# Patient Record
Sex: FEMALE | Race: WHITE | Employment: OTHER | URBAN - METROPOLITAN AREA
[De-identification: names, ages, dates, MRNs, and addresses within clinical notes are randomized per-mention and may not be internally consistent; named-entity substitution may affect disease eponyms.]

---

## 2018-12-10 ENCOUNTER — HOSPITAL ENCOUNTER (EMERGENCY)
Age: 78
Discharge: HOME | End: 2018-12-10
Payer: MEDICARE

## 2018-12-10 VITALS
RESPIRATION RATE: 18 BRPM | SYSTOLIC BLOOD PRESSURE: 140 MMHG | HEART RATE: 66 BPM | OXYGEN SATURATION: 98 % | TEMPERATURE: 98.6 F | HEIGHT: 62 IN | DIASTOLIC BLOOD PRESSURE: 58 MMHG | WEIGHT: 162 LBS | BODY MASS INDEX: 29.81 KG/M2

## 2018-12-10 DIAGNOSIS — Z76.0 ENCOUNTER FOR MEDICATION REFILL: Primary | ICD-10-CM

## 2018-12-10 DIAGNOSIS — G62.9 NEUROPATHY: ICD-10-CM

## 2018-12-10 PROCEDURE — 99281 EMR DPT VST MAYX REQ PHY/QHP: CPT

## 2018-12-10 RX ORDER — HYDROCODONE BITARTRATE AND ACETAMINOPHEN 5; 325 MG/1; MG/1
1 TABLET ORAL 2 TIMES DAILY PRN
Qty: 14 TABLET | Refills: 0 | Status: SHIPPED | OUTPATIENT
Start: 2018-12-10 | End: 2018-12-17

## 2018-12-10 NOTE — ED PROVIDER NOTES
found. Please discuss with provider. Electronically signed by CONNIE Fernandez CNP   DD: 12/10/18  **This report was transcribed using voice recognition software. Every effort was made to ensure accuracy; however, inadvertent computerized transcription errors may be present.   END OF ED PROVIDER NOTE       CONNIE Fernandez CNP  12/31/18 9974

## 2018-12-14 LAB
T4 FREE: 1.1
TSH SERPL DL<=0.05 MIU/L-ACNC: 2.7 UIU/ML

## 2019-01-16 ENCOUNTER — TELEPHONE (OUTPATIENT)
Dept: ONCOLOGY | Age: 79
End: 2019-01-16

## 2019-01-18 ENCOUNTER — OFFICE VISIT (OUTPATIENT)
Dept: ONCOLOGY | Age: 79
End: 2019-01-18
Payer: MEDICARE

## 2019-01-18 ENCOUNTER — HOSPITAL ENCOUNTER (OUTPATIENT)
Dept: INFUSION THERAPY | Age: 79
Discharge: HOME | End: 2019-01-18

## 2019-01-18 VITALS
SYSTOLIC BLOOD PRESSURE: 139 MMHG | DIASTOLIC BLOOD PRESSURE: 95 MMHG | WEIGHT: 148 LBS | HEART RATE: 84 BPM | RESPIRATION RATE: 20 BRPM | HEIGHT: 61 IN | TEMPERATURE: 98.7 F | BODY MASS INDEX: 27.94 KG/M2

## 2019-01-18 DIAGNOSIS — D46.9 MYELODYSPLASIA (MYELODYSPLASTIC SYNDROME) (HCC): ICD-10-CM

## 2019-01-18 DIAGNOSIS — Z12.39 BREAST SCREENING: ICD-10-CM

## 2019-01-18 DIAGNOSIS — D46.9 MYELODYSPLASIA (MYELODYSPLASTIC SYNDROME) (HCC): Primary | ICD-10-CM

## 2019-01-18 LAB
ANISOCYTOSIS: ABNORMAL
BASOPHILS ABSOLUTE: 0 E9/L (ref 0–0.2)
BASOPHILS RELATIVE PERCENT: 0.6 % (ref 0–2)
EOSINOPHILS ABSOLUTE: 0.12 E9/L (ref 0.05–0.5)
EOSINOPHILS RELATIVE PERCENT: 2.6 % (ref 0–6)
FERRITIN: 4651 NG/ML
FOLATE: 7.2 NG/ML (ref 4.8–24.2)
HCT VFR BLD CALC: 26.3 % (ref 34–48)
HEMOGLOBIN: 8.8 G/DL (ref 11.5–15.5)
HYPOCHROMIA: ABNORMAL
IMMATURE RETIC FRACT: 18.1 % (ref 3–15.9)
IRON SATURATION: 102 % (ref 15–50)
IRON: 231 MCG/DL (ref 37–145)
LYMPHOCYTES ABSOLUTE: 2.45 E9/L (ref 1.5–4)
LYMPHOCYTES RELATIVE PERCENT: 50.9 % (ref 20–42)
MCH RBC QN AUTO: 38.1 PG (ref 26–35)
MCHC RBC AUTO-ENTMCNC: 33.5 % (ref 32–34.5)
MCV RBC AUTO: 113.9 FL (ref 80–99.9)
MONOCYTES ABSOLUTE: 0.24 E9/L (ref 0.1–0.95)
MONOCYTES RELATIVE PERCENT: 5.3 % (ref 2–12)
NEUTROPHILS ABSOLUTE: 1.97 E9/L (ref 1.8–7.3)
NEUTROPHILS RELATIVE PERCENT: 41.2 % (ref 43–80)
NUCLEATED RED BLOOD CELLS: 1.8 /100 WBC
OVALOCYTES: ABNORMAL
PDW BLD-RTO: 23.4 FL (ref 11.5–15)
PLATELET # BLD: 210 E9/L (ref 130–450)
PMV BLD AUTO: 10.3 FL (ref 7–12)
POIKILOCYTES: ABNORMAL
POLYCHROMASIA: ABNORMAL
RBC # BLD: 2.31 E12/L (ref 3.5–5.5)
RETIC HGB EQUIVALENT: 35.2 PG (ref 28.2–36.6)
RETICULOCYTE ABSOLUTE COUNT: 0.05 E12/L
RETICULOCYTE COUNT PCT: 2.2 % (ref 0.4–1.9)
SCHISTOCYTES: ABNORMAL
TARGET CELLS: ABNORMAL
TOTAL IRON BINDING CAPACITY: 227 MCG/DL (ref 250–450)
VITAMIN B-12: 657 PG/ML (ref 211–946)
WBC # BLD: 4.8 E9/L (ref 4.5–11.5)

## 2019-01-18 PROCEDURE — G8484 FLU IMMUNIZE NO ADMIN: HCPCS | Performed by: INTERNAL MEDICINE

## 2019-01-18 PROCEDURE — 84165 PROTEIN E-PHORESIS SERUM: CPT

## 2019-01-18 PROCEDURE — 1090F PRES/ABSN URINE INCON ASSESS: CPT | Performed by: INTERNAL MEDICINE

## 2019-01-18 PROCEDURE — 99205 OFFICE O/P NEW HI 60 MIN: CPT | Performed by: INTERNAL MEDICINE

## 2019-01-18 PROCEDURE — 83540 ASSAY OF IRON: CPT

## 2019-01-18 PROCEDURE — 82746 ASSAY OF FOLIC ACID SERUM: CPT

## 2019-01-18 PROCEDURE — 1123F ACP DISCUSS/DSCN MKR DOCD: CPT | Performed by: INTERNAL MEDICINE

## 2019-01-18 PROCEDURE — 1036F TOBACCO NON-USER: CPT | Performed by: INTERNAL MEDICINE

## 2019-01-18 PROCEDURE — 4040F PNEUMOC VAC/ADMIN/RCVD: CPT | Performed by: INTERNAL MEDICINE

## 2019-01-18 PROCEDURE — 83550 IRON BINDING TEST: CPT

## 2019-01-18 PROCEDURE — 85045 AUTOMATED RETICULOCYTE COUNT: CPT

## 2019-01-18 PROCEDURE — G8419 CALC BMI OUT NRM PARAM NOF/U: HCPCS | Performed by: INTERNAL MEDICINE

## 2019-01-18 PROCEDURE — 36415 COLL VENOUS BLD VENIPUNCTURE: CPT

## 2019-01-18 PROCEDURE — 82728 ASSAY OF FERRITIN: CPT

## 2019-01-18 PROCEDURE — 82668 ASSAY OF ERYTHROPOIETIN: CPT

## 2019-01-18 PROCEDURE — G8400 PT W/DXA NO RESULTS DOC: HCPCS | Performed by: INTERNAL MEDICINE

## 2019-01-18 PROCEDURE — 1101F PT FALLS ASSESS-DOCD LE1/YR: CPT | Performed by: INTERNAL MEDICINE

## 2019-01-18 PROCEDURE — G8427 DOCREV CUR MEDS BY ELIG CLIN: HCPCS | Performed by: INTERNAL MEDICINE

## 2019-01-18 PROCEDURE — 85025 COMPLETE CBC W/AUTO DIFF WBC: CPT

## 2019-01-18 PROCEDURE — 82607 VITAMIN B-12: CPT

## 2019-01-18 PROCEDURE — 99214 OFFICE O/P EST MOD 30 MIN: CPT | Performed by: INTERNAL MEDICINE

## 2019-01-20 LAB — ERYTHROPOIETIN: 77 MU/ML (ref 4–27)

## 2019-01-21 LAB
ALBUMIN SERPL-MCNC: 4 G/DL (ref 3.5–4.7)
ALPHA-1-GLOBULIN: 0.2 G/DL (ref 0.2–0.4)
ALPHA-2-GLOBULIN: 0.7 G/FL (ref 0.5–1)
BETA GLOBULIN: 0.9 G/DL (ref 0.8–1.3)
ELECTROPHORESIS: NORMAL
GAMMA GLOBULIN: 1.5 G/DL (ref 0.7–1.6)
TOTAL PROTEIN: 7.3 G/DL (ref 6.4–8.3)

## 2019-01-29 ENCOUNTER — APPOINTMENT (OUTPATIENT)
Dept: CT IMAGING | Age: 79
End: 2019-01-29
Payer: MEDICARE

## 2019-01-29 ENCOUNTER — APPOINTMENT (OUTPATIENT)
Dept: GENERAL RADIOLOGY | Age: 79
End: 2019-01-29
Payer: MEDICARE

## 2019-01-29 ENCOUNTER — HOSPITAL ENCOUNTER (OUTPATIENT)
Age: 79
Setting detail: OBSERVATION
Discharge: HOME | End: 2019-01-30
Attending: EMERGENCY MEDICINE | Admitting: INTERNAL MEDICINE
Payer: MEDICARE

## 2019-01-29 DIAGNOSIS — R09.02 HYPOXIA: ICD-10-CM

## 2019-01-29 DIAGNOSIS — J40 BRONCHITIS: Primary | ICD-10-CM

## 2019-01-29 PROBLEM — J44.1 COPD EXACERBATION (HCC): Status: ACTIVE | Noted: 2019-01-29

## 2019-01-29 LAB
ALBUMIN SERPL-MCNC: 4.5 G/DL (ref 3.5–5.2)
ALP BLD-CCNC: 45 U/L (ref 35–104)
ALT SERPL-CCNC: 30 U/L (ref 0–32)
ANION GAP SERPL CALCULATED.3IONS-SCNC: 11 MMOL/L (ref 7–16)
ANISOCYTOSIS: ABNORMAL
AST SERPL-CCNC: 28 U/L (ref 0–31)
BACTERIA: ABNORMAL /HPF
BASOPHILS ABSOLUTE: 0.03 E9/L (ref 0–0.2)
BASOPHILS RELATIVE PERCENT: 0.5 % (ref 0–2)
BILIRUB SERPL-MCNC: 1.4 MG/DL (ref 0–1.2)
BILIRUBIN URINE: NEGATIVE
BLOOD, URINE: NEGATIVE
BUN BLDV-MCNC: 26 MG/DL (ref 8–23)
CALCIUM SERPL-MCNC: 9.6 MG/DL (ref 8.6–10.2)
CASTS UA: ABNORMAL /LPF
CHLORIDE BLD-SCNC: 101 MMOL/L (ref 98–107)
CLARITY: CLEAR
CO2: 25 MMOL/L (ref 22–29)
COLOR: ABNORMAL
CREAT SERPL-MCNC: 0.9 MG/DL (ref 0.5–1)
CRYSTALS, UA: ABNORMAL
EOSINOPHILS ABSOLUTE: 0.02 E9/L (ref 0.05–0.5)
EOSINOPHILS RELATIVE PERCENT: 0.3 % (ref 0–6)
GFR AFRICAN AMERICAN: >60
GFR NON-AFRICAN AMERICAN: >60 ML/MIN/1.73
GLUCOSE BLD-MCNC: 119 MG/DL (ref 74–99)
GLUCOSE URINE: NEGATIVE MG/DL
HCT VFR BLD CALC: 26.3 % (ref 34–48)
HEMOGLOBIN: 9.1 G/DL (ref 11.5–15.5)
HYPOCHROMIA: ABNORMAL
IMMATURE GRANULOCYTES #: 0.06 E9/L
IMMATURE GRANULOCYTES %: 1 % (ref 0–5)
KETONES, URINE: ABNORMAL MG/DL
LACTIC ACID: 3.2 MMOL/L (ref 0.5–2.2)
LEUKOCYTE ESTERASE, URINE: ABNORMAL
LYMPHOCYTES ABSOLUTE: 2.04 E9/L (ref 1.5–4)
LYMPHOCYTES RELATIVE PERCENT: 32.5 % (ref 20–42)
MCH RBC QN AUTO: 40.8 PG (ref 26–35)
MCHC RBC AUTO-ENTMCNC: 34.6 % (ref 32–34.5)
MCV RBC AUTO: 117.9 FL (ref 80–99.9)
MONOCYTES ABSOLUTE: 0.55 E9/L (ref 0.1–0.95)
MONOCYTES RELATIVE PERCENT: 8.8 % (ref 2–12)
NEUTROPHILS ABSOLUTE: 3.58 E9/L (ref 1.8–7.3)
NEUTROPHILS RELATIVE PERCENT: 56.9 % (ref 43–80)
NITRITE, URINE: NEGATIVE
PDW BLD-RTO: 25.2 FL (ref 11.5–15)
PH UA: 5.5 (ref 5–9)
PLATELET # BLD: 275 E9/L (ref 130–450)
PMV BLD AUTO: 11.1 FL (ref 7–12)
POIKILOCYTES: ABNORMAL
POTASSIUM SERPL-SCNC: 4.5 MMOL/L (ref 3.5–5)
PRO-BNP: 375 PG/ML (ref 0–450)
PROTEIN UA: NEGATIVE MG/DL
RBC # BLD: 2.23 E12/L (ref 3.5–5.5)
RBC UA: ABNORMAL /HPF (ref 0–2)
SEDIMENTATION RATE, ERYTHROCYTE: 1 MM/HR (ref 0–20)
SODIUM BLD-SCNC: 137 MMOL/L (ref 132–146)
SPECIFIC GRAVITY UA: 1.02 (ref 1–1.03)
TEAR DROP CELLS: ABNORMAL
TOTAL PROTEIN: 7.5 G/DL (ref 6.4–8.3)
TROPONIN: <0.01 NG/ML (ref 0–0.03)
UROBILINOGEN, URINE: 0.2 E.U./DL
WBC # BLD: 6.3 E9/L (ref 4.5–11.5)
WBC UA: ABNORMAL /HPF (ref 0–5)

## 2019-01-29 PROCEDURE — 85025 COMPLETE CBC W/AUTO DIFF WBC: CPT

## 2019-01-29 PROCEDURE — 6360000002 HC RX W HCPCS: Performed by: EMERGENCY MEDICINE

## 2019-01-29 PROCEDURE — 80053 COMPREHEN METABOLIC PANEL: CPT

## 2019-01-29 PROCEDURE — 83605 ASSAY OF LACTIC ACID: CPT

## 2019-01-29 PROCEDURE — 71045 X-RAY EXAM CHEST 1 VIEW: CPT

## 2019-01-29 PROCEDURE — 85651 RBC SED RATE NONAUTOMATED: CPT

## 2019-01-29 PROCEDURE — 87798 DETECT AGENT NOS DNA AMP: CPT

## 2019-01-29 PROCEDURE — 96365 THER/PROPH/DIAG IV INF INIT: CPT

## 2019-01-29 PROCEDURE — 86140 C-REACTIVE PROTEIN: CPT

## 2019-01-29 PROCEDURE — 87633 RESP VIRUS 12-25 TARGETS: CPT

## 2019-01-29 PROCEDURE — 84484 ASSAY OF TROPONIN QUANT: CPT

## 2019-01-29 PROCEDURE — 96366 THER/PROPH/DIAG IV INF ADDON: CPT

## 2019-01-29 PROCEDURE — 2580000003 HC RX 258: Performed by: EMERGENCY MEDICINE

## 2019-01-29 PROCEDURE — 84145 PROCALCITONIN (PCT): CPT

## 2019-01-29 PROCEDURE — 83880 ASSAY OF NATRIURETIC PEPTIDE: CPT

## 2019-01-29 PROCEDURE — 94664 DEMO&/EVAL PT USE INHALER: CPT

## 2019-01-29 PROCEDURE — 87486 CHLMYD PNEUM DNA AMP PROBE: CPT

## 2019-01-29 PROCEDURE — 94640 AIRWAY INHALATION TREATMENT: CPT

## 2019-01-29 PROCEDURE — G0378 HOSPITAL OBSERVATION PER HR: HCPCS

## 2019-01-29 PROCEDURE — 87040 BLOOD CULTURE FOR BACTERIA: CPT

## 2019-01-29 PROCEDURE — 99285 EMERGENCY DEPT VISIT HI MDM: CPT

## 2019-01-29 PROCEDURE — 81001 URINALYSIS AUTO W/SCOPE: CPT

## 2019-01-29 PROCEDURE — 87581 M.PNEUMON DNA AMP PROBE: CPT

## 2019-01-29 PROCEDURE — 99219 PR INITIAL OBSERVATION CARE/DAY 50 MINUTES: CPT | Performed by: INTERNAL MEDICINE

## 2019-01-29 PROCEDURE — 6370000000 HC RX 637 (ALT 250 FOR IP): Performed by: EMERGENCY MEDICINE

## 2019-01-29 PROCEDURE — 36415 COLL VENOUS BLD VENIPUNCTURE: CPT

## 2019-01-29 RX ORDER — SODIUM CHLORIDE 0.9 % (FLUSH) 0.9 %
10 SYRINGE (ML) INJECTION PRN
Status: DISCONTINUED | OUTPATIENT
Start: 2019-01-29 | End: 2019-01-30 | Stop reason: HOSPADM

## 2019-01-29 RX ORDER — LEVOTHYROXINE SODIUM 175 UG/1
175 TABLET ORAL EVERY MORNING
COMMUNITY
End: 2020-07-17 | Stop reason: SDUPTHER

## 2019-01-29 RX ORDER — 0.9 % SODIUM CHLORIDE 0.9 %
1000 INTRAVENOUS SOLUTION INTRAVENOUS ONCE
Status: COMPLETED | OUTPATIENT
Start: 2019-01-29 | End: 2019-01-30

## 2019-01-29 RX ORDER — LEVOFLOXACIN 5 MG/ML
750 INJECTION, SOLUTION INTRAVENOUS ONCE
Status: COMPLETED | OUTPATIENT
Start: 2019-01-29 | End: 2019-01-30

## 2019-01-29 RX ORDER — IPRATROPIUM BROMIDE AND ALBUTEROL SULFATE 2.5; .5 MG/3ML; MG/3ML
3 SOLUTION RESPIRATORY (INHALATION) ONCE
Status: COMPLETED | OUTPATIENT
Start: 2019-01-29 | End: 2019-01-29

## 2019-01-29 RX ORDER — DOXYCYCLINE HYCLATE 100 MG/1
1 CAPSULE ORAL 2 TIMES DAILY
Refills: 0 | Status: ON HOLD | COMMUNITY
Start: 2019-01-25 | End: 2019-01-30 | Stop reason: HOSPADM

## 2019-01-29 RX ORDER — PREDNISONE 10 MG/1
10 TABLET ORAL SEE ADMIN INSTRUCTIONS
Refills: 0 | COMMUNITY
Start: 2019-01-25 | End: 2019-01-31

## 2019-01-29 RX ORDER — GABAPENTIN 300 MG/1
900 CAPSULE ORAL NIGHTLY
COMMUNITY
End: 2019-06-11 | Stop reason: SDUPTHER

## 2019-01-29 RX ADMIN — IPRATROPIUM BROMIDE AND ALBUTEROL SULFATE 3 AMPULE: .5; 3 SOLUTION RESPIRATORY (INHALATION) at 19:05

## 2019-01-29 RX ADMIN — SODIUM CHLORIDE 1000 ML: 9 INJECTION, SOLUTION INTRAVENOUS at 20:21

## 2019-01-29 RX ADMIN — LEVOFLOXACIN 750 MG: 5 INJECTION, SOLUTION INTRAVENOUS at 21:17

## 2019-01-29 ASSESSMENT — ENCOUNTER SYMPTOMS
NAUSEA: 0
STRIDOR: 0
COUGH: 1
BLOOD IN STOOL: 0
ABDOMINAL PAIN: 0
SHORTNESS OF BREATH: 0
BACK PAIN: 0
VOMITING: 0
WHEEZING: 1
CHEST TIGHTNESS: 0

## 2019-01-29 ASSESSMENT — PAIN SCALES - GENERAL: PAINLEVEL_OUTOF10: 0

## 2019-01-30 VITALS
RESPIRATION RATE: 18 BRPM | BODY MASS INDEX: 27.73 KG/M2 | SYSTOLIC BLOOD PRESSURE: 130 MMHG | OXYGEN SATURATION: 94 % | WEIGHT: 146.9 LBS | DIASTOLIC BLOOD PRESSURE: 59 MMHG | HEIGHT: 61 IN | TEMPERATURE: 97.9 F | HEART RATE: 68 BPM

## 2019-01-30 PROBLEM — G60.8 PERIPHERAL SENSORY NEUROPATHY: Chronic | Status: ACTIVE | Noted: 2019-01-29

## 2019-01-30 PROBLEM — R79.89 ELEVATED LACTIC ACID LEVEL: Status: ACTIVE | Noted: 2019-01-29

## 2019-01-30 PROBLEM — R05.9 COUGH: Status: ACTIVE | Noted: 2019-01-29

## 2019-01-30 PROBLEM — F32.A DEPRESSION: Chronic | Status: ACTIVE | Noted: 2019-01-29

## 2019-01-30 PROBLEM — E03.9 HYPOTHYROIDISM: Chronic | Status: ACTIVE | Noted: 2019-01-29

## 2019-01-30 PROBLEM — J40 BRONCHITIS: Status: ACTIVE | Noted: 2019-01-29

## 2019-01-30 PROBLEM — D46.9 MYELODYSPLASIA (MYELODYSPLASTIC SYNDROME) (HCC): Chronic | Status: ACTIVE | Noted: 2019-01-18

## 2019-01-30 PROBLEM — D53.9 MACROCYTIC ANEMIA: Status: ACTIVE | Noted: 2019-01-29

## 2019-01-30 LAB
ANION GAP SERPL CALCULATED.3IONS-SCNC: 9 MMOL/L (ref 7–16)
BUN BLDV-MCNC: 20 MG/DL (ref 8–23)
C-REACTIVE PROTEIN: 0.3 MG/DL (ref 0–0.4)
CALCIUM SERPL-MCNC: 8.6 MG/DL (ref 8.6–10.2)
CHLORIDE BLD-SCNC: 106 MMOL/L (ref 98–107)
CO2: 24 MMOL/L (ref 22–29)
CREAT SERPL-MCNC: 0.7 MG/DL (ref 0.5–1)
FILM ARRAY ADENOVIRUS: NORMAL
FILM ARRAY BORDETELLA PERTUSSIS: NORMAL
FILM ARRAY CHLAMYDOPHILIA PNEUMONIAE: NORMAL
FILM ARRAY CORONAVIRUS 229E: NORMAL
FILM ARRAY CORONAVIRUS HKU1: NORMAL
FILM ARRAY CORONAVIRUS NL63: NORMAL
FILM ARRAY CORONAVIRUS OC43: NORMAL
FILM ARRAY INFLUENZA A VIRUS 09H1: NORMAL
FILM ARRAY INFLUENZA A VIRUS H1: NORMAL
FILM ARRAY INFLUENZA A VIRUS H3: NORMAL
FILM ARRAY INFLUENZA A VIRUS: NORMAL
FILM ARRAY INFLUENZA B: NORMAL
FILM ARRAY METAPNEUMOVIRUS: NORMAL
FILM ARRAY MYCOPLASMA PNEUMONIAE: NORMAL
FILM ARRAY PARAINFLUENZA VIRUS 1: NORMAL
FILM ARRAY PARAINFLUENZA VIRUS 2: NORMAL
FILM ARRAY PARAINFLUENZA VIRUS 3: NORMAL
FILM ARRAY PARAINFLUENZA VIRUS 4: NORMAL
FILM ARRAY RESPIRATORY SYNCITIAL VIRUS: NORMAL
FILM ARRAY RHINOVIRUS/ENTEROVIRUS: NORMAL
GFR AFRICAN AMERICAN: >60
GFR NON-AFRICAN AMERICAN: >60 ML/MIN/1.73
GLUCOSE BLD-MCNC: 87 MG/DL (ref 74–99)
LACTIC ACID: 1.9 MMOL/L (ref 0.5–2.2)
POTASSIUM REFLEX MAGNESIUM: 4.2 MMOL/L (ref 3.5–5)
PROCALCITONIN: 0.05 NG/ML (ref 0–0.08)
SODIUM BLD-SCNC: 139 MMOL/L (ref 132–146)

## 2019-01-30 PROCEDURE — 2580000003 HC RX 258: Performed by: INTERNAL MEDICINE

## 2019-01-30 PROCEDURE — 6360000002 HC RX W HCPCS: Performed by: INTERNAL MEDICINE

## 2019-01-30 PROCEDURE — 6370000000 HC RX 637 (ALT 250 FOR IP): Performed by: INTERNAL MEDICINE

## 2019-01-30 PROCEDURE — 36415 COLL VENOUS BLD VENIPUNCTURE: CPT

## 2019-01-30 PROCEDURE — 80048 BASIC METABOLIC PNL TOTAL CA: CPT

## 2019-01-30 PROCEDURE — G0378 HOSPITAL OBSERVATION PER HR: HCPCS

## 2019-01-30 PROCEDURE — 83605 ASSAY OF LACTIC ACID: CPT

## 2019-01-30 PROCEDURE — APPSS45 APP SPLIT SHARED TIME 31-45 MINUTES: Performed by: PHYSICIAN ASSISTANT

## 2019-01-30 PROCEDURE — 96372 THER/PROPH/DIAG INJ SC/IM: CPT

## 2019-01-30 PROCEDURE — 99217 PR OBSERVATION CARE DISCHARGE MANAGEMENT: CPT | Performed by: HOSPITALIST

## 2019-01-30 PROCEDURE — 6370000000 HC RX 637 (ALT 250 FOR IP): Performed by: HOSPITALIST

## 2019-01-30 PROCEDURE — 94640 AIRWAY INHALATION TREATMENT: CPT

## 2019-01-30 RX ORDER — ALBUTEROL SULFATE 90 UG/1
2 AEROSOL, METERED RESPIRATORY (INHALATION) EVERY 6 HOURS PRN
Qty: 1 INHALER | Refills: 3 | Status: SHIPPED | OUTPATIENT
Start: 2019-01-30 | End: 2019-05-28

## 2019-01-30 RX ORDER — BENZONATATE 100 MG/1
100 CAPSULE ORAL EVERY 8 HOURS
Qty: 21 CAPSULE | Refills: 0 | Status: SHIPPED | OUTPATIENT
Start: 2019-01-30 | End: 2019-02-06

## 2019-01-30 RX ORDER — BENZONATATE 100 MG/1
100 CAPSULE ORAL EVERY 8 HOURS
Status: DISCONTINUED | OUTPATIENT
Start: 2019-01-30 | End: 2019-01-30 | Stop reason: HOSPADM

## 2019-01-30 RX ORDER — SODIUM CHLORIDE 9 MG/ML
INJECTION, SOLUTION INTRAVENOUS CONTINUOUS
Status: ACTIVE | OUTPATIENT
Start: 2019-01-30 | End: 2019-01-30

## 2019-01-30 RX ORDER — GUAIFENESIN/DEXTROMETHORPHAN 100-10MG/5
5 SYRUP ORAL EVERY 4 HOURS PRN
Qty: 120 ML | Refills: 0 | Status: SHIPPED | OUTPATIENT
Start: 2019-01-30 | End: 2019-02-09

## 2019-01-30 RX ORDER — IPRATROPIUM BROMIDE AND ALBUTEROL SULFATE 2.5; .5 MG/3ML; MG/3ML
1 SOLUTION RESPIRATORY (INHALATION) EVERY 4 HOURS PRN
Status: DISCONTINUED | OUTPATIENT
Start: 2019-01-30 | End: 2019-01-30 | Stop reason: HOSPADM

## 2019-01-30 RX ORDER — SODIUM CHLORIDE 0.9 % (FLUSH) 0.9 %
10 SYRINGE (ML) INJECTION EVERY 12 HOURS SCHEDULED
Status: DISCONTINUED | OUTPATIENT
Start: 2019-01-30 | End: 2019-01-30 | Stop reason: HOSPADM

## 2019-01-30 RX ORDER — SODIUM CHLORIDE 0.9 % (FLUSH) 0.9 %
10 SYRINGE (ML) INJECTION PRN
Status: DISCONTINUED | OUTPATIENT
Start: 2019-01-30 | End: 2019-01-30 | Stop reason: HOSPADM

## 2019-01-30 RX ORDER — GUAIFENESIN/DEXTROMETHORPHAN 100-10MG/5
5 SYRUP ORAL EVERY 4 HOURS PRN
Status: DISCONTINUED | OUTPATIENT
Start: 2019-01-30 | End: 2019-01-30 | Stop reason: HOSPADM

## 2019-01-30 RX ORDER — ACETAMINOPHEN 325 MG/1
650 TABLET ORAL EVERY 4 HOURS PRN
Status: DISCONTINUED | OUTPATIENT
Start: 2019-01-30 | End: 2019-01-30 | Stop reason: HOSPADM

## 2019-01-30 RX ORDER — ONDANSETRON 2 MG/ML
4 INJECTION INTRAMUSCULAR; INTRAVENOUS EVERY 6 HOURS PRN
Status: DISCONTINUED | OUTPATIENT
Start: 2019-01-30 | End: 2019-01-30 | Stop reason: HOSPADM

## 2019-01-30 RX ORDER — HYDROCODONE BITARTRATE AND ACETAMINOPHEN 5; 325 MG/1; MG/1
1 TABLET ORAL EVERY 12 HOURS PRN
Status: DISCONTINUED | OUTPATIENT
Start: 2019-01-30 | End: 2019-01-30 | Stop reason: HOSPADM

## 2019-01-30 RX ORDER — FAMOTIDINE 20 MG/1
20 TABLET, FILM COATED ORAL 2 TIMES DAILY
Status: DISCONTINUED | OUTPATIENT
Start: 2019-01-30 | End: 2019-01-30 | Stop reason: HOSPADM

## 2019-01-30 RX ORDER — ALBUTEROL SULFATE 90 UG/1
2 AEROSOL, METERED RESPIRATORY (INHALATION) 3 TIMES DAILY
Status: DISCONTINUED | OUTPATIENT
Start: 2019-01-30 | End: 2019-01-30 | Stop reason: HOSPADM

## 2019-01-30 RX ORDER — GABAPENTIN 300 MG/1
900 CAPSULE ORAL NIGHTLY
Status: DISCONTINUED | OUTPATIENT
Start: 2019-01-30 | End: 2019-01-30 | Stop reason: HOSPADM

## 2019-01-30 RX ADMIN — Medication 10 ML: at 10:27

## 2019-01-30 RX ADMIN — BENZONATATE 100 MG: 100 CAPSULE ORAL at 03:33

## 2019-01-30 RX ADMIN — GUAIFENESIN AND DEXTROMETHORPHAN 5 ML: 100; 10 SYRUP ORAL at 10:35

## 2019-01-30 RX ADMIN — GUAIFENESIN AND DEXTROMETHORPHAN 5 ML: 100; 10 SYRUP ORAL at 03:33

## 2019-01-30 RX ADMIN — FAMOTIDINE 20 MG: 20 TABLET, FILM COATED ORAL at 10:28

## 2019-01-30 RX ADMIN — SODIUM CHLORIDE: 9 INJECTION, SOLUTION INTRAVENOUS at 03:34

## 2019-01-30 RX ADMIN — ALBUTEROL SULFATE 2 PUFF: 90 AEROSOL, METERED RESPIRATORY (INHALATION) at 16:20

## 2019-01-30 RX ADMIN — BENZONATATE 100 MG: 100 CAPSULE ORAL at 10:26

## 2019-01-30 RX ADMIN — ENOXAPARIN SODIUM 40 MG: 40 INJECTION SUBCUTANEOUS at 10:26

## 2019-01-30 ASSESSMENT — PAIN DESCRIPTION - ORIENTATION: ORIENTATION: RIGHT;LEFT

## 2019-01-30 ASSESSMENT — PAIN DESCRIPTION - ONSET: ONSET: ON-GOING

## 2019-01-30 ASSESSMENT — PAIN DESCRIPTION - DESCRIPTORS: DESCRIPTORS: SORE

## 2019-01-30 ASSESSMENT — PAIN - FUNCTIONAL ASSESSMENT: PAIN_FUNCTIONAL_ASSESSMENT: PREVENTS OR INTERFERES SOME ACTIVE ACTIVITIES AND ADLS

## 2019-01-30 ASSESSMENT — PAIN DESCRIPTION - FREQUENCY: FREQUENCY: INTERMITTENT

## 2019-01-30 ASSESSMENT — PAIN SCALES - GENERAL: PAINLEVEL_OUTOF10: 1

## 2019-01-30 ASSESSMENT — PAIN DESCRIPTION - PAIN TYPE: TYPE: ACUTE PAIN

## 2019-01-30 ASSESSMENT — PAIN DESCRIPTION - LOCATION: LOCATION: CHEST

## 2019-02-01 ENCOUNTER — HOSPITAL ENCOUNTER (OUTPATIENT)
Dept: INFUSION THERAPY | Age: 79
End: 2019-02-01

## 2019-02-04 LAB
BLOOD CULTURE, ROUTINE: NORMAL
CULTURE, BLOOD 2: NORMAL
EKG ATRIAL RATE: 76 BPM
EKG P AXIS: 79 DEGREES
EKG P-R INTERVAL: 158 MS
EKG Q-T INTERVAL: 378 MS
EKG QRS DURATION: 80 MS
EKG QTC CALCULATION (BAZETT): 425 MS
EKG R AXIS: -7 DEGREES
EKG T AXIS: 44 DEGREES
EKG VENTRICULAR RATE: 76 BPM

## 2019-03-01 PROBLEM — R05.9 COUGH: Status: RESOLVED | Noted: 2019-01-29 | Resolved: 2019-03-01

## 2019-03-13 ENCOUNTER — APPOINTMENT (OUTPATIENT)
Dept: CT IMAGING | Age: 79
DRG: 195 | End: 2019-03-13
Payer: MEDICARE

## 2019-03-13 ENCOUNTER — APPOINTMENT (OUTPATIENT)
Dept: GENERAL RADIOLOGY | Age: 79
DRG: 195 | End: 2019-03-13
Payer: MEDICARE

## 2019-03-13 ENCOUNTER — HOSPITAL ENCOUNTER (INPATIENT)
Age: 79
LOS: 1 days | Discharge: HOME | DRG: 195 | End: 2019-03-14
Attending: EMERGENCY MEDICINE | Admitting: INTERNAL MEDICINE
Payer: MEDICARE

## 2019-03-13 DIAGNOSIS — J18.9 PNEUMONIA DUE TO ORGANISM: Primary | ICD-10-CM

## 2019-03-13 LAB
ALBUMIN SERPL-MCNC: 4.2 G/DL (ref 3.5–5.2)
ALP BLD-CCNC: 44 U/L (ref 35–104)
ALT SERPL-CCNC: 42 U/L (ref 0–32)
ANION GAP SERPL CALCULATED.3IONS-SCNC: 12 MMOL/L (ref 7–16)
ANISOCYTOSIS: ABNORMAL
AST SERPL-CCNC: 33 U/L (ref 0–31)
ATYPICAL LYMPHOCYTE RELATIVE PERCENT: 5.2 % (ref 0–4)
BASOPHILS ABSOLUTE: 0.06 E9/L (ref 0–0.2)
BASOPHILS RELATIVE PERCENT: 0.9 % (ref 0–2)
BILIRUB SERPL-MCNC: 1.6 MG/DL (ref 0–1.2)
BILIRUBIN URINE: NEGATIVE
BLOOD, URINE: NEGATIVE
BUN BLDV-MCNC: 17 MG/DL (ref 8–23)
CALCIUM SERPL-MCNC: 8.8 MG/DL (ref 8.6–10.2)
CHLORIDE BLD-SCNC: 100 MMOL/L (ref 98–107)
CLARITY: CLEAR
CO2: 24 MMOL/L (ref 22–29)
COLOR: YELLOW
CREAT SERPL-MCNC: 0.8 MG/DL (ref 0.5–1)
EOSINOPHILS ABSOLUTE: 0 E9/L (ref 0.05–0.5)
EOSINOPHILS RELATIVE PERCENT: 0 % (ref 0–6)
GFR AFRICAN AMERICAN: >60
GFR NON-AFRICAN AMERICAN: >60 ML/MIN/1.73
GLUCOSE BLD-MCNC: 104 MG/DL (ref 74–99)
GLUCOSE URINE: NEGATIVE MG/DL
HCT VFR BLD CALC: 26 % (ref 34–48)
HEMOGLOBIN: 8.5 G/DL (ref 11.5–15.5)
HYPOCHROMIA: ABNORMAL
INFLUENZA A BY PCR: NOT DETECTED
INFLUENZA B BY PCR: NOT DETECTED
KETONES, URINE: NEGATIVE MG/DL
LACTIC ACID: 2.3 MMOL/L (ref 0.5–2.2)
LEUKOCYTE ESTERASE, URINE: NEGATIVE
LIPASE: 41 U/L (ref 13–60)
LYMPHOCYTES ABSOLUTE: 1.52 E9/L (ref 1.5–4)
LYMPHOCYTES RELATIVE PERCENT: 17.4 % (ref 20–42)
MCH RBC QN AUTO: 38.3 PG (ref 26–35)
MCHC RBC AUTO-ENTMCNC: 32.7 % (ref 32–34.5)
MCV RBC AUTO: 117.1 FL (ref 80–99.9)
METAMYELOCYTES RELATIVE PERCENT: 1.7 % (ref 0–1)
MONOCYTES ABSOLUTE: 0.4 E9/L (ref 0.1–0.95)
MONOCYTES RELATIVE PERCENT: 6.1 % (ref 2–12)
NEUTROPHILS ABSOLUTE: 4.62 E9/L (ref 1.8–7.3)
NEUTROPHILS RELATIVE PERCENT: 68.7 % (ref 43–80)
NITRITE, URINE: NEGATIVE
NUCLEATED RED BLOOD CELLS: 0 /100 WBC
OVALOCYTES: ABNORMAL
PDW BLD-RTO: 24.3 FL (ref 11.5–15)
PH UA: 7 (ref 5–9)
PLATELET # BLD: 174 E9/L (ref 130–450)
PMV BLD AUTO: 10.2 FL (ref 7–12)
POIKILOCYTES: ABNORMAL
POLYCHROMASIA: ABNORMAL
POTASSIUM SERPL-SCNC: 4.1 MMOL/L (ref 3.5–5)
PROTEIN UA: NEGATIVE MG/DL
RBC # BLD: 2.22 E12/L (ref 3.5–5.5)
SODIUM BLD-SCNC: 136 MMOL/L (ref 132–146)
SPECIFIC GRAVITY UA: 1.01 (ref 1–1.03)
TARGET CELLS: ABNORMAL
TEAR DROP CELLS: ABNORMAL
TOTAL PROTEIN: 7.1 G/DL (ref 6.4–8.3)
UROBILINOGEN, URINE: 0.2 E.U./DL
WBC # BLD: 6.6 E9/L (ref 4.5–11.5)

## 2019-03-13 PROCEDURE — 71046 X-RAY EXAM CHEST 2 VIEWS: CPT

## 2019-03-13 PROCEDURE — 6370000000 HC RX 637 (ALT 250 FOR IP): Performed by: INTERNAL MEDICINE

## 2019-03-13 PROCEDURE — 74176 CT ABD & PELVIS W/O CONTRAST: CPT

## 2019-03-13 PROCEDURE — 83605 ASSAY OF LACTIC ACID: CPT

## 2019-03-13 PROCEDURE — 99285 EMERGENCY DEPT VISIT HI MDM: CPT

## 2019-03-13 PROCEDURE — 2580000003 HC RX 258: Performed by: EMERGENCY MEDICINE

## 2019-03-13 PROCEDURE — 81003 URINALYSIS AUTO W/O SCOPE: CPT

## 2019-03-13 PROCEDURE — 87040 BLOOD CULTURE FOR BACTERIA: CPT

## 2019-03-13 PROCEDURE — 99223 1ST HOSP IP/OBS HIGH 75: CPT | Performed by: INTERNAL MEDICINE

## 2019-03-13 PROCEDURE — G0378 HOSPITAL OBSERVATION PER HR: HCPCS

## 2019-03-13 PROCEDURE — 87633 RESP VIRUS 12-25 TARGETS: CPT

## 2019-03-13 PROCEDURE — 6370000000 HC RX 637 (ALT 250 FOR IP): Performed by: PHYSICIAN ASSISTANT

## 2019-03-13 PROCEDURE — 96365 THER/PROPH/DIAG IV INF INIT: CPT

## 2019-03-13 PROCEDURE — 85025 COMPLETE CBC W/AUTO DIFF WBC: CPT

## 2019-03-13 PROCEDURE — 36415 COLL VENOUS BLD VENIPUNCTURE: CPT

## 2019-03-13 PROCEDURE — 6360000002 HC RX W HCPCS: Performed by: INTERNAL MEDICINE

## 2019-03-13 PROCEDURE — 87798 DETECT AGENT NOS DNA AMP: CPT

## 2019-03-13 PROCEDURE — 87088 URINE BACTERIA CULTURE: CPT

## 2019-03-13 PROCEDURE — 6360000002 HC RX W HCPCS: Performed by: EMERGENCY MEDICINE

## 2019-03-13 PROCEDURE — 80053 COMPREHEN METABOLIC PANEL: CPT

## 2019-03-13 PROCEDURE — 87486 CHLMYD PNEUM DNA AMP PROBE: CPT

## 2019-03-13 PROCEDURE — 2060000000 HC ICU INTERMEDIATE R&B

## 2019-03-13 PROCEDURE — 87581 M.PNEUMON DNA AMP PROBE: CPT

## 2019-03-13 PROCEDURE — 87502 INFLUENZA DNA AMP PROBE: CPT

## 2019-03-13 PROCEDURE — 83690 ASSAY OF LIPASE: CPT

## 2019-03-13 RX ORDER — ACETAMINOPHEN 325 MG/1
650 TABLET ORAL EVERY 6 HOURS PRN
Status: DISCONTINUED | OUTPATIENT
Start: 2019-03-13 | End: 2019-03-14 | Stop reason: HOSPADM

## 2019-03-13 RX ORDER — ALBUTEROL SULFATE 90 UG/1
2 AEROSOL, METERED RESPIRATORY (INHALATION) EVERY 6 HOURS PRN
Status: DISCONTINUED | OUTPATIENT
Start: 2019-03-13 | End: 2019-03-14 | Stop reason: HOSPADM

## 2019-03-13 RX ORDER — HYDROCODONE BITARTRATE AND ACETAMINOPHEN 5; 325 MG/1; MG/1
1 TABLET ORAL NIGHTLY
Status: DISCONTINUED | OUTPATIENT
Start: 2019-03-13 | End: 2019-03-14 | Stop reason: HOSPADM

## 2019-03-13 RX ORDER — 0.9 % SODIUM CHLORIDE 0.9 %
1000 INTRAVENOUS SOLUTION INTRAVENOUS ONCE
Status: COMPLETED | OUTPATIENT
Start: 2019-03-13 | End: 2019-03-13

## 2019-03-13 RX ORDER — DOXYCYCLINE HYCLATE 100 MG/1
100 CAPSULE ORAL EVERY 12 HOURS SCHEDULED
Status: DISCONTINUED | OUTPATIENT
Start: 2019-03-13 | End: 2019-03-14 | Stop reason: HOSPADM

## 2019-03-13 RX ORDER — GABAPENTIN 300 MG/1
900 CAPSULE ORAL NIGHTLY
Status: DISCONTINUED | OUTPATIENT
Start: 2019-03-13 | End: 2019-03-14 | Stop reason: HOSPADM

## 2019-03-13 RX ORDER — ONDANSETRON 2 MG/ML
4 INJECTION INTRAMUSCULAR; INTRAVENOUS EVERY 6 HOURS PRN
Status: DISCONTINUED | OUTPATIENT
Start: 2019-03-13 | End: 2019-03-14 | Stop reason: HOSPADM

## 2019-03-13 RX ADMIN — ACETAMINOPHEN 650 MG: 325 TABLET ORAL at 17:56

## 2019-03-13 RX ADMIN — DOXYCYCLINE HYCLATE 100 MG: 100 CAPSULE ORAL at 16:15

## 2019-03-13 RX ADMIN — ONDANSETRON 4 MG: 2 INJECTION INTRAMUSCULAR; INTRAVENOUS at 17:49

## 2019-03-13 RX ADMIN — SODIUM CHLORIDE 1000 ML: 9 INJECTION, SOLUTION INTRAVENOUS at 10:06

## 2019-03-13 RX ADMIN — HYDROCODONE BITARTRATE AND ACETAMINOPHEN 1 TABLET: 5; 325 TABLET ORAL at 20:53

## 2019-03-13 RX ADMIN — GABAPENTIN 900 MG: 300 CAPSULE ORAL at 20:53

## 2019-03-13 RX ADMIN — CEFTRIAXONE SODIUM 2 G: 2 INJECTION, POWDER, FOR SOLUTION INTRAMUSCULAR; INTRAVENOUS at 10:33

## 2019-03-13 RX ADMIN — SERTRALINE HYDROCHLORIDE 50 MG: 50 TABLET ORAL at 20:53

## 2019-03-13 ASSESSMENT — PAIN SCALES - GENERAL
PAINLEVEL_OUTOF10: 0

## 2019-03-14 VITALS
BODY MASS INDEX: 26.55 KG/M2 | TEMPERATURE: 99.2 F | RESPIRATION RATE: 18 BRPM | HEIGHT: 61 IN | WEIGHT: 140.6 LBS | SYSTOLIC BLOOD PRESSURE: 102 MMHG | OXYGEN SATURATION: 90 % | DIASTOLIC BLOOD PRESSURE: 50 MMHG | HEART RATE: 78 BPM

## 2019-03-14 LAB
FILM ARRAY ADENOVIRUS: ABNORMAL
FILM ARRAY BORDETELLA PERTUSSIS: ABNORMAL
FILM ARRAY CHLAMYDOPHILIA PNEUMONIAE: ABNORMAL
FILM ARRAY CORONAVIRUS 229E: ABNORMAL
FILM ARRAY CORONAVIRUS HKU1: ABNORMAL
FILM ARRAY CORONAVIRUS NL63: ABNORMAL
FILM ARRAY CORONAVIRUS OC43: ABNORMAL
FILM ARRAY INFLUENZA A VIRUS 09H1: ABNORMAL
FILM ARRAY INFLUENZA A VIRUS H1: ABNORMAL
FILM ARRAY INFLUENZA A VIRUS H3: ABNORMAL
FILM ARRAY INFLUENZA A VIRUS: ABNORMAL
FILM ARRAY INFLUENZA B: ABNORMAL
FILM ARRAY METAPNEUMOVIRUS: ABNORMAL
FILM ARRAY MYCOPLASMA PNEUMONIAE: ABNORMAL
FILM ARRAY PARAINFLUENZA VIRUS 1: ABNORMAL
FILM ARRAY PARAINFLUENZA VIRUS 2: ABNORMAL
FILM ARRAY PARAINFLUENZA VIRUS 3: ABNORMAL
FILM ARRAY PARAINFLUENZA VIRUS 4: ABNORMAL
FILM ARRAY RHINOVIRUS/ENTEROVIRUS: ABNORMAL
ORGANISM: ABNORMAL
PROCALCITONIN: 0.07 NG/ML (ref 0–0.08)

## 2019-03-14 PROCEDURE — APPSS45 APP SPLIT SHARED TIME 31-45 MINUTES: Performed by: PHYSICIAN ASSISTANT

## 2019-03-14 PROCEDURE — 36415 COLL VENOUS BLD VENIPUNCTURE: CPT

## 2019-03-14 PROCEDURE — 99239 HOSP IP/OBS DSCHRG MGMT >30: CPT | Performed by: INTERNAL MEDICINE

## 2019-03-14 PROCEDURE — 6360000002 HC RX W HCPCS: Performed by: PHYSICIAN ASSISTANT

## 2019-03-14 PROCEDURE — 2580000003 HC RX 258: Performed by: PHYSICIAN ASSISTANT

## 2019-03-14 PROCEDURE — 2580000003 HC RX 258: Performed by: INTERNAL MEDICINE

## 2019-03-14 PROCEDURE — 84145 PROCALCITONIN (PCT): CPT

## 2019-03-14 PROCEDURE — 6370000000 HC RX 637 (ALT 250 FOR IP): Performed by: PHYSICIAN ASSISTANT

## 2019-03-14 PROCEDURE — 6370000000 HC RX 637 (ALT 250 FOR IP): Performed by: INTERNAL MEDICINE

## 2019-03-14 RX ORDER — SODIUM CHLORIDE 0.9 % (FLUSH) 0.9 %
10 SYRINGE (ML) INJECTION 2 TIMES DAILY
Status: DISCONTINUED | OUTPATIENT
Start: 2019-03-14 | End: 2019-03-14 | Stop reason: HOSPADM

## 2019-03-14 RX ORDER — LEVOFLOXACIN 250 MG/1
250 TABLET ORAL DAILY
Qty: 3 TABLET | Refills: 0 | Status: SHIPPED | OUTPATIENT
Start: 2019-03-14 | End: 2019-03-17

## 2019-03-14 RX ORDER — SODIUM CHLORIDE 0.9 % (FLUSH) 0.9 %
10 SYRINGE (ML) INJECTION PRN
Status: DISCONTINUED | OUTPATIENT
Start: 2019-03-14 | End: 2019-03-14 | Stop reason: HOSPADM

## 2019-03-14 RX ADMIN — LEVOTHYROXINE SODIUM 175 MCG: 125 TABLET ORAL at 08:34

## 2019-03-14 RX ADMIN — Medication 10 ML: at 09:50

## 2019-03-14 RX ADMIN — Medication 10 ML: at 10:28

## 2019-03-14 RX ADMIN — CEFTRIAXONE SODIUM 2 G: 2 INJECTION, POWDER, FOR SOLUTION INTRAMUSCULAR; INTRAVENOUS at 09:47

## 2019-03-14 RX ADMIN — DOXYCYCLINE HYCLATE 100 MG: 100 CAPSULE ORAL at 08:34

## 2019-03-14 ASSESSMENT — PAIN SCALES - GENERAL
PAINLEVEL_OUTOF10: 0

## 2019-03-15 LAB — URINE CULTURE, ROUTINE: NORMAL

## 2019-03-18 LAB
BLOOD CULTURE, ROUTINE: NORMAL
CULTURE, BLOOD 2: NORMAL

## 2019-04-23 ENCOUNTER — TELEPHONE (OUTPATIENT)
Dept: ONCOLOGY | Age: 79
End: 2019-04-23

## 2019-04-23 ENCOUNTER — HOSPITAL ENCOUNTER (OUTPATIENT)
Dept: INFUSION THERAPY | Age: 79
Discharge: HOME | End: 2019-04-23
Payer: MEDICARE

## 2019-04-23 ENCOUNTER — OFFICE VISIT (OUTPATIENT)
Dept: ONCOLOGY | Age: 79
End: 2019-04-23
Payer: MEDICARE

## 2019-04-23 VITALS
HEIGHT: 61 IN | DIASTOLIC BLOOD PRESSURE: 61 MMHG | HEART RATE: 75 BPM | BODY MASS INDEX: 27.17 KG/M2 | RESPIRATION RATE: 20 BRPM | TEMPERATURE: 98.2 F | SYSTOLIC BLOOD PRESSURE: 134 MMHG | WEIGHT: 143.9 LBS

## 2019-04-23 DIAGNOSIS — D46.9 MYELODYSPLASIA (MYELODYSPLASTIC SYNDROME) (HCC): Primary | ICD-10-CM

## 2019-04-23 DIAGNOSIS — D46.9 MDS (MYELODYSPLASTIC SYNDROME) (HCC): ICD-10-CM

## 2019-04-23 LAB
ANISOCYTOSIS: ABNORMAL
BASOPHILIC STIPPLING: ABNORMAL
BASOPHILS ABSOLUTE: 0.1 E9/L (ref 0–0.2)
BASOPHILS RELATIVE PERCENT: 1.8 % (ref 0–2)
EOSINOPHILS ABSOLUTE: 0.25 E9/L (ref 0.05–0.5)
EOSINOPHILS RELATIVE PERCENT: 4.4 % (ref 0–6)
FERRITIN: 3647 NG/ML
HCT VFR BLD CALC: 25.6 % (ref 34–48)
HEMOGLOBIN: 8.5 G/DL (ref 11.5–15.5)
HYPOCHROMIA: ABNORMAL
IRON SATURATION: 104 % (ref 15–50)
IRON: 231 MCG/DL (ref 37–145)
LYMPHOCYTES ABSOLUTE: 3.47 E9/L (ref 1.5–4)
LYMPHOCYTES RELATIVE PERCENT: 62.3 % (ref 20–42)
MCH RBC QN AUTO: 38.8 PG (ref 26–35)
MCHC RBC AUTO-ENTMCNC: 33.2 % (ref 32–34.5)
MCV RBC AUTO: 116.9 FL (ref 80–99.9)
MONOCYTES ABSOLUTE: 0.11 E9/L (ref 0.1–0.95)
MONOCYTES RELATIVE PERCENT: 1.8 % (ref 2–12)
NEUTROPHILS ABSOLUTE: 1.68 E9/L (ref 1.8–7.3)
NEUTROPHILS RELATIVE PERCENT: 29.8 % (ref 43–80)
NUCLEATED RED BLOOD CELLS: 1.8 /100 WBC
OVALOCYTES: ABNORMAL
PDW BLD-RTO: 23.5 FL (ref 11.5–15)
PLATELET # BLD: 204 E9/L (ref 130–450)
PMV BLD AUTO: 10.5 FL (ref 7–12)
POIKILOCYTES: ABNORMAL
POLYCHROMASIA: ABNORMAL
RBC # BLD: 2.19 E12/L (ref 3.5–5.5)
SCHISTOCYTES: ABNORMAL
TOTAL IRON BINDING CAPACITY: 222 MCG/DL (ref 250–450)
WBC # BLD: 5.6 E9/L (ref 4.5–11.5)

## 2019-04-23 PROCEDURE — 4040F PNEUMOC VAC/ADMIN/RCVD: CPT | Performed by: INTERNAL MEDICINE

## 2019-04-23 PROCEDURE — 6360000002 HC RX W HCPCS: Performed by: INTERNAL MEDICINE

## 2019-04-23 PROCEDURE — 36415 COLL VENOUS BLD VENIPUNCTURE: CPT

## 2019-04-23 PROCEDURE — 82728 ASSAY OF FERRITIN: CPT

## 2019-04-23 PROCEDURE — 99212 OFFICE O/P EST SF 10 MIN: CPT | Performed by: INTERNAL MEDICINE

## 2019-04-23 PROCEDURE — 1090F PRES/ABSN URINE INCON ASSESS: CPT | Performed by: INTERNAL MEDICINE

## 2019-04-23 PROCEDURE — G8419 CALC BMI OUT NRM PARAM NOF/U: HCPCS | Performed by: INTERNAL MEDICINE

## 2019-04-23 PROCEDURE — G8427 DOCREV CUR MEDS BY ELIG CLIN: HCPCS | Performed by: INTERNAL MEDICINE

## 2019-04-23 PROCEDURE — 83550 IRON BINDING TEST: CPT

## 2019-04-23 PROCEDURE — 85025 COMPLETE CBC W/AUTO DIFF WBC: CPT

## 2019-04-23 PROCEDURE — 83540 ASSAY OF IRON: CPT

## 2019-04-23 PROCEDURE — 96372 THER/PROPH/DIAG INJ SC/IM: CPT

## 2019-04-23 PROCEDURE — 1123F ACP DISCUSS/DSCN MKR DOCD: CPT | Performed by: INTERNAL MEDICINE

## 2019-04-23 PROCEDURE — 1036F TOBACCO NON-USER: CPT | Performed by: INTERNAL MEDICINE

## 2019-04-23 PROCEDURE — 99214 OFFICE O/P EST MOD 30 MIN: CPT | Performed by: INTERNAL MEDICINE

## 2019-04-23 PROCEDURE — G8400 PT W/DXA NO RESULTS DOC: HCPCS | Performed by: INTERNAL MEDICINE

## 2019-04-23 RX ADMIN — DARBEPOETIN ALFA 500 MCG: 500 INJECTION, SOLUTION INTRAVENOUS; SUBCUTANEOUS at 15:12

## 2019-04-23 NOTE — PROGRESS NOTES
followed by radiation therapy and chemotherapy and was then on oral chemo pill for 5 yrs; no recurrence as of 2019    Hypothyroidism     Mild depression ([facility]     Myelodysplastic syndrome ([facility] ~    Pulmonary tuberculosis ~5114-9823    in her early 25s    Sensory neuropathy     beyond the ankle b/l     Patient Active Problem List   Diagnosis    MDS (myelodysplastic syndrome) (HCC)    Bronchitis    Macrocytic anemia with high RDW    Hypothyroidism    Elevated lactic acid level    Depression    Peripheral sensory neuropathy    Pneumonia    Pneumonia due to organism    RSV (acute bronchiolitis due to respiratory syncytial virus)        Past Surgical History:      Procedure Laterality Date    BREAST BIOPSY      CHOLECYSTECTOMY      MASTECTOMY      right breast    OTHER SURGICAL HISTORY      blood transfusions    TONSILLECTOMY      TUBAL LIGATION         Family History:  Family History   Problem Relation Age of Onset    Cancer Mother         lung    Cancer Father         lung    Cancer Sister         ovarian cancer and breast cancer    Cancer Brother         unknown    Cancer Maternal Aunt         unknown    Cancer Other         brain       Medications:  Reviewed and reconciled.     Social History:  Social History     Socioeconomic History    Marital status: Single     Spouse name: Not on file    Number of children: Not on file    Years of education: Not on file    Highest education level: Not on file   Occupational History    Not on file   Social Needs    Financial resource strain: Not on file    Food insecurity:     Worry: Not on file     Inability: Not on file    Transportation needs:     Medical: Not on file     Non-medical: Not on file   Tobacco Use    Smoking status: Former Smoker     Packs/day: 1.00     Years: 37.00     Pack years: 37.00     Types: Cigarettes     Start date:      Last attempt to quit: 2000     Years since quittin.3    Smokeless tobacco: Never with a PMH significant for Right Breast Cancer, stage III, HR pos, was diagnosed in 2009, she had a right mastectomy done, followed by adjuvant chemo, she received 8 cycles, probably AC followed by T, then PMRT, and 5 years of adjuvant ET with Arimidex, was completed in 2015. She was treated in [facility] under the care of Dr. Valarie Skiff. She is doing well clinically without any evidence of recurrence of her disease. Discussed with her the importance of monthly SBE, and routine screening mammograms, she is overdue for a left breast mammogram, will need to be done ASAP. 2. She has MDS, diagnosed in 2017, she received ESAs and PRBCs transfusion, has transfusion related iron overload, hgb is 8.5, ferritin 3647, proceed with Aranesp today 4/23/2019. RTC in 3 weeks. Thank you for allowing us to participate in the care of Ms. Ventura.     Michaelle Lemus MD   HEMATOLOGY/MEDICAL ONCOLOGY  [address] [place] [address] MED ONCOLOGY  [address] [place] [id number]  Dept: [phone]

## 2019-05-14 ENCOUNTER — OFFICE VISIT (OUTPATIENT)
Dept: ONCOLOGY | Age: 79
End: 2019-05-14
Payer: MEDICARE

## 2019-05-14 ENCOUNTER — HOSPITAL ENCOUNTER (OUTPATIENT)
Dept: INFUSION THERAPY | Age: 79
Discharge: HOME | End: 2019-05-14
Payer: MEDICARE

## 2019-05-14 ENCOUNTER — HOSPITAL ENCOUNTER (OUTPATIENT)
Dept: GENERAL RADIOLOGY | Age: 79
Discharge: HOME | End: 2019-05-16
Payer: MEDICARE

## 2019-05-14 VITALS
WEIGHT: 145.1 LBS | HEIGHT: 61 IN | DIASTOLIC BLOOD PRESSURE: 57 MMHG | HEART RATE: 69 BPM | BODY MASS INDEX: 27.4 KG/M2 | TEMPERATURE: 98.2 F | SYSTOLIC BLOOD PRESSURE: 118 MMHG

## 2019-05-14 DIAGNOSIS — Z17.0 MALIGNANT NEOPLASM OF RIGHT BREAST IN FEMALE, ESTROGEN RECEPTOR POSITIVE, UNSPECIFIED SITE OF BREAST (HCC): ICD-10-CM

## 2019-05-14 DIAGNOSIS — D46.9 MYELODYSPLASIA (MYELODYSPLASTIC SYNDROME) (HCC): Primary | ICD-10-CM

## 2019-05-14 DIAGNOSIS — Z12.39 BREAST SCREENING: ICD-10-CM

## 2019-05-14 DIAGNOSIS — C50.911 MALIGNANT NEOPLASM OF RIGHT BREAST IN FEMALE, ESTROGEN RECEPTOR POSITIVE, UNSPECIFIED SITE OF BREAST (HCC): ICD-10-CM

## 2019-05-14 DIAGNOSIS — D46.9 MDS (MYELODYSPLASTIC SYNDROME) (HCC): ICD-10-CM

## 2019-05-14 LAB
ANISOCYTOSIS: ABNORMAL
BASOPHILS ABSOLUTE: 0.04 E9/L (ref 0–0.2)
BASOPHILS RELATIVE PERCENT: 0.9 % (ref 0–2)
EOSINOPHILS ABSOLUTE: 0.04 E9/L (ref 0.05–0.5)
EOSINOPHILS RELATIVE PERCENT: 0.9 % (ref 0–6)
HCT VFR BLD CALC: 25.7 % (ref 34–48)
HEMOGLOBIN: 8.4 G/DL (ref 11.5–15.5)
HYPOCHROMIA: ABNORMAL
LYMPHOCYTES ABSOLUTE: 2.82 E9/L (ref 1.5–4)
LYMPHOCYTES RELATIVE PERCENT: 64.3 % (ref 20–42)
MCH RBC QN AUTO: 38.4 PG (ref 26–35)
MCHC RBC AUTO-ENTMCNC: 32.7 % (ref 32–34.5)
MCV RBC AUTO: 117.4 FL (ref 80–99.9)
MONOCYTES ABSOLUTE: 0.13 E9/L (ref 0.1–0.95)
MONOCYTES RELATIVE PERCENT: 2.6 % (ref 2–12)
NEUTROPHILS ABSOLUTE: 1.36 E9/L (ref 1.8–7.3)
NEUTROPHILS RELATIVE PERCENT: 31.3 % (ref 43–80)
OVALOCYTES: ABNORMAL
PDW BLD-RTO: 25.2 FL (ref 11.5–15)
PLATELET # BLD: 184 E9/L (ref 130–450)
PMV BLD AUTO: 9.8 FL (ref 7–12)
POIKILOCYTES: ABNORMAL
POLYCHROMASIA: ABNORMAL
RBC # BLD: 2.19 E12/L (ref 3.5–5.5)
SCHISTOCYTES: ABNORMAL
WBC # BLD: 4.4 E9/L (ref 4.5–11.5)

## 2019-05-14 PROCEDURE — 85025 COMPLETE CBC W/AUTO DIFF WBC: CPT

## 2019-05-14 PROCEDURE — 99214 OFFICE O/P EST MOD 30 MIN: CPT | Performed by: INTERNAL MEDICINE

## 2019-05-14 PROCEDURE — G8419 CALC BMI OUT NRM PARAM NOF/U: HCPCS | Performed by: INTERNAL MEDICINE

## 2019-05-14 PROCEDURE — 6360000002 HC RX W HCPCS: Performed by: INTERNAL MEDICINE

## 2019-05-14 PROCEDURE — 36415 COLL VENOUS BLD VENIPUNCTURE: CPT

## 2019-05-14 PROCEDURE — 1036F TOBACCO NON-USER: CPT | Performed by: INTERNAL MEDICINE

## 2019-05-14 PROCEDURE — 1090F PRES/ABSN URINE INCON ASSESS: CPT | Performed by: INTERNAL MEDICINE

## 2019-05-14 PROCEDURE — 96372 THER/PROPH/DIAG INJ SC/IM: CPT

## 2019-05-14 PROCEDURE — G8427 DOCREV CUR MEDS BY ELIG CLIN: HCPCS | Performed by: INTERNAL MEDICINE

## 2019-05-14 PROCEDURE — 4040F PNEUMOC VAC/ADMIN/RCVD: CPT | Performed by: INTERNAL MEDICINE

## 2019-05-14 PROCEDURE — 77063 BREAST TOMOSYNTHESIS BI: CPT

## 2019-05-14 PROCEDURE — G8400 PT W/DXA NO RESULTS DOC: HCPCS | Performed by: INTERNAL MEDICINE

## 2019-05-14 PROCEDURE — 99212 OFFICE O/P EST SF 10 MIN: CPT

## 2019-05-14 PROCEDURE — 1123F ACP DISCUSS/DSCN MKR DOCD: CPT | Performed by: INTERNAL MEDICINE

## 2019-05-14 RX ADMIN — DARBEPOETIN ALFA 500 MCG: 500 INJECTION, SOLUTION INTRAVENOUS; SUBCUTANEOUS at 16:21

## 2019-05-14 NOTE — PROGRESS NOTES
[address] MED ONCOLOGY  [facility] [id number]  Dept: [phone]  Attending Progress Note      Reason for Visit:   1. Right Breast Cancer. 2. MDS. Referring Physician:  CONNIE Burks CNP    PCP:  CONNIE Burks CNP    History of Present Illness: The patient is a very pleasant 78-year-old lady with a PMH significant for Right Breast Cancer, stage III, HR pos, was diagnosed in 2009, she had a right mastectomy done, followed by adjuvant chemo, she received 8 cycles, probably AC followed by T, then PMRT, and 5 years of adjuvant ET with Arimidex, was completed in 2015. She was treated in [facility] under the care of Dr. Arie Zuniga. She was diagnosed with MDS in 2017, she received ESAs and PRBCs transfusion. She has transfusion related iron overload. She is feeling well overall, has chronic joints pain, peripheral neuropathy. The patient had a left breast screening mammogram done today. She was started on Aranesp on 4/24/2020. She felt better, had more energy after the aranesp. Review of Systems;  CONSTITUTIONAL: No fever, chills. Fair appetite and energy level. ENMT: Eyes: No diplopia; Nose: No epistaxis. Mouth: No sore throat. RESPIRATORY: No hemoptysis, shortness of breath, cough. CARDIOVASCULAR: No chest pain, palpitations. GASTROINTESTINAL: No nausea/vomiting, abdominal pain, diarrhea/constipation. GENITOURINARY: No dysuria, urinary frequency, hematuria. NEURO: No syncope, presyncope, headache.   Remainder:  ROS NEGATIVE    Past Medical History:      Diagnosis Date    Anemia     Pt reports she was dx in her teens, w/o proper w/u, with iron deficiency anemia and was on oral iron replacement for many years, resulting in iron overload    Aplastic anemia ([address] ~2525-1108    Possibly d/t chemotherapy for breast cancer    Breast cancer ([facility] 2009    right side; pt underwent radical mastectomy followed by radiation therapy and chemotherapy and was then on oral chemo pill for 5 yrs; no recurrence as of 2019    Hypothyroidism     [facility] depression ([facility]     Myelodysplastic syndrome ([facility] ~2016    Pulmonary tuberculosis ~9047-1508    in her early 25s    Sensory neuropathy     beyond the ankle b/l     Patient Active Problem List   Diagnosis    MDS (myelodysplastic syndrome) (HCC)    Bronchitis    Macrocytic anemia with high RDW    Hypothyroidism    Elevated lactic acid level    Depression    Peripheral sensory neuropathy    Pneumonia    Pneumonia due to organism    RSV (acute bronchiolitis due to respiratory syncytial virus)        Past Surgical History:      Procedure Laterality Date    BREAST BIOPSY      CHOLECYSTECTOMY      MASTECTOMY      right breast    OTHER SURGICAL HISTORY      blood transfusions    TONSILLECTOMY      TUBAL LIGATION         Family History:  Family History   Problem Relation Age of Onset    Cancer Mother         lung    Cancer Father         lung    Cancer Sister         ovarian cancer and breast cancer    Cancer Brother         unknown    Cancer Maternal Aunt         unknown    Cancer Other         brain       Medications:  Reviewed and reconciled.     Social History:  Social History     Socioeconomic History    Marital status: Single     Spouse name: Not on file    Number of children: Not on file    Years of education: Not on file    Highest education level: Not on file   Occupational History    Not on file   Social Needs    Financial resource strain: Not on file    Food insecurity:     Worry: Not on file     Inability: Not on file    Transportation needs:     Medical: Not on file     Non-medical: Not on file   Tobacco Use    Smoking status: Former Smoker     Packs/day: 1.00     Years: 37.00     Pack years: 37.00     Types: Cigarettes     Start date:      Last attempt to quit: 2000     Years since quittin.3    Smokeless tobacco: Never Used   Substance and Sexual Activity    Alcohol use: No    Drug use: No    Sexual activity: Not Currently   Lifestyle    Physical activity:     Days per week: Not on file     Minutes per session: Not on file    Stress: Not on file   Relationships    Social connections:     Talks on phone: Not on file     Gets together: Not on file     Attends Zoroastrian service: Not on file     Active member of club or organization: Not on file     Attends meetings of clubs or organizations: Not on file     Relationship status: Not on file    Intimate partner violence:     Fear of current or ex partner: Not on file     Emotionally abused: Not on file     Physically abused: Not on file     Forced sexual activity: Not on file   Other Topics Concern    Not on file   Social History Narrative    Not on file       Allergies: Allergies   Allergen Reactions    Bee Venom Swelling and Dermatitis    Penicillins Hives, Swelling and Dermatitis       Physical Exam:  BP (!) 118/57 (Site: Right Upper Arm, Position: Sitting, Cuff Size: Medium Adult)   Pulse 69   Temp 98.2 °F (36.8 °C) (Temporal)   Ht 5' 1\" (1.549 m)   Wt 145 lb 1.6 oz (65.8 kg)   LMP 05/28/2016   BMI 27.42 kg/m²   GENERAL: Alert, oriented x 3, not in acute distress. HEENT: PERRLA; EOMI. Oropharynx clear. NECK: Supple. No palpable cervical or supraclavicular lymphadenopathy. LUNGS: Good air entry bilaterally. No wheezing, crackles or rhonchi. CARDIOVASCULAR: Regular rate. No murmurs, rubs or gallops. BREASTS: she is s/p right mastectomy, she has telangiectasias, no palpable right axillary LN, the left breast exam is negative for any skin changes, no nipple discharge, no palpable masses or palpable left axillary LN. ABDOMEN: Soft. Non-tender, non-distended. Positive bowel sounds. EXTREMITIES: Without clubbing, cyanosis, or edema. NEUROLOGIC: No focal deficits. ECOG PS 1      Impression/Plan:     1.  The patient is a very pleasant 75-year-old lady with a PMH significant for Right Breast Cancer, stage III, HR pos, was diagnosed in 2009, she had a right mastectomy done, followed by adjuvant chemo, she received 8 cycles, probably AC followed by T, then PMRT, and 5 years of adjuvant ET with Arimidex, was completed in 2015. She was treated in [facility] under the care of Dr. Sea Knox. She is doing well clinically without any evidence of recurrence of her disease. Discussed with her the importance of monthly SBE, and routine screening mammograms, she had a left breast screening mammogram done today 5/14/2019, revealing benign findings, was negative for malignancy, will be due for repeat mammogram in 1 year. 2. She has MDS, diagnosed in 2017, she received ESAs and PRBCs transfusion, has transfusion related iron overload, hgb is 8.5, ferritin 3647, was restarted on Aranesp on 4/23/2019. Labs reviewed, she did not have improvement of the hemoglobin with Aranesp, it is 8.4 today, hematocrit 25.7 .4, has mild leukopenia, ANC is 1360, she will receive Aranesp, 500 MCG today, with change the frequency to every 2 weeks and reassess her response. RTC in 2 weeks. Thank you for allowing us to participate in the care of Ms. Ventura.     Alex Graves MD   HEMATOLOGY/MEDICAL ONCOLOGY  [address] [place] [address] MED ONCOLOGY  [address] [address]  Dept: [phone]

## 2019-05-28 ENCOUNTER — HOSPITAL ENCOUNTER (OUTPATIENT)
Dept: INFUSION THERAPY | Age: 79
Discharge: HOME | End: 2019-05-28
Payer: MEDICARE

## 2019-05-28 ENCOUNTER — OFFICE VISIT (OUTPATIENT)
Dept: ONCOLOGY | Age: 79
End: 2019-05-28
Payer: MEDICARE

## 2019-05-28 VITALS
HEART RATE: 60 BPM | WEIGHT: 142.9 LBS | HEIGHT: 61 IN | DIASTOLIC BLOOD PRESSURE: 61 MMHG | SYSTOLIC BLOOD PRESSURE: 127 MMHG | TEMPERATURE: 98.5 F | BODY MASS INDEX: 26.98 KG/M2

## 2019-05-28 DIAGNOSIS — Z17.0 MALIGNANT NEOPLASM OF RIGHT BREAST IN FEMALE, ESTROGEN RECEPTOR POSITIVE, UNSPECIFIED SITE OF BREAST (HCC): ICD-10-CM

## 2019-05-28 DIAGNOSIS — C50.911 MALIGNANT NEOPLASM OF RIGHT BREAST IN FEMALE, ESTROGEN RECEPTOR POSITIVE, UNSPECIFIED SITE OF BREAST (HCC): ICD-10-CM

## 2019-05-28 DIAGNOSIS — D46.9 MYELODYSPLASIA (MYELODYSPLASTIC SYNDROME) (HCC): Primary | ICD-10-CM

## 2019-05-28 DIAGNOSIS — D46.9 MDS (MYELODYSPLASTIC SYNDROME) (HCC): Primary | ICD-10-CM

## 2019-05-28 LAB
ANISOCYTOSIS: ABNORMAL
BASOPHILIC STIPPLING: ABNORMAL
BASOPHILS ABSOLUTE: 0.05 E9/L (ref 0–0.2)
BASOPHILS RELATIVE PERCENT: 0.9 % (ref 0–2)
EOSINOPHILS ABSOLUTE: 0.05 E9/L (ref 0.05–0.5)
EOSINOPHILS RELATIVE PERCENT: 0.9 % (ref 0–6)
FERRITIN: 3641 NG/ML
HCT VFR BLD CALC: 29.5 % (ref 34–48)
HEMOGLOBIN: 9.6 G/DL (ref 11.5–15.5)
LYMPHOCYTES ABSOLUTE: 3.45 E9/L (ref 1.5–4)
LYMPHOCYTES RELATIVE PERCENT: 65.2 % (ref 20–42)
MCH RBC QN AUTO: 38.4 PG (ref 26–35)
MCHC RBC AUTO-ENTMCNC: 32.5 % (ref 32–34.5)
MCV RBC AUTO: 118 FL (ref 80–99.9)
MONOCYTES ABSOLUTE: 0.26 E9/L (ref 0.1–0.95)
MONOCYTES RELATIVE PERCENT: 5.2 % (ref 2–12)
NEUTROPHILS ABSOLUTE: 1.48 E9/L (ref 1.8–7.3)
NEUTROPHILS RELATIVE PERCENT: 27.8 % (ref 43–80)
NUCLEATED RED BLOOD CELLS: 0.9 /100 WBC
OVALOCYTES: ABNORMAL
PDW BLD-RTO: 25.5 FL (ref 11.5–15)
PLATELET # BLD: 159 E9/L (ref 130–450)
PMV BLD AUTO: 10.2 FL (ref 7–12)
POIKILOCYTES: ABNORMAL
POLYCHROMASIA: ABNORMAL
RBC # BLD: 2.5 E12/L (ref 3.5–5.5)
SCHISTOCYTES: ABNORMAL
TARGET CELLS: ABNORMAL
TEAR DROP CELLS: ABNORMAL
WBC # BLD: 5.3 E9/L (ref 4.5–11.5)

## 2019-05-28 PROCEDURE — 1036F TOBACCO NON-USER: CPT | Performed by: INTERNAL MEDICINE

## 2019-05-28 PROCEDURE — G8400 PT W/DXA NO RESULTS DOC: HCPCS | Performed by: INTERNAL MEDICINE

## 2019-05-28 PROCEDURE — 85025 COMPLETE CBC W/AUTO DIFF WBC: CPT

## 2019-05-28 PROCEDURE — 99214 OFFICE O/P EST MOD 30 MIN: CPT | Performed by: INTERNAL MEDICINE

## 2019-05-28 PROCEDURE — 36415 COLL VENOUS BLD VENIPUNCTURE: CPT

## 2019-05-28 PROCEDURE — 82728 ASSAY OF FERRITIN: CPT

## 2019-05-28 PROCEDURE — 1090F PRES/ABSN URINE INCON ASSESS: CPT | Performed by: INTERNAL MEDICINE

## 2019-05-28 PROCEDURE — 1123F ACP DISCUSS/DSCN MKR DOCD: CPT | Performed by: INTERNAL MEDICINE

## 2019-05-28 PROCEDURE — G8419 CALC BMI OUT NRM PARAM NOF/U: HCPCS | Performed by: INTERNAL MEDICINE

## 2019-05-28 PROCEDURE — 4040F PNEUMOC VAC/ADMIN/RCVD: CPT | Performed by: INTERNAL MEDICINE

## 2019-05-28 PROCEDURE — 96372 THER/PROPH/DIAG INJ SC/IM: CPT

## 2019-05-28 PROCEDURE — G8427 DOCREV CUR MEDS BY ELIG CLIN: HCPCS | Performed by: INTERNAL MEDICINE

## 2019-05-28 PROCEDURE — 6360000002 HC RX W HCPCS: Performed by: INTERNAL MEDICINE

## 2019-05-28 RX ADMIN — DARBEPOETIN ALFA 500 MCG: 500 INJECTION, SOLUTION INTRAVENOUS; SUBCUTANEOUS at 14:26

## 2019-05-28 NOTE — PROGRESS NOTES
[address] MED ONCOLOGY  [facility] [address]  Dept: [phone]  Attending Progress Note      Reason for Visit:   1. Right Breast Cancer. 2. MDS. Referring Physician:  CONNIE Villagomez CNP    PCP:  CONNIE Villagomez CNP    History of Present Illness: The patient is a very pleasant 25-year-old lady with a PMH significant for Right Breast Cancer, stage III, HR pos, was diagnosed in 2009, she had a right mastectomy done, followed by adjuvant chemo, she received 8 cycles, probably AC followed by T, then PMRT, and 5 years of adjuvant ET with Arimidex, was completed in 2015. She was treated in [place] under the care of Dr. Dennise Dennis. She was diagnosed with MDS in 2017, she received ESAs and PRBCs transfusion. She has transfusion related iron overload. She is feeling well overall, has chronic joints pain, peripheral neuropathy. The patient had a left breast screening mammogram done today. She was started on Aranesp on 4/24/2020. She felt better, had more energy after the aranesp. Review of Systems;  CONSTITUTIONAL: No fever, chills. Fair appetite and energy level. ENMT: Eyes: No diplopia; Nose: No epistaxis. Mouth: No sore throat. RESPIRATORY: No hemoptysis, shortness of breath, cough. CARDIOVASCULAR: No chest pain, palpitations. GASTROINTESTINAL: No nausea/vomiting, abdominal pain, diarrhea/constipation. GENITOURINARY: No dysuria, urinary frequency, hematuria. NEURO: No syncope, presyncope, headache.   Remainder:  ROS NEGATIVE    Past Medical History:      Diagnosis Date    Anemia     Pt reports she was dx in her teens, w/o proper w/u, with iron deficiency anemia and was on oral iron replacement for many years, resulting in iron overload    Aplastic anemia ([address] ~8470-9259    Possibly d/t chemotherapy for breast cancer    Breast cancer ([facility] [facility] 2009    right side; pt underwent radical mastectomy followed by radiation therapy and chemotherapy and was then on oral chemo pill for 5 yrs; no recurrence as of 2019    Hypothyroidism     Mild depression ([facility]     Myelodysplastic syndrome ([facility] ~2016    Pulmonary tuberculosis ~9736-9922    in her early 25s    Sensory neuropathy     beyond the ankle b/l     Patient Active Problem List   Diagnosis    MDS (myelodysplastic syndrome) (HCC)    Bronchitis    Macrocytic anemia with high RDW    Hypothyroidism    Elevated lactic acid level    Depression    Peripheral sensory neuropathy    Pneumonia    Pneumonia due to organism    RSV (acute bronchiolitis due to respiratory syncytial virus)        Past Surgical History:      Procedure Laterality Date    BREAST BIOPSY      CHOLECYSTECTOMY      MASTECTOMY      right breast    OTHER SURGICAL HISTORY      blood transfusions    TONSILLECTOMY      TUBAL LIGATION         Family History:  Family History   Problem Relation Age of Onset    Cancer Mother         lung    Cancer Father         lung    Cancer Sister         ovarian cancer and breast cancer    Cancer Brother         unknown    Cancer Maternal Aunt         unknown    Cancer Other         brain       Medications:  Reviewed and reconciled.     Social History:  Social History     Socioeconomic History    Marital status: Single     Spouse name: Not on file    Number of children: Not on file    Years of education: Not on file    Highest education level: Not on file   Occupational History    Not on file   Social Needs    Financial resource strain: Not on file    Food insecurity:     Worry: Not on file     Inability: Not on file    Transportation needs:     Medical: Not on file     Non-medical: Not on file   Tobacco Use    Smoking status: Former Smoker     Packs/day: 1.00     Years: 37.00     Pack years: 37.00     Types: Cigarettes     Start date:      Last attempt to quit: 2000     Years since quittin.4    Smokeless tobacco: Never Used   Substance and Sexual Activity    Alcohol use: No    Drug use: No    Sexual activity: Not Currently   Lifestyle    Physical activity:     Days per week: Not on file     Minutes per session: Not on file    Stress: Not on file   Relationships    Social connections:     Talks on phone: Not on file     Gets together: Not on file     Attends Alevism service: Not on file     Active member of club or organization: Not on file     Attends meetings of clubs or organizations: Not on file     Relationship status: Not on file    Intimate partner violence:     Fear of current or ex partner: Not on file     Emotionally abused: Not on file     Physically abused: Not on file     Forced sexual activity: Not on file   Other Topics Concern    Not on file   Social History Narrative    Not on file       Allergies: Allergies   Allergen Reactions    Bee Venom Swelling and Dermatitis    Penicillins Hives, Swelling and Dermatitis       Physical Exam:  /61 (Site: Left Upper Arm, Position: Sitting, Cuff Size: Medium Adult)   Pulse 60   Temp 98.5 °F (36.9 °C) (Temporal)   Ht 5' 1\" (1.549 m)   Wt 142 lb 14.4 oz (64.8 kg)   LMP 05/28/2016   BMI 27.00 kg/m²   GENERAL: Alert, oriented x 3, not in acute distress. HEENT: PERRLA; EOMI. Oropharynx clear. NECK: Supple. No palpable cervical or supraclavicular lymphadenopathy. LUNGS: Good air entry bilaterally. No wheezing, crackles or rhonchi. CARDIOVASCULAR: Regular rate. No murmurs, rubs or gallops. BREASTS: she is s/p right mastectomy, she has telangiectasias, no palpable right axillary LN, the left breast exam is negative for any skin changes, no nipple discharge, no palpable masses or palpable left axillary LN. ABDOMEN: Soft. Non-tender, non-distended. Positive bowel sounds. EXTREMITIES: Without clubbing, cyanosis, or edema. NEUROLOGIC: No focal deficits. ECOG PS 1      Impression/Plan:     1.  The patient is a very pleasant 59-year-old lady with a PMH significant for Right Breast Cancer, stage III, HR pos, was diagnosed in 2009, she had a right mastectomy done, followed by adjuvant chemo, she received 8 cycles, probably AC followed by T, then PMRT, and 5 years of adjuvant ET with Arimidex, was completed in 2015. She was treated in [facility] under the care of Dr. Shalini Collins. She is doing well clinically without any evidence of recurrence of her disease. Discussed with her the importance of monthly SBE, and routine screening mammograms, she had a left breast screening mammogram done today 5/14/2019, revealing benign findings, was negative for malignancy, will be due for repeat mammogram in 1 year. 2. She has MDS, diagnosed in 2017, she received ESAs and PRBCs transfusion, has transfusion related iron overload, hgb is 8.5, ferritin 3647, was restarted on Aranesp on 4/23/2019. Labs reviewed, she did have improvement of the hemoglobin with Aranesp, from 8.4 to 9.6 g/dl today, she has macrocytosis and mild leukopenia, she will receive Aranesp, 500 MCG today, will decrease the frequency every 3 weeks. RTC in 3 weeks with labs, aranesp. Thank you for allowing us to participate in the care of Ms. Ventura.     Rachell Fofana MD   HEMATOLOGY/MEDICAL ONCOLOGY  [address] [place] [address] MED ONCOLOGY  [address] [place] [id number]  Dept: [phone]

## 2019-06-11 ENCOUNTER — OFFICE VISIT (OUTPATIENT)
Dept: FAMILY MEDICINE CLINIC | Age: 79
End: 2019-06-11
Payer: MEDICARE

## 2019-06-11 VITALS
HEIGHT: 61 IN | WEIGHT: 144 LBS | TEMPERATURE: 97.7 F | OXYGEN SATURATION: 96 % | HEART RATE: 67 BPM | BODY MASS INDEX: 27.19 KG/M2 | DIASTOLIC BLOOD PRESSURE: 58 MMHG | SYSTOLIC BLOOD PRESSURE: 120 MMHG

## 2019-06-11 DIAGNOSIS — D46.9 MDS (MYELODYSPLASTIC SYNDROME) (HCC): Chronic | ICD-10-CM

## 2019-06-11 DIAGNOSIS — G89.28 CHRONIC PAIN AFTER SURGICAL PROCEDURE FOR MALIGNANT NEOPLASM (HCC): ICD-10-CM

## 2019-06-11 DIAGNOSIS — M79.604 LEG PAIN, ANTERIOR, RIGHT: Primary | ICD-10-CM

## 2019-06-11 DIAGNOSIS — R19.7 DIARRHEA, UNSPECIFIED TYPE: ICD-10-CM

## 2019-06-11 DIAGNOSIS — C80.1 CHRONIC PAIN AFTER SURGICAL PROCEDURE FOR MALIGNANT NEOPLASM (HCC): ICD-10-CM

## 2019-06-11 PROBLEM — J18.9 PNEUMONIA: Status: RESOLVED | Noted: 2019-03-13 | Resolved: 2019-06-11

## 2019-06-11 PROBLEM — J18.9 PNEUMONIA DUE TO ORGANISM: Status: RESOLVED | Noted: 2019-03-13 | Resolved: 2019-06-11

## 2019-06-11 PROBLEM — R79.89 ELEVATED LACTIC ACID LEVEL: Status: RESOLVED | Noted: 2019-01-29 | Resolved: 2019-06-11

## 2019-06-11 PROBLEM — J40 BRONCHITIS: Status: RESOLVED | Noted: 2019-01-29 | Resolved: 2019-06-11

## 2019-06-11 PROCEDURE — 99214 OFFICE O/P EST MOD 30 MIN: CPT | Performed by: NURSE PRACTITIONER

## 2019-06-11 RX ORDER — GABAPENTIN 300 MG/1
900 CAPSULE ORAL NIGHTLY
Qty: 90 CAPSULE | Refills: 2 | Status: SHIPPED | OUTPATIENT
Start: 2019-06-11 | End: 2019-09-20 | Stop reason: SDUPTHER

## 2019-06-11 RX ORDER — HYDROCODONE BITARTRATE AND ACETAMINOPHEN 5; 325 MG/1; MG/1
1 TABLET ORAL 2 TIMES DAILY PRN
Qty: 60 TABLET | Refills: 0 | Status: SHIPPED | OUTPATIENT
Start: 2019-06-11 | End: 2019-07-16 | Stop reason: SDUPTHER

## 2019-06-11 ASSESSMENT — ENCOUNTER SYMPTOMS
NAUSEA: 0
CONSTIPATION: 0
EYES NEGATIVE: 1
ABDOMINAL PAIN: 0
SHORTNESS OF BREATH: 0
COUGH: 0
ALLERGIC/IMMUNOLOGIC NEGATIVE: 1
CHEST TIGHTNESS: 0
DIARRHEA: 1

## 2019-06-11 NOTE — PROGRESS NOTES
Psychiatric/Behavioral: Positive for dysphoric mood. Vitals:    06/11/19 1449   BP: (!) 120/58   Pulse: 67   Temp: 97.7 °F (36.5 °C)   SpO2: 96%   Weight: 144 lb (65.3 kg)   Height: 5' 1\" (1.549 m)     Estimated body mass index is 27.21 kg/m² as calculated from the following:    Height as of this encounter: 5' 1\" (1.549 m). Weight as of this encounter: 144 lb (65.3 kg). PHYSICAL EXAM:    VS:  Blood pressure (!) 120/58, pulse 67, temperature 97.7 °F (36.5 °C), height 5' 1\" (1.549 m), weight 144 lb (65.3 kg), last menstrual period 05/28/2016, SpO2 96 %. Physical Exam   Constitutional: She is oriented to person, place, and time. She appears well-developed and well-nourished. No distress. HENT:   Head: Normocephalic and atraumatic. Right Ear: Hearing, tympanic membrane, external ear and ear canal normal.   Left Ear: Hearing, tympanic membrane, external ear and ear canal normal.   Nose: Nose normal. No mucosal edema or rhinorrhea. Mouth/Throat: Oropharynx is clear and moist and mucous membranes are normal. No oropharyngeal exudate. Missing teeth and fillings   Eyes: Pupils are equal, round, and reactive to light. Conjunctivae and EOM are normal.   Neck: Normal range of motion. Neck supple. No JVD present. Carotid bruit is not present. No thyromegaly present. Cardiovascular: Normal rate, regular rhythm and intact distal pulses. Murmur heard. Pulmonary/Chest: Effort normal. No respiratory distress. She has decreased breath sounds. She has no wheezes. She has no rales. Abdominal: Soft. Bowel sounds are normal. She exhibits no distension and no mass. There is no hepatosplenomegaly. There is no tenderness. Musculoskeletal: Normal range of motion. She exhibits no edema, tenderness or deformity. Bruising medial side right below patella, a slightly firm nodule and painful to touch. Warm to touch   Neurological: She is alert and oriented to person, place, and time.  She has normal strength and normal reflexes. No cranial nerve deficit or sensory deficit. Feeling diminished top of feet   Skin: Skin is warm, dry and intact. Capillary refill takes less than 2 seconds. No rash noted. Psychiatric: She has a normal mood and affect. Her speech is normal and behavior is normal. Judgment and thought content normal. Cognition and memory are normal.   Vitals reviewed. ASSESSMENT/PLAN:    Fermin Blake was seen today for diarrhea, leg pain and medication refill. Diagnoses and all orders for this visit:    Leg pain, anterior, right  -     Cancel: US DUP LOWER EXTREMITY RIGHT OLEGARIO; Future  -     US DUP LOWER EXTREMITY RIGHT OLEGARIO; Future    Chronic pain after surgical procedure for malignant neoplasm [facility])  -     Corky Ellington MD, Palliative Care, [facility]  -     HYDROcodone-acetaminophen (Ping Serve) 5-325 MG per tablet; Take 1 tablet by mouth 2 times daily as needed for Pain for up to 30 days. Diarrhea, unspecified type    MDS (myelodysplastic syndrome) (HCC)    Other orders  -     gabapentin (NEURONTIN) 300 MG capsule; Take 3 capsules by mouth nightly for 30 days. Patient requests narcotic medication refill. I have reviewed the current medications and prior notes regarding the need for these refills. I believe the need for refill is warranted at this time. I have reviewed the [place]AR[place] report and found no suspicion of drug seeking behavior. I have discussed the side effects and narcotic policy with this patient and the patient has demonstrated understanding.   A follow up appointment will be scheduled and will see palliative care  Cont meds for mood as currently stable  Try imodium at each meal  US negative , use warm compresses    Orders Placed This Encounter   Procedures    US DUP LOWER EXTREMITY RIGHT OLEGARIO     Standing Status:   Future     Number of Occurrences:   1     Standing Expiration Date:   6/11/2020   [address] Louis Geiger MD, [address], [facility]     Referral Priority:   Routine     Referral Type:   Eval and Treat     Referral Reason:   Specialty Services Required     Referred to Provider:   Meena Moeller MD     Requested Specialty:   Palliative Medicine     Number of Visits Requested:   1        No follow-ups on file. An electronic signature was used to authenticate this note.     --Zonia Carrel, APRN - CNP on 6/11/2019 at 11:33 PM

## 2019-06-18 ENCOUNTER — HOSPITAL ENCOUNTER (OUTPATIENT)
Dept: INFUSION THERAPY | Age: 79
Discharge: HOME | End: 2019-06-18
Payer: MEDICARE

## 2019-06-18 ENCOUNTER — OFFICE VISIT (OUTPATIENT)
Dept: ONCOLOGY | Age: 79
End: 2019-06-18
Payer: MEDICARE

## 2019-06-18 VITALS — TEMPERATURE: 98.6 F | DIASTOLIC BLOOD PRESSURE: 64 MMHG | HEART RATE: 80 BPM | SYSTOLIC BLOOD PRESSURE: 128 MMHG

## 2019-06-18 DIAGNOSIS — D46.9 MYELODYSPLASIA (MYELODYSPLASTIC SYNDROME) (HCC): Primary | ICD-10-CM

## 2019-06-18 DIAGNOSIS — C50.919 MALIGNANT NEOPLASM OF FEMALE BREAST, UNSPECIFIED ESTROGEN RECEPTOR STATUS, UNSPECIFIED LATERALITY, UNSPECIFIED SITE OF BREAST (HCC): ICD-10-CM

## 2019-06-18 DIAGNOSIS — D46.9 MYELODYSPLASTIC SYNDROME (HCC): ICD-10-CM

## 2019-06-18 DIAGNOSIS — D63.8 ANEMIA IN OTHER CHRONIC DISEASES CLASSIFIED ELSEWHERE: ICD-10-CM

## 2019-06-18 DIAGNOSIS — D46.9 MYELODYSPLASTIC SYNDROME (HCC): Chronic | ICD-10-CM

## 2019-06-18 LAB
ANISOCYTOSIS: ABNORMAL
BASOPHILS ABSOLUTE: 0.03 E9/L (ref 0–0.2)
BASOPHILS RELATIVE PERCENT: 0.7 % (ref 0–2)
EOSINOPHILS ABSOLUTE: 0.07 E9/L (ref 0.05–0.5)
EOSINOPHILS RELATIVE PERCENT: 1.6 % (ref 0–6)
FERRITIN: 3844 NG/ML
HCT VFR BLD CALC: 26.9 % (ref 34–48)
HEMOGLOBIN: 8.8 G/DL (ref 11.5–15.5)
HYPOCHROMIA: ABNORMAL
IMMATURE GRANULOCYTES #: 0.01 E9/L
IMMATURE GRANULOCYTES %: 0.2 % (ref 0–5)
IRON SATURATION: 106 % (ref 15–50)
IRON: 226 MCG/DL (ref 37–145)
LYMPHOCYTES ABSOLUTE: 2.61 E9/L (ref 1.5–4)
LYMPHOCYTES RELATIVE PERCENT: 60.3 % (ref 20–42)
MCH RBC QN AUTO: 38.3 PG (ref 26–35)
MCHC RBC AUTO-ENTMCNC: 32.7 % (ref 32–34.5)
MCV RBC AUTO: 117 FL (ref 80–99.9)
MONOCYTES ABSOLUTE: 0.27 E9/L (ref 0.1–0.95)
MONOCYTES RELATIVE PERCENT: 6.2 % (ref 2–12)
NEUTROPHILS ABSOLUTE: 1.34 E9/L (ref 1.8–7.3)
NEUTROPHILS RELATIVE PERCENT: 31 % (ref 43–80)
OVALOCYTES: ABNORMAL
PDW BLD-RTO: 24.9 FL (ref 11.5–15)
PLATELET # BLD: 178 E9/L (ref 130–450)
PMV BLD AUTO: 10.9 FL (ref 7–12)
POIKILOCYTES: ABNORMAL
POLYCHROMASIA: ABNORMAL
RBC # BLD: 2.3 E12/L (ref 3.5–5.5)
TEAR DROP CELLS: ABNORMAL
TOTAL IRON BINDING CAPACITY: 214 MCG/DL (ref 250–450)
WBC # BLD: 4.3 E9/L (ref 4.5–11.5)

## 2019-06-18 PROCEDURE — 6360000002 HC RX W HCPCS: Performed by: INTERNAL MEDICINE

## 2019-06-18 PROCEDURE — 83540 ASSAY OF IRON: CPT

## 2019-06-18 PROCEDURE — 1036F TOBACCO NON-USER: CPT | Performed by: INTERNAL MEDICINE

## 2019-06-18 PROCEDURE — 36415 COLL VENOUS BLD VENIPUNCTURE: CPT

## 2019-06-18 PROCEDURE — 1123F ACP DISCUSS/DSCN MKR DOCD: CPT | Performed by: INTERNAL MEDICINE

## 2019-06-18 PROCEDURE — 99214 OFFICE O/P EST MOD 30 MIN: CPT | Performed by: INTERNAL MEDICINE

## 2019-06-18 PROCEDURE — G8427 DOCREV CUR MEDS BY ELIG CLIN: HCPCS | Performed by: INTERNAL MEDICINE

## 2019-06-18 PROCEDURE — 36415 COLL VENOUS BLD VENIPUNCTURE: CPT | Performed by: INTERNAL MEDICINE

## 2019-06-18 PROCEDURE — 82728 ASSAY OF FERRITIN: CPT

## 2019-06-18 PROCEDURE — 1090F PRES/ABSN URINE INCON ASSESS: CPT | Performed by: INTERNAL MEDICINE

## 2019-06-18 PROCEDURE — G8400 PT W/DXA NO RESULTS DOC: HCPCS | Performed by: INTERNAL MEDICINE

## 2019-06-18 PROCEDURE — 96372 THER/PROPH/DIAG INJ SC/IM: CPT

## 2019-06-18 PROCEDURE — 83550 IRON BINDING TEST: CPT

## 2019-06-18 PROCEDURE — G8419 CALC BMI OUT NRM PARAM NOF/U: HCPCS | Performed by: INTERNAL MEDICINE

## 2019-06-18 PROCEDURE — 99212 OFFICE O/P EST SF 10 MIN: CPT | Performed by: INTERNAL MEDICINE

## 2019-06-18 PROCEDURE — 85025 COMPLETE CBC W/AUTO DIFF WBC: CPT

## 2019-06-18 PROCEDURE — 4040F PNEUMOC VAC/ADMIN/RCVD: CPT | Performed by: INTERNAL MEDICINE

## 2019-06-18 RX ADMIN — DARBEPOETIN ALFA 500 MCG: 500 INJECTION, SOLUTION INTRAVENOUS; SUBCUTANEOUS at 15:47

## 2019-06-18 NOTE — PROGRESS NOTES
[address] MED ONCOLOGY  [facility] [address]  Dept: [phone]  Attending Progress Note      Reason for Visit:   1. Right Breast Cancer. 2. MDS. Referring Physician:  CONNIE Vargas CNP    PCP:  CONNIE Vargas CNP    History of Present Illness: The patient is a very pleasant 70-year-old lady with a PMH significant for Right Breast Cancer, stage III, HR pos, was diagnosed in 2009, she had a right mastectomy done, followed by adjuvant chemo, she received 8 cycles, probably AC followed by T, then PMRT, and 5 years of adjuvant ET with Arimidex, was completed in 2015. She was treated in [facility] under the care of Dr. Esme Jarquin. She was diagnosed with MDS in 2017, she received ESAs and PRBCs transfusion. She has transfusion related iron overload. She is feeling well overall, has chronic joints pain, peripheral neuropathy. The patient had a left breast screening mammogram done today. She was started on Aranesp on 4/24/2020. She has been very active. She is accompanied by her daughter. Review of Systems;  CONSTITUTIONAL: No fever, chills. Fair appetite and energy level. ENMT: Eyes: No diplopia; Nose: No epistaxis. Mouth: No sore throat. RESPIRATORY: No hemoptysis, shortness of breath, cough. CARDIOVASCULAR: No chest pain, palpitations. GASTROINTESTINAL: No nausea/vomiting, abdominal pain, diarrhea/constipation. GENITOURINARY: No dysuria, urinary frequency, hematuria. NEURO: No syncope, presyncope, headache.   Remainder:  ROS NEGATIVE    Past Medical History:      Diagnosis Date    Anemia     Pt reports she was dx in her teens, w/o proper w/u, with iron deficiency anemia and was on oral iron replacement for many years, resulting in iron overload    Aplastic anemia ([address] ~6818-1029    Possibly d/t chemotherapy for breast cancer    Breast [facility] [facility] 2009    right side; pt underwent radical mastectomy followed by radiation therapy and chemotherapy and was then on oral chemo pill for 5 yrs; no recurrence as of 2019    Hypothyroidism     Mild depression ([address]     Myelodysplastic syndrome ([address] ~2016    Pulmonary tuberculosis ~7275-5688    in her early 25s    Sensory neuropathy     beyond the ankle b/l     Patient Active Problem List   Diagnosis    Myelodysplastic syndrome ([address]    Macrocytic anemia with high RDW    Hypothyroidism    Mild depression (HCC)    Peripheral sensory neuropathy    Breast cancer (HCC)        Past Surgical History:      Procedure Laterality Date    BREAST BIOPSY      CHOLECYSTECTOMY      MASTECTOMY      right breast    OTHER SURGICAL HISTORY      blood transfusions    TONSILLECTOMY      TUBAL LIGATION         Family History:  Family History   Problem Relation Age of Onset   Coco Mac Cancer Mother         lung    Cancer Father         lung    Cancer Sister         ovarian cancer and breast cancer    Cancer Brother         unknown    Cancer Maternal Aunt         unknown    Cancer Other         brain       Medications:  Reviewed and reconciled.     Social History:  Social History     Socioeconomic History    Marital status: Single     Spouse name: Not on file    Number of children: Not on file    Years of education: Not on file    Highest education level: Not on file   Occupational History    Not on file   Social Needs    Financial resource strain: Not on file    Food insecurity:     Worry: Not on file     Inability: Not on file    Transportation needs:     Medical: Not on file     Non-medical: Not on file   Tobacco Use    Smoking status: Former Smoker     Packs/day: 1.00     Years: 37.00     Pack years: 37.00     Types: Cigarettes     Start date:      Last attempt to quit: 2000     Years since quittin.4    Smokeless tobacco: Never Used   Substance and Sexual Activity    Alcohol use: No    Drug use: No    Sexual activity: Not Currently by T, then PMRT, and 5 years of adjuvant ET with Arimidex, was completed in 2015. She was treated in [facility] under the care of Dr. Ranjith Wiseman. She is doing well clinically without any evidence of recurrence of her disease. Discussed with her the importance of monthly SBE, and routine screening mammograms, she had a left breast screening mammogram done today 5/14/2019, revealing benign findings, was negative for malignancy, will be due for repeat mammogram in 1 year. 2. She has MDS, diagnosed in 2017, she received ESAs and PRBCs transfusion, has transfusion related iron overload, hgb is 8.5, ferritin 3647, was restarted on Aranesp on 4/23/2019. Labs reviewed, she did have improvement of the hemoglobin with Aranesp, from 8.4 to 9.6 g/dl today, she has macrocytosis and mild leukopenia, she received Aranesp 500 MCG 3 weeks ago, she had a better response when she received it every 2 weeks, hemoglobin today 8.8 g/dL, proceed with Aranesp. RTC in 2 weeks with labs, aranesp. Thank you for allowing us to participate in the care of Ms. Ventura.     Dyan Morocho MD   HEMATOLOGY/MEDICAL ONCOLOGY  [address] [place] [address] Rd MED ONCOLOGY  [address] [place] [id number]  Dept: [phone]

## 2019-06-19 ENCOUNTER — OFFICE VISIT (OUTPATIENT)
Dept: PRIMARY CARE CLINIC | Age: 79
End: 2019-06-19
Payer: MEDICARE

## 2019-06-19 VITALS
HEART RATE: 70 BPM | WEIGHT: 146 LBS | BODY MASS INDEX: 27.56 KG/M2 | SYSTOLIC BLOOD PRESSURE: 118 MMHG | HEIGHT: 61 IN | DIASTOLIC BLOOD PRESSURE: 60 MMHG | TEMPERATURE: 97.7 F | OXYGEN SATURATION: 98 %

## 2019-06-19 DIAGNOSIS — Z00.00 ROUTINE GENERAL MEDICAL EXAMINATION AT A HEALTH CARE FACILITY: Primary | ICD-10-CM

## 2019-06-19 DIAGNOSIS — M81.8 OTHER OSTEOPOROSIS WITHOUT CURRENT PATHOLOGICAL FRACTURE: ICD-10-CM

## 2019-06-19 DIAGNOSIS — Z78.0 ASYMPTOMATIC MENOPAUSAL STATE: ICD-10-CM

## 2019-06-19 PROCEDURE — G0439 PPPS, SUBSEQ VISIT: HCPCS | Performed by: NURSE PRACTITIONER

## 2019-06-19 ASSESSMENT — LIFESTYLE VARIABLES
HOW OFTEN DO YOU HAVE SIX OR MORE DRINKS ON ONE OCCASION: 0
HOW MANY STANDARD DRINKS CONTAINING ALCOHOL DO YOU HAVE ON A TYPICAL DAY: 0
AUDIT-C TOTAL SCORE: 1
HOW OFTEN DO YOU HAVE A DRINK CONTAINING ALCOHOL: 1

## 2019-06-19 ASSESSMENT — PATIENT HEALTH QUESTIONNAIRE - PHQ9
SUM OF ALL RESPONSES TO PHQ QUESTIONS 1-9: 0
SUM OF ALL RESPONSES TO PHQ QUESTIONS 1-9: 0

## 2019-06-19 ASSESSMENT — ANXIETY QUESTIONNAIRES: GAD7 TOTAL SCORE: 0

## 2019-06-19 NOTE — PROGRESS NOTES
Medicare Annual Wellness Visit  Name: Socorro Reyes Date: 2019   MRN: [id number] Sex: Female   Age: [de-identified] y.o. Ethnicity: Non-/Non    : 1940 Race: Betzy Britt is here for Medicare AWV    Screenings for behavioral, psychosocial and functional/safety risks, and cognitive dysfunction are all negative except as indicated below. These results, as well as other patient data from the [address] form, are documented in Flowsheets linked to this Encounter. Allergies   Allergen Reactions    Bee Venom Swelling and Dermatitis    Penicillins Hives, Swelling and Dermatitis     Prior to Visit Medications    Medication Sig Taking? Authorizing Provider   gabapentin (NEURONTIN) 300 MG capsule Take 3 capsules by mouth nightly for 30 days. Yes CONNIE Butler CNP   HYDROcodone-acetaminophen (NORCO) 5-325 MG per tablet Take 1 tablet by mouth 2 times daily as needed for Pain for up to 30 days.  Yes CONNIE Butler CNP   levothyroxine (SYNTHROID) 175 MCG tablet Take 175 mcg by mouth every morning  Yes Historical Provider, MD   sertraline (ZOLOFT) 50 MG tablet Take 50 mg by mouth nightly Yes Historical Provider, MD     Past Medical History:   Diagnosis Date    Anemia     Pt reports she was dx in her teens, w/o proper w/u, with iron deficiency anemia and was on oral iron replacement for many years, resulting in iron overload    Aplastic anemia ([address] ~6436-8639    Possibly d/t chemotherapy for breast cancer    Breast cancer ([address]     right side; pt underwent radical mastectomy followed by radiation therapy and chemotherapy and was then on oral chemo pill for 5 yrs; no recurrence as of 2019    Hypothyroidism     Mild depression ([address]     Myelodysplastic syndrome ([address] ~2016    Pulmonary tuberculosis ~7609-3472    in her early 25s    Sensory neuropathy     beyond the ankle b/l     Past Surgical History:   Procedure Laterality Date    BREAST BIOPSY  CHOLECYSTECTOMY      MASTECTOMY      right breast    OTHER SURGICAL HISTORY      blood transfusions    TONSILLECTOMY      TUBAL LIGATION       Family History   Problem Relation Age of Onset    Cancer Mother         lung    Cancer Father         lung    Cancer Sister         ovarian cancer and breast cancer    Cancer Brother         unknown    Cancer Maternal Aunt         unknown    Cancer Other         brain       CareTeam (Including outside providers/suppliers regularly involved in providing care):   Patient Care Team:  CONNIE Mak CNP as PCP - General (Nurse Practitioner)  CONNIE Mak CNP as PCP - [facility] Empaneled Provider    Wt Readings from Last 3 Encounters:   06/19/19 146 lb (66.2 kg)   06/11/19 144 lb (65.3 kg)   05/28/19 142 lb 14.4 oz (64.8 kg)     Vitals:    06/19/19 1510   BP: 118/60   Pulse: 70   Temp: 97.7 °F (36.5 °C)   TempSrc: Tympanic   SpO2: 98%   Weight: 146 lb (66.2 kg)   Height: 5' 1\" (1.549 m)     Body mass index is 27.59 kg/m². Based upon direct observation of the patient, evaluation of cognition reveals recent and remote memory intact.     General Appearance: alert and oriented to person, place and time, well developed and well- nourished, in no acute distress  Skin: warm and dry, no rash or erythema  Head: normocephalic and atraumatic  Eyes: pupils equal, round, and reactive to light, extraocular eye movements intact, conjunctivae normal  Neck: supple and non-tender without mass, no thyromegaly or thyroid nodules, no cervical lymphadenopathy  Pulmonary/Chest: clear to auscultation bilaterally- no wheezes, rales or rhonchi, decreased air movement, no respiratory distress  Cardiovascular: normal rate, regular rhythm, normal S1 and S2,  +murmurs, rubs, clicks, or gallops, distal pulses intact, no carotid bruits  Abdomen: soft, non-tender, non-distended, normal bowel sounds, no masses or organomegaly  Extremities: no cyanosis, clubbing or edema  Musculoskeletal: normal range of motion, no joint swelling, deformity or tenderness  Neurologic: reflexes normal and symmetric, no cranial nerve deficit, gait, coordination and speech normal    Patient's complete Health Risk Assessment and screening values have been reviewed and are found in Flowsheets. The following problems were reviewed today and where indicated follow up appointments were made and/or referrals ordered. Positive Risk Factor Screenings with Interventions:     Health Habits/Nutrition:  Health Habits/Nutrition  Do you exercise for at least 20 minutes 2-3 times per week?: Yes  Have you lost any weight without trying in the past 3 months?: (!) Yes  Do you eat fewer than 2 meals per day?: No  Have you seen a dentist within the past year?: (!) No  Body mass index is 27.59 kg/m².   Health Habits/Nutrition Interventions:  · Inadequate physical activity:  patient agrees to increase physical activity as follows: 5 minutes several times a week , is anemic and on chemo,   ·     Hearing/Vision:  Hearing/Vision  Do you or your family notice any trouble with your hearing?: (!) Yes  Do you have difficulty driving, watching TV, or doing any of your daily activities because of your eyesight?: (!) Yes  Have you had an eye exam within the past year?: (!) No  Hearing/Vision Interventions:  · Hearing concerns:  patient declines any further evaluation/treatment for hearing issues    Safety:  Safety  Do you have working smoke detectors?: Yes  Have all throw rugs been removed or fastened?: (!) No  Do you have non-slip mats in all bathtubs?: Yes  Do all of your stairways have a railing or banister?: Yes  Are your doorways, halls and stairs free of clutter?: Yes  Do you always fasten your seatbelt when you are in a car?: Yes  Safety Interventions:  · Home safety tips provided , Throw rugs are rubber backed    Personalized Preventive Plan   Current Health Maintenance Status    There is no immunization history on file for this patient. Health Maintenance   Topic Date Due    TSH testing  1940    DTaP/Tdap/Td vaccine (1 - Tdap) 09/22/1957    Shingles Vaccine (1 of 2) 09/22/1988    Annual Wellness Visit (AWV)  09/22/2001    DEXA (modify frequency per FRAX score)  09/22/2003    Pneumococcal 65+ years Vaccine (1 of 2 - PCV13) 09/22/2003    Flu vaccine (Season Ended) 09/01/2019     Recommendations for Preventive Services Due: see orders and patient instructions/AVS.  .   Recommended screening schedule for the next 5-10 years is provided to the patient in written form: see Patient Instructions/AVS.

## 2019-06-19 NOTE — PATIENT INSTRUCTIONS
Personalized Preventive Plan for Audrey Louis - 6/19/2019  Medicare offers a range of preventive health benefits. Some of the tests and screenings are paid in full while other may be subject to a deductible, co-insurance, and/or copay. Some of these benefits include a comprehensive review of your medical history including lifestyle, illnesses that may run in your family, and various assessments and screenings as appropriate. After reviewing your medical record and screening and assessments performed today your provider may have ordered immunizations, labs, imaging, and/or referrals for you. A list of these orders (if applicable) as well as your Preventive Care list are included within your After Visit Summary for your review. Other Preventive Recommendations:    · A preventive eye exam performed by an eye specialist is recommended every 1-2 years to screen for glaucoma; cataracts, macular degeneration, and other eye disorders. · A preventive dental visit is recommended every 6 months. · Try to get at least 150 minutes of exercise per week or 10,000 steps per day on a pedometer . · Order or download the FREE \"Exercise & Physical Activity: Your Everyday Guide\" from [employer]. Call 0-877.697.3700 or search [employer] online. · You need 7255-3550 mg of calcium and 4565-6262 IU of vitamin D per day. It is possible to meet your calcium requirement with diet alone, but a vitamin D supplement is usually necessary to meet this goal.  · When exposed to the sun, use a sunscreen that protects against both UVA and UVB radiation with an SPF of 30 or greater. Reapply every 2 to 3 hours or after sweating, drying off with a towel, or swimming. · Always wear a seat belt when traveling in a car. Always wear a helmet when riding a bicycle or motorcycle.

## 2019-07-02 ENCOUNTER — OFFICE VISIT (OUTPATIENT)
Dept: ONCOLOGY | Age: 79
End: 2019-07-02
Payer: MEDICARE

## 2019-07-02 ENCOUNTER — HOSPITAL ENCOUNTER (OUTPATIENT)
Dept: INFUSION THERAPY | Age: 79
Discharge: HOME | End: 2019-07-02
Payer: MEDICARE

## 2019-07-02 VITALS
HEART RATE: 60 BPM | BODY MASS INDEX: 27.2 KG/M2 | WEIGHT: 144.1 LBS | SYSTOLIC BLOOD PRESSURE: 120 MMHG | DIASTOLIC BLOOD PRESSURE: 54 MMHG | HEIGHT: 61 IN | TEMPERATURE: 98 F

## 2019-07-02 DIAGNOSIS — D46.9 MYELODYSPLASTIC SYNDROME (HCC): Chronic | ICD-10-CM

## 2019-07-02 DIAGNOSIS — D63.8 ANEMIA IN OTHER CHRONIC DISEASES CLASSIFIED ELSEWHERE: ICD-10-CM

## 2019-07-02 DIAGNOSIS — D46.9 MYELODYSPLASIA (MYELODYSPLASTIC SYNDROME) (HCC): Primary | ICD-10-CM

## 2019-07-02 DIAGNOSIS — D46.9 MYELODYSPLASTIC SYNDROME (HCC): ICD-10-CM

## 2019-07-02 LAB
ANISOCYTOSIS: ABNORMAL
BASOPHILS ABSOLUTE: 0 E9/L (ref 0–0.2)
BASOPHILS RELATIVE PERCENT: 0.6 % (ref 0–2)
EOSINOPHILS ABSOLUTE: 0.12 E9/L (ref 0.05–0.5)
EOSINOPHILS RELATIVE PERCENT: 2.6 % (ref 0–6)
HCT VFR BLD CALC: 27.9 % (ref 34–48)
HEMOGLOBIN: 9.1 G/DL (ref 11.5–15.5)
HYPOCHROMIA: ABNORMAL
LYMPHOCYTES ABSOLUTE: 3.26 E9/L (ref 1.5–4)
LYMPHOCYTES RELATIVE PERCENT: 67.8 % (ref 20–42)
MCH RBC QN AUTO: 38.4 PG (ref 26–35)
MCHC RBC AUTO-ENTMCNC: 32.6 % (ref 32–34.5)
MCV RBC AUTO: 117.7 FL (ref 80–99.9)
MONOCYTES ABSOLUTE: 0.24 E9/L (ref 0.1–0.95)
MONOCYTES RELATIVE PERCENT: 5.2 % (ref 2–12)
NEUTROPHILS ABSOLUTE: 1.15 E9/L (ref 1.8–7.3)
NEUTROPHILS RELATIVE PERCENT: 24.3 % (ref 43–80)
OVALOCYTES: ABNORMAL
PDW BLD-RTO: 25.6 FL (ref 11.5–15)
PLATELET # BLD: 162 E9/L (ref 130–450)
PMV BLD AUTO: 11.3 FL (ref 7–12)
POIKILOCYTES: ABNORMAL
POLYCHROMASIA: ABNORMAL
RBC # BLD: 2.37 E12/L (ref 3.5–5.5)
SCHISTOCYTES: ABNORMAL
TEAR DROP CELLS: ABNORMAL
WBC # BLD: 4.8 E9/L (ref 4.5–11.5)

## 2019-07-02 PROCEDURE — G8419 CALC BMI OUT NRM PARAM NOF/U: HCPCS | Performed by: INTERNAL MEDICINE

## 2019-07-02 PROCEDURE — G8400 PT W/DXA NO RESULTS DOC: HCPCS | Performed by: INTERNAL MEDICINE

## 2019-07-02 PROCEDURE — 1036F TOBACCO NON-USER: CPT | Performed by: INTERNAL MEDICINE

## 2019-07-02 PROCEDURE — 96372 THER/PROPH/DIAG INJ SC/IM: CPT

## 2019-07-02 PROCEDURE — 36415 COLL VENOUS BLD VENIPUNCTURE: CPT | Performed by: INTERNAL MEDICINE

## 2019-07-02 PROCEDURE — 85025 COMPLETE CBC W/AUTO DIFF WBC: CPT

## 2019-07-02 PROCEDURE — 6360000002 HC RX W HCPCS: Performed by: INTERNAL MEDICINE

## 2019-07-02 PROCEDURE — 99212 OFFICE O/P EST SF 10 MIN: CPT | Performed by: INTERNAL MEDICINE

## 2019-07-02 PROCEDURE — 1090F PRES/ABSN URINE INCON ASSESS: CPT | Performed by: INTERNAL MEDICINE

## 2019-07-02 PROCEDURE — 4040F PNEUMOC VAC/ADMIN/RCVD: CPT | Performed by: INTERNAL MEDICINE

## 2019-07-02 PROCEDURE — G8427 DOCREV CUR MEDS BY ELIG CLIN: HCPCS | Performed by: INTERNAL MEDICINE

## 2019-07-02 PROCEDURE — 36415 COLL VENOUS BLD VENIPUNCTURE: CPT

## 2019-07-02 PROCEDURE — 99214 OFFICE O/P EST MOD 30 MIN: CPT | Performed by: INTERNAL MEDICINE

## 2019-07-02 PROCEDURE — 1123F ACP DISCUSS/DSCN MKR DOCD: CPT | Performed by: INTERNAL MEDICINE

## 2019-07-02 RX ADMIN — DARBEPOETIN ALFA 500 MCG: 500 INJECTION, SOLUTION INTRAVENOUS; SUBCUTANEOUS at 15:21

## 2019-07-16 ENCOUNTER — OFFICE VISIT (OUTPATIENT)
Dept: ONCOLOGY | Age: 79
End: 2019-07-16
Payer: MEDICARE

## 2019-07-16 ENCOUNTER — HOSPITAL ENCOUNTER (OUTPATIENT)
Dept: INFUSION THERAPY | Age: 79
Discharge: HOME | End: 2019-07-16
Payer: MEDICARE

## 2019-07-16 ENCOUNTER — HOSPITAL ENCOUNTER (OUTPATIENT)
Age: 79
Discharge: HOME | End: 2019-07-18
Payer: MEDICARE

## 2019-07-16 ENCOUNTER — OFFICE VISIT (OUTPATIENT)
Dept: PALLATIVE CARE | Age: 79
End: 2019-07-16
Payer: MEDICARE

## 2019-07-16 VITALS
HEART RATE: 66 BPM | OXYGEN SATURATION: 96 % | SYSTOLIC BLOOD PRESSURE: 123 MMHG | BODY MASS INDEX: 27.21 KG/M2 | DIASTOLIC BLOOD PRESSURE: 40 MMHG | WEIGHT: 144 LBS

## 2019-07-16 VITALS
TEMPERATURE: 98.7 F | BODY MASS INDEX: 27.34 KG/M2 | WEIGHT: 144.8 LBS | HEIGHT: 61 IN | DIASTOLIC BLOOD PRESSURE: 60 MMHG | SYSTOLIC BLOOD PRESSURE: 134 MMHG | HEART RATE: 59 BPM

## 2019-07-16 DIAGNOSIS — T45.1X5A CHEMOTHERAPY-INDUCED PERIPHERAL NEUROPATHY (HCC): ICD-10-CM

## 2019-07-16 DIAGNOSIS — D46.9 MYELODYSPLASTIC SYNDROME (HCC): Chronic | ICD-10-CM

## 2019-07-16 DIAGNOSIS — D46.9 MYELODYSPLASIA (MYELODYSPLASTIC SYNDROME) (HCC): Primary | ICD-10-CM

## 2019-07-16 DIAGNOSIS — C80.1 CHRONIC PAIN AFTER SURGICAL PROCEDURE FOR MALIGNANT NEOPLASM (HCC): ICD-10-CM

## 2019-07-16 DIAGNOSIS — G62.0 CHEMOTHERAPY-INDUCED PERIPHERAL NEUROPATHY (HCC): ICD-10-CM

## 2019-07-16 DIAGNOSIS — D46.9 MYELODYSPLASTIC SYNDROME (HCC): ICD-10-CM

## 2019-07-16 DIAGNOSIS — Z51.5 PALLIATIVE CARE BY SPECIALIST: Primary | ICD-10-CM

## 2019-07-16 DIAGNOSIS — D63.8 ANEMIA IN OTHER CHRONIC DISEASES CLASSIFIED ELSEWHERE: ICD-10-CM

## 2019-07-16 DIAGNOSIS — G89.28 CHRONIC PAIN AFTER SURGICAL PROCEDURE FOR MALIGNANT NEOPLASM (HCC): ICD-10-CM

## 2019-07-16 DIAGNOSIS — C50.911 MALIGNANT NEOPLASM OF RIGHT FEMALE BREAST, UNSPECIFIED ESTROGEN RECEPTOR STATUS, UNSPECIFIED SITE OF BREAST (HCC): ICD-10-CM

## 2019-07-16 LAB
AMPHETAMINE SCREEN, URINE: NOT DETECTED
ANISOCYTOSIS: ABNORMAL
BARBITURATE SCREEN URINE: NOT DETECTED
BASOPHILS ABSOLUTE: 0.03 E9/L (ref 0–0.2)
BASOPHILS RELATIVE PERCENT: 0.5 % (ref 0–2)
BENZODIAZEPINE SCREEN, URINE: NOT DETECTED
CANNABINOID SCREEN URINE: NOT DETECTED
COCAINE METABOLITE SCREEN URINE: NOT DETECTED
EOSINOPHILS ABSOLUTE: 0.14 E9/L (ref 0.05–0.5)
EOSINOPHILS RELATIVE PERCENT: 2.1 % (ref 0–6)
HCT VFR BLD CALC: 30.2 % (ref 34–48)
HEMOGLOBIN: 9.9 G/DL (ref 11.5–15.5)
HYPOCHROMIA: ABNORMAL
IMMATURE GRANULOCYTES #: 0.02 E9/L
IMMATURE GRANULOCYTES %: 0.3 % (ref 0–5)
LYMPHOCYTES ABSOLUTE: 4.2 E9/L (ref 1.5–4)
LYMPHOCYTES RELATIVE PERCENT: 63.1 % (ref 20–42)
MCH RBC QN AUTO: 38.1 PG (ref 26–35)
MCHC RBC AUTO-ENTMCNC: 32.8 % (ref 32–34.5)
MCV RBC AUTO: 116.2 FL (ref 80–99.9)
METHADONE SCREEN, URINE: NOT DETECTED
MONOCYTES ABSOLUTE: 0.39 E9/L (ref 0.1–0.95)
MONOCYTES RELATIVE PERCENT: 5.9 % (ref 2–12)
NEUTROPHILS ABSOLUTE: 1.88 E9/L (ref 1.8–7.3)
NEUTROPHILS RELATIVE PERCENT: 28.1 % (ref 43–80)
OPIATE SCREEN URINE: POSITIVE
OVALOCYTES: ABNORMAL
PDW BLD-RTO: 25.9 FL (ref 11.5–15)
PHENCYCLIDINE SCREEN URINE: NOT DETECTED
PLATELET # BLD: 177 E9/L (ref 130–450)
PMV BLD AUTO: 10.9 FL (ref 7–12)
POIKILOCYTES: ABNORMAL
POLYCHROMASIA: ABNORMAL
PROPOXYPHENE SCREEN: NOT DETECTED
RBC # BLD: 2.6 E12/L (ref 3.5–5.5)
TEAR DROP CELLS: ABNORMAL
WBC # BLD: 6.7 E9/L (ref 4.5–11.5)

## 2019-07-16 PROCEDURE — G8419 CALC BMI OUT NRM PARAM NOF/U: HCPCS | Performed by: INTERNAL MEDICINE

## 2019-07-16 PROCEDURE — 99215 OFFICE O/P EST HI 40 MIN: CPT | Performed by: NURSE PRACTITIONER

## 2019-07-16 PROCEDURE — 99214 OFFICE O/P EST MOD 30 MIN: CPT | Performed by: INTERNAL MEDICINE

## 2019-07-16 PROCEDURE — G0480 DRUG TEST DEF 1-7 CLASSES: HCPCS

## 2019-07-16 PROCEDURE — 6360000002 HC RX W HCPCS: Performed by: INTERNAL MEDICINE

## 2019-07-16 PROCEDURE — 1123F ACP DISCUSS/DSCN MKR DOCD: CPT | Performed by: INTERNAL MEDICINE

## 2019-07-16 PROCEDURE — 80307 DRUG TEST PRSMV CHEM ANLYZR: CPT

## 2019-07-16 PROCEDURE — 1036F TOBACCO NON-USER: CPT | Performed by: INTERNAL MEDICINE

## 2019-07-16 PROCEDURE — 99204 OFFICE O/P NEW MOD 45 MIN: CPT | Performed by: NURSE PRACTITIONER

## 2019-07-16 PROCEDURE — 96372 THER/PROPH/DIAG INJ SC/IM: CPT

## 2019-07-16 PROCEDURE — 85025 COMPLETE CBC W/AUTO DIFF WBC: CPT

## 2019-07-16 PROCEDURE — 36415 COLL VENOUS BLD VENIPUNCTURE: CPT | Performed by: INTERNAL MEDICINE

## 2019-07-16 PROCEDURE — 4040F PNEUMOC VAC/ADMIN/RCVD: CPT | Performed by: INTERNAL MEDICINE

## 2019-07-16 PROCEDURE — 1090F PRES/ABSN URINE INCON ASSESS: CPT | Performed by: INTERNAL MEDICINE

## 2019-07-16 PROCEDURE — 36415 COLL VENOUS BLD VENIPUNCTURE: CPT

## 2019-07-16 PROCEDURE — G8400 PT W/DXA NO RESULTS DOC: HCPCS | Performed by: INTERNAL MEDICINE

## 2019-07-16 PROCEDURE — G8427 DOCREV CUR MEDS BY ELIG CLIN: HCPCS | Performed by: INTERNAL MEDICINE

## 2019-07-16 RX ORDER — HYDROCODONE BITARTRATE AND ACETAMINOPHEN 5; 325 MG/1; MG/1
1 TABLET ORAL EVERY 8 HOURS PRN
Qty: 60 TABLET | Refills: 0 | Status: SHIPPED | OUTPATIENT
Start: 2019-07-16 | End: 2019-08-05

## 2019-07-16 RX ADMIN — DARBEPOETIN ALFA 500 MCG: 500 INJECTION, SOLUTION INTRAVENOUS; SUBCUTANEOUS at 15:58

## 2019-07-16 NOTE — PROGRESS NOTES
Palliative Care Department  Palliative Care Initial Consult  Provider: Mela ALCAZAR    Referring Provider:  Noel ALCAZAR    Location of Service:   [address] palliative medicine clinic    Reason for Consult:  []  Code status Discussion  [x]  Assist with goals of care  [x]  Psychosocial support  [x]  Symptom Management  []  Advanced Care Planning    Chief Complaint: Theron Small is a [de-identified] y.o. female with chief complaint of bilateral LE neuropathic pain. Assessment/Plan      History of Breast Cancer stage III, HR positive:   -Diagnosed, treated, and managed in [place] in 2009   -Status post right mastectomy   -Followed by adjuvant chemotherapy   -Completed her Demadex 2015   -Currently followed locally by Dr. Marlene Redding, she also follows with regarding MDS diagnosed 2017    Chemotherapy related peripheral Neuropathy/Pain:   -  Worse bilateral feet and LEs, burning and pressure,    -  Gabapentin 900 mg HS, does not wish daytime dose as fearful of sedation   -  He did discuss potential increase of gabapentin starting with a daytime dose, she is willing to discuss this further at her next visit. -  Improves with activity, worsens at rest   -  Norco 5-325 mg every 8 hours as needed, using 1 to 2/day, will provide refill today prescribing 60 tablets. -  Also reports pain in her shoulders, low back, and hips, especially after significant activity   -  Currently she is very high functioning, enjoys gardening, and is quite active    Palliative Care Encounter:      - Goals of care: improve or maintain function/quality of life, remain at home, preserve independence/autonomy/control and continue current management     - Code Status: Not discussed during this encounter    Prognosis: unknown    Follow Up:  4 weeks. They were encouraged to call with any questions, concerns, needs, or changes in symptoms.     Subjective:     HPI:  Theron Small is a [de-identified] y.o. female with significant past medical history of stage II HR positive breast cancer, diagnosed in 2009, status post right mastectomy, followed by adjuvant therapy, and 5 years of adjuvant Arimidex which was completed in 2015, with all treatment performed in [facility] under the care of Dr. Yessy Dumont. She is currently being followed locally by Dr. Apolonia Quintana, without evidence of recurrent disease, whom she also follows for management of myelodysplastic syndrome, which was diagnosed in 2017. She was referred to [address] [place] [address] for assistance with symptom management related to chemotherapy-induced peripheral neuropathy. As per Dr. Apolonia Quintana on 7/2/2019:  Impression/Plan:   1. The patient is a very pleasant 77-year-old lady with a PMH significant for Right Breast Cancer, stage III, HR pos, was diagnosed in 2009, she had a right mastectomy done, followed by adjuvant chemo, she received 8 cycles, probably AC followed by T, then PMRT, and 5 years of adjuvant ET with Arimidex, was completed in 2015. She was treated in [facility] under the care of Dr. Yessy Dumont. She is doing well clinically without any evidence of recurrence of her disease. Discussed with her the importance of monthly SBE, and routine screening mammograms, she had a left breast screening mammogram done today 5/14/2019, revealing benign findings, was negative for malignancy, will be due for repeat mammogram on 5/15/2020.    2. She has MDS, diagnosed in 2017, she received ESAs and PRBCs transfusion, has transfusion related iron overload, hgb is 8.5, ferritin 3647, was restarted on Aranesp on 4/23/2019. Labs reviewed, she did have improvement of the hemoglobin with Aranesp, from 8.4 to 9.6 g/dl today, she has macrocytosis and mild leukopenia, she is receiving Aranesp, she had a better response when she received it every 2 weeks, hemoglobin today 7/2/2019 is 9.1 g/dL, proceed with Aranesp.  Will continue to monitor her CBCD.   RTC in 2 weeks with labs, Cyndi Overall you pica.  MUSCULOSKELETAL:  pain, edema, joint swelling or redness  NEUROLOGICAL:  light headed, dizziness, loss of consciousness, weakness, change in memory, seizures, tremors    Objective:     Physical Exam  BP (!) 123/40   Pulse 66   Wt 144 lb (65.3 kg)   LMP 05/28/2016   SpO2 96% Comment: RA  BMI 27.21 kg/m²     Gen:  Alert, appears stated age, well nourished, in no acute distress  HEENT:  Normocephalic, conjunctiva pink, no drainage, mucosa moist  Neck:  Supple  Lungs:  CTA bilaterally, no audible rhonchi or wheezes noted  Heart[de-identified]  RRR, no murmur, rub, or gallop noted during exam  Abd:  Soft, non tender, non distended, BS+  Ext:  Moving all extremities, no edema, pulses present  Skin:  Warm and dry  Neuro:  PERRL, Alert, oriented x 3; following commands    [place] Symptom Assessment Score   [place] Score 7/16/2019   Pain Score 6   Tiredness Score 5   Nausea Score Not nauseated   Depression Score Not depressed   Anxiety Score Not anxious   Drowsiness Score 1   Appetite Score Best appetite   Wellbeing Score Best feeling of wellbeing   Dyspnea Score 6   Other Problem Score Best possible response   Total Assessment Score(calculated) 18     Assessed by: patient and provider. Current Medications:  Medications reviewed: yes    Controlled Substances Monitoring:     RX Monitoring 6/11/2019   Periodic Controlled Substance Monitoring Possible medication side effects, risk of tolerance/dependence & alternative treatments discussed. ;No signs of potential drug abuse or diversion identified. Olga Lidia Parmar APRN-CNP  Palliative Medicine    Time/Communication  Greater than 51% of time spent, total 60 minutes in counseling and coordination of care at the bedside regarding goals of care, symptom management, diagnosis and prognosis and see above. Discussed patient and the plan of care with the other IDT members of the Palliative Care Team, and with patient.     Thank you for allowing Palliative Medicine to

## 2019-07-16 NOTE — PROGRESS NOTES
[address] MED ONCOLOGY  [facility] [address]  Dept: [phone]  Attending Progress Note      Reason for Visit:   1. Right Breast Cancer. 2. MDS. Referring Physician:  CONNIE Montes De Oca CNP    PCP:  CONNIE Montes De Oca CNP    History of Present Illness: The patient is a very pleasant 78-year-old lady with a PMH significant for Right Breast Cancer, stage III, HR pos, was diagnosed in 2009, she had a right mastectomy done, followed by adjuvant chemo, she received 8 cycles, probably AC followed by T, then PMRT, and 5 years of adjuvant ET with Arimidex, was completed in 2015. She was treated in [facility] under the care of Dr. Lizzette Joyner. She was diagnosed with MDS in 2017, she received ESAs and PRBCs transfusion. She has transfusion related iron overload. She is feeling well overall, has chronic joints pain, peripheral neuropathy. The patient had a left breast screening mammogram done today. She was started on Aranesp on 4/24/2020. No SE from Aranesp. She has been very active. She denies any new problems. Review of Systems;  CONSTITUTIONAL: No fever, chills. Fair appetite and energy level. ENMT: Eyes: No diplopia; Nose: No epistaxis. Mouth: No sore throat. RESPIRATORY: No hemoptysis, shortness of breath, cough. CARDIOVASCULAR: No chest pain, palpitations. GASTROINTESTINAL: No nausea/vomiting, abdominal pain, diarrhea/constipation. GENITOURINARY: No dysuria, urinary frequency, hematuria. NEURO: No syncope, presyncope, headache.   Remainder:  ROS NEGATIVE    Past Medical History:      Diagnosis Date    Anemia     Pt reports she was dx in her teens, w/o proper w/u, with iron deficiency anemia and was on oral iron replacement for many years, resulting in iron overload    Aplastic anemia ([address] ~3670-9205    Possibly d/t chemotherapy for breast cancer    Breast [facility] 2009    right side; pt underwent use: No    Drug use: No    Sexual activity: Not Currently   Lifestyle    Physical activity:     Days per week: Not on file     Minutes per session: Not on file    Stress: Not on file   Relationships    Social connections:     Talks on phone: Not on file     Gets together: Not on file     Attends Mandaeism service: Not on file     Active member of club or organization: Not on file     Attends meetings of clubs or organizations: Not on file     Relationship status: Not on file    Intimate partner violence:     Fear of current or ex partner: Not on file     Emotionally abused: Not on file     Physically abused: Not on file     Forced sexual activity: Not on file   Other Topics Concern    Not on file   Social History Narrative    Not on file       Allergies: Allergies   Allergen Reactions    Bee Venom Swelling and Dermatitis    Penicillins Hives, Swelling and Dermatitis       Physical Exam:  /60 (Site: Left Upper Arm, Position: Sitting, Cuff Size: Medium Adult)   Pulse 59   Temp 98.7 °F (37.1 °C) (Temporal)   Ht 5' 1\" (1.549 m)   Wt 144 lb 12.8 oz (65.7 kg)   LMP 05/28/2016   BMI 27.36 kg/m²   GENERAL: Alert, oriented x 3, not in acute distress. HEENT: PERRLA; EOMI. Oropharynx clear. NECK: Supple. No palpable cervical or supraclavicular lymphadenopathy. LUNGS: Good air entry bilaterally. No wheezing, crackles or rhonchi. CARDIOVASCULAR: Regular rate. No murmurs, rubs or gallops. BREASTS: she is s/p right mastectomy, she has telangiectasias, no palpable right axillary LN, the left breast exam is negative for any skin changes, no nipple discharge, no palpable masses or palpable left axillary LN. ABDOMEN: Soft. Non-tender, non-distended. Positive bowel sounds. EXTREMITIES: Without clubbing, cyanosis, or edema. NEUROLOGIC: No focal deficits. ECOG PS 1      Impression/Plan:     1.  The patient is a very pleasant 78-year-old lady with a PMH significant for Right Breast Cancer, stage III,

## 2019-07-20 LAB
7-AMINOCLONAZEPAM, URINE: <5 NG/ML
ALPHA-HYDROXYALPRAZOLAM, URINE: <5 NG/ML
ALPHA-HYDROXYMIDAZOLAM, URINE: <20 NG/ML
ALPRAZOLAM, URINE: <5 NG/ML
CHLORDIAZEPOXIDE, URINE: <20 NG/ML
CLONAZEPAM, URINE: <5 NG/ML
DIAZEPAM, URINE: <20 NG/ML
LORAZEPAM, URINE: <20 NG/ML
MIDAZOLAM, URINE: <20 NG/ML
NORDIAZEPAM, URINE: <20 NG/ML
OXAZEPAM, URINE: <20 NG/ML
TEMAZEPAM, URINE: <20 NG/ML

## 2019-07-21 LAB
6AM URINE: <10 NG/ML
CODEINE, URINE: <20 NG/ML
FENTANYL URINE: <1 NG/ML
HYDROCODONE, URINE: 379 NG/ML
HYDROMORPHONE, URINE: <20 NG/ML
MORPHINE URINE: <20 NG/ML
NORFENTANYL, URINE: <1 NG/ML
NORHYDROCODONE, URINE: 452 NG/ML
NOROXYCODONE, URINE: <20 NG/ML
NOROXYMORPHONE, URINE: <20 NG/ML
OXYCODONE, URINE CONFIRMATION: <20 NG/ML
OXYMORPHONE, URINE: <20 NG/ML

## 2019-07-25 LAB
O-DESMETHYLTRAMADOL,UR: <25 NG/ML
TRAMADOL, URINE: <25 NG/ML

## 2019-07-29 NOTE — PROGRESS NOTES
radical mastectomy followed by radiation therapy and chemotherapy and was then on oral chemo pill for 5 yrs; no recurrence as of 2019    Hypothyroidism     Mild depression ([address]     Myelodysplastic syndrome ([address] ~2016    Pulmonary tuberculosis ~8709-1254    in her early 25s    Sensory neuropathy     beyond the ankle b/l     Patient Active Problem List   Diagnosis    Myelodysplastic syndrome ([address]    Macrocytic anemia with high RDW    Hypothyroidism    Mild depression (HCC)    Peripheral sensory neuropathy    Breast cancer (HCC)    Anemia in other chronic diseases classified elsewhere         Past Surgical History:      Procedure Laterality Date    BREAST BIOPSY      CHOLECYSTECTOMY      MASTECTOMY      right breast    OTHER SURGICAL HISTORY      blood transfusions    TONSILLECTOMY      TUBAL LIGATION         Family History:  Family History   Problem Relation Age of Onset    Cancer Mother         lung    Cancer Father         lung    Cancer Sister         ovarian cancer and breast cancer    Cancer Brother         unknown    Cancer Maternal Aunt         unknown    Cancer Other         brain       Medications:  Reviewed and reconciled.     Social History:  Social History     Socioeconomic History    Marital status: Single     Spouse name: Not on file    Number of children: Not on file    Years of education: Not on file    Highest education level: Not on file   Occupational History    Not on file   Social Needs    Financial resource strain: Not on file    Food insecurity:     Worry: Not on file     Inability: Not on file    Transportation needs:     Medical: Not on file     Non-medical: Not on file   Tobacco Use    Smoking status: Former Smoker     Packs/day: 1.00     Years: 37.00     Pack years: 37.00     Types: Cigarettes     Start date:      Last attempt to quit: 2000     Years since quittin.5    Smokeless tobacco: Never Used   Substance and Sexual Activity    Alcohol

## 2019-07-30 ENCOUNTER — HOSPITAL ENCOUNTER (OUTPATIENT)
Dept: INFUSION THERAPY | Age: 79
Discharge: HOME | End: 2019-07-30
Payer: MEDICARE

## 2019-07-30 ENCOUNTER — OFFICE VISIT (OUTPATIENT)
Dept: ONCOLOGY | Age: 79
End: 2019-07-30
Payer: MEDICARE

## 2019-07-30 VITALS
DIASTOLIC BLOOD PRESSURE: 60 MMHG | SYSTOLIC BLOOD PRESSURE: 128 MMHG | BODY MASS INDEX: 27.09 KG/M2 | RESPIRATION RATE: 16 BRPM | WEIGHT: 143.5 LBS | HEART RATE: 57 BPM | TEMPERATURE: 98.4 F | HEIGHT: 61 IN

## 2019-07-30 DIAGNOSIS — D46.9 MYELODYSPLASIA (MYELODYSPLASTIC SYNDROME) (HCC): Primary | ICD-10-CM

## 2019-07-30 DIAGNOSIS — D63.8 ANEMIA IN OTHER CHRONIC DISEASES CLASSIFIED ELSEWHERE: ICD-10-CM

## 2019-07-30 DIAGNOSIS — C50.919 MALIGNANT NEOPLASM OF FEMALE BREAST, UNSPECIFIED ESTROGEN RECEPTOR STATUS, UNSPECIFIED LATERALITY, UNSPECIFIED SITE OF BREAST (HCC): ICD-10-CM

## 2019-07-30 DIAGNOSIS — D46.9 MYELODYSPLASTIC SYNDROME (HCC): Primary | ICD-10-CM

## 2019-07-30 DIAGNOSIS — D46.9 MYELODYSPLASTIC SYNDROME (HCC): ICD-10-CM

## 2019-07-30 LAB
ANISOCYTOSIS: ABNORMAL
BASOPHILS ABSOLUTE: 0 E9/L (ref 0–0.2)
BASOPHILS RELATIVE PERCENT: 0 % (ref 0–2)
EOSINOPHILS ABSOLUTE: 0.3 E9/L (ref 0.05–0.5)
EOSINOPHILS RELATIVE PERCENT: 5 % (ref 0–6)
HCT VFR BLD CALC: 29.6 % (ref 34–48)
HEMOGLOBIN: 9.7 G/DL (ref 11.5–15.5)
HYPOCHROMIA: ABNORMAL
LYMPHOCYTES ABSOLUTE: 3.78 E9/L (ref 1.5–4)
LYMPHOCYTES RELATIVE PERCENT: 63 % (ref 20–42)
MCH RBC QN AUTO: 38.3 PG (ref 26–35)
MCHC RBC AUTO-ENTMCNC: 32.8 % (ref 32–34.5)
MCV RBC AUTO: 117 FL (ref 80–99.9)
MONOCYTES ABSOLUTE: 0.42 E9/L (ref 0.1–0.95)
MONOCYTES RELATIVE PERCENT: 7 % (ref 2–12)
NEUTROPHILS ABSOLUTE: 1.5 E9/L (ref 1.8–7.3)
NEUTROPHILS RELATIVE PERCENT: 25 % (ref 43–80)
NUCLEATED RED BLOOD CELLS: 1 /100 WBC
OVALOCYTES: ABNORMAL
PDW BLD-RTO: 26.1 FL (ref 11.5–15)
PLATELET # BLD: 166 E9/L (ref 130–450)
PMV BLD AUTO: 10.7 FL (ref 7–12)
POIKILOCYTES: ABNORMAL
POLYCHROMASIA: ABNORMAL
RBC # BLD: 2.53 E12/L (ref 3.5–5.5)
TEAR DROP CELLS: ABNORMAL
WBC # BLD: 6 E9/L (ref 4.5–11.5)

## 2019-07-30 PROCEDURE — G8419 CALC BMI OUT NRM PARAM NOF/U: HCPCS | Performed by: INTERNAL MEDICINE

## 2019-07-30 PROCEDURE — 99212 OFFICE O/P EST SF 10 MIN: CPT

## 2019-07-30 PROCEDURE — 99214 OFFICE O/P EST MOD 30 MIN: CPT | Performed by: INTERNAL MEDICINE

## 2019-07-30 PROCEDURE — 36415 COLL VENOUS BLD VENIPUNCTURE: CPT

## 2019-07-30 PROCEDURE — 85025 COMPLETE CBC W/AUTO DIFF WBC: CPT

## 2019-07-30 PROCEDURE — 96372 THER/PROPH/DIAG INJ SC/IM: CPT

## 2019-07-30 PROCEDURE — 6360000002 HC RX W HCPCS: Performed by: INTERNAL MEDICINE

## 2019-07-30 PROCEDURE — 4040F PNEUMOC VAC/ADMIN/RCVD: CPT | Performed by: INTERNAL MEDICINE

## 2019-07-30 PROCEDURE — G8400 PT W/DXA NO RESULTS DOC: HCPCS | Performed by: INTERNAL MEDICINE

## 2019-07-30 PROCEDURE — G8427 DOCREV CUR MEDS BY ELIG CLIN: HCPCS | Performed by: INTERNAL MEDICINE

## 2019-07-30 PROCEDURE — 1123F ACP DISCUSS/DSCN MKR DOCD: CPT | Performed by: INTERNAL MEDICINE

## 2019-07-30 PROCEDURE — 1090F PRES/ABSN URINE INCON ASSESS: CPT | Performed by: INTERNAL MEDICINE

## 2019-07-30 PROCEDURE — 1036F TOBACCO NON-USER: CPT | Performed by: INTERNAL MEDICINE

## 2019-07-30 RX ADMIN — DARBEPOETIN ALFA 500 MCG: 500 INJECTION, SOLUTION INTRAVENOUS; SUBCUTANEOUS at 15:35

## 2019-08-08 ENCOUNTER — HOSPITAL ENCOUNTER (OUTPATIENT)
Dept: MAMMOGRAPHY | Age: 79
Discharge: HOME | End: 2019-08-10
Payer: MEDICARE

## 2019-08-08 DIAGNOSIS — M81.8 OTHER OSTEOPOROSIS WITHOUT CURRENT PATHOLOGICAL FRACTURE: ICD-10-CM

## 2019-08-08 DIAGNOSIS — Z78.0 ASYMPTOMATIC MENOPAUSAL STATE: ICD-10-CM

## 2019-08-08 PROCEDURE — 77080 DXA BONE DENSITY AXIAL: CPT

## 2019-08-08 NOTE — PROCEDURES
Patient was given dexa brochure after exam was completed. Patients left forearm was scanned due to plus signs in spine.

## 2019-08-20 ENCOUNTER — HOSPITAL ENCOUNTER (OUTPATIENT)
Dept: INFUSION THERAPY | Age: 79
Discharge: HOME | End: 2019-08-20
Payer: MEDICARE

## 2019-08-20 ENCOUNTER — OFFICE VISIT (OUTPATIENT)
Dept: ONCOLOGY | Age: 79
End: 2019-08-20
Payer: MEDICARE

## 2019-08-20 VITALS
HEART RATE: 69 BPM | HEIGHT: 61 IN | BODY MASS INDEX: 27.64 KG/M2 | DIASTOLIC BLOOD PRESSURE: 60 MMHG | SYSTOLIC BLOOD PRESSURE: 127 MMHG | TEMPERATURE: 98.8 F | WEIGHT: 146.4 LBS

## 2019-08-20 DIAGNOSIS — D46.9 MYELODYSPLASTIC SYNDROME (HCC): ICD-10-CM

## 2019-08-20 DIAGNOSIS — D46.9 MYELODYSPLASIA (MYELODYSPLASTIC SYNDROME) (HCC): Primary | ICD-10-CM

## 2019-08-20 DIAGNOSIS — D63.8 ANEMIA IN OTHER CHRONIC DISEASES CLASSIFIED ELSEWHERE: ICD-10-CM

## 2019-08-20 LAB
ANISOCYTOSIS: ABNORMAL
BASOPHILS ABSOLUTE: 0.02 E9/L (ref 0–0.2)
BASOPHILS RELATIVE PERCENT: 0.4 % (ref 0–2)
EOSINOPHILS ABSOLUTE: 0.09 E9/L (ref 0.05–0.5)
EOSINOPHILS RELATIVE PERCENT: 2 % (ref 0–6)
HCT VFR BLD CALC: 26.9 % (ref 34–48)
HEMOGLOBIN: 8.7 G/DL (ref 11.5–15.5)
HYPOCHROMIA: ABNORMAL
IMMATURE GRANULOCYTES #: 0.01 E9/L
IMMATURE GRANULOCYTES %: 0.2 % (ref 0–5)
LYMPHOCYTES ABSOLUTE: 2.88 E9/L (ref 1.5–4)
LYMPHOCYTES RELATIVE PERCENT: 62.5 % (ref 20–42)
MCH RBC QN AUTO: 38 PG (ref 26–35)
MCHC RBC AUTO-ENTMCNC: 32.3 % (ref 32–34.5)
MCV RBC AUTO: 117.5 FL (ref 80–99.9)
MONOCYTES ABSOLUTE: 0.29 E9/L (ref 0.1–0.95)
MONOCYTES RELATIVE PERCENT: 6.3 % (ref 2–12)
NEUTROPHILS ABSOLUTE: 1.32 E9/L (ref 1.8–7.3)
NEUTROPHILS RELATIVE PERCENT: 28.6 % (ref 43–80)
PDW BLD-RTO: 25.3 FL (ref 11.5–15)
PLATELET # BLD: 164 E9/L (ref 130–450)
PMV BLD AUTO: 10.4 FL (ref 7–12)
POIKILOCYTES: ABNORMAL
POLYCHROMASIA: ABNORMAL
RBC # BLD: 2.29 E12/L (ref 3.5–5.5)
SCHISTOCYTES: ABNORMAL
TARGET CELLS: ABNORMAL
TEAR DROP CELLS: ABNORMAL
WBC # BLD: 4.6 E9/L (ref 4.5–11.5)

## 2019-08-20 PROCEDURE — G8427 DOCREV CUR MEDS BY ELIG CLIN: HCPCS | Performed by: INTERNAL MEDICINE

## 2019-08-20 PROCEDURE — G8399 PT W/DXA RESULTS DOCUMENT: HCPCS | Performed by: INTERNAL MEDICINE

## 2019-08-20 PROCEDURE — 6360000002 HC RX W HCPCS: Performed by: INTERNAL MEDICINE

## 2019-08-20 PROCEDURE — 4040F PNEUMOC VAC/ADMIN/RCVD: CPT | Performed by: INTERNAL MEDICINE

## 2019-08-20 PROCEDURE — 36415 COLL VENOUS BLD VENIPUNCTURE: CPT | Performed by: INTERNAL MEDICINE

## 2019-08-20 PROCEDURE — 99214 OFFICE O/P EST MOD 30 MIN: CPT | Performed by: INTERNAL MEDICINE

## 2019-08-20 PROCEDURE — 1036F TOBACCO NON-USER: CPT | Performed by: INTERNAL MEDICINE

## 2019-08-20 PROCEDURE — 85025 COMPLETE CBC W/AUTO DIFF WBC: CPT

## 2019-08-20 PROCEDURE — 96372 THER/PROPH/DIAG INJ SC/IM: CPT

## 2019-08-20 PROCEDURE — 1123F ACP DISCUSS/DSCN MKR DOCD: CPT | Performed by: INTERNAL MEDICINE

## 2019-08-20 PROCEDURE — G8419 CALC BMI OUT NRM PARAM NOF/U: HCPCS | Performed by: INTERNAL MEDICINE

## 2019-08-20 PROCEDURE — 1090F PRES/ABSN URINE INCON ASSESS: CPT | Performed by: INTERNAL MEDICINE

## 2019-08-20 PROCEDURE — 99212 OFFICE O/P EST SF 10 MIN: CPT | Performed by: INTERNAL MEDICINE

## 2019-08-20 RX ADMIN — DARBEPOETIN ALFA 500 MCG: 500 INJECTION, SOLUTION INTRAVENOUS; SUBCUTANEOUS at 14:45

## 2019-08-20 NOTE — PROGRESS NOTES
[address] [facility] ONCOLOGY  [address] [facility] [address]  Dept: [phone]  Attending Progress Note      Reason for Visit:   1. Right Breast Cancer. 2. MDS. Referring Physician:  CONNIE Anne CNP    PCP:  CONNIE Anne CNP    History of Present Illness: The patient is a very pleasant 53-year-old lady with a PMH significant for Right Breast Cancer, stage III, HR pos, was diagnosed in 2009, she had a right mastectomy done, followed by adjuvant chemo, she received 8 cycles, probably AC followed by T, then PMRT, and 5 years of adjuvant ET with Arimidex, was completed in 2015. She was treated in [facility] under the care of Dr. Mary Swartz. She was diagnosed with MDS in 2017, she received ESAs and PRBCs transfusion. She has transfusion related iron overload. She is feeling well overall, has chronic joints pain, peripheral neuropathy. The patient had a left breast screening mammogram done today. She was started on Aranesp on 4/24/2020. No SE from Aranesp. She has been very active. She denies any new problems. Review of Systems;  CONSTITUTIONAL: No fever, chills. Fair appetite and energy level. ENMT: Eyes: No diplopia; Nose: No epistaxis. Mouth: No sore throat. RESPIRATORY: No hemoptysis, shortness of breath, cough. CARDIOVASCULAR: No chest pain, palpitations. GASTROINTESTINAL: No nausea/vomiting, abdominal pain, diarrhea/constipation. GENITOURINARY: No dysuria, urinary frequency, hematuria. NEURO: No syncope, presyncope, headache.   Remainder:  ROS NEGATIVE    Past Medical History:      Diagnosis Date    Anemia     Pt reports she was dx in her teens, w/o proper w/u, with iron deficiency anemia and was on oral iron replacement for many years, resulting in iron overload    Aplastic anemia ([address] ~5189-4784    Possibly d/t chemotherapy for breast cancer    Breast cancer ([facility] 2009    right side; pt underwent radical mastectomy followed by radiation therapy and chemotherapy and was then on oral chemo pill for 5 yrs; no recurrence as of 2019    Hypothyroidism     Mild depression ([address]     Myelodysplastic syndrome ([address] ~2016    Pulmonary tuberculosis ~5903-6944    in her early 25s    Sensory neuropathy     beyond the ankle b/l     Patient Active Problem List   Diagnosis    Myelodysplastic syndrome ([address]    Macrocytic anemia with high RDW    Hypothyroidism    Mild depression (HCC)    Peripheral sensory neuropathy    Breast cancer (HCC)    Anemia in other chronic diseases classified elsewhere         Past Surgical History:      Procedure Laterality Date    BREAST BIOPSY      CHOLECYSTECTOMY      MASTECTOMY      right breast    OTHER SURGICAL HISTORY      blood transfusions    TONSILLECTOMY      TUBAL LIGATION         Family History:  Family History   Problem Relation Age of Onset    Cancer Mother         lung    Cancer Father         lung    Cancer Sister         ovarian cancer and breast cancer    Cancer Brother         unknown    Cancer Maternal Aunt         unknown    Cancer Other         brain       Medications:  Reviewed and reconciled.     Social History:  Social History     Socioeconomic History    Marital status: Single     Spouse name: Not on file    Number of children: Not on file    Years of education: Not on file    Highest education level: Not on file   Occupational History    Not on file   Social Needs    Financial resource strain: Not on file    Food insecurity:     Worry: Not on file     Inability: Not on file    Transportation needs:     Medical: Not on file     Non-medical: Not on file   Tobacco Use    Smoking status: Former Smoker     Packs/day: 1.00     Years: 37.00     Pack years: 37.00     Types: Cigarettes     Start date:      Last attempt to quit: 2000     Years since quittin.6    Smokeless tobacco: Never Used   Substance and Sexual Activity    Alcohol

## 2019-08-26 ENCOUNTER — TELEPHONE (OUTPATIENT)
Dept: ONCOLOGY | Age: 79
End: 2019-08-26

## 2019-08-26 NOTE — TELEPHONE ENCOUNTER
Pt states that since she had the aranesp injection on 8-20, she has been very ill, along with left elbow and hand is itchy and edematous.

## 2019-09-03 ENCOUNTER — OFFICE VISIT (OUTPATIENT)
Dept: ONCOLOGY | Age: 79
End: 2019-09-03
Payer: MEDICARE

## 2019-09-03 ENCOUNTER — HOSPITAL ENCOUNTER (OUTPATIENT)
Dept: INFUSION THERAPY | Age: 79
Discharge: HOME | End: 2019-09-03
Payer: MEDICARE

## 2019-09-03 VITALS
SYSTOLIC BLOOD PRESSURE: 117 MMHG | DIASTOLIC BLOOD PRESSURE: 56 MMHG | TEMPERATURE: 98.2 F | HEIGHT: 61 IN | HEART RATE: 71 BPM | BODY MASS INDEX: 27.19 KG/M2 | WEIGHT: 144 LBS

## 2019-09-03 DIAGNOSIS — D46.9 MYELODYSPLASIA (MYELODYSPLASTIC SYNDROME) (HCC): Primary | ICD-10-CM

## 2019-09-03 DIAGNOSIS — D46.9 MYELODYSPLASTIC SYNDROME (HCC): ICD-10-CM

## 2019-09-03 DIAGNOSIS — D63.8 ANEMIA IN OTHER CHRONIC DISEASES CLASSIFIED ELSEWHERE: ICD-10-CM

## 2019-09-03 LAB
ANISOCYTOSIS: ABNORMAL
BASOPHILS ABSOLUTE: 0 E9/L (ref 0–0.2)
BASOPHILS RELATIVE PERCENT: 0.7 % (ref 0–2)
EOSINOPHILS ABSOLUTE: 0 E9/L (ref 0.05–0.5)
EOSINOPHILS RELATIVE PERCENT: 2.1 % (ref 0–6)
HCT VFR BLD CALC: 29.8 % (ref 34–48)
HEMOGLOBIN: 9.6 G/DL (ref 11.5–15.5)
HYPOCHROMIA: ABNORMAL
LYMPHOCYTES ABSOLUTE: 2.92 E9/L (ref 1.5–4)
LYMPHOCYTES RELATIVE PERCENT: 67.6 % (ref 20–42)
MCH RBC QN AUTO: 37.9 PG (ref 26–35)
MCHC RBC AUTO-ENTMCNC: 32.2 % (ref 32–34.5)
MCV RBC AUTO: 117.8 FL (ref 80–99.9)
MONOCYTES ABSOLUTE: 0.22 E9/L (ref 0.1–0.95)
MONOCYTES RELATIVE PERCENT: 4.5 % (ref 2–12)
NEUTROPHILS ABSOLUTE: 1.2 E9/L (ref 1.8–7.3)
NEUTROPHILS RELATIVE PERCENT: 27.9 % (ref 43–80)
OVALOCYTES: ABNORMAL
PDW BLD-RTO: 25.7 FL (ref 11.5–15)
PLATELET # BLD: 140 E9/L (ref 130–450)
PMV BLD AUTO: 10.9 FL (ref 7–12)
POIKILOCYTES: ABNORMAL
POLYCHROMASIA: ABNORMAL
RBC # BLD: 2.53 E12/L (ref 3.5–5.5)
SCHISTOCYTES: ABNORMAL
TEAR DROP CELLS: ABNORMAL
WBC # BLD: 4.3 E9/L (ref 4.5–11.5)

## 2019-09-03 PROCEDURE — 6360000002 HC RX W HCPCS: Performed by: INTERNAL MEDICINE

## 2019-09-03 PROCEDURE — G8419 CALC BMI OUT NRM PARAM NOF/U: HCPCS | Performed by: INTERNAL MEDICINE

## 2019-09-03 PROCEDURE — 36415 COLL VENOUS BLD VENIPUNCTURE: CPT | Performed by: INTERNAL MEDICINE

## 2019-09-03 PROCEDURE — 85025 COMPLETE CBC W/AUTO DIFF WBC: CPT

## 2019-09-03 PROCEDURE — 1036F TOBACCO NON-USER: CPT | Performed by: INTERNAL MEDICINE

## 2019-09-03 PROCEDURE — 99214 OFFICE O/P EST MOD 30 MIN: CPT | Performed by: INTERNAL MEDICINE

## 2019-09-03 PROCEDURE — 96372 THER/PROPH/DIAG INJ SC/IM: CPT

## 2019-09-03 PROCEDURE — 1123F ACP DISCUSS/DSCN MKR DOCD: CPT | Performed by: INTERNAL MEDICINE

## 2019-09-03 PROCEDURE — 4040F PNEUMOC VAC/ADMIN/RCVD: CPT | Performed by: INTERNAL MEDICINE

## 2019-09-03 PROCEDURE — 99212 OFFICE O/P EST SF 10 MIN: CPT | Performed by: INTERNAL MEDICINE

## 2019-09-03 PROCEDURE — G8427 DOCREV CUR MEDS BY ELIG CLIN: HCPCS | Performed by: INTERNAL MEDICINE

## 2019-09-03 PROCEDURE — 1090F PRES/ABSN URINE INCON ASSESS: CPT | Performed by: INTERNAL MEDICINE

## 2019-09-03 PROCEDURE — G8399 PT W/DXA RESULTS DOCUMENT: HCPCS | Performed by: INTERNAL MEDICINE

## 2019-09-03 RX ADMIN — DARBEPOETIN ALFA 500 MCG: 500 INJECTION, SOLUTION INTRAVENOUS; SUBCUTANEOUS at 15:23

## 2019-09-03 NOTE — PROGRESS NOTES
[address] [facility]COLOGY  [address] [facility] [address]  Dept: [phone]  Attending Progress Note      Reason for Visit:   1. Right Breast Cancer. 2. MDS. Referring Physician:  Florencio Cabot, APRN - CNP    PCP:  Florencio Cabot, APRN - CNP    History of Present Illness: The patient is a very pleasant 33-year-old lady with a PMH significant for Right Breast Cancer, stage III, HR pos, was diagnosed in 2009, she had a right mastectomy done, followed by adjuvant chemo, she received 8 cycles, probably AC followed by T, then PMRT, and 5 years of adjuvant ET with Arimidex, was completed in 2015. She was treated in [place] under the care of Dr. Milton Choi. She was diagnosed with MDS in 2017, she received ESAs and PRBCs transfusion. She has transfusion related iron overload. She is feeling well overall, has chronic joints pain, peripheral neuropathy. The patient had a left breast screening mammogram done today. She was started on Aranesp on 4/24/2020. No SE from Aranesp. She has been feeling tired, but continues to be active. Review of Systems;  CONSTITUTIONAL: No fever, chills. Fair appetite. ENMT: Eyes: No diplopia; Nose: No epistaxis. Mouth: No sore throat. RESPIRATORY: No hemoptysis, shortness of breath, cough. CARDIOVASCULAR: No chest pain, palpitations. GASTROINTESTINAL: No nausea/vomiting, abdominal pain, diarrhea/constipation. GENITOURINARY: No dysuria, urinary frequency, hematuria. NEURO: No syncope, presyncope, headache.   Remainder:  ROS NEGATIVE    Past Medical History:      Diagnosis Date    Anemia     Pt reports she was dx in her teens, w/o proper w/u, with iron deficiency anemia and was on oral iron replacement for many years, resulting in iron overload    Aplastic anemia ([address] ~2777-9073    Possibly d/t chemotherapy for breast cancer    Breast cancer [facility] 2009    right side; pt underwent radical mastectomy followed by radiation therapy and chemotherapy and was then on oral chemo pill for 5 yrs; no recurrence as of 2019    Hypothyroidism     Mild depression ([address]     Myelodysplastic syndrome ([address] ~2016    Pulmonary tuberculosis ~0736-3147    in her early 25s    Sensory neuropathy     beyond the ankle b/l     Patient Active Problem List   Diagnosis    Myelodysplastic syndrome ([address]    Macrocytic anemia with high RDW    Hypothyroidism    Mild depression (HCC)    Peripheral sensory neuropathy    Breast cancer (HCC)    Anemia in other chronic diseases classified elsewhere         Past Surgical History:      Procedure Laterality Date    BREAST BIOPSY      CHOLECYSTECTOMY      MASTECTOMY      right breast    OTHER SURGICAL HISTORY      blood transfusions    TONSILLECTOMY      TUBAL LIGATION         Family History:  Family History   Problem Relation Age of Onset    Cancer Mother         lung    Cancer Father         lung    Cancer Sister         ovarian cancer and breast cancer    Cancer Brother         unknown    Cancer Maternal Aunt         unknown    Cancer Other         brain       Medications:  Reviewed and reconciled. Social History:  Social History     Socioeconomic History    Marital status: Single     Spouse name: Not on file    Number of children: Not on file    Years of education: Not on file    Highest education level: Not on file   Occupational History    Not on file   Social Needs    Financial resource strain: Not on file    Food insecurity:     Worry: Not on file     Inability: Not on file    Transportation needs:     Medical: Not on file     Non-medical: Not on file   Tobacco Use    Smoking status: Former Smoker     Packs/day: 1.00     Years: 37.00     Pack years: 37.00     Types: Cigarettes     Start date:      Last attempt to quit: 2000     Years since quittin.6    Smokeless tobacco: Never Used   Substance and Sexual Activity    Alcohol use:  No diagnosed in 2009, she had a right mastectomy done, followed by adjuvant chemo, she received 8 cycles, probably AC followed by T, then PMRT, and 5 years of adjuvant ET with Arimidex, was completed in 2015. She was treated in [place] under the care of Dr. Milton Choi. She is doing well clinically without any evidence of recurrence of her disease. Discussed with her the importance of monthly SBE, and routine screening mammograms, she had a left breast screening mammogram done on 5/14/2019, revealing benign findings, was negative for malignancy, will be due for repeat mammogram on 5/15/2020.     2. She has MDS, diagnosed in 2017, she received ESAs and PRBCs transfusion, has transfusion related iron overload, hgb is 8.5, ferritin 3647, was restarted on Aranesp on 4/23/2019. Labs reviewed, she did have improvement of the hemoglobin with Aranesp, she has macrocytosis, intermittent leukopenia, is on Aranesp, hemoglobin today 9/3/2019 is 9.6 g/dL, proceed with Aranesp. Will continue to monitor her CBCD. RTC in 2 weeks with labs, aranesp. Thank you for allowing us to participate in the care of Ms. Ventura.     Komal De La Torre MD   HEMATOLOGY/MEDICAL ONCOLOGY  [address] [place] [address] ONCOLOGY  [address]  [place] [address]  Dept: [phone]

## 2019-09-10 ENCOUNTER — OFFICE VISIT (OUTPATIENT)
Dept: FAMILY MEDICINE CLINIC | Age: 79
End: 2019-09-10
Payer: MEDICARE

## 2019-09-10 VITALS
HEART RATE: 95 BPM | DIASTOLIC BLOOD PRESSURE: 60 MMHG | RESPIRATION RATE: 16 BRPM | OXYGEN SATURATION: 96 % | TEMPERATURE: 98.6 F | SYSTOLIC BLOOD PRESSURE: 100 MMHG | BODY MASS INDEX: 27.72 KG/M2 | HEIGHT: 61 IN | WEIGHT: 146.8 LBS

## 2019-09-10 DIAGNOSIS — D46.9 MDS (MYELODYSPLASTIC SYNDROME) (HCC): ICD-10-CM

## 2019-09-10 DIAGNOSIS — J40 BRONCHITIS: Primary | ICD-10-CM

## 2019-09-10 PROBLEM — F32.A MILD DEPRESSION: Chronic | Status: RESOLVED | Noted: 2019-01-29 | Resolved: 2019-09-10

## 2019-09-10 PROCEDURE — 99214 OFFICE O/P EST MOD 30 MIN: CPT | Performed by: NURSE PRACTITIONER

## 2019-09-10 PROCEDURE — 90653 IIV ADJUVANT VACCINE IM: CPT | Performed by: NURSE PRACTITIONER

## 2019-09-10 PROCEDURE — G0008 ADMIN INFLUENZA VIRUS VAC: HCPCS | Performed by: NURSE PRACTITIONER

## 2019-09-10 RX ORDER — HYDROCODONE BITARTRATE AND ACETAMINOPHEN 5; 325 MG/1; MG/1
1 TABLET ORAL EVERY 8 HOURS PRN
COMMUNITY
End: 2019-09-17 | Stop reason: SDUPTHER

## 2019-09-10 RX ORDER — AZITHROMYCIN 250 MG/1
250 TABLET, FILM COATED ORAL SEE ADMIN INSTRUCTIONS
Qty: 6 TABLET | Refills: 0 | Status: SHIPPED | OUTPATIENT
Start: 2019-09-10 | End: 2019-09-15

## 2019-09-10 ASSESSMENT — ENCOUNTER SYMPTOMS
EYES NEGATIVE: 1
CHEST TIGHTNESS: 0
EYE ITCHING: 0
DIARRHEA: 0
ALLERGIC/IMMUNOLOGIC NEGATIVE: 1
SORE THROAT: 0
SINUS PRESSURE: 0
ABDOMINAL PAIN: 0
COUGH: 1
SINUS PAIN: 0
RHINORRHEA: 0
EYE PAIN: 0
EYE DISCHARGE: 0
SHORTNESS OF BREATH: 0
NAUSEA: 0
CONSTIPATION: 0

## 2019-09-13 RX ORDER — BENZONATATE 200 MG/1
200 CAPSULE ORAL 3 TIMES DAILY PRN
COMMUNITY
End: 2020-06-23

## 2019-09-13 RX ORDER — GUAIFENESIN 100 MG/5ML
200 SOLUTION ORAL
COMMUNITY
End: 2020-06-23

## 2019-09-13 RX ORDER — ALBUTEROL SULFATE 90 UG/1
2 AEROSOL, METERED RESPIRATORY (INHALATION) EVERY 4 HOURS PRN
COMMUNITY
End: 2020-06-23

## 2019-09-17 ENCOUNTER — OFFICE VISIT (OUTPATIENT)
Dept: ONCOLOGY | Age: 79
End: 2019-09-17
Payer: MEDICARE

## 2019-09-17 ENCOUNTER — OFFICE VISIT (OUTPATIENT)
Dept: PALLATIVE CARE | Age: 79
End: 2019-09-17
Payer: MEDICARE

## 2019-09-17 ENCOUNTER — HOSPITAL ENCOUNTER (OUTPATIENT)
Dept: INFUSION THERAPY | Age: 79
Discharge: HOME | End: 2019-09-17
Payer: MEDICARE

## 2019-09-17 VITALS — OXYGEN SATURATION: 98 % | HEART RATE: 67 BPM | SYSTOLIC BLOOD PRESSURE: 146 MMHG | DIASTOLIC BLOOD PRESSURE: 59 MMHG

## 2019-09-17 VITALS
HEART RATE: 64 BPM | HEIGHT: 61 IN | SYSTOLIC BLOOD PRESSURE: 135 MMHG | DIASTOLIC BLOOD PRESSURE: 56 MMHG | TEMPERATURE: 98.2 F | BODY MASS INDEX: 27.26 KG/M2 | WEIGHT: 144.4 LBS

## 2019-09-17 DIAGNOSIS — Z17.0 MALIGNANT NEOPLASM OF RIGHT BREAST IN FEMALE, ESTROGEN RECEPTOR POSITIVE, UNSPECIFIED SITE OF BREAST (HCC): ICD-10-CM

## 2019-09-17 DIAGNOSIS — Z51.5 PALLIATIVE CARE BY SPECIALIST: Primary | ICD-10-CM

## 2019-09-17 DIAGNOSIS — G89.3 PAIN DUE TO NEOPLASM: ICD-10-CM

## 2019-09-17 DIAGNOSIS — D63.8 ANEMIA IN OTHER CHRONIC DISEASES CLASSIFIED ELSEWHERE: Primary | ICD-10-CM

## 2019-09-17 DIAGNOSIS — C50.911 MALIGNANT NEOPLASM OF RIGHT BREAST IN FEMALE, ESTROGEN RECEPTOR POSITIVE, UNSPECIFIED SITE OF BREAST (HCC): ICD-10-CM

## 2019-09-17 DIAGNOSIS — D46.9 MDS (MYELODYSPLASTIC SYNDROME) (HCC): ICD-10-CM

## 2019-09-17 LAB
ANISOCYTOSIS: ABNORMAL
BASOPHILS ABSOLUTE: 0.1 E9/L (ref 0–0.2)
BASOPHILS RELATIVE PERCENT: 1.8 % (ref 0–2)
EOSINOPHILS ABSOLUTE: 0.19 E9/L (ref 0.05–0.5)
EOSINOPHILS RELATIVE PERCENT: 3.5 % (ref 0–6)
HCT VFR BLD CALC: 28.9 % (ref 34–48)
HEMOGLOBIN: 9.6 G/DL (ref 11.5–15.5)
LYMPHOCYTES ABSOLUTE: 2.86 E9/L (ref 1.5–4)
LYMPHOCYTES RELATIVE PERCENT: 51.8 % (ref 20–42)
MCH RBC QN AUTO: 40.2 PG (ref 26–35)
MCHC RBC AUTO-ENTMCNC: 33.2 % (ref 32–34.5)
MCV RBC AUTO: 120.9 FL (ref 80–99.9)
MONOCYTES ABSOLUTE: 0.38 E9/L (ref 0.1–0.95)
MONOCYTES RELATIVE PERCENT: 7 % (ref 2–12)
MYELOCYTE PERCENT: 0.9 % (ref 0–0)
NEUTROPHILS ABSOLUTE: 1.98 E9/L (ref 1.8–7.3)
NEUTROPHILS RELATIVE PERCENT: 35.1 % (ref 43–80)
OVALOCYTES: ABNORMAL
PDW BLD-RTO: 26 FL (ref 11.5–15)
PLATELET # BLD: 167 E9/L (ref 130–450)
PMV BLD AUTO: 10.5 FL (ref 7–12)
POIKILOCYTES: ABNORMAL
POLYCHROMASIA: ABNORMAL
RBC # BLD: 2.39 E12/L (ref 3.5–5.5)
SCHISTOCYTES: ABNORMAL
TARGET CELLS: ABNORMAL
TEAR DROP CELLS: ABNORMAL
WBC # BLD: 5.5 E9/L (ref 4.5–11.5)

## 2019-09-17 PROCEDURE — 99212 OFFICE O/P EST SF 10 MIN: CPT

## 2019-09-17 PROCEDURE — G8419 CALC BMI OUT NRM PARAM NOF/U: HCPCS | Performed by: NURSE PRACTITIONER

## 2019-09-17 PROCEDURE — 4040F PNEUMOC VAC/ADMIN/RCVD: CPT | Performed by: NURSE PRACTITIONER

## 2019-09-17 PROCEDURE — 6360000002 HC RX W HCPCS: Performed by: INTERNAL MEDICINE

## 2019-09-17 PROCEDURE — 99214 OFFICE O/P EST MOD 30 MIN: CPT | Performed by: INTERNAL MEDICINE

## 2019-09-17 PROCEDURE — 1036F TOBACCO NON-USER: CPT | Performed by: INTERNAL MEDICINE

## 2019-09-17 PROCEDURE — 36415 COLL VENOUS BLD VENIPUNCTURE: CPT

## 2019-09-17 PROCEDURE — G8427 DOCREV CUR MEDS BY ELIG CLIN: HCPCS | Performed by: INTERNAL MEDICINE

## 2019-09-17 PROCEDURE — 99214 OFFICE O/P EST MOD 30 MIN: CPT | Performed by: NURSE PRACTITIONER

## 2019-09-17 PROCEDURE — 1036F TOBACCO NON-USER: CPT | Performed by: NURSE PRACTITIONER

## 2019-09-17 PROCEDURE — 99212 OFFICE O/P EST SF 10 MIN: CPT | Performed by: NURSE PRACTITIONER

## 2019-09-17 PROCEDURE — 1123F ACP DISCUSS/DSCN MKR DOCD: CPT | Performed by: NURSE PRACTITIONER

## 2019-09-17 PROCEDURE — G8399 PT W/DXA RESULTS DOCUMENT: HCPCS | Performed by: INTERNAL MEDICINE

## 2019-09-17 PROCEDURE — G8399 PT W/DXA RESULTS DOCUMENT: HCPCS | Performed by: NURSE PRACTITIONER

## 2019-09-17 PROCEDURE — 4040F PNEUMOC VAC/ADMIN/RCVD: CPT | Performed by: INTERNAL MEDICINE

## 2019-09-17 PROCEDURE — 1090F PRES/ABSN URINE INCON ASSESS: CPT | Performed by: NURSE PRACTITIONER

## 2019-09-17 PROCEDURE — 96372 THER/PROPH/DIAG INJ SC/IM: CPT

## 2019-09-17 PROCEDURE — 85025 COMPLETE CBC W/AUTO DIFF WBC: CPT

## 2019-09-17 PROCEDURE — 1090F PRES/ABSN URINE INCON ASSESS: CPT | Performed by: INTERNAL MEDICINE

## 2019-09-17 PROCEDURE — G8427 DOCREV CUR MEDS BY ELIG CLIN: HCPCS | Performed by: NURSE PRACTITIONER

## 2019-09-17 PROCEDURE — 1123F ACP DISCUSS/DSCN MKR DOCD: CPT | Performed by: INTERNAL MEDICINE

## 2019-09-17 PROCEDURE — G8419 CALC BMI OUT NRM PARAM NOF/U: HCPCS | Performed by: INTERNAL MEDICINE

## 2019-09-17 RX ORDER — HYDROCODONE BITARTRATE AND ACETAMINOPHEN 5; 325 MG/1; MG/1
1 TABLET ORAL EVERY 8 HOURS PRN
Qty: 60 TABLET | Refills: 0 | Status: SHIPPED | OUTPATIENT
Start: 2019-09-17 | End: 2019-12-03 | Stop reason: SDUPTHER

## 2019-09-17 RX ADMIN — DARBEPOETIN ALFA 500 MCG: 500 INJECTION, SOLUTION INTRAVENOUS; SUBCUTANEOUS at 15:13

## 2019-09-17 NOTE — PROGRESS NOTES
Palliative Care Department  Palliative Care Initial Consult  Provider: Pao ROSALES-SHANKAR    Referring Provider:  Shelley ROSALES-CNP    Location of Service:   [address] palliative medicine clinic    Reason for Consult:  []  Code status Discussion  [x]  Assist with goals of care  [x]  Psychosocial support  [x]  Symptom Management  []  Advanced Care Planning    Chief Complaint: Fariba Cardoso is a [de-identified] y.o. female with chief complaint of bilateral LE neuropathic pain. Assessment/Plan      History of Breast Cancer stage III, HR positive:   -Diagnosed, treated, and managed in [place] in 2009   -Status post right mastectomy   -Followed by adjuvant chemotherapy   -Completed her Demadex 2015   -Currently followed locally by Dr. Shahbaz Hernandez, she also follows with regarding MDS diagnosed 2017    Chemotherapy related peripheral Neuropathy/Pain:   -  Worse bilateral feet and LEs, burning and pressure,    -  Gabapentin 900 mg HS per PCP   -  Improves with activity, worsens at rest   -  Norco 5-325 mg every 8 hours as needed, using 1 to 2/day, will provide refill today prescribing 60 tablets. -  Also reports pain in her shoulders, low back, and hips, especially after significant activity   -  Currently she is very high functioning, enjoys gardening, and is quite active, and has recently moved to a new home, which has had her even more active. Palliative Care Encounter:      - Goals of care: improve or maintain function/quality of life, remain at home, preserve independence/autonomy/control and continue current management     - Code Status: Not discussed during this encounter    Prognosis: unknown    Follow Up:  8 weeks. They were encouraged to call with any questions, concerns, needs, or changes in symptoms.     Subjective:     HPI:  Fariba Cardoso is a [de-identified] y.o. female with significant past medical history of stage II HR positive breast cancer, diagnosed in 2009, status post right mastectomy, [place]. Subjective/Events/Discussions:  7/16/2019:  Stacy Stafford presents today, unaccompanied, alert, oriented, in good spirits, smiling, very interactive and talkative, able to voice her needs and concerns well, and shows no signs of sedation and/or confusion. I introduced palliative medicine, we discussed the role of palliative medicine in her care. She reports that she is a very active 47-year-old woman, who enjoys gardening, participating in activities related to [employer]ae[employer], and spending time with her family. We reviewed her medical history, and we discussed her symptoms at length. Her #1 symptom is that of pain, located primarily in her bilateral lower extremities, mostly her feet. She describes this as severe tingling, as well as viselike pressure in the arches of her feet, with radiation up to just above her knee. She states that this has been present for years, and worsened with chemotherapy. She reports minimal improvement post chemotherapy, and currently utilizes Norco and gabapentin as listed above. We did discuss at length, our policy regarding controlled substance prescribing, and she is agreeable to provide a urine drug screen, as well as sign a controlled substance contract which she will abide by. She denies the use of any illicit drugs,or abuse of prescription drugs of which are not prescribed to her. It was a pleasure getting to know Stacy Stafford, and will plan to follow-up further with her in approximately 4 weeks. 9/17/2019: As per Dr. Christian Ruiz today:  She has MDS, diagnosed in 2017, she received ESAs and PRBCs transfusion, has transfusion related iron overload, hgb is 8.5, ferritin 3647, was restarted on Aranesp on 4/23/2019. Labs reviewed, she did have improvement of the hemoglobin with Aranesp, she has macrocytosis, intermittent leukopenia, hemoglobin today 9/17/2019 is 9.6, hematocrit 28.9, proceeded with Aranesp. Will continue to monitor her CBCD. Aranesp today 09/17/2019.   RTC in 2 weeks with labs, desirae Peter Victoria presents today, accompanied by her sister. She is alert, oriented, able voice needs concerns well, does not show any signs of sedation and or confusion. She continues to complain of neuropathy and pain, which she states is well managed on her current regimen of gabapentin as per PCP, as well as utilizing Norco 5/325 mg 1-2 times per day. She states that she used her last Norco last evening. She states that her pain has slightly increased over the past week, as she has been moving to a new home, and thus been more active than typical.  She states though that overall she feels well, he continues to manage her pain well without need for significant increase in her Norco.  She states that some days we will take a half a tablet if she only has moderate pain, and this is effective. Options were discussed, will make no further changes to her current plan of care, will continue her Norco as listed above.     Family Meeting:    Participants:patient    Family meeting was held to discuss:diagnosis, goals of care and symptom management      Referrals to: none today    Pain Assessment   Ratin  Description: aching, burning, throbbing, tingling and squeezing  Duration: years  Frequency: daily  Location: Bilateral feet, bilateral lower extremities, shoulders, low back, hips  Alleviating Factors: pain medication and States her neuropathy improves with activity  Exacerbating Factors: Neuropathy is exacerbated by resting following strenuous activity  Effect: interference with activities and sleep    Past Medical History:   Diagnosis Date    Anemia     Pt reports she was dx in her teens, w/o proper w/u, with iron deficiency anemia and was on oral iron replacement for many years, resulting in iron overload    Aplastic anemia ([facility] ~2780-9763    Possibly d/t chemotherapy for breast cancer    Breast cancer ([facility] [address] [facility]     right side; pt underwent radical mastectomy followed by radiation therapy and chemotherapy and was then on oral chemo pill for 5 yrs; no recurrence as of 01/2019    Chickenpox     Hypothyroidism     Measles     Mild depression (HCC)     Mumps     Myelodysplastic syndrome ([facility] ~2016    Pulmonary tuberculosis ~4335-5372    in her early 25s    Scarlet fever     Sensory neuropathy     beyond the ankle b/l       Past Surgical History:   Procedure Laterality Date    ADENOIDECTOMY      BONE MARROW BIOPSY      BREAST BIOPSY      CHOLECYSTECTOMY      MASTECTOMY      right breast    OTHER SURGICAL HISTORY      blood transfusions    TONSILLECTOMY AND ADENOIDECTOMY      TUBAL LIGATION         Family History   Problem Relation Age of Onset    Cancer Mother         lung    Cancer Father         lung    Cancer Sister         ovarian cancer and breast cancer    Cancer Brother         unknown    Cancer Maternal Aunt         unknown    Cancer Other         brain    Other Brother         MI       ROS: UNLESS STATED ABOVE PATIENT DENIES:  CONSTITUTIONAL:  fever, chill, rigors, nausea, vomiting, fatigue. HEENT: blurry vision, double vision, hearing problem, tinnitus, hoarseness, dysphagia, odynophagia  RESPIRATORY: cough, shortness of breath, sputum expectoration. CARDIOVASCULAR:  Chest pain/pressure, palpitation, syncope, irregular beats  GASTROINTESTINAL:  abdominal or rectal pain, diarrhea, constipation, . GENITOURINARY:  Burning, frequency, urgency, incontinence, discharge  INTEGUMENTARY: rash, wound, pruritis  HEMATOLOGIC/LYMPHATIC:  Swelling, sores, gum bleeding, easy bruising, pica.   MUSCULOSKELETAL:  pain, edema, joint swelling or redness  NEUROLOGICAL:  light headed, dizziness, loss of consciousness, weakness, change in memory, seizures, tremors    Objective:     Physical Exam  BP (!) 146/59   Pulse 67   LMP 05/28/1991   SpO2 98% Comment: RA    Gen:  Alert, appears stated age, well nourished, in no acute distress  HEENT:  Normocephalic, conjunctiva pink, no drainage, mucosa moist  Neck:  Supple  Lungs:  CTA bilaterally, no audible rhonchi or wheezes noted  Heart[de-identified]  RRR, no murmur, rub, or gallop noted during exam  Abd:  Soft, non tender, non distended, BS+  Ext:  Moving all extremities, no edema, pulses present  Skin:  Warm and dry  Neuro:  PERRL, Alert, oriented x 3; following commands    [place] Symptom Assessment Score   [place] Score 9/17/2019 7/16/2019   Pain Score 5 6   Tiredness Score 9 5   Nausea Score Not nauseated Not nauseated   Depression Score Not depressed Not depressed   Anxiety Score Not anxious Not anxious   Drowsiness Score 6 1   Appetite Score Best appetite Best appetite   Wellbeing Score 1 Best feeling of wellbeing   Dyspnea Score 2 6   Other Problem Score Best possible response Best possible response   Total Assessment Score(calculated) 23 18     Assessed by: patient and provider. Current Medications:  Medications reviewed: yes    Controlled Substances Monitoring: [place]ARRS reviewed 9/17/19    RX Monitoring 9/17/2019   Periodic Controlled Substance Monitoring Possible medication side effects, risk of tolerance/dependence & alternative treatments discussed. ;No signs of potential drug abuse or diversion identified. ;Assessed functional status. ;Obtaining appropriate analgesic effect of treatment. Chronic Pain > 50 MEDD Re-evaluated the status of the patient's underlying condition causing pain.;Obtained or confirmed \"Consent for Opioid Use\" on file. Elisa ROSALES-CNP  Palliative Medicine    Time/Communication  Greater than 51% of time spent, total 30 minutes in counseling and coordination of care at the bedside regarding goals of care, symptom management, diagnosis and prognosis and see above. Discussed patient and the plan of care with the other IDT members of the Palliative Care Team, and with patient. Thank you for allowing Palliative Medicine to participate in the care of Jeffory Romberg.     Note: This report was completed using computerize voiced

## 2019-09-17 NOTE — PROGRESS NOTES
1965     Last attempt to quit: 2000     Years since quittin.7    Smokeless tobacco: Never Used   Substance and Sexual Activity    Alcohol use: No    Drug use: Never    Sexual activity: Not Currently   Lifestyle    Physical activity:     Days per week: Not on file     Minutes per session: Not on file    Stress: Not on file   Relationships    Social connections:     Talks on phone: Not on file     Gets together: Not on file     Attends Anabaptist service: Not on file     Active member of club or organization: Not on file     Attends meetings of clubs or organizations: Not on file     Relationship status: Not on file    Intimate partner violence:     Fear of current or ex partner: Not on file     Emotionally abused: Not on file     Physically abused: Not on file     Forced sexual activity: Not on file   Other Topics Concern    Not on file   Social History Narrative    Not on file       Allergies: Allergies   Allergen Reactions    Bee Venom Swelling and Dermatitis    Penicillins Hives, Swelling and Dermatitis       Physical Exam:  [facility] 1991     GENERAL: Alert, oriented x 3, not in acute distress. HEENT: PERRLA; EOMI. Oropharynx clear. NECK: Supple. No palpable cervical or supraclavicular lymphadenopathy. LUNGS: Good air entry bilaterally. No wheezing, crackles or rhonchi. CARDIOVASCULAR: Regular rate. No murmurs, rubs or gallops. BREASTS: she is s/p right mastectomy, she has telangiectasias, no palpable right axillary LN, the left breast exam is negative for any skin changes, no nipple discharge, no palpable masses or palpable left axillary LN. ABDOMEN: Soft. Non-tender, non-distended. Positive bowel sounds. EXTREMITIES: Without clubbing, cyanosis, or edema. NEUROLOGIC: No focal deficits. ECOG PS 1      Impression/Plan:     1.  The patient is a very pleasant 49-year-old lady with a PMH significant for Right Breast Cancer, stage III, HR pos, was diagnosed in , she had a right

## 2019-09-20 RX ORDER — GABAPENTIN 300 MG/1
900 CAPSULE ORAL NIGHTLY
Qty: 270 CAPSULE | Refills: 0 | Status: SHIPPED | OUTPATIENT
Start: 2019-09-20 | End: 2019-12-31 | Stop reason: SDUPTHER

## 2019-10-01 ENCOUNTER — HOSPITAL ENCOUNTER (OUTPATIENT)
Dept: INFUSION THERAPY | Age: 79
Discharge: HOME | End: 2019-10-01
Payer: MEDICARE

## 2019-10-01 ENCOUNTER — OFFICE VISIT (OUTPATIENT)
Dept: ONCOLOGY | Age: 79
End: 2019-10-01
Payer: MEDICARE

## 2019-10-01 VITALS
TEMPERATURE: 98.1 F | HEART RATE: 65 BPM | BODY MASS INDEX: 26.92 KG/M2 | WEIGHT: 142.6 LBS | HEIGHT: 61 IN | SYSTOLIC BLOOD PRESSURE: 117 MMHG | DIASTOLIC BLOOD PRESSURE: 70 MMHG

## 2019-10-01 DIAGNOSIS — D63.8 ANEMIA IN OTHER CHRONIC DISEASES CLASSIFIED ELSEWHERE: Primary | ICD-10-CM

## 2019-10-01 DIAGNOSIS — D46.9 MDS (MYELODYSPLASTIC SYNDROME) (HCC): ICD-10-CM

## 2019-10-01 LAB
ANISOCYTOSIS: ABNORMAL
BASOPHILIC STIPPLING: ABNORMAL
BASOPHILS ABSOLUTE: 0 E9/L (ref 0–0.2)
BASOPHILS RELATIVE PERCENT: 0.5 % (ref 0–2)
EOSINOPHILS ABSOLUTE: 0.11 E9/L (ref 0.05–0.5)
EOSINOPHILS RELATIVE PERCENT: 2.6 % (ref 0–6)
HCT VFR BLD CALC: 29.1 % (ref 34–48)
HEMOGLOBIN: 9.3 G/DL (ref 11.5–15.5)
HYPOCHROMIA: ABNORMAL
LYMPHOCYTES ABSOLUTE: 2.82 E9/L (ref 1.5–4)
LYMPHOCYTES RELATIVE PERCENT: 64 % (ref 20–42)
MCH RBC QN AUTO: 36.9 PG (ref 26–35)
MCHC RBC AUTO-ENTMCNC: 32 % (ref 32–34.5)
MCV RBC AUTO: 115.5 FL (ref 80–99.9)
MONOCYTES ABSOLUTE: 0.18 E9/L (ref 0.1–0.95)
MONOCYTES RELATIVE PERCENT: 3.5 % (ref 2–12)
NEUTROPHILS ABSOLUTE: 1.32 E9/L (ref 1.8–7.3)
NEUTROPHILS RELATIVE PERCENT: 29.8 % (ref 43–80)
OVALOCYTES: ABNORMAL
PDW BLD-RTO: 25 FL (ref 11.5–15)
PLATELET # BLD: 156 E9/L (ref 130–450)
PMV BLD AUTO: 10.6 FL (ref 7–12)
POIKILOCYTES: ABNORMAL
POLYCHROMASIA: ABNORMAL
RBC # BLD: 2.52 E12/L (ref 3.5–5.5)
SCHISTOCYTES: ABNORMAL
TEAR DROP CELLS: ABNORMAL
WBC # BLD: 4.4 E9/L (ref 4.5–11.5)

## 2019-10-01 PROCEDURE — 4040F PNEUMOC VAC/ADMIN/RCVD: CPT | Performed by: INTERNAL MEDICINE

## 2019-10-01 PROCEDURE — 6360000002 HC RX W HCPCS: Performed by: INTERNAL MEDICINE

## 2019-10-01 PROCEDURE — 85025 COMPLETE CBC W/AUTO DIFF WBC: CPT

## 2019-10-01 PROCEDURE — 99214 OFFICE O/P EST MOD 30 MIN: CPT | Performed by: INTERNAL MEDICINE

## 2019-10-01 PROCEDURE — 99212 OFFICE O/P EST SF 10 MIN: CPT

## 2019-10-01 PROCEDURE — 96372 THER/PROPH/DIAG INJ SC/IM: CPT

## 2019-10-01 PROCEDURE — G8427 DOCREV CUR MEDS BY ELIG CLIN: HCPCS | Performed by: INTERNAL MEDICINE

## 2019-10-01 PROCEDURE — G8419 CALC BMI OUT NRM PARAM NOF/U: HCPCS | Performed by: INTERNAL MEDICINE

## 2019-10-01 PROCEDURE — 1036F TOBACCO NON-USER: CPT | Performed by: INTERNAL MEDICINE

## 2019-10-01 PROCEDURE — G8399 PT W/DXA RESULTS DOCUMENT: HCPCS | Performed by: INTERNAL MEDICINE

## 2019-10-01 PROCEDURE — 1090F PRES/ABSN URINE INCON ASSESS: CPT | Performed by: INTERNAL MEDICINE

## 2019-10-01 PROCEDURE — G8482 FLU IMMUNIZE ORDER/ADMIN: HCPCS | Performed by: INTERNAL MEDICINE

## 2019-10-01 PROCEDURE — 1123F ACP DISCUSS/DSCN MKR DOCD: CPT | Performed by: INTERNAL MEDICINE

## 2019-10-01 PROCEDURE — 36415 COLL VENOUS BLD VENIPUNCTURE: CPT

## 2019-10-01 RX ADMIN — DARBEPOETIN ALFA 500 MCG: 500 INJECTION, SOLUTION INTRAVENOUS; SUBCUTANEOUS at 13:55

## 2019-10-15 ENCOUNTER — OFFICE VISIT (OUTPATIENT)
Dept: ONCOLOGY | Age: 79
End: 2019-10-15
Payer: MEDICARE

## 2019-10-15 ENCOUNTER — HOSPITAL ENCOUNTER (OUTPATIENT)
Dept: INFUSION THERAPY | Age: 79
Discharge: HOME | End: 2019-10-15
Payer: MEDICARE

## 2019-10-15 VITALS
WEIGHT: 144.2 LBS | HEIGHT: 61 IN | SYSTOLIC BLOOD PRESSURE: 126 MMHG | TEMPERATURE: 97.9 F | HEART RATE: 59 BPM | DIASTOLIC BLOOD PRESSURE: 59 MMHG | BODY MASS INDEX: 27.23 KG/M2

## 2019-10-15 DIAGNOSIS — D46.9 MYELODYSPLASIA (MYELODYSPLASTIC SYNDROME) (HCC): ICD-10-CM

## 2019-10-15 DIAGNOSIS — Z17.0 MALIGNANT NEOPLASM OF RIGHT BREAST IN FEMALE, ESTROGEN RECEPTOR POSITIVE, UNSPECIFIED SITE OF BREAST (HCC): Primary | ICD-10-CM

## 2019-10-15 DIAGNOSIS — C50.911 MALIGNANT NEOPLASM OF RIGHT BREAST IN FEMALE, ESTROGEN RECEPTOR POSITIVE, UNSPECIFIED SITE OF BREAST (HCC): Primary | ICD-10-CM

## 2019-10-15 DIAGNOSIS — D63.8 ANEMIA IN OTHER CHRONIC DISEASES CLASSIFIED ELSEWHERE: ICD-10-CM

## 2019-10-15 DIAGNOSIS — D46.9 MYELODYSPLASTIC SYNDROME (HCC): ICD-10-CM

## 2019-10-15 DIAGNOSIS — D46.9 MDS (MYELODYSPLASTIC SYNDROME) (HCC): ICD-10-CM

## 2019-10-15 LAB
ANISOCYTOSIS: ABNORMAL
BASOPHILIC STIPPLING: ABNORMAL
BASOPHILS ABSOLUTE: 0.02 E9/L (ref 0–0.2)
BASOPHILS RELATIVE PERCENT: 0.3 % (ref 0–2)
EOSINOPHILS ABSOLUTE: 0.15 E9/L (ref 0.05–0.5)
EOSINOPHILS RELATIVE PERCENT: 2.5 % (ref 0–6)
HCT VFR BLD CALC: 31.5 % (ref 34–48)
HEMOGLOBIN: 9.8 G/DL (ref 11.5–15.5)
HYPOCHROMIA: ABNORMAL
IMMATURE GRANULOCYTES #: 0.02 E9/L
IMMATURE GRANULOCYTES %: 0.3 % (ref 0–5)
LYMPHOCYTES ABSOLUTE: 3.82 E9/L (ref 1.5–4)
LYMPHOCYTES RELATIVE PERCENT: 64.5 % (ref 20–42)
MCH RBC QN AUTO: 36.7 PG (ref 26–35)
MCHC RBC AUTO-ENTMCNC: 31.1 % (ref 32–34.5)
MCV RBC AUTO: 118 FL (ref 80–99.9)
MONOCYTES ABSOLUTE: 0.37 E9/L (ref 0.1–0.95)
MONOCYTES RELATIVE PERCENT: 6.3 % (ref 2–12)
NEUTROPHILS ABSOLUTE: 1.54 E9/L (ref 1.8–7.3)
NEUTROPHILS RELATIVE PERCENT: 26.1 % (ref 43–80)
OVALOCYTES: ABNORMAL
PDW BLD-RTO: 26.6 FL (ref 11.5–15)
PLATELET # BLD: 170 E9/L (ref 130–450)
PMV BLD AUTO: 10.9 FL (ref 7–12)
POIKILOCYTES: ABNORMAL
POLYCHROMASIA: ABNORMAL
RBC # BLD: 2.67 E12/L (ref 3.5–5.5)
SCHISTOCYTES: ABNORMAL
WBC # BLD: 5.9 E9/L (ref 4.5–11.5)

## 2019-10-15 PROCEDURE — 1123F ACP DISCUSS/DSCN MKR DOCD: CPT | Performed by: INTERNAL MEDICINE

## 2019-10-15 PROCEDURE — 1090F PRES/ABSN URINE INCON ASSESS: CPT | Performed by: INTERNAL MEDICINE

## 2019-10-15 PROCEDURE — 1036F TOBACCO NON-USER: CPT | Performed by: INTERNAL MEDICINE

## 2019-10-15 PROCEDURE — 6360000002 HC RX W HCPCS: Performed by: INTERNAL MEDICINE

## 2019-10-15 PROCEDURE — 36415 COLL VENOUS BLD VENIPUNCTURE: CPT | Performed by: INTERNAL MEDICINE

## 2019-10-15 PROCEDURE — 4040F PNEUMOC VAC/ADMIN/RCVD: CPT | Performed by: INTERNAL MEDICINE

## 2019-10-15 PROCEDURE — G8427 DOCREV CUR MEDS BY ELIG CLIN: HCPCS | Performed by: INTERNAL MEDICINE

## 2019-10-15 PROCEDURE — 99214 OFFICE O/P EST MOD 30 MIN: CPT | Performed by: INTERNAL MEDICINE

## 2019-10-15 PROCEDURE — G8419 CALC BMI OUT NRM PARAM NOF/U: HCPCS | Performed by: INTERNAL MEDICINE

## 2019-10-15 PROCEDURE — G8482 FLU IMMUNIZE ORDER/ADMIN: HCPCS | Performed by: INTERNAL MEDICINE

## 2019-10-15 PROCEDURE — 85025 COMPLETE CBC W/AUTO DIFF WBC: CPT

## 2019-10-15 PROCEDURE — G8399 PT W/DXA RESULTS DOCUMENT: HCPCS | Performed by: INTERNAL MEDICINE

## 2019-10-15 PROCEDURE — 99212 OFFICE O/P EST SF 10 MIN: CPT | Performed by: INTERNAL MEDICINE

## 2019-10-15 PROCEDURE — 96372 THER/PROPH/DIAG INJ SC/IM: CPT

## 2019-10-15 RX ADMIN — DARBEPOETIN ALFA 500 MCG: 500 INJECTION, SOLUTION INTRAVENOUS; SUBCUTANEOUS at 14:40

## 2019-10-29 ENCOUNTER — HOSPITAL ENCOUNTER (OUTPATIENT)
Dept: INFUSION THERAPY | Age: 79
Discharge: HOME | End: 2019-10-29
Payer: MEDICARE

## 2019-10-29 ENCOUNTER — OFFICE VISIT (OUTPATIENT)
Dept: ONCOLOGY | Age: 79
End: 2019-10-29
Payer: MEDICARE

## 2019-10-29 ENCOUNTER — HOSPITAL ENCOUNTER (OUTPATIENT)
Dept: GENERAL RADIOLOGY | Age: 79
Discharge: HOME | End: 2019-10-31
Payer: MEDICARE

## 2019-10-29 ENCOUNTER — HOSPITAL ENCOUNTER (OUTPATIENT)
Age: 79
Discharge: HOME | End: 2019-10-31
Payer: MEDICARE

## 2019-10-29 VITALS
WEIGHT: 146.3 LBS | BODY MASS INDEX: 27.62 KG/M2 | SYSTOLIC BLOOD PRESSURE: 115 MMHG | TEMPERATURE: 98.2 F | DIASTOLIC BLOOD PRESSURE: 79 MMHG | HEIGHT: 61 IN | HEART RATE: 76 BPM
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
## 2019-10-29 DIAGNOSIS — D63.8 ANEMIA IN OTHER CHRONIC DISEASES CLASSIFIED ELSEWHERE: ICD-10-CM

## 2019-10-29 DIAGNOSIS — D46.9 MDS (MYELODYSPLASTIC SYNDROME) (HCC): ICD-10-CM

## 2019-10-29 DIAGNOSIS — C50.911 MALIGNANT NEOPLASM OF RIGHT BREAST IN FEMALE, ESTROGEN RECEPTOR POSITIVE, UNSPECIFIED SITE OF BREAST (HCC): Primary | ICD-10-CM

## 2019-10-29 DIAGNOSIS — Z17.0 MALIGNANT NEOPLASM OF RIGHT BREAST IN FEMALE, ESTROGEN RECEPTOR POSITIVE, UNSPECIFIED SITE OF BREAST (HCC): Primary | ICD-10-CM

## 2019-10-29 LAB
ANISOCYTOSIS: ABNORMAL
BASOPHILS ABSOLUTE: 0 E9/L (ref 0–0.2)
BASOPHILS RELATIVE PERCENT: 0.5 % (ref 0–2)
EOSINOPHILS ABSOLUTE: 0.11 E9/L (ref 0.05–0.5)
EOSINOPHILS RELATIVE PERCENT: 1.8 % (ref 0–6)
HCT VFR BLD CALC: 30.5 % (ref 34–48)
HEMOGLOBIN: 9.8 G/DL (ref 11.5–15.5)
LYMPHOCYTES ABSOLUTE: 3.13 E9/L (ref 1.5–4)
LYMPHOCYTES RELATIVE PERCENT: 53.2 % (ref 20–42)
MCH RBC QN AUTO: 37.3 PG (ref 26–35)
MCHC RBC AUTO-ENTMCNC: 32.1 % (ref 32–34.5)
MCV RBC AUTO: 116 FL (ref 80–99.9)
METAMYELOCYTES RELATIVE PERCENT: 0.9 % (ref 0–1)
MONOCYTES ABSOLUTE: 0.35 E9/L (ref 0.1–0.95)
MONOCYTES RELATIVE PERCENT: 6.3 % (ref 2–12)
NEUTROPHILS ABSOLUTE: 2.3 E9/L (ref 1.8–7.3)
NEUTROPHILS RELATIVE PERCENT: 37.8 % (ref 43–80)
NUCLEATED RED BLOOD CELLS: 0.9 /100 WBC
OVALOCYTES: ABNORMAL
PDW BLD-RTO: 26.3 FL (ref 11.5–15)
PLATELET # BLD: 170 E9/L (ref 130–450)
PMV BLD AUTO: 10.6 FL (ref 7–12)
POIKILOCYTES: ABNORMAL
POLYCHROMASIA: ABNORMAL
RBC # BLD: 2.63 E12/L (ref 3.5–5.5)
TARGET CELLS: ABNORMAL
TEAR DROP CELLS: ABNORMAL
WBC # BLD: 5.9 E9/L (ref 4.5–11.5)

## 2019-10-29 PROCEDURE — 85025 COMPLETE CBC W/AUTO DIFF WBC: CPT

## 2019-10-29 PROCEDURE — G8399 PT W/DXA RESULTS DOCUMENT: HCPCS | Performed by: INTERNAL MEDICINE

## 2019-10-29 PROCEDURE — 1123F ACP DISCUSS/DSCN MKR DOCD: CPT | Performed by: INTERNAL MEDICINE

## 2019-10-29 PROCEDURE — 6360000002 HC RX W HCPCS: Performed by: INTERNAL MEDICINE

## 2019-10-29 PROCEDURE — 99214 OFFICE O/P EST MOD 30 MIN: CPT | Performed by: INTERNAL MEDICINE

## 2019-10-29 PROCEDURE — G8482 FLU IMMUNIZE ORDER/ADMIN: HCPCS | Performed by: INTERNAL MEDICINE

## 2019-10-29 PROCEDURE — 1036F TOBACCO NON-USER: CPT | Performed by: INTERNAL MEDICINE

## 2019-10-29 PROCEDURE — G8417 CALC BMI ABV UP PARAM F/U: HCPCS | Performed by: INTERNAL MEDICINE

## 2019-10-29 PROCEDURE — 73552 X-RAY EXAM OF FEMUR 2/>: CPT

## 2019-10-29 PROCEDURE — 73502 X-RAY EXAM HIP UNI 2-3 VIEWS: CPT

## 2019-10-29 PROCEDURE — 73562 X-RAY EXAM OF KNEE 3: CPT

## 2019-10-29 PROCEDURE — 36415 COLL VENOUS BLD VENIPUNCTURE: CPT | Performed by: INTERNAL MEDICINE

## 2019-10-29 PROCEDURE — 96372 THER/PROPH/DIAG INJ SC/IM: CPT

## 2019-10-29 PROCEDURE — 1090F PRES/ABSN URINE INCON ASSESS: CPT | Performed by: INTERNAL MEDICINE

## 2019-10-29 PROCEDURE — G8427 DOCREV CUR MEDS BY ELIG CLIN: HCPCS | Performed by: INTERNAL MEDICINE

## 2019-10-29 PROCEDURE — 4040F PNEUMOC VAC/ADMIN/RCVD: CPT | Performed by: INTERNAL MEDICINE

## 2019-10-29 PROCEDURE — 99213 OFFICE O/P EST LOW 20 MIN: CPT | Performed by: INTERNAL MEDICINE

## 2019-10-29 RX ADMIN — DARBEPOETIN ALFA 500 MCG: 500 INJECTION, SOLUTION INTRAVENOUS; SUBCUTANEOUS at 14:04

## 2019-11-22 ENCOUNTER — HOSPITAL ENCOUNTER (OUTPATIENT)
Dept: INFUSION THERAPY | Age: 79
Discharge: HOME | End: 2019-11-22
Payer: MEDICARE

## 2019-11-22 ENCOUNTER — OFFICE VISIT (OUTPATIENT)
Dept: ONCOLOGY | Age: 79
End: 2019-11-22
Payer: MEDICARE

## 2019-11-22 VITALS
BODY MASS INDEX: 27.38 KG/M2 | TEMPERATURE: 98.4 F | HEIGHT: 61 IN | WEIGHT: 145 LBS | SYSTOLIC BLOOD PRESSURE: 128 MMHG | HEART RATE: 66 BPM | OXYGEN SATURATION: 99 % | DIASTOLIC BLOOD PRESSURE: 60 MMHG

## 2019-11-22 DIAGNOSIS — D63.8 ANEMIA IN OTHER CHRONIC DISEASES CLASSIFIED ELSEWHERE: Primary | ICD-10-CM

## 2019-11-22 DIAGNOSIS — C50.911 MALIGNANT NEOPLASM OF RIGHT BREAST IN FEMALE, ESTROGEN RECEPTOR POSITIVE, UNSPECIFIED SITE OF BREAST (HCC): Primary | ICD-10-CM

## 2019-11-22 DIAGNOSIS — Z17.0 MALIGNANT NEOPLASM OF RIGHT BREAST IN FEMALE, ESTROGEN RECEPTOR POSITIVE, UNSPECIFIED SITE OF BREAST (HCC): Primary | ICD-10-CM

## 2019-11-22 DIAGNOSIS — D63.8 ANEMIA IN OTHER CHRONIC DISEASES CLASSIFIED ELSEWHERE: ICD-10-CM

## 2019-11-22 DIAGNOSIS — D46.9 MDS (MYELODYSPLASTIC SYNDROME) (HCC): ICD-10-CM

## 2019-11-22 LAB
ANISOCYTOSIS: ABNORMAL
BASOPHILS ABSOLUTE: 0 E9/L (ref 0–0.2)
BASOPHILS RELATIVE PERCENT: 0.5 % (ref 0–2)
EOSINOPHILS ABSOLUTE: 0.05 E9/L (ref 0.05–0.5)
EOSINOPHILS RELATIVE PERCENT: 0.9 % (ref 0–6)
HCT VFR BLD CALC: 27.5 % (ref 34–48)
HEMOGLOBIN: 8.6 G/DL (ref 11.5–15.5)
HYPOCHROMIA: ABNORMAL
LYMPHOCYTES ABSOLUTE: 3.02 E9/L (ref 1.5–4)
LYMPHOCYTES RELATIVE PERCENT: 53 % (ref 20–42)
MCH RBC QN AUTO: 36.8 PG (ref 26–35)
MCHC RBC AUTO-ENTMCNC: 31.3 % (ref 32–34.5)
MCV RBC AUTO: 117.5 FL (ref 80–99.9)
MONOCYTES ABSOLUTE: 0.4 E9/L (ref 0.1–0.95)
MONOCYTES RELATIVE PERCENT: 7 % (ref 2–12)
NEUTROPHILS ABSOLUTE: 2.22 E9/L (ref 1.8–7.3)
NEUTROPHILS RELATIVE PERCENT: 39.1 % (ref 43–80)
OVALOCYTES: ABNORMAL
PDW BLD-RTO: 25.6 FL (ref 11.5–15)
PLATELET # BLD: 213 E9/L (ref 130–450)
PMV BLD AUTO: 9.8 FL (ref 7–12)
POIKILOCYTES: ABNORMAL
POLYCHROMASIA: ABNORMAL
RBC # BLD: 2.34 E12/L (ref 3.5–5.5)
SCHISTOCYTES: ABNORMAL
TARGET CELLS: ABNORMAL
TEAR DROP CELLS: ABNORMAL
WBC # BLD: 5.7 E9/L (ref 4.5–11.5)

## 2019-11-22 PROCEDURE — G8399 PT W/DXA RESULTS DOCUMENT: HCPCS | Performed by: INTERNAL MEDICINE

## 2019-11-22 PROCEDURE — G8417 CALC BMI ABV UP PARAM F/U: HCPCS | Performed by: INTERNAL MEDICINE

## 2019-11-22 PROCEDURE — G8427 DOCREV CUR MEDS BY ELIG CLIN: HCPCS | Performed by: INTERNAL MEDICINE

## 2019-11-22 PROCEDURE — 99214 OFFICE O/P EST MOD 30 MIN: CPT | Performed by: INTERNAL MEDICINE

## 2019-11-22 PROCEDURE — 99212 OFFICE O/P EST SF 10 MIN: CPT | Performed by: INTERNAL MEDICINE

## 2019-11-22 PROCEDURE — 4040F PNEUMOC VAC/ADMIN/RCVD: CPT | Performed by: INTERNAL MEDICINE

## 2019-11-22 PROCEDURE — 85025 COMPLETE CBC W/AUTO DIFF WBC: CPT

## 2019-11-22 PROCEDURE — 1036F TOBACCO NON-USER: CPT | Performed by: INTERNAL MEDICINE

## 2019-11-22 PROCEDURE — 1090F PRES/ABSN URINE INCON ASSESS: CPT | Performed by: INTERNAL MEDICINE

## 2019-11-22 PROCEDURE — 96372 THER/PROPH/DIAG INJ SC/IM: CPT

## 2019-11-22 PROCEDURE — G8482 FLU IMMUNIZE ORDER/ADMIN: HCPCS | Performed by: INTERNAL MEDICINE

## 2019-11-22 PROCEDURE — 1123F ACP DISCUSS/DSCN MKR DOCD: CPT | Performed by: INTERNAL MEDICINE

## 2019-11-22 PROCEDURE — 6360000002 HC RX W HCPCS: Performed by: INTERNAL MEDICINE

## 2019-11-22 PROCEDURE — 36415 COLL VENOUS BLD VENIPUNCTURE: CPT | Performed by: INTERNAL MEDICINE

## 2019-11-22 RX ADMIN — DARBEPOETIN ALFA 500 MCG: 500 INJECTION, SOLUTION INTRAVENOUS; SUBCUTANEOUS at 14:16

## 2019-11-25 DIAGNOSIS — Z17.0 MALIGNANT NEOPLASM OF RIGHT BREAST IN FEMALE, ESTROGEN RECEPTOR POSITIVE, UNSPECIFIED SITE OF BREAST (HCC): Primary | ICD-10-CM

## 2019-11-25 DIAGNOSIS — C50.911 MALIGNANT NEOPLASM OF RIGHT BREAST IN FEMALE, ESTROGEN RECEPTOR POSITIVE, UNSPECIFIED SITE OF BREAST (HCC): Primary | ICD-10-CM

## 2019-12-03 ENCOUNTER — OFFICE VISIT (OUTPATIENT)
Dept: PALLATIVE CARE | Age: 79
End: 2019-12-03
Payer: MEDICARE

## 2019-12-03 ENCOUNTER — CLINICAL DOCUMENTATION (OUTPATIENT)
Dept: PALLATIVE CARE | Age: 79
End: 2019-12-03

## 2019-12-03 VITALS
DIASTOLIC BLOOD PRESSURE: 47 MMHG | BODY MASS INDEX: 27.4 KG/M2 | OXYGEN SATURATION: 98 % | SYSTOLIC BLOOD PRESSURE: 119 MMHG | HEART RATE: 65 BPM | WEIGHT: 145 LBS

## 2019-12-03 DIAGNOSIS — Z51.5 PALLIATIVE CARE BY SPECIALIST: ICD-10-CM

## 2019-12-03 DIAGNOSIS — G89.3 PAIN DUE TO NEOPLASM: ICD-10-CM

## 2019-12-03 PROCEDURE — 99214 OFFICE O/P EST MOD 30 MIN: CPT | Performed by: NURSE PRACTITIONER

## 2019-12-03 PROCEDURE — 99212 OFFICE O/P EST SF 10 MIN: CPT

## 2019-12-03 PROCEDURE — 4040F PNEUMOC VAC/ADMIN/RCVD: CPT | Performed by: NURSE PRACTITIONER

## 2019-12-03 PROCEDURE — 1090F PRES/ABSN URINE INCON ASSESS: CPT | Performed by: NURSE PRACTITIONER

## 2019-12-03 PROCEDURE — G8417 CALC BMI ABV UP PARAM F/U: HCPCS | Performed by: NURSE PRACTITIONER

## 2019-12-03 PROCEDURE — G8428 CUR MEDS NOT DOCUMENT: HCPCS | Performed by: NURSE PRACTITIONER

## 2019-12-03 PROCEDURE — 1123F ACP DISCUSS/DSCN MKR DOCD: CPT | Performed by: NURSE PRACTITIONER

## 2019-12-03 PROCEDURE — G8399 PT W/DXA RESULTS DOCUMENT: HCPCS | Performed by: NURSE PRACTITIONER

## 2019-12-03 PROCEDURE — 1036F TOBACCO NON-USER: CPT | Performed by: NURSE PRACTITIONER

## 2019-12-03 PROCEDURE — G8482 FLU IMMUNIZE ORDER/ADMIN: HCPCS | Performed by: NURSE PRACTITIONER

## 2019-12-03 RX ORDER — HYDROCODONE BITARTRATE AND ACETAMINOPHEN 5; 325 MG/1; MG/1
1 TABLET ORAL EVERY 8 HOURS PRN
Qty: 90 TABLET | Refills: 0 | Status: SHIPPED | OUTPATIENT
Start: 2019-12-03 | End: 2020-01-28 | Stop reason: SDUPTHER

## 2019-12-06 ENCOUNTER — OFFICE VISIT (OUTPATIENT)
Dept: ONCOLOGY | Age: 79
End: 2019-12-06
Payer: MEDICARE

## 2019-12-06 ENCOUNTER — HOSPITAL ENCOUNTER (OUTPATIENT)
Dept: INFUSION THERAPY | Age: 79
Discharge: HOME | End: 2019-12-06
Payer: MEDICARE

## 2019-12-06 VITALS
WEIGHT: 147.2 LBS | HEIGHT: 61 IN | DIASTOLIC BLOOD PRESSURE: 62 MMHG | HEART RATE: 67 BPM | SYSTOLIC BLOOD PRESSURE: 137 MMHG | TEMPERATURE: 98.3 F | BODY MASS INDEX: 27.79 KG/M2 | OXYGEN SATURATION: 96 %

## 2019-12-06 DIAGNOSIS — C50.911 MALIGNANT NEOPLASM OF RIGHT BREAST IN FEMALE, ESTROGEN RECEPTOR POSITIVE, UNSPECIFIED SITE OF BREAST (HCC): Primary | ICD-10-CM

## 2019-12-06 DIAGNOSIS — Z17.0 MALIGNANT NEOPLASM OF RIGHT BREAST IN FEMALE, ESTROGEN RECEPTOR POSITIVE, UNSPECIFIED SITE OF BREAST (HCC): Primary | ICD-10-CM

## 2019-12-06 DIAGNOSIS — D46.9 MDS (MYELODYSPLASTIC SYNDROME) (HCC): ICD-10-CM

## 2019-12-06 DIAGNOSIS — D63.8 ANEMIA IN OTHER CHRONIC DISEASES CLASSIFIED ELSEWHERE: ICD-10-CM

## 2019-12-06 LAB
ANISOCYTOSIS: ABNORMAL
BASOPHILS ABSOLUTE: 0.02 E9/L (ref 0–0.2)
BASOPHILS RELATIVE PERCENT: 0.4 % (ref 0–2)
EOSINOPHILS ABSOLUTE: 0.11 E9/L (ref 0.05–0.5)
EOSINOPHILS RELATIVE PERCENT: 2.1 % (ref 0–6)
HCT VFR BLD CALC: 30.3 % (ref 34–48)
HEMOGLOBIN: 9.6 G/DL (ref 11.5–15.5)
HYPOCHROMIA: ABNORMAL
IMMATURE GRANULOCYTES #: 0.01 E9/L
IMMATURE GRANULOCYTES %: 0.2 % (ref 0–5)
LYMPHOCYTES ABSOLUTE: 3.46 E9/L (ref 1.5–4)
LYMPHOCYTES RELATIVE PERCENT: 65.2 % (ref 20–42)
MCH RBC QN AUTO: 36.6 PG (ref 26–35)
MCHC RBC AUTO-ENTMCNC: 31.7 % (ref 32–34.5)
MCV RBC AUTO: 115.6 FL (ref 80–99.9)
MONOCYTES ABSOLUTE: 0.4 E9/L (ref 0.1–0.95)
MONOCYTES RELATIVE PERCENT: 7.5 % (ref 2–12)
NEUTROPHILS ABSOLUTE: 1.31 E9/L (ref 1.8–7.3)
NEUTROPHILS RELATIVE PERCENT: 24.6 % (ref 43–80)
OVALOCYTES: ABNORMAL
PDW BLD-RTO: 25.6 FL (ref 11.5–15)
PLATELET # BLD: 153 E9/L (ref 130–450)
PMV BLD AUTO: 11.6 FL (ref 7–12)
POIKILOCYTES: ABNORMAL
RBC # BLD: 2.62 E12/L (ref 3.5–5.5)
STOMATOCYTES: ABNORMAL
TARGET CELLS: ABNORMAL
WBC # BLD: 5.3 E9/L (ref 4.5–11.5)

## 2019-12-06 PROCEDURE — 1123F ACP DISCUSS/DSCN MKR DOCD: CPT | Performed by: INTERNAL MEDICINE

## 2019-12-06 PROCEDURE — G8482 FLU IMMUNIZE ORDER/ADMIN: HCPCS | Performed by: INTERNAL MEDICINE

## 2019-12-06 PROCEDURE — 6360000002 HC RX W HCPCS: Performed by: INTERNAL MEDICINE

## 2019-12-06 PROCEDURE — 99214 OFFICE O/P EST MOD 30 MIN: CPT | Performed by: INTERNAL MEDICINE

## 2019-12-06 PROCEDURE — 85025 COMPLETE CBC W/AUTO DIFF WBC: CPT

## 2019-12-06 PROCEDURE — G8399 PT W/DXA RESULTS DOCUMENT: HCPCS | Performed by: INTERNAL MEDICINE

## 2019-12-06 PROCEDURE — 1036F TOBACCO NON-USER: CPT | Performed by: INTERNAL MEDICINE

## 2019-12-06 PROCEDURE — 1090F PRES/ABSN URINE INCON ASSESS: CPT | Performed by: INTERNAL MEDICINE

## 2019-12-06 PROCEDURE — 4040F PNEUMOC VAC/ADMIN/RCVD: CPT | Performed by: INTERNAL MEDICINE

## 2019-12-06 PROCEDURE — G8417 CALC BMI ABV UP PARAM F/U: HCPCS | Performed by: INTERNAL MEDICINE

## 2019-12-06 PROCEDURE — 96372 THER/PROPH/DIAG INJ SC/IM: CPT

## 2019-12-06 PROCEDURE — 99212 OFFICE O/P EST SF 10 MIN: CPT | Performed by: INTERNAL MEDICINE

## 2019-12-06 PROCEDURE — G8427 DOCREV CUR MEDS BY ELIG CLIN: HCPCS | Performed by: INTERNAL MEDICINE

## 2019-12-06 PROCEDURE — 36415 COLL VENOUS BLD VENIPUNCTURE: CPT | Performed by: INTERNAL MEDICINE

## 2019-12-06 RX ADMIN — DARBEPOETIN ALFA 500 MCG: 500 INJECTION, SOLUTION INTRAVENOUS; SUBCUTANEOUS at 15:21

## 2019-12-20 ENCOUNTER — OFFICE VISIT (OUTPATIENT)
Dept: ONCOLOGY | Age: 79
End: 2019-12-20
Payer: MEDICARE

## 2019-12-20 ENCOUNTER — HOSPITAL ENCOUNTER (OUTPATIENT)
Dept: INFUSION THERAPY | Age: 79
Discharge: HOME | End: 2019-12-20
Payer: MEDICARE

## 2019-12-20 VITALS
DIASTOLIC BLOOD PRESSURE: 63 MMHG | TEMPERATURE: 98.9 F | WEIGHT: 148 LBS | SYSTOLIC BLOOD PRESSURE: 136 MMHG | HEART RATE: 62 BPM | BODY MASS INDEX: 27.94 KG/M2 | HEIGHT: 61 IN

## 2019-12-20 DIAGNOSIS — D46.9 MYELODYSPLASIA (MYELODYSPLASTIC SYNDROME) (HCC): ICD-10-CM

## 2019-12-20 DIAGNOSIS — D46.9 MYELODYSPLASTIC SYNDROME (HCC): ICD-10-CM

## 2019-12-20 DIAGNOSIS — Z17.0 MALIGNANT NEOPLASM OF RIGHT BREAST IN FEMALE, ESTROGEN RECEPTOR POSITIVE, UNSPECIFIED SITE OF BREAST (HCC): Primary | ICD-10-CM

## 2019-12-20 DIAGNOSIS — C50.911 MALIGNANT NEOPLASM OF RIGHT BREAST IN FEMALE, ESTROGEN RECEPTOR POSITIVE, UNSPECIFIED SITE OF BREAST (HCC): Primary | ICD-10-CM

## 2019-12-20 LAB
ANISOCYTOSIS: ABNORMAL
BASOPHILS ABSOLUTE: 0.03 E9/L (ref 0–0.2)
BASOPHILS RELATIVE PERCENT: 0.5 % (ref 0–2)
EOSINOPHILS ABSOLUTE: 0.1 E9/L (ref 0.05–0.5)
EOSINOPHILS RELATIVE PERCENT: 1.7 % (ref 0–6)
HCT VFR BLD CALC: 32.5 % (ref 34–48)
HEMOGLOBIN: 10.1 G/DL (ref 11.5–15.5)
IMMATURE GRANULOCYTES #: 0.03 E9/L
IMMATURE GRANULOCYTES %: 0.5 % (ref 0–5)
LYMPHOCYTES ABSOLUTE: 3.37 E9/L (ref 1.5–4)
LYMPHOCYTES RELATIVE PERCENT: 56.3 % (ref 20–42)
MCH RBC QN AUTO: 36.9 PG (ref 26–35)
MCHC RBC AUTO-ENTMCNC: 31.1 % (ref 32–34.5)
MCV RBC AUTO: 118.6 FL (ref 80–99.9)
MONOCYTES ABSOLUTE: 0.32 E9/L (ref 0.1–0.95)
MONOCYTES RELATIVE PERCENT: 5.3 % (ref 2–12)
NEUTROPHILS ABSOLUTE: 2.14 E9/L (ref 1.8–7.3)
NEUTROPHILS RELATIVE PERCENT: 35.7 % (ref 43–80)
OVALOCYTES: ABNORMAL
PDW BLD-RTO: 27.2 FL (ref 11.5–15)
PLATELET # BLD: 174 E9/L (ref 130–450)
PMV BLD AUTO: 12 FL (ref 7–12)
POIKILOCYTES: ABNORMAL
POLYCHROMASIA: ABNORMAL
RBC # BLD: 2.74 E12/L (ref 3.5–5.5)
TEAR DROP CELLS: ABNORMAL
WBC # BLD: 6 E9/L (ref 4.5–11.5)

## 2019-12-20 PROCEDURE — G8427 DOCREV CUR MEDS BY ELIG CLIN: HCPCS | Performed by: INTERNAL MEDICINE

## 2019-12-20 PROCEDURE — 1090F PRES/ABSN URINE INCON ASSESS: CPT | Performed by: INTERNAL MEDICINE

## 2019-12-20 PROCEDURE — 85025 COMPLETE CBC W/AUTO DIFF WBC: CPT

## 2019-12-20 PROCEDURE — 1036F TOBACCO NON-USER: CPT | Performed by: INTERNAL MEDICINE

## 2019-12-20 PROCEDURE — 36415 COLL VENOUS BLD VENIPUNCTURE: CPT | Performed by: INTERNAL MEDICINE

## 2019-12-20 PROCEDURE — 1123F ACP DISCUSS/DSCN MKR DOCD: CPT | Performed by: INTERNAL MEDICINE

## 2019-12-20 PROCEDURE — G8482 FLU IMMUNIZE ORDER/ADMIN: HCPCS | Performed by: INTERNAL MEDICINE

## 2019-12-20 PROCEDURE — 99212 OFFICE O/P EST SF 10 MIN: CPT | Performed by: INTERNAL MEDICINE

## 2019-12-20 PROCEDURE — G8417 CALC BMI ABV UP PARAM F/U: HCPCS | Performed by: INTERNAL MEDICINE

## 2019-12-20 PROCEDURE — G8399 PT W/DXA RESULTS DOCUMENT: HCPCS | Performed by: INTERNAL MEDICINE

## 2019-12-20 PROCEDURE — 99214 OFFICE O/P EST MOD 30 MIN: CPT | Performed by: INTERNAL MEDICINE

## 2019-12-20 PROCEDURE — 4040F PNEUMOC VAC/ADMIN/RCVD: CPT | Performed by: INTERNAL MEDICINE

## 2019-12-31 RX ORDER — GABAPENTIN 300 MG/1
900 CAPSULE ORAL NIGHTLY
Qty: 270 CAPSULE | Refills: 0 | Status: SHIPPED
Start: 2019-12-31 | End: 2020-04-08 | Stop reason: SDUPTHER

## 2020-01-02 ENCOUNTER — APPOINTMENT (OUTPATIENT)
Dept: CT IMAGING | Age: 80
End: 2020-01-02
Payer: MEDICARE

## 2020-01-02 ENCOUNTER — APPOINTMENT (OUTPATIENT)
Dept: GENERAL RADIOLOGY | Age: 80
End: 2020-01-02
Payer: MEDICARE

## 2020-01-02 ENCOUNTER — HOSPITAL ENCOUNTER (EMERGENCY)
Age: 80
Discharge: HOME | End: 2020-01-02
Payer: MEDICARE

## 2020-01-02 ENCOUNTER — TELEPHONE (OUTPATIENT)
Dept: ADMINISTRATIVE | Age: 80
End: 2020-01-02

## 2020-01-02 VITALS
HEART RATE: 88 BPM | DIASTOLIC BLOOD PRESSURE: 60 MMHG | WEIGHT: 135 LBS | HEIGHT: 64 IN | RESPIRATION RATE: 16 BRPM | BODY MASS INDEX: 23.05 KG/M2 | SYSTOLIC BLOOD PRESSURE: 130 MMHG | OXYGEN SATURATION: 98 % | TEMPERATURE: 98.6 F

## 2020-01-02 LAB
BILIRUBIN URINE: NEGATIVE
BLOOD, URINE: NEGATIVE
CLARITY: CLEAR
COLOR: YELLOW
GLUCOSE URINE: NEGATIVE MG/DL
KETONES, URINE: NEGATIVE MG/DL
LEUKOCYTE ESTERASE, URINE: NEGATIVE
NITRITE, URINE: NEGATIVE
PH UA: 6 (ref 5–9)
PROTEIN UA: NEGATIVE MG/DL
SPECIFIC GRAVITY UA: 1.02 (ref 1–1.03)
UROBILINOGEN, URINE: 0.2 E.U./DL

## 2020-01-02 PROCEDURE — 81003 URINALYSIS AUTO W/O SCOPE: CPT

## 2020-01-02 PROCEDURE — 73502 X-RAY EXAM HIP UNI 2-3 VIEWS: CPT

## 2020-01-02 PROCEDURE — 96372 THER/PROPH/DIAG INJ SC/IM: CPT

## 2020-01-02 PROCEDURE — 6360000002 HC RX W HCPCS: Performed by: NURSE PRACTITIONER

## 2020-01-02 PROCEDURE — 72131 CT LUMBAR SPINE W/O DYE: CPT

## 2020-01-02 PROCEDURE — 74176 CT ABD & PELVIS W/O CONTRAST: CPT

## 2020-01-02 PROCEDURE — 99284 EMERGENCY DEPT VISIT MOD MDM: CPT

## 2020-01-02 RX ORDER — METHYLPREDNISOLONE 4 MG/1
TABLET ORAL
Qty: 1 KIT | Status: SHIPPED | OUTPATIENT
Start: 2020-01-02 | End: 2020-01-08

## 2020-01-02 RX ORDER — CYCLOBENZAPRINE HCL 10 MG
5 TABLET ORAL 3 TIMES DAILY PRN
Qty: 10 TABLET | Refills: 0 | Status: SHIPPED | OUTPATIENT
Start: 2020-01-02 | End: 2020-06-23

## 2020-01-02 RX ORDER — ACETAMINOPHEN 500 MG
500 TABLET ORAL EVERY 6 HOURS PRN
Qty: 20 TABLET | Refills: 0 | Status: SHIPPED | OUTPATIENT
Start: 2020-01-02 | End: 2020-01-02 | Stop reason: CLARIF

## 2020-01-02 RX ORDER — DEXAMETHASONE SODIUM PHOSPHATE 10 MG/ML
10 INJECTION INTRAMUSCULAR; INTRAVENOUS ONCE
Status: COMPLETED | OUTPATIENT
Start: 2020-01-02 | End: 2020-01-02

## 2020-01-02 RX ORDER — KETOROLAC TROMETHAMINE 30 MG/ML
30 INJECTION, SOLUTION INTRAMUSCULAR; INTRAVENOUS ONCE
Status: COMPLETED | OUTPATIENT
Start: 2020-01-02 | End: 2020-01-02

## 2020-01-02 RX ORDER — ORPHENADRINE CITRATE 30 MG/ML
60 INJECTION INTRAMUSCULAR; INTRAVENOUS ONCE
Status: COMPLETED | OUTPATIENT
Start: 2020-01-02 | End: 2020-01-02

## 2020-01-02 RX ADMIN — KETOROLAC TROMETHAMINE 30 MG: 30 INJECTION, SOLUTION INTRAMUSCULAR; INTRAVENOUS at 12:54

## 2020-01-02 RX ADMIN — ORPHENADRINE CITRATE 60 MG: 30 INJECTION INTRAMUSCULAR; INTRAVENOUS at 12:54

## 2020-01-02 RX ADMIN — DEXAMETHASONE SODIUM PHOSPHATE 10 MG: 10 INJECTION INTRAMUSCULAR; INTRAVENOUS at 12:53

## 2020-01-02 ASSESSMENT — PAIN SCALES - GENERAL
PAINLEVEL_OUTOF10: 8
PAINLEVEL_OUTOF10: 7

## 2020-01-02 ASSESSMENT — PAIN DESCRIPTION - ORIENTATION: ORIENTATION: RIGHT;LEFT

## 2020-01-02 ASSESSMENT — PAIN DESCRIPTION - LOCATION: LOCATION: HIP

## 2020-01-02 ASSESSMENT — PAIN DESCRIPTION - PAIN TYPE: TYPE: ACUTE PAIN

## 2020-01-02 NOTE — ED PROVIDER NOTES
consciousness. She denies any anticoagulation or confusion. Patient is in chronic pain management and does take hydrocodone states usually she will take 1 pill at bedtime but lately she has had to up it to 1-1/2 pills during the day due to increase in pain. Since onset the symptoms have been constant and gradually worsening and moderate to severe in severity. There has been no bowel or bladder incontinence associated with the pain. There have been associated symptoms of nothing additional and denies any weakness, numbness, incontinence, dysuria, hematuria, abdominal pain, fever, hx cancer, weight loss, recent steroid use, tingling, morning stiffness, leg weakness, new numbness, new weakness, new tingling, abdominal swelling, chest pain, pelvic pain or perianal numbness. The the pain is aggraveated by any movement and relieved by nothing. ROS   Pertinent positives and negatives are stated within HPI, all other systems reviewed and are negative. Past Surgical History:   Procedure Laterality Date    ADENOIDECTOMY      BONE MARROW BIOPSY      BREAST BIOPSY      CHOLECYSTECTOMY      MASTECTOMY      right breast    OTHER SURGICAL HISTORY      blood transfusions    TONSILLECTOMY AND ADENOIDECTOMY      TUBAL LIGATION     Social History:  reports that she quit smoking about 20 years ago. Her smoking use included cigarettes. She started smoking about 57 years ago. She has a 37.00 pack-year smoking history. She has never used smokeless tobacco. She reports that she does not drink alcohol or use drugs. Family History: family history includes Cancer in her brother, father, maternal aunt, mother, sister, and another family member; Other in her brother.   Allergies: Bee venom and Penicillins    Physical Exam           ED Triage Vitals [01/02/20 1117]   BP Temp Temp Source Pulse Resp SpO2 Height Weight   130/60 98.6 °F (37 °C) Oral 88 16 98 % 5' 4\" (1.626 m) 135 lb (61.2 kg)      Oxygen Saturation Interpretation: Normal.    Constitutional:  Alert, development consistent with age. HEENT:  NC/NT. Airway patent. Neck:  Normal ROM. Supple. Respiratory:  Clear to auscultation and breath sounds equal.  CV:  Regular rate and rhythm, normal heart sounds, without pathological murmurs, ectopy, gallops, or rubs. GI:  Abdomen Soft, nontender, good bowel sounds. No firm or pulsatile mass. Back: lower lumbar spine central and bilateral.             Tenderness: Moderate. Swelling: no.              Range of Motion: diminished range with pain. CVA Tenderness: No.            Straight leg raising: In chair with guarded movements. Skin:  no erythema, rash or swelling noted. Distal Function:              Motor deficit: movements guarded and limited due to pain. Sensory deficit: none; full sensation intact to light touch in bilateral lower legs. Pulse deficit: none; 2 + pedal and posterior tibial pulses intact. Calf Tenderness:  No Bilateral.               Edema:  none Both lower extremity(s). Reflexes: Bilateral knee,ankle,biceps normal.  Gait:  ataxic. Integument:  Normal turgor. Warm, dry, without visible rash. Lymphatics: No lymphangitis or adenopathy noted. Neurological:  Oriented. Motor functions intact.     Lab / Imaging Results   (All laboratory and radiology results have been personally reviewed by myself)  Labs:  Results for orders placed or performed during the hospital encounter of 01/02/20   Urinalysis   Result Value Ref Range    Color, UA Yellow Straw/Yellow    Clarity, UA Clear Clear    Glucose, Ur Negative Negative mg/dL    Bilirubin Urine Negative Negative    Ketones, Urine Negative Negative mg/dL    Specific Gravity, UA 1.020 1.005 - 1.030    Blood, Urine Negative Negative    pH, UA 6.0 5.0 - 9.0    Protein, UA Negative Negative mg/dL    Urobilinogen, Urine 0.2 <2.0 E.U./dL    Nitrite, Urine Negative Negative Leukocyte Esterase, Urine Negative Negative       Imaging: All Radiology results interpreted by Radiologist unless otherwise noted. XR HIP LEFT (2-3 VIEWS)   Final Result   No significant abnormal findings at either hip. XR HIP RIGHT (2-3 VIEWS)   Final Result   No significant abnormal findings at either hip. CT Lumbar Spine WO Contrast   Final Result   Multilevel degenerative changes in the lumbar spine. There is   significant facet arthrosis lower lumbar region. At L4-5 there is   moderately severe stenosis. There is moderate stenosis at L3-4 and   L1-2. Suggestion of subtle acute fracture along the anterior superior   endplate of L3. There is mild chronic compression deformity of the   superior endplate of J71.      CT ABDOMEN PELVIS WO CONTRAST   Final Result   Constipation. There is no acute inflammation or bowel obstruction. Multiple compression fractures of the thoraco lumbar spine. ED Course / Medical Decision Making     Medications   dexamethasone (DECADRON) injection 10 mg (10 mg Intramuscular Given 1/2/20 1253)   orphenadrine (NORFLEX) injection 60 mg (60 mg Intramuscular Given 1/2/20 1254)   ketorolac (TORADOL) injection 30 mg (30 mg Intramuscular Given 1/2/20 1254)        Re-examination:  1/2/20       Time: 0975 Case and imaging discussed with Dr. Kelly April and will discharge since pain is improved and able to ambulate and give a referral to neurosurgery for reevaluation. Consult(s):   None    Procedure(s):   none    Medical Decision Making:    Films were obtained based on positive suspicion for bony injury as per history/physical findings. She has no signs of acute cauda equina syndrome with no neurologic or sensory deficits on examination. Will obtain CT of lumbar spine and abdomen and pelvis. At this time the patient is without objective evidence of an acute process requiring inpatient management.  She is afebrile, nontoxic in appearance, hemodynamically stable has no signs of acute discitis. Patient was able to ambulate and her pain did improve in the ED after giving muscle relaxants, steroids and Toradol. She does have a prescription of Norco at home and will give her a Medrol Dosepak and muscle relaxants with NSAIDs. She has a prescription given a prescription for a walker to assist with her ambulation as a preventative measures did for falls. Advised on specific signs and symptoms warranting immediate return such as loss of bladder bowel function or rectal paresthesias. Counseling: The emergency provider has spoken with the patient and son and discussed todays results, in addition to providing specific details for the plan of care and counseling regarding the diagnosis and prognosis. Questions are answered at this time and they are agreeable with the plan. Assessment      1. Closed compression fracture of L3 lumbar vertebra, initial encounter ([facility] [address]    2. Closed fracture of multiple old thoracic vertebrae, initial encounter ([facility] [address]    3. Osteoarthritis of thoracic spine, unspecified spinal osteoarthritis complication status    4. DJD (degenerative joint disease), lumbosacral    5. Fall from slip, trip, or stumble, initial encounter      Plan   Discharge to home  Patient condition is good    New Medications     New Prescriptions    ACETAMINOPHEN (APAP EXTRA STRENGTH) 500 MG TABLET    Take 1 tablet by mouth every 6 hours as needed for Pain    CYCLOBENZAPRINE (FLEXERIL) 10 MG TABLET    Take 0.5 tablets by mouth 3 times daily as needed for Muscle spasms    METHYLPREDNISOLONE (MEDROL, CAROL ANN,) 4 MG TABLET    Take by mouth. Electronically signed by CONNIE Whelan CNP   DD: 1/2/20  **This report was transcribed using voice recognition software. Every effort was made to ensure accuracy; however, inadvertent computerized transcription errors may be present.   END OF ED PROVIDER NOTE      CONNIE Whelan CNP  01/02/20 9622

## 2020-01-02 NOTE — TELEPHONE ENCOUNTER
Pt fell 3 weeks ago at home and did not go to ER, she now states she is having some hip and leg pain. No availability Advised pt to use [facility]. Pt verbalized agreement and understanding.

## 2020-01-03 ENCOUNTER — HOSPITAL ENCOUNTER (OUTPATIENT)
Dept: INFUSION THERAPY | Age: 80
Discharge: HOME | End: 2020-01-03
Payer: MEDICARE

## 2020-01-03 ENCOUNTER — OFFICE VISIT (OUTPATIENT)
Dept: ONCOLOGY | Age: 80
End: 2020-01-03
Payer: COMMERCIAL

## 2020-01-03 VITALS
HEART RATE: 70 BPM | BODY MASS INDEX: 25.44 KG/M2 | HEIGHT: 64 IN | DIASTOLIC BLOOD PRESSURE: 60 MMHG | TEMPERATURE: 96.5 F | SYSTOLIC BLOOD PRESSURE: 126 MMHG | RESPIRATION RATE: 18 BRPM | WEIGHT: 149 LBS

## 2020-01-03 DIAGNOSIS — Z17.0 MALIGNANT NEOPLASM OF RIGHT BREAST IN FEMALE, ESTROGEN RECEPTOR POSITIVE, UNSPECIFIED SITE OF BREAST (HCC): Primary | ICD-10-CM

## 2020-01-03 DIAGNOSIS — D46.9 MYELODYSPLASTIC SYNDROME (HCC): ICD-10-CM

## 2020-01-03 DIAGNOSIS — D46.9 MDS (MYELODYSPLASTIC SYNDROME) (HCC): ICD-10-CM

## 2020-01-03 DIAGNOSIS — C50.911 MALIGNANT NEOPLASM OF RIGHT BREAST IN FEMALE, ESTROGEN RECEPTOR POSITIVE, UNSPECIFIED SITE OF BREAST (HCC): Primary | ICD-10-CM

## 2020-01-03 DIAGNOSIS — D63.8 ANEMIA IN OTHER CHRONIC DISEASES CLASSIFIED ELSEWHERE: ICD-10-CM

## 2020-01-03 LAB
ANISOCYTOSIS: ABNORMAL
BASOPHILS ABSOLUTE: 0.02 E9/L (ref 0–0.2)
BASOPHILS RELATIVE PERCENT: 0.3 % (ref 0–2)
EOSINOPHILS ABSOLUTE: 0.01 E9/L (ref 0.05–0.5)
EOSINOPHILS RELATIVE PERCENT: 0.1 % (ref 0–6)
HCT VFR BLD CALC: 27.5 % (ref 34–48)
HEMOGLOBIN: 8.8 G/DL (ref 11.5–15.5)
HYPOCHROMIA: ABNORMAL
IMMATURE GRANULOCYTES #: 0.01 E9/L
IMMATURE GRANULOCYTES %: 0.1 % (ref 0–5)
LYMPHOCYTES ABSOLUTE: 2.72 E9/L (ref 1.5–4)
LYMPHOCYTES RELATIVE PERCENT: 35.7 % (ref 20–42)
MCH RBC QN AUTO: 37.1 PG (ref 26–35)
MCHC RBC AUTO-ENTMCNC: 32 % (ref 32–34.5)
MCV RBC AUTO: 116 FL (ref 80–99.9)
MONOCYTES ABSOLUTE: 0.42 E9/L (ref 0.1–0.95)
MONOCYTES RELATIVE PERCENT: 5.5 % (ref 2–12)
NEUTROPHILS ABSOLUTE: 4.44 E9/L (ref 1.8–7.3)
NEUTROPHILS RELATIVE PERCENT: 58.3 % (ref 43–80)
OVALOCYTES: ABNORMAL
PDW BLD-RTO: 25.3 FL (ref 11.5–15)
PLATELET # BLD: 182 E9/L (ref 130–450)
PMV BLD AUTO: 11 FL (ref 7–12)
POIKILOCYTES: ABNORMAL
POLYCHROMASIA: ABNORMAL
RBC # BLD: 2.37 E12/L (ref 3.5–5.5)
STOMATOCYTES: ABNORMAL
TEAR DROP CELLS: ABNORMAL
WBC # BLD: 7.6 E9/L (ref 4.5–11.5)

## 2020-01-03 PROCEDURE — 6360000002 HC RX W HCPCS: Performed by: INTERNAL MEDICINE

## 2020-01-03 PROCEDURE — G8417 CALC BMI ABV UP PARAM F/U: HCPCS | Performed by: INTERNAL MEDICINE

## 2020-01-03 PROCEDURE — 99212 OFFICE O/P EST SF 10 MIN: CPT | Performed by: INTERNAL MEDICINE

## 2020-01-03 PROCEDURE — 4040F PNEUMOC VAC/ADMIN/RCVD: CPT | Performed by: INTERNAL MEDICINE

## 2020-01-03 PROCEDURE — 85025 COMPLETE CBC W/AUTO DIFF WBC: CPT

## 2020-01-03 PROCEDURE — 1090F PRES/ABSN URINE INCON ASSESS: CPT | Performed by: INTERNAL MEDICINE

## 2020-01-03 PROCEDURE — 1123F ACP DISCUSS/DSCN MKR DOCD: CPT | Performed by: INTERNAL MEDICINE

## 2020-01-03 PROCEDURE — 1036F TOBACCO NON-USER: CPT | Performed by: INTERNAL MEDICINE

## 2020-01-03 PROCEDURE — G8427 DOCREV CUR MEDS BY ELIG CLIN: HCPCS | Performed by: INTERNAL MEDICINE

## 2020-01-03 PROCEDURE — 36415 COLL VENOUS BLD VENIPUNCTURE: CPT | Performed by: INTERNAL MEDICINE

## 2020-01-03 PROCEDURE — G8399 PT W/DXA RESULTS DOCUMENT: HCPCS | Performed by: INTERNAL MEDICINE

## 2020-01-03 PROCEDURE — G8482 FLU IMMUNIZE ORDER/ADMIN: HCPCS | Performed by: INTERNAL MEDICINE

## 2020-01-03 PROCEDURE — 96372 THER/PROPH/DIAG INJ SC/IM: CPT

## 2020-01-03 PROCEDURE — 99214 OFFICE O/P EST MOD 30 MIN: CPT | Performed by: INTERNAL MEDICINE

## 2020-01-03 RX ADMIN — DARBEPOETIN ALFA 500 MCG: 500 INJECTION, SOLUTION INTRAVENOUS; SUBCUTANEOUS at 15:52

## 2020-01-03 NOTE — PROGRESS NOTES
[address] MED ONCOLOGY  [address] [address]  Dept: [phone]  Attending Progress Note      Reason for Visit:   1. Right Breast Cancer. 2. MDS. Referring Physician:  CONNIE Pyle CNP    PCP:  CONNIE Pyle CNP    History of Present Illness: The patient is a very pleasant 78 y.o. lady with a PMH significant for Right Breast Cancer, stage III, HR pos, was diagnosed in 2009, she had a right mastectomy done, followed by adjuvant chemo, she received 8 cycles, probably AC followed by T, then PMRT, and 5 years of adjuvant ET with Arimidex, was completed in 2015. She was treated in [facility] under the care of Dr. Twyla Dye. She was diagnosed with MDS in 2017, she received ESAs and PRBCs transfusion. She has transfusion related iron overload. She is feeling well overall, has chronic joints pain, peripheral neuropathy. She was started on Aranesp on 4/24/2020. No SE from Aranesp. She was seen in the ED on 1/2/2020 worsening pelvic and hip pain s/p fall, scan of the lumbar spine had revealed moderately severe stenosis at L4-L5, moderate stenosis at L3-L4 and L1-L2, suggestion of subtle acute fracture along the anterior superior endplate of L3, mild chronic compression deformity of the superior endplate of K72. Review of Systems;  CONSTITUTIONAL: No fever, chills. Fair appetite. ENMT: Eyes: No diplopia; Nose: No epistaxis. Mouth: No sore throat. RESPIRATORY: No hemoptysis, shortness of breath, cough. CARDIOVASCULAR: No chest pain, palpitations. GASTROINTESTINAL: No nausea/vomiting, abdominal pain, diarrhea/constipation. GENITOURINARY: No dysuria, urinary frequency, hematuria. NEURO: No syncope, presyncope, headache.   Remainder:  ROS NEGATIVE    Past Medical History:      Diagnosis Date    Anemia     Pt reports she was dx in her teens, w/o proper w/u, with iron deficiency anemia and was on oral ECOG PS 1      Impression/Plan:     1. The patient is a very pleasant 78 y.o. lady with a PMH significant for Right Breast Cancer, stage III, HR pos, was diagnosed in 2009, she had a right mastectomy done, followed by adjuvant chemo, she received 8 cycles, probably AC followed by T, then PMRT, and 5 years of adjuvant ET with Arimidex, was completed in 2015. She was treated in [facility] under the care of Dr. Jesenia Ramos. She is doing well clinically without any evidence of recurrence of her disease. Discussed with her the importance of monthly SBE, and routine screening mammograms, she had a left breast screening mammogram done on 5/14/2019, revealing benign findings, was negative for malignancy, will be due for repeat mammogram on 5/15/2020.     2. She has MDS, diagnosed in 2017, she received ESAs and PRBCs transfusion, has transfusion related iron overload, hgb was 8.5, ferritin 3647, was restarted on Aranesp on 4/23/2019. Labs reviewed, she did have improvement of the hemoglobin with Aranesp, she has macrocytosis, intermittent leukopenia, today 1/3/2020, globin is 8.8, hematocrit 7.5, proceed with Aranesp. Will continue to monitor her CBCD. She was seen in the ED on 1/2/2020 worsening pelvic and hip pain s/p fall, scan of the lumbar spine had revealed moderately severe stenosis at L4-L5, moderate stenosis at L3-L4 and L1-L2, suggestion of subtle acute fracture along the anterior superior endplate of L3, mild chronic compression deformity of the superior endplate of O20. The patient was referred to NS and PT.    RTC in 2 weeks with labs, aranesp. Thank you for allowing us to participate in the care of Ms. Ventura.     Ana Bazan MD   HEMATOLOGY/MEDICAL ONCOLOGY  [address] [place] [address] MED ONCOLOGY  [facility] [address] [address]  Dept: [phone]

## 2020-01-08 ENCOUNTER — INITIAL CONSULT (OUTPATIENT)
Dept: NEUROSURGERY | Age: 80
End: 2020-01-08
Payer: MEDICARE

## 2020-01-08 VITALS — HEIGHT: 64 IN | BODY MASS INDEX: 25.78 KG/M2 | WEIGHT: 151 LBS

## 2020-01-08 PROCEDURE — G8417 CALC BMI ABV UP PARAM F/U: HCPCS | Performed by: PHYSICIAN ASSISTANT

## 2020-01-08 PROCEDURE — G8427 DOCREV CUR MEDS BY ELIG CLIN: HCPCS | Performed by: PHYSICIAN ASSISTANT

## 2020-01-08 PROCEDURE — G8482 FLU IMMUNIZE ORDER/ADMIN: HCPCS | Performed by: PHYSICIAN ASSISTANT

## 2020-01-08 PROCEDURE — G8399 PT W/DXA RESULTS DOCUMENT: HCPCS | Performed by: PHYSICIAN ASSISTANT

## 2020-01-08 PROCEDURE — 1123F ACP DISCUSS/DSCN MKR DOCD: CPT | Performed by: PHYSICIAN ASSISTANT

## 2020-01-08 PROCEDURE — 99203 OFFICE O/P NEW LOW 30 MIN: CPT | Performed by: PHYSICIAN ASSISTANT

## 2020-01-08 PROCEDURE — 1090F PRES/ABSN URINE INCON ASSESS: CPT | Performed by: PHYSICIAN ASSISTANT

## 2020-01-08 PROCEDURE — 1036F TOBACCO NON-USER: CPT | Performed by: PHYSICIAN ASSISTANT

## 2020-01-08 PROCEDURE — 4040F PNEUMOC VAC/ADMIN/RCVD: CPT | Performed by: PHYSICIAN ASSISTANT

## 2020-01-08 ASSESSMENT — ENCOUNTER SYMPTOMS
PHOTOPHOBIA: 0
ABDOMINAL PAIN: 0
BACK PAIN: 1
TROUBLE SWALLOWING: 0
SHORTNESS OF BREATH: 0

## 2020-01-08 NOTE — PROGRESS NOTES
beyond the ankle b/l     Past Surgical History:   Procedure Laterality Date    ADENOIDECTOMY      BONE MARROW BIOPSY      BREAST BIOPSY      CHOLECYSTECTOMY      MASTECTOMY      right breast    OTHER SURGICAL HISTORY      blood transfusions    TONSILLECTOMY AND ADENOIDECTOMY      TUBAL LIGATION        Family History   Problem Relation Age of Onset    Cancer Mother         lung    Cancer Father         lung    Cancer Sister         ovarian cancer and breast cancer    Cancer Brother         unknown    Cancer Maternal Aunt         unknown    Cancer Other         brain    Other Brother         MI      Social History     Socioeconomic History    Marital status: Single     Spouse name: Not on file    Number of children: Not on file    Years of education: Not on file    Highest education level: Not on file   Occupational History    Not on file   Social Needs    Financial resource strain: Not on file    Food insecurity:     Worry: Not on file     Inability: Not on file    Transportation needs:     Medical: Not on file     Non-medical: Not on file   Tobacco Use    Smoking status: Former Smoker     Packs/day: 1.00     Years: 37.00     Pack years: 37.00     Types: Cigarettes     Start date:      Last attempt to quit: 2000     Years since quittin.0    Smokeless tobacco: Never Used   Substance and Sexual Activity    Alcohol use: No    Drug use: Never    Sexual activity: Not Currently   Lifestyle    Physical activity:     Days per week: Not on file     Minutes per session: Not on file    Stress: Not on file   Relationships    Social connections:     Talks on phone: Not on file     Gets together: Not on file     Attends Alevism service: Not on file     Active member of club or organization: Not on file     Attends meetings of clubs or organizations: Not on file     Relationship status: Not on file    Intimate partner violence:     Fear of current or ex partner: Not on file     Emotionally abused: Not on file     Physically abused: Not on file     Forced sexual activity: Not on file   Other Topics Concern    Not on file   Social History Narrative    Not on file       Medications:   Current Outpatient Medications   Medication Sig Dispense Refill    methylPREDNISolone (MEDROL, CAROL ANN,) 4 MG tablet Take by mouth. 1 kit zero    cyclobenzaprine (FLEXERIL) 10 MG tablet Take 0.5 tablets by mouth 3 times daily as needed for Muscle spasms 10 tablet 0    gabapentin (NEURONTIN) 300 MG capsule Take 3 capsules by mouth nightly for 91 days. 270 capsule 0    sertraline (ZOLOFT) 50 MG tablet Take 1 tablet by mouth nightly 30 tablet 5    levothyroxine (SYNTHROID) 175 MCG tablet Take 175 mcg by mouth every morning       [facility] Multiple thoracic fractures with L3 fracture 1 each 0    benzonatate (TESSALON) 200 MG capsule Take 200 mg by mouth 3 times daily as needed for Cough      guaiFENesin (ROBITUSSIN) 100 MG/5ML SOLN oral solution Take 200 mg by mouth every 6-8 hours as needed for Cough      albuterol sulfate HFA (PROAIR HFA) 108 (90 Base) MCG/ACT inhaler Inhale 2 puffs into the lungs every 4 hours as needed for Wheezing       No current facility-administered medications for this visit. Allergies: Allergies as of 01/08/2020 - Review Complete 01/08/2020   Allergen Reaction Noted    Bee venom Swelling and Dermatitis 01/30/2019    Penicillins Hives, Swelling, and Dermatitis 06/03/2016          Review of Systems   Constitutional: Negative for fever. HENT: Negative for trouble swallowing. Eyes: Negative for photophobia. Respiratory: Negative for shortness of breath. Cardiovascular: Negative for chest pain. Gastrointestinal: Negative for abdominal pain. Endocrine: Negative for heat intolerance. Genitourinary: Negative for flank pain. Musculoskeletal: Positive for arthralgias and back pain. Negative for myalgias. Skin: Negative for wound.    Neurological: Positive

## 2020-01-17 ENCOUNTER — OFFICE VISIT (OUTPATIENT)
Dept: ONCOLOGY | Age: 80
End: 2020-01-17
Payer: MEDICARE

## 2020-01-17 ENCOUNTER — HOSPITAL ENCOUNTER (OUTPATIENT)
Dept: INFUSION THERAPY | Age: 80
Discharge: HOME | End: 2020-01-17
Payer: MEDICARE

## 2020-01-17 VITALS
HEART RATE: 70 BPM | SYSTOLIC BLOOD PRESSURE: 141 MMHG | TEMPERATURE: 98.8 F | BODY MASS INDEX: 25.44 KG/M2 | OXYGEN SATURATION: 96 % | WEIGHT: 149 LBS | DIASTOLIC BLOOD PRESSURE: 61 MMHG | HEIGHT: 64 IN

## 2020-01-17 LAB
ANISOCYTOSIS: ABNORMAL
BASOPHILS ABSOLUTE: 0.03 E9/L (ref 0–0.2)
BASOPHILS RELATIVE PERCENT: 0.4 % (ref 0–2)
EOSINOPHILS ABSOLUTE: 0.07 E9/L (ref 0.05–0.5)
EOSINOPHILS RELATIVE PERCENT: 1 % (ref 0–6)
HCT VFR BLD CALC: 36 % (ref 34–48)
HEMOGLOBIN: 11.3 G/DL (ref 11.5–15.5)
HYPOCHROMIA: ABNORMAL
IMMATURE GRANULOCYTES #: 0.06 E9/L
IMMATURE GRANULOCYTES %: 0.8 % (ref 0–5)
LYMPHOCYTES ABSOLUTE: 3.23 E9/L (ref 1.5–4)
LYMPHOCYTES RELATIVE PERCENT: 45 % (ref 20–42)
MCH RBC QN AUTO: 37.9 PG (ref 26–35)
MCHC RBC AUTO-ENTMCNC: 31.4 % (ref 32–34.5)
MCV RBC AUTO: 120.8 FL (ref 80–99.9)
MONOCYTES ABSOLUTE: 0.32 E9/L (ref 0.1–0.95)
MONOCYTES RELATIVE PERCENT: 4.5 % (ref 2–12)
NEUTROPHILS ABSOLUTE: 3.47 E9/L (ref 1.8–7.3)
NEUTROPHILS RELATIVE PERCENT: 48.3 % (ref 43–80)
OVALOCYTES: ABNORMAL
PDW BLD-RTO: 26.3 FL (ref 11.5–15)
PLATELET # BLD: 130 E9/L (ref 130–450)
PMV BLD AUTO: 10.7 FL (ref 7–12)
POIKILOCYTES: ABNORMAL
POLYCHROMASIA: ABNORMAL
RBC # BLD: 2.98 E12/L (ref 3.5–5.5)
STOMATOCYTES: ABNORMAL
TARGET CELLS: ABNORMAL
TEAR DROP CELLS: ABNORMAL
WBC # BLD: 7.2 E9/L (ref 4.5–11.5)

## 2020-01-17 PROCEDURE — 99212 OFFICE O/P EST SF 10 MIN: CPT | Performed by: INTERNAL MEDICINE

## 2020-01-17 PROCEDURE — G8417 CALC BMI ABV UP PARAM F/U: HCPCS | Performed by: INTERNAL MEDICINE

## 2020-01-17 PROCEDURE — 4040F PNEUMOC VAC/ADMIN/RCVD: CPT | Performed by: INTERNAL MEDICINE

## 2020-01-17 PROCEDURE — 1090F PRES/ABSN URINE INCON ASSESS: CPT | Performed by: INTERNAL MEDICINE

## 2020-01-17 PROCEDURE — 85025 COMPLETE CBC W/AUTO DIFF WBC: CPT

## 2020-01-17 PROCEDURE — 1036F TOBACCO NON-USER: CPT | Performed by: INTERNAL MEDICINE

## 2020-01-17 PROCEDURE — G8399 PT W/DXA RESULTS DOCUMENT: HCPCS | Performed by: INTERNAL MEDICINE

## 2020-01-17 PROCEDURE — 36415 COLL VENOUS BLD VENIPUNCTURE: CPT | Performed by: INTERNAL MEDICINE

## 2020-01-17 PROCEDURE — G8482 FLU IMMUNIZE ORDER/ADMIN: HCPCS | Performed by: INTERNAL MEDICINE

## 2020-01-17 PROCEDURE — 1123F ACP DISCUSS/DSCN MKR DOCD: CPT | Performed by: INTERNAL MEDICINE

## 2020-01-17 PROCEDURE — 99214 OFFICE O/P EST MOD 30 MIN: CPT | Performed by: INTERNAL MEDICINE

## 2020-01-17 PROCEDURE — G8427 DOCREV CUR MEDS BY ELIG CLIN: HCPCS | Performed by: INTERNAL MEDICINE

## 2020-01-17 NOTE — PROGRESS NOTES
[address] MED ONCOLOGY  [address] [address]  Dept: [phone]  Attending Progress Note      Reason for Visit:   1. Right Breast Cancer. 2. MDS. Referring Physician:  CONNIE Granado CNP    PCP:  CONNIE Granado CNP    History of Present Illness: The patient is a very pleasant 78 y.o. lady with a PMH significant for Right Breast Cancer, stage III, HR pos, was diagnosed in 2009, she had a right mastectomy done, followed by adjuvant chemo, she received 8 cycles, probably AC followed by T, then PMRT, and 5 years of adjuvant ET with Arimidex, was completed in 2015. She was treated in [facility] under the care of Dr. Reginaldo Spence. She was diagnosed with MDS in 2017, she received ESAs and PRBCs transfusion. She has transfusion related iron overload. She is feeling well overall, has chronic joints pain, peripheral neuropathy. She was started on Aranesp on 4/24/2020. No SE from Aranesp. She was seen in the ED on 1/2/2020 worsening pelvic and hip pain s/p fall, scan of the lumbar spine had revealed moderately severe stenosis at L4-L5, moderate stenosis at L3-L4 and L1-L2, suggestion of subtle acute fracture along the anterior superior endplate of L3, mild chronic compression deformity of the superior endplate of C07. The patient is feeling much better, the steroids and Flexeril are helping. By neurosurgery, lumbar spine MRI was ordered. Review of Systems;  CONSTITUTIONAL: No fever, chills. Fair appetite. ENMT: Eyes: No diplopia; Nose: No epistaxis. Mouth: No sore throat. RESPIRATORY: No hemoptysis, shortness of breath, cough. CARDIOVASCULAR: No chest pain, palpitations. GASTROINTESTINAL: No nausea/vomiting, abdominal pain, diarrhea/constipation. GENITOURINARY: No dysuria, urinary frequency, hematuria. NEURO: No syncope, presyncope, headache.   Remainder:  ROS NEGATIVE    Past Medical History: Diagnosis Date    Anemia     Pt reports she was dx in her teens, w/o proper w/u, with iron deficiency anemia and was on oral iron replacement for many years, resulting in iron overload    Aplastic anemia ([address] ~7168-4521    Possibly d/t chemotherapy for breast cancer    Breast cancer ([address] 2009    right side; pt underwent radical mastectomy followed by radiation therapy and chemotherapy and was then on oral chemo pill for 5 yrs; no recurrence as of 01/2019    Chickenpox     Hypothyroidism     Measles     Mild depression ([address]     Mumps     Myelodysplastic syndrome ([address] ~2016    Pulmonary tuberculosis ~2388-5994    in her early 25s    Scarlet fever     Sensory neuropathy     beyond the ankle b/l     Patient Active Problem List   Diagnosis    MDS (myelodysplastic syndrome) (HCC)    Bronchitis    Macrocytic anemia with high RDW    Hypothyroidism    Peripheral sensory neuropathy    Breast cancer (HCC)    Anemia in other chronic diseases classified elsewhere         Past Surgical History:      Procedure Laterality Date    ADENOIDECTOMY      BONE MARROW BIOPSY      BREAST BIOPSY      CHOLECYSTECTOMY      MASTECTOMY      right breast    OTHER SURGICAL HISTORY      blood transfusions    TONSILLECTOMY AND ADENOIDECTOMY      TUBAL LIGATION         Family History:  Family History   Problem Relation Age of Onset   [facility] Cancer Mother         lung    Cancer Father         lung    Cancer Sister         ovarian cancer and breast cancer    Cancer Brother         unknown    Cancer Maternal Aunt         unknown    Cancer Other         brain    Other Brother         MI       Medications:  Reviewed and reconciled.     Social History:  Social History     Socioeconomic History    Marital status: Single     Spouse name: Not on file    Number of children: Not on file    Years of education: Not on file    Highest education level: Not on file   Occupational History    Not on file   Social Needs    Financial resource strain: Not on file    Food insecurity:     Worry: Not on file     Inability: Not on file    Transportation needs:     Medical: Not on file     Non-medical: Not on file   Tobacco Use    Smoking status: Former Smoker     Packs/day: 1.00     Years: 37.00     Pack years: 37.00     Types: Cigarettes     Start date:      Last attempt to quit: 2000     Years since quittin.0    Smokeless tobacco: Never Used   Substance and Sexual Activity    Alcohol use: No    Drug use: Never    Sexual activity: Not Currently   Lifestyle    Physical activity:     Days per week: Not on file     Minutes per session: Not on file    Stress: Not on file   Relationships    Social connections:     Talks on phone: Not on file     Gets together: Not on file     Attends Scientology service: Not on file     Active member of club or organization: Not on file     Attends meetings of clubs or organizations: Not on file     Relationship status: Not on file    Intimate partner violence:     Fear of current or ex partner: Not on file     Emotionally abused: Not on file     Physically abused: Not on file     Forced sexual activity: Not on file   Other Topics Concern    Not on file   Social History Narrative    Not on file       Allergies: Allergies   Allergen Reactions    Bee Venom Swelling and Dermatitis    Penicillins Hives, Swelling and Dermatitis       Physical Exam:  [facility]     GENERAL: Alert, oriented x 3, not in acute distress. HEENT: PERRLA; EOMI. Oropharynx clear. NECK: Supple. No palpable cervical or supraclavicular lymphadenopathy. LUNGS: Good air entry bilaterally. No wheezing, crackles or rhonchi. CARDIOVASCULAR: Regular rate. No murmurs, rubs or gallops. BREASTS: she is s/p right mastectomy, she has telangiectasias, no palpable right axillary LN, the left breast exam is negative for any skin changes, no nipple discharge, no palpable masses or palpable left axillary LN. ABDOMEN: Soft.

## 2020-01-24 ENCOUNTER — CARE COORDINATION (OUTPATIENT)
Dept: CARE COORDINATION | Age: 80
End: 2020-01-24

## 2020-01-24 NOTE — LETTER
1/24/2020    [address] [address]      Dear Braulio Del Valle,    My name is Leyda Lamb and I am a registered nurse who partners with CONNIE Posada CNP to improve patients' health. As a member of your health care team, I would work with other providers involved in your care, offer education for your specific health conditions, and connect you with additional resources as needed. I will collaborate with CONNIE Posada CNP to support you in following your treatment plan. The additional support I provide is no additional cost to you. My primary focus is to help you achieve specific goals and improve your health. We are committed to walk with you on this journey and look forward to working with you. Please call me to further discuss your healthcare needs. I am available by phone or for appointments at the office. You can reach me at .     In good health,     Leyda Lamb RN

## 2020-01-27 ENCOUNTER — TELEPHONE (OUTPATIENT)
Dept: NEUROSURGERY | Age: 80
End: 2020-01-27

## 2020-01-27 NOTE — TELEPHONE ENCOUNTER
Please fax order for soft TLSO to [facility] [phone]. Patient was unable to get brace from [facility] due to insurance.

## 2020-01-28 ENCOUNTER — OFFICE VISIT (OUTPATIENT)
Dept: PALLATIVE CARE | Age: 80
End: 2020-01-28
Payer: MEDICARE

## 2020-01-28 VITALS
OXYGEN SATURATION: 92 % | DIASTOLIC BLOOD PRESSURE: 40 MMHG | SYSTOLIC BLOOD PRESSURE: 116 MMHG | BODY MASS INDEX: 24.89 KG/M2 | WEIGHT: 145 LBS | HEART RATE: 73 BPM

## 2020-01-28 PROCEDURE — 99214 OFFICE O/P EST MOD 30 MIN: CPT | Performed by: NURSE PRACTITIONER

## 2020-01-28 PROCEDURE — 1036F TOBACCO NON-USER: CPT | Performed by: NURSE PRACTITIONER

## 2020-01-28 PROCEDURE — G8482 FLU IMMUNIZE ORDER/ADMIN: HCPCS | Performed by: NURSE PRACTITIONER

## 2020-01-28 PROCEDURE — 99212 OFFICE O/P EST SF 10 MIN: CPT | Performed by: NURSE PRACTITIONER

## 2020-01-28 PROCEDURE — 1123F ACP DISCUSS/DSCN MKR DOCD: CPT | Performed by: NURSE PRACTITIONER

## 2020-01-28 PROCEDURE — 1090F PRES/ABSN URINE INCON ASSESS: CPT | Performed by: NURSE PRACTITIONER

## 2020-01-28 PROCEDURE — G8399 PT W/DXA RESULTS DOCUMENT: HCPCS | Performed by: NURSE PRACTITIONER

## 2020-01-28 PROCEDURE — G8420 CALC BMI NORM PARAMETERS: HCPCS | Performed by: NURSE PRACTITIONER

## 2020-01-28 PROCEDURE — G8428 CUR MEDS NOT DOCUMENT: HCPCS | Performed by: NURSE PRACTITIONER

## 2020-01-28 PROCEDURE — 4040F PNEUMOC VAC/ADMIN/RCVD: CPT | Performed by: NURSE PRACTITIONER

## 2020-01-28 RX ORDER — HYDROCODONE BITARTRATE AND ACETAMINOPHEN 5; 325 MG/1; MG/1
1 TABLET ORAL EVERY 6 HOURS PRN
Qty: 120 TABLET | Refills: 0 | Status: SHIPPED
Start: 2020-01-28 | End: 2020-03-16 | Stop reason: SDUPTHER

## 2020-01-28 RX ORDER — IBUPROFEN 400 MG/1
400 TABLET ORAL EVERY 6 HOURS PRN
Qty: 120 TABLET | Refills: 3 | Status: SHIPPED
Start: 2020-01-28 | End: 2022-08-22

## 2020-01-28 NOTE — PROGRESS NOTES
voice needs concerns well, does not show any signs of sedation and or confusion. She continues to complain of neuropathy and pain, which she states is well managed on her current regimen of gabapentin as per PCP, as well as utilizing Norco 5/325 mg 1-2 times per day. She states that she used her last Norco last evening. She states that her pain has slightly increased over the past week, as she has been moving to a new home, and thus been more active than typical.  She states though that overall she feels well, he continues to manage her pain well without need for significant increase in her Norco.  She states that some days we will take a half a tablet if she only has moderate pain, and this is effective. Options were discussed, will make no further changes to her current plan of care, will continue her Norco as listed above. 12/3/2019:  Bird Tariq presents today, unaccompanied, and she is alert, oriented, able voice needs and concerns well, does not show any signs sedation, confusion, or distress. She continues to complain of neuropathy in her bilateral feet, as well as pain in her lower back as well as her left hip. She is continued to utilize Norco 5-325 mg sometimes utilizing 1/2 tablet, usually using 1 to 2 tablets/day, she states that this is working well at managing her pain, and allowing her to function well throughout the day. She typically states her pain is worse in the morning, and is greatly improved throughout the day, and again often worsening in the evening time. She is continue to take her gabapentin provided by her PCP, and we will make no changes to her current plan of care today. We will provide a refill of her Norco today. We will have her return approximately 8 weeks to reassess her needs, and she is encouraged to call with any questions, concerns, changes in her symptoms, or if she requires a refill of her Norco prior to her next visit.     1/28/2020  As per Dr. Linette Cifuentes on burning, throbbing, tingling and squeezing  Duration: years  Frequency: daily  Location: Bilateral feet, bilateral lower extremities, shoulders, low back, hips  Alleviating Factors: pain medication and States her neuropathy improves with activity  Exacerbating Factors: Neuropathy is exacerbated by resting following strenuous activity  Effect: interference with activities and sleep    Past Medical History:   Diagnosis Date    Anemia     Pt reports she was dx in her teens, w/o proper w/u, with iron deficiency anemia and was on oral iron replacement for many years, resulting in iron overload    Aplastic anemia ([address] ~9688-7576    Possibly d/t chemotherapy for breast cancer    Breast cancer ([facility] 2009    right side; pt underwent radical mastectomy followed by radiation therapy and chemotherapy and was then on oral chemo pill for 5 yrs; no recurrence as of 01/2019    Chickenpox     Hypothyroidism     Measles     Mild depression ([address]     Mumps     Myelodysplastic syndrome ([facility] [address] ~2016    Pulmonary tuberculosis ~7530-6664    in her early 25s    Scarlet fever     Sensory neuropathy     beyond the ankle b/l       Past Surgical History:   Procedure Laterality Date    ADENOIDECTOMY      BONE MARROW BIOPSY      BREAST BIOPSY      CHOLECYSTECTOMY      MASTECTOMY      right breast    OTHER SURGICAL HISTORY      blood transfusions    TONSILLECTOMY AND ADENOIDECTOMY      TUBAL LIGATION         Family History   Problem Relation Age of Onset    Cancer Mother         lung    Cancer Father         lung    Cancer Sister         ovarian cancer and breast cancer    Cancer Brother         unknown    Cancer Maternal Aunt         unknown    Cancer Other         brain    Other Brother         MI       ROS: UNLESS STATED ABOVE PATIENT DENIES:  CONSTITUTIONAL:  fever, chill, rigors, nausea, vomiting, fatigue.   HEENT: blurry vision, double vision, hearing problem, tinnitus, hoarseness, dysphagia, odynophagia  RESPIRATORY: cough, shortness of breath, sputum expectoration. CARDIOVASCULAR:  Chest pain/pressure, palpitation, syncope, irregular beats  GASTROINTESTINAL:  abdominal or rectal pain, diarrhea, constipation, . GENITOURINARY:  Burning, frequency, urgency, incontinence, discharge  INTEGUMENTARY: rash, wound, pruritis  HEMATOLOGIC/LYMPHATIC:  Swelling, sores, gum bleeding, easy bruising, pica. MUSCULOSKELETAL:  pain, edema, joint swelling or redness  NEUROLOGICAL:  light headed, dizziness, loss of consciousness, weakness, change in memory, seizures, tremors    Objective:     Physical Exam  BP (!) 116/40   Pulse 73   Wt 145 lb (65.8 kg)   LMP 05/28/1991   SpO2 92% Comment: RA  BMI 24.89 kg/m²     Gen:  Alert, appears stated age, well nourished, in no acute distress  HEENT:  Normocephalic, conjunctiva pink, no drainage, mucosa moist  Neck:  Supple  Lungs:  CTA bilaterally, no audible rhonchi or wheezes noted  Heart[de-identified]  RRR, no murmur, rub, or gallop noted during exam  Abd:  Soft, non tender, non distended, BS+  Ext:  Moving all extremities, no edema, pulses present  Skin:  Warm and dry  Neuro:  PERRL, Alert, oriented x 3; following commands    [place] Symptom Assessment Score   [place] Score 1/28/2020 12/3/2019 9/17/2019 7/16/2019   Pain Score 8 4 5 6   Tiredness Score 9 6 9 5   Nausea Score Not nauseated Not nauseated Not nauseated Not nauseated   Depression Score Not depressed Not depressed Not depressed Not depressed   Anxiety Score Not anxious 1 Not anxious Not anxious   Drowsiness Score 1 2 6 1   Appetite Score Best appetite Best appetite Best appetite Best appetite   Wellbeing Score Best feeling of wellbeing 2 1 Best feeling of wellbeing   Dyspnea Score 5 7 2 6   Other Problem Score Best possible response Best possible response Best possible response Best possible response   Total Assessment Score(calculated) 23 22 23 18     Assessed by: patient and provider.     Current Medications:  Medications reviewed: yes    Controlled Substances Monitoring: [place] reviewed 1/28/20    RX Monitoring 1/28/2020   Periodic Controlled Substance Monitoring Possible medication side effects, risk of tolerance/dependence & alternative treatments discussed. ;No signs of potential drug abuse or diversion identified. ;Assessed functional status. ;Obtaining appropriate analgesic effect of treatment. Chronic Pain > 50 MEDD -       Ella López APRN-CNP  Palliative Medicine    Time/Communication  Greater than 51% of time spent, total 30 minutes in counseling and coordination of care at the bedside regarding goals of care, symptom management, diagnosis and prognosis and see above. Discussed patient and the plan of care with the other IDT members of the Palliative Care Team, and with patient. Thank you for allowing Palliative Medicine to participate in the care of Mercy Bates. Note: This report was completed using computerize voiced recognition software. Every effort has been made to ensure accuracy; however, inadvertent computerized transcription errors may be present.

## 2020-01-31 ENCOUNTER — OFFICE VISIT (OUTPATIENT)
Dept: ONCOLOGY | Age: 80
End: 2020-01-31
Payer: MEDICARE

## 2020-01-31 ENCOUNTER — HOSPITAL ENCOUNTER (OUTPATIENT)
Dept: INFUSION THERAPY | Age: 80
Discharge: HOME | End: 2020-01-31
Payer: MEDICARE

## 2020-01-31 VITALS
BODY MASS INDEX: 25.51 KG/M2 | WEIGHT: 149.4 LBS | HEIGHT: 64 IN | OXYGEN SATURATION: 96 % | SYSTOLIC BLOOD PRESSURE: 118 MMHG | DIASTOLIC BLOOD PRESSURE: 58 MMHG | TEMPERATURE: 98.8 F | HEART RATE: 75 BPM

## 2020-01-31 DIAGNOSIS — D63.8 ANEMIA IN OTHER CHRONIC DISEASES CLASSIFIED ELSEWHERE: ICD-10-CM

## 2020-01-31 DIAGNOSIS — D46.9 MYELODYSPLASTIC SYNDROME (HCC): Primary | ICD-10-CM

## 2020-01-31 DIAGNOSIS — D46.9 MDS (MYELODYSPLASTIC SYNDROME) (HCC): ICD-10-CM

## 2020-01-31 LAB
ANISOCYTOSIS: ABNORMAL
BASOPHILS ABSOLUTE: 0.04 E9/L (ref 0–0.2)
BASOPHILS RELATIVE PERCENT: 0.5 % (ref 0–2)
EOSINOPHILS ABSOLUTE: 0.09 E9/L (ref 0.05–0.5)
EOSINOPHILS RELATIVE PERCENT: 1.1 % (ref 0–6)
HCT VFR BLD CALC: 28.4 % (ref 34–48)
HEMOGLOBIN: 9 G/DL (ref 11.5–15.5)
HYPOCHROMIA: ABNORMAL
IMMATURE GRANULOCYTES #: 0.33 E9/L
IMMATURE GRANULOCYTES %: 4.1 % (ref 0–5)
LYMPHOCYTES ABSOLUTE: 3.37 E9/L (ref 1.5–4)
LYMPHOCYTES RELATIVE PERCENT: 41.7 % (ref 20–42)
MCH RBC QN AUTO: 37 PG (ref 26–35)
MCHC RBC AUTO-ENTMCNC: 31.7 % (ref 32–34.5)
MCV RBC AUTO: 116.9 FL (ref 80–99.9)
MONOCYTES ABSOLUTE: 0.41 E9/L (ref 0.1–0.95)
MONOCYTES RELATIVE PERCENT: 5.1 % (ref 2–12)
NEUTROPHILS ABSOLUTE: 3.85 E9/L (ref 1.8–7.3)
NEUTROPHILS RELATIVE PERCENT: 47.5 % (ref 43–80)
OVALOCYTES: ABNORMAL
PDW BLD-RTO: 24.3 FL (ref 11.5–15)
PLATELET # BLD: 341 E9/L (ref 130–450)
PMV BLD AUTO: 10 FL (ref 7–12)
POIKILOCYTES: ABNORMAL
POLYCHROMASIA: ABNORMAL
RBC # BLD: 2.43 E12/L (ref 3.5–5.5)
TEAR DROP CELLS: ABNORMAL
WBC # BLD: 8.1 E9/L (ref 4.5–11.5)

## 2020-01-31 PROCEDURE — 85025 COMPLETE CBC W/AUTO DIFF WBC: CPT

## 2020-01-31 PROCEDURE — G8482 FLU IMMUNIZE ORDER/ADMIN: HCPCS | Performed by: INTERNAL MEDICINE

## 2020-01-31 PROCEDURE — 1036F TOBACCO NON-USER: CPT | Performed by: INTERNAL MEDICINE

## 2020-01-31 PROCEDURE — G8417 CALC BMI ABV UP PARAM F/U: HCPCS | Performed by: INTERNAL MEDICINE

## 2020-01-31 PROCEDURE — G8427 DOCREV CUR MEDS BY ELIG CLIN: HCPCS | Performed by: INTERNAL MEDICINE

## 2020-01-31 PROCEDURE — 1090F PRES/ABSN URINE INCON ASSESS: CPT | Performed by: INTERNAL MEDICINE

## 2020-01-31 PROCEDURE — 4040F PNEUMOC VAC/ADMIN/RCVD: CPT | Performed by: INTERNAL MEDICINE

## 2020-01-31 PROCEDURE — 99214 OFFICE O/P EST MOD 30 MIN: CPT | Performed by: INTERNAL MEDICINE

## 2020-01-31 PROCEDURE — 99212 OFFICE O/P EST SF 10 MIN: CPT | Performed by: INTERNAL MEDICINE

## 2020-01-31 PROCEDURE — 1123F ACP DISCUSS/DSCN MKR DOCD: CPT | Performed by: INTERNAL MEDICINE

## 2020-01-31 PROCEDURE — 36415 COLL VENOUS BLD VENIPUNCTURE: CPT | Performed by: INTERNAL MEDICINE

## 2020-01-31 PROCEDURE — 96372 THER/PROPH/DIAG INJ SC/IM: CPT

## 2020-01-31 PROCEDURE — G8399 PT W/DXA RESULTS DOCUMENT: HCPCS | Performed by: INTERNAL MEDICINE

## 2020-01-31 PROCEDURE — 6360000002 HC RX W HCPCS: Performed by: INTERNAL MEDICINE

## 2020-01-31 RX ADMIN — DARBEPOETIN ALFA 500 MCG: 500 INJECTION, SOLUTION INTRAVENOUS; SUBCUTANEOUS at 16:01

## 2020-01-31 NOTE — PROGRESS NOTES
hematuria. NEURO: No syncope, presyncope, headache. Remainder:  ROS NEGATIVE    Past Medical History:      Diagnosis Date    Anemia     Pt reports she was dx in her teens, w/o proper w/u, with iron deficiency anemia and was on oral iron replacement for many years, resulting in iron overload    Aplastic anemia ([address] ~6671-2963    Possibly d/t chemotherapy for breast cancer    Breast cancer ([facility] [address] 2009    right side; pt underwent radical mastectomy followed by radiation therapy and chemotherapy and was then on oral chemo pill for 5 yrs; no recurrence as of 01/2019    Chickenpox     Hypothyroidism     Measles     Mild depression ([address]     Mumps     Myelodysplastic syndrome ([facility] [address] ~2016    Pulmonary tuberculosis ~3949-7414    in her early 25s    Scarlet fever     Sensory neuropathy     beyond the ankle b/l     Patient Active Problem List   Diagnosis    MDS (myelodysplastic syndrome) (HCC)    Bronchitis    Macrocytic anemia with high RDW    Hypothyroidism    Peripheral sensory neuropathy    Breast cancer (HCC)    Anemia in other chronic diseases classified elsewhere         Past Surgical History:      Procedure Laterality Date    ADENOIDECTOMY      BONE MARROW BIOPSY      BREAST BIOPSY      CHOLECYSTECTOMY      MASTECTOMY      right breast    OTHER SURGICAL HISTORY      blood transfusions    TONSILLECTOMY AND ADENOIDECTOMY      TUBAL LIGATION         Family History:  Family History   Problem Relation Age of Onset   Elda Muff Cancer Mother         lung    Cancer Father         lung    Cancer Sister         ovarian cancer and breast cancer    Cancer Brother         unknown    Cancer Maternal Aunt         unknown    Cancer Other         brain    Other Brother         MI       Medications:  Reviewed and reconciled.     Social History:  Social History     Socioeconomic History    Marital status: Single     Spouse name: Not on file    Number of children: Not on file    Years of education: Not on file crackles or rhonchi. CARDIOVASCULAR: Regular rate. No murmurs, rubs or gallops. ABDOMEN: Soft. Non-tender, non-distended. Positive bowel sounds. EXTREMITIES: Without clubbing, cyanosis, or edema,   NEUROLOGIC: No focal deficits. ECOG PS 1      Impression/Plan:     1. The patient is a very pleasant 78 y.o. lady with a PMH significant for Right Breast Cancer, stage III, HR pos, was diagnosed in 2009, she had a right mastectomy done, followed by adjuvant chemo, she received 8 cycles, probably AC followed by T, then PMRT, and 5 years of adjuvant ET with Arimidex, was completed in 2015. She was treated in [facility] under the care of Dr. Sixto Blanton. She is doing well clinically without any evidence of recurrence of her disease. Discussed with her the importance of monthly SBE, and routine screening mammograms, she had a left breast screening mammogram done on 5/14/2019, revealing benign findings, was negative for malignancy, will be due for repeat mammogram on 5/15/2020.     2. She has MDS, diagnosed in 2017, she received ESAs and PRBCs transfusion, has transfusion related iron overload, hgb was 8.5, ferritin 3647, was restarted on Aranesp on 4/23/2019. Labs reviewed, she did have improvement of the hemoglobin with Aranesp, she has macrocytosis, intermittent leukopenia, her hemoglobin is 11.3G/DL, hematocrit 36, .8, hold Aranesp today. Will continue to monitor her CBCD. She was seen in the ED on 1/2/2020 worsening pelvic and hip pain s/p fall, scan of the lumbar spine had revealed moderately severe stenosis at L4-L5, moderate stenosis at L3-L4 and L1-L2, suggestion of subtle acute fracture along the anterior superior endplate of L3, mild chronic compression deformity of the superior endplate of V18. Patient is feeling better, she was seen by neurosurgery, lumbar spine MRI was ordered. She did not do the MRI yet due to the cost of the test.  She started using the brace.     Aranesp was held on 1/17/2020,

## 2020-02-04 ENCOUNTER — CARE COORDINATION (OUTPATIENT)
Dept: CARE COORDINATION | Age: 80
End: 2020-02-04

## 2020-02-07 ENCOUNTER — TELEPHONE (OUTPATIENT)
Dept: NEUROSURGERY | Age: 80
End: 2020-02-07

## 2020-02-07 NOTE — TELEPHONE ENCOUNTER
Called [facility] insurance to check for authorization for MRI L-Spine ([id number]). Spoke with Rene Coreas from [address] [place] [address], she stated that no authorization is required for the MRI.       Ref# [id number]      Electronically signed by Luis Kwok MA on 2/7/2020 at 3:06 PM

## 2020-02-11 ENCOUNTER — CARE COORDINATION (OUTPATIENT)
Dept: CARE COORDINATION | Age: 80
End: 2020-02-11

## 2020-02-11 NOTE — CARE COORDINATION
Call #3    Spoke with daughter, Donato Andrew, explained and offered enrollment into Care Coordination for her mother, Odalis Maradiaga. Donato Andrew stated her mother is doing well now, she had a fall about a year ago and she has made steady improvements. She has declined CC at this time. I asked if her mother is having any problems paying for her medications. She stated they picked up her medications last week, and is not aware of the cost or which she may need assistance with. Donato Andrew said she will check on the medications at the end of the week and will get back with me if she has any needs for assistance. Educated Donato Andrew to keep my number and outreach for any needs or change in decision to enroll in Care Coordination. PLAN    Place in 90 day exclusion d/t declining CC.

## 2020-02-14 ENCOUNTER — OFFICE VISIT (OUTPATIENT)
Dept: ONCOLOGY | Age: 80
End: 2020-02-14
Payer: MEDICARE

## 2020-02-14 ENCOUNTER — HOSPITAL ENCOUNTER (OUTPATIENT)
Dept: INFUSION THERAPY | Age: 80
Discharge: HOME | End: 2020-02-14
Payer: MEDICARE

## 2020-02-14 VITALS
TEMPERATURE: 97.4 F | BODY MASS INDEX: 25.59 KG/M2 | RESPIRATION RATE: 18 BRPM | WEIGHT: 149.9 LBS | SYSTOLIC BLOOD PRESSURE: 130 MMHG | HEIGHT: 64 IN | HEART RATE: 74 BPM | DIASTOLIC BLOOD PRESSURE: 61 MMHG

## 2020-02-14 DIAGNOSIS — D63.8 ANEMIA IN OTHER CHRONIC DISEASES CLASSIFIED ELSEWHERE: ICD-10-CM

## 2020-02-14 DIAGNOSIS — D46.9 MYELODYSPLASTIC SYNDROME (HCC): Primary | ICD-10-CM

## 2020-02-14 DIAGNOSIS — D46.9 MDS (MYELODYSPLASTIC SYNDROME) (HCC): ICD-10-CM

## 2020-02-14 LAB
ANISOCYTOSIS: ABNORMAL
BASOPHILIC STIPPLING: ABNORMAL
BASOPHILS ABSOLUTE: 0.03 E9/L (ref 0–0.2)
BASOPHILS RELATIVE PERCENT: 0.5 % (ref 0–2)
EOSINOPHILS ABSOLUTE: 0.14 E9/L (ref 0.05–0.5)
EOSINOPHILS RELATIVE PERCENT: 2.2 % (ref 0–6)
HCT VFR BLD CALC: 29.3 % (ref 34–48)
HEMOGLOBIN: 9 G/DL (ref 11.5–15.5)
IMMATURE GRANULOCYTES #: 0.03 E9/L
IMMATURE GRANULOCYTES %: 0.5 % (ref 0–5)
LYMPHOCYTES ABSOLUTE: 3.7 E9/L (ref 1.5–4)
LYMPHOCYTES RELATIVE PERCENT: 56.9 % (ref 20–42)
MCH RBC QN AUTO: 35.9 PG (ref 26–35)
MCHC RBC AUTO-ENTMCNC: 30.7 % (ref 32–34.5)
MCV RBC AUTO: 116.7 FL (ref 80–99.9)
MONOCYTES ABSOLUTE: 0.34 E9/L (ref 0.1–0.95)
MONOCYTES RELATIVE PERCENT: 5.2 % (ref 2–12)
NEUTROPHILS ABSOLUTE: 2.26 E9/L (ref 1.8–7.3)
NEUTROPHILS RELATIVE PERCENT: 34.7 % (ref 43–80)
OVALOCYTES: ABNORMAL
PDW BLD-RTO: 24.4 FL (ref 11.5–15)
PLATELET # BLD: 126 E9/L (ref 130–450)
PMV BLD AUTO: 10.6 FL (ref 7–12)
POIKILOCYTES: ABNORMAL
POLYCHROMASIA: ABNORMAL
RBC # BLD: 2.51 E12/L (ref 3.5–5.5)
SCHISTOCYTES: ABNORMAL
TARGET CELLS: ABNORMAL
TEAR DROP CELLS: ABNORMAL
WBC # BLD: 6.5 E9/L (ref 4.5–11.5)

## 2020-02-14 PROCEDURE — G8427 DOCREV CUR MEDS BY ELIG CLIN: HCPCS | Performed by: INTERNAL MEDICINE

## 2020-02-14 PROCEDURE — G8399 PT W/DXA RESULTS DOCUMENT: HCPCS | Performed by: INTERNAL MEDICINE

## 2020-02-14 PROCEDURE — 1123F ACP DISCUSS/DSCN MKR DOCD: CPT | Performed by: INTERNAL MEDICINE

## 2020-02-14 PROCEDURE — 4040F PNEUMOC VAC/ADMIN/RCVD: CPT | Performed by: INTERNAL MEDICINE

## 2020-02-14 PROCEDURE — 1090F PRES/ABSN URINE INCON ASSESS: CPT | Performed by: INTERNAL MEDICINE

## 2020-02-14 PROCEDURE — 36415 COLL VENOUS BLD VENIPUNCTURE: CPT

## 2020-02-14 PROCEDURE — 85025 COMPLETE CBC W/AUTO DIFF WBC: CPT

## 2020-02-14 PROCEDURE — 6360000002 HC RX W HCPCS: Performed by: INTERNAL MEDICINE

## 2020-02-14 PROCEDURE — G8482 FLU IMMUNIZE ORDER/ADMIN: HCPCS | Performed by: INTERNAL MEDICINE

## 2020-02-14 PROCEDURE — G8417 CALC BMI ABV UP PARAM F/U: HCPCS | Performed by: INTERNAL MEDICINE

## 2020-02-14 PROCEDURE — 96372 THER/PROPH/DIAG INJ SC/IM: CPT

## 2020-02-14 PROCEDURE — 99212 OFFICE O/P EST SF 10 MIN: CPT

## 2020-02-14 PROCEDURE — 1036F TOBACCO NON-USER: CPT | Performed by: INTERNAL MEDICINE

## 2020-02-14 PROCEDURE — 99214 OFFICE O/P EST MOD 30 MIN: CPT | Performed by: INTERNAL MEDICINE

## 2020-02-14 RX ADMIN — DARBEPOETIN ALFA 500 MCG: 500 INJECTION, SOLUTION INTRAVENOUS; SUBCUTANEOUS at 15:36

## 2020-02-14 NOTE — PROGRESS NOTES
[address] MED ONCOLOGY  [facility] [address]  Dept: [phone]  Attending Progress Note      Reason for Visit:   1. Right Breast Cancer. 2. MDS. Referring Physician:  CONNIE Ponce CNP    PCP:  CONNIE Ponce CNP    History of Present Illness: The patient is a very pleasant 78 y.o. lady with a PMH significant for Right Breast Cancer, stage III, HR pos, was diagnosed in 2009, she had a right mastectomy done, followed by adjuvant chemo, she received 8 cycles, probably AC followed by T, then PMRT, and 5 years of adjuvant ET with Arimidex, was completed in 2015. She was treated in [facility] under the care of Dr. Eitan Lama. She was diagnosed with MDS in 2017, she received ESAs and PRBCs transfusion. She has transfusion related iron overload. She is feeling well overall, has chronic joints pain, peripheral neuropathy. She was started on Aranesp on 4/24/2020. No SE from Aranesp. She was seen in the ED on 1/2/2020 worsening pelvic and hip pain s/p fall, scan of the lumbar spine had revealed moderately severe stenosis at L4-L5, moderate stenosis at L3-L4 and L1-L2, suggestion of subtle acute fracture along the anterior superior endplate of L3, mild chronic compression deformity of the superior endplate of J67. The patient is feeling much better, the steroids and Flexeril did help, she started using the brace. She was seen by neurosurgery, lumbar spine MRI was ordered, patient did not have it done yet due to the cost of the test.  She found spine CT scan and MRI from 2017, which she will take to the neurosurgery office. The back pain had improved. Review of Systems;  CONSTITUTIONAL: No fever, chills. Fair appetite. ENMT: Eyes: No diplopia; Nose: No epistaxis. Mouth: No sore throat. RESPIRATORY: No hemoptysis, shortness of breath, cough.    CARDIOVASCULAR: No chest pain, palpitations. GASTROINTESTINAL: No nausea/vomiting, abdominal pain, diarrhea/constipation. GENITOURINARY: No dysuria, urinary frequency, hematuria. NEURO: No syncope, presyncope, headache. Remainder:  ROS NEGATIVE    Past Medical History:      Diagnosis Date    Anemia     Pt reports she was dx in her teens, w/o proper w/u, with iron deficiency anemia and was on oral iron replacement for many years, resulting in iron overload    Aplastic anemia ([address] ~9673-5781    Possibly d/t chemotherapy for breast cancer    Breast cancer ([address] 2009    right side; pt underwent radical mastectomy followed by radiation therapy and chemotherapy and was then on oral chemo pill for 5 yrs; no recurrence as of 01/2019    Chickenpox     Hypothyroidism     Measles     Mild depression ([address]     Mumps     Myelodysplastic syndrome ([address] ~2016    Pulmonary tuberculosis ~0822-8227    in her early 25s    Scarlet fever     Sensory neuropathy     beyond the ankle b/l     Patient Active Problem List   Diagnosis    MDS (myelodysplastic syndrome) (HCC)    Bronchitis    Macrocytic anemia with high RDW    Hypothyroidism    Peripheral sensory neuropathy    Breast cancer (HCC)    Anemia in other chronic diseases classified elsewhere         Past Surgical History:      Procedure Laterality Date    ADENOIDECTOMY      BONE MARROW BIOPSY      BREAST BIOPSY      CHOLECYSTECTOMY      MASTECTOMY      right breast    OTHER SURGICAL HISTORY      blood transfusions    TONSILLECTOMY AND ADENOIDECTOMY      TUBAL LIGATION         Family History:  Family History   Problem Relation Age of Onset   Sindhu Jordon Cancer Mother         lung    Cancer Father         lung    Cancer Sister         ovarian cancer and breast cancer    Cancer Brother         unknown    Cancer Maternal Aunt         unknown    Cancer Other         brain    Other Brother         MI       Medications:  Reviewed and reconciled.     Social History:  Social History Socioeconomic History    Marital status: Single     Spouse name: Not on file    Number of children: Not on file    Years of education: Not on file    Highest education level: Not on file   Occupational History    Not on file   Social Needs    Financial resource strain: Not on file    Food insecurity:     Worry: Not on file     Inability: Not on file    Transportation needs:     Medical: Not on file     Non-medical: Not on file   Tobacco Use    Smoking status: Former Smoker     Packs/day: 1.00     Years: 37.00     Pack years: 37.00     Types: Cigarettes     Start date:      Last attempt to quit:      Years since quittin.1    Smokeless tobacco: Never Used   Substance and Sexual Activity    Alcohol use: No    Drug use: Never    Sexual activity: Not Currently   Lifestyle    Physical activity:     Days per week: Not on file     Minutes per session: Not on file    Stress: Not on file   Relationships    Social connections:     Talks on phone: Not on file     Gets together: Not on file     Attends Rastafari service: Not on file     Active member of club or organization: Not on file     Attends meetings of clubs or organizations: Not on file     Relationship status: Not on file    Intimate partner violence:     Fear of current or ex partner: Not on file     Emotionally abused: Not on file     Physically abused: Not on file     Forced sexual activity: Not on file   Other Topics Concern    Not on file   Social History Narrative    Not on file       Allergies: Allergies   Allergen Reactions    Bee Venom Swelling and Dermatitis    Penicillins Hives, Swelling and Dermatitis       Physical Exam:  /61 (Site: Left Upper Arm, Position: Sitting, Cuff Size: Medium Adult)   Pulse 74   Temp 97.4 °F (36.3 °C) (Temporal)   Resp 18   Ht 5' 4\" (1.626 m)   Wt 149 lb 14.4 oz (68 kg)   LMP 1991   BMI 25.73 kg/m²   GENERAL: Alert, oriented x 3, not in acute distress. HEENT: PERRLA; EOMI.

## 2020-02-28 ENCOUNTER — OFFICE VISIT (OUTPATIENT)
Dept: ONCOLOGY | Age: 80
End: 2020-02-28
Payer: MEDICARE

## 2020-02-28 ENCOUNTER — HOSPITAL ENCOUNTER (OUTPATIENT)
Dept: INFUSION THERAPY | Age: 80
Discharge: HOME | End: 2020-02-28
Payer: MEDICARE

## 2020-02-28 VITALS
OXYGEN SATURATION: 100 % | TEMPERATURE: 97.9 F | WEIGHT: 150 LBS | HEIGHT: 64 IN | SYSTOLIC BLOOD PRESSURE: 138 MMHG | HEART RATE: 57 BPM | DIASTOLIC BLOOD PRESSURE: 62 MMHG | BODY MASS INDEX: 25.61 KG/M2

## 2020-02-28 DIAGNOSIS — D46.9 MDS (MYELODYSPLASTIC SYNDROME) (HCC): ICD-10-CM

## 2020-02-28 DIAGNOSIS — D63.8 ANEMIA IN OTHER CHRONIC DISEASES CLASSIFIED ELSEWHERE: ICD-10-CM

## 2020-02-28 DIAGNOSIS — D46.9 MYELODYSPLASTIC SYNDROME (HCC): Primary | ICD-10-CM

## 2020-02-28 LAB
ANISOCYTOSIS: ABNORMAL
BASOPHILIC STIPPLING: ABNORMAL
BASOPHILS ABSOLUTE: 0.08 E9/L (ref 0–0.2)
BASOPHILS RELATIVE PERCENT: 1.7 % (ref 0–2)
EOSINOPHILS ABSOLUTE: 0.04 E9/L (ref 0.05–0.5)
EOSINOPHILS RELATIVE PERCENT: 0.9 % (ref 0–6)
HCT VFR BLD CALC: 30.9 % (ref 34–48)
HEMOGLOBIN: 9.6 G/DL (ref 11.5–15.5)
LYMPHOCYTES ABSOLUTE: 3.17 E9/L (ref 1.5–4)
LYMPHOCYTES RELATIVE PERCENT: 66.1 % (ref 20–42)
MCH RBC QN AUTO: 37.1 PG (ref 26–35)
MCHC RBC AUTO-ENTMCNC: 31.1 % (ref 32–34.5)
MCV RBC AUTO: 119.3 FL (ref 80–99.9)
MONOCYTES ABSOLUTE: 0.19 E9/L (ref 0.1–0.95)
MONOCYTES RELATIVE PERCENT: 4.3 % (ref 2–12)
NEUTROPHILS ABSOLUTE: 1.3 E9/L (ref 1.8–7.3)
NEUTROPHILS RELATIVE PERCENT: 27 % (ref 43–80)
NUCLEATED RED BLOOD CELLS: 0.9 /100 WBC
OVALOCYTES: ABNORMAL
PDW BLD-RTO: 26.7 FL (ref 11.5–15)
PLATELET # BLD: 166 E9/L (ref 130–450)
PMV BLD AUTO: 11 FL (ref 7–12)
POIKILOCYTES: ABNORMAL
POLYCHROMASIA: ABNORMAL
RBC # BLD: 2.59 E12/L (ref 3.5–5.5)
TARGET CELLS: ABNORMAL
WBC # BLD: 4.8 E9/L (ref 4.5–11.5)

## 2020-02-28 PROCEDURE — 1036F TOBACCO NON-USER: CPT | Performed by: INTERNAL MEDICINE

## 2020-02-28 PROCEDURE — 96372 THER/PROPH/DIAG INJ SC/IM: CPT

## 2020-02-28 PROCEDURE — G8399 PT W/DXA RESULTS DOCUMENT: HCPCS | Performed by: INTERNAL MEDICINE

## 2020-02-28 PROCEDURE — 99214 OFFICE O/P EST MOD 30 MIN: CPT | Performed by: INTERNAL MEDICINE

## 2020-02-28 PROCEDURE — 6360000002 HC RX W HCPCS: Performed by: INTERNAL MEDICINE

## 2020-02-28 PROCEDURE — G8417 CALC BMI ABV UP PARAM F/U: HCPCS | Performed by: INTERNAL MEDICINE

## 2020-02-28 PROCEDURE — 1123F ACP DISCUSS/DSCN MKR DOCD: CPT | Performed by: INTERNAL MEDICINE

## 2020-02-28 PROCEDURE — 4040F PNEUMOC VAC/ADMIN/RCVD: CPT | Performed by: INTERNAL MEDICINE

## 2020-02-28 PROCEDURE — G8482 FLU IMMUNIZE ORDER/ADMIN: HCPCS | Performed by: INTERNAL MEDICINE

## 2020-02-28 PROCEDURE — 1090F PRES/ABSN URINE INCON ASSESS: CPT | Performed by: INTERNAL MEDICINE

## 2020-02-28 PROCEDURE — 85025 COMPLETE CBC W/AUTO DIFF WBC: CPT

## 2020-02-28 PROCEDURE — G8427 DOCREV CUR MEDS BY ELIG CLIN: HCPCS | Performed by: INTERNAL MEDICINE

## 2020-02-28 RX ORDER — HYDROCODONE BITARTRATE AND ACETAMINOPHEN 5; 325 MG/1; MG/1
1 TABLET ORAL
COMMUNITY
End: 2020-03-16

## 2020-02-28 RX ADMIN — DARBEPOETIN ALFA 500 MCG: 500 INJECTION, SOLUTION INTRAVENOUS; SUBCUTANEOUS at 15:06

## 2020-03-01 NOTE — PROGRESS NOTES
of the test.  She started using the brace. She received Aranesp on 01/31/2020. Today 2/28/2020 labs reviewed, hemoglobin had improved to 9.6G/DL, hematocrit 30.9, MCV 11 0.3, proceed with Aranesp, white count is 4.8, the platelet count had improved to 160 6K, normal today, will continue monitor her CBCD. No indication to start her on hypo-methylating agents at this time. RTC in 2 weeks with labs, aranesp. Thank you for allowing us to participate in the care of Ms. Ventura.     William Gunter MD   HEMATOLOGY/MEDICAL ONCOLOGY  [address] [place] [address] MED ONCOLOGY  [facility] [address] [facility] [address]  Dept: [phone]

## 2020-03-13 ENCOUNTER — TELEPHONE (OUTPATIENT)
Dept: PALLATIVE CARE | Age: 80
End: 2020-03-13

## 2020-03-13 ENCOUNTER — HOSPITAL ENCOUNTER (OUTPATIENT)
Dept: INFUSION THERAPY | Age: 80
Discharge: HOME | End: 2020-03-13
Payer: MEDICARE

## 2020-03-13 ENCOUNTER — OFFICE VISIT (OUTPATIENT)
Dept: ONCOLOGY | Age: 80
End: 2020-03-13
Payer: MEDICARE

## 2020-03-13 VITALS
HEART RATE: 69 BPM | DIASTOLIC BLOOD PRESSURE: 56 MMHG | TEMPERATURE: 98.3 F | HEIGHT: 64 IN | BODY MASS INDEX: 25.73 KG/M2 | OXYGEN SATURATION: 95 % | WEIGHT: 150.7 LBS | SYSTOLIC BLOOD PRESSURE: 120 MMHG

## 2020-03-13 DIAGNOSIS — D46.9 MDS (MYELODYSPLASTIC SYNDROME) (HCC): ICD-10-CM

## 2020-03-13 DIAGNOSIS — D46.9 MYELODYSPLASTIC SYNDROME (HCC): Primary | ICD-10-CM

## 2020-03-13 DIAGNOSIS — D63.8 ANEMIA IN OTHER CHRONIC DISEASES CLASSIFIED ELSEWHERE: ICD-10-CM

## 2020-03-13 LAB
ANISOCYTOSIS: ABNORMAL
BASOPHILS ABSOLUTE: 0 E9/L (ref 0–0.2)
BASOPHILS RELATIVE PERCENT: 0.6 % (ref 0–2)
EOSINOPHILS ABSOLUTE: 0.22 E9/L (ref 0.05–0.5)
EOSINOPHILS RELATIVE PERCENT: 4.3 % (ref 0–6)
HCT VFR BLD CALC: 29.5 % (ref 34–48)
HEMOGLOBIN: 9.3 G/DL (ref 11.5–15.5)
HYPOCHROMIA: ABNORMAL
LYMPHOCYTES ABSOLUTE: 2.95 E9/L (ref 1.5–4)
LYMPHOCYTES RELATIVE PERCENT: 59.1 % (ref 20–42)
MCH RBC QN AUTO: 36.9 PG (ref 26–35)
MCHC RBC AUTO-ENTMCNC: 31.5 % (ref 32–34.5)
MCV RBC AUTO: 117.1 FL (ref 80–99.9)
MONOCYTES ABSOLUTE: 0.5 E9/L (ref 0.1–0.95)
MONOCYTES RELATIVE PERCENT: 9.6 % (ref 2–12)
NEUTROPHILS ABSOLUTE: 1.35 E9/L (ref 1.8–7.3)
NEUTROPHILS RELATIVE PERCENT: 27 % (ref 43–80)
NUCLEATED RED BLOOD CELLS: 0.9 /100 WBC
OVALOCYTES: ABNORMAL
PDW BLD-RTO: 26.5 FL (ref 11.5–15)
PLATELET # BLD: 169 E9/L (ref 130–450)
PMV BLD AUTO: 11.1 FL (ref 7–12)
POIKILOCYTES: ABNORMAL
POLYCHROMASIA: ABNORMAL
RBC # BLD: 2.52 E12/L (ref 3.5–5.5)
TARGET CELLS: ABNORMAL
TEAR DROP CELLS: ABNORMAL
WBC # BLD: 5 E9/L (ref 4.5–11.5)

## 2020-03-13 PROCEDURE — 6360000002 HC RX W HCPCS: Performed by: INTERNAL MEDICINE

## 2020-03-13 PROCEDURE — 99214 OFFICE O/P EST MOD 30 MIN: CPT | Performed by: INTERNAL MEDICINE

## 2020-03-13 PROCEDURE — 96372 THER/PROPH/DIAG INJ SC/IM: CPT

## 2020-03-13 PROCEDURE — 85025 COMPLETE CBC W/AUTO DIFF WBC: CPT

## 2020-03-13 PROCEDURE — 1036F TOBACCO NON-USER: CPT | Performed by: INTERNAL MEDICINE

## 2020-03-13 PROCEDURE — 1090F PRES/ABSN URINE INCON ASSESS: CPT | Performed by: INTERNAL MEDICINE

## 2020-03-13 PROCEDURE — 1123F ACP DISCUSS/DSCN MKR DOCD: CPT | Performed by: INTERNAL MEDICINE

## 2020-03-13 PROCEDURE — 99212 OFFICE O/P EST SF 10 MIN: CPT

## 2020-03-13 PROCEDURE — G8427 DOCREV CUR MEDS BY ELIG CLIN: HCPCS | Performed by: INTERNAL MEDICINE

## 2020-03-13 PROCEDURE — G8417 CALC BMI ABV UP PARAM F/U: HCPCS | Performed by: INTERNAL MEDICINE

## 2020-03-13 PROCEDURE — 36415 COLL VENOUS BLD VENIPUNCTURE: CPT

## 2020-03-13 PROCEDURE — G8399 PT W/DXA RESULTS DOCUMENT: HCPCS | Performed by: INTERNAL MEDICINE

## 2020-03-13 PROCEDURE — 4040F PNEUMOC VAC/ADMIN/RCVD: CPT | Performed by: INTERNAL MEDICINE

## 2020-03-13 PROCEDURE — G8482 FLU IMMUNIZE ORDER/ADMIN: HCPCS | Performed by: INTERNAL MEDICINE

## 2020-03-13 RX ADMIN — DARBEPOETIN ALFA 500 MCG: 500 INJECTION, SOLUTION INTRAVENOUS; SUBCUTANEOUS at 14:45

## 2020-03-13 NOTE — PROGRESS NOTES
[address] MED ONCOLOGY  [facility] [address]  Dept: [phone]  Attending Progress Note      Reason for Visit:   1. Right Breast Cancer. 2. MDS. Referring Physician:  CONNIE Benavides CNP    PCP:  CONNIE Benavides CNP    History of Present Illness: The patient is a very pleasant 78 y.o. lady with a PMH significant for Right Breast Cancer, stage III, HR pos, was diagnosed in 2009, she had a right mastectomy done, followed by adjuvant chemo, she received 8 cycles, probably AC followed by T, then PMRT, and 5 years of adjuvant ET with Arimidex, was completed in 2015. She was treated in [place] under the care of Dr. Josie Oneil. She was diagnosed with MDS in 2017, she received ESAs and PRBCs transfusion. She has transfusion related iron overload. She is feeling well overall, has chronic joints pain, peripheral neuropathy. She was started on Aranesp on 4/24/2020. No SE from Aranesp. She was seen in the ED on 1/2/2020 worsening pelvic and hip pain s/p fall, scan of the lumbar spine had revealed moderately severe stenosis at L4-L5, moderate stenosis at L3-L4 and L1-L2, suggestion of subtle acute fracture along the anterior superior endplate of L3, mild chronic compression deformity of the superior endplate of Q49. The patient is feeling much better, the steroids and Flexeril did help, she started using the brace. She was seen by neurosurgery, lumbar spine MRI was ordered, patient did not have it done yet due to the cost of the test.  She found spine CT scan and MRI from 2017, which she took to the neurosurgery office. She has been feeling tired for the last 4 days. Review of Systems;  CONSTITUTIONAL: No fever, chills. Fair appetite. ENMT: Eyes: No diplopia; Nose: No epistaxis. Mouth: No sore throat. RESPIRATORY: No hemoptysis, shortness of breath, cough.    CARDIOVASCULAR: No chest pain, palpitations. GASTROINTESTINAL: No nausea/vomiting, abdominal pain, diarrhea/constipation. GENITOURINARY: No dysuria, urinary frequency, hematuria. NEURO: No syncope, presyncope, headache. Remainder:  ROS NEGATIVE    Past Medical History:      Diagnosis Date    Anemia     Pt reports she was dx in her teens, w/o proper w/u, with iron deficiency anemia and was on oral iron replacement for many years, resulting in iron overload    Aplastic anemia ([address] ~1887-6594    Possibly d/t chemotherapy for breast cancer    Breast cancer ([address] 2009    right side; pt underwent radical mastectomy followed by radiation therapy and chemotherapy and was then on oral chemo pill for 5 yrs; no recurrence as of 01/2019    Chickenpox     Hypothyroidism     Measles     Mild depression ([address]     Mumps     Myelodysplastic syndrome ([address] ~2016    Pulmonary tuberculosis ~9203-5035    in her early 25s    Scarlet fever     Sensory neuropathy     beyond the ankle b/l     Patient Active Problem List   Diagnosis    MDS (myelodysplastic syndrome) (HCC)    Bronchitis    Macrocytic anemia with high RDW    Hypothyroidism    Peripheral sensory neuropathy    Breast cancer (HCC)    Anemia in other chronic diseases classified elsewhere         Past Surgical History:      Procedure Laterality Date    ADENOIDECTOMY      BONE MARROW BIOPSY      BREAST BIOPSY      CHOLECYSTECTOMY      MASTECTOMY      right breast    OTHER SURGICAL HISTORY      blood transfusions    TONSILLECTOMY AND ADENOIDECTOMY      TUBAL LIGATION         Family History:  Family History   Problem Relation Age of Onset   [place] A[employer] Cancer Mother         lung    Cancer Father         lung    Cancer Sister         ovarian cancer and breast cancer    Cancer Brother         unknown    Cancer Maternal Aunt         unknown    Cancer Other         brain    Other Brother         MI       Medications:  Reviewed and reconciled.     Social History:  Social History EOMI. Oropharynx clear. NECK: Supple. No palpable cervical or supraclavicular lymphadenopathy. LUNGS: Good air entry bilaterally. No wheezing, crackles or rhonchi. CARDIOVASCULAR: Regular rate. No murmurs, rubs or gallops. ABDOMEN: Soft. Non-tender, non-distended. Positive bowel sounds. EXTREMITIES: Without clubbing, cyanosis, or edema,   NEUROLOGIC: No focal deficits. ECOG PS 1      Impression/Plan:     1. The patient is a very pleasant 78 y.o. lady with a PMH significant for Right Breast Cancer, stage III, HR pos, was diagnosed in 2009, she had a right mastectomy done, followed by adjuvant chemo, she received 8 cycles, probably AC followed by T, then PMRT, and 5 years of adjuvant ET with Arimidex, was completed in 2015. She was treated in [facility] under the care of Dr. Imani Lira. She is doing well clinically without any evidence of recurrence of her disease. Discussed with her the importance of monthly SBE, and routine screening mammograms, she had a left breast screening mammogram done on 5/14/2019, revealing benign findings, was negative for malignancy, will be due for repeat mammogram on 5/15/2020.     2. She has MDS, diagnosed in 2017, she received ESAs and PRBCs transfusion, has transfusion related iron overload, hgb was 8.5, ferritin 3647, was restarted on Aranesp on 4/23/2019. Labs reviewed, she did have improvement of the hemoglobin with Aranesp, she has macrocytosis, intermittent leukopenia, her hemoglobin is 11.3G/DL, hematocrit 36, .8, hold Aranesp today. Will continue to monitor her CBCD. She was seen in the ED on 1/2/2020 worsening pelvic and hip pain s/p fall, scan of the lumbar spine had revealed moderately severe stenosis at L4-L5, moderate stenosis at L3-L4 and L1-L2, suggestion of subtle acute fracture along the anterior superior endplate of L3, mild chronic compression deformity of the superior endplate of Z31.   Patient is feeling better, she was seen by neurosurgery, lumbar spine MRI was ordered. She did not do the MRI yet due to the cost of the test.  She started using the brace. Today 3/13/2020, labs reviewed, hemoglobin is 9.3G/DL, hematocrit 29.5, .1, white count is normal at 5, proceed with Aranesp, white count is 4.8, platelet count had normalized, her hemoglobin is less than 10 G/DL, proceed with Aranesp, will continue monitor her CBCD. No indication to start her on hypo-methylating agents at this time. RTC in 2 weeks with labs, aranesp. Thank you for allowing us to participate in the care of Ms. Ventura.     Micheline Valencia MD   HEMATOLOGY/MEDICAL ONCOLOGY  [address] [place] [address] ONCOLOGY  [facility] [address]  Monique [place] [address]  Dept: [phone]

## 2020-03-16 RX ORDER — HYDROCODONE BITARTRATE AND ACETAMINOPHEN 5; 325 MG/1; MG/1
1 TABLET ORAL EVERY 6 HOURS PRN
Qty: 120 TABLET | Refills: 0 | Status: SHIPPED
Start: 2020-03-16 | End: 2020-06-23 | Stop reason: SDUPTHER

## 2020-03-27 ENCOUNTER — OFFICE VISIT (OUTPATIENT)
Dept: ONCOLOGY | Age: 80
End: 2020-03-27
Payer: MEDICARE

## 2020-03-27 ENCOUNTER — HOSPITAL ENCOUNTER (OUTPATIENT)
Dept: INFUSION THERAPY | Age: 80
Discharge: HOME | End: 2020-03-27
Payer: MEDICARE

## 2020-03-27 VITALS
DIASTOLIC BLOOD PRESSURE: 63 MMHG | HEART RATE: 71 BPM | OXYGEN SATURATION: 94 % | SYSTOLIC BLOOD PRESSURE: 131 MMHG | BODY MASS INDEX: 25.87 KG/M2 | TEMPERATURE: 98.5 F | HEIGHT: 64 IN

## 2020-03-27 DIAGNOSIS — D63.8 ANEMIA IN OTHER CHRONIC DISEASES CLASSIFIED ELSEWHERE: ICD-10-CM

## 2020-03-27 DIAGNOSIS — D46.9 MDS (MYELODYSPLASTIC SYNDROME) (HCC): ICD-10-CM

## 2020-03-27 DIAGNOSIS — D46.9 MYELODYSPLASTIC SYNDROME (HCC): Primary | ICD-10-CM

## 2020-03-27 LAB
ANISOCYTOSIS: ABNORMAL
BASOPHILS ABSOLUTE: 0.05 E9/L (ref 0–0.2)
BASOPHILS RELATIVE PERCENT: 0.9 % (ref 0–2)
EOSINOPHILS ABSOLUTE: 0 E9/L (ref 0.05–0.5)
EOSINOPHILS RELATIVE PERCENT: 1.7 % (ref 0–6)
HCT VFR BLD CALC: 31.1 % (ref 34–48)
HEMOGLOBIN: 9.6 G/DL (ref 11.5–15.5)
HYPOCHROMIA: ABNORMAL
LYMPHOCYTES ABSOLUTE: 3.45 E9/L (ref 1.5–4)
LYMPHOCYTES RELATIVE PERCENT: 65.2 % (ref 20–42)
MCH RBC QN AUTO: 36.9 PG (ref 26–35)
MCHC RBC AUTO-ENTMCNC: 30.9 % (ref 32–34.5)
MCV RBC AUTO: 119.6 FL (ref 80–99.9)
MONOCYTES ABSOLUTE: 0.05 E9/L (ref 0.1–0.95)
MONOCYTES RELATIVE PERCENT: 0.9 % (ref 2–12)
MYELOCYTE PERCENT: 0.9 % (ref 0–0)
NEUTROPHILS ABSOLUTE: 1.75 E9/L (ref 1.8–7.3)
NEUTROPHILS RELATIVE PERCENT: 32.2 % (ref 43–80)
OVALOCYTES: ABNORMAL
PDW BLD-RTO: 27.2 FL (ref 11.5–15)
PLATELET # BLD: 134 E9/L (ref 130–450)
PMV BLD AUTO: 10.2 FL (ref 7–12)
POIKILOCYTES: ABNORMAL
POLYCHROMASIA: ABNORMAL
RBC # BLD: 2.6 E12/L (ref 3.5–5.5)
TARGET CELLS: ABNORMAL
WBC # BLD: 5.3 E9/L (ref 4.5–11.5)

## 2020-03-27 PROCEDURE — 4040F PNEUMOC VAC/ADMIN/RCVD: CPT | Performed by: INTERNAL MEDICINE

## 2020-03-27 PROCEDURE — 99213 OFFICE O/P EST LOW 20 MIN: CPT

## 2020-03-27 PROCEDURE — 36415 COLL VENOUS BLD VENIPUNCTURE: CPT

## 2020-03-27 PROCEDURE — 1090F PRES/ABSN URINE INCON ASSESS: CPT | Performed by: INTERNAL MEDICINE

## 2020-03-27 PROCEDURE — 99214 OFFICE O/P EST MOD 30 MIN: CPT | Performed by: INTERNAL MEDICINE

## 2020-03-27 PROCEDURE — 1123F ACP DISCUSS/DSCN MKR DOCD: CPT | Performed by: INTERNAL MEDICINE

## 2020-03-27 PROCEDURE — 85025 COMPLETE CBC W/AUTO DIFF WBC: CPT

## 2020-03-27 PROCEDURE — G8427 DOCREV CUR MEDS BY ELIG CLIN: HCPCS | Performed by: INTERNAL MEDICINE

## 2020-03-27 PROCEDURE — 96372 THER/PROPH/DIAG INJ SC/IM: CPT

## 2020-03-27 PROCEDURE — G8482 FLU IMMUNIZE ORDER/ADMIN: HCPCS | Performed by: INTERNAL MEDICINE

## 2020-03-27 PROCEDURE — 6360000002 HC RX W HCPCS: Performed by: INTERNAL MEDICINE

## 2020-03-27 PROCEDURE — 1036F TOBACCO NON-USER: CPT | Performed by: INTERNAL MEDICINE

## 2020-03-27 PROCEDURE — G8417 CALC BMI ABV UP PARAM F/U: HCPCS | Performed by: INTERNAL MEDICINE

## 2020-03-27 PROCEDURE — 99999 CBC WITH AUTO DIFFERENTIAL: CPT | Performed by: NURSE PRACTITIONER

## 2020-03-27 PROCEDURE — G8399 PT W/DXA RESULTS DOCUMENT: HCPCS | Performed by: INTERNAL MEDICINE

## 2020-03-27 RX ADMIN — DARBEPOETIN ALFA 500 MCG: 500 INJECTION, SOLUTION INTRAVENOUS; SUBCUTANEOUS at 15:13

## 2020-03-27 NOTE — PROGRESS NOTES
pain, palpitations. GASTROINTESTINAL: No nausea/vomiting, abdominal pain, diarrhea/constipation. GENITOURINARY: No dysuria, urinary frequency, hematuria. NEURO: No syncope, presyncope, headache. Remainder:  ROS NEGATIVE    Past Medical History:      Diagnosis Date    Anemia     Pt reports she was dx in her teens, w/o proper w/u, with iron deficiency anemia and was on oral iron replacement for many years, resulting in iron overload    Aplastic anemia ([address] ~4293-4295    Possibly d/t chemotherapy for breast cancer    Breast cancer ([address] 2009    right side; pt underwent radical mastectomy followed by radiation therapy and chemotherapy and was then on oral chemo pill for 5 yrs; no recurrence as of 01/2019    Chickenpox     Hypothyroidism     Measles     Mild depression ([address]     Mumps     Myelodysplastic syndrome ([address] ~2016    Pulmonary tuberculosis ~2590-6917    in her early 25s    Scarlet fever     Sensory neuropathy     beyond the ankle b/l     Patient Active Problem List   Diagnosis    MDS (myelodysplastic syndrome) (HCC)    Bronchitis    Macrocytic anemia with high RDW    Hypothyroidism    Peripheral sensory neuropathy    Breast cancer (HCC)    Anemia in other chronic diseases classified elsewhere         Past Surgical History:      Procedure Laterality Date    ADENOIDECTOMY      BONE MARROW BIOPSY      BREAST BIOPSY      CHOLECYSTECTOMY      MASTECTOMY      right breast    OTHER SURGICAL HISTORY      blood transfusions    TONSILLECTOMY AND ADENOIDECTOMY      TUBAL LIGATION         Family History:  Family History   Problem Relation Age of Onset   [employer] Cancer Mother         lung    Cancer Father         lung    Cancer Sister         ovarian cancer and breast cancer    Cancer Brother         unknown    Cancer Maternal Aunt         unknown    Cancer Other         brain    Other Brother         MI       Medications:  Reviewed and reconciled.     Social History:  Social History hemoglobin is less than 10 G/DL, proceed with Aranesp, will continue monitor her CBCD. No indication to start her on hypo-methylating agents at this time. RTC in 2 weeks with labs, aranesp. Thank you for allowing us to participate in the care of Ms. Ventura.     Linus Brian MD   HEMATOLOGY/MEDICAL ONCOLOGY  [address] [place] [address] ONCOLOGY  [address] [address]  Dept: [phone]

## 2020-04-08 ENCOUNTER — VIRTUAL VISIT (OUTPATIENT)
Dept: PRIMARY CARE CLINIC | Age: 80
End: 2020-04-08
Payer: MEDICARE

## 2020-04-08 VITALS — HEIGHT: 61 IN | WEIGHT: 144 LBS | BODY MASS INDEX: 27.19 KG/M2
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
## 2020-04-08 PROCEDURE — 99214 OFFICE O/P EST MOD 30 MIN: CPT | Performed by: NURSE PRACTITIONER

## 2020-04-08 RX ORDER — GABAPENTIN 300 MG/1
900 CAPSULE ORAL NIGHTLY
Qty: 270 CAPSULE | Refills: 0 | Status: SHIPPED | OUTPATIENT
Start: 2020-04-08 | End: 2020-07-17

## 2020-04-08 RX ORDER — HYDROCODONE BITARTRATE AND ACETAMINOPHEN 5; 325 MG/1; MG/1
1 TABLET ORAL EVERY 6 HOURS PRN
Qty: 120 TABLET | Refills: 0 | Status: CANCELLED | OUTPATIENT
Start: 2020-04-08 | End: 2020-05-08

## 2020-04-08 ASSESSMENT — PATIENT HEALTH QUESTIONNAIRE - PHQ9
2. FEELING DOWN, DEPRESSED OR HOPELESS: 0
1. LITTLE INTEREST OR PLEASURE IN DOING THINGS: 0
SUM OF ALL RESPONSES TO PHQ QUESTIONS 1-9: 0
SUM OF ALL RESPONSES TO PHQ QUESTIONS 1-9: 0
SUM OF ALL RESPONSES TO PHQ9 QUESTIONS 1 & 2: 0

## 2020-04-08 ASSESSMENT — ENCOUNTER SYMPTOMS
EYES NEGATIVE: 1
SHORTNESS OF BREATH: 0
EYE PAIN: 0
COUGH: 0
NAUSEA: 0
RHINORRHEA: 0
EYE ITCHING: 0
CHEST TIGHTNESS: 0
DIARRHEA: 0
ABDOMINAL PAIN: 0
ALLERGIC/IMMUNOLOGIC NEGATIVE: 1
SORE THROAT: 0
CONSTIPATION: 0
SINUS PAIN: 0
EYE DISCHARGE: 0
SINUS PRESSURE: 0

## 2020-04-08 NOTE — PROGRESS NOTES
2020     Chelsey Thomas (:  1940) is a 78 y.o. female, here for evaluation of the following medical concerns:  No chief complaint on file. TeleMedicine Patient Consent    This visit was performed as a virtual video visit using a synchronous, two-way, audio-video telehealth technology platform. Patient identification was verified at the start of the visit, including the patient's telephone number and physical location. I discussed with the patient the nature of our telehealth visits, that:     1. Due to the nature of an audio- video modality, the only components of a physical exam that could be done are the elements supported by direct observation. 2. I would evaluate the patient and recommend diagnostics and treatments based on my assessment. 3. If it was felt that the patient should be evaluated in clinic or an emergency room setting, then they would be directed there. 4. Our sessions are not being recorded and that personal health information is protected. 5. Our team would provide follow up care in person if/when the patient needs it. Patient does agree to proceed with telemedicine consultation. Patient's location: home address in [place]        Time spent: Greater than 20 with review of notes, reports, lab. Video contact 12 minutes    This visit was completed virtually using Doxy. me Daughter Elda Lab present        HPI:  78 y.o. female presents for video call  Really has an up and down course with her chronic issues. Especially the HGB. However, she has amazing resilience and great attitude. - Sees Dr. Rodolfo Gan (oncology) for history of breast cancer and MDS. Still receiving aranesp.   - Sees palliative care for pain management and does use hydrocodone     She was seen in the ED on 2020 worsening pelvic and hip pain s/p fall, scan of the lumbar spine had revealed moderately severe stenosis at L4-L5, moderate stenosis at L3-L4 and L1-L2, suggestion of subtle acute fracture along the anterior superior endplate of L3, mild chronic compression deformity of the superior endplate of Y89. States she is doing well and doing yard work. Denies any other issues    Health Maintenance:  Colonoscopy - 2018 neg. Ulcer and gastritis  Mammogram - 2017  Zoster/Shingles Vaccine, Prevnar Vaccine, Pneumonia Vaccination  Influenza Vaccination - 2018  Bone Density Test - 2018  Childhood Illnesses:  TB, Measles, Scarlet Fever, Mumps, Chickenpox    Past Medical History:   Diagnosis Date    Anemia     Pt reports she was dx in her teens, w/o proper w/u, with iron deficiency anemia and was on oral iron replacement for many years, resulting in iron overload    Aplastic anemia ([address] ~8247-7317    Possibly d/t chemotherapy for breast cancer    Breast cancer ([address] 2009    right side; pt underwent radical mastectomy followed by radiation therapy and chemotherapy and was then on oral chemo pill for 5 yrs; no recurrence as of 01/2019    Chickenpox     Hypothyroidism     Measles     Mild depression [facility]     Mumps     Myelodysplastic syndrome ([facility] ~2016    Pulmonary tuberculosis ~3589-0214    in her early 25s    Scarlet fever     Sensory neuropathy     beyond the ankle b/l       Current Outpatient Medications on File Prior to Visit   Medication Sig Dispense Refill    HYDROcodone-acetaminophen (NORCO) 5-325 MG per tablet Take 1 tablet by mouth every 6 hours as needed for Pain for up to 30 days. 120 tablet 0    ibuprofen (ADVIL;MOTRIN) 400 MG tablet Take 1 tablet by mouth every 6 hours as needed for Pain 120 tablet 3    cyclobenzaprine (FLEXERIL) 10 MG tablet Take 0.5 tablets by mouth 3 times daily as needed for Muscle spasms 10 tablet 0    Misc.  Devices [facility]) MISC Multiple thoracic fractures with L3 fracture 1 each 0    benzonatate (TESSALON) 200 MG capsule Take 200 mg by mouth 3 times daily as needed for Cough      guaiFENesin (ROBITUSSIN) 100 MG/5ML SOLN oral solution Take 200 mg by mouth

## 2020-04-14 ENCOUNTER — OFFICE VISIT (OUTPATIENT)
Dept: PALLATIVE CARE | Age: 80
End: 2020-04-14
Payer: MEDICARE

## 2020-04-14 ENCOUNTER — OFFICE VISIT (OUTPATIENT)
Dept: ONCOLOGY | Age: 80
End: 2020-04-14
Payer: MEDICARE

## 2020-04-14 ENCOUNTER — HOSPITAL ENCOUNTER (OUTPATIENT)
Dept: INFUSION THERAPY | Age: 80
Discharge: HOME | End: 2020-04-14
Payer: MEDICARE

## 2020-04-14 VITALS
HEIGHT: 61 IN | WEIGHT: 151.2 LBS | SYSTOLIC BLOOD PRESSURE: 148 MMHG | TEMPERATURE: 97.9 F | BODY MASS INDEX: 28.55 KG/M2 | DIASTOLIC BLOOD PRESSURE: 58 MMHG | HEART RATE: 63 BPM | OXYGEN SATURATION: 98 %

## 2020-04-14 DIAGNOSIS — D63.8 ANEMIA OF CHRONIC DISEASE: ICD-10-CM

## 2020-04-14 DIAGNOSIS — D46.9 MYELODYSPLASTIC SYNDROME (HCC): Primary | ICD-10-CM

## 2020-04-14 DIAGNOSIS — D46.9 MDS (MYELODYSPLASTIC SYNDROME) (HCC): ICD-10-CM

## 2020-04-14 LAB
ANISOCYTOSIS: ABNORMAL
BASOPHILS ABSOLUTE: 0.04 E9/L (ref 0–0.2)
BASOPHILS RELATIVE PERCENT: 0.7 % (ref 0–2)
EOSINOPHILS ABSOLUTE: 0.09 E9/L (ref 0.05–0.5)
EOSINOPHILS RELATIVE PERCENT: 1.7 % (ref 0–6)
HCT VFR BLD CALC: 30.3 % (ref 34–48)
HEMOGLOBIN: 9.7 G/DL (ref 11.5–15.5)
IMMATURE GRANULOCYTES #: 0.01 E9/L
IMMATURE GRANULOCYTES %: 0.2 % (ref 0–5)
LYMPHOCYTES ABSOLUTE: 3.48 E9/L (ref 1.5–4)
LYMPHOCYTES RELATIVE PERCENT: 64.3 % (ref 20–42)
MCH RBC QN AUTO: 38.6 PG (ref 26–35)
MCHC RBC AUTO-ENTMCNC: 32 % (ref 32–34.5)
MCV RBC AUTO: 120.7 FL (ref 80–99.9)
MONOCYTES ABSOLUTE: 0.33 E9/L (ref 0.1–0.95)
MONOCYTES RELATIVE PERCENT: 6.1 % (ref 2–12)
NEUTROPHILS ABSOLUTE: 1.46 E9/L (ref 1.8–7.3)
NEUTROPHILS RELATIVE PERCENT: 27 % (ref 43–80)
OVALOCYTES: ABNORMAL
PDW BLD-RTO: 25.3 FL (ref 11.5–15)
PLATELET # BLD: 147 E9/L (ref 130–450)
PMV BLD AUTO: 10.3 FL (ref 7–12)
POIKILOCYTES: ABNORMAL
POLYCHROMASIA: ABNORMAL
RBC # BLD: 2.51 E12/L (ref 3.5–5.5)
WBC # BLD: 5.4 E9/L (ref 4.5–11.5)

## 2020-04-14 PROCEDURE — 4040F PNEUMOC VAC/ADMIN/RCVD: CPT | Performed by: INTERNAL MEDICINE

## 2020-04-14 PROCEDURE — 85025 COMPLETE CBC W/AUTO DIFF WBC: CPT

## 2020-04-14 PROCEDURE — G8399 PT W/DXA RESULTS DOCUMENT: HCPCS | Performed by: NURSE PRACTITIONER

## 2020-04-14 PROCEDURE — 1036F TOBACCO NON-USER: CPT | Performed by: NURSE PRACTITIONER

## 2020-04-14 PROCEDURE — 1123F ACP DISCUSS/DSCN MKR DOCD: CPT | Performed by: INTERNAL MEDICINE

## 2020-04-14 PROCEDURE — G8427 DOCREV CUR MEDS BY ELIG CLIN: HCPCS | Performed by: INTERNAL MEDICINE

## 2020-04-14 PROCEDURE — G8417 CALC BMI ABV UP PARAM F/U: HCPCS | Performed by: INTERNAL MEDICINE

## 2020-04-14 PROCEDURE — 4040F PNEUMOC VAC/ADMIN/RCVD: CPT | Performed by: NURSE PRACTITIONER

## 2020-04-14 PROCEDURE — 99213 OFFICE O/P EST LOW 20 MIN: CPT | Performed by: NURSE PRACTITIONER

## 2020-04-14 PROCEDURE — G8428 CUR MEDS NOT DOCUMENT: HCPCS | Performed by: NURSE PRACTITIONER

## 2020-04-14 PROCEDURE — 1090F PRES/ABSN URINE INCON ASSESS: CPT | Performed by: NURSE PRACTITIONER

## 2020-04-14 PROCEDURE — G8399 PT W/DXA RESULTS DOCUMENT: HCPCS | Performed by: INTERNAL MEDICINE

## 2020-04-14 PROCEDURE — 99212 OFFICE O/P EST SF 10 MIN: CPT

## 2020-04-14 PROCEDURE — 96372 THER/PROPH/DIAG INJ SC/IM: CPT

## 2020-04-14 PROCEDURE — G8417 CALC BMI ABV UP PARAM F/U: HCPCS | Performed by: NURSE PRACTITIONER

## 2020-04-14 PROCEDURE — 1090F PRES/ABSN URINE INCON ASSESS: CPT | Performed by: INTERNAL MEDICINE

## 2020-04-14 PROCEDURE — 1123F ACP DISCUSS/DSCN MKR DOCD: CPT | Performed by: NURSE PRACTITIONER

## 2020-04-14 PROCEDURE — 1036F TOBACCO NON-USER: CPT | Performed by: INTERNAL MEDICINE

## 2020-04-14 PROCEDURE — 6360000002 HC RX W HCPCS: Performed by: INTERNAL MEDICINE

## 2020-04-14 PROCEDURE — 99214 OFFICE O/P EST MOD 30 MIN: CPT | Performed by: INTERNAL MEDICINE

## 2020-04-14 RX ADMIN — DARBEPOETIN ALFA 500 MCG: 500 INJECTION, SOLUTION INTRAVENOUS; SUBCUTANEOUS at 14:14

## 2020-04-14 NOTE — PROGRESS NOTES
No chest pain, palpitations. GASTROINTESTINAL: No nausea/vomiting, abdominal pain, diarrhea/constipation. GENITOURINARY: No dysuria, urinary frequency, hematuria. NEURO: No syncope, presyncope, headache. Remainder:  ROS NEGATIVE    Past Medical History:      Diagnosis Date    Anemia     Pt reports she was dx in her teens, w/o proper w/u, with iron deficiency anemia and was on oral iron replacement for many years, resulting in iron overload    Aplastic anemia ([address] ~8987-2676    Possibly d/t chemotherapy for breast cancer    Breast cancer ([address] 2009    right side; pt underwent radical mastectomy followed by radiation therapy and chemotherapy and was then on oral chemo pill for 5 yrs; no recurrence as of 01/2019    Chickenpox     Hypothyroidism     Measles     Mild depression ([address]     Mumps     Myelodysplastic syndrome ([address] ~2016    Pulmonary tuberculosis ~2525-0639    in her early 25s    Scarlet fever     Sensory neuropathy     beyond the ankle b/l     Patient Active Problem List   Diagnosis    MDS (myelodysplastic syndrome) (HCC)    Bronchitis    Macrocytic anemia with high RDW    Hypothyroidism    Peripheral sensory neuropathy    Breast cancer (HCC)    Anemia of chronic disease    Fall        Past Surgical History:      Procedure Laterality Date    ADENOIDECTOMY      BONE MARROW BIOPSY      BREAST BIOPSY      CHOLECYSTECTOMY      MASTECTOMY      right breast    OTHER SURGICAL HISTORY      blood transfusions    TONSILLECTOMY AND ADENOIDECTOMY      TUBAL LIGATION         Family History:  Family History   Problem Relation Age of Onset   [place] Cancer Mother         lung    Cancer Father         lung    Cancer Sister         ovarian cancer and breast cancer    Cancer Brother         unknown    Cancer Maternal Aunt         unknown    Cancer Other         brain    Other Brother         MI       Medications:  Reviewed and reconciled.     Social History:  Social History     Socioeconomic

## 2020-04-14 NOTE — PROGRESS NOTES
under the care of Dr. Chioma Guzman. She is currently being followed locally by Dr. Maryam Pickard, without evidence of recurrent disease, whom she also follows for management of myelodysplastic syndrome, which was diagnosed in 2017. She was referred to [address] [place] [address] for assistance with symptom management related to chemotherapy-induced peripheral neuropathy. As per Dr. Maryam Pickard on 7/2/2019:  Impression/Plan:   1. The patient is a very pleasant 59-year-old lady with a PMH significant for Right Breast Cancer, stage III, HR pos, was diagnosed in 2009, she had a right mastectomy done, followed by adjuvant chemo, she received 8 cycles, probably AC followed by T, then PMRT, and 5 years of adjuvant ET with Arimidex, was completed in 2015. She was treated in [facility] under the care of Dr. Chioma Guzman. She is doing well clinically without any evidence of recurrence of her disease. Discussed with her the importance of monthly SBE, and routine screening mammograms, she had a left breast screening mammogram done today 5/14/2019, revealing benign findings, was negative for malignancy, will be due for repeat mammogram on 5/15/2020.    2. She has MDS, diagnosed in 2017, she received ESAs and PRBCs transfusion, has transfusion related iron overload, hgb is 8.5, ferritin 3647, was restarted on Aranesp on 4/23/2019. Labs reviewed, she did have improvement of the hemoglobin with Aranesp, from 8.4 to 9.6 g/dl today, she has macrocytosis and mild leukopenia, she is receiving Aranesp, she had a better response when she received it every 2 weeks, hemoglobin today 7/2/2019 is 9.1 g/dL, proceed with Aranesp. Will continue to monitor her CBCD.   RTC in 2 weeks with labs, Rabia gillespie for allowing us to participate in the care of Ms. Ventura.     Subjective/Events/Discussions:  7/16/2019:  Mitzi Bautista presents today, unaccompanied, alert, oriented, in good spirits, smiling, very interactive and talkative, able to voice her needs to complain of neuropathy and pain, which she states is well managed on her current regimen of gabapentin as per PCP, as well as utilizing Norco 5/325 mg 1-2 times per day. She states that she used her last Norco last evening. She states that her pain has slightly increased over the past week, as she has been moving to a new home, and thus been more active than typical.  She states though that overall she feels well, he continues to manage her pain well without need for significant increase in her Norco.  She states that some days we will take a half a tablet if she only has moderate pain, and this is effective. Options were discussed, will make no further changes to her current plan of care, will continue her Norco as listed above. 12/3/2019:  Scott Crystal presents today, unaccompanied, and she is alert, oriented, able voice needs and concerns well, does not show any signs sedation, confusion, or distress. She continues to complain of neuropathy in her bilateral feet, as well as pain in her lower back as well as her left hip. She is continued to utilize Norco 5-325 mg sometimes utilizing 1/2 tablet, usually using 1 to 2 tablets/day, she states that this is working well at managing her pain, and allowing her to function well throughout the day. She typically states her pain is worse in the morning, and is greatly improved throughout the day, and again often worsening in the evening time. She is continue to take her gabapentin provided by her PCP, and we will make no changes to her current plan of care today. We will provide a refill of her Norco today. We will have her return approximately 8 weeks to reassess her needs, and she is encouraged to call with any questions, concerns, changes in her symptoms, or if she requires a refill of her Norco prior to her next visit. 1/28/2020  As per Dr. Chu Clarity on 1/17/2020:  her hemoglobin is 11.3G/DL, hematocrit 36, .8, hold Aranesp today.   Will continue to PERRL, Alert, oriented x 3; following commands    Hopkins Symptom Assessment Score   Hopkins Score 4/14/2020 1/28/2020 12/3/2019 9/17/2019 7/16/2019   Pain Score 2 8 4 5 6   Tiredness Score 1 9 6 9 5   Nausea Score Not nauseated Not nauseated Not nauseated Not nauseated Not nauseated   Depression Score Not depressed Not depressed Not depressed Not depressed Not depressed   Anxiety Score Not anxious Not anxious 1 Not anxious Not anxious   Drowsiness Score Not drowsy 1 2 6 1   Appetite Score Best appetite Best appetite Best appetite Best appetite Best appetite   Wellbeing Score Best feeling of wellbeing Best feeling of wellbeing 2 1 Best feeling of wellbeing   Dyspnea Score No shortness of breath 5 7 2 6   Other Problem Score Best possible response Best possible response Best possible response Best possible response Best possible response   Total Assessment Score(calculated) 3 23 22 23 18     Assessed by: patient and provider. Current Medications:  Medications reviewed: yes    Controlled Substances Monitoring: [place]AR[place] reviewed 4/14/20    RX Monitoring 4/14/2020   Periodic Controlled Substance Monitoring Possible medication side effects, risk of tolerance/dependence & alternative treatments discussed. ;No signs of potential drug abuse or diversion identified. ;Assessed functional status. ;Obtaining appropriate analgesic effect of treatment. Chronic Pain > 50 MEDD -       [place] Rinks APRN-CNP  Palliative Medicine    Time/Communication  Greater than 51% of time spent, total 15 minutes in counseling and coordination of care at the bedside regarding goals of care, symptom management, diagnosis and prognosis and see above. Discussed patient and the plan of care with the other IDT members of the Palliative Care Team, and with patient. Thank you for allowing Palliative Medicine to participate in the care of Shiraz Cruz. Note: This report was completed using computerize voiced recognition software.   Every effort has been made to ensure accuracy; however, inadvertent computerized transcription errors may be present.

## 2020-04-28 ENCOUNTER — OFFICE VISIT (OUTPATIENT)
Dept: ONCOLOGY | Age: 80
End: 2020-04-28
Payer: MEDICARE

## 2020-04-28 ENCOUNTER — HOSPITAL ENCOUNTER (OUTPATIENT)
Dept: INFUSION THERAPY | Age: 80
Discharge: HOME | End: 2020-04-28
Payer: MEDICARE

## 2020-04-28 VITALS
BODY MASS INDEX: 28.79 KG/M2 | HEART RATE: 61 BPM | DIASTOLIC BLOOD PRESSURE: 59 MMHG | WEIGHT: 152.5 LBS | SYSTOLIC BLOOD PRESSURE: 124 MMHG | OXYGEN SATURATION: 95 % | HEIGHT: 61 IN | TEMPERATURE: 98.7 F

## 2020-04-28 LAB
ANISOCYTOSIS: ABNORMAL
ATYPICAL LYMPHOCYTE RELATIVE PERCENT: 0.9 % (ref 0–4)
BASOPHILS ABSOLUTE: 0.06 E9/L (ref 0–0.2)
BASOPHILS RELATIVE PERCENT: 0.9 % (ref 0–2)
EOSINOPHILS ABSOLUTE: 0.18 E9/L (ref 0.05–0.5)
EOSINOPHILS RELATIVE PERCENT: 2.6 % (ref 0–6)
HCT VFR BLD CALC: 33.2 % (ref 34–48)
HEMOGLOBIN: 10.7 G/DL (ref 11.5–15.5)
HYPOCHROMIA: ABNORMAL
LYMPHOCYTES ABSOLUTE: 3.43 E9/L (ref 1.5–4)
LYMPHOCYTES RELATIVE PERCENT: 47.8 % (ref 20–42)
MCH RBC QN AUTO: 39.1 PG (ref 26–35)
MCHC RBC AUTO-ENTMCNC: 32.2 % (ref 32–34.5)
MCV RBC AUTO: 121.2 FL (ref 80–99.9)
MONOCYTES ABSOLUTE: 0.56 E9/L (ref 0.1–0.95)
MONOCYTES RELATIVE PERCENT: 7.8 % (ref 2–12)
NEUTROPHILS ABSOLUTE: 2.8 E9/L (ref 1.8–7.3)
NEUTROPHILS RELATIVE PERCENT: 40 % (ref 43–80)
OVALOCYTES: ABNORMAL
PDW BLD-RTO: 24.8 FL (ref 11.5–15)
PLATELET # BLD: 145 E9/L (ref 130–450)
PMV BLD AUTO: 10.9 FL (ref 7–12)
POIKILOCYTES: ABNORMAL
POLYCHROMASIA: ABNORMAL
RBC # BLD: 2.74 E12/L (ref 3.5–5.5)
SCHISTOCYTES: ABNORMAL
TARGET CELLS: ABNORMAL
TEAR DROP CELLS: ABNORMAL
WBC # BLD: 7 E9/L (ref 4.5–11.5)

## 2020-04-28 PROCEDURE — 1123F ACP DISCUSS/DSCN MKR DOCD: CPT | Performed by: INTERNAL MEDICINE

## 2020-04-28 PROCEDURE — G8399 PT W/DXA RESULTS DOCUMENT: HCPCS | Performed by: INTERNAL MEDICINE

## 2020-04-28 PROCEDURE — 1090F PRES/ABSN URINE INCON ASSESS: CPT | Performed by: INTERNAL MEDICINE

## 2020-04-28 PROCEDURE — 85025 COMPLETE CBC W/AUTO DIFF WBC: CPT

## 2020-04-28 PROCEDURE — G8427 DOCREV CUR MEDS BY ELIG CLIN: HCPCS | Performed by: INTERNAL MEDICINE

## 2020-04-28 PROCEDURE — 99212 OFFICE O/P EST SF 10 MIN: CPT

## 2020-04-28 PROCEDURE — 4040F PNEUMOC VAC/ADMIN/RCVD: CPT | Performed by: INTERNAL MEDICINE

## 2020-04-28 PROCEDURE — 36415 COLL VENOUS BLD VENIPUNCTURE: CPT

## 2020-04-28 PROCEDURE — 1036F TOBACCO NON-USER: CPT | Performed by: INTERNAL MEDICINE

## 2020-04-28 PROCEDURE — 99214 OFFICE O/P EST MOD 30 MIN: CPT | Performed by: INTERNAL MEDICINE

## 2020-04-28 PROCEDURE — G8417 CALC BMI ABV UP PARAM F/U: HCPCS | Performed by: INTERNAL MEDICINE
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
## 2020-05-12 ENCOUNTER — OFFICE VISIT (OUTPATIENT)
Dept: ONCOLOGY | Age: 80
End: 2020-05-12
Payer: MEDICARE

## 2020-05-12 ENCOUNTER — HOSPITAL ENCOUNTER (OUTPATIENT)
Dept: INFUSION THERAPY | Age: 80
Discharge: HOME | End: 2020-05-12
Payer: MEDICARE

## 2020-05-12 VITALS
WEIGHT: 154.5 LBS | HEART RATE: 66 BPM | BODY MASS INDEX: 29.17 KG/M2 | SYSTOLIC BLOOD PRESSURE: 121 MMHG | TEMPERATURE: 98.7 F | HEIGHT: 61 IN | OXYGEN SATURATION: 94 % | DIASTOLIC BLOOD PRESSURE: 59 MMHG

## 2020-05-12 DIAGNOSIS — D63.8 ANEMIA OF CHRONIC DISEASE: ICD-10-CM

## 2020-05-12 DIAGNOSIS — D46.9 MYELODYSPLASTIC SYNDROME (HCC): Primary | ICD-10-CM

## 2020-05-12 DIAGNOSIS — D46.9 MDS (MYELODYSPLASTIC SYNDROME) (HCC): ICD-10-CM

## 2020-05-12 LAB
ANISOCYTOSIS: ABNORMAL
BASOPHILIC STIPPLING: ABNORMAL
BASOPHILS ABSOLUTE: 0 E9/L (ref 0–0.2)
BASOPHILS RELATIVE PERCENT: 0.5 % (ref 0–2)
EOSINOPHILS ABSOLUTE: 0.11 E9/L (ref 0.05–0.5)
EOSINOPHILS RELATIVE PERCENT: 1.8 % (ref 0–6)
HCT VFR BLD CALC: 27.6 % (ref 34–48)
HEMOGLOBIN: 9.2 G/DL (ref 11.5–15.5)
HYPOCHROMIA: ABNORMAL
LYMPHOCYTES ABSOLUTE: 3.22 E9/L (ref 1.5–4)
LYMPHOCYTES RELATIVE PERCENT: 51.8 % (ref 20–42)
MCH RBC QN AUTO: 39.3 PG (ref 26–35)
MCHC RBC AUTO-ENTMCNC: 33.3 % (ref 32–34.5)
MCV RBC AUTO: 117.9 FL (ref 80–99.9)
MONOCYTES ABSOLUTE: 0.43 E9/L (ref 0.1–0.95)
MONOCYTES RELATIVE PERCENT: 7 % (ref 2–12)
NEUTROPHILS ABSOLUTE: 2.48 E9/L (ref 1.8–7.3)
NEUTROPHILS RELATIVE PERCENT: 39.5 % (ref 43–80)
OVALOCYTES: ABNORMAL
PDW BLD-RTO: 23 FL (ref 11.5–15)
PLATELET # BLD: 167 E9/L (ref 130–450)
PMV BLD AUTO: 10.2 FL (ref 7–12)
POIKILOCYTES: ABNORMAL
POLYCHROMASIA: ABNORMAL
RBC # BLD: 2.34 E12/L (ref 3.5–5.5)
WBC # BLD: 6.2 E9/L (ref 4.5–11.5)

## 2020-05-12 PROCEDURE — G8399 PT W/DXA RESULTS DOCUMENT: HCPCS | Performed by: INTERNAL MEDICINE

## 2020-05-12 PROCEDURE — G8427 DOCREV CUR MEDS BY ELIG CLIN: HCPCS | Performed by: INTERNAL MEDICINE

## 2020-05-12 PROCEDURE — 1123F ACP DISCUSS/DSCN MKR DOCD: CPT | Performed by: INTERNAL MEDICINE

## 2020-05-12 PROCEDURE — 85025 COMPLETE CBC W/AUTO DIFF WBC: CPT

## 2020-05-12 PROCEDURE — G8417 CALC BMI ABV UP PARAM F/U: HCPCS | Performed by: INTERNAL MEDICINE

## 2020-05-12 PROCEDURE — 96372 THER/PROPH/DIAG INJ SC/IM: CPT

## 2020-05-12 PROCEDURE — 1090F PRES/ABSN URINE INCON ASSESS: CPT | Performed by: INTERNAL MEDICINE

## 2020-05-12 PROCEDURE — 99214 OFFICE O/P EST MOD 30 MIN: CPT | Performed by: INTERNAL MEDICINE

## 2020-05-12 PROCEDURE — 1036F TOBACCO NON-USER: CPT | Performed by: INTERNAL MEDICINE

## 2020-05-12 PROCEDURE — 4040F PNEUMOC VAC/ADMIN/RCVD: CPT | Performed by: INTERNAL MEDICINE

## 2020-05-12 PROCEDURE — 6360000002 HC RX W HCPCS: Performed by: INTERNAL MEDICINE

## 2020-05-12 RX ADMIN — DARBEPOETIN ALFA 500 MCG: 500 INJECTION, SOLUTION INTRAVENOUS; SUBCUTANEOUS at 13:52

## 2020-05-12 NOTE — PROGRESS NOTES
[address] MED ONCOLOGY  [address] [place] [id number]  Dept: [phone]  Attending Progress Note      Reason for Visit:   1. Right Breast Cancer. 2. MDS. Referring Physician:  CONNIE Ledbetter CNP    PCP:  CONNIE Ledbetter CNP    History of Present Illness: The patient is a very pleasant 78 y.o. lady with a PMH significant for Right Breast Cancer, stage III, HR pos, was diagnosed in 2009, she had a right mastectomy done, followed by adjuvant chemo, she received 8 cycles, probably AC followed by T, then PMRT, and 5 years of adjuvant ET with Arimidex, was completed in 2015. She was treated in [facility] under the care of Dr. Herb Douglass. She was diagnosed with MDS in 2017, she received ESAs and PRBCs transfusion. She has transfusion related iron overload. She is feeling well overall, has chronic joints pain, peripheral neuropathy. She was started on Aranesp on 4/24/2020. No SE from Aranesp. She was seen in the ED on 1/2/2020 worsening pelvic and hip pain s/p fall, scan of the lumbar spine had revealed moderately severe stenosis at L4-L5, moderate stenosis at L3-L4 and L1-L2, suggestion of subtle acute fracture along the anterior superior endplate of L3, mild chronic compression deformity of the superior endplate of F43. The patient is feeling much better, the steroids and Flexeril did help, she started using the brace. She was seen by neurosurgery, lumbar spine MRI was ordered, patient did not have it done due to the cost of the test.  She found spine CT scan and MRI from 2017, which she took to the neurosurgery office. The patient returns for a follow-up visit, she was tired over the weekend. She has pain and tenderness in the left hip. Review of Systems;  CONSTITUTIONAL: No fever, chills. Fair appetite. ENMT: Eyes: No diplopia; Nose: No epistaxis. Mouth: No sore throat.   RESPIRATORY: No hemoptysis, normalized, 167 K. Her hemoglobin is less than 10 G/DL, proceed with Aranesp, will continue monitor her CBCD. No indication to start her on hypo-methylating agents at this time. Left hip pain and tenderness, the patient had an x-ray done in January, that was unremarkable, she could have bursitis, the patient will take Tylenol or NSAIDs for pain control, if the pain does not get better will need to have a repeat x-ray done. RTC in 2 weeks with labs, aranesp. Thank you for allowing us to participate in the care of Ms. Ventura.     Bentley Dawson MD   HEMATOLOGY/MEDICAL ONCOLOGY  [address] [place] [address] ONCOLOGY  [facility] [address] [facility] [address]  Dept: [phone]

## 2020-05-22 NOTE — PROGRESS NOTES
shortness of breath, cough. CARDIOVASCULAR: No chest pain, palpitations. GASTROINTESTINAL: No nausea/vomiting, abdominal pain, diarrhea/constipation. GENITOURINARY: No dysuria, urinary frequency, hematuria. NEURO: No syncope, presyncope, headache. Remainder:  ROS NEGATIVE    Past Medical History:      Diagnosis Date    Anemia     Pt reports she was dx in her teens, w/o proper w/u, with iron deficiency anemia and was on oral iron replacement for many years, resulting in iron overload    Aplastic anemia ([address] ~1706-1201    Possibly d/t chemotherapy for breast cancer    Breast cancer ([address] 2009    right side; pt underwent radical mastectomy followed by radiation therapy and chemotherapy and was then on oral chemo pill for 5 yrs; no recurrence as of 01/2019    Chickenpox     Hypothyroidism     Measles     Mild depression ([address]     Mumps     Myelodysplastic syndrome ([address] ~2016    Pulmonary tuberculosis ~3120-1842    in her early 25s    Scarlet fever     Sensory neuropathy     beyond the ankle b/l     Patient Active Problem List   Diagnosis    MDS (myelodysplastic syndrome) (HCC)    Bronchitis    Macrocytic anemia with high RDW    Hypothyroidism    Peripheral sensory neuropathy    Breast cancer (HCC)    Anemia of chronic disease        Past Surgical History:      Procedure Laterality Date    ADENOIDECTOMY      BONE MARROW BIOPSY      BREAST BIOPSY      CHOLECYSTECTOMY      MASTECTOMY      right breast    OTHER SURGICAL HISTORY      blood transfusions    TONSILLECTOMY AND ADENOIDECTOMY      TUBAL LIGATION         Family History:  Family History   Problem Relation Age of Onset   [de-identified] Cancer Mother         lung    Cancer Father         lung    Cancer Sister         ovarian cancer and breast cancer    Cancer Brother         unknown    Cancer Maternal Aunt         unknown    Cancer Other         brain    Other Brother         MI       Medications:  Reviewed and reconciled.     Social rate. No murmurs, rubs or gallops. ABDOMEN: Soft. Non-tender, non-distended. Positive bowel sounds. EXTREMITIES: Without clubbing, cyanosis, or edema, she has tenderness of the left hip. NEUROLOGIC: No focal deficits. ECOG PS 1      Impression/Plan:     1. The patient is a very pleasant 78 y.o. lady with a PMH significant for Right Breast Cancer, stage III, HR pos, was diagnosed in 2009, she had a right mastectomy done, followed by adjuvant chemo, she received 8 cycles, probably AC followed by T, then PMRT, and 5 years of adjuvant ET with Arimidex, was completed in 2015. She was treated in [facility] under the care of Dr. Thelda Goldberg. She is doing well clinically without any evidence of recurrence of her disease. Discussed with her the importance of monthly SBE, and routine screening mammograms.  -Left breast screening mammogram done 05/26/2020 ***     2. She has MDS, diagnosed in 2017, she received ESAs and PRBCs transfusion, has transfusion related iron overload, hgb was 8.5, ferritin 3647, was restarted on Aranesp on 4/23/2019. She was seen in the ED on 1/2/2020 worsening pelvic and hip pain s/p fall, scan of the lumbar spine had revealed moderately severe stenosis at L4-L5, moderate stenosis at L3-L4 and L1-L2, suggestion of subtle acute fracture along the anterior superior endplate of L3, mild chronic compression deformity of the superior endplate of Q19. Patient is feeling better, she was seen by neurosurgery, lumbar spine MRI was ordered. She did not do the MRI yet due to the cost of the test.  She has been using the brace. On 5/12/2020, labs reviewed, hemoglobin is 9.2 G/DL,  hematocrit 27.6, .9, white count is normal at 6.2, platelet count had normalized, 167 K. Her hemoglobin is less than 10 G/DL, proceed with Aranesp, will continue monitor her CBCD. No indication to start her on hypo-methylating agents at this time.     Left hip pain and tenderness, the patient had an x-ray done in January, that was unremarkable, she could have bursitis, the patient will take Tylenol or NSAIDs for pain control, if the pain does not get better will need to have a repeat x-ray done. Presents today, 05/26/2020:  Labs:      RTC in 2 weeks with labs, aranesp. Thank you for allowing us to participate in the care of Ms. Ventura.

## 2020-05-26 ENCOUNTER — OFFICE VISIT (OUTPATIENT)
Dept: ONCOLOGY | Age: 80
End: 2020-05-26
Payer: MEDICARE

## 2020-05-26 ENCOUNTER — HOSPITAL ENCOUNTER (OUTPATIENT)
Dept: INFUSION THERAPY | Age: 80
Discharge: HOME | End: 2020-05-26
Payer: MEDICARE

## 2020-05-26 ENCOUNTER — HOSPITAL ENCOUNTER (OUTPATIENT)
Dept: GENERAL RADIOLOGY | Age: 80
Discharge: HOME | End: 2020-05-28
Payer: MEDICARE

## 2020-05-26 VITALS
BODY MASS INDEX: 28.21 KG/M2 | SYSTOLIC BLOOD PRESSURE: 122 MMHG | HEIGHT: 61 IN | OXYGEN SATURATION: 96 % | WEIGHT: 149.4 LBS | HEART RATE: 64 BPM | TEMPERATURE: 98.3 F | DIASTOLIC BLOOD PRESSURE: 58 MMHG

## 2020-05-26 DIAGNOSIS — D46.9 MYELODYSPLASTIC SYNDROME (HCC): ICD-10-CM

## 2020-05-26 LAB
ANISOCYTOSIS: ABNORMAL
BASOPHILIC STIPPLING: ABNORMAL
BASOPHILS ABSOLUTE: 0.05 E9/L (ref 0–0.2)
BASOPHILS RELATIVE PERCENT: 0.9 % (ref 0–2)
BURR CELLS: ABNORMAL
EOSINOPHILS ABSOLUTE: 0.14 E9/L (ref 0.05–0.5)
EOSINOPHILS RELATIVE PERCENT: 2.6 % (ref 0–6)
HCT VFR BLD CALC: 34.2 % (ref 34–48)
HEMOGLOBIN: 10.7 G/DL (ref 11.5–15.5)
LYMPHOCYTES ABSOLUTE: 2.48 E9/L (ref 1.5–4)
LYMPHOCYTES RELATIVE PERCENT: 45.6 % (ref 20–42)
MCH RBC QN AUTO: 37.2 PG (ref 26–35)
MCHC RBC AUTO-ENTMCNC: 31.3 % (ref 32–34.5)
MCV RBC AUTO: 118.8 FL (ref 80–99.9)
MONOCYTES ABSOLUTE: 0.43 E9/L (ref 0.1–0.95)
MONOCYTES RELATIVE PERCENT: 7.9 % (ref 2–12)
MYELOCYTE PERCENT: 0.9 % (ref 0–0)
NEUTROPHILS ABSOLUTE: 2.32 E9/L (ref 1.8–7.3)
NEUTROPHILS RELATIVE PERCENT: 42.1 % (ref 43–80)
OVALOCYTES: ABNORMAL
PDW BLD-RTO: 23.1 FL (ref 11.5–15)
PLATELET # BLD: 163 E9/L (ref 130–450)
PMV BLD AUTO: 11.1 FL (ref 7–12)
POIKILOCYTES: ABNORMAL
POLYCHROMASIA: ABNORMAL
RBC # BLD: 2.88 E12/L (ref 3.5–5.5)
SCHISTOCYTES: ABNORMAL
WBC # BLD: 5.4 E9/L (ref 4.5–11.5)

## 2020-05-26 PROCEDURE — 36415 COLL VENOUS BLD VENIPUNCTURE: CPT

## 2020-05-26 PROCEDURE — 1123F ACP DISCUSS/DSCN MKR DOCD: CPT | Performed by: NURSE PRACTITIONER

## 2020-05-26 PROCEDURE — G8417 CALC BMI ABV UP PARAM F/U: HCPCS | Performed by: NURSE PRACTITIONER

## 2020-05-26 PROCEDURE — 99213 OFFICE O/P EST LOW 20 MIN: CPT | Performed by: NURSE PRACTITIONER

## 2020-05-26 PROCEDURE — 85025 COMPLETE CBC W/AUTO DIFF WBC: CPT

## 2020-05-26 PROCEDURE — 4040F PNEUMOC VAC/ADMIN/RCVD: CPT | Performed by: NURSE PRACTITIONER

## 2020-05-26 PROCEDURE — 1036F TOBACCO NON-USER: CPT | Performed by: NURSE PRACTITIONER

## 2020-05-26 PROCEDURE — 1090F PRES/ABSN URINE INCON ASSESS: CPT | Performed by: NURSE PRACTITIONER

## 2020-05-26 PROCEDURE — G8427 DOCREV CUR MEDS BY ELIG CLIN: HCPCS | Performed by: NURSE PRACTITIONER

## 2020-05-26 PROCEDURE — 77063 BREAST TOMOSYNTHESIS BI: CPT

## 2020-05-26 PROCEDURE — 99212 OFFICE O/P EST SF 10 MIN: CPT

## 2020-05-26 PROCEDURE — G8399 PT W/DXA RESULTS DOCUMENT: HCPCS | Performed by: NURSE PRACTITIONER

## 2020-05-26 NOTE — PROGRESS NOTES
of Systems;  CONSTITUTIONAL: No fever, chills. Fair appetite. ENMT: Eyes: No diplopia; Nose: No epistaxis. Mouth: No sore throat. RESPIRATORY: No hemoptysis, shortness of breath with exertion, no cough. CARDIOVASCULAR: No chest pain, palpitations. GASTROINTESTINAL: No nausea/vomiting, abdominal pain, diarrhea/constipation. GENITOURINARY: No dysuria, urinary frequency, hematuria. NEURO: No syncope, presyncope, headache.   Remainder:  ROS NEGATIVE    Past Medical History:      Diagnosis Date    Anemia     Pt reports she was dx in her teens, w/o proper w/u, with iron deficiency anemia and was on oral iron replacement for many years, resulting in iron overload    Aplastic anemia ([address] ~2032-7960    Possibly d/t chemotherapy for breast cancer    Breast cancer ([address] 2009    right side; pt underwent radical mastectomy followed by radiation therapy and chemotherapy and was then on oral chemo pill for 5 yrs; no recurrence as of 01/2019    Chickenpox     Hypothyroidism     Measles     Mild depression ([address]     Mumps     Myelodysplastic syndrome ([address] ~2016    Pulmonary tuberculosis ~5587-7452    in her early 25s    Scarlet fever     Sensory neuropathy     beyond the ankle b/l     Patient Active Problem List   Diagnosis    MDS (myelodysplastic syndrome) (HCC)    Bronchitis    Macrocytic anemia with high RDW    Hypothyroidism    Peripheral sensory neuropathy    Breast cancer (HCC)    Anemia of chronic disease        Past Surgical History:      Procedure Laterality Date    ADENOIDECTOMY      BONE MARROW BIOPSY      BREAST BIOPSY      CHOLECYSTECTOMY      MASTECTOMY      right breast    OTHER SURGICAL HISTORY      blood transfusions    TONSILLECTOMY AND ADENOIDECTOMY      TUBAL LIGATION         Family History:  Family History   Problem Relation Age of Onset    Cancer Mother         lung    Cancer Father         lung    Cancer Sister         ovarian cancer and breast cancer    Cancer Brother .8, white count is normal at 5.4, platelet count normal at, 163 K. Her hemoglobin is greater than 10 g/dL, no need to proceed with Aranesp, will continue monitor her CBCD. No indication to start her on hypo-methylating agents at this time. Left hip pain and tenderness, the patient had an x-ray done in January, that was unremarkable, she could have bursitis, the patient will take Tylenol or NSAIDs for pain control, if the pain does not get better will need to have a repeat x-ray done. RTC in 2 weeks with labs, aranesp. Thank you for allowing us to participate in the care of Ms. Ventura.     CONNIE Waters [facility]  MEDICAL ONCOLOGY  [facility] [address]  [place] [address]  Dept: [phone]  May 26, 2020

## 2020-06-09 ENCOUNTER — HOSPITAL ENCOUNTER (OUTPATIENT)
Dept: INFUSION THERAPY | Age: 80
Discharge: HOME | End: 2020-06-09
Payer: MEDICARE

## 2020-06-09 ENCOUNTER — OFFICE VISIT (OUTPATIENT)
Dept: ONCOLOGY | Age: 80
End: 2020-06-09
Payer: MEDICARE

## 2020-06-09 VITALS
SYSTOLIC BLOOD PRESSURE: 140 MMHG | OXYGEN SATURATION: 95 % | HEART RATE: 67 BPM | TEMPERATURE: 98.1 F | DIASTOLIC BLOOD PRESSURE: 64 MMHG | HEIGHT: 61 IN | BODY MASS INDEX: 28.42 KG/M2 | WEIGHT: 150.5 LBS

## 2020-06-09 DIAGNOSIS — D46.9 MYELODYSPLASTIC SYNDROME (HCC): Primary | ICD-10-CM

## 2020-06-09 DIAGNOSIS — D63.8 ANEMIA OF CHRONIC DISEASE: ICD-10-CM

## 2020-06-09 DIAGNOSIS — D46.9 MDS (MYELODYSPLASTIC SYNDROME) (HCC): ICD-10-CM

## 2020-06-09 LAB
ANISOCYTOSIS: ABNORMAL
BASOPHILIC STIPPLING: ABNORMAL
BASOPHILS ABSOLUTE: 0.02 E9/L (ref 0–0.2)
BASOPHILS RELATIVE PERCENT: 0.3 % (ref 0–2)
EOSINOPHILS ABSOLUTE: 0.11 E9/L (ref 0.05–0.5)
EOSINOPHILS RELATIVE PERCENT: 1.4 % (ref 0–6)
HCT VFR BLD CALC: 30.6 % (ref 34–48)
HEMOGLOBIN: 9.9 G/DL (ref 11.5–15.5)
HYPOCHROMIA: ABNORMAL
IMMATURE GRANULOCYTES #: 0.03 E9/L
IMMATURE GRANULOCYTES %: 0.4 % (ref 0–5)
LYMPHOCYTES ABSOLUTE: 3.37 E9/L (ref 1.5–4)
LYMPHOCYTES RELATIVE PERCENT: 42.2 % (ref 20–42)
MCH RBC QN AUTO: 36.9 PG (ref 26–35)
MCHC RBC AUTO-ENTMCNC: 32.4 % (ref 32–34.5)
MCV RBC AUTO: 114.2 FL (ref 80–99.9)
MONOCYTES ABSOLUTE: 0.64 E9/L (ref 0.1–0.95)
MONOCYTES RELATIVE PERCENT: 8 % (ref 2–12)
NEUTROPHILS ABSOLUTE: 3.82 E9/L (ref 1.8–7.3)
NEUTROPHILS RELATIVE PERCENT: 47.7 % (ref 43–80)
PDW BLD-RTO: 22.3 FL (ref 11.5–15)
PLATELET # BLD: 222 E9/L (ref 130–450)
PMV BLD AUTO: 10.1 FL (ref 7–12)
POIKILOCYTES: ABNORMAL
POLYCHROMASIA: ABNORMAL
RBC # BLD: 2.68 E12/L (ref 3.5–5.5)
SPHEROCYTES: ABNORMAL
WBC # BLD: 8 E9/L (ref 4.5–11.5)

## 2020-06-09 PROCEDURE — 99212 OFFICE O/P EST SF 10 MIN: CPT

## 2020-06-09 PROCEDURE — 99212 OFFICE O/P EST SF 10 MIN: CPT | Performed by: INTERNAL MEDICINE

## 2020-06-09 PROCEDURE — 36415 COLL VENOUS BLD VENIPUNCTURE: CPT

## 2020-06-09 PROCEDURE — 85025 COMPLETE CBC W/AUTO DIFF WBC: CPT

## 2020-06-09 PROCEDURE — 96372 THER/PROPH/DIAG INJ SC/IM: CPT

## 2020-06-09 PROCEDURE — 6360000002 HC RX W HCPCS: Performed by: INTERNAL MEDICINE

## 2020-06-09 RX ADMIN — DARBEPOETIN ALFA 500 MCG: 500 INJECTION, SOLUTION INTRAVENOUS; SUBCUTANEOUS at 13:18

## 2020-06-09 NOTE — PROGRESS NOTES
Diagnosis    Anemia    Interim history    Pt denies any bleeding. PE    HEAD NC AT  CHEST Clear BS BL  HEART S1S2 no m/r/g  ABD Soft ND NT  EXT no edema  NEURO A+Ox3    A/P    This is a 77 y/o lady with MDS. She has h/o breast ca.     Aranesp    F/u in 3 months

## 2020-06-15 ENCOUNTER — APPOINTMENT (OUTPATIENT)
Dept: GENERAL RADIOLOGY | Age: 80
End: 2020-06-15
Payer: MEDICARE

## 2020-06-15 ENCOUNTER — APPOINTMENT (OUTPATIENT)
Dept: ULTRASOUND IMAGING | Age: 80
End: 2020-06-15
Payer: MEDICARE

## 2020-06-15 ENCOUNTER — HOSPITAL ENCOUNTER (EMERGENCY)
Age: 80
Discharge: HOME | End: 2020-06-15
Attending: EMERGENCY MEDICINE
Payer: MEDICARE

## 2020-06-15 VITALS
WEIGHT: 150 LBS | HEART RATE: 80 BPM | DIASTOLIC BLOOD PRESSURE: 68 MMHG | BODY MASS INDEX: 29.45 KG/M2 | TEMPERATURE: 97.8 F | SYSTOLIC BLOOD PRESSURE: 130 MMHG | HEIGHT: 60 IN | OXYGEN SATURATION: 97 % | RESPIRATION RATE: 18 BRPM

## 2020-06-15 PROCEDURE — 73502 X-RAY EXAM HIP UNI 2-3 VIEWS: CPT

## 2020-06-15 PROCEDURE — 99283 EMERGENCY DEPT VISIT LOW MDM: CPT

## 2020-06-15 PROCEDURE — 73552 X-RAY EXAM OF FEMUR 2/>: CPT

## 2020-06-15 PROCEDURE — 93971 EXTREMITY STUDY: CPT

## 2020-06-15 ASSESSMENT — PAIN SCALES - GENERAL: PAINLEVEL_OUTOF10: 9

## 2020-06-15 NOTE — ED PROVIDER NOTES
[facility]), Pulmonary tuberculosis, Scarlet fever, and Sensory neuropathy. Past Surgical History:  has a past surgical history that includes Breast biopsy; Tubal ligation; Mastectomy; Cholecystectomy; other surgical history; Adenoidectomy; bone marrow biopsy; and Tonsillectomy and adenoidectomy. Social History:  reports that she quit smoking about 20 years ago. Her smoking use included cigarettes. She started smoking about 57 years ago. She has a 37.00 pack-year smoking history. She has never used smokeless tobacco. She reports that she does not drink alcohol or use drugs. Family History: family history includes Cancer in her brother, father, maternal aunt, mother, sister, and another family member; Other in her brother. The patients home medications have been reviewed. Allergies: Bee venom and Penicillins    -------------------------------------------------- RESULTS -------------------------------------------------  Labs:  No results found for this visit on 06/15/20. Radiology:  US DUP LOWER EXTREMITY LEFT OLEGARIO   Final Result   No evidence of deep venous thrombosis. XR FEMUR LEFT (MIN 2 VIEWS)   Final Result   Stable likely dystrophic calcification which is likely at the   hamstring insertion. No other active process. XR HIP LEFT (2-3 VIEWS)   Final Result   Stable likely dystrophic calcification which is likely at the   hamstring insertion. No other active process. ------------------------- NURSING NOTES AND VITALS REVIEWED ---------------------------  Date / Time Roomed:  6/15/2020 12:01 PM  ED Bed Assignment:  24/24    The nursing notes within the ED encounter and vital signs as below have been reviewed.    /68   Pulse 80   Temp 97.8 °F (36.6 °C) (Oral)   Resp 18   Ht 5' (1.524 m)   Wt 150 lb (68 kg)   LMP 05/28/2016   SpO2 97%   BMI 29.29 kg/m²   Oxygen Saturation Interpretation: Normal      ------------------------------------------ PROGRESS NOTES

## 2020-06-16 ENCOUNTER — CARE COORDINATION (OUTPATIENT)
Dept: CARE COORDINATION | Age: 80
End: 2020-06-16

## 2020-06-16 ASSESSMENT — ENCOUNTER SYMPTOMS
NAUSEA: 0
ABDOMINAL DISTENTION: 0
COUGH: 0
BACK PAIN: 0
SHORTNESS OF BREATH: 0
DIARRHEA: 0
VOMITING: 0

## 2020-06-23 ENCOUNTER — HOSPITAL ENCOUNTER (OUTPATIENT)
Dept: INFUSION THERAPY | Age: 80
Discharge: HOME | End: 2020-06-23
Payer: MEDICARE

## 2020-06-23 DIAGNOSIS — D46.9 MYELODYSPLASTIC SYNDROME (HCC): ICD-10-CM

## 2020-06-23 LAB
ANISOCYTOSIS: ABNORMAL
BASOPHILIC STIPPLING: ABNORMAL
BASOPHILS ABSOLUTE: 0.04 E9/L (ref 0–0.2)
BASOPHILS RELATIVE PERCENT: 0.5 % (ref 0–2)
EOSINOPHILS ABSOLUTE: 0.16 E9/L (ref 0.05–0.5)
EOSINOPHILS RELATIVE PERCENT: 2.2 % (ref 0–6)
HCT VFR BLD CALC: 34.5 % (ref 34–48)
HEMOGLOBIN: 11.4 G/DL (ref 11.5–15.5)
IMMATURE GRANULOCYTES #: 0.04 E9/L
IMMATURE GRANULOCYTES %: 0.5 % (ref 0–5)
LYMPHOCYTES ABSOLUTE: 4.1 E9/L (ref 1.5–4)
LYMPHOCYTES RELATIVE PERCENT: 56.1 % (ref 20–42)
MCH RBC QN AUTO: 38.3 PG (ref 26–35)
MCHC RBC AUTO-ENTMCNC: 33 % (ref 32–34.5)
MCV RBC AUTO: 115.8 FL (ref 80–99.9)
MONOCYTES ABSOLUTE: 0.44 E9/L (ref 0.1–0.95)
MONOCYTES RELATIVE PERCENT: 6 % (ref 2–12)
NEUTROPHILS ABSOLUTE: 2.53 E9/L (ref 1.8–7.3)
NEUTROPHILS RELATIVE PERCENT: 34.7 % (ref 43–80)
OVALOCYTES: ABNORMAL
PDW BLD-RTO: 24.7 FL (ref 11.5–15)
PLATELET # BLD: 140 E9/L (ref 130–450)
PMV BLD AUTO: 10.3 FL (ref 7–12)
POIKILOCYTES: ABNORMAL
POLYCHROMASIA: ABNORMAL
RBC # BLD: 2.98 E12/L (ref 3.5–5.5)
SCHISTOCYTES: ABNORMAL
TEAR DROP CELLS: ABNORMAL
WBC # BLD: 7.3 E9/L (ref 4.5–11.5)

## 2020-06-23 PROCEDURE — 36415 COLL VENOUS BLD VENIPUNCTURE: CPT

## 2020-06-23 PROCEDURE — 85025 COMPLETE CBC W/AUTO DIFF WBC: CPT

## 2020-06-23 RX ORDER — HYDROCODONE BITARTRATE AND ACETAMINOPHEN 5; 325 MG/1; MG/1
1 TABLET ORAL EVERY 6 HOURS PRN
Qty: 120 TABLET | Refills: 0 | Status: SHIPPED
Start: 2020-06-23 | End: 2020-09-22 | Stop reason: SDUPTHER

## 2020-06-29 ENCOUNTER — CARE COORDINATION (OUTPATIENT)
Dept: CARE COORDINATION | Age: 80
End: 2020-06-29

## 2020-07-07 ENCOUNTER — HOSPITAL ENCOUNTER (OUTPATIENT)
Dept: INFUSION THERAPY | Age: 80
Discharge: HOME | End: 2020-07-07
Payer: MEDICARE

## 2020-07-07 DIAGNOSIS — D46.9 MDS (MYELODYSPLASTIC SYNDROME) (HCC): ICD-10-CM

## 2020-07-07 DIAGNOSIS — D63.8 ANEMIA OF CHRONIC DISEASE: ICD-10-CM

## 2020-07-07 DIAGNOSIS — D46.9 MYELODYSPLASTIC SYNDROME (HCC): Primary | ICD-10-CM

## 2020-07-07 LAB
ANISOCYTOSIS: ABNORMAL
BASOPHILIC STIPPLING: ABNORMAL
BASOPHILS ABSOLUTE: 0.02 E9/L (ref 0–0.2)
BASOPHILS RELATIVE PERCENT: 0.4 % (ref 0–2)
EOSINOPHILS ABSOLUTE: 0.1 E9/L (ref 0.05–0.5)
EOSINOPHILS RELATIVE PERCENT: 2.1 % (ref 0–6)
HCT VFR BLD CALC: 29.3 % (ref 34–48)
HEMOGLOBIN: 9.8 G/DL (ref 11.5–15.5)
IMMATURE GRANULOCYTES #: 0.02 E9/L
IMMATURE GRANULOCYTES %: 0.4 % (ref 0–5)
LYMPHOCYTES ABSOLUTE: 2.78 E9/L (ref 1.5–4)
LYMPHOCYTES RELATIVE PERCENT: 58.6 % (ref 20–42)
MCH RBC QN AUTO: 38.3 PG (ref 26–35)
MCHC RBC AUTO-ENTMCNC: 33.4 % (ref 32–34.5)
MCV RBC AUTO: 114.5 FL (ref 80–99.9)
MONOCYTES ABSOLUTE: 0.26 E9/L (ref 0.1–0.95)
MONOCYTES RELATIVE PERCENT: 5.5 % (ref 2–12)
NEUTROPHILS ABSOLUTE: 1.56 E9/L (ref 1.8–7.3)
NEUTROPHILS RELATIVE PERCENT: 33 % (ref 43–80)
OVALOCYTES: ABNORMAL
PDW BLD-RTO: 23.9 FL (ref 11.5–15)
PLATELET # BLD: 174 E9/L (ref 130–450)
PMV BLD AUTO: 11.2 FL (ref 7–12)
POIKILOCYTES: ABNORMAL
POLYCHROMASIA: ABNORMAL
RBC # BLD: 2.56 E12/L (ref 3.5–5.5)
TEAR DROP CELLS: ABNORMAL
WBC # BLD: 4.7 E9/L (ref 4.5–11.5)

## 2020-07-07 PROCEDURE — 85025 COMPLETE CBC W/AUTO DIFF WBC: CPT

## 2020-07-07 PROCEDURE — 96372 THER/PROPH/DIAG INJ SC/IM: CPT

## 2020-07-07 PROCEDURE — 6360000002 HC RX W HCPCS: Performed by: INTERNAL MEDICINE

## 2020-07-07 RX ADMIN — DARBEPOETIN ALFA 500 MCG: 500 INJECTION, SOLUTION INTRAVENOUS; SUBCUTANEOUS at 12:15

## 2020-07-17 RX ORDER — GABAPENTIN 300 MG/1
CAPSULE ORAL
Qty: 270 CAPSULE | Refills: 0 | Status: SHIPPED
Start: 2020-07-17 | End: 2020-11-04 | Stop reason: SDUPTHER

## 2020-07-17 RX ORDER — LEVOTHYROXINE SODIUM 175 UG/1
175 TABLET ORAL EVERY MORNING
Qty: 30 TABLET | Refills: 2 | Status: SHIPPED | OUTPATIENT
Start: 2020-07-17 | End: 2021-07-06

## 2020-07-17 NOTE — TELEPHONE ENCOUNTER
Name of Medication(s) Requested:  Synthroid    Pharmacy Requested:   [facility]    Medication(s) pended? [x] Yes  [] No    Last Appointment:  9/10/2019    Future appts:  Future Appointments   Date Time Provider Paula Su   7/21/2020  1:00 PM [facility] FAST TRACK 1 [facility] Med Onc [facility]   9/8/2020  1:00 PM Christie Brittle, MD MED ONC [facility]   9/8/2020  1:30 PM [facility] FAST TRACK 2 [facility] Med Onc [facility]   10/13/2020  1:00 PM SCHEDULE, [facility] PALLIATIVE [facility]ARE PROVIDER [facility]        Does patient need call back?   [] Yes  [x] No

## 2020-07-21 ENCOUNTER — HOSPITAL ENCOUNTER (OUTPATIENT)
Dept: INFUSION THERAPY | Age: 80
Discharge: HOME | End: 2020-07-21
Payer: MEDICARE

## 2020-07-21 DIAGNOSIS — D46.9 MYELODYSPLASTIC SYNDROME (HCC): ICD-10-CM

## 2020-07-21 LAB
ANISOCYTOSIS: ABNORMAL
BASOPHILIC STIPPLING: ABNORMAL
BASOPHILS ABSOLUTE: 0 E9/L (ref 0–0.2)
BASOPHILS RELATIVE PERCENT: 0.5 % (ref 0–2)
EOSINOPHILS ABSOLUTE: 0.1 E9/L (ref 0.05–0.5)
EOSINOPHILS RELATIVE PERCENT: 1.8 % (ref 0–6)
HCT VFR BLD CALC: 34.1 % (ref 34–48)
HEMOGLOBIN: 10.9 G/DL (ref 11.5–15.5)
LYMPHOCYTES ABSOLUTE: 3.03 E9/L (ref 1.5–4)
LYMPHOCYTES RELATIVE PERCENT: 55.3 % (ref 20–42)
MCH RBC QN AUTO: 38.4 PG (ref 26–35)
MCHC RBC AUTO-ENTMCNC: 32 % (ref 32–34.5)
MCV RBC AUTO: 120.1 FL (ref 80–99.9)
MONOCYTES ABSOLUTE: 0.11 E9/L (ref 0.1–0.95)
MONOCYTES RELATIVE PERCENT: 1.8 % (ref 2–12)
MYELOCYTE PERCENT: 0.9 % (ref 0–0)
NEUTROPHILS ABSOLUTE: 2.26 E9/L (ref 1.8–7.3)
NEUTROPHILS RELATIVE PERCENT: 40.4 % (ref 43–80)
NUCLEATED RED BLOOD CELLS: 0.9 /100 WBC
OVALOCYTES: ABNORMAL
PDW BLD-RTO: 26.8 FL (ref 11.5–15)
PLATELET # BLD: 123 E9/L (ref 130–450)
PMV BLD AUTO: 10.5 FL (ref 7–12)
POIKILOCYTES: ABNORMAL
POLYCHROMASIA: ABNORMAL
RBC # BLD: 2.84 E12/L (ref 3.5–5.5)
SCHISTOCYTES: ABNORMAL
TEAR DROP CELLS: ABNORMAL
WBC # BLD: 5.5 E9/L (ref 4.5–11.5)

## 2020-07-21 PROCEDURE — 85025 COMPLETE CBC W/AUTO DIFF WBC: CPT

## 2020-08-04 ENCOUNTER — OFFICE VISIT (OUTPATIENT)
Dept: PRIMARY CARE CLINIC | Age: 80
End: 2020-08-04
Payer: MEDICARE

## 2020-08-04 ENCOUNTER — TELEPHONE (OUTPATIENT)
Dept: ONCOLOGY | Age: 80
End: 2020-08-04

## 2020-08-04 ENCOUNTER — HOSPITAL ENCOUNTER (OUTPATIENT)
Age: 80
Discharge: HOME | End: 2020-08-06
Payer: MEDICARE

## 2020-08-04 ENCOUNTER — HOSPITAL ENCOUNTER (OUTPATIENT)
Dept: GENERAL RADIOLOGY | Age: 80
Discharge: HOME | End: 2020-08-06
Payer: MEDICARE

## 2020-08-04 VITALS
OXYGEN SATURATION: 97 % | BODY MASS INDEX: 27.38 KG/M2 | SYSTOLIC BLOOD PRESSURE: 106 MMHG | DIASTOLIC BLOOD PRESSURE: 58 MMHG | HEART RATE: 57 BPM | WEIGHT: 145 LBS | HEIGHT: 61 IN | TEMPERATURE: 98.3 F

## 2020-08-04 PROCEDURE — 71046 X-RAY EXAM CHEST 2 VIEWS: CPT

## 2020-08-04 PROCEDURE — U0003 INFECTIOUS AGENT DETECTION BY NUCLEIC ACID (DNA OR RNA); SEVERE ACUTE RESPIRATORY SYNDROME CORONAVIRUS 2 (SARS-COV-2) (CORONAVIRUS DISEASE [COVID-19]), AMPLIFIED PROBE TECHNIQUE, MAKING USE OF HIGH THROUGHPUT TECHNOLOGIES AS DESCRIBED BY CMS-2020-01-R: HCPCS

## 2020-08-04 PROCEDURE — 1036F TOBACCO NON-USER: CPT | Performed by: PHYSICIAN ASSISTANT

## 2020-08-04 PROCEDURE — 1123F ACP DISCUSS/DSCN MKR DOCD: CPT | Performed by: PHYSICIAN ASSISTANT

## 2020-08-04 PROCEDURE — 4040F PNEUMOC VAC/ADMIN/RCVD: CPT | Performed by: PHYSICIAN ASSISTANT

## 2020-08-04 PROCEDURE — G8427 DOCREV CUR MEDS BY ELIG CLIN: HCPCS | Performed by: PHYSICIAN ASSISTANT

## 2020-08-04 PROCEDURE — G8417 CALC BMI ABV UP PARAM F/U: HCPCS | Performed by: PHYSICIAN ASSISTANT

## 2020-08-04 PROCEDURE — G8399 PT W/DXA RESULTS DOCUMENT: HCPCS | Performed by: PHYSICIAN ASSISTANT

## 2020-08-04 PROCEDURE — 99213 OFFICE O/P EST LOW 20 MIN: CPT | Performed by: PHYSICIAN ASSISTANT

## 2020-08-04 PROCEDURE — 1090F PRES/ABSN URINE INCON ASSESS: CPT | Performed by: PHYSICIAN ASSISTANT

## 2020-08-04 NOTE — PROGRESS NOTES
Chief Complaint   Cough (x 1 day, green productive); Congestion (fatigue, started yesterday); Fatigue (exposed to COVID-19); and Other (loss of smell)    History of Present Illness   Source of history provided by: patient. Sharri Mcardle is a 78 y.o. old female who has a past medical history of:   Past Medical History:   Diagnosis Date    Anemia     Pt reports she was dx in her teens, w/o proper w/u, with iron deficiency anemia and was on oral iron replacement for many years, resulting in iron overload    Aplastic anemia ([address] ~5376-0746    Possibly d/t chemotherapy for breast cancer    Breast cancer ([address] 2009    right side; pt underwent radical mastectomy followed by radiation therapy and chemotherapy and was then on oral chemo pill for 5 yrs; no recurrence as of 01/2019    Chickenpox     Hypothyroidism     Measles     Mild depression ([address]     Mumps     [address]elodys[address] [facility] ~2016    Pulmonary tuberculosis ~5351-3638    in her early 25s    Scarlet fever     Sensory neuropathy     beyond the ankle b/l   Presents to the flu clinic with complaints of a productive cough with green sputum, nasal congestion, chest congestion, and fatigue x 1 day. Pt reports loss of smell for the past 3-4 days. Denies any fever. Pt is mostly concerned because she was exposed to a known case of COVID-19. Symptoms have been persistent since onset. Denies any CP, dyspnea, LE edema, abdominal pain, vomiting, rash, or lethargy. Has not been taking anything OTC for symptomatic relief. Denies any hx of asthma, COPD, or tobacco use. Denies any history of international travel in the past 14 days. Relevant PMH: No pertinent PMH. Smoking history:  She  reports that she quit smoking about 20 years ago. Her smoking use included cigarettes. She started smoking about 57 years ago. She has a 37.00 pack-year smoking history. She has never used smokeless tobacco.     She has had ill contacts with URI symptoms. Treatment to date: none. Travel screen completed:  Yes      ROS   Pertinent positives and negatives are stated within HPI, all other systems reviewed and are negative. Past Surgical History:   Procedure Laterality Date    ADENOIDECTOMY      BONE MARROW BIOPSY      BREAST BIOPSY      CHOLECYSTECTOMY      MASTECTOMY      right breast    OTHER SURGICAL HISTORY      blood transfusions    TONSILLECTOMY AND ADENOIDECTOMY      TUBAL LIGATION     Social History:  reports that she quit smoking about 20 years ago. Her smoking use included cigarettes. She started smoking about 57 years ago. She has a 37.00 pack-year smoking history. She has never used smokeless tobacco. She reports that she does not drink alcohol or use drugs. Family History: family history includes Cancer in her brother, father, maternal aunt, mother, sister, and another family member; Other in her brother. Allergies: Bee venom and Penicillins    Physical Exam      VS:  BP (!) 106/58   Pulse 57   Temp 98.3 °F (36.8 °C) (Oral)   Ht 5' 1\" (1.549 m)   Wt 145 lb (65.8 kg)   LMP 05/28/2016   SpO2 97%   BMI 27.40 kg/m²    Oxygen Saturation Interpretation: Normal.    Constitutional:  Alert, development consistent with age. NAD. Head:  NC/NT. Airway patent. Ears: TMs translucent bilaterally. Canals without exudate or swelling bilaterally. Mouth: Posterior pharynx with mild erythema and clear postnasal drip. No tonsillar hypertrophy or exudate. Neck:  Normal ROM. Supple. No anterior cervical adenopathy noted. Lungs: Breath sounds are decreased over the left lower lung base but otherwise CTAB without wheezes, rales, or rhonchi. CV:  Regular rate and rhythm, normal heart sounds, without pathological murmurs, ectopy, gallops, or rubs. Skin:  Normal turgor. Warm, dry, without visible rash. Lymphatic: No lymphangitis or adenopathy noted. Neurological:  Oriented. Motor functions intact.     Lab / Imaging Results   (All laboratory and

## 2020-08-06 LAB
SARS-COV-2: NOT DETECTED
SOURCE: NORMAL

## 2020-08-20 ENCOUNTER — NURSE ONLY (OUTPATIENT)
Dept: PRIMARY CARE CLINIC | Age: 80
End: 2020-08-20

## 2020-08-20 ENCOUNTER — HOSPITAL ENCOUNTER (OUTPATIENT)
Age: 80
Discharge: HOME | End: 2020-08-22
Payer: MEDICARE

## 2020-08-20 PROCEDURE — U0003 INFECTIOUS AGENT DETECTION BY NUCLEIC ACID (DNA OR RNA); SEVERE ACUTE RESPIRATORY SYNDROME CORONAVIRUS 2 (SARS-COV-2) (CORONAVIRUS DISEASE [COVID-19]), AMPLIFIED PROBE TECHNIQUE, MAKING USE OF HIGH THROUGHPUT TECHNOLOGIES AS DESCRIBED BY CMS-2020-01-R: HCPCS

## 2020-08-22 LAB
SARS-COV-2: NOT DETECTED
SOURCE: NORMAL

## 2020-09-04 ENCOUNTER — HOSPITAL ENCOUNTER (OUTPATIENT)
Dept: INFUSION THERAPY | Age: 80
Discharge: HOME | End: 2020-09-04
Payer: MEDICARE

## 2020-09-04 ENCOUNTER — OFFICE VISIT (OUTPATIENT)
Dept: ONCOLOGY | Age: 80
End: 2020-09-04
Payer: MEDICARE

## 2020-09-04 VITALS
WEIGHT: 153 LBS | HEIGHT: 61 IN | BODY MASS INDEX: 28.89 KG/M2 | TEMPERATURE: 98.4 F | OXYGEN SATURATION: 94 % | SYSTOLIC BLOOD PRESSURE: 119 MMHG | DIASTOLIC BLOOD PRESSURE: 58 MMHG | HEART RATE: 65 BPM

## 2020-09-04 DIAGNOSIS — D63.8 ANEMIA OF CHRONIC DISEASE: ICD-10-CM

## 2020-09-04 DIAGNOSIS — D46.9 MYELODYSPLASTIC SYNDROME (HCC): Primary | ICD-10-CM

## 2020-09-04 DIAGNOSIS — D46.9 MDS (MYELODYSPLASTIC SYNDROME) (HCC): ICD-10-CM

## 2020-09-04 LAB
ANISOCYTOSIS: ABNORMAL
BASOPHILS ABSOLUTE: 0.03 E9/L (ref 0–0.2)
BASOPHILS RELATIVE PERCENT: 0.5 % (ref 0–2)
EOSINOPHILS ABSOLUTE: 0.07 E9/L (ref 0.05–0.5)
EOSINOPHILS RELATIVE PERCENT: 1.2 % (ref 0–6)
HCT VFR BLD CALC: 28.1 % (ref 34–48)
HEMOGLOBIN: 9.4 G/DL (ref 11.5–15.5)
HYPOCHROMIA: ABNORMAL
IMMATURE GRANULOCYTES #: 0.02 E9/L
IMMATURE GRANULOCYTES %: 0.3 % (ref 0–5)
LYMPHOCYTES ABSOLUTE: 3.06 E9/L (ref 1.5–4)
LYMPHOCYTES RELATIVE PERCENT: 53.1 % (ref 20–42)
MCH RBC QN AUTO: 40 PG (ref 26–35)
MCHC RBC AUTO-ENTMCNC: 33.5 % (ref 32–34.5)
MCV RBC AUTO: 119.6 FL (ref 80–99.9)
MONOCYTES ABSOLUTE: 0.35 E9/L (ref 0.1–0.95)
MONOCYTES RELATIVE PERCENT: 6.1 % (ref 2–12)
NEUTROPHILS ABSOLUTE: 2.23 E9/L (ref 1.8–7.3)
NEUTROPHILS RELATIVE PERCENT: 38.8 % (ref 43–80)
OVALOCYTES: ABNORMAL
PDW BLD-RTO: 23.6 FL (ref 11.5–15)
PLATELET # BLD: 179 E9/L (ref 130–450)
PMV BLD AUTO: 10.4 FL (ref 7–12)
POIKILOCYTES: ABNORMAL
POLYCHROMASIA: ABNORMAL
RBC # BLD: 2.35 E12/L (ref 3.5–5.5)
SCHISTOCYTES: ABNORMAL
TARGET CELLS: ABNORMAL
TEAR DROP CELLS: ABNORMAL
WBC # BLD: 5.8 E9/L (ref 4.5–11.5)

## 2020-09-04 PROCEDURE — 4040F PNEUMOC VAC/ADMIN/RCVD: CPT | Performed by: INTERNAL MEDICINE

## 2020-09-04 PROCEDURE — G8399 PT W/DXA RESULTS DOCUMENT: HCPCS | Performed by: INTERNAL MEDICINE

## 2020-09-04 PROCEDURE — 6360000002 HC RX W HCPCS: Performed by: INTERNAL MEDICINE

## 2020-09-04 PROCEDURE — G8417 CALC BMI ABV UP PARAM F/U: HCPCS | Performed by: INTERNAL MEDICINE

## 2020-09-04 PROCEDURE — 85025 COMPLETE CBC W/AUTO DIFF WBC: CPT

## 2020-09-04 PROCEDURE — 99214 OFFICE O/P EST MOD 30 MIN: CPT | Performed by: INTERNAL MEDICINE

## 2020-09-04 PROCEDURE — 1036F TOBACCO NON-USER: CPT | Performed by: INTERNAL MEDICINE

## 2020-09-04 PROCEDURE — 99212 OFFICE O/P EST SF 10 MIN: CPT

## 2020-09-04 PROCEDURE — 1090F PRES/ABSN URINE INCON ASSESS: CPT | Performed by: INTERNAL MEDICINE

## 2020-09-04 PROCEDURE — 1123F ACP DISCUSS/DSCN MKR DOCD: CPT | Performed by: INTERNAL MEDICINE

## 2020-09-04 PROCEDURE — 96372 THER/PROPH/DIAG INJ SC/IM: CPT

## 2020-09-04 PROCEDURE — G8427 DOCREV CUR MEDS BY ELIG CLIN: HCPCS | Performed by: INTERNAL MEDICINE

## 2020-09-04 RX ADMIN — DARBEPOETIN ALFA 500 MCG: 500 INJECTION, SOLUTION INTRAVENOUS; SUBCUTANEOUS at 14:39

## 2020-09-04 NOTE — PROGRESS NOTES
[address] MED ONCOLOGY  [address] [facility] [address]  Dept: [phone]  Attending Progress Note      Reason for Visit:   1. Right Breast Cancer. 2. MDS. Referring Physician:  CONNIE Allen CNP    PCP:  CONNIE Allen CNP    History of Present Illness: The patient is a very pleasant 78 y.o. lady with a PMH significant for Right Breast Cancer, stage III, HR pos, was diagnosed in 2009, she had a right mastectomy done, followed by adjuvant chemo, she received 8 cycles, probably AC followed by T, then PMRT, and 5 years of adjuvant ET with Arimidex, was completed in 2015. She was treated in [facility] under the care of Dr. Dennis Bang. She was diagnosed with MDS in 2017, she received ESAs and PRBCs transfusion. She has transfusion related iron overload. She is feeling well overall, has chronic joints pain, peripheral neuropathy. She was started on Aranesp on 4/24/2020. No SE from Aranesp. She was seen in the ED on 1/2/2020 worsening pelvic and hip pain s/p fall, scan of the lumbar spine had revealed moderately severe stenosis at L4-L5, moderate stenosis at L3-L4 and L1-L2, suggestion of subtle acute fracture along the anterior superior endplate of L3, mild chronic compression deformity of the superior endplate of R79. The patient is feeling much better, the steroids and Flexeril did help, she started using the brace. She was seen by neurosurgery, lumbar spine MRI was ordered, patient did not have it done due to the cost of the test.  She found spine CT scan and MRI from 2017, which she took to the neurosurgery office. The patient returns for a follow-up visit, she was tired over the weekend. She has pain and tenderness in the left hip. Review of Systems;  CONSTITUTIONAL: No fever, chills. Fair appetite. ENMT: Eyes: No diplopia; Nose: No epistaxis. Mouth: No sore throat.   RESPIRATORY: No hemoptysis, shortness of breath, cough. CARDIOVASCULAR: No chest pain, palpitations. GASTROINTESTINAL: No nausea/vomiting, abdominal pain, diarrhea/constipation. GENITOURINARY: No dysuria, urinary frequency, hematuria. NEURO: No syncope, presyncope, headache. Remainder:  ROS NEGATIVE    Past Medical History:      Diagnosis Date    Anemia     Pt reports she was dx in her teens, w/o proper w/u, with iron deficiency anemia and was on oral iron replacement for many years, resulting in iron overload    Aplastic anemia ([address] ~8593-6278    Possibly d/t chemotherapy for breast cancer    Breast cancer ([address] 2009    right side; pt underwent radical mastectomy followed by radiation therapy and chemotherapy and was then on oral chemo pill for 5 yrs; no recurrence as of 01/2019    Chickenpox     Hypothyroidism     Measles     Mild depression ([address]     Mumps     Myelodysplastic syndrome ([address] ~2016    Pulmonary tuberculosis ~9449-9839    in her early 25s    Scarlet fever     Sensory neuropathy     beyond the ankle b/l     Patient Active Problem List   Diagnosis    MDS (myelodysplastic syndrome) (HCC)    Bronchitis    Macrocytic anemia with high RDW    Hypothyroidism    Peripheral sensory neuropathy    Breast cancer (HCC)    Anemia of chronic disease        Past Surgical History:      Procedure Laterality Date    ADENOIDECTOMY      BONE MARROW BIOPSY      BREAST BIOPSY      CHOLECYSTECTOMY      MASTECTOMY      right breast    OTHER SURGICAL HISTORY      blood transfusions    TONSILLECTOMY AND ADENOIDECTOMY      TUBAL LIGATION         Family History:  Family History   Problem Relation Age of Onset   Michelcomfort Ni Cancer Mother         lung    Cancer Father         lung    Cancer Sister         ovarian cancer and breast cancer    Cancer Brother         unknown    Cancer Maternal Aunt         unknown    Cancer Other         brain    Other Brother         MI       Medications:  Reviewed and reconciled.     Social History:  Social History     Socioeconomic History    Marital status: Single     Spouse name: Not on file    Number of children: Not on file    Years of education: Not on file    Highest education level: Not on file   Occupational History    Not on file   Social Needs    Financial resource strain: Not on file    Food insecurity     Worry: Not on file     Inability: Not on file    Transportation needs     Medical: Not on file     Non-medical: Not on file   Tobacco Use    Smoking status: Former Smoker     Packs/day: 1.00     Years: 37.00     Pack years: 37.00     Types: Cigarettes     Start date:      Last attempt to quit: 2000     Years since quittin.6    Smokeless tobacco: Never Used   Substance and Sexual Activity    Alcohol use: No    Drug use: Never    Sexual activity: Not Currently   Lifestyle    Physical activity     Days per week: Not on file     Minutes per session: Not on file    Stress: Not on file   Relationships    Social connections     Talks on phone: Not on file     Gets together: Not on file     Attends Protestant service: Not on file     Active member of club or organization: Not on file     Attends meetings of clubs or organizations: Not on file     Relationship status: Not on file    Intimate partner violence     Fear of current or ex partner: Not on file     Emotionally abused: Not on file     Physically abused: Not on file     Forced sexual activity: Not on file   Other Topics Concern    Not on file   Social History Narrative    Not on file       Allergies: Allergies   Allergen Reactions    Bee Venom Swelling and Dermatitis    Penicillins Hives, Swelling and Dermatitis       Physical Exam:  BP (!) 119/58   Pulse 65   Temp 98.4 °F (36.9 °C)   Ht 5' 1\" (1.549 m)   Wt 153 lb (69.4 kg)   LMP 2016   SpO2 94%   BMI 28.91 kg/m²   GENERAL: Alert, oriented x 3, not in acute distress. HEENT: PERRLA; EOMI. Oropharynx clear. NECK: Supple.  No palpable cervical or supraclavicular lymphadenopathy. LUNGS: Good air entry bilaterally. No wheezing, crackles or rhonchi. CARDIOVASCULAR: Regular rate. No murmurs, rubs or gallops. ABDOMEN: Soft. Non-tender, non-distended. Positive bowel sounds. EXTREMITIES: Without clubbing, cyanosis, or edema, she has tenderness of the left hip. NEUROLOGIC: No focal deficits. ECOG PS 1      Impression/Plan:     1. The patient is a very pleasant 78 y.o. lady with a PMH significant for Right Breast Cancer, stage III, HR pos, was diagnosed in 2009, she had a right mastectomy done, followed by adjuvant chemo, she received 8 cycles, probably AC followed by T, then PMRT, and 5 years of adjuvant ET with Arimidex, was completed in 2015. She was treated in [facility] under the care of Dr. Yao Muñoz. She is doing well clinically without any evidence of recurrence of her disease. Discussed with her the importance of monthly SBE, and routine screening mammograms, she had a left breast screening mammogram done on 5/26/2020, revealing benign findings, was negative for malignancy,      2. She has MDS, diagnosed in 2017, she received ESAs and PRBCs transfusion, has transfusion related iron overload, hgb was 8.5, ferritin 3647, was restarted on Aranesp on 4/23/2019. She was seen in the ED on 1/2/2020 worsening pelvic and hip pain s/p fall, scan of the lumbar spine had revealed moderately severe stenosis at L4-L5, moderate stenosis at L3-L4 and L1-L2, suggestion of subtle acute fracture along the anterior superior endplate of L3, mild chronic compression deformity of the superior endplate of B99. Patient is feeling better, she was seen by neurosurgery. Today 9/4/2020, hemoglobin is 9.4, hematocrit 28.1, white count 5.8, .6, platelet count 131B, her hemoglobin is less than 10 G/DL, proceed with Aranesp, she has not been requiring the Aranesp every 2 weeks. Will continue monitor her CBCD.   No indication to start her on hypo-methylating agents at this

## 2020-09-08 ENCOUNTER — HOSPITAL ENCOUNTER (OUTPATIENT)
Dept: INFUSION THERAPY | Age: 80
Discharge: HOME | End: 2020-09-08

## 2020-09-22 RX ORDER — HYDROCODONE BITARTRATE AND ACETAMINOPHEN 5; 325 MG/1; MG/1
1 TABLET ORAL EVERY 6 HOURS PRN
Qty: 120 TABLET | Refills: 0 | Status: SHIPPED
Start: 2020-09-22 | End: 2020-10-13 | Stop reason: SDUPTHER

## 2020-09-22 NOTE — TELEPHONE ENCOUNTER
Spoke with patient to let them know that their medication refills were called into their pharmacy.     10/13/2020

## 2020-10-02 ENCOUNTER — OFFICE VISIT (OUTPATIENT)
Dept: ONCOLOGY | Age: 80
End: 2020-10-02
Payer: MEDICARE

## 2020-10-02 ENCOUNTER — HOSPITAL ENCOUNTER (OUTPATIENT)
Dept: INFUSION THERAPY | Age: 80
Discharge: HOME | End: 2020-10-02
Payer: MEDICARE

## 2020-10-02 VITALS
BODY MASS INDEX: 30.17 KG/M2 | TEMPERATURE: 97.9 F | WEIGHT: 159.8 LBS | SYSTOLIC BLOOD PRESSURE: 113 MMHG | OXYGEN SATURATION: 94 % | HEART RATE: 66 BPM | DIASTOLIC BLOOD PRESSURE: 63 MMHG | HEIGHT: 61 IN

## 2020-10-02 DIAGNOSIS — D63.8 ANEMIA OF CHRONIC DISEASE: ICD-10-CM

## 2020-10-02 DIAGNOSIS — D46.9 MDS (MYELODYSPLASTIC SYNDROME) (HCC): Primary | Chronic | ICD-10-CM

## 2020-10-02 DIAGNOSIS — C50.919 MALIGNANT NEOPLASM OF FEMALE BREAST, UNSPECIFIED ESTROGEN RECEPTOR STATUS, UNSPECIFIED LATERALITY, UNSPECIFIED SITE OF BREAST (HCC): ICD-10-CM

## 2020-10-02 LAB
ANISOCYTOSIS: ABNORMAL
BASOPHILS ABSOLUTE: 0 E9/L (ref 0–0.2)
BASOPHILS RELATIVE PERCENT: 0.4 % (ref 0–2)
EOSINOPHILS ABSOLUTE: 0.14 E9/L (ref 0.05–0.5)
EOSINOPHILS RELATIVE PERCENT: 2.7 % (ref 0–6)
HCT VFR BLD CALC: 27.8 % (ref 34–48)
HEMOGLOBIN: 9.3 G/DL (ref 11.5–15.5)
HYPOCHROMIA: ABNORMAL
LYMPHOCYTES ABSOLUTE: 3.22 E9/L (ref 1.5–4)
LYMPHOCYTES RELATIVE PERCENT: 61.9 % (ref 20–42)
MCH RBC QN AUTO: 39.6 PG (ref 26–35)
MCHC RBC AUTO-ENTMCNC: 33.5 % (ref 32–34.5)
MCV RBC AUTO: 118.3 FL (ref 80–99.9)
MONOCYTES ABSOLUTE: 0.21 E9/L (ref 0.1–0.95)
MONOCYTES RELATIVE PERCENT: 3.5 % (ref 2–12)
NEUTROPHILS ABSOLUTE: 1.66 E9/L (ref 1.8–7.3)
NEUTROPHILS RELATIVE PERCENT: 31.9 % (ref 43–80)
OVALOCYTES: ABNORMAL
PDW BLD-RTO: 22.8 FL (ref 11.5–15)
PLATELET # BLD: 134 E9/L (ref 130–450)
PMV BLD AUTO: 10.7 FL (ref 7–12)
POIKILOCYTES: ABNORMAL
POLYCHROMASIA: ABNORMAL
RBC # BLD: 2.35 E12/L (ref 3.5–5.5)
TEAR DROP CELLS: ABNORMAL
WBC # BLD: 5.2 E9/L (ref 4.5–11.5)

## 2020-10-02 PROCEDURE — 85025 COMPLETE CBC W/AUTO DIFF WBC: CPT

## 2020-10-02 PROCEDURE — 4040F PNEUMOC VAC/ADMIN/RCVD: CPT | Performed by: INTERNAL MEDICINE

## 2020-10-02 PROCEDURE — 6360000002 HC RX W HCPCS: Performed by: INTERNAL MEDICINE

## 2020-10-02 PROCEDURE — 99214 OFFICE O/P EST MOD 30 MIN: CPT | Performed by: INTERNAL MEDICINE

## 2020-10-02 PROCEDURE — 1036F TOBACCO NON-USER: CPT | Performed by: INTERNAL MEDICINE

## 2020-10-02 PROCEDURE — G0008 ADMIN INFLUENZA VIRUS VAC: HCPCS | Performed by: INTERNAL MEDICINE

## 2020-10-02 PROCEDURE — 96372 THER/PROPH/DIAG INJ SC/IM: CPT

## 2020-10-02 PROCEDURE — G8399 PT W/DXA RESULTS DOCUMENT: HCPCS | Performed by: INTERNAL MEDICINE

## 2020-10-02 PROCEDURE — 1123F ACP DISCUSS/DSCN MKR DOCD: CPT | Performed by: INTERNAL MEDICINE

## 2020-10-02 PROCEDURE — 1090F PRES/ABSN URINE INCON ASSESS: CPT | Performed by: INTERNAL MEDICINE

## 2020-10-02 PROCEDURE — G8427 DOCREV CUR MEDS BY ELIG CLIN: HCPCS | Performed by: INTERNAL MEDICINE

## 2020-10-02 PROCEDURE — G8417 CALC BMI ABV UP PARAM F/U: HCPCS | Performed by: INTERNAL MEDICINE

## 2020-10-02 PROCEDURE — 90694 VACC AIIV4 NO PRSRV 0.5ML IM: CPT | Performed by: INTERNAL MEDICINE

## 2020-10-02 PROCEDURE — G8484 FLU IMMUNIZE NO ADMIN: HCPCS | Performed by: INTERNAL MEDICINE

## 2020-10-02 PROCEDURE — 99212 OFFICE O/P EST SF 10 MIN: CPT

## 2020-10-02 RX ADMIN — A/SINGAPORE/GP1908/2015 IVR-180 (AN A/MICHIGAN/45/2015 (H1N1)PDM09-LIKE VIRUS, A/HONG KONG/4801/2014, NYMC X-263B (H3N2) (AN A/HONG KONG/4801/2014-LIKE VIRUS), AND B/BRISBANE/60/2008, WILD TYPE (A B/BRISBANE/60/2008-LIKE VIRUS) 0.5 ML: 15; 15; 15 INJECTION, SUSPENSION INTRAMUSCULAR at 14:09

## 2020-10-02 RX ADMIN — DARBEPOETIN ALFA 500 MCG: 500 INJECTION, SOLUTION INTRAVENOUS; SUBCUTANEOUS at 13:49

## 2020-10-02 NOTE — PROGRESS NOTES
hemoptysis, shortness of breath, cough. CARDIOVASCULAR: No chest pain, palpitations. GASTROINTESTINAL: No nausea/vomiting, abdominal pain, diarrhea/constipation. GENITOURINARY: No dysuria, urinary frequency, hematuria. NEURO: No syncope, presyncope, headache.   Remainder:  ROS NEGATIVE    Past Medical History:      Diagnosis Date    Anemia     Pt reports she was dx in her teens, w/o proper w/u, with iron deficiency anemia and was on oral iron replacement for many years, resulting in iron overload    Aplastic anemia ([address] ~1997-7609    Possibly d/t chemotherapy for breast cancer    Breast cancer ([address] 2009    right side; pt underwent radical mastectomy followed by radiation therapy and chemotherapy and was then on oral chemo pill for 5 yrs; no recurrence as of 01/2019    Chickenpox     Hypothyroidism     Measles     Mild depression ([address]     Mumps     Myelodysplastic syndrome ([address] ~2016    Pulmonary tuberculosis ~6867-9653    in her early 25s    Scarlet fever     Sensory neuropathy     beyond the ankle b/l     Patient Active Problem List   Diagnosis    MDS (myelodysplastic syndrome) (HCC)    Bronchitis    Macrocytic anemia with high RDW    Hypothyroidism    Peripheral sensory neuropathy    Breast cancer (HCC)    Anemia of chronic disease        Past Surgical History:      Procedure Laterality Date    ADENOIDECTOMY      BONE MARROW BIOPSY      BREAST BIOPSY      CHOLECYSTECTOMY      MASTECTOMY      right breast    OTHER SURGICAL HISTORY      blood transfusions    TONSILLECTOMY AND ADENOIDECTOMY      TUBAL LIGATION         Family History:  Family History   Problem Relation Age of Onset   Phyllis Solum Cancer Mother         lung    Cancer Father         lung    Cancer Sister         ovarian cancer and breast cancer    Cancer Brother         unknown    Cancer Maternal Aunt         unknown    Cancer Other         brain    Other Brother         MI       Medications:  Reviewed and Supple. No palpable cervical or supraclavicular lymphadenopathy. LUNGS: Good air entry bilaterally. No wheezing, crackles or rhonchi. CARDIOVASCULAR: Regular rate. No murmurs, rubs or gallops. ABDOMEN: Soft. Non-tender, non-distended. Positive bowel sounds. EXTREMITIES: Without clubbing, cyanosis, or edema, she has tenderness of the left hip. NEUROLOGIC: No focal deficits. ECOG PS 1      Impression/Plan:     1. The patient is a very pleasant 78 y.o. lady with a PMH significant for Right Breast Cancer, stage III, HR pos, was diagnosed in 2009, she had a right mastectomy done, followed by adjuvant chemo, she received 8 cycles, probably AC followed by T, then PMRT, and 5 years of adjuvant ET with Arimidex, was completed in 2015. She was treated in [facility] under the care of Dr. Sigifredo Avery. She is doing well clinically without any evidence of recurrence of her disease. Discussed with her the importance of monthly SBE, and routine screening mammograms, she had a left breast screening mammogram done on 5/26/2020, revealing benign findings, was negative for malignancy,      2. She has MDS, diagnosed in 2017, she received ESAs and PRBCs transfusion, has transfusion related iron overload, hgb was 8.5, ferritin 3647, was restarted on Aranesp on 4/23/2019. She was seen in the ED on 1/2/2020 worsening pelvic and hip pain s/p fall, scan of the lumbar spine had revealed moderately severe stenosis at L4-L5, moderate stenosis at L3-L4 and L1-L2, suggestion of subtle acute fracture along the anterior superior endplate of L3, mild chronic compression deformity of the superior endplate of V77. Patient is feeling better, she was seen by neurosurgery. Today 10/2/2020, hemoglobin is 9.3, hematocrit 27.8, white count 5. 2, , platelet count 1 3 4 K, her hemoglobin is less than 10 G/DL, proceed with Aranesp, will continue with Aranesp every 4 weeks, her hemoglobin has been maintained in the 9 range.   Will continue monitor her CBCD. No indication to start her on hypo-methylating agents at this time. Weight gain, will check TSH with next blood work. RTC in 4 weeks with labs, aranesp. Thank you for allowing us to participate in the care of Ms. Ventura.     Tyree Kelly MD   HEMATOLOGY/MEDICAL ONCOLOGY  [address] [place] [address] ONCOLOGY  [facility] [address] [facility] [id number]  Dept: [phone]

## 2020-10-13 ENCOUNTER — OFFICE VISIT (OUTPATIENT)
Dept: PALLATIVE CARE | Age: 80
End: 2020-10-13
Payer: MEDICARE

## 2020-10-13 VITALS
SYSTOLIC BLOOD PRESSURE: 122 MMHG | WEIGHT: 158 LBS | DIASTOLIC BLOOD PRESSURE: 57 MMHG | HEART RATE: 90 BPM | BODY MASS INDEX: 29.85 KG/M2 | OXYGEN SATURATION: 93 %

## 2020-10-13 PROCEDURE — 99214 OFFICE O/P EST MOD 30 MIN: CPT | Performed by: STUDENT IN AN ORGANIZED HEALTH CARE EDUCATION/TRAINING PROGRAM

## 2020-10-13 PROCEDURE — 99211 OFF/OP EST MAY X REQ PHY/QHP: CPT

## 2020-10-13 RX ORDER — HYDROCODONE BITARTRATE AND ACETAMINOPHEN 5; 325 MG/1; MG/1
1 TABLET ORAL EVERY 12 HOURS PRN
Qty: 60 TABLET | Refills: 0 | Status: SHIPPED
Start: 2020-10-13 | End: 2020-11-24 | Stop reason: SDUPTHER

## 2020-10-13 NOTE — PROGRESS NOTES
during this encounter     Prognosis: unknown     Follow Up:  4 weeks. They were encouraged to call with any questions, concerns, needs, or changes in symptoms. Subjective:     Met with Chuy Escobar, she presented with her daughter at her side. She says that her pain regimen currently is great. She has bursitis, and pain in her lower back, Which causes neuropathy. She has been using Norco once a day, this significantly relieves her from her pain. This does not seem to be pain that requires Norco.  This would be better managed with NSAID. I told her this and she is began crying, saying that her pain has been well managed, why she would change it. After much discussion, she has realized that her pain can be better managed with NSAIDs. She has been using Norco for the past 11 years. She will try to decrease her usage, and incorporate other terms of pain relief. Such as heating pads, and lidocaine patches.           Objective:     Physical Exam  Wt Readings from Last 3 Encounters:   10/02/20 159 lb 12.8 oz (72.5 kg)   09/04/20 153 lb (69.4 kg)   08/04/20 145 lb (65.8 kg)     LMP 05/28/2016     Gen:  Alert, appears stated age, well nourished, in no acute distress  HEENT:  Normocephalic, conjunctiva pink, no drainage, mucosa moist  Neck:  Supple  Lungs:  CTA bilaterally, no audible rhonchi or wheezes noted  Heart[de-identified]  RRR, no murmur, rub, or gallop noted during exam  Abd:  Soft, non tender, non distended, BS+  M/S/Ext:  Moving all extremities, no edema, pulses present  Skin:  Warm and dry  Neuro:  PERRL, Alert, oriented x 3; following commands         [place] Symptom Assessment Score   [place] Score 10/13/2020 4/14/2020 1/28/2020 12/3/2019 9/17/2019   Pain Score 8 2 8 4 5   Tiredness Score 7 1 9 6 9   Nausea Score Not nauseated Not nauseated Not nauseated Not nauseated Not nauseated   Depression Score Not depressed Not depressed Not depressed Not depressed Not depressed   Anxiety Score Not anxious Not anxious Not anxious 1 Not anxious   Drowsiness Score 5 Not drowsy 1 2 6   Appetite Score 1 Best appetite Best appetite Best appetite Best appetite   Wellbeing Score Best feeling of wellbeing Best feeling of wellbeing Best feeling of wellbeing 2 1   Dyspnea Score 6 No shortness of breath 5 7 2   Other Problem Score Best possible response Best possible response Best possible response Best possible response Best possible response   Total Assessment Score(calculated) 27 3 23 22 23       Assessed by: patient. Current Medications:  Medications reviewed: yes    Controlled Substances Monitoring: [place]AR[place] reviewed 10/13/20. RX Monitoring 4/14/2020   Periodic Controlled Substance Monitoring Possible medication side effects, risk of tolerance/dependence & alternative treatments discussed. ;No signs of potential drug abuse or diversion identified. ;Assessed functional status. ;Obtaining appropriate analgesic effect of treatment. Chronic Pain > 50 MEDD -         Brent Lee MD  Palliative Care Department     Time/Communication  Greater than 50% of time spent, total 25 minutes in face-to-face counseling and coordination of care regarding symptom management. Note: This report was completed using computerize voiced recognition software. Every effort has been made to ensure accuracy; however, inadvertent computerized transcription errors may be present.

## 2020-11-03 ENCOUNTER — HOSPITAL ENCOUNTER (OUTPATIENT)
Dept: INFUSION THERAPY | Age: 80
Discharge: HOME | End: 2020-11-03
Payer: MEDICARE

## 2020-11-03 ENCOUNTER — OFFICE VISIT (OUTPATIENT)
Dept: ONCOLOGY | Age: 80
End: 2020-11-03
Payer: MEDICARE

## 2020-11-03 VITALS
SYSTOLIC BLOOD PRESSURE: 161 MMHG | TEMPERATURE: 96.3 F | HEIGHT: 61 IN | WEIGHT: 153 LBS | BODY MASS INDEX: 28.89 KG/M2 | HEART RATE: 59 BPM | OXYGEN SATURATION: 100 % | DIASTOLIC BLOOD PRESSURE: 69 MMHG

## 2020-11-03 DIAGNOSIS — D63.8 ANEMIA OF CHRONIC DISEASE: ICD-10-CM

## 2020-11-03 DIAGNOSIS — D46.9 MDS (MYELODYSPLASTIC SYNDROME) (HCC): Primary | Chronic | ICD-10-CM

## 2020-11-03 LAB
ANISOCYTOSIS: ABNORMAL
BASOPHILS ABSOLUTE: 0 E9/L (ref 0–0.2)
BASOPHILS RELATIVE PERCENT: 0.5 % (ref 0–2)
EOSINOPHILS ABSOLUTE: 0.11 E9/L (ref 0.05–0.5)
EOSINOPHILS RELATIVE PERCENT: 1.7 % (ref 0–6)
HCT VFR BLD CALC: 28.6 % (ref 34–48)
HEMOGLOBIN: 9.4 G/DL (ref 11.5–15.5)
LYMPHOCYTES ABSOLUTE: 3.4 E9/L (ref 1.5–4)
LYMPHOCYTES RELATIVE PERCENT: 53.9 % (ref 20–42)
MCH RBC QN AUTO: 38.4 PG (ref 26–35)
MCHC RBC AUTO-ENTMCNC: 32.9 % (ref 32–34.5)
MCV RBC AUTO: 116.7 FL (ref 80–99.9)
MONOCYTES ABSOLUTE: 0.25 E9/L (ref 0.1–0.95)
MONOCYTES RELATIVE PERCENT: 3.5 % (ref 2–12)
NEUTROPHILS ABSOLUTE: 2.58 E9/L (ref 1.8–7.3)
NEUTROPHILS RELATIVE PERCENT: 40.9 % (ref 43–80)
OVALOCYTES: ABNORMAL
PDW BLD-RTO: 22.7 FL (ref 11.5–15)
PLATELET # BLD: 180 E9/L (ref 130–450)
PMV BLD AUTO: 10.2 FL (ref 7–12)
POIKILOCYTES: ABNORMAL
POLYCHROMASIA: ABNORMAL
RBC # BLD: 2.45 E12/L (ref 3.5–5.5)
TARGET CELLS: ABNORMAL
WBC # BLD: 6.3 E9/L (ref 4.5–11.5)

## 2020-11-03 PROCEDURE — 1036F TOBACCO NON-USER: CPT | Performed by: INTERNAL MEDICINE

## 2020-11-03 PROCEDURE — 1090F PRES/ABSN URINE INCON ASSESS: CPT | Performed by: INTERNAL MEDICINE

## 2020-11-03 PROCEDURE — 4040F PNEUMOC VAC/ADMIN/RCVD: CPT | Performed by: INTERNAL MEDICINE

## 2020-11-03 PROCEDURE — 96372 THER/PROPH/DIAG INJ SC/IM: CPT

## 2020-11-03 PROCEDURE — 99214 OFFICE O/P EST MOD 30 MIN: CPT | Performed by: INTERNAL MEDICINE

## 2020-11-03 PROCEDURE — 85025 COMPLETE CBC W/AUTO DIFF WBC: CPT

## 2020-11-03 PROCEDURE — G8427 DOCREV CUR MEDS BY ELIG CLIN: HCPCS | Performed by: INTERNAL MEDICINE

## 2020-11-03 PROCEDURE — G8484 FLU IMMUNIZE NO ADMIN: HCPCS | Performed by: INTERNAL MEDICINE

## 2020-11-03 PROCEDURE — 1123F ACP DISCUSS/DSCN MKR DOCD: CPT | Performed by: INTERNAL MEDICINE

## 2020-11-03 PROCEDURE — 99212 OFFICE O/P EST SF 10 MIN: CPT

## 2020-11-03 PROCEDURE — G8417 CALC BMI ABV UP PARAM F/U: HCPCS | Performed by: INTERNAL MEDICINE

## 2020-11-03 PROCEDURE — 6360000002 HC RX W HCPCS: Performed by: INTERNAL MEDICINE

## 2020-11-03 PROCEDURE — G8399 PT W/DXA RESULTS DOCUMENT: HCPCS | Performed by: INTERNAL MEDICINE

## 2020-11-03 RX ADMIN — DARBEPOETIN ALFA 500 MCG: 500 INJECTION, SOLUTION INTRAVENOUS; SUBCUTANEOUS at 13:44

## 2020-11-03 NOTE — PROGRESS NOTES
[address] MED ONCOLOGY  [address] [place] [id number]  Dept: [phone]  Attending Progress Note      Reason for Visit:   1. Right Breast Cancer. 2. MDS. Referring Physician:  CONNIE Link CNP    PCP:  CONNIE Link CNP    History of Present Illness: The patient is a very pleasant 78 y.o. lady with a PMH significant for Right Breast Cancer, stage III, HR pos, was diagnosed in 2009, she had a right mastectomy done, followed by adjuvant chemo, she received 8 cycles, probably AC followed by T, then PMRT, and 5 years of adjuvant ET with Arimidex, was completed in 2015. She was treated in [facility] under the care of Dr. Kristin Cruz. She was diagnosed with MDS in 2017, she received ESAs and PRBCs transfusion. She has transfusion related iron overload. She is feeling well overall, has chronic joints pain, peripheral neuropathy. She was started on Aranesp on 4/24/2020. No SE from Aranesp. She was seen in the ED on 1/2/2020 worsening pelvic and hip pain s/p fall, scan of the lumbar spine had revealed moderately severe stenosis at L4-L5, moderate stenosis at L3-L4 and L1-L2, suggestion of subtle acute fracture along the anterior superior endplate of L3, mild chronic compression deformity of the superior endplate of Y07. The patient is feeling much better, the steroids and Flexeril did help, she started using the brace. She was seen by neurosurgery, lumbar spine MRI was ordered, patient did not have it done due to the cost of the test.  She found spine CT scan and MRI from 2017, which she took to the neurosurgery office. The patient returns for a follow-up visit, her energy level has been good, no longer gaining weight. Review of Systems;  CONSTITUTIONAL: No fever, chills. Fair appetite. ENMT: Eyes: No diplopia; Nose: No epistaxis. Mouth: No sore throat.   RESPIRATORY: No hemoptysis, shortness of breath, cough. CARDIOVASCULAR: No chest pain, palpitations. GASTROINTESTINAL: No nausea/vomiting, abdominal pain, diarrhea/constipation. GENITOURINARY: No dysuria, urinary frequency, hematuria. NEURO: No syncope, presyncope, headache. Remainder:  ROS NEGATIVE    Past Medical History:      Diagnosis Date    Anemia     Pt reports she was dx in her teens, w/o proper w/u, with iron deficiency anemia and was on oral iron replacement for many years, resulting in iron overload    Aplastic anemia ([address] ~1542-4448    Possibly d/t chemotherapy for breast cancer    Breast cancer ([address] 2009    right side; pt underwent radical mastectomy followed by radiation therapy and chemotherapy and was then on oral chemo pill for 5 yrs; no recurrence as of 01/2019    Chickenpox     Hypothyroidism     Measles     Mild depression ([address]     Mumps     Myelodysplastic syndrome ([address] ~2016    Pulmonary tuberculosis ~9032-1257    in her early 25s    Scarlet fever     Sensory neuropathy     beyond the ankle b/l     Patient Active Problem List   Diagnosis    MDS (myelodysplastic syndrome) (HCC)    Bronchitis    Macrocytic anemia with high RDW    Hypothyroidism    Peripheral sensory neuropathy    Breast cancer (HCC)    Anemia of chronic disease        Past Surgical History:      Procedure Laterality Date    ADENOIDECTOMY      BONE MARROW BIOPSY      BREAST BIOPSY      CHOLECYSTECTOMY      MASTECTOMY      right breast    OTHER SURGICAL HISTORY      blood transfusions    TONSILLECTOMY AND ADENOIDECTOMY      TUBAL LIGATION         Family History:  Family History   Problem Relation Age of Onset   [address] Cancer Mother         lung    Cancer Father         lung    Cancer Sister         ovarian cancer and breast cancer    Cancer Brother         unknown    Cancer Maternal Aunt         unknown    Cancer Other         brain    Other Brother         MI       Medications:  Reviewed and reconciled.     Social History:  Social History     Socioeconomic History    Marital status: Single     Spouse name: Not on file    Number of children: Not on file    Years of education: Not on file    Highest education level: Not on file   Occupational History    Not on file   Social Needs    Financial resource strain: Not on file    Food insecurity     Worry: Not on file     Inability: Not on file    Transportation needs     Medical: Not on file     Non-medical: Not on file   Tobacco Use    Smoking status: Former Smoker     Packs/day: 1.00     Years: 37.00     Pack years: 37.00     Types: Cigarettes     Start date:      Last attempt to quit: 2000     Years since quittin.8    Smokeless tobacco: Never Used   Substance and Sexual Activity    Alcohol use: No    Drug use: Never    Sexual activity: Not Currently   Lifestyle    Physical activity     Days per week: Not on file     Minutes per session: Not on file    Stress: Not on file   Relationships    Social connections     Talks on phone: Not on file     Gets together: Not on file     Attends Buddhism service: Not on file     Active member of club or organization: Not on file     Attends meetings of clubs or organizations: Not on file     Relationship status: Not on file    Intimate partner violence     Fear of current or ex partner: Not on file     Emotionally abused: Not on file     Physically abused: Not on file     Forced sexual activity: Not on file   Other Topics Concern    Not on file   Social History Narrative    Not on file       Allergies: Allergies   Allergen Reactions    Bee Venom Swelling and Dermatitis    Penicillins Hives, Swelling and Dermatitis       Physical Exam:  BP (!) 161/69 (Site: Right Upper Arm, Position: Sitting, Cuff Size: Medium Adult)   Pulse 59   Temp 96.3 °F (35.7 °C) (Temporal)   Ht 5' 1\" (1.549 m)   Wt 153 lb (69.4 kg)   LMP 2016   SpO2 100%   BMI 28.91 kg/m²   GENERAL: Alert, oriented x 3, not in acute distress.   HEENT: PERRLA; EOMI. Oropharynx clear. NECK: Supple. No palpable cervical or supraclavicular lymphadenopathy. LUNGS: Good air entry bilaterally. No wheezing, crackles or rhonchi. CARDIOVASCULAR: Regular rate. No murmurs, rubs or gallops. ABDOMEN: Soft. Non-tender, non-distended. Positive bowel sounds. EXTREMITIES: Without clubbing, cyanosis, or edema, she has tenderness of the left hip. NEUROLOGIC: No focal deficits. ECOG PS 1      Impression/Plan:     1. The patient is a very pleasant 78 y.o. lady with a PMH significant for Right Breast Cancer, stage III, HR pos, was diagnosed in 2009, she had a right mastectomy done, followed by adjuvant chemo, she received 8 cycles, probably AC followed by T, then PMRT, and 5 years of adjuvant ET with Arimidex, was completed in 2015. She was treated in [facility] under the care of Dr. Chuy Lantigua. She is doing well clinically without any evidence of recurrence of her disease. Discussed with her the importance of monthly SBE, and routine screening mammograms, she had a left breast screening mammogram done on 5/26/2020, revealing benign findings, was negative for malignancy,      2. She has MDS, diagnosed in 2017, she received ESAs and PRBCs transfusion, has transfusion related iron overload, hgb was 8.5, ferritin 3647, was restarted on Aranesp on 4/23/2019. She was seen in the ED on 1/2/2020 worsening pelvic and hip pain s/p fall, scan of the lumbar spine had revealed moderately severe stenosis at L4-L5, moderate stenosis at L3-L4 and L1-L2, suggestion of subtle acute fracture along the anterior superior endplate of L3, mild chronic compression deformity of the superior endplate of K62. Patient is feeling better, she was seen by neurosurgery.     Today 11/3/2020, hemoglobin is 9.4, hematocrit 28.6, white count 6.3 .7, platelet count 329 K, her hemoglobin is less than 10 G/DL, proceed with Aranesp, will continue with Aranesp every 4 weeks, her hemoglobin has been maintained in the

## 2020-11-04 RX ORDER — GABAPENTIN 300 MG/1
CAPSULE ORAL
Qty: 270 CAPSULE | Refills: 0 | Status: SHIPPED | OUTPATIENT
Start: 2020-11-04 | End: 2021-07-20 | Stop reason: SDUPTHER

## 2020-11-04 NOTE — TELEPHONE ENCOUNTER
Name of Medication(s) Requested:  gabapentin (NEURONTIN) 300 MG capsule    Pharmacy Requested:   [facility]    Medication(s) pended? [x] Yes  [] No    Last Appointment:  Visit date not found    Future appts:  Future Appointments   Date Time Provider Paula Su   11/24/2020  8:00 AM SCHEDULE, [facility] PALLIATIVE CARE PROVIDER [facility] PALLIAT [facility]   12/1/2020  1:15 PM Zamzam Randhawa MD MED ONC [facility]   12/1/2020  1:15 PM [facility] FAST TRACK 1 [facility] Med Onc [facility]        Does patient need call back?   [] Yes  [x] No

## 2020-11-24 ENCOUNTER — VIRTUAL VISIT (OUTPATIENT)
Dept: PALLATIVE CARE | Age: 80
End: 2020-11-24
Payer: MEDICARE

## 2020-11-24 PROCEDURE — 99442 PR PHYS/QHP TELEPHONE EVALUATION 11-20 MIN: CPT | Performed by: NURSE PRACTITIONER

## 2020-11-24 RX ORDER — HYDROCODONE BITARTRATE AND ACETAMINOPHEN 5; 325 MG/1; MG/1
1 TABLET ORAL EVERY 12 HOURS PRN
Qty: 60 TABLET | Refills: 0 | Status: SHIPPED
Start: 2020-11-24 | End: 2021-02-20 | Stop reason: SDUPTHER

## 2020-11-24 NOTE — PROGRESS NOTES
Palliative Care Department  Palliative Care Telephone Encounter  Provider: Ursula ALCAZAR    Bety Escalera is a 78 y.o. female evaluated via telephone on 11/24/2020. Consent:  She and/or health care decision maker is aware that that she may receive a bill for this telephone service, depending on her insurance coverage, and has provided verbal consent to proceed: Yes      Documentation:  I communicated with the patient and/or health care decision maker regarding pain. Assessment/Plan   History of Breast Cancer stage III, HR positive:              -Diagnosed, treated, and managed in [place] in 2009              -Status post right mastectomy              -Followed by adjuvant chemotherapy              -Completed her Demadex 2015              -Currently followed locally by Dr. Lynette Colby, she also follows with regarding MDS diagnosed 2017     Chemotherapy related peripheral Neuropathy/Pain:              -  Worse bilateral feet and LEs, burning and pressure,               -  Gabapentin 900 mg HS per PCP              -  Improves with activity, worsens at rest              -  Norco 5-325 mg every 12 hours as needed, using 1/day, encouraged to continue to reduce as she is able to tolerate her pain more              -  Currently she is very high functioning, enjoys gardening, and is quite active, and has recently moved to a new home, which has had her even more active. Details of this discussion including any medical advice provided:   A telephonic virtual visit was completed via telephone with Bety Escalera today regarding the issues listed above. She states that overall she is doing well. She continues to have pain, primarily in her LE and feet. She continues to use hydrocodone approximately 1 time per day, and has 18 tablets left from her previous prescription. We again discussed the need for continued decrease of use as she is able and she will try and reduce as she is able.   She continues to be appointment within my department in the past 7 days or scheduled within the next 24 hours. Total Time: minutes: 11-20 minutes    Maria Luisa ALCAZAR  [facility] Palliative Medicine    Note: not billable if this call serves to triage the patient into an appointment for the relevant concern    Note: This report was completed using computerize voice recognition software. Every effort has been made to ensure accuracy; however, inadvertent computerized transcription errors may be present.

## 2020-12-01 ENCOUNTER — HOSPITAL ENCOUNTER (OUTPATIENT)
Dept: INFUSION THERAPY | Age: 80
Discharge: HOME | End: 2020-12-01
Payer: MEDICARE

## 2020-12-04 ENCOUNTER — OFFICE VISIT (OUTPATIENT)
Dept: ONCOLOGY | Age: 80
End: 2020-12-04
Payer: MEDICARE

## 2020-12-04 ENCOUNTER — HOSPITAL ENCOUNTER (OUTPATIENT)
Dept: INFUSION THERAPY | Age: 80
Discharge: HOME | End: 2020-12-04
Payer: MEDICARE

## 2020-12-04 VITALS
DIASTOLIC BLOOD PRESSURE: 65 MMHG | HEIGHT: 61 IN | BODY MASS INDEX: 29.07 KG/M2 | WEIGHT: 154 LBS | OXYGEN SATURATION: 95 % | HEART RATE: 69 BPM | TEMPERATURE: 96.3 F | SYSTOLIC BLOOD PRESSURE: 142 MMHG

## 2020-12-04 DIAGNOSIS — C50.919 MALIGNANT NEOPLASM OF FEMALE BREAST, UNSPECIFIED ESTROGEN RECEPTOR STATUS, UNSPECIFIED LATERALITY, UNSPECIFIED SITE OF BREAST (HCC): ICD-10-CM

## 2020-12-04 DIAGNOSIS — D46.9 MDS (MYELODYSPLASTIC SYNDROME) (HCC): Primary | Chronic | ICD-10-CM

## 2020-12-04 DIAGNOSIS — D63.8 ANEMIA OF CHRONIC DISEASE: ICD-10-CM

## 2020-12-04 LAB
ANISOCYTOSIS: ABNORMAL
BASOPHILS ABSOLUTE: 0 E9/L (ref 0–0.2)
BASOPHILS RELATIVE PERCENT: 0.3 % (ref 0–2)
EOSINOPHILS ABSOLUTE: 0.36 E9/L (ref 0.05–0.5)
EOSINOPHILS RELATIVE PERCENT: 5.2 % (ref 0–6)
HCT VFR BLD CALC: 28.1 % (ref 34–48)
HEMOGLOBIN: 9.2 G/DL (ref 11.5–15.5)
HYPOCHROMIA: ABNORMAL
LYMPHOCYTES ABSOLUTE: 3.04 E9/L (ref 1.5–4)
LYMPHOCYTES RELATIVE PERCENT: 43.5 % (ref 20–42)
MCH RBC QN AUTO: 38.8 PG (ref 26–35)
MCHC RBC AUTO-ENTMCNC: 32.7 % (ref 32–34.5)
MCV RBC AUTO: 118.6 FL (ref 80–99.9)
MONOCYTES ABSOLUTE: 0.34 E9/L (ref 0.1–0.95)
MONOCYTES RELATIVE PERCENT: 5.2 % (ref 2–12)
NEUTROPHILS ABSOLUTE: 3.17 E9/L (ref 1.8–7.3)
NEUTROPHILS RELATIVE PERCENT: 46.1 % (ref 43–80)
OVALOCYTES: ABNORMAL
PDW BLD-RTO: 23.3 FL (ref 11.5–15)
PLATELET # BLD: 196 E9/L (ref 130–450)
PMV BLD AUTO: 10.7 FL (ref 7–12)
POIKILOCYTES: ABNORMAL
POLYCHROMASIA: ABNORMAL
RBC # BLD: 2.37 E12/L (ref 3.5–5.5)
TARGET CELLS: ABNORMAL
TEAR DROP CELLS: ABNORMAL
WBC # BLD: 6.9 E9/L (ref 4.5–11.5)

## 2020-12-04 PROCEDURE — G8417 CALC BMI ABV UP PARAM F/U: HCPCS | Performed by: INTERNAL MEDICINE

## 2020-12-04 PROCEDURE — 1036F TOBACCO NON-USER: CPT | Performed by: INTERNAL MEDICINE

## 2020-12-04 PROCEDURE — 99214 OFFICE O/P EST MOD 30 MIN: CPT | Performed by: INTERNAL MEDICINE

## 2020-12-04 PROCEDURE — 96372 THER/PROPH/DIAG INJ SC/IM: CPT

## 2020-12-04 PROCEDURE — 85025 COMPLETE CBC W/AUTO DIFF WBC: CPT

## 2020-12-04 PROCEDURE — G8399 PT W/DXA RESULTS DOCUMENT: HCPCS | Performed by: INTERNAL MEDICINE

## 2020-12-04 PROCEDURE — 4040F PNEUMOC VAC/ADMIN/RCVD: CPT | Performed by: INTERNAL MEDICINE

## 2020-12-04 PROCEDURE — 1123F ACP DISCUSS/DSCN MKR DOCD: CPT | Performed by: INTERNAL MEDICINE

## 2020-12-04 PROCEDURE — G8427 DOCREV CUR MEDS BY ELIG CLIN: HCPCS | Performed by: INTERNAL MEDICINE

## 2020-12-04 PROCEDURE — 1090F PRES/ABSN URINE INCON ASSESS: CPT | Performed by: INTERNAL MEDICINE

## 2020-12-04 PROCEDURE — 6360000002 HC RX W HCPCS: Performed by: INTERNAL MEDICINE

## 2020-12-04 PROCEDURE — G8484 FLU IMMUNIZE NO ADMIN: HCPCS | Performed by: INTERNAL MEDICINE

## 2020-12-04 RX ADMIN — DARBEPOETIN ALFA 500 MCG: 500 INJECTION, SOLUTION INTRAVENOUS; SUBCUTANEOUS at 13:38

## 2020-12-04 NOTE — PROGRESS NOTES
[address] MED ONCOLOGY  [address] [facility] [address]  Dept: [phone]  Attending Progress Note      Reason for Visit:   1. Right Breast Cancer. 2. MDS. Referring Physician:  CONNIE Marshall CNP    PCP:  CONNIE Marshall CNP    History of Present Illness: The patient is a very pleasant 78 y.o. lady with a PMH significant for Right Breast Cancer, stage III, HR pos, was diagnosed in 2009, she had a right mastectomy done, followed by adjuvant chemo, she received 8 cycles, probably AC followed by T, then PMRT, and 5 years of adjuvant ET with Arimidex, was completed in 2015. She was treated in [facility] under the care of Dr. Alex Nelson. She was diagnosed with MDS in 2017, she received ESAs and PRBCs transfusion. She has transfusion related iron overload. She is feeling well overall, has chronic joints pain, peripheral neuropathy. She was started on Aranesp on 4/24/2020. No SE from Aranesp. She was seen in the ED on 1/2/2020 worsening pelvic and hip pain s/p fall, scan of the lumbar spine had revealed moderately severe stenosis at L4-L5, moderate stenosis at L3-L4 and L1-L2, suggestion of subtle acute fracture along the anterior superior endplate of L3, mild chronic compression deformity of the superior endplate of A39. The patient is feeling much better, the steroids and Flexeril did help, she started using the brace. She was seen by neurosurgery, lumbar spine MRI was ordered, patient did not have it done due to the cost of the test.  She found spine CT scan and MRI from 2017, which she took to the neurosurgery office. The patient returns for a follow-up visit, her energy level has been good, she has itching of the right chest wall. Review of Systems;  CONSTITUTIONAL: No fever, chills. Fair appetite. ENMT: Eyes: No diplopia; Nose: No epistaxis. Mouth: No sore throat.   RESPIRATORY: No hemoptysis, shortness of breath, cough. CARDIOVASCULAR: No chest pain, palpitations. GASTROINTESTINAL: No nausea/vomiting, abdominal pain, diarrhea/constipation. GENITOURINARY: No dysuria, urinary frequency, hematuria. NEURO: No syncope, presyncope, headache. Remainder:  ROS NEGATIVE    Past Medical History:      Diagnosis Date    Anemia     Pt reports she was dx in her teens, w/o proper w/u, with iron deficiency anemia and was on oral iron replacement for many years, resulting in iron overload    Aplastic anemia ([address] ~3788-0198    Possibly d/t chemotherapy for breast cancer    Breast cancer ([address] 2009    right side; pt underwent radical mastectomy followed by radiation therapy and chemotherapy and was then on oral chemo pill for 5 yrs; no recurrence as of 01/2019    Chickenpox     Hypothyroidism     Measles     Mild depression ([address]     Mumps     Myelodysplastic syndrome ([address] ~2016    Pulmonary tuberculosis ~4082-3856    in her early 25s    Scarlet fever     Sensory neuropathy     beyond the ankle b/l     Patient Active Problem List   Diagnosis    MDS (myelodysplastic syndrome) (HCC)    Bronchitis    Macrocytic anemia with high RDW    Hypothyroidism    Peripheral sensory neuropathy    Breast cancer (HCC)    Anemia of chronic disease        Past Surgical History:      Procedure Laterality Date    ADENOIDECTOMY      BONE MARROW BIOPSY      BREAST BIOPSY      CHOLECYSTECTOMY      MASTECTOMY      right breast    OTHER SURGICAL HISTORY      blood transfusions    TONSILLECTOMY AND ADENOIDECTOMY      TUBAL LIGATION         Family History:  Family History   Problem Relation Age of Onset   [facility] Cancer Mother         lung    Cancer Father         lung    Cancer Sister         ovarian cancer and breast cancer    Cancer Brother         unknown    Cancer Maternal Aunt         unknown    Cancer Other         brain    Other Brother         MI       Medications:  Reviewed and reconciled.     Social History:  Social History     Socioeconomic History    Marital status: Single     Spouse name: Not on file    Number of children: Not on file    Years of education: Not on file    Highest education level: Not on file   Occupational History    Not on file   Social Needs    Financial resource strain: Not on file    Food insecurity     Worry: Not on file     Inability: Not on file    Transportation needs     Medical: Not on file     Non-medical: Not on file   Tobacco Use    Smoking status: Former Smoker     Packs/day: 1.00     Years: 37.00     Pack years: 37.00     Types: Cigarettes     Start date:      Last attempt to quit: 2000     Years since quittin.9    Smokeless tobacco: Never Used   Substance and Sexual Activity    Alcohol use: No    Drug use: Never    Sexual activity: Not Currently   Lifestyle    Physical activity     Days per week: Not on file     Minutes per session: Not on file    Stress: Not on file   Relationships    Social connections     Talks on phone: Not on file     Gets together: Not on file     Attends Christian service: Not on file     Active member of club or organization: Not on file     Attends meetings of clubs or organizations: Not on file     Relationship status: Not on file    Intimate partner violence     Fear of current or ex partner: Not on file     Emotionally abused: Not on file     Physically abused: Not on file     Forced sexual activity: Not on file   Other Topics Concern    Not on file   Social History Narrative    Not on file       Allergies:   Allergies   Allergen Reactions    Bee Venom Swelling and Dermatitis    Penicillins Hives, Swelling and Dermatitis       Physical Exam:  BP (!) 142/65 (Site: Right Upper Arm, Position: Sitting, Cuff Size: Medium Adult)   Pulse 69   Temp 96.3 °F (35.7 °C) (Temporal)   Ht 5' 1\" (1.549 m)   Wt 154 lb (69.9 kg)   LMP 2016   SpO2 95%   BMI 29.10 kg/m²   GENERAL: Alert, oriented x 3, not in acute distress. HEENT: PERRLA; EOMI. Oropharynx clear. NECK: Supple. No palpable cervical or supraclavicular lymphadenopathy. LUNGS: Good air entry bilaterally. No wheezing, crackles or rhonchi. BREASTS: She is s/p right mastectomy, she has telangiectasias, 2 areas where she has scabs, no suspicious lesions, no drainage or bleeding. CARDIOVASCULAR: Regular rate. No murmurs, rubs or gallops. ABDOMEN: Soft. Non-tender, non-distended. Positive bowel sounds. EXTREMITIES: Without clubbing, cyanosis, or edema, she has tenderness of the left hip. NEUROLOGIC: No focal deficits. ECOG PS 1      Impression/Plan:     1. The patient is a very pleasant 78 y.o. lady with a PMH significant for Right Breast Cancer, stage III, HR pos, was diagnosed in 2009, she had a right mastectomy done, followed by adjuvant chemo, she received 8 cycles, probably AC followed by T, then PMRT, and 5 years of adjuvant ET with Arimidex, was completed in 2015. She was treated in [facility] under the care of Dr. Gema Estevez. She is doing well clinically without any evidence of recurrence of her disease. Discussed with her the importance of monthly SBE, and routine screening mammograms, she had a left breast screening mammogram done on 5/26/2020, revealing benign findings, was negative for malignancy, she has itching of the right chest wall, no suspicious lesions, she may apply Benadryl cream.  She will let me know if she has any other changes. 2. She has MDS, diagnosed in 2017, she received ESAs and PRBCs transfusion, has transfusion related iron overload, hgb was 8.5, ferritin 3647, was restarted on Aranesp on 4/23/2019.      She was seen in the ED on 1/2/2020 worsening pelvic and hip pain s/p fall, scan of the lumbar spine had revealed moderately severe stenosis at L4-L5, moderate stenosis at L3-L4 and L1-L2, suggestion of subtle acute fracture along the anterior superior endplate of L3, mild chronic compression deformity of the superior endplate of T12.  Patient is feeling better, she was seen by neurosurgery. Today 12/4/2020, hemoglobin is 9.2, hematocrit 28.1, white count 6.9, platelet count 524I, MCV is 118. 6. Her hemoglobin is less than 10 G/DL, proceed with Aranesp, will continue with Aranesp every 4 weeks, her hemoglobin has been maintained in the 9 range. Will continue monitor her CBCD. No indication to start her on hypo-methylating agents at this time. RTC in 4 weeks with labs, aranesp. Thank you for allowing us to participate in the care of Ms. Ventura.     Juan Carlos Rodgers MD   HEMATOLOGY/MEDICAL ONCOLOGY  [address] [place] [address] ONCOLOGY  [facility] [address] [facility] [address]  Dept: [phone]

## 2021-01-05 ENCOUNTER — OFFICE VISIT (OUTPATIENT)
Dept: ONCOLOGY | Age: 81
End: 2021-01-05
Payer: MEDICARE

## 2021-01-05 ENCOUNTER — HOSPITAL ENCOUNTER (OUTPATIENT)
Dept: INFUSION THERAPY | Age: 81
Discharge: HOME | End: 2021-01-05
Payer: MEDICARE

## 2021-01-05 VITALS
WEIGHT: 155.9 LBS | SYSTOLIC BLOOD PRESSURE: 135 MMHG | DIASTOLIC BLOOD PRESSURE: 62 MMHG | TEMPERATURE: 98.1 F | HEART RATE: 66 BPM | HEIGHT: 61 IN | BODY MASS INDEX: 29.43 KG/M2 | OXYGEN SATURATION: 97 %

## 2021-01-05 DIAGNOSIS — C50.919 MALIGNANT NEOPLASM OF FEMALE BREAST, UNSPECIFIED ESTROGEN RECEPTOR STATUS, UNSPECIFIED LATERALITY, UNSPECIFIED SITE OF BREAST (HCC): ICD-10-CM

## 2021-01-05 DIAGNOSIS — Z17.0 MALIGNANT NEOPLASM OF RIGHT BREAST IN FEMALE, ESTROGEN RECEPTOR POSITIVE, UNSPECIFIED SITE OF BREAST (HCC): ICD-10-CM

## 2021-01-05 DIAGNOSIS — D63.8 ANEMIA OF CHRONIC DISEASE: ICD-10-CM

## 2021-01-05 DIAGNOSIS — C50.911 MALIGNANT NEOPLASM OF RIGHT BREAST IN FEMALE, ESTROGEN RECEPTOR POSITIVE, UNSPECIFIED SITE OF BREAST (HCC): ICD-10-CM

## 2021-01-05 DIAGNOSIS — D46.9 MDS (MYELODYSPLASTIC SYNDROME) (HCC): Chronic | ICD-10-CM

## 2021-01-05 DIAGNOSIS — D46.9 MDS (MYELODYSPLASTIC SYNDROME) (HCC): Primary | ICD-10-CM

## 2021-01-05 LAB
ANISOCYTOSIS: ABNORMAL
BASOPHILS ABSOLUTE: 0.03 E9/L (ref 0–0.2)
BASOPHILS RELATIVE PERCENT: 0.4 % (ref 0–2)
EOSINOPHILS ABSOLUTE: 0.14 E9/L (ref 0.05–0.5)
EOSINOPHILS RELATIVE PERCENT: 2 % (ref 0–6)
HCT VFR BLD CALC: 29.1 % (ref 34–48)
HEMOGLOBIN: 10 G/DL (ref 11.5–15.5)
HYPOCHROMIA: ABNORMAL
IMMATURE GRANULOCYTES #: 0.03 E9/L
IMMATURE GRANULOCYTES %: 0.4 % (ref 0–5)
LYMPHOCYTES ABSOLUTE: 3.2 E9/L (ref 1.5–4)
LYMPHOCYTES RELATIVE PERCENT: 45.6 % (ref 20–42)
MCH RBC QN AUTO: 39.7 PG (ref 26–35)
MCHC RBC AUTO-ENTMCNC: 34.4 % (ref 32–34.5)
MCV RBC AUTO: 115.5 FL (ref 80–99.9)
MONOCYTES ABSOLUTE: 0.42 E9/L (ref 0.1–0.95)
MONOCYTES RELATIVE PERCENT: 6 % (ref 2–12)
NEUTROPHILS ABSOLUTE: 3.2 E9/L (ref 1.8–7.3)
NEUTROPHILS RELATIVE PERCENT: 45.6 % (ref 43–80)
OVALOCYTES: ABNORMAL
PDW BLD-RTO: 23.7 FL (ref 11.5–15)
PLATELET # BLD: 216 E9/L (ref 130–450)
PMV BLD AUTO: 10 FL (ref 7–12)
POIKILOCYTES: ABNORMAL
POLYCHROMASIA: ABNORMAL
RBC # BLD: 2.52 E12/L (ref 3.5–5.5)
SCHISTOCYTES: ABNORMAL
TEAR DROP CELLS: ABNORMAL
WBC # BLD: 7 E9/L (ref 4.5–11.5)

## 2021-01-05 PROCEDURE — 1123F ACP DISCUSS/DSCN MKR DOCD: CPT | Performed by: INTERNAL MEDICINE

## 2021-01-05 PROCEDURE — G8484 FLU IMMUNIZE NO ADMIN: HCPCS | Performed by: INTERNAL MEDICINE

## 2021-01-05 PROCEDURE — 99214 OFFICE O/P EST MOD 30 MIN: CPT | Performed by: INTERNAL MEDICINE

## 2021-01-05 PROCEDURE — G8427 DOCREV CUR MEDS BY ELIG CLIN: HCPCS | Performed by: INTERNAL MEDICINE

## 2021-01-05 PROCEDURE — 85025 COMPLETE CBC W/AUTO DIFF WBC: CPT

## 2021-01-05 PROCEDURE — G8399 PT W/DXA RESULTS DOCUMENT: HCPCS | Performed by: INTERNAL MEDICINE

## 2021-01-05 PROCEDURE — 1090F PRES/ABSN URINE INCON ASSESS: CPT | Performed by: INTERNAL MEDICINE

## 2021-01-05 PROCEDURE — 1036F TOBACCO NON-USER: CPT | Performed by: INTERNAL MEDICINE

## 2021-01-05 PROCEDURE — 36415 COLL VENOUS BLD VENIPUNCTURE: CPT

## 2021-01-05 PROCEDURE — G8417 CALC BMI ABV UP PARAM F/U: HCPCS | Performed by: INTERNAL MEDICINE

## 2021-01-05 PROCEDURE — 99212 OFFICE O/P EST SF 10 MIN: CPT

## 2021-01-05 PROCEDURE — 4040F PNEUMOC VAC/ADMIN/RCVD: CPT | Performed by: INTERNAL MEDICINE

## 2021-01-05 NOTE — PROGRESS NOTES
[address] MED ONCOLOGY  [facility] [facility] [address]  Dept: [phone]  Attending Progress Note      Reason for Visit:   1. Right Breast Cancer. 2. MDS. Referring Physician:  CONNIE Rubin CNP    PCP:  CONNIE Rubin CNP    History of Present Illness: The patient is a very pleasant 78 y.o. lady with a PMH significant for Right Breast Cancer, stage III, HR pos, was diagnosed in 2009, she had a right mastectomy done, followed by adjuvant chemo, she received 8 cycles, probably AC followed by T, then PMRT, and 5 years of adjuvant ET with Arimidex, was completed in 2015. She was treated in [facility] under the care of Dr. Srinivas Grimes. She was diagnosed with MDS in 2017, she received ESAs and PRBCs transfusion. She has transfusion related iron overload. She is feeling well overall, has chronic joints pain, peripheral neuropathy. She was started on Aranesp on 4/24/2020. No SE from Aranesp. She was seen in the ED on 1/2/2020 worsening pelvic and hip pain s/p fall, scan of the lumbar spine had revealed moderately severe stenosis at L4-L5, moderate stenosis at L3-L4 and L1-L2, suggestion of subtle acute fracture along the anterior superior endplate of L3, mild chronic compression deformity of the superior endplate of F03. The patient is feeling much better, the steroids and Flexeril did help, she started using the brace. She was seen by neurosurgery, lumbar spine MRI was ordered, patient did not have it done due to the cost of the test.  She found spine CT scan and MRI from 2017, which she took to the neurosurgery office. The patient returns for a follow-up visit, her energy level has been good, the itching of the right chest wall had resolved. Review of Systems;  CONSTITUTIONAL: No fever, chills. Fair appetite. ENMT: Eyes: No diplopia; Nose: No epistaxis. Mouth: No sore throat.   RESPIRATORY: No hemoptysis, shortness of breath, cough. CARDIOVASCULAR: No chest pain, palpitations. GASTROINTESTINAL: No nausea/vomiting, abdominal pain, diarrhea/constipation. GENITOURINARY: No dysuria, urinary frequency, hematuria. NEURO: No syncope, presyncope, headache.   Remainder:  ROS NEGATIVE    Past Medical History:      Diagnosis Date    Anemia     Pt reports she was dx in her teens, w/o proper w/u, with iron deficiency anemia and was on oral iron replacement for many years, resulting in iron overload    Aplastic anemia ([address] ~8762-1564    Possibly d/t chemotherapy for breast cancer    Breast cancer ([address] 2009    right side; pt underwent radical mastectomy followed by radiation therapy and chemotherapy and was then on oral chemo pill for 5 yrs; no recurrence as of 01/2019    Chickenpox     Hypothyroidism     Measles     Mild depression ([address]     Mumps     Myelodysplastic syndrome ([address] ~2016    Pulmonary tuberculosis ~8858-0641    in her early 25s    Scarlet fever     Sensory neuropathy     beyond the ankle b/l     Patient Active Problem List   Diagnosis    MDS (myelodysplastic syndrome) (HCC)    Bronchitis    Macrocytic anemia with high RDW    Hypothyroidism    Peripheral sensory neuropathy    Breast cancer (HCC)    Anemia of chronic disease        Past Surgical History:      Procedure Laterality Date    ADENOIDECTOMY      BONE MARROW BIOPSY      BREAST BIOPSY      CHOLECYSTECTOMY      MASTECTOMY      right breast    OTHER SURGICAL HISTORY      blood transfusions    TONSILLECTOMY AND ADENOIDECTOMY      TUBAL LIGATION         Family History:  Family History   Problem Relation Age of Onset   Kristin Diehl Cancer Mother         lung    Cancer Father         lung    Cancer Sister         ovarian cancer and breast cancer    Cancer Brother         unknown    Cancer Maternal Aunt         unknown    Cancer Other         brain    Other Brother         MI       Medications:  Reviewed and reconciled. Social History:  Social History     Socioeconomic History    Marital status: Single     Spouse name: Not on file    Number of children: Not on file    Years of education: Not on file    Highest education level: Not on file   Occupational History    Not on file   Social Needs    Financial resource strain: Not on file    Food insecurity     Worry: Not on file     Inability: Not on file    Transportation needs     Medical: Not on file     Non-medical: Not on file   Tobacco Use    Smoking status: Former Smoker     Packs/day: 1.00     Years: 37.00     Pack years: 37.00     Types: Cigarettes     Start date:      Quit date:      Years since quittin.0    Smokeless tobacco: Never Used   Substance and Sexual Activity    Alcohol use: No    Drug use: Never    Sexual activity: Not Currently   Lifestyle    Physical activity     Days per week: Not on file     Minutes per session: Not on file    Stress: Not on file   Relationships    Social connections     Talks on phone: Not on file     Gets together: Not on file     Attends Quaker service: Not on file     Active member of club or organization: Not on file     Attends meetings of clubs or organizations: Not on file     Relationship status: Not on file    Intimate partner violence     Fear of current or ex partner: Not on file     Emotionally abused: Not on file     Physically abused: Not on file     Forced sexual activity: Not on file   Other Topics Concern    Not on file   Social History Narrative    Not on file       Allergies:   Allergies   Allergen Reactions    Bee Venom Swelling and Dermatitis    Penicillins Hives, Swelling and Dermatitis       Physical Exam:  /62 (Site: Left Upper Arm, Position: Sitting, Cuff Size: Medium Adult)   Pulse 66   Temp 98.1 °F (36.7 °C) (Temporal)   Ht 5' 1\" (1.549 m)   Wt 155 lb 14.4 oz (70.7 kg)   LMP 2016   SpO2 97%   BMI 29.46 kg/m²   GENERAL: Alert, oriented x 3, not in acute distress. HEENT: PERRLA; EOMI. Oropharynx clear. NECK: Supple. No palpable cervical or supraclavicular lymphadenopathy. LUNGS: Good air entry bilaterally. No wheezing, crackles or rhonchi. BREASTS: She is s/p right mastectomy, she has telangiectasias, one small scab, no suspicious lesions, no drainage or bleeding. CARDIOVASCULAR: Regular rate. No murmurs, rubs or gallops. ABDOMEN: Soft. Non-tender, non-distended. Positive bowel sounds. EXTREMITIES: Without clubbing, cyanosis, or edema. NEUROLOGIC: No focal deficits. ECOG PS 1      Impression/Plan:     1. The patient is a very pleasant 78 y.o. lady with a PMH significant for Right Breast Cancer, stage III, HR pos, was diagnosed in 2009, she had a right mastectomy done, followed by adjuvant chemo, she received 8 cycles, probably AC followed by T, then PMRT, and 5 years of adjuvant ET with Arimidex, was completed in 2015. She was treated in [facility] under the care of Dr. Sofy Kennedy. She is doing well clinically without any evidence of recurrence of her disease. Discussed with her the importance of monthly SBE, and routine screening mammograms, she had a left breast screening mammogram done on 5/26/2020, revealing benign findings, was negative for malignancy. 2. She has MDS, diagnosed in 2017, she received ESAs and PRBCs transfusion, has transfusion related iron overload, hgb was 8.5, ferritin 3647, was restarted on Aranesp on 4/23/2019. Today 1/5/2021, hemoglobin is 10 hematocrit 29.1, white count 7, platelet count 238Q, MCV is 115.5. Her hemoglobin is 10 G/DL, will hold Aranesp today, will continue monitor her CBCD. No indication to start her on hypo-methylating agents at this time. RTC in 4 weeks with labs, possible aranesp. Thank you for allowing us to participate in the care of Ms. Ventura.     Jed Su MD   HEMATOLOGY/MEDICAL [address] [facility],  [address] [facility] ONCOLOGY  [address] [place] [id number]  Dept: [phone]

## 2021-02-02 ENCOUNTER — HOSPITAL ENCOUNTER (OUTPATIENT)
Dept: INFUSION THERAPY | Age: 81
Discharge: HOME | End: 2021-02-02
Payer: MEDICARE

## 2021-02-02 ENCOUNTER — OFFICE VISIT (OUTPATIENT)
Dept: ONCOLOGY | Age: 81
End: 2021-02-02
Payer: MEDICARE

## 2021-02-02 ENCOUNTER — TELEPHONE (OUTPATIENT)
Dept: CASE MANAGEMENT | Age: 81
End: 2021-02-02

## 2021-02-02 VITALS
SYSTOLIC BLOOD PRESSURE: 135 MMHG | HEART RATE: 63 BPM | BODY MASS INDEX: 29.24 KG/M2 | TEMPERATURE: 97 F | OXYGEN SATURATION: 95 % | HEIGHT: 61 IN | WEIGHT: 154.9 LBS | DIASTOLIC BLOOD PRESSURE: 61 MMHG

## 2021-02-02 DIAGNOSIS — R63.5 WEIGHT GAIN: Primary | ICD-10-CM

## 2021-02-02 DIAGNOSIS — D46.9 MDS (MYELODYSPLASTIC SYNDROME) (HCC): Chronic | ICD-10-CM

## 2021-02-02 DIAGNOSIS — D46.9 MDS (MYELODYSPLASTIC SYNDROME) (HCC): Primary | ICD-10-CM

## 2021-02-02 DIAGNOSIS — C50.919 MALIGNANT NEOPLASM OF FEMALE BREAST, UNSPECIFIED ESTROGEN RECEPTOR STATUS, UNSPECIFIED LATERALITY, UNSPECIFIED SITE OF BREAST (HCC): ICD-10-CM

## 2021-02-02 DIAGNOSIS — D63.8 ANEMIA OF CHRONIC DISEASE: ICD-10-CM

## 2021-02-02 LAB
ANISOCYTOSIS: ABNORMAL
BASOPHILIC STIPPLING: ABNORMAL
BASOPHILS ABSOLUTE: 0.02 E9/L (ref 0–0.2)
BASOPHILS RELATIVE PERCENT: 0.4 % (ref 0–2)
EOSINOPHILS ABSOLUTE: 0.15 E9/L (ref 0.05–0.5)
EOSINOPHILS RELATIVE PERCENT: 3 % (ref 0–6)
HCT VFR BLD CALC: 28.3 % (ref 34–48)
HEMOGLOBIN: 9.2 G/DL (ref 11.5–15.5)
HYPOCHROMIA: ABNORMAL
IMMATURE GRANULOCYTES #: 0.04 E9/L
IMMATURE GRANULOCYTES %: 0.8 % (ref 0–5)
LYMPHOCYTES ABSOLUTE: 2.37 E9/L (ref 1.5–4)
LYMPHOCYTES RELATIVE PERCENT: 47.4 % (ref 20–42)
MCH RBC QN AUTO: 38.2 PG (ref 26–35)
MCHC RBC AUTO-ENTMCNC: 32.5 % (ref 32–34.5)
MCV RBC AUTO: 117.4 FL (ref 80–99.9)
MONOCYTES ABSOLUTE: 0.35 E9/L (ref 0.1–0.95)
MONOCYTES RELATIVE PERCENT: 7 % (ref 2–12)
NEUTROPHILS ABSOLUTE: 2.07 E9/L (ref 1.8–7.3)
NEUTROPHILS RELATIVE PERCENT: 41.4 % (ref 43–80)
OVALOCYTES: ABNORMAL
PDW BLD-RTO: 22.8 FL (ref 11.5–15)
PLATELET # BLD: 167 E9/L (ref 130–450)
PMV BLD AUTO: 10.3 FL (ref 7–12)
POIKILOCYTES: ABNORMAL
POLYCHROMASIA: ABNORMAL
RBC # BLD: 2.41 E12/L (ref 3.5–5.5)
TEAR DROP CELLS: ABNORMAL
TSH SERPL DL<=0.05 MIU/L-ACNC: 36.32 UIU/ML (ref 0.27–4.2)
WBC # BLD: 5 E9/L (ref 4.5–11.5)

## 2021-02-02 PROCEDURE — G8484 FLU IMMUNIZE NO ADMIN: HCPCS | Performed by: INTERNAL MEDICINE

## 2021-02-02 PROCEDURE — 96372 THER/PROPH/DIAG INJ SC/IM: CPT

## 2021-02-02 PROCEDURE — 6360000002 HC RX W HCPCS: Performed by: NURSE PRACTITIONER

## 2021-02-02 PROCEDURE — 4040F PNEUMOC VAC/ADMIN/RCVD: CPT | Performed by: INTERNAL MEDICINE

## 2021-02-02 PROCEDURE — 1036F TOBACCO NON-USER: CPT | Performed by: INTERNAL MEDICINE

## 2021-02-02 PROCEDURE — 99212 OFFICE O/P EST SF 10 MIN: CPT

## 2021-02-02 PROCEDURE — 99214 OFFICE O/P EST MOD 30 MIN: CPT | Performed by: INTERNAL MEDICINE

## 2021-02-02 PROCEDURE — 1123F ACP DISCUSS/DSCN MKR DOCD: CPT | Performed by: INTERNAL MEDICINE

## 2021-02-02 PROCEDURE — G8427 DOCREV CUR MEDS BY ELIG CLIN: HCPCS | Performed by: INTERNAL MEDICINE

## 2021-02-02 PROCEDURE — 84443 ASSAY THYROID STIM HORMONE: CPT

## 2021-02-02 PROCEDURE — 1090F PRES/ABSN URINE INCON ASSESS: CPT | Performed by: INTERNAL MEDICINE

## 2021-02-02 PROCEDURE — 85025 COMPLETE CBC W/AUTO DIFF WBC: CPT

## 2021-02-02 PROCEDURE — G8417 CALC BMI ABV UP PARAM F/U: HCPCS | Performed by: INTERNAL MEDICINE

## 2021-02-02 PROCEDURE — 36415 COLL VENOUS BLD VENIPUNCTURE: CPT

## 2021-02-02 PROCEDURE — G8399 PT W/DXA RESULTS DOCUMENT: HCPCS | Performed by: INTERNAL MEDICINE

## 2021-02-02 RX ADMIN — DARBEPOETIN ALFA 500 MCG: 500 INJECTION, SOLUTION INTRAVENOUS; SUBCUTANEOUS at 13:49

## 2021-02-02 NOTE — PROGRESS NOTES
shortness of breath, cough. CARDIOVASCULAR: No chest pain, palpitations. GASTROINTESTINAL: No nausea/vomiting, abdominal pain, diarrhea/constipation. GENITOURINARY: No dysuria, urinary frequency, hematuria. NEURO: No syncope, presyncope, headache. Remainder:  ROS NEGATIVE    Past Medical History:      Diagnosis Date    Anemia     Pt reports she was dx in her teens, w/o proper w/u, with iron deficiency anemia and was on oral iron replacement for many years, resulting in iron overload    Aplastic anemia ([address] ~6051-6466    Possibly d/t chemotherapy for breast cancer    Breast cancer ([address] 2009    right side; pt underwent radical mastectomy followed by radiation therapy and chemotherapy and was then on oral chemo pill for 5 yrs; no recurrence as of 01/2019    Chickenpox     Hypothyroidism     Measles     Mild depression ([address]     Mumps     Myelodysplastic syndrome ([address] ~2016    Pulmonary tuberculosis ~0966-9037    in her early 25s    Scarlet fever     Sensory neuropathy     beyond the ankle b/l     Patient Active Problem List   Diagnosis    MDS (myelodysplastic syndrome) (HCC)    Bronchitis    Macrocytic anemia with high RDW    Hypothyroidism    Peripheral sensory neuropathy    Breast cancer (HCC)    Anemia of chronic disease        Past Surgical History:      Procedure Laterality Date    ADENOIDECTOMY      BONE MARROW BIOPSY      BREAST BIOPSY      CHOLECYSTECTOMY      MASTECTOMY      right breast    OTHER SURGICAL HISTORY      blood transfusions    TONSILLECTOMY AND ADENOIDECTOMY      TUBAL LIGATION         Family History:  Family History   Problem Relation Age of Onset   Kadi Abler Cancer Mother         lung    Cancer Father         lung    Cancer Sister         ovarian cancer and breast cancer    Cancer Brother         unknown    Cancer Maternal Aunt         unknown    Cancer Other         brain    Other Brother         MI       Medications:  Reviewed and reconciled.     Social History:  Social History     Socioeconomic History    Marital status: Single     Spouse name: Not on file    Number of children: Not on file    Years of education: Not on file    Highest education level: Not on file   Occupational History    Not on file   Social Needs    Financial resource strain: Not on file    Food insecurity     Worry: Not on file     Inability: Not on file    Transportation needs     Medical: Not on file     Non-medical: Not on file   Tobacco Use    Smoking status: Former Smoker     Packs/day: 1.00     Years: 37.00     Pack years: 37.00     Types: Cigarettes     Start date:      Quit date:      Years since quittin.1    Smokeless tobacco: Never Used   Substance and Sexual Activity    Alcohol use: No    Drug use: Never    Sexual activity: Not Currently   Lifestyle    Physical activity     Days per week: Not on file     Minutes per session: Not on file    Stress: Not on file   Relationships    Social connections     Talks on phone: Not on file     Gets together: Not on file     Attends Evangelical service: Not on file     Active member of club or organization: Not on file     Attends meetings of clubs or organizations: Not on file     Relationship status: Not on file    Intimate partner violence     Fear of current or ex partner: Not on file     Emotionally abused: Not on file     Physically abused: Not on file     Forced sexual activity: Not on file   Other Topics Concern    Not on file   Social History Narrative    Not on file       Allergies: Allergies   Allergen Reactions    Bee Venom Swelling and Dermatitis    Penicillins Hives, Swelling and Dermatitis       Physical Exam:  /61   Pulse 63   Temp 97 °F (36.1 °C)   Ht 5' 1\" (1.549 m)   Wt 154 lb 14.4 oz (70.3 kg)   LMP 2016   SpO2 95%   BMI 29.27 kg/m²   GENERAL: Alert, oriented x 3, not in acute distress. HEENT: PERRLA; EOMI. Oropharynx clear. NECK: Supple.  No palpable cervical or supraclavicular lymphadenopathy. LUNGS: Good air entry bilaterally. No wheezing, crackles or rhonchi. BREASTS: She is s/p right mastectomy, she has telangiectasias, one small scab, no suspicious lesions, no drainage or bleeding. CARDIOVASCULAR: Regular rate. No murmurs, rubs or gallops. ABDOMEN: Soft. Non-tender, non-distended. Positive bowel sounds. EXTREMITIES: Without clubbing, cyanosis, or edema. NEUROLOGIC: No focal deficits. ECOG PS 1      Impression/Plan:     1. The patient is a very pleasant 78 y.o. lady with a PMH significant for Right Breast Cancer, stage III, HR pos, was diagnosed in 2009, she had a right mastectomy done, followed by adjuvant chemo, she received 8 cycles, probably AC followed by T, then PMRT, and 5 years of adjuvant ET with Arimidex, was completed in 2015. She was treated in [facility] under the care of Dr. Farzana Shanks. She is doing well clinically without any evidence of recurrence of her disease. Discussed with her the importance of monthly SBE, and routine screening mammograms, she had a left breast screening mammogram done on 5/26/2020, revealing benign findings, was negative for malignancy. 2. She has MDS, diagnosed in 2017, she received ESAs and PRBCs transfusion, has transfusion related iron overload, hgb was 8.5, ferritin 3647, was restarted on Aranesp on 4/23/2019. Today 02/20/2021, labs reviewed, hemoglobin is 9.2, hematocrit 28.3, .4, normal white count and platelet count. Her hemoglobin is less than 10 G/DL, proceed with Aranesp. No indication to start her on hypo-methylating agents at this time. Weight gain, TSH is 36.320, the patient will follow up with Nat ROSALES CNP, results were sent to her office. RTC in 4 weeks with labs, possible aranesp. Thank you for allowing us to participate in the care of Ms. Ventura.     Selena Almazan MD   HEMATOLOGY/MEDICAL [address] [facility], [address] [facility] ONCOLOGY  [facility] [address]  [place] [address]  Dept: [phone]

## 2021-02-02 NOTE — TELEPHONE ENCOUNTER
Dr. Ra Cohn requested most recent progress note be faxed to patient's PCP Khanh ROSALES, left message at [phone] for return call with fax number.

## 2021-02-19 DIAGNOSIS — Z51.5 PALLIATIVE CARE BY SPECIALIST: ICD-10-CM

## 2021-02-19 DIAGNOSIS — G89.3 PAIN DUE TO NEOPLASM: ICD-10-CM

## 2021-02-20 RX ORDER — HYDROCODONE BITARTRATE AND ACETAMINOPHEN 5; 325 MG/1; MG/1
1 TABLET ORAL EVERY 12 HOURS PRN
Qty: 60 TABLET | Refills: 0 | Status: SHIPPED
Start: 2021-02-20 | End: 2021-04-09 | Stop reason: SDUPTHER

## 2021-03-01 ENCOUNTER — VIRTUAL VISIT (OUTPATIENT)
Dept: PALLATIVE CARE | Age: 81
End: 2021-03-01
Payer: MEDICARE

## 2021-03-01 DIAGNOSIS — Z51.5 PALLIATIVE CARE BY SPECIALIST: Primary | ICD-10-CM

## 2021-03-01 PROCEDURE — 99442 PR PHYS/QHP TELEPHONE EVALUATION 11-20 MIN: CPT | Performed by: STUDENT IN AN ORGANIZED HEALTH CARE EDUCATION/TRAINING PROGRAM

## 2021-03-01 NOTE — PROGRESS NOTES
Department of Palliative Medicine  Virtual visit telephone encounter  Provider: Padilla Kern MD        Chief Complaint: Joni Black is a 78 y.o. female with chief complaint of Pain    HPI:  Joni Black is a 78 y.o. female with significant past medical history of stage II HR positive breast cancer, diagnosed in 2009, status post right mastectomy, followed by adjuvant therapy, and 5 years of adjuvant Arimidex which was completed in 2015, with all treatment performed in [facility] under the care of Dr. Al Hernandez. She is currently being followed locally by Dr. Sylvia Mishra, without evidence of recurrent disease, whom she also follows for management of myelodysplastic syndrome, which was diagnosed in 2017. She was referred to [address] [place] [address] for assistance with symptom management related to chemotherapy-induced peripheral neuropathy.     Assessment/Plan      History of Breast Cancer stage III, HR positive:              -Diagnosed, treated, and managed in [place] in 2009              -Status post right mastectomy              -Followed by adjuvant chemotherapy              -Completed her Demadex 2015              -Currently followed locally by Dr. Sylvia Mishra, she also follows with regarding MDS diagnosed 2017     Chemotherapy related peripheral Neuropathy/Pain:              -  Worse bilateral feet and LEs, burning and pressure,               -  Gabapentin 900 mg HS per PCP              -  Improves with activity, worsens at rest              -  Decrease Norco 5-325 mg every 12 hours as needed, using 1/day,              -  Currently she is very high functioning, enjoys gardening, and is quite active, and has recently moved to a new home, which has had her even more active.     Palliative Care Encounter:                            - Goals of care: improve or maintain function/quality of life, remain at home, preserve independence/autonomy/control and continue current management                 - Code Status: Not discussed during this encounter     Prognosis: unknown     Follow Up:  8 weeks. They were encouraged to call with any questions, concerns, needs, or changes in symptoms. Subjective:     Spoke to Mila Kumar over the phone, she is doing very well. She has recently received Covid vaccine, she says her pain has been very well controlled. She has been using half tab in the morning and half tab at bedtime. I explained to her to use ibuprofen for one of the tabs, instead of the Norco.  She does not complain of nausea or vomiting, or constipation. We will follow her watch how she does. Objective:     Physical Exam  Wt Readings from Last 3 Encounters:   02/02/21 154 lb 14.4 oz (70.3 kg)   01/05/21 155 lb 14.4 oz (70.7 kg)   12/04/20 154 lb (69.9 kg)       Gatzke Symptom Assessment Score   Gatzke Score 11/24/2020 10/13/2020 4/14/2020 1/28/2020 12/3/2019   Pain Score 5 8 2 8 4   Tiredness Score 3 7 1 9 6   Nausea Score Not nauseated Not nauseated Not nauseated Not nauseated Not nauseated   Depression Score Not depressed Not depressed Not depressed Not depressed Not depressed   Anxiety Score Not anxious Not anxious Not anxious Not anxious 1   Drowsiness Score 2 5 Not drowsy 1 2   Appetite Score Best appetite 1 Best appetite Best appetite Best appetite   Wellbeing Score Best feeling of wellbeing Best feeling of wellbeing Best feeling of wellbeing Best feeling of wellbeing 2   Dyspnea Score 3 6 No shortness of breath 5 7   Other Problem Score Best possible response Best possible response Best possible response Best possible response Best possible response   Total Assessment Score(calculated) 13 27 3 23 22       Assessed by: patient. Current Medications:  Medications reviewed: yes    Controlled Substances Monitoring: [place] reviewed 3/1/21.   RX Monitoring 11/24/2020   Periodic Controlled Substance Monitoring Possible medication side effects, risk of tolerance/dependence & alternative treatments discussed. ;No signs of potential drug abuse or diversion identified. ;Assessed functional status. ;Obtaining appropriate analgesic effect of treatment. Chronic Pain > 50 AMAURI -         Audrey Mejía MD  Palliative Care Department     Time/Communication  Greater than 50% of time spent, total 25 minutes in face-to-face counseling and coordination of care regarding symptom management. Note: This report was completed using computerize voiced recognition software. Every effort has been made to ensure accuracy; however, inadvertent computerized transcription errors may be present.

## 2021-03-02 ENCOUNTER — HOSPITAL ENCOUNTER (OUTPATIENT)
Dept: INFUSION THERAPY | Age: 81
Discharge: HOME | End: 2021-03-02
Payer: MEDICARE

## 2021-03-02 ENCOUNTER — OFFICE VISIT (OUTPATIENT)
Dept: ONCOLOGY | Age: 81
End: 2021-03-02
Payer: MEDICARE

## 2021-03-02 VITALS
DIASTOLIC BLOOD PRESSURE: 60 MMHG | HEIGHT: 61 IN | HEART RATE: 69 BPM | TEMPERATURE: 98.2 F | SYSTOLIC BLOOD PRESSURE: 131 MMHG | WEIGHT: 154.1 LBS | BODY MASS INDEX: 29.09 KG/M2 | OXYGEN SATURATION: 98 %

## 2021-03-02 DIAGNOSIS — D63.8 ANEMIA OF CHRONIC DISEASE: ICD-10-CM

## 2021-03-02 DIAGNOSIS — D46.9 MDS (MYELODYSPLASTIC SYNDROME) (HCC): Primary | Chronic | ICD-10-CM

## 2021-03-02 DIAGNOSIS — D46.9 MDS (MYELODYSPLASTIC SYNDROME) (HCC): Primary | ICD-10-CM

## 2021-03-02 DIAGNOSIS — C50.919 MALIGNANT NEOPLASM OF FEMALE BREAST, UNSPECIFIED ESTROGEN RECEPTOR STATUS, UNSPECIFIED LATERALITY, UNSPECIFIED SITE OF BREAST (HCC): ICD-10-CM

## 2021-03-02 LAB
ANISOCYTOSIS: ABNORMAL
BASOPHILS ABSOLUTE: 0 E9/L (ref 0–0.2)
BASOPHILS RELATIVE PERCENT: 0.3 % (ref 0–2)
EOSINOPHILS ABSOLUTE: 0.23 E9/L (ref 0.05–0.5)
EOSINOPHILS RELATIVE PERCENT: 3.5 % (ref 0–6)
HCT VFR BLD CALC: 27.7 % (ref 34–48)
HEMOGLOBIN: 9.2 G/DL (ref 11.5–15.5)
LYMPHOCYTES ABSOLUTE: 2.41 E9/L (ref 1.5–4)
LYMPHOCYTES RELATIVE PERCENT: 36.5 % (ref 20–42)
MCH RBC QN AUTO: 38.5 PG (ref 26–35)
MCHC RBC AUTO-ENTMCNC: 33.2 % (ref 32–34.5)
MCV RBC AUTO: 115.9 FL (ref 80–99.9)
MONOCYTES ABSOLUTE: 0.32 E9/L (ref 0.1–0.95)
MONOCYTES RELATIVE PERCENT: 5.2 % (ref 2–12)
MYELOCYTE PERCENT: 2.6 % (ref 0–0)
NEUTROPHILS ABSOLUTE: 3.58 E9/L (ref 1.8–7.3)
NEUTROPHILS RELATIVE PERCENT: 52.2 % (ref 43–80)
NUCLEATED RED BLOOD CELLS: 0.9 /100 WBC
OVALOCYTES: ABNORMAL
PDW BLD-RTO: 23.4 FL (ref 11.5–15)
PLATELET # BLD: 196 E9/L (ref 130–450)
PMV BLD AUTO: 10.1 FL (ref 7–12)
POIKILOCYTES: ABNORMAL
POLYCHROMASIA: ABNORMAL
RBC # BLD: 2.39 E12/L (ref 3.5–5.5)
SCHISTOCYTES: ABNORMAL
TARGET CELLS: ABNORMAL
WBC # BLD: 6.5 E9/L (ref 4.5–11.5)

## 2021-03-02 PROCEDURE — G8399 PT W/DXA RESULTS DOCUMENT: HCPCS | Performed by: INTERNAL MEDICINE

## 2021-03-02 PROCEDURE — G8427 DOCREV CUR MEDS BY ELIG CLIN: HCPCS | Performed by: INTERNAL MEDICINE

## 2021-03-02 PROCEDURE — 85025 COMPLETE CBC W/AUTO DIFF WBC: CPT

## 2021-03-02 PROCEDURE — G8484 FLU IMMUNIZE NO ADMIN: HCPCS | Performed by: INTERNAL MEDICINE

## 2021-03-02 PROCEDURE — G8417 CALC BMI ABV UP PARAM F/U: HCPCS | Performed by: INTERNAL MEDICINE

## 2021-03-02 PROCEDURE — 4040F PNEUMOC VAC/ADMIN/RCVD: CPT | Performed by: INTERNAL MEDICINE

## 2021-03-02 PROCEDURE — 36415 COLL VENOUS BLD VENIPUNCTURE: CPT

## 2021-03-02 PROCEDURE — 99214 OFFICE O/P EST MOD 30 MIN: CPT | Performed by: INTERNAL MEDICINE

## 2021-03-02 PROCEDURE — 1123F ACP DISCUSS/DSCN MKR DOCD: CPT | Performed by: INTERNAL MEDICINE

## 2021-03-02 PROCEDURE — 96372 THER/PROPH/DIAG INJ SC/IM: CPT

## 2021-03-02 PROCEDURE — 99212 OFFICE O/P EST SF 10 MIN: CPT

## 2021-03-02 PROCEDURE — 6360000002 HC RX W HCPCS: Performed by: NURSE PRACTITIONER

## 2021-03-02 PROCEDURE — 1036F TOBACCO NON-USER: CPT | Performed by: INTERNAL MEDICINE

## 2021-03-02 PROCEDURE — 1090F PRES/ABSN URINE INCON ASSESS: CPT | Performed by: INTERNAL MEDICINE

## 2021-03-02 RX ADMIN — DARBEPOETIN ALFA 500 MCG: 500 INJECTION, SOLUTION INTRAVENOUS; SUBCUTANEOUS at 13:38

## 2021-03-02 NOTE — PROGRESS NOTES
of breath, cough. CARDIOVASCULAR: No chest pain, palpitations. GASTROINTESTINAL: No nausea/vomiting, abdominal pain, diarrhea/constipation. GENITOURINARY: No dysuria, urinary frequency, hematuria. NEURO: No syncope, presyncope, headache. Remainder:  ROS NEGATIVE    Past Medical History:      Diagnosis Date    Anemia     Pt reports she was dx in her teens, w/o proper w/u, with iron deficiency anemia and was on oral iron replacement for many years, resulting in iron overload    Aplastic anemia ([address] ~[id number]    Possibly d/t chemotherapy for breast cancer    Breast cancer ([address] 2009    right side; pt underwent radical mastectomy followed by radiation therapy and chemotherapy and was then on oral chemo pill for 5 yrs; no recurrence as of 01/2019    Chickenpox     Hypothyroidism     Measles     Mild depression ([address]     Mumps     Myelodysplastic syndrome ([address] ~2016    Pulmonary tuberculosis ~[id number]    in her early 25s    Scarlet fever     Sensory neuropathy     beyond the ankle b/l     Patient Active Problem List   Diagnosis    MDS (myelodysplastic syndrome) (HCC)    Bronchitis    Macrocytic anemia with high RDW    Hypothyroidism    Peripheral sensory neuropathy    Breast cancer (HCC)    Anemia of chronic disease        Past Surgical History:      Procedure Laterality Date    ADENOIDECTOMY      BONE MARROW BIOPSY      BREAST BIOPSY      CHOLECYSTECTOMY      MASTECTOMY      right breast    OTHER SURGICAL HISTORY      blood transfusions    TONSILLECTOMY AND ADENOIDECTOMY      TUBAL LIGATION         Family History:  Family History   Problem Relation Age of Onset   Dot Drea Cancer Mother         lung    Cancer Father         lung    Cancer Sister         ovarian cancer and breast cancer    Cancer Brother         unknown    Cancer Maternal Aunt         unknown    Cancer Other         brain    Other Brother         MI       Medications:  Reviewed and reconciled.     Social History:  Social History     Socioeconomic History    Marital status: Single     Spouse name: Not on file    Number of children: Not on file    Years of education: Not on file    Highest education level: Not on file   Occupational History    Not on file   Social Needs    Financial resource strain: Not on file    Food insecurity     Worry: Not on file     Inability: Not on file    Transportation needs     Medical: Not on file     Non-medical: Not on file   Tobacco Use    Smoking status: Former Smoker     Packs/day: 1.00     Years: 37.00     Pack years: 37.00     Types: Cigarettes     Start date:      Quit date:      Years since quittin.1    Smokeless tobacco: Never Used   Substance and Sexual Activity    Alcohol use: No    Drug use: Never    Sexual activity: Not Currently   Lifestyle    Physical activity     Days per week: Not on file     Minutes per session: Not on file    Stress: Not on file   Relationships    Social connections     Talks on phone: Not on file     Gets together: Not on file     Attends Yarsani service: Not on file     Active member of club or organization: Not on file     Attends meetings of clubs or organizations: Not on file     Relationship status: Not on file    Intimate partner violence     Fear of current or ex partner: Not on file     Emotionally abused: Not on file     Physically abused: Not on file     Forced sexual activity: Not on file   Other Topics Concern    Not on file   Social History Narrative    Not on file       Allergies: Allergies   Allergen Reactions    Bee Venom Swelling and Dermatitis    Penicillins Hives, Swelling and Dermatitis       Physical Exam:  /60 (Site: Left Upper Arm, Position: Sitting, Cuff Size: Medium Adult)   Pulse 69   Temp 98.2 °F (36.8 °C) (Temporal)   Ht 5' 1\" (1.549 m)   Wt 154 lb 1.6 oz (69.9 kg)   LMP 2016   SpO2 98%   BMI 29.12 kg/m²   GENERAL: Alert, oriented x 3, not in acute distress. HEENT: PERRLA; EOMI. Oropharynx clear. NECK: Supple. No palpable cervical or supraclavicular lymphadenopathy. LUNGS: Good air entry bilaterally. No wheezing, crackles or rhonchi. BREASTS: She is s/p right mastectomy, she has telangiectasias, one small scab, no suspicious lesions, no drainage or bleeding. CARDIOVASCULAR: Regular rate. No murmurs, rubs or gallops. ABDOMEN: Soft. Non-tender, non-distended. Positive bowel sounds. EXTREMITIES: Without clubbing, cyanosis, or edema. NEUROLOGIC: No focal deficits. ECOG PS 1      Impression/Plan:     1. The patient is a very pleasant 78 y.o. lady with a PMH significant for Right Breast Cancer, stage III, HR pos, was diagnosed in 2009, she had a right mastectomy done, followed by adjuvant chemo, she received 8 cycles, probably AC followed by T, then PMRT, and 5 years of adjuvant ET with Arimidex, was completed in 2015. She was treated in [facility] under the care of Dr. Janeen Joshi. She is doing well clinically without any evidence of recurrence of her disease. Discussed with her the importance of monthly SBE, and routine screening mammograms, she had a left breast screening mammogram done on 5/26/2020, revealing benign findings, was negative for malignancy. 2. She has MDS, diagnosed in 2017, she received ESAs and PRBCs transfusion, has transfusion related iron overload, hgb was 8.5, ferritin 3647, was restarted on Aranesp on 4/23/2019. Today 3/2/2021, labs reviewed, hemoglobin is 9.2, hematocrit 27.7, .4, normal white count and platelet count. Her hemoglobin is less than 10 G/DL, proceed with Aranesp. No indication to start her on hypo-methylating agents at this time. Hypothyroidism, follow-up with PCP. RTC in 4 weeks with labs, possible aranesp. Thank you for allowing us to participate in the care of Ms. Ventura.     Nathalia Nicholson MD   HEMATOLOGY/MEDICAL [facility], [address] [place] ONCOLOGY  [address] [place] [id number]  Dept: [phone]

## 2021-03-30 ENCOUNTER — HOSPITAL ENCOUNTER (OUTPATIENT)
Dept: INFUSION THERAPY | Age: 81
End: 2021-03-30
Payer: MEDICARE

## 2021-04-08 DIAGNOSIS — G89.3 PAIN DUE TO NEOPLASM: ICD-10-CM

## 2021-04-08 DIAGNOSIS — Z51.5 PALLIATIVE CARE BY SPECIALIST: ICD-10-CM

## 2021-04-09 RX ORDER — HYDROCODONE BITARTRATE AND ACETAMINOPHEN 5; 325 MG/1; MG/1
1 TABLET ORAL EVERY 12 HOURS PRN
Qty: 60 TABLET | Refills: 0 | Status: SHIPPED
Start: 2021-04-09 | End: 2021-06-01 | Stop reason: SDUPTHER

## 2021-04-13 ENCOUNTER — HOSPITAL ENCOUNTER (OUTPATIENT)
Dept: INFUSION THERAPY | Age: 81
Discharge: HOME | End: 2021-04-13
Payer: MEDICARE

## 2021-04-13 ENCOUNTER — OFFICE VISIT (OUTPATIENT)
Dept: ONCOLOGY | Age: 81
End: 2021-04-13
Payer: MEDICARE

## 2021-04-13 VITALS
SYSTOLIC BLOOD PRESSURE: 130 MMHG | TEMPERATURE: 96.8 F | WEIGHT: 150 LBS | BODY MASS INDEX: 28.32 KG/M2 | OXYGEN SATURATION: 95 % | HEART RATE: 73 BPM | HEIGHT: 61 IN | DIASTOLIC BLOOD PRESSURE: 56 MMHG

## 2021-04-13 DIAGNOSIS — D46.9 MDS (MYELODYSPLASTIC SYNDROME) (HCC): Primary | ICD-10-CM

## 2021-04-13 DIAGNOSIS — D63.8 ANEMIA OF CHRONIC DISEASE: ICD-10-CM

## 2021-04-13 LAB
ANISOCYTOSIS: ABNORMAL
BASOPHILS ABSOLUTE: 0 E9/L (ref 0–0.2)
BASOPHILS RELATIVE PERCENT: 0.4 % (ref 0–2)
EOSINOPHILS ABSOLUTE: 0.04 E9/L (ref 0.05–0.5)
EOSINOPHILS RELATIVE PERCENT: 0.9 % (ref 0–6)
HCT VFR BLD CALC: 25.7 % (ref 34–48)
HEMOGLOBIN: 8.2 G/DL (ref 11.5–15.5)
LYMPHOCYTES ABSOLUTE: 1.79 E9/L (ref 1.5–4)
LYMPHOCYTES RELATIVE PERCENT: 38.3 % (ref 20–42)
MCH RBC QN AUTO: 37.4 PG (ref 26–35)
MCHC RBC AUTO-ENTMCNC: 31.9 % (ref 32–34.5)
MCV RBC AUTO: 117.4 FL (ref 80–99.9)
MONOCYTES ABSOLUTE: 0.19 E9/L (ref 0.1–0.95)
MONOCYTES RELATIVE PERCENT: 4.3 % (ref 2–12)
NEUTROPHILS ABSOLUTE: 2.68 E9/L (ref 1.8–7.3)
NEUTROPHILS RELATIVE PERCENT: 56.5 % (ref 43–80)
OVALOCYTES: ABNORMAL
PDW BLD-RTO: 24.3 FL (ref 11.5–15)
PLATELET # BLD: 162 E9/L (ref 130–450)
PMV BLD AUTO: 11 FL (ref 7–12)
POIKILOCYTES: ABNORMAL
POLYCHROMASIA: ABNORMAL
RBC # BLD: 2.19 E12/L (ref 3.5–5.5)
SCHISTOCYTES: ABNORMAL
TARGET CELLS: ABNORMAL
TEAR DROP CELLS: ABNORMAL
WBC # BLD: 4.7 E9/L (ref 4.5–11.5)

## 2021-04-13 PROCEDURE — 99214 OFFICE O/P EST MOD 30 MIN: CPT | Performed by: INTERNAL MEDICINE

## 2021-04-13 PROCEDURE — 1123F ACP DISCUSS/DSCN MKR DOCD: CPT | Performed by: INTERNAL MEDICINE

## 2021-04-13 PROCEDURE — 1090F PRES/ABSN URINE INCON ASSESS: CPT | Performed by: INTERNAL MEDICINE

## 2021-04-13 PROCEDURE — 96372 THER/PROPH/DIAG INJ SC/IM: CPT

## 2021-04-13 PROCEDURE — 85025 COMPLETE CBC W/AUTO DIFF WBC: CPT

## 2021-04-13 PROCEDURE — 4040F PNEUMOC VAC/ADMIN/RCVD: CPT | Performed by: INTERNAL MEDICINE

## 2021-04-13 PROCEDURE — 6360000002 HC RX W HCPCS: Performed by: NURSE PRACTITIONER

## 2021-04-13 PROCEDURE — G8427 DOCREV CUR MEDS BY ELIG CLIN: HCPCS | Performed by: INTERNAL MEDICINE

## 2021-04-13 PROCEDURE — 1036F TOBACCO NON-USER: CPT | Performed by: INTERNAL MEDICINE

## 2021-04-13 PROCEDURE — G8399 PT W/DXA RESULTS DOCUMENT: HCPCS | Performed by: INTERNAL MEDICINE

## 2021-04-13 PROCEDURE — G8417 CALC BMI ABV UP PARAM F/U: HCPCS | Performed by: INTERNAL MEDICINE

## 2021-04-13 RX ADMIN — DARBEPOETIN ALFA 500 MCG: 500 INJECTION, SOLUTION INTRAVENOUS; SUBCUTANEOUS at 14:52

## 2021-04-13 NOTE — PROGRESS NOTES
[address] MED ONCOLOGY  [address] [facility] [address]  Dept: [phone]  Attending Progress Note      Reason for Visit:   1. Right Breast Cancer. 2. MDS. Referring Physician:  CONNIE Colorado CNP    PCP:  CONNIE Colorado CNP    History of Present Illness: The patient is a very pleasant 78 y.o. lady with a PMH significant for Right Breast Cancer, stage III, HR pos, was diagnosed in 2009, she had a right mastectomy done, followed by adjuvant chemo, she received 8 cycles, probably AC followed by T, then PMRT, and 5 years of adjuvant ET with Arimidex, was completed in 2015. She was treated in [facility] under the care of Dr. Joel Miles. She was diagnosed with MDS in 2017, she received ESAs and PRBCs transfusion. She has transfusion related iron overload. She is feeling well overall, has chronic joints pain, peripheral neuropathy. She was started on Aranesp on 4/24/2020. No SE from Aranesp. She was seen in the ED on 1/2/2020 worsening pelvic and hip pain s/p fall, scan of the lumbar spine had revealed moderately severe stenosis at L4-L5, moderate stenosis at L3-L4 and L1-L2, suggestion of subtle acute fracture along the anterior superior endplate of L3, mild chronic compression deformity of the superior endplate of C28. The patient is feeling much better, the steroids and Flexeril did help, she started using the brace. She was seen by neurosurgery, lumbar spine MRI was ordered, patient did not have it done due to the cost of the test.  She found spine CT scan and MRI from 2017, which she took to the neurosurgery office. The patient returns for a follow-up visit, she is feeling tired, otherwise doing well. Review of Systems;  CONSTITUTIONAL: No fever, chills. Fair appetite. ENMT: Eyes: No diplopia; Nose: No epistaxis. Mouth: No sore throat. RESPIRATORY: No hemoptysis, shortness of breath, cough. CARDIOVASCULAR: No chest pain, palpitations. GASTROINTESTINAL: No nausea/vomiting, abdominal pain, diarrhea/constipation. GENITOURINARY: No dysuria, urinary frequency, hematuria. NEURO: No syncope, presyncope, headache. Remainder:  ROS NEGATIVE    Past Medical History:      Diagnosis Date    Anemia     Pt reports she was dx in her teens, w/o proper w/u, with iron deficiency anemia and was on oral iron replacement for many years, resulting in iron overload    Aplastic anemia ([address] ~1501-6530    Possibly d/t chemotherapy for breast cancer    Breast cancer ([address] 2009    right side; pt underwent radical mastectomy followed by radiation therapy and chemotherapy and was then on oral chemo pill for 5 yrs; no recurrence as of 01/2019    Chickenpox     Hypothyroidism     Measles     Mild depression ([address]     Mumps     Myelodysplastic syndrome ([address] ~2016    Pulmonary tuberculosis ~[id number]    in her early 25s    Scarlet fever     Sensory neuropathy     beyond the ankle b/l     Patient Active Problem List   Diagnosis    MDS (myelodysplastic syndrome) (HCC)    Bronchitis    Macrocytic anemia with high RDW    Hypothyroidism    Peripheral sensory neuropathy    Breast cancer (HCC)    Anemia of chronic disease        Past Surgical History:      Procedure Laterality Date    ADENOIDECTOMY      BONE MARROW BIOPSY      BREAST BIOPSY      CHOLECYSTECTOMY      MASTECTOMY      right breast    OTHER SURGICAL HISTORY      blood transfusions    TONSILLECTOMY AND ADENOIDECTOMY      TUBAL LIGATION         Family History:  Family History   Problem Relation Age of Onset   Jaz Tracy Cancer Mother         lung    Cancer Father         lung    Cancer Sister         ovarian cancer and breast cancer    Cancer Brother         unknown    Cancer Maternal Aunt         unknown    Cancer Other         brain    Other Brother         MI       Medications:  Reviewed and reconciled.     Social History:  Social History Socioeconomic History    Marital status: Single     Spouse name: Not on file    Number of children: Not on file    Years of education: Not on file    Highest education level: Not on file   Occupational History    Not on file   Social Needs    Financial resource strain: Not on file    Food insecurity     Worry: Not on file     Inability: Not on file    Transportation needs     Medical: Not on file     Non-medical: Not on file   Tobacco Use    Smoking status: Former Smoker     Packs/day: 1.00     Years: 37.00     Pack years: 37.00     Types: Cigarettes     Start date:      Quit date:      Years since quittin.2    Smokeless tobacco: Never Used   Substance and Sexual Activity    Alcohol use: No    Drug use: Never    Sexual activity: Not Currently   Lifestyle    Physical activity     Days per week: Not on file     Minutes per session: Not on file    Stress: Not on file   Relationships    Social connections     Talks on phone: Not on file     Gets together: Not on file     Attends Synagogue service: Not on file     Active member of club or organization: Not on file     Attends meetings of clubs or organizations: Not on file     Relationship status: Not on file    Intimate partner violence     Fear of current or ex partner: Not on file     Emotionally abused: Not on file     Physically abused: Not on file     Forced sexual activity: Not on file   Other Topics Concern    Not on file   Social History Narrative    Not on file       Allergies: Allergies   Allergen Reactions    Bee Venom Swelling and Dermatitis    Penicillins Hives, Swelling and Dermatitis       Physical Exam:  BP (!) 130/56 (Site: Left Upper Arm, Position: Sitting, Cuff Size: Medium Adult)   Pulse 73   Temp 96.8 °F (36 °C) (Temporal)   Ht 5' 1\" (1.549 m)   Wt 150 lb (68 kg)   LMP 2016   SpO2 95%   BMI 28.34 kg/m²   GENERAL: Alert, oriented x 3, not in acute distress. HEENT: PERRLA; EOMI. Oropharynx clear. NECK: Supple. No palpable cervical or supraclavicular lymphadenopathy. LUNGS: Good air entry bilaterally. No wheezing, crackles or rhonchi. BREASTS: She is s/p right mastectomy, she has telangiectasias, one small scab, no suspicious lesions, no drainage or bleeding. CARDIOVASCULAR: Regular rate. No murmurs, rubs or gallops. ABDOMEN: Soft. Non-tender, non-distended. Positive bowel sounds. EXTREMITIES: Without clubbing, cyanosis, or edema. NEUROLOGIC: No focal deficits. ECOG PS 1      Impression/Plan:     1. The patient is a very pleasant 78 y.o. lady with a PMH significant for Right Breast Cancer, stage III, HR pos, was diagnosed in 2009, she had a right mastectomy done, followed by adjuvant chemo, she received 8 cycles, probably AC followed by T, then PMRT, and 5 years of adjuvant ET with Arimidex, was completed in 2015. She was treated in [facility] under the care of Dr. Shawn He. She is doing well clinically without any evidence of recurrence of her disease. Discussed with her the importance of monthly SBE, and routine screening mammograms, she had a left breast screening mammogram done on 5/26/2020, revealing benign findings, was negative for malignancy. 2. She has MDS, diagnosed in 2017, she received ESAs and PRBCs transfusion, has transfusion related iron overload, hgb was 8.5, ferritin 3647, was restarted on Aranesp on 4/23/2019. Today 4/13/2021, labs reviewed, hemoglobin had decreased to 8.2, hematocrit 25.7, .4, normal white count and platelet count. Her hemoglobin is less than 10 G/DL, proceed with Aranesp, she has been receiving the Aranesp every 4 weeks and her hemoglobin has been maintained in the 9 range, it had decreased to 8.2, she might need Aranesp again in 2 weeks. No indication to start her on hypo-methylating agent at this time. Hypothyroidism, follow-up with PCP, she is scheduled for her 6-week visit with blood work.     RTC in 2 weeks with labs, possible tiffanie. Thank you for allowing us to participate in the care of Ms. Ventura.     Jung Ahuja MD   HEMATOLOGY/MEDICAL ONCOLOGY  [address] [place] [address] ONCOLOGY  [facility] [address] [facility] [address]  Dept: [phone]

## 2021-04-27 ENCOUNTER — HOSPITAL ENCOUNTER (OUTPATIENT)
Dept: INFUSION THERAPY | Age: 81
Discharge: HOME | End: 2021-04-27
Payer: MEDICARE

## 2021-04-27 ENCOUNTER — OFFICE VISIT (OUTPATIENT)
Dept: ONCOLOGY | Age: 81
End: 2021-04-27
Payer: MEDICARE

## 2021-04-27 ENCOUNTER — OFFICE VISIT (OUTPATIENT)
Dept: PALLATIVE CARE | Age: 81
End: 2021-04-27
Payer: MEDICARE

## 2021-04-27 VITALS
SYSTOLIC BLOOD PRESSURE: 139 MMHG | WEIGHT: 149 LBS | BODY MASS INDEX: 28.13 KG/M2 | OXYGEN SATURATION: 98 % | TEMPERATURE: 97.1 F | HEIGHT: 61 IN | HEART RATE: 73 BPM | DIASTOLIC BLOOD PRESSURE: 66 MMHG

## 2021-04-27 VITALS
HEART RATE: 68 BPM | OXYGEN SATURATION: 94 % | WEIGHT: 148 LBS | DIASTOLIC BLOOD PRESSURE: 36 MMHG | BODY MASS INDEX: 27.96 KG/M2 | SYSTOLIC BLOOD PRESSURE: 125 MMHG

## 2021-04-27 DIAGNOSIS — Z12.31 ENCOUNTER FOR SCREENING MAMMOGRAM FOR MALIGNANT NEOPLASM OF BREAST: ICD-10-CM

## 2021-04-27 DIAGNOSIS — Z51.5 PALLIATIVE CARE BY SPECIALIST: ICD-10-CM

## 2021-04-27 DIAGNOSIS — D46.9 MDS (MYELODYSPLASTIC SYNDROME) (HCC): Primary | ICD-10-CM

## 2021-04-27 DIAGNOSIS — C50.911 MALIGNANT NEOPLASM OF RIGHT BREAST IN FEMALE, ESTROGEN RECEPTOR POSITIVE, UNSPECIFIED SITE OF BREAST (HCC): ICD-10-CM

## 2021-04-27 DIAGNOSIS — D63.8 ANEMIA OF CHRONIC DISEASE: ICD-10-CM

## 2021-04-27 DIAGNOSIS — Z17.0 MALIGNANT NEOPLASM OF RIGHT BREAST IN FEMALE, ESTROGEN RECEPTOR POSITIVE, UNSPECIFIED SITE OF BREAST (HCC): ICD-10-CM

## 2021-04-27 LAB
ANISOCYTOSIS: ABNORMAL
ATYPICAL LYMPHOCYTE RELATIVE PERCENT: 0.9 % (ref 0–4)
BASOPHILIC STIPPLING: ABNORMAL
BASOPHILS ABSOLUTE: 0 E9/L (ref 0–0.2)
BASOPHILS RELATIVE PERCENT: 0.3 % (ref 0–2)
BURR CELLS: ABNORMAL
EOSINOPHILS ABSOLUTE: 0.06 E9/L (ref 0.05–0.5)
EOSINOPHILS RELATIVE PERCENT: 0.9 % (ref 0–6)
FERRITIN: 4270 NG/ML
HCT VFR BLD CALC: 28.6 % (ref 34–48)
HEMOGLOBIN: 9.2 G/DL (ref 11.5–15.5)
HYPOCHROMIA: ABNORMAL
IRON SATURATION: 99 % (ref 15–50)
IRON: 212 MCG/DL (ref 37–145)
LYMPHOCYTES ABSOLUTE: 2.11 E9/L (ref 1.5–4)
LYMPHOCYTES RELATIVE PERCENT: 32.2 % (ref 20–42)
MCH RBC QN AUTO: 38.2 PG (ref 26–35)
MCHC RBC AUTO-ENTMCNC: 32.2 % (ref 32–34.5)
MCV RBC AUTO: 118.7 FL (ref 80–99.9)
MONOCYTES ABSOLUTE: 0.26 E9/L (ref 0.1–0.95)
MONOCYTES RELATIVE PERCENT: 3.5 % (ref 2–12)
NEUTROPHILS ABSOLUTE: 4.03 E9/L (ref 1.8–7.3)
NEUTROPHILS RELATIVE PERCENT: 62.6 % (ref 43–80)
NUCLEATED RED BLOOD CELLS: 1.7 /100 WBC
OVALOCYTES: ABNORMAL
PDW BLD-RTO: 25 FL (ref 11.5–15)
PLATELET # BLD: 140 E9/L (ref 130–450)
PMV BLD AUTO: 10.4 FL (ref 7–12)
POIKILOCYTES: ABNORMAL
POLYCHROMASIA: ABNORMAL
RBC # BLD: 2.41 E12/L (ref 3.5–5.5)
TARGET CELLS: ABNORMAL
TOTAL IRON BINDING CAPACITY: 215 MCG/DL (ref 250–450)
WBC # BLD: 6.4 E9/L (ref 4.5–11.5)

## 2021-04-27 PROCEDURE — G8399 PT W/DXA RESULTS DOCUMENT: HCPCS | Performed by: INTERNAL MEDICINE

## 2021-04-27 PROCEDURE — 99213 OFFICE O/P EST LOW 20 MIN: CPT | Performed by: STUDENT IN AN ORGANIZED HEALTH CARE EDUCATION/TRAINING PROGRAM

## 2021-04-27 PROCEDURE — 4040F PNEUMOC VAC/ADMIN/RCVD: CPT | Performed by: STUDENT IN AN ORGANIZED HEALTH CARE EDUCATION/TRAINING PROGRAM

## 2021-04-27 PROCEDURE — 99214 OFFICE O/P EST MOD 30 MIN: CPT | Performed by: INTERNAL MEDICINE

## 2021-04-27 PROCEDURE — 1036F TOBACCO NON-USER: CPT | Performed by: INTERNAL MEDICINE

## 2021-04-27 PROCEDURE — 36415 COLL VENOUS BLD VENIPUNCTURE: CPT

## 2021-04-27 PROCEDURE — 1090F PRES/ABSN URINE INCON ASSESS: CPT | Performed by: INTERNAL MEDICINE

## 2021-04-27 PROCEDURE — 1036F TOBACCO NON-USER: CPT | Performed by: STUDENT IN AN ORGANIZED HEALTH CARE EDUCATION/TRAINING PROGRAM

## 2021-04-27 PROCEDURE — G8399 PT W/DXA RESULTS DOCUMENT: HCPCS | Performed by: STUDENT IN AN ORGANIZED HEALTH CARE EDUCATION/TRAINING PROGRAM

## 2021-04-27 PROCEDURE — 99211 OFF/OP EST MAY X REQ PHY/QHP: CPT | Performed by: STUDENT IN AN ORGANIZED HEALTH CARE EDUCATION/TRAINING PROGRAM

## 2021-04-27 PROCEDURE — 83550 IRON BINDING TEST: CPT

## 2021-04-27 PROCEDURE — 1123F ACP DISCUSS/DSCN MKR DOCD: CPT | Performed by: STUDENT IN AN ORGANIZED HEALTH CARE EDUCATION/TRAINING PROGRAM

## 2021-04-27 PROCEDURE — G8427 DOCREV CUR MEDS BY ELIG CLIN: HCPCS | Performed by: INTERNAL MEDICINE

## 2021-04-27 PROCEDURE — 82728 ASSAY OF FERRITIN: CPT

## 2021-04-27 PROCEDURE — 4040F PNEUMOC VAC/ADMIN/RCVD: CPT | Performed by: INTERNAL MEDICINE

## 2021-04-27 PROCEDURE — 6360000002 HC RX W HCPCS: Performed by: NURSE PRACTITIONER

## 2021-04-27 PROCEDURE — 1090F PRES/ABSN URINE INCON ASSESS: CPT | Performed by: STUDENT IN AN ORGANIZED HEALTH CARE EDUCATION/TRAINING PROGRAM

## 2021-04-27 PROCEDURE — 83540 ASSAY OF IRON: CPT

## 2021-04-27 PROCEDURE — 1123F ACP DISCUSS/DSCN MKR DOCD: CPT | Performed by: INTERNAL MEDICINE

## 2021-04-27 PROCEDURE — G8417 CALC BMI ABV UP PARAM F/U: HCPCS | Performed by: INTERNAL MEDICINE

## 2021-04-27 PROCEDURE — 85025 COMPLETE CBC W/AUTO DIFF WBC: CPT

## 2021-04-27 PROCEDURE — 99213 OFFICE O/P EST LOW 20 MIN: CPT

## 2021-04-27 PROCEDURE — 96372 THER/PROPH/DIAG INJ SC/IM: CPT

## 2021-04-27 PROCEDURE — G8428 CUR MEDS NOT DOCUMENT: HCPCS | Performed by: STUDENT IN AN ORGANIZED HEALTH CARE EDUCATION/TRAINING PROGRAM

## 2021-04-27 PROCEDURE — G8417 CALC BMI ABV UP PARAM F/U: HCPCS | Performed by: STUDENT IN AN ORGANIZED HEALTH CARE EDUCATION/TRAINING PROGRAM

## 2021-04-27 RX ADMIN — DARBEPOETIN ALFA 500 MCG: 500 INJECTION, SOLUTION INTRAVENOUS; SUBCUTANEOUS at 13:47

## 2021-04-27 NOTE — PROGRESS NOTES
Department of Palliative Medicine  Ambulatory Note  Provider: Drew Cadena MD        Chief Complaint: Rayo Lemus is a 78 y.o. female with chief complaint of Pain    HPI:  Rayo Lemus is a 78 y.o. female with significant past medical history of stage II HR positive breast cancer, diagnosed in 2009, status post right mastectomy, followed by adjuvant therapy, and 5 years of adjuvant Arimidex which was completed in 2015, with all treatment performed in [facility] under the care of Dr. Alma Rosa Dahl. She is currently being followed locally by Dr. Terry Smith, without evidence of recurrent disease, whom she also follows for management of myelodysplastic syndrome, which was diagnosed in 2017. She was referred to [address] [place] [address] for assistance with symptom management related to chemotherapy-induced peripheral neuropathy.     Assessment/Plan      History of Breast Cancer stage III, HR positive:              -Diagnosed, treated, and managed in [place] in 2009              -Status post right mastectomy              -Followed by adjuvant chemotherapy              -Completed her Demadex 2015              -Currently followed locally by Dr. Terry Smith, she also follows with regarding MDS diagnosed 2017     Chemotherapy related peripheral Neuropathy/Pain:              -  Worse bilateral feet and LEs, burning and pressure,               -  Gabapentin 900 mg HS per PCP              -  Improves with activity, worsens at rest              -  Continue Norco 5-325 mg every 12 hours as needed, using 1/day,              -  Currently she is very high functioning, enjoys gardening, and is quite active, and has recently moved to a new home, which has had her even more active.     Palliative Care Encounter:                            - Goals of care: improve or maintain function/quality of life, remain at home, preserve independence/autonomy/control and continue current management                 - Code Status: Not discussed during this encounter     Prognosis: unknown     Follow Up:  12 weeks. They were encouraged to call with any questions, concerns, needs, or changes in symptoms. Subjective:     Met with Alysha Santos,  She says that her pain regimen currently is great. She utilizes Norco half tablet daily, if her hip pain becomes worse she will utilize ibuprofen. She denies any nausea vomiting, or any constipation. Patient is very active, she will build different items throughout the house. She walks around a lot.   We will follow up with her in 12 weeks      Objective:     Physical Exam  Wt Readings from Last 3 Encounters:   04/13/21 150 lb (68 kg)   03/02/21 154 lb 1.6 oz (69.9 kg)   02/02/21 154 lb 14.4 oz (70.3 kg)     LMP 05/28/2016     Gen:  Alert, appears stated age, well nourished, in no acute distress  HEENT:  Normocephalic, conjunctiva pink, no drainage, mucosa moist  Neck:  Supple  Lungs:  CTA bilaterally, no audible rhonchi or wheezes noted  Heart[de-identified]  RRR, no murmur, rub, or gallop noted during exam  Abd:  Soft, non tender, non distended, BS+  M/S/Ext:  Moving all extremities, no edema, pulses present  Skin:  Warm and dry  Neuro:  PERRL, Alert, oriented x 3; following commands       Roxie Symptom Assessment Score   Roxie Score 4/27/2021 11/24/2020 10/13/2020 4/14/2020 1/28/2020   Pain Score 5 5 8 2 8   Tiredness Score 4 3 7 1 9   Nausea Score Not nauseated Not nauseated Not nauseated Not nauseated Not nauseated   Depression Score Not depressed Not depressed Not depressed Not depressed Not depressed   Anxiety Score Not anxious Not anxious Not anxious Not anxious Not anxious   Drowsiness Score 7 2 5 Not drowsy 1   Appetite Score Best appetite Best appetite 1 Best appetite Best appetite   Wellbeing Score 1 Best feeling of wellbeing Best feeling of wellbeing Best feeling of wellbeing Best feeling of wellbeing   Dyspnea Score 8 3 6 No shortness of breath 5   Other Problem Score Best possible response Best possible response

## 2021-04-27 NOTE — PROGRESS NOTES
[address] [facility]  [address] [place] [id number]  Dept: [phone]  Attending Progress Note      Reason for Visit:   1. Right Breast Cancer. 2. MDS. Referring Physician:  CONNIE Perdomo CNP    PCP:  CONNIE Perdomo CNP    History of Present Illness: The patient is a very pleasant 78 y.o. lady with a PMH significant for Right Breast Cancer, stage III, HR pos, was diagnosed in 2009, she had a right mastectomy done, followed by adjuvant chemo, she received 8 cycles, probably AC followed by T, then PMRT, and 5 years of adjuvant ET with Arimidex, was completed in 2015. She was treated in [facility] under the care of Dr. Al Hurt. She was diagnosed with MDS in 2017, she received ESAs and PRBCs transfusion. She has transfusion related iron overload. She is feeling well overall, has chronic joints pain, peripheral neuropathy. She was started on Aranesp on 4/24/2020. No SE from Aranesp. She was seen in the ED on 1/2/2020 worsening pelvic and hip pain s/p fall, scan of the lumbar spine had revealed moderately severe stenosis at L4-L5, moderate stenosis at L3-L4 and L1-L2, suggestion of subtle acute fracture along the anterior superior endplate of L3, mild chronic compression deformity of the superior endplate of Q80. The patient is feeling much better, the steroids and Flexeril did help, she started using the brace. She was seen by neurosurgery, lumbar spine MRI was ordered, patient did not have it done due to the cost of the test.  She found spine CT scan and MRI from 2017, which she took to the neurosurgery office. The patient returns for a follow-up visit, her energy level had improved, no bleeding. She was seen by PCP, she was kept on the same dose of Synthroid. Review of Systems;  CONSTITUTIONAL: No fever, chills. Fair appetite. ENMT: Eyes: No diplopia; Nose: No epistaxis.  Mouth: No sore throat. RESPIRATORY: No hemoptysis, shortness of breath, cough. CARDIOVASCULAR: No chest pain, palpitations. GASTROINTESTINAL: No nausea/vomiting, abdominal pain, diarrhea/constipation. GENITOURINARY: No dysuria, urinary frequency, hematuria. NEURO: No syncope, presyncope, headache.   Remainder:  ROS NEGATIVE    Past Medical History:      Diagnosis Date    Anemia     Pt reports she was dx in her teens, w/o proper w/u, with iron deficiency anemia and was on oral iron replacement for many years, resulting in iron overload    Aplastic anemia ([address] ~7070-1908    Possibly d/t chemotherapy for breast cancer    Breast cancer ([address] 2009    right side; pt underwent radical mastectomy followed by radiation therapy and chemotherapy and was then on oral chemo pill for 5 yrs; no recurrence as of 01/2019    Chickenpox     Hypothyroidism     Measles     Mild depression ([address]     Mumps     Myelodysplastic syndrome ([address] ~2016    Pulmonary tuberculosis ~3229-2701    in her early 25s    Scarlet fever     Sensory neuropathy     beyond the ankle b/l     Patient Active Problem List   Diagnosis    MDS (myelodysplastic syndrome) (HCC)    Bronchitis    Macrocytic anemia with high RDW    Hypothyroidism    Peripheral sensory neuropathy    Breast cancer (HCC)    Anemia of chronic disease        Past Surgical History:      Procedure Laterality Date    ADENOIDECTOMY      BONE MARROW BIOPSY      BREAST BIOPSY      CHOLECYSTECTOMY      MASTECTOMY      right breast    OTHER SURGICAL HISTORY      blood transfusions    TONSILLECTOMY AND ADENOIDECTOMY      TUBAL LIGATION         Family History:  Family History   Problem Relation Age of Onset   Tompkins Cancer Mother         lung    Cancer Father         lung    Cancer Sister         ovarian cancer and breast cancer    Cancer Brother         unknown    Cancer Maternal Aunt         unknown    Cancer Other         brain    Other Brother         MI Medications:  Reviewed and reconciled. Social History:  Social History     Socioeconomic History    Marital status: Single     Spouse name: Not on file    Number of children: Not on file    Years of education: Not on file    Highest education level: Not on file   Occupational History    Not on file   Social Needs    Financial resource strain: Not on file    Food insecurity     Worry: Not on file     Inability: Not on file    Transportation needs     Medical: Not on file     Non-medical: Not on file   Tobacco Use    Smoking status: Former Smoker     Packs/day: 1.00     Years: 37.00     Pack years: 37.00     Types: Cigarettes     Start date:      Quit date:      Years since quittin.3    Smokeless tobacco: Never Used   Substance and Sexual Activity    Alcohol use: No    Drug use: Never    Sexual activity: Not Currently   Lifestyle    Physical activity     Days per week: Not on file     Minutes per session: Not on file    Stress: Not on file   Relationships    Social connections     Talks on phone: Not on file     Gets together: Not on file     Attends Mormonism service: Not on file     Active member of club or organization: Not on file     Attends meetings of clubs or organizations: Not on file     Relationship status: Not on file    Intimate partner violence     Fear of current or ex partner: Not on file     Emotionally abused: Not on file     Physically abused: Not on file     Forced sexual activity: Not on file   Other Topics Concern    Not on file   Social History Narrative    Not on file       Allergies:   Allergies   Allergen Reactions    Bee Venom Swelling and Dermatitis    Penicillins Hives, Swelling and Dermatitis       Physical Exam:  /66 (Site: Left Upper Arm, Position: Sitting, Cuff Size: Medium Adult)   Pulse 73   Temp 97.1 °F (36.2 °C) (Temporal)   Ht 5' 1\" (1.549 m)   Wt 149 lb (67.6 kg)   LMP 2016   SpO2 98%   BMI 28.15 kg/m²   GENERAL: Alert, HEMATOLOGY/MEDICAL ONCOLOGY  [facility]  [address] [place] [id number]  Dept: [phone]

## 2021-05-25 ENCOUNTER — HOSPITAL ENCOUNTER (OUTPATIENT)
Dept: INFUSION THERAPY | Age: 81
Discharge: HOME | End: 2021-05-25
Payer: MEDICARE

## 2021-05-25 ENCOUNTER — OFFICE VISIT (OUTPATIENT)
Dept: ONCOLOGY | Age: 81
End: 2021-05-25
Payer: MEDICARE

## 2021-05-25 VITALS
WEIGHT: 146.2 LBS | OXYGEN SATURATION: 94 % | HEART RATE: 84 BPM | BODY MASS INDEX: 27.6 KG/M2 | TEMPERATURE: 98.3 F | HEIGHT: 61 IN | DIASTOLIC BLOOD PRESSURE: 67 MMHG | SYSTOLIC BLOOD PRESSURE: 119 MMHG

## 2021-05-25 DIAGNOSIS — D46.9 MDS (MYELODYSPLASTIC SYNDROME) (HCC): Primary | Chronic | ICD-10-CM

## 2021-05-25 DIAGNOSIS — C50.919 MALIGNANT NEOPLASM OF FEMALE BREAST, UNSPECIFIED ESTROGEN RECEPTOR STATUS, UNSPECIFIED LATERALITY, UNSPECIFIED SITE OF BREAST (HCC): ICD-10-CM

## 2021-05-25 DIAGNOSIS — Z17.0 MALIGNANT NEOPLASM OF RIGHT BREAST IN FEMALE, ESTROGEN RECEPTOR POSITIVE, UNSPECIFIED SITE OF BREAST (HCC): ICD-10-CM

## 2021-05-25 DIAGNOSIS — D46.9 MDS (MYELODYSPLASTIC SYNDROME) (HCC): Primary | ICD-10-CM

## 2021-05-25 DIAGNOSIS — C50.911 MALIGNANT NEOPLASM OF RIGHT BREAST IN FEMALE, ESTROGEN RECEPTOR POSITIVE, UNSPECIFIED SITE OF BREAST (HCC): ICD-10-CM

## 2021-05-25 DIAGNOSIS — D63.8 ANEMIA OF CHRONIC DISEASE: ICD-10-CM

## 2021-05-25 LAB
ANISOCYTOSIS: ABNORMAL
BASOPHILS ABSOLUTE: 0.05 E9/L (ref 0–0.2)
BASOPHILS RELATIVE PERCENT: 0.9 % (ref 0–2)
EOSINOPHILS ABSOLUTE: 0.1 E9/L (ref 0.05–0.5)
EOSINOPHILS RELATIVE PERCENT: 1.7 % (ref 0–6)
HCT VFR BLD CALC: 24.9 % (ref 34–48)
HEMOGLOBIN: 8.1 G/DL (ref 11.5–15.5)
HYPOCHROMIA: ABNORMAL
LYMPHOCYTES ABSOLUTE: 3.54 E9/L (ref 1.5–4)
LYMPHOCYTES RELATIVE PERCENT: 60 % (ref 20–42)
MCH RBC QN AUTO: 37.9 PG (ref 26–35)
MCHC RBC AUTO-ENTMCNC: 32.5 % (ref 32–34.5)
MCV RBC AUTO: 116.4 FL (ref 80–99.9)
MONOCYTES ABSOLUTE: 0.24 E9/L (ref 0.1–0.95)
MONOCYTES RELATIVE PERCENT: 4.3 % (ref 2–12)
NEUTROPHILS ABSOLUTE: 1.95 E9/L (ref 1.8–7.3)
NEUTROPHILS RELATIVE PERCENT: 33 % (ref 43–80)
OVALOCYTES: ABNORMAL
PDW BLD-RTO: 24.8 FL (ref 11.5–15)
PLATELET # BLD: 180 E9/L (ref 130–450)
PMV BLD AUTO: 10.6 FL (ref 7–12)
POIKILOCYTES: ABNORMAL
POLYCHROMASIA: ABNORMAL
RBC # BLD: 2.14 E12/L (ref 3.5–5.5)
SCHISTOCYTES: ABNORMAL
SMUDGE CELLS: ABNORMAL
TARGET CELLS: ABNORMAL
TEAR DROP CELLS: ABNORMAL
WBC # BLD: 5.9 E9/L (ref 4.5–11.5)

## 2021-05-25 PROCEDURE — 1123F ACP DISCUSS/DSCN MKR DOCD: CPT | Performed by: INTERNAL MEDICINE

## 2021-05-25 PROCEDURE — 1036F TOBACCO NON-USER: CPT | Performed by: INTERNAL MEDICINE

## 2021-05-25 PROCEDURE — 99214 OFFICE O/P EST MOD 30 MIN: CPT | Performed by: INTERNAL MEDICINE

## 2021-05-25 PROCEDURE — 1090F PRES/ABSN URINE INCON ASSESS: CPT | Performed by: INTERNAL MEDICINE

## 2021-05-25 PROCEDURE — G8417 CALC BMI ABV UP PARAM F/U: HCPCS | Performed by: INTERNAL MEDICINE

## 2021-05-25 PROCEDURE — 99212 OFFICE O/P EST SF 10 MIN: CPT

## 2021-05-25 PROCEDURE — 4040F PNEUMOC VAC/ADMIN/RCVD: CPT | Performed by: INTERNAL MEDICINE

## 2021-05-25 PROCEDURE — G8427 DOCREV CUR MEDS BY ELIG CLIN: HCPCS | Performed by: INTERNAL MEDICINE

## 2021-05-25 PROCEDURE — 6360000002 HC RX W HCPCS: Performed by: INTERNAL MEDICINE

## 2021-05-25 PROCEDURE — 85025 COMPLETE CBC W/AUTO DIFF WBC: CPT

## 2021-05-25 PROCEDURE — G8399 PT W/DXA RESULTS DOCUMENT: HCPCS | Performed by: INTERNAL MEDICINE

## 2021-05-25 PROCEDURE — 36415 COLL VENOUS BLD VENIPUNCTURE: CPT

## 2021-05-25 PROCEDURE — 96372 THER/PROPH/DIAG INJ SC/IM: CPT

## 2021-05-25 RX ADMIN — DARBEPOETIN ALFA 500 MCG: 500 INJECTION, SOLUTION INTRAVENOUS; SUBCUTANEOUS at 12:44

## 2021-05-25 NOTE — PROGRESS NOTES
[address] [facility]  [address] [facility] [address]  Dept: [phone]  Attending Progress Note      Reason for Visit:   1. Right Breast Cancer. 2. MDS. Referring Physician:  CONNIE Messina CNP    PCP:  CONNIE Messina CNP    History of Present Illness: The patient is a very pleasant 78 y.o. lady with a PMH significant for Right Breast Cancer, stage III, HR pos, was diagnosed in 2009, she had a right mastectomy done, followed by adjuvant chemo, she received 8 cycles, probably AC followed by T, then PMRT, and 5 years of adjuvant ET with Arimidex, was completed in 2015. She was treated in [facility] under the care of Dr. Jeremías Carlton. She was diagnosed with MDS in 2017, she received ESAs and PRBCs transfusion. She has transfusion related iron overload. She is feeling well overall, has chronic joints pain, peripheral neuropathy. She was started on Aranesp on 4/24/2020. No SE from Aranesp. She was seen in the ED on 1/2/2020 worsening pelvic and hip pain s/p fall, scan of the lumbar spine had revealed moderately severe stenosis at L4-L5, moderate stenosis at L3-L4 and L1-L2, suggestion of subtle acute fracture along the anterior superior endplate of L3, mild chronic compression deformity of the superior endplate of T16. The patient is feeling much better, the steroids and Flexeril did help, she started using the brace. She was seen by neurosurgery, lumbar spine MRI was ordered, patient did not have it done due to the cost of the test.  She found spine CT scan and MRI from 2017, which she took to the neurosurgery office. The patient returns for a follow-up visit, patient has been active, No bleeding, no worsening fatigue. Review of Systems;  CONSTITUTIONAL: No fever, chills. Fair appetite. ENMT: Eyes: No diplopia; Nose: No epistaxis. Mouth: No sore throat.   RESPIRATORY: No hemoptysis, shortness of breath, cough. CARDIOVASCULAR: No chest pain, palpitations. GASTROINTESTINAL: No nausea/vomiting, abdominal pain, diarrhea/constipation. GENITOURINARY: No dysuria, urinary frequency, hematuria. NEURO: No syncope, presyncope, headache. Remainder:  ROS NEGATIVE    Past Medical History:      Diagnosis Date    Anemia     Pt reports she was dx in her teens, w/o proper w/u, with iron deficiency anemia and was on oral iron replacement for many years, resulting in iron overload    Aplastic anemia ([address] ~2311-9244    Possibly d/t chemotherapy for breast cancer    Breast cancer ([address] 2009    right side; pt underwent radical mastectomy followed by radiation therapy and chemotherapy and was then on oral chemo pill for 5 yrs; no recurrence as of 01/2019    Chickenpox     Hypothyroidism     Measles     Mild depression ([address]     Mumps     Myelodysplastic syndrome ([address] ~2016    Pulmonary tuberculosis ~9836-1442    in her early 25s    Scarlet fever     Sensory neuropathy     beyond the ankle b/l     Patient Active Problem List   Diagnosis    MDS (myelodysplastic syndrome) (HCC)    Bronchitis    Macrocytic anemia with high RDW    Hypothyroidism    Peripheral sensory neuropathy    Breast cancer (HCC)    Anemia of chronic disease        Past Surgical History:      Procedure Laterality Date    ADENOIDECTOMY      BONE MARROW BIOPSY      BREAST BIOPSY      CHOLECYSTECTOMY      MASTECTOMY      right breast    OTHER SURGICAL HISTORY      blood transfusions    TONSILLECTOMY AND ADENOIDECTOMY      TUBAL LIGATION         Family History:  Family History   Problem Relation Age of Onset   Wilhelminia Blazing Cancer Mother         lung    Cancer Father         lung    Cancer Sister         ovarian cancer and breast cancer    Cancer Brother         unknown    Cancer Maternal Aunt         unknown    Cancer Other         brain    Other Brother         MI       Medications:  Reviewed and reconciled.     Social History:  Social History     Socioeconomic History    Marital status: Single     Spouse name: Not on file    Number of children: Not on file    Years of education: Not on file    Highest education level: Not on file   Occupational History    Not on file   Tobacco Use    Smoking status: Former Smoker     Packs/day: 1.00     Years: 37.00     Pack years: 37.00     Types: Cigarettes     Start date:      Quit date:      Years since quittin.4    Smokeless tobacco: Never Used   Substance and Sexual Activity    Alcohol use: No    Drug use: Never    Sexual activity: Not Currently   Other Topics Concern    Not on file   Social History Narrative    Not on file     Social Determinants of Health     Financial Resource Strain:     Difficulty of Paying Living Expenses:    Food Insecurity:     Worried About Running Out of Food in the Last Year:     [address] in the Last Year:    Transportation Needs:     Lack of Transportation (Medical):  Lack of Transportation (Non-Medical):    Physical Activity:     Days of Exercise per Week:     Minutes of Exercise per Session:    Stress:     Feeling of Stress :    Social Connections:     Frequency of Communication with Friends and Family:     Frequency of Social Gatherings with Friends and Family:     Attends [employer] Services:     Active Member of Clubs or Organizations:     Attends Club or Organization Meetings:     Marital Status:    Intimate Partner Violence:     Fear of Current or Ex-Partner:     Emotionally Abused:     Physically Abused:     Sexually Abused: Allergies: Allergies   Allergen Reactions    Bee Venom Swelling and Dermatitis    Penicillins Hives, Swelling and Dermatitis       Physical Exam:  /67   Pulse 84   Temp 98.3 °F (36.8 °C)   Ht 5' 1\" (1.549 m)   Wt 146 lb 3.2 oz (66.3 kg)   LMP 2016   SpO2 94%   BMI 27.62 kg/m²   GENERAL: Alert, oriented x 3, not in acute distress. HEENT: PERRLA; EOMI. Oropharynx clear. NECK: Supple. No palpable cervical or supraclavicular lymphadenopathy. LUNGS: Good air entry bilaterally. No wheezing, crackles or rhonchi. BREASTS: She is s/p right mastectomy, she has telangiectasias, one small scab, no suspicious lesions, no drainage or bleeding. CARDIOVASCULAR: Regular rate. No murmurs, rubs or gallops. ABDOMEN: Soft. Non-tender, non-distended. Positive bowel sounds. EXTREMITIES: Without clubbing, cyanosis, or edema. NEUROLOGIC: No focal deficits. ECOG PS 1      Impression/Plan:     1. The patient is a very pleasant 78 y.o. lady with a PMH significant for Right Breast Cancer, stage III, HR pos, was diagnosed in 2009, she had a right mastectomy done, followed by adjuvant chemo, she received 8 cycles, probably AC followed by T, then PMRT, and 5 years of adjuvant ET with Arimidex, was completed in 2015. She was treated in [facility] under the care of Dr. Jacinta Fabian. She is doing well clinically without any evidence of recurrence of her disease. Discussed with her the importance of monthly SBE, and routine screening mammograms, she had a left breast screening mammogram done on 5/26/2020, revealing benign findings, was negative for malignancy. Left breast screening mammogram was ordered, await results. 2. She has MDS, diagnosed in 2017, she received ESAs and PRBCs transfusion, has transfusion related iron overload, hgb was 8.5, ferritin 3647, was restarted on Aranesp on 4/23/2019. Today 5/25/2021, labs reviewed, hemoglobin had decreased to 8.1/24.9, normal white count and platelet count. Her hemoglobin is less than 10 G/DL, proceed with Aranesp, her hemoglobin did not improve since her last visit, she will go back on the every 2-week schedule for the Aranesp, no indication to start her on hypo-methylating agent at this time. Hypothyroidism, follow-up with PCP. RTC in 2 weeks with labs, possible aranesp.     Thank you for allowing us to participate in the care of Ms.

## 2021-06-01 ENCOUNTER — TELEPHONE (OUTPATIENT)
Dept: PALLATIVE CARE | Age: 81
End: 2021-06-01

## 2021-06-01 DIAGNOSIS — Z51.5 PALLIATIVE CARE BY SPECIALIST: ICD-10-CM

## 2021-06-01 DIAGNOSIS — G89.3 PAIN DUE TO NEOPLASM: ICD-10-CM

## 2021-06-01 RX ORDER — HYDROCODONE BITARTRATE AND ACETAMINOPHEN 5; 325 MG/1; MG/1
1 TABLET ORAL EVERY 12 HOURS PRN
Qty: 60 TABLET | Refills: 0 | Status: SHIPPED
Start: 2021-06-01 | End: 2021-07-20 | Stop reason: SDUPTHER

## 2021-06-01 NOTE — TELEPHONE ENCOUNTER
Call from Gen requesting a refill for Azalia Stephenson states she is trying not to take too frequently but has been busy doing yard work and needed them daily. Pharmacy is [facility] on [address]. Next natalie 7/20/21.

## 2021-06-08 ENCOUNTER — OFFICE VISIT (OUTPATIENT)
Dept: ONCOLOGY | Age: 81
End: 2021-06-08
Payer: MEDICARE

## 2021-06-08 ENCOUNTER — HOSPITAL ENCOUNTER (OUTPATIENT)
Dept: INFUSION THERAPY | Age: 81
Discharge: HOME | End: 2021-06-08
Payer: MEDICARE

## 2021-06-08 VITALS
TEMPERATURE: 96.8 F | HEART RATE: 65 BPM | WEIGHT: 142 LBS | BODY MASS INDEX: 26.81 KG/M2 | SYSTOLIC BLOOD PRESSURE: 162 MMHG | DIASTOLIC BLOOD PRESSURE: 68 MMHG | OXYGEN SATURATION: 98 % | HEIGHT: 61 IN | RESPIRATION RATE: 14 BRPM

## 2021-06-08 DIAGNOSIS — C50.919 MALIGNANT NEOPLASM OF FEMALE BREAST, UNSPECIFIED ESTROGEN RECEPTOR STATUS, UNSPECIFIED LATERALITY, UNSPECIFIED SITE OF BREAST (HCC): ICD-10-CM

## 2021-06-08 DIAGNOSIS — D46.9 MDS (MYELODYSPLASTIC SYNDROME) (HCC): Primary | ICD-10-CM

## 2021-06-08 DIAGNOSIS — D46.9 MDS (MYELODYSPLASTIC SYNDROME) (HCC): Primary | Chronic | ICD-10-CM

## 2021-06-08 DIAGNOSIS — D63.8 ANEMIA OF CHRONIC DISEASE: ICD-10-CM

## 2021-06-08 LAB
ANISOCYTOSIS: ABNORMAL
BASOPHILS ABSOLUTE: 0 E9/L (ref 0–0.2)
BASOPHILS RELATIVE PERCENT: 0.3 % (ref 0–2)
EOSINOPHILS ABSOLUTE: 0.21 E9/L (ref 0.05–0.5)
EOSINOPHILS RELATIVE PERCENT: 3.5 % (ref 0–6)
HCT VFR BLD CALC: 26.4 % (ref 34–48)
HEMOGLOBIN: 8.7 G/DL (ref 11.5–15.5)
HYPOCHROMIA: ABNORMAL
LYMPHOCYTES ABSOLUTE: 3.54 E9/L (ref 1.5–4)
LYMPHOCYTES RELATIVE PERCENT: 58.3 % (ref 20–42)
MCH RBC QN AUTO: 38.5 PG (ref 26–35)
MCHC RBC AUTO-ENTMCNC: 33 % (ref 32–34.5)
MCV RBC AUTO: 116.8 FL (ref 80–99.9)
MONOCYTES ABSOLUTE: 0.18 E9/L (ref 0.1–0.95)
MONOCYTES RELATIVE PERCENT: 2.6 % (ref 2–12)
NEUTROPHILS ABSOLUTE: 2.2 E9/L (ref 1.8–7.3)
NEUTROPHILS RELATIVE PERCENT: 35.7 % (ref 43–80)
NUCLEATED RED BLOOD CELLS: 0.9 /100 WBC
OVALOCYTES: ABNORMAL
PDW BLD-RTO: 25.2 FL (ref 11.5–15)
PLATELET # BLD: 160 E9/L (ref 130–450)
PMV BLD AUTO: 10.7 FL (ref 7–12)
POIKILOCYTES: ABNORMAL
POLYCHROMASIA: ABNORMAL
RBC # BLD: 2.26 E12/L (ref 3.5–5.5)
SCHISTOCYTES: ABNORMAL
TEAR DROP CELLS: ABNORMAL
WBC # BLD: 6.1 E9/L (ref 4.5–11.5)

## 2021-06-08 PROCEDURE — 99214 OFFICE O/P EST MOD 30 MIN: CPT | Performed by: INTERNAL MEDICINE

## 2021-06-08 PROCEDURE — G8399 PT W/DXA RESULTS DOCUMENT: HCPCS | Performed by: INTERNAL MEDICINE

## 2021-06-08 PROCEDURE — 1090F PRES/ABSN URINE INCON ASSESS: CPT | Performed by: INTERNAL MEDICINE

## 2021-06-08 PROCEDURE — G8417 CALC BMI ABV UP PARAM F/U: HCPCS | Performed by: INTERNAL MEDICINE

## 2021-06-08 PROCEDURE — 99212 OFFICE O/P EST SF 10 MIN: CPT

## 2021-06-08 PROCEDURE — 1036F TOBACCO NON-USER: CPT | Performed by: INTERNAL MEDICINE

## 2021-06-08 PROCEDURE — 96372 THER/PROPH/DIAG INJ SC/IM: CPT

## 2021-06-08 PROCEDURE — 6360000002 HC RX W HCPCS: Performed by: INTERNAL MEDICINE

## 2021-06-08 PROCEDURE — 4040F PNEUMOC VAC/ADMIN/RCVD: CPT | Performed by: INTERNAL MEDICINE

## 2021-06-08 PROCEDURE — 1123F ACP DISCUSS/DSCN MKR DOCD: CPT | Performed by: INTERNAL MEDICINE

## 2021-06-08 PROCEDURE — G8427 DOCREV CUR MEDS BY ELIG CLIN: HCPCS | Performed by: INTERNAL MEDICINE

## 2021-06-08 PROCEDURE — 36415 COLL VENOUS BLD VENIPUNCTURE: CPT

## 2021-06-08 PROCEDURE — 85025 COMPLETE CBC W/AUTO DIFF WBC: CPT

## 2021-06-08 RX ORDER — LEVOTHYROXINE SODIUM 0.2 MG/1
TABLET ORAL
COMMUNITY
Start: 2021-04-13

## 2021-06-08 RX ORDER — LEVOTHYROXINE SODIUM 0.2 MG/1
TABLET ORAL
Qty: 30 TABLET | Status: CANCELLED | OUTPATIENT
Start: 2021-06-08

## 2021-06-08 RX ADMIN — DARBEPOETIN ALFA 500 MCG: 500 INJECTION, SOLUTION INTRAVENOUS; SUBCUTANEOUS at 14:13

## 2021-06-08 NOTE — PROGRESS NOTES
[address] [facility]  [address] [facility] [address]  Dept: [phone]  Attending Progress Note      Reason for Visit:   1. Right Breast Cancer. 2. MDS. Referring Physician:  CONNIE Hernandez CNP    PCP:  CONNIE Hernandez CNP    History of Present Illness: The patient is a very pleasant 78 y.o. lady with a PMH significant for Right Breast Cancer, stage III, HR pos, was diagnosed in 2009, she had a right mastectomy done, followed by adjuvant chemo, she received 8 cycles, probably AC followed by T, then PMRT, and 5 years of adjuvant ET with Arimidex, was completed in 2015. She was treated in [facility] under the care of Dr. Brent Perkins. She was diagnosed with MDS in 2017, she received ESAs and PRBCs transfusion. She has transfusion related iron overload. She is feeling well overall, has chronic joints pain, peripheral neuropathy. She was started on Aranesp on 4/24/2020. No SE from Aranesp. She was seen in the ED on 1/2/2020 worsening pelvic and hip pain s/p fall, scan of the lumbar spine had revealed moderately severe stenosis at L4-L5, moderate stenosis at L3-L4 and L1-L2, suggestion of subtle acute fracture along the anterior superior endplate of L3, mild chronic compression deformity of the superior endplate of Y45. The patient is feeling much better, the steroids and Flexeril did help, she started using the brace. She was seen by neurosurgery, lumbar spine MRI was ordered, patient did not have it done due to the cost of the test.  She found spine CT scan and MRI from 2017, which she took to the neurosurgery office. The patient returns for a follow-up visit, patient has been active, No bleeding, no worsening fatigue. Review of Systems;  CONSTITUTIONAL: No fever, chills. Fair appetite. ENMT: Eyes: No diplopia; Nose: No epistaxis. Mouth: No sore throat.   RESPIRATORY: No hemoptysis, shortness of breath, cough. CARDIOVASCULAR: No chest pain, palpitations. GASTROINTESTINAL: No nausea/vomiting, abdominal pain, diarrhea/constipation. GENITOURINARY: No dysuria, urinary frequency, hematuria. NEURO: No syncope, presyncope, headache. Remainder:  ROS NEGATIVE    Past Medical History:      Diagnosis Date    Anemia     Pt reports she was dx in her teens, w/o proper w/u, with iron deficiency anemia and was on oral iron replacement for many years, resulting in iron overload    Aplastic anemia ([address] ~2132-1796    Possibly d/t chemotherapy for breast cancer    Breast cancer ([address] 2009    right side; pt underwent radical mastectomy followed by radiation therapy and chemotherapy and was then on oral chemo pill for 5 yrs; no recurrence as of 01/2019    Chickenpox     Hypothyroidism     Measles     Mild depression ([address]     Mumps     Myelodysplastic syndrome ([address] ~2016    Pulmonary tuberculosis ~7895-6402    in her early 25s    Scarlet fever     Sensory neuropathy     beyond the ankle b/l     Patient Active Problem List   Diagnosis    MDS (myelodysplastic syndrome) (HCC)    Bronchitis    Macrocytic anemia with high RDW    Hypothyroidism    Peripheral sensory neuropathy    Breast cancer (HCC)    Anemia of chronic disease        Past Surgical History:      Procedure Laterality Date    ADENOIDECTOMY      BONE MARROW BIOPSY      BREAST BIOPSY      CHOLECYSTECTOMY      MASTECTOMY      right breast    OTHER SURGICAL HISTORY      blood transfusions    TONSILLECTOMY AND ADENOIDECTOMY      TUBAL LIGATION         Family History:  Family History   Problem Relation Age of Onset   Mimi Draft Cancer Mother         lung    Cancer Father         lung    Cancer Sister         ovarian cancer and breast cancer    Cancer Brother         unknown    Cancer Maternal Aunt         unknown    Cancer Other         brain    Other Brother         MI       Medications:  Reviewed and reconciled.     Social History:  Social History     Socioeconomic History    Marital status: Single     Spouse name: Not on file    Number of children: Not on file    Years of education: Not on file    Highest education level: Not on file   Occupational History    Not on file   Tobacco Use    Smoking status: Former Smoker     Packs/day: 1.00     Years: 37.00     Pack years: 37.00     Types: Cigarettes     Start date:      Quit date:      Years since quittin.4    Smokeless tobacco: Never Used   Substance and Sexual Activity    Alcohol use: No    Drug use: Never    Sexual activity: Not Currently   Other Topics Concern    Not on file   Social History Narrative    Not on file     Social Determinants of Health     Financial Resource Strain:     Difficulty of Paying Living Expenses:    Food Insecurity:     Worried About Running Out of Food in the Last Year:     [address] in the Last Year:    Transportation Needs:     Lack of Transportation (Medical):  Lack of Transportation (Non-Medical):    Physical Activity:     Days of Exercise per Week:     Minutes of Exercise per Session:    Stress:     Feeling of Stress :    Social Connections:     Frequency of Communication with Friends and Family:     Frequency of Social Gatherings with Friends and Family:     Attends [employer] Services:     Active Member of Clubs or Organizations:     Attends Club or Organization Meetings:     Marital Status:    Intimate Partner Violence:     Fear of Current or Ex-Partner:     Emotionally Abused:     Physically Abused:     Sexually Abused: Allergies: Allergies   Allergen Reactions    Bee Venom Swelling and Dermatitis    Penicillins Hives, Swelling and Dermatitis       Physical Exam:  BP (!) 162/68   Pulse 65   Temp 96.8 °F (36 °C)   Resp 14   Ht 5' 1\" (1.549 m)   Wt 142 lb (64.4 kg)   LMP 2016   SpO2 98%   BMI 26.83 kg/m²   GENERAL: Alert, oriented x 3, not in acute distress. HEENT: PERRLA; EOMI.  [address] MED ONCOLOGY  [facility] [facility] [address]  Dept: [phone]

## 2021-06-22 ENCOUNTER — HOSPITAL ENCOUNTER (OUTPATIENT)
Dept: GENERAL RADIOLOGY | Age: 81
Discharge: HOME | End: 2021-06-24
Payer: MEDICARE

## 2021-06-22 ENCOUNTER — HOSPITAL ENCOUNTER (OUTPATIENT)
Dept: INFUSION THERAPY | Age: 81
Discharge: HOME | End: 2021-06-22
Payer: MEDICARE

## 2021-06-22 ENCOUNTER — OFFICE VISIT (OUTPATIENT)
Dept: ONCOLOGY | Age: 81
End: 2021-06-22
Payer: MEDICARE

## 2021-06-22 VITALS
SYSTOLIC BLOOD PRESSURE: 139 MMHG | WEIGHT: 141 LBS | HEIGHT: 61 IN | BODY MASS INDEX: 26.62 KG/M2 | OXYGEN SATURATION: 95 % | HEART RATE: 70 BPM | DIASTOLIC BLOOD PRESSURE: 58 MMHG | TEMPERATURE: 97.6 F

## 2021-06-22 DIAGNOSIS — D63.8 ANEMIA OF CHRONIC DISEASE: ICD-10-CM

## 2021-06-22 DIAGNOSIS — Z12.31 ENCOUNTER FOR SCREENING MAMMOGRAM FOR MALIGNANT NEOPLASM OF BREAST: ICD-10-CM

## 2021-06-22 DIAGNOSIS — C50.919 MALIGNANT NEOPLASM OF FEMALE BREAST, UNSPECIFIED ESTROGEN RECEPTOR STATUS, UNSPECIFIED LATERALITY, UNSPECIFIED SITE OF BREAST (HCC): ICD-10-CM

## 2021-06-22 DIAGNOSIS — D46.9 MDS (MYELODYSPLASTIC SYNDROME) (HCC): Primary | ICD-10-CM

## 2021-06-22 DIAGNOSIS — D46.9 MDS (MYELODYSPLASTIC SYNDROME) (HCC): Primary | Chronic | ICD-10-CM

## 2021-06-22 LAB
ANISOCYTOSIS: ABNORMAL
BASOPHILIC STIPPLING: ABNORMAL
BASOPHILS ABSOLUTE: 0 E9/L (ref 0–0.2)
BASOPHILS RELATIVE PERCENT: 0.4 % (ref 0–2)
EOSINOPHILS ABSOLUTE: 0.17 E9/L (ref 0.05–0.5)
EOSINOPHILS RELATIVE PERCENT: 3.5 % (ref 0–6)
HCT VFR BLD CALC: 26 % (ref 34–48)
HEMOGLOBIN: 8.4 G/DL (ref 11.5–15.5)
HYPOCHROMIA: ABNORMAL
LYMPHOCYTES ABSOLUTE: 2.94 E9/L (ref 1.5–4)
LYMPHOCYTES RELATIVE PERCENT: 60 % (ref 20–42)
MCH RBC QN AUTO: 38.2 PG (ref 26–35)
MCHC RBC AUTO-ENTMCNC: 32.3 % (ref 32–34.5)
MCV RBC AUTO: 118.2 FL (ref 80–99.9)
MONOCYTES ABSOLUTE: 0.2 E9/L (ref 0.1–0.95)
MONOCYTES RELATIVE PERCENT: 4.3 % (ref 2–12)
NEUTROPHILS ABSOLUTE: 1.57 E9/L (ref 1.8–7.3)
NEUTROPHILS RELATIVE PERCENT: 32.2 % (ref 43–80)
NUCLEATED RED BLOOD CELLS: 0.9 /100 WBC
OVALOCYTES: ABNORMAL
PDW BLD-RTO: 25.5 FL (ref 11.5–15)
PLATELET # BLD: 154 E9/L (ref 130–450)
PMV BLD AUTO: 10.9 FL (ref 7–12)
POIKILOCYTES: ABNORMAL
POLYCHROMASIA: ABNORMAL
RBC # BLD: 2.2 E12/L (ref 3.5–5.5)
SCHISTOCYTES: ABNORMAL
TEAR DROP CELLS: ABNORMAL
WBC # BLD: 4.9 E9/L (ref 4.5–11.5)

## 2021-06-22 PROCEDURE — 6360000002 HC RX W HCPCS: Performed by: INTERNAL MEDICINE

## 2021-06-22 PROCEDURE — 96372 THER/PROPH/DIAG INJ SC/IM: CPT

## 2021-06-22 PROCEDURE — G8417 CALC BMI ABV UP PARAM F/U: HCPCS | Performed by: INTERNAL MEDICINE

## 2021-06-22 PROCEDURE — 1090F PRES/ABSN URINE INCON ASSESS: CPT | Performed by: INTERNAL MEDICINE

## 2021-06-22 PROCEDURE — 77067 SCR MAMMO BI INCL CAD: CPT

## 2021-06-22 PROCEDURE — G8427 DOCREV CUR MEDS BY ELIG CLIN: HCPCS | Performed by: INTERNAL MEDICINE

## 2021-06-22 PROCEDURE — 4040F PNEUMOC VAC/ADMIN/RCVD: CPT | Performed by: INTERNAL MEDICINE

## 2021-06-22 PROCEDURE — 1036F TOBACCO NON-USER: CPT | Performed by: INTERNAL MEDICINE

## 2021-06-22 PROCEDURE — G8399 PT W/DXA RESULTS DOCUMENT: HCPCS | Performed by: INTERNAL MEDICINE

## 2021-06-22 PROCEDURE — 1123F ACP DISCUSS/DSCN MKR DOCD: CPT | Performed by: INTERNAL MEDICINE

## 2021-06-22 PROCEDURE — 99213 OFFICE O/P EST LOW 20 MIN: CPT | Performed by: INTERNAL MEDICINE

## 2021-06-22 PROCEDURE — 36415 COLL VENOUS BLD VENIPUNCTURE: CPT

## 2021-06-22 PROCEDURE — 85025 COMPLETE CBC W/AUTO DIFF WBC: CPT

## 2021-06-22 PROCEDURE — 99214 OFFICE O/P EST MOD 30 MIN: CPT | Performed by: INTERNAL MEDICINE

## 2021-06-22 RX ADMIN — DARBEPOETIN ALFA 500 MCG: 500 INJECTION, SOLUTION INTRAVENOUS; SUBCUTANEOUS at 13:52

## 2021-06-22 NOTE — PROGRESS NOTES
[address] [facility] ONCOLOGY  [address] [place] [id number]  Dept: [phone]  Attending Progress Note      Reason for Visit:   1. Right Breast Cancer. 2. MDS. Referring Physician:  CONNIE Chopra CNP    PCP:  CONNIE Chopra CNP    History of Present Illness: The patient is a very pleasant 78 y.o. lady with a PMH significant for Right Breast Cancer, stage III, HR pos, was diagnosed in 2009, she had a right mastectomy done, followed by adjuvant chemo, she received 8 cycles, probably AC followed by T, then PMRT, and 5 years of adjuvant ET with Arimidex, was completed in 2015. She was treated in [facility] under the care of Dr. Angel Smith. She was diagnosed with MDS in 2017, she received ESAs and PRBCs transfusion. She has transfusion related iron overload. She was started on Aranesp on 4/24/2020. No SE from Aranesp. The patient returns for a follow-up visit, she had last week fever, temperature was 102, she was seen by PCP, was prescribed Z-Mark. No longer having fever. Patient has been active, No bleeding, and tired. Review of Systems;  CONSTITUTIONAL: No fever, chills. Fair appetite. ENMT: Eyes: No diplopia; Nose: No epistaxis. Mouth: No sore throat. RESPIRATORY: No hemoptysis, shortness of breath, cough. CARDIOVASCULAR: No chest pain, palpitations. GASTROINTESTINAL: No nausea/vomiting, abdominal pain, diarrhea/constipation. GENITOURINARY: No dysuria, urinary frequency, hematuria. NEURO: No syncope, presyncope, headache.   Remainder:  ROS NEGATIVE    Past Medical History:      Diagnosis Date    Anemia     Pt reports she was dx in her teens, w/o proper w/u, with iron deficiency anemia and was on oral iron replacement for many years, resulting in iron overload    Aplastic anemia ([address] ~5484-4945    Possibly d/t chemotherapy for breast cancer    Breast cancer ([address] 2009    right side; pt underwent radical mastectomy followed by radiation therapy and chemotherapy and was then on oral chemo pill for 5 yrs; no recurrence as of 2019    Chickenpox     Hypothyroidism     Measles     Mild depression (HCC)     Mumps     Myelodysplastic syndrome ([facility] ~2016    Pulmonary tuberculosis ~2104-4291    in her early 25s    Scarlet fever     Sensory neuropathy     beyond the ankle b/l     Patient Active Problem List   Diagnosis    MDS (myelodysplastic syndrome) (HCC)    Bronchitis    Macrocytic anemia with high RDW    Hypothyroidism    Peripheral sensory neuropathy    Breast cancer (HCC)    Anemia of chronic disease        Past Surgical History:      Procedure Laterality Date    ADENOIDECTOMY      BONE MARROW BIOPSY      BREAST BIOPSY      CHOLECYSTECTOMY      MASTECTOMY      right breast    OTHER SURGICAL HISTORY      blood transfusions    TONSILLECTOMY AND ADENOIDECTOMY      TUBAL LIGATION         Family History:  Family History   Problem Relation Age of Onset   [facility] Cancer Mother         lung    Cancer Father         lung    Cancer Sister         ovarian cancer and breast cancer    Cancer Brother         unknown    Cancer Maternal Aunt         unknown    Cancer Other         brain    Other Brother         MI       Medications:  Reviewed and reconciled.     Social History:  Social History     Socioeconomic History    Marital status: Single     Spouse name: Not on file    Number of children: Not on file    Years of education: Not on file    Highest education level: Not on file   Occupational History    Not on file   Tobacco Use    Smoking status: Former Smoker     Packs/day: 1.00     Years: 37.00     Pack years: 37.00     Types: Cigarettes     Start date:      Quit date: 2000     Years since quittin.4    Smokeless tobacco: Never Used   Substance and Sexual Activity    Alcohol use: No    Drug use: Never    Sexual activity: Not Currently   Other Topics Concern    Not on file Social History Narrative    Not on file     Social Determinants of Health     Financial Resource Strain:     Difficulty of Paying Living Expenses:    Food Insecurity:     Worried About Running Out of Food in the Last Year:     [address] in the Last Year:    Transportation Needs:     Lack of Transportation (Medical):  Lack of Transportation (Non-Medical):    Physical Activity:     Days of Exercise per Week:     Minutes of Exercise per Session:    Stress:     Feeling of Stress :    Social Connections:     Frequency of Communication with Friends and Family:     Frequency of Social Gatherings with Friends and Family:     Attends Islam Services:     Active Member of Clubs or Organizations:     Attends Club or Organization Meetings:     Marital Status:    Intimate Partner Violence:     Fear of Current or Ex-Partner:     Emotionally Abused:     Physically Abused:     Sexually Abused: Allergies: Allergies   Allergen Reactions    Bee Venom Swelling and Dermatitis    Penicillins Hives, Swelling and Dermatitis       Physical Exam:  BP (!) 139/58 (Site: Left Upper Arm, Position: Sitting, Cuff Size: Medium Adult)   Pulse 70   Temp 97.6 °F (36.4 °C) (Temporal)   Ht 5' 1\" (1.549 m)   Wt 141 lb (64 kg)   LMP 05/28/2016   SpO2 95%   BMI 26.64 kg/m²   GENERAL: Alert, oriented x 3, not in acute distress. HEENT: PERRLA; EOMI. Oropharynx clear. NECK: Supple. No palpable cervical or supraclavicular lymphadenopathy. LUNGS: Good air entry bilaterally. No wheezing, crackles or rhonchi. BREASTS: She is s/p right mastectomy, she has telangiectasias, one small scab, no suspicious lesions, no drainage or bleeding. CARDIOVASCULAR: Regular rate. No murmurs, rubs or gallops. ABDOMEN: Soft. Non-tender, non-distended. Positive bowel sounds. EXTREMITIES: Without clubbing, cyanosis, or edema. NEUROLOGIC: No focal deficits. ECOG PS 1      Impression/Plan:     1.  The patient is a very pleasant 78 y.o. lady with a PMH significant for Right Breast Cancer, stage III, HR pos, was diagnosed in 2009, she had a right mastectomy done, followed by adjuvant chemo, she received 8 cycles, probably AC followed by T, then PMRT, and 5 years of adjuvant ET with Arimidex, was completed in 2015. She was treated in [facility] under the care of Dr. Karina Ken. She is doing well clinically without any evidence of recurrence of her disease. Discussed with her the importance of monthly SBE, and routine screening mammograms, she had a left breast screening mammogram done on 5/26/2020, revealing benign findings, was negative for malignancy. Left breast screening mammogram was ordered, it has not been scheduled, will be done today if possible, await results. 2. She has MDS, diagnosed in 2017, she received ESAs and PRBCs transfusion, has transfusion related iron overload, hgb was 8.5, ferritin 3647, was restarted on Aranesp on 4/23/2019. Today 6/23/2021, labs reviewed, hemoglobin had decreased to 8.4 G/DL, which could be secondary to the recent infection, normal white count and platelet count. .2. Her hemoglobin is less than 10 G/DL, proceed with Aranesp, no indication to start her on hypo-methylating agent at this time. Hypothyroidism, follow-up with PCP. RTC in 2 weeks with labs, possible aranesp. Thank you for allowing us to participate in the care of Ms. Ventura.     Gail Clark MD   HEMATOLOGY/MEDICAL ONCOLOGY  [address] [place] [address] ONCOLOGY  [address] [facility] [address] [facility] [id number]  Dept: [phone] Yes-Patient/Caregiver accepts free interpretation services...

## 2021-07-06 ENCOUNTER — HOSPITAL ENCOUNTER (OUTPATIENT)
Dept: INFUSION THERAPY | Age: 81
Discharge: HOME | End: 2021-07-06
Payer: MEDICARE

## 2021-07-06 ENCOUNTER — OFFICE VISIT (OUTPATIENT)
Dept: ONCOLOGY | Age: 81
End: 2021-07-06
Payer: MEDICARE

## 2021-07-06 VITALS
HEART RATE: 70 BPM | DIASTOLIC BLOOD PRESSURE: 55 MMHG | BODY MASS INDEX: 26.81 KG/M2 | TEMPERATURE: 97.2 F | WEIGHT: 142 LBS | SYSTOLIC BLOOD PRESSURE: 112 MMHG | HEIGHT: 61 IN | OXYGEN SATURATION: 95 %

## 2021-07-06 DIAGNOSIS — C50.919 MALIGNANT NEOPLASM OF FEMALE BREAST, UNSPECIFIED ESTROGEN RECEPTOR STATUS, UNSPECIFIED LATERALITY, UNSPECIFIED SITE OF BREAST (HCC): ICD-10-CM

## 2021-07-06 DIAGNOSIS — D63.8 ANEMIA OF CHRONIC DISEASE: ICD-10-CM

## 2021-07-06 DIAGNOSIS — D46.9 MDS (MYELODYSPLASTIC SYNDROME) (HCC): Primary | Chronic | ICD-10-CM

## 2021-07-06 DIAGNOSIS — D46.9 MDS (MYELODYSPLASTIC SYNDROME) (HCC): Primary | ICD-10-CM

## 2021-07-06 LAB
ANISOCYTOSIS: ABNORMAL
BASOPHILS ABSOLUTE: 0.03 E9/L (ref 0–0.2)
BASOPHILS RELATIVE PERCENT: 0.6 % (ref 0–2)
CONTROL: NORMAL
EOSINOPHILS ABSOLUTE: 0.1 E9/L (ref 0.05–0.5)
EOSINOPHILS RELATIVE PERCENT: 2 % (ref 0–6)
HCT VFR BLD CALC: 24.7 % (ref 34–48)
HEMOCCULT STL QL: NORMAL
HEMOGLOBIN: 7.9 G/DL (ref 11.5–15.5)
HYPOCHROMIA: ABNORMAL
IMMATURE GRANULOCYTES #: 0.01 E9/L
IMMATURE GRANULOCYTES %: 0.2 % (ref 0–5)
LYMPHOCYTES ABSOLUTE: 3.09 E9/L (ref 1.5–4)
LYMPHOCYTES RELATIVE PERCENT: 63.2 % (ref 20–42)
MCH RBC QN AUTO: 37.8 PG (ref 26–35)
MCHC RBC AUTO-ENTMCNC: 32 % (ref 32–34.5)
MCV RBC AUTO: 118.2 FL (ref 80–99.9)
MONOCYTES ABSOLUTE: 0.29 E9/L (ref 0.1–0.95)
MONOCYTES RELATIVE PERCENT: 5.9 % (ref 2–12)
NEUTROPHILS ABSOLUTE: 1.37 E9/L (ref 1.8–7.3)
NEUTROPHILS RELATIVE PERCENT: 28.1 % (ref 43–80)
OVALOCYTES: ABNORMAL
PDW BLD-RTO: 25.4 FL (ref 11.5–15)
PLATELET # BLD: 130 E9/L (ref 130–450)
PMV BLD AUTO: 10.4 FL (ref 7–12)
POIKILOCYTES: ABNORMAL
POLYCHROMASIA: ABNORMAL
RBC # BLD: 2.09 E12/L (ref 3.5–5.5)
SCHISTOCYTES: ABNORMAL
TARGET CELLS: ABNORMAL
TEAR DROP CELLS: ABNORMAL
WBC # BLD: 4.9 E9/L (ref 4.5–11.5)

## 2021-07-06 PROCEDURE — 99214 OFFICE O/P EST MOD 30 MIN: CPT | Performed by: INTERNAL MEDICINE

## 2021-07-06 PROCEDURE — G8427 DOCREV CUR MEDS BY ELIG CLIN: HCPCS | Performed by: INTERNAL MEDICINE

## 2021-07-06 PROCEDURE — 82274 ASSAY TEST FOR BLOOD FECAL: CPT | Performed by: INTERNAL MEDICINE

## 2021-07-06 PROCEDURE — 4040F PNEUMOC VAC/ADMIN/RCVD: CPT | Performed by: INTERNAL MEDICINE

## 2021-07-06 PROCEDURE — 1123F ACP DISCUSS/DSCN MKR DOCD: CPT | Performed by: INTERNAL MEDICINE

## 2021-07-06 PROCEDURE — 36415 COLL VENOUS BLD VENIPUNCTURE: CPT

## 2021-07-06 PROCEDURE — G8399 PT W/DXA RESULTS DOCUMENT: HCPCS | Performed by: INTERNAL MEDICINE

## 2021-07-06 PROCEDURE — 1090F PRES/ABSN URINE INCON ASSESS: CPT | Performed by: INTERNAL MEDICINE

## 2021-07-06 PROCEDURE — 99212 OFFICE O/P EST SF 10 MIN: CPT | Performed by: INTERNAL MEDICINE

## 2021-07-06 PROCEDURE — 85025 COMPLETE CBC W/AUTO DIFF WBC: CPT

## 2021-07-06 PROCEDURE — 1036F TOBACCO NON-USER: CPT | Performed by: INTERNAL MEDICINE

## 2021-07-06 PROCEDURE — 6360000002 HC RX W HCPCS: Performed by: INTERNAL MEDICINE

## 2021-07-06 PROCEDURE — G8417 CALC BMI ABV UP PARAM F/U: HCPCS | Performed by: INTERNAL MEDICINE

## 2021-07-06 PROCEDURE — 96372 THER/PROPH/DIAG INJ SC/IM: CPT

## 2021-07-06 RX ADMIN — DARBEPOETIN ALFA 500 MCG: 500 INJECTION, SOLUTION INTRAVENOUS; SUBCUTANEOUS at 13:55

## 2021-07-06 NOTE — PROGRESS NOTES
[address] MED ONCOLOGY  [address] [facility] [address]  Dept: [phone]  Attending Progress Note      Reason for Visit:   1. Right Breast Cancer. 2. MDS. Referring Physician:  CONNIE Pina CNP    PCP:  CONNIE Pina CNP    History of Present Illness: The patient is a very pleasant 78 y.o. lady with a PMH significant for Right Breast Cancer, stage III, HR pos, was diagnosed in 2009, she had a right mastectomy done, followed by adjuvant chemo, she received 8 cycles, probably AC followed by T, then PMRT, and 5 years of adjuvant ET with Arimidex, was completed in 2015. She was treated in [facility] under the care of Dr. Brain Ying. She was diagnosed with MDS in 2017, she received ESAs and PRBCs transfusion. She has transfusion related iron overload. She was started on Aranesp on 4/24/2020. No SE from Aranesp. The patient returns for a follow-up visit, she is tired, but no significant decrease in her energy level. No bleeding. Review of Systems;  CONSTITUTIONAL: No fever, chills. Fair appetite. ENMT: Eyes: No diplopia; Nose: No epistaxis. Mouth: No sore throat. RESPIRATORY: No hemoptysis, shortness of breath, cough. CARDIOVASCULAR: No chest pain, palpitations. GASTROINTESTINAL: No nausea/vomiting, abdominal pain, diarrhea/constipation. GENITOURINARY: No dysuria, urinary frequency, hematuria. NEURO: No syncope, presyncope, headache.   Remainder:  ROS NEGATIVE    Past Medical History:      Diagnosis Date    Anemia     Pt reports she was dx in her teens, w/o proper w/u, with iron deficiency anemia and was on oral iron replacement for many years, resulting in iron overload    Aplastic anemia ([address] ~3981-2080    Possibly d/t chemotherapy for breast cancer    Breast cancer ([facility] 2009    right side; pt underwent radical mastectomy followed by radiation therapy and chemotherapy and was then on oral chemo pill for 5 yrs; no recurrence as of 2019    Chickenpox     Hypothyroidism     Measles     Mild depression (HCC)     Mumps     Myelodysplastic syndrome (HCC) ~2016    Pulmonary tuberculosis ~9731-5185    in her early 25s    Scarlet fever     Sensory neuropathy     beyond the ankle b/l     Patient Active Problem List   Diagnosis    MDS (myelodysplastic syndrome) (HCC)    Bronchitis    Macrocytic anemia with high RDW    Hypothyroidism    Peripheral sensory neuropathy    Breast cancer (HCC)    Anemia of chronic disease        Past Surgical History:      Procedure Laterality Date    ADENOIDECTOMY      BONE MARROW BIOPSY      BREAST BIOPSY      CHOLECYSTECTOMY      MASTECTOMY      right breast    OTHER SURGICAL HISTORY      blood transfusions    TONSILLECTOMY AND ADENOIDECTOMY      TUBAL LIGATION         Family History:  Family History   Problem Relation Age of Onset    Cancer Mother         lung    Cancer Father         lung    Cancer Sister         ovarian cancer and breast cancer    Cancer Brother         unknown    Cancer Maternal Aunt         unknown    Cancer Other         brain    Other Brother         MI       Medications:  Reviewed and reconciled.     Social History:  Social History     Socioeconomic History    Marital status: Single     Spouse name: Not on file    Number of children: Not on file    Years of education: Not on file    Highest education level: Not on file   Occupational History    Not on file   Tobacco Use    Smoking status: Former Smoker     Packs/day: 1.00     Years: 37.00     Pack years: 37.00     Types: Cigarettes     Start date:      Quit date:      Years since quittin.5    Smokeless tobacco: Never Used   Substance and Sexual Activity    Alcohol use: No    Drug use: Never    Sexual activity: Not Currently   Other Topics Concern    Not on file   Social History Narrative    Not on file     Social Determinants of Health     Financial Resource Strain:     Difficulty of Paying Living Expenses:    Food Insecurity:     Worried About Running Out of Food in the Last Year:     [address] in the Last Year:    Transportation Needs:     Lack of Transportation (Medical):  Lack of Transportation (Non-Medical):    Physical Activity:     Days of Exercise per Week:     Minutes of Exercise per Session:    Stress:     Feeling of Stress :    Social Connections:     Frequency of Communication with Friends and Family:     Frequency of Social Gatherings with Friends and Family:     Attends Quaker Services:     Active Member of Clubs or Organizations:     Attends Club or Organization Meetings:     Marital Status:    Intimate Partner Violence:     Fear of Current or Ex-Partner:     Emotionally Abused:     Physically Abused:     Sexually Abused: Allergies: Allergies   Allergen Reactions    Bee Venom Swelling and Dermatitis    Penicillins Hives, Swelling and Dermatitis       Physical Exam:  BP (!) 112/55   Pulse 70   Temp 97.2 °F (36.2 °C) (Temporal)   Ht 5' 1\" (1.549 m)   Wt 142 lb (64.4 kg)   LMP 05/28/2016   SpO2 95%   BMI 26.83 kg/m²   GENERAL: Alert, oriented x 3, not in acute distress. HEENT: PERRLA; EOMI. Oropharynx clear. NECK: Supple. No palpable cervical or supraclavicular lymphadenopathy. LUNGS: Good air entry bilaterally. No wheezing, crackles or rhonchi. BREASTS: She is s/p right mastectomy, she has telangiectasias, one small scab, no suspicious lesions, no drainage or bleeding. CARDIOVASCULAR: Regular rate. No murmurs, rubs or gallops. ABDOMEN: Soft. Non-tender, non-distended. Positive bowel sounds. EXTREMITIES: Without clubbing, cyanosis, or edema. NEUROLOGIC: No focal deficits. ECOG PS 1      Impression/Plan:     1.  The patient is a very pleasant 78 y.o. lady with a PMH significant for Right Breast Cancer, stage III, HR pos, was diagnosed in 2009, she had a right mastectomy done, followed by adjuvant chemo, she received 8 cycles, probably AC followed by T, then PMRT, and 5 years of adjuvant ET with Arimidex, was completed in 2015. She was treated in [facility] under the care of Dr. Eleanor Briggs. She is doing well clinically without any evidence of recurrence of her disease. Discussed with her the importance of monthly SBE, and routine screening mammograms, she had a left breast screening mammogram done on 6/22/2021, there was no evidence of malignancy, she will be due for a repeat left breast screening mammogram on 6/23/2022.     2. She has MDS, diagnosed in 2017, she received ESAs and PRBCs transfusion, has transfusion related iron overload, hgb was 8.5, ferritin 3647, was restarted on Aranesp on 4/23/2019. Today 7/6/2021, labs reviewed, hemoglobin had decreased to 7.9, hematocrit 24.7, MCV is 118.2, normal white count, platelet count is 524U, I discussed with the patient also that her hemoglobin/hematocrit are decreasing despite treatment with HENNY's, will order the test to rule out GI bleed, we discussed that if her hemoglobin does not improve, a bone marrow biopsy and aspirate will be warranted. Proceed with Aranesp today 7/6/2021. Hypothyroidism, follow-up with PCP. RTC in 2 weeks with labs, possible aranesp. Thank you for allowing us to participate in the care of Ms. Ventura.     Salomón Christianson MD   HEMATOLOGY/MEDICAL ONCOLOGY  [address] [place] [address] ONCOLOGY  [facility] [address] [facility] [address]  Dept: [phone]

## 2021-07-20 ENCOUNTER — OFFICE VISIT (OUTPATIENT)
Dept: PALLATIVE CARE | Age: 81
End: 2021-07-20
Payer: MEDICARE

## 2021-07-20 ENCOUNTER — OFFICE VISIT (OUTPATIENT)
Dept: ONCOLOGY | Age: 81
End: 2021-07-20
Payer: MEDICARE

## 2021-07-20 ENCOUNTER — HOSPITAL ENCOUNTER (OUTPATIENT)
Dept: INFUSION THERAPY | Age: 81
Discharge: HOME | End: 2021-07-20
Payer: MEDICARE

## 2021-07-20 VITALS
DIASTOLIC BLOOD PRESSURE: 48 MMHG | OXYGEN SATURATION: 96 % | WEIGHT: 140 LBS | HEART RATE: 78 BPM | SYSTOLIC BLOOD PRESSURE: 129 MMHG | BODY MASS INDEX: 26.45 KG/M2

## 2021-07-20 VITALS
SYSTOLIC BLOOD PRESSURE: 149 MMHG | BODY MASS INDEX: 26.24 KG/M2 | HEIGHT: 61 IN | OXYGEN SATURATION: 96 % | DIASTOLIC BLOOD PRESSURE: 62 MMHG | TEMPERATURE: 97 F | WEIGHT: 139 LBS | HEART RATE: 75 BPM

## 2021-07-20 DIAGNOSIS — D46.9 MDS (MYELODYSPLASTIC SYNDROME) (HCC): Primary | ICD-10-CM

## 2021-07-20 DIAGNOSIS — Z51.5 PALLIATIVE CARE BY SPECIALIST: ICD-10-CM

## 2021-07-20 DIAGNOSIS — D46.9 MDS (MYELODYSPLASTIC SYNDROME) (HCC): Primary | Chronic | ICD-10-CM

## 2021-07-20 DIAGNOSIS — C50.919 MALIGNANT NEOPLASM OF FEMALE BREAST, UNSPECIFIED ESTROGEN RECEPTOR STATUS, UNSPECIFIED LATERALITY, UNSPECIFIED SITE OF BREAST (HCC): ICD-10-CM

## 2021-07-20 DIAGNOSIS — D63.8 ANEMIA OF CHRONIC DISEASE: ICD-10-CM

## 2021-07-20 DIAGNOSIS — G89.3 PAIN DUE TO NEOPLASM: ICD-10-CM

## 2021-07-20 LAB
ANISOCYTOSIS: ABNORMAL
BASOPHILIC STIPPLING: ABNORMAL
BASOPHILS ABSOLUTE: 0 E9/L (ref 0–0.2)
BASOPHILS RELATIVE PERCENT: 0.2 % (ref 0–2)
EOSINOPHILS ABSOLUTE: 0.05 E9/L (ref 0.05–0.5)
EOSINOPHILS RELATIVE PERCENT: 0.9 % (ref 0–6)
HCT VFR BLD CALC: 25.6 % (ref 34–48)
HEMOGLOBIN: 8 G/DL (ref 11.5–15.5)
HYPOCHROMIA: ABNORMAL
LYMPHOCYTES ABSOLUTE: 2.09 E9/L (ref 1.5–4)
LYMPHOCYTES RELATIVE PERCENT: 37.7 % (ref 20–42)
MCH RBC QN AUTO: 37.9 PG (ref 26–35)
MCHC RBC AUTO-ENTMCNC: 31.3 % (ref 32–34.5)
MCV RBC AUTO: 121.3 FL (ref 80–99.9)
MONOCYTES ABSOLUTE: 0.55 E9/L (ref 0.1–0.95)
MONOCYTES RELATIVE PERCENT: 9.6 % (ref 2–12)
NEUTROPHILS ABSOLUTE: 2.86 E9/L (ref 1.8–7.3)
NEUTROPHILS RELATIVE PERCENT: 51.8 % (ref 43–80)
NUCLEATED RED BLOOD CELLS: 0.9 /100 WBC
OVALOCYTES: ABNORMAL
PDW BLD-RTO: 27.6 FL (ref 11.5–15)
PLATELET # BLD: 138 E9/L (ref 130–450)
PMV BLD AUTO: 11.4 FL (ref 7–12)
POIKILOCYTES: ABNORMAL
POLYCHROMASIA: ABNORMAL
RBC # BLD: 2.11 E12/L (ref 3.5–5.5)
TARGET CELLS: ABNORMAL
TEAR DROP CELLS: ABNORMAL
WBC # BLD: 5.5 E9/L (ref 4.5–11.5)

## 2021-07-20 PROCEDURE — 96372 THER/PROPH/DIAG INJ SC/IM: CPT

## 2021-07-20 PROCEDURE — 36415 COLL VENOUS BLD VENIPUNCTURE: CPT

## 2021-07-20 PROCEDURE — G8399 PT W/DXA RESULTS DOCUMENT: HCPCS | Performed by: INTERNAL MEDICINE

## 2021-07-20 PROCEDURE — 99213 OFFICE O/P EST LOW 20 MIN: CPT

## 2021-07-20 PROCEDURE — G8427 DOCREV CUR MEDS BY ELIG CLIN: HCPCS | Performed by: INTERNAL MEDICINE

## 2021-07-20 PROCEDURE — 99213 OFFICE O/P EST LOW 20 MIN: CPT | Performed by: NURSE PRACTITIONER

## 2021-07-20 PROCEDURE — 85025 COMPLETE CBC W/AUTO DIFF WBC: CPT

## 2021-07-20 PROCEDURE — 99212 OFFICE O/P EST SF 10 MIN: CPT | Performed by: NURSE PRACTITIONER

## 2021-07-20 PROCEDURE — G8417 CALC BMI ABV UP PARAM F/U: HCPCS | Performed by: INTERNAL MEDICINE

## 2021-07-20 PROCEDURE — 99214 OFFICE O/P EST MOD 30 MIN: CPT | Performed by: INTERNAL MEDICINE

## 2021-07-20 PROCEDURE — 1036F TOBACCO NON-USER: CPT | Performed by: INTERNAL MEDICINE

## 2021-07-20 PROCEDURE — 1090F PRES/ABSN URINE INCON ASSESS: CPT | Performed by: INTERNAL MEDICINE

## 2021-07-20 PROCEDURE — 6360000002 HC RX W HCPCS: Performed by: INTERNAL MEDICINE

## 2021-07-20 PROCEDURE — 1123F ACP DISCUSS/DSCN MKR DOCD: CPT | Performed by: INTERNAL MEDICINE

## 2021-07-20 PROCEDURE — 4040F PNEUMOC VAC/ADMIN/RCVD: CPT | Performed by: INTERNAL MEDICINE

## 2021-07-20 RX ORDER — HYDROCODONE BITARTRATE AND ACETAMINOPHEN 5; 325 MG/1; MG/1
1 TABLET ORAL EVERY 12 HOURS PRN
Qty: 60 TABLET | Refills: 0 | Status: SHIPPED
Start: 2021-07-20 | End: 2021-08-27 | Stop reason: SDUPTHER

## 2021-07-20 RX ORDER — GABAPENTIN 300 MG/1
CAPSULE ORAL
Qty: 270 CAPSULE | Refills: 0 | Status: SHIPPED
Start: 2021-07-20 | End: 2021-10-19 | Stop reason: SDUPTHER

## 2021-07-20 RX ORDER — DOXYCYCLINE HYCLATE 100 MG
100 TABLET ORAL 2 TIMES DAILY
Qty: 14 TABLET | Refills: 0 | Status: SHIPPED | OUTPATIENT
Start: 2021-07-20 | End: 2021-07-27

## 2021-07-20 RX ORDER — METHYLPREDNISOLONE 4 MG/1
TABLET ORAL
Qty: 1 KIT | Refills: 0 | Status: SHIPPED | OUTPATIENT
Start: 2021-07-20 | End: 2021-07-26

## 2021-07-20 RX ADMIN — DARBEPOETIN ALFA 500 MCG: 500 INJECTION, SOLUTION INTRAVENOUS; SUBCUTANEOUS at 13:39

## 2021-07-20 NOTE — PROGRESS NOTES
[address] MED ONCOLOGY  [address] [facility] [address]  Dept: [phone]  Attending Progress Note      Reason for Visit:   1. Right Breast Cancer. 2. MDS. Referring Physician:  CONNIE Valles CNP    PCP:  CONNIE Valles CNP    History of Present Illness: The patient is a very pleasant 78 y.o. lady with a PMH significant for Right Breast Cancer, stage III, HR pos, was diagnosed in 2009, she had a right mastectomy done, followed by adjuvant chemo, she received 8 cycles, probably AC followed by T, then PMRT, and 5 years of adjuvant ET with Arimidex, was completed in 2015. She was treated in [place] under the care of Dr. Russ Gaona. She was diagnosed with MDS in 2017, she received ESAs and PRBCs transfusion. She has transfusion related iron overload. She was started on Aranesp on 4/24/2020. No SE from Aranesp. The patient returns for a follow-up visit, she is tired, but no significant decrease in her energy level. No bleeding. She has pain in the right chest wall and warmth, started few days ago. No fever. Review of Systems;  CONSTITUTIONAL: No fever, chills. Fair appetite. ENMT: Eyes: No diplopia; Nose: No epistaxis. Mouth: No sore throat. RESPIRATORY: No hemoptysis, shortness of breath, cough. CARDIOVASCULAR: No chest pain, palpitations. GASTROINTESTINAL: No nausea/vomiting, abdominal pain, diarrhea/constipation. GENITOURINARY: No dysuria, urinary frequency, hematuria. NEURO: No syncope, presyncope, headache.   Remainder:  ROS NEGATIVE    Past Medical History:      Diagnosis Date    Anemia     Pt reports she was dx in her teens, w/o proper w/u, with iron deficiency anemia and was on oral iron replacement for many years, resulting in iron overload    Aplastic anemia ([address] ~5994-0190    Possibly d/t chemotherapy for breast cancer    Breast cancer ([facility] 2009    right side; pt underwent radical mastectomy followed by radiation therapy and chemotherapy and was then on oral chemo pill for 5 yrs; no recurrence as of 2019    Chickenpox     Hypothyroidism     Measles     Mild depression (HCC)     Mumps     Myelodysplastic syndrome ([facility] ~    Pulmonary tuberculosis ~1765-4494    in her early 25s    Scarlet fever     Sensory neuropathy     beyond the ankle b/l     Patient Active Problem List   Diagnosis    MDS (myelodysplastic syndrome) (HCC)    Bronchitis    Macrocytic anemia with high RDW    Hypothyroidism    Peripheral sensory neuropathy    Breast cancer (HCC)    Anemia of chronic disease        Past Surgical History:      Procedure Laterality Date    ADENOIDECTOMY      BONE MARROW BIOPSY      BREAST BIOPSY      CHOLECYSTECTOMY      MASTECTOMY      right breast    OTHER SURGICAL HISTORY      blood transfusions    TONSILLECTOMY AND ADENOIDECTOMY      TUBAL LIGATION         Family History:  Family History   Problem Relation Age of Onset   [employer] Cancer Mother         lung    Cancer Father         lung    Cancer Sister         ovarian cancer and breast cancer    Cancer Brother         unknown    Cancer Maternal Aunt         unknown    Cancer Other         brain    Other Brother         MI       Medications:  Reviewed and reconciled.     Social History:  Social History     Socioeconomic History    Marital status: Single     Spouse name: Not on file    Number of children: Not on file    Years of education: Not on file    Highest education level: Not on file   Occupational History    Not on file   Tobacco Use    Smoking status: Former Smoker     Packs/day: 1.00     Years: 37.00     Pack years: 37.00     Types: Cigarettes     Start date:      Quit date:      Years since quittin.5    Smokeless tobacco: Never Used   Substance and Sexual Activity    Alcohol use: No    Drug use: Never    Sexual activity: Not Currently   Other Topics Concern    Not on file   Social History Narrative    Not on file     Social Determinants of Health     Financial Resource Strain:     Difficulty of Paying Living Expenses:    Food Insecurity:     Worried About Running Out of Food in the Last Year:     [address] in the Last Year:    Transportation Needs:     Lack of Transportation (Medical):  Lack of Transportation (Non-Medical):    Physical Activity:     Days of Exercise per Week:     Minutes of Exercise per Session:    Stress:     Feeling of Stress :    Social Connections:     Frequency of Communication with Friends and Family:     Frequency of Social Gatherings with Friends and Family:     Attends Sabianist Services:     Active Member of Clubs or Organizations:     Attends Club or Organization Meetings:     Marital Status:    Intimate Partner Violence:     Fear of Current or Ex-Partner:     Emotionally Abused:     Physically Abused:     Sexually Abused: Allergies: Allergies   Allergen Reactions    Bee Venom Swelling and Dermatitis    Penicillins Hives, Swelling and Dermatitis       Physical Exam:  BP (!) 149/62   Pulse 75   Temp 97 °F (36.1 °C) (Temporal)   Ht 5' 1\" (1.549 m)   Wt 139 lb (63 kg)   LMP 05/28/2016   SpO2 96%   BMI 26.26 kg/m²   GENERAL: Alert, oriented x 3, not in acute distress. HEENT: PERRLA; EOMI. Oropharynx clear. NECK: Supple. No palpable cervical or supraclavicular lymphadenopathy. LUNGS: Good air entry bilaterally. No wheezing, crackles or rhonchi. BREASTS: She is s/p right mastectomy, she has telangiectasias, she has warmth of the right chest wall and edema, she has erythema in the upper abdomen, and the right side, she has 3 raised lesions, no vesicles. CARDIOVASCULAR: Regular rate. No murmurs, rubs or gallops. ABDOMEN: Soft. Non-tender, non-distended. Positive bowel sounds. EXTREMITIES: Without clubbing, cyanosis, or edema. NEUROLOGIC: No focal deficits. ECOG PS 1      Impression/Plan:     1.  The patient is a very pleasant 78 y.o. lady with a PMH significant for Right Breast Cancer, stage III, HR pos, was diagnosed in 2009, she had a right mastectomy done, followed by adjuvant chemo, she received 8 cycles, probably AC followed by T, then PMRT, and 5 years of adjuvant ET with Arimidex, was completed in 2015. She was treated in [facility] under the care of Dr. Kenan Mack. She is doing well clinically without any evidence of recurrence of her disease. Discussed with her the importance of monthly SBE, and routine screening mammograms, she had a left breast screening mammogram done on 6/22/2021, there was no evidence of malignancy, she will be due for a repeat left breast screening mammogram on 6/23/2022. The patient has warmth and edema of the right chest wall, as well as a skin rash, prescribed doxycycline and Medrol Dosepak, a chest CT was ordered for further evaluation. She will seek immediate medical attention if she has worsening symptoms. 2. She has MDS, diagnosed in 2017, she received ESAs and PRBCs transfusion, has transfusion related iron overload, hgb was 8.5, ferritin 3647, was restarted on Aranesp on 4/23/2019. Today 7/20/2021, labs reviewed, hemoglobin is 8, was 7.9 2 weeks ago, hematocrit 24.7, MCV is 112 1.3, normal white count, platelet count is 756C, fit test is negative. I discussed with the patient that the bone marrow biopsy and aspirate might be warranted pending the treatment for her CBC. Proceed with Aranesp today 7/20/2021. Hypothyroidism, follow-up with PCP. RTC in 1 week. Thank you for allowing us to participate in the care of Ms. Ventura.     Lorelei Jackson MD   HEMATOLOGY/MEDICAL ONCOLOGY  [address] [place] [address] ONCOLOGY  [address] [facility] [address]  Dept: [phone]

## 2021-07-20 NOTE — PROGRESS NOTES
Department of Palliative Medicine  Ambulatory Note  Provider: CONNIE Mathew - CNP       Chief Complaint: Donna Roy is a 78 y.o. female with chief complaint of Pain    HPI:  Donna Roy is a 78 y.o. female with significant past medical history of stage II HR positive breast cancer, diagnosed in 2009, status post right mastectomy, followed by adjuvant therapy, and 5 years of adjuvant Arimidex which was completed in 2015, with all treatment performed in [facility] under the care of Dr. Gema Estevez. She is currently being followed locally by Dr. Alok Suarez, without evidence of recurrent disease, whom she also follows for management of myelodysplastic syndrome, which was diagnosed in 2017. She was referred to [address] [place] [address] for assistance with symptom management related to chemotherapy-induced peripheral neuropathy. Assessment/Plan      History of Breast Cancer stage III, HR positive:              -Diagnosed, treated, and managed in [place] in 2009              -Status post right mastectomy              -Followed by adjuvant chemotherapy              -Completed her Demadex 2015              -Currently followed locally by Dr. Alok Suarez, she also follows with regarding MDS diagnosed 2017     Chemotherapy related peripheral Neuropathy/Pain:              -  Worse bilateral feet and LEs, burning and pressure              -  Continue gabapentin 300 mg at nighttime              -  Improves with activity, worsens at rest              -  Continue Norco 5-325 mg every 12 hours as needed, using 1/day,              -  Currently she is very high functioning, enjoys gardening, and is quite active.     Prognosis: unknown     Follow Up:  8 weeks. They were encouraged to call with any questions, concerns, needs, or changes in symptoms. Subjective:     Met with Wilfred Jain today in the clinic she is in good spirits and has been doing well. She continues using her Norco 1-2 times daily.   She explains that she gets a throbbing pain in her legs that usually worsens at nighttime. The Norco is effective in relieving her pain. She is just using the gabapentin at nighttime, because she does not want to be drowsy during the day. She denies any nausea, depression, anxiety, loss of appetite, or constipation. She does complain of a rash on her right chest, the rash is warm to touch. She descibes the rash as itchy and stinging.  Suggest starting hydrocortisone cream.     Objective:     Physical Exam  Wt Readings from Last 3 Encounters:   07/20/21 140 lb (63.5 kg)   07/06/21 142 lb (64.4 kg)   06/22/21 141 lb (64 kg)     BP (!) 129/48   Pulse 78   Wt 140 lb (63.5 kg)   LMP 05/28/2016   SpO2 96%   BMI 26.45 kg/m²     Gen:  Alert, appears stated age, well nourished, in no acute distress  HEENT:  Normocephalic, conjunctiva pink, no drainage, mucosa moist  Neck:  Supple  Lungs:  CTA bilaterally, no audible rhonchi or wheezes noted  Heart[de-identified]  RRR, no murmur, rub, or gallop noted during exam  Abd:  Soft, non tender, non distended, BS+  M/S/Ext:  Moving all extremities, no edema, pulses present  Skin:  Warm and dry, raised erythema of the right chest  Neuro:  Alert, oriented x 3; following commands     Celoron Symptom Assessment Score   Celoron Score 7/20/2021 4/27/2021 11/24/2020 10/13/2020 4/14/2020   Pain Score 6 5 5 8 2   Tiredness Score 3 4 3 7 1   Nausea Score Not nauseated Not nauseated Not nauseated Not nauseated Not nauseated   Depression Score Not depressed Not depressed Not depressed Not depressed Not depressed   Anxiety Score Not anxious Not anxious Not anxious Not anxious Not anxious   Drowsiness Score 1 7 2 5 Not drowsy   Appetite Score Best appetite Best appetite Best appetite 1 Best appetite   Wellbeing Score Best feeling of wellbeing 1 Best feeling of wellbeing Best feeling of wellbeing Best feeling of wellbeing   Dyspnea Score 5 8 3 6 No shortness of breath   Other Problem Score Best possible response Best possible response Best possible response Best possible response Best possible response   Total Assessment Score(calculated) 15 25 13 27 3     Assessed by: patient. Current Medications:  Medications reviewed: yes    Controlled Substances Monitoring: [place] reviewed 7/20/21. RX Monitoring 11/24/2020   Periodic Controlled Substance Monitoring Possible medication side effects, risk of tolerance/dependence & alternative treatments discussed. ;No signs of potential drug abuse or diversion identified. ;Assessed functional status. ;Obtaining appropriate analgesic effect of treatment. Chronic Pain > 50 AMAURI -       CONNIE Ojeda - CNP   Palliative Care Department     Time/Communication  Greater than 50% of time spent, total 25 minutes in face-to-face counseling and coordination of care regarding symptom management. Note: This report was completed using computerize voiced recognition software. Every effort has been made to ensure accuracy; however, inadvertent computerized transcription errors may be present.

## 2021-07-21 ENCOUNTER — HOSPITAL ENCOUNTER (EMERGENCY)
Age: 81
Discharge: HOME | End: 2021-07-21
Payer: MEDICARE

## 2021-07-21 ENCOUNTER — APPOINTMENT (OUTPATIENT)
Dept: GENERAL RADIOLOGY | Age: 81
End: 2021-07-21
Payer: MEDICARE

## 2021-07-21 VITALS
BODY MASS INDEX: 26.24 KG/M2 | WEIGHT: 139 LBS | HEIGHT: 61 IN | DIASTOLIC BLOOD PRESSURE: 57 MMHG | HEART RATE: 76 BPM | SYSTOLIC BLOOD PRESSURE: 131 MMHG | OXYGEN SATURATION: 97 % | RESPIRATION RATE: 14 BRPM | TEMPERATURE: 98.3 F

## 2021-07-21 DIAGNOSIS — B02.9 HERPES ZOSTER WITHOUT COMPLICATION: Primary | ICD-10-CM

## 2021-07-21 PROCEDURE — 71046 X-RAY EXAM CHEST 2 VIEWS: CPT

## 2021-07-21 PROCEDURE — 99283 EMERGENCY DEPT VISIT LOW MDM: CPT

## 2021-07-21 RX ORDER — ACYCLOVIR 800 MG/1
800 TABLET ORAL 4 TIMES DAILY
Qty: 40 TABLET | Refills: 0 | Status: SHIPPED | OUTPATIENT
Start: 2021-07-21 | End: 2021-07-31

## 2021-07-21 ASSESSMENT — PAIN DESCRIPTION - PAIN TYPE: TYPE: ACUTE PAIN

## 2021-07-21 ASSESSMENT — PAIN DESCRIPTION - LOCATION: LOCATION: CHEST

## 2021-07-21 ASSESSMENT — PAIN DESCRIPTION - FREQUENCY: FREQUENCY: INTERMITTENT

## 2021-07-21 ASSESSMENT — PAIN DESCRIPTION - ORIENTATION: ORIENTATION: RIGHT;UPPER

## 2021-07-21 ASSESSMENT — PAIN DESCRIPTION - DESCRIPTORS: DESCRIPTORS: PATIENT UNABLE TO DESCRIBE

## 2021-07-21 ASSESSMENT — PAIN SCALES - GENERAL: PAINLEVEL_OUTOF10: 8

## 2021-07-21 ASSESSMENT — PAIN DESCRIPTION - PROGRESSION: CLINICAL_PROGRESSION: NOT CHANGED

## 2021-07-21 NOTE — ED PROVIDER NOTES
[address] [place] [address]  Department of Emergency Medicine   ED  Encounter Note  Admit Date/RoomTime: 2021  9:49 AM  ED Room: 36/36    NAME: Ynes Parmar  : 1940  MRN: [id number]     Chief Complaint:  Shortness of Breath (R upper chest with inspiration for wk, hx of breast ca on R side, also painfull to tough.)    History of Present Illness       Ynes Parmar is a 78 y.o. old female who presents to the emergency department by private vehicle, for sudden onset, persistent of red, raised and painful area on  Right  which began 1 week(s) prior to arrival.  The symptoms were caused by unknown cause. Since onset the symptoms have been persistent. Prior history of similar episodes: Yes. She was seen by PCP yesterday and told that she has shingles on her right lat chest wall and was given an RX for prednisone and Doxy. Her symptoms are associated with pain with light touch and inspiration and relieved by nothing. She denies any wheezing, dizziness, fever, chills or abd pain. ROS   Pertinent positives and negatives are stated within HPI, all other systems reviewed and are negative. Past Medical History:  has a past medical history of Anemia, Aplastic anemia ([address] Breast cancer ([address] Chickenpox, Hypothyroidism, Measles, Mild depression ([address] Mumps, Myelodysplastic syndrome ([address] Pulmonary tuberculosis, Scarlet fever, and Sensory neuropathy. Surgical History:  has a past surgical history that includes Breast biopsy; Tubal ligation; Mastectomy; Cholecystectomy; other surgical history; Adenoidectomy; bone marrow biopsy; and Tonsillectomy and adenoidectomy. Social History:  reports that she quit smoking about 21 years ago. Her smoking use included cigarettes. She started smoking about 58 years ago. She has a 37.00 pack-year smoking history. She has never used smokeless tobacco. She reports that she does not drink alcohol and does not use drugs.     Family History: family history includes Cancer in her brother, father, maternal aunt, mother, sister, and another family member; Other in her brother. Allergies: Bee venom and Penicillins    Physical Exam   Oxygen Saturation Interpretation: Normal.        ED Triage Vitals [07/21/21 0945]   BP Temp Temp Source Pulse Resp SpO2 Height Weight   (!) 131/57 98.3 °F (36.8 °C) Tympanic 76 14 97 % 5' 1\" (1.549 m) 139 lb (63 kg)         Constitutional:  Alert, development consistent with age. HEENT:  NC/NT. Airway patent. Eyes:  PERRL, EOMI, no discharge. Ears:  TMs without perforation, injection, or bulging. External canals clear without exudate. Mouth:  Mucous membranes moist without lesions, tongue and gums normal.  Throat:  Pharynx without injection, exudate, or tonsillar hypertrophy. Airway patient. Neck:  Supple. No lymphadenopathy. Respiratory:  Clear to auscultation and breath sounds equal.  CV:  Regular rate and rhythm. GI:  Abdomen Soft, nontender, +BS. Integument:  Skin turgor: Normal.              Neck with papular lesions along the anterior chest wall on the right very stages consistent with herpes zoster. No evidence of secondary infection. Patient status post right mastectomy. .  Neurological:  Orientation age-appropriate unless noted elseware. Motor functions intact. Lab / Imaging Results   (All laboratory and radiology results have been personally reviewed by myself)  Labs:  No results found for this visit on 07/21/21. Imaging: All Radiology results interpreted by Radiologist unless otherwise noted. XR CHEST (2 VW)   Final Result   Round opacity in right lower lung is unchanged when compared to August 2020. Findings likely chronic, superimposed infection may be considered in the   proper clinical setting.              ED Course / Medical Decision Making   Medications - No data to display     Consults:   None    Procedures:   none    MDM:   Patient presented with rash tenderness to the right upper

## 2021-07-22 ENCOUNTER — TELEPHONE (OUTPATIENT)
Dept: ONCOLOGY | Age: 81
End: 2021-07-22

## 2021-07-22 NOTE — TELEPHONE ENCOUNTER
Therman Done called in and left  stating that after she saw Dr Malachi Gan on 07/20/21 that night her chest felt \"like murder\". She went to the ER and they diagnosed her with shingles. They said it was a very bad case, and she \"even had the vaccine too. \" She needs to know if she still has to have the CT of the chest, as the pain was the reason it was ordered. \"She doesn't want a huge price tag for something not needed. \" Plus she doesn't know if she could or should have a procedure right now with this pain. And because of this diagnosis, should she even go to the scan or come into the office on 07/27/2021? She also stated that the ER dr, gave her \"pills so big that alone she's full after taking them. She doesn't need to eat\".  She would like a return call Friday 07/23/21 with advisement on whether to keep these appt's or not

## 2021-07-27 ENCOUNTER — HOSPITAL ENCOUNTER (OUTPATIENT)
Dept: INFUSION THERAPY | Age: 81
Discharge: HOME | End: 2021-07-27
Payer: MEDICARE

## 2021-07-27 ENCOUNTER — OFFICE VISIT (OUTPATIENT)
Dept: ONCOLOGY | Age: 81
End: 2021-07-27
Payer: MEDICARE

## 2021-07-27 VITALS
DIASTOLIC BLOOD PRESSURE: 62 MMHG | WEIGHT: 135.6 LBS | HEART RATE: 71 BPM | RESPIRATION RATE: 16 BRPM | SYSTOLIC BLOOD PRESSURE: 135 MMHG | OXYGEN SATURATION: 94 % | HEIGHT: 61 IN | BODY MASS INDEX: 25.6 KG/M2 | TEMPERATURE: 97.3 F

## 2021-07-27 DIAGNOSIS — D46.9 MDS (MYELODYSPLASTIC SYNDROME) (HCC): Primary | ICD-10-CM

## 2021-07-27 PROCEDURE — 4040F PNEUMOC VAC/ADMIN/RCVD: CPT | Performed by: INTERNAL MEDICINE

## 2021-07-27 PROCEDURE — G8399 PT W/DXA RESULTS DOCUMENT: HCPCS | Performed by: INTERNAL MEDICINE

## 2021-07-27 PROCEDURE — 1090F PRES/ABSN URINE INCON ASSESS: CPT | Performed by: INTERNAL MEDICINE

## 2021-07-27 PROCEDURE — 99213 OFFICE O/P EST LOW 20 MIN: CPT | Performed by: INTERNAL MEDICINE

## 2021-07-27 PROCEDURE — 99212 OFFICE O/P EST SF 10 MIN: CPT | Performed by: INTERNAL MEDICINE

## 2021-07-27 PROCEDURE — G8417 CALC BMI ABV UP PARAM F/U: HCPCS | Performed by: INTERNAL MEDICINE

## 2021-07-27 PROCEDURE — 1036F TOBACCO NON-USER: CPT | Performed by: INTERNAL MEDICINE

## 2021-07-27 PROCEDURE — 1123F ACP DISCUSS/DSCN MKR DOCD: CPT | Performed by: INTERNAL MEDICINE

## 2021-07-27 PROCEDURE — G8427 DOCREV CUR MEDS BY ELIG CLIN: HCPCS | Performed by: INTERNAL MEDICINE

## 2021-07-27 NOTE — PROGRESS NOTES
[address] [facility]  [address] [facility] [address]  Dept: [phone]  Attending Progress Note      Reason for Visit:   1. Right Breast Cancer. 2. MDS. Referring Physician:  CONNIE Saba CNP    PCP:  CONNIE Saba CNP    History of Present Illness: The patient is a very pleasant 78 y.o. lady with a PMH significant for Right Breast Cancer, stage III, HR pos, was diagnosed in 2009, she had a right mastectomy done, followed by adjuvant chemo, she received 8 cycles, probably AC followed by T, then PMRT, and 5 years of adjuvant ET with Arimidex, was completed in 2015. She was treated in [facility] under the care of Dr. Yan Lewis. She was diagnosed with MDS in 2017, she received ESAs and PRBCs transfusion. She has transfusion related iron overload. She was started on Aranesp on 4/24/2020. No SE from Aranesp. The patient returns for a follow-up visit, he was seen in the ED on 7/21/2012, she was diagnosed with angles, she was prescribed acyclovir, she did not take the acyclovir, she took the steroids only with significant improvement of the rash. The patient had a chest x-ray done, revealing rounded opacity in the right lower lobe, unchanged compared to August 2020, according to the patient this is a chronic finding, since she had had a TB infection. Energy level is okay at this time. Review of Systems;  CONSTITUTIONAL: No fever, chills. Fair appetite. ENMT: Eyes: No diplopia; Nose: No epistaxis. Mouth: No sore throat. RESPIRATORY: No hemoptysis, shortness of breath, cough. CARDIOVASCULAR: No chest pain, palpitations. GASTROINTESTINAL: No nausea/vomiting, abdominal pain, diarrhea/constipation. GENITOURINARY: No dysuria, urinary frequency, hematuria. NEURO: No syncope, presyncope, headache.   Remainder:  ROS NEGATIVE    Past Medical History:      Diagnosis Date    Anemia     Pt reports she was dx in her teens, w/o proper w/u, with iron deficiency anemia and was on oral iron replacement for many years, resulting in iron overload    Aplastic anemia ([address] ~1311-4191    Possibly d/t chemotherapy for breast cancer    Breast cancer ([address] 2009    right side; pt underwent radical mastectomy followed by radiation therapy and chemotherapy and was then on oral chemo pill for 5 yrs; no recurrence as of 01/2019    Chickenpox     Hypothyroidism     Measles     Mild depression ([address]     Mumps     Myelodysplastic syndrome ([address] ~2016    Pulmonary tuberculosis ~2591-7209    in her early 25s    Scarlet fever     Sensory neuropathy     beyond the ankle b/l     Patient Active Problem List   Diagnosis    MDS (myelodysplastic syndrome) (HCC)    Bronchitis    Macrocytic anemia with high RDW    Hypothyroidism    Peripheral sensory neuropathy    Breast cancer (HCC)    Anemia of chronic disease        Past Surgical History:      Procedure Laterality Date    ADENOIDECTOMY      BONE MARROW BIOPSY      BREAST BIOPSY      CHOLECYSTECTOMY      MASTECTOMY      right breast    OTHER SURGICAL HISTORY      blood transfusions    TONSILLECTOMY AND ADENOIDECTOMY      TUBAL LIGATION         Family History:  Family History   Problem Relation Age of Onset   Tl Mims Cancer Mother         lung    Cancer Father         lung    Cancer Sister         ovarian cancer and breast cancer    Cancer Brother         unknown    Cancer Maternal Aunt         unknown    Cancer Other         brain    Other Brother         MI       Medications:  Reviewed and reconciled. Social History:  Social History     Socioeconomic History    Marital status:       Spouse name: Not on file    Number of children: Not on file    Years of education: Not on file    Highest education level: Not on file   Occupational History    Not on file   Tobacco Use    Smoking status: Former Smoker     Packs/day: 1.00     Years: 37.00     Pack years: 37.00 Types: Cigarettes     Start date:      Quit date: 2000     Years since quittin.5    Smokeless tobacco: Never Used   Substance and Sexual Activity    Alcohol use: No    Drug use: Never    Sexual activity: Not Currently   Other Topics Concern    Not on file   Social History Narrative    Not on file     Social Determinants of Health     Financial Resource Strain:     Difficulty of Paying Living Expenses:    Food Insecurity:     Worried About Running Out of Food in the Last Year:     [address] in the Last Year:    Transportation Needs:     Lack of Transportation (Medical):  Lack of Transportation (Non-Medical):    Physical Activity:     Days of Exercise per Week:     Minutes of Exercise per Session:    Stress:     Feeling of Stress :    Social Connections:     Frequency of Communication with Friends and Family:     Frequency of Social Gatherings with Friends and Family:     Attends Sikhism Services:     Active Member of Clubs or Organizations:     Attends Club or Organization Meetings:     Marital Status:    Intimate Partner Violence:     Fear of Current or Ex-Partner:     Emotionally Abused:     Physically Abused:     Sexually Abused: Allergies: Allergies   Allergen Reactions    Bee Venom Swelling and Dermatitis    Penicillins Hives, Swelling and Dermatitis       Physical Exam:  /62 (Site: Left Upper Arm, Position: Sitting, Cuff Size: Medium Adult)   Pulse 71   Temp 97.3 °F (36.3 °C)   Resp 16   Ht 5' 1\" (1.549 m)   Wt 135 lb 9.6 oz (61.5 kg)   LMP 2016   SpO2 94%   BMI 25.62 kg/m²   GENERAL: Alert, oriented x 3, not in acute distress. HEENT: PERRLA; EOMI. Oropharynx clear. NECK: Supple. No palpable cervical or supraclavicular lymphadenopathy. LUNGS: Good air entry bilaterally. No wheezing, crackles or rhonchi.    BREASTS: She is s/p right mastectomy, she has telangiectasias, edema and warmth of the right chest wall had resolved, the rash and raised lesions on the right upper abdomen had completely resolved,   CARDIOVASCULAR: Regular rate. No murmurs, rubs or gallops. ABDOMEN: Soft. Non-tender, non-distended. Positive bowel sounds. EXTREMITIES: Without clubbing, cyanosis, or edema. BACK: Erythematous rash. NEUROLOGIC: No focal deficits. ECOG PS 1      Impression/Plan:     1. The patient is a very pleasant 78 y.o. lady with a PMH significant for Right Breast Cancer, stage III, HR pos, was diagnosed in 2009, she had a right mastectomy done, followed by adjuvant chemo, she received 8 cycles, probably AC followed by T, then PMRT, and 5 years of adjuvant ET with Arimidex, was completed in 2015. She was treated in [facility] under the care of Dr. Scarlett Kwok. She is doing well clinically without any evidence of recurrence of her disease. Discussed with her the importance of monthly SBE, and routine screening mammograms, she had a left breast screening mammogram done on 6/22/2021, there was no evidence of malignancy, she will be due for a repeat left breast screening mammogram on 6/23/2022. The patient has warmth and edema of the right chest wall, as well as a skin rash, prescribed doxycycline and Medrol Dosepak, a chest CT was ordered for further evaluation. She will seek immediate medical attention if she has worsening symptoms. 2. She has MDS, diagnosed in 2017, she received ESAs and PRBCs transfusion, has transfusion related iron overload, hgb was 8.5, ferritin 3647, was restarted on Aranesp on 4/23/2019. On 7/20/2021, hemoglobin was 8 g/dl hematocrit 24.7,  1.3, normal white count, platelet count 196X, fit test is negative. Received Aranesp, she will be due again for Aranesp in 1 week. A bone marrow biopsy and aspirate might be warranted if she does not have an adequate response to the Aranesp. Skin rash, the patient was diagnosed with shingles, the rash had improved with the steroids.     Hypothyroidism, continue Synthroid

## 2021-08-03 ENCOUNTER — HOSPITAL ENCOUNTER (OUTPATIENT)
Dept: INFUSION THERAPY | Age: 81
End: 2021-08-03
Payer: MEDICARE

## 2021-08-06 ENCOUNTER — HOSPITAL ENCOUNTER (OUTPATIENT)
Dept: INFUSION THERAPY | Age: 81
Discharge: HOME | End: 2021-08-06
Payer: MEDICARE

## 2021-08-06 ENCOUNTER — OFFICE VISIT (OUTPATIENT)
Dept: ONCOLOGY | Age: 81
End: 2021-08-06
Payer: MEDICARE

## 2021-08-06 VITALS
TEMPERATURE: 97.2 F | HEIGHT: 61 IN | HEART RATE: 70 BPM | BODY MASS INDEX: 25.3 KG/M2 | SYSTOLIC BLOOD PRESSURE: 131 MMHG | DIASTOLIC BLOOD PRESSURE: 78 MMHG | OXYGEN SATURATION: 96 % | WEIGHT: 134 LBS

## 2021-08-06 DIAGNOSIS — C50.919 MALIGNANT NEOPLASM OF FEMALE BREAST, UNSPECIFIED ESTROGEN RECEPTOR STATUS, UNSPECIFIED LATERALITY, UNSPECIFIED SITE OF BREAST (HCC): ICD-10-CM

## 2021-08-06 DIAGNOSIS — D46.9 MDS (MYELODYSPLASTIC SYNDROME) (HCC): Primary | ICD-10-CM

## 2021-08-06 DIAGNOSIS — D63.8 ANEMIA OF CHRONIC DISEASE: ICD-10-CM

## 2021-08-06 DIAGNOSIS — D46.9 MDS (MYELODYSPLASTIC SYNDROME) (HCC): Primary | Chronic | ICD-10-CM

## 2021-08-06 LAB
ANISOCYTOSIS: ABNORMAL
BASOPHILS ABSOLUTE: 0.02 E9/L (ref 0–0.2)
BASOPHILS RELATIVE PERCENT: 0.3 % (ref 0–2)
EOSINOPHILS ABSOLUTE: 0.04 E9/L (ref 0.05–0.5)
EOSINOPHILS RELATIVE PERCENT: 0.6 % (ref 0–6)
HCT VFR BLD CALC: 30.9 % (ref 34–48)
HEMOGLOBIN: 9.7 G/DL (ref 11.5–15.5)
IMMATURE GRANULOCYTES #: 0.05 E9/L
IMMATURE GRANULOCYTES %: 0.8 % (ref 0–5)
LYMPHOCYTES ABSOLUTE: 2.44 E9/L (ref 1.5–4)
LYMPHOCYTES RELATIVE PERCENT: 36.9 % (ref 20–42)
MCH RBC QN AUTO: 37.5 PG (ref 26–35)
MCHC RBC AUTO-ENTMCNC: 31.4 % (ref 32–34.5)
MCV RBC AUTO: 119.3 FL (ref 80–99.9)
MONOCYTES ABSOLUTE: 0.39 E9/L (ref 0.1–0.95)
MONOCYTES RELATIVE PERCENT: 5.9 % (ref 2–12)
NEUTROPHILS ABSOLUTE: 3.68 E9/L (ref 1.8–7.3)
NEUTROPHILS RELATIVE PERCENT: 55.5 % (ref 43–80)
OVALOCYTES: ABNORMAL
PDW BLD-RTO: 24.4 FL (ref 11.5–15)
PLATELET # BLD: 135 E9/L (ref 130–450)
PMV BLD AUTO: 9.9 FL (ref 7–12)
POIKILOCYTES: ABNORMAL
POLYCHROMASIA: ABNORMAL
RBC # BLD: 2.59 E12/L (ref 3.5–5.5)
TARGET CELLS: ABNORMAL
TEAR DROP CELLS: ABNORMAL
WBC # BLD: 6.6 E9/L (ref 4.5–11.5)

## 2021-08-06 PROCEDURE — 85025 COMPLETE CBC W/AUTO DIFF WBC: CPT

## 2021-08-06 PROCEDURE — 99214 OFFICE O/P EST MOD 30 MIN: CPT | Performed by: NURSE PRACTITIONER

## 2021-08-06 PROCEDURE — 99212 OFFICE O/P EST SF 10 MIN: CPT

## 2021-08-06 PROCEDURE — 36415 COLL VENOUS BLD VENIPUNCTURE: CPT

## 2021-08-06 PROCEDURE — G8399 PT W/DXA RESULTS DOCUMENT: HCPCS | Performed by: NURSE PRACTITIONER

## 2021-08-06 PROCEDURE — G8427 DOCREV CUR MEDS BY ELIG CLIN: HCPCS | Performed by: NURSE PRACTITIONER

## 2021-08-06 PROCEDURE — 1123F ACP DISCUSS/DSCN MKR DOCD: CPT | Performed by: NURSE PRACTITIONER

## 2021-08-06 PROCEDURE — 1090F PRES/ABSN URINE INCON ASSESS: CPT | Performed by: NURSE PRACTITIONER

## 2021-08-06 PROCEDURE — 4040F PNEUMOC VAC/ADMIN/RCVD: CPT | Performed by: NURSE PRACTITIONER

## 2021-08-06 PROCEDURE — 1036F TOBACCO NON-USER: CPT | Performed by: NURSE PRACTITIONER

## 2021-08-06 PROCEDURE — G8417 CALC BMI ABV UP PARAM F/U: HCPCS | Performed by: NURSE PRACTITIONER

## 2021-08-06 NOTE — PROGRESS NOTES
[address] MED ONCOLOGY  [facility] [address]  Dept: [phone]  Attending Progress Note      Reason for Visit:   1. Right Breast Cancer. 2. MDS. Referring Physician:  CONNIE Murray CNP    PCP:  CONNIE Murray CNP    History of Present Illness: The patient is a very pleasant 78 y.o. lady with a PMH significant for Right Breast Cancer, stage III, HR pos, was diagnosed in 2009, she had a right mastectomy done, followed by adjuvant chemo, she received 8 cycles, probably AC followed by T, then PMRT, and 5 years of adjuvant ET with Arimidex, was completed in 2015. She was treated in [facility] under the care of Dr. Raciel Delgado. She was diagnosed with MDS in 2017, she received ESAs and PRBCs transfusion. She has transfusion related iron overload. She was started on Aranesp on 4/24/2020. No SE from Aranesp. The patient returns for a follow-up visit, he was seen in the ED on 7/21/2012, she was diagnosed with Shingles, she was prescribed acyclovir, she did not take the acyclovir, she took the steroids only with significant improvement of the rash. The patient had a chest x-ray done, revealing rounded opacity in the right lower lobe, unchanged compared to August 2020, according to the patient this is a chronic finding, since she had had a TB infection. Energy level is okay at this time. She had some left axillary pain after doing yard work, but that has improved. Review of Systems;  CONSTITUTIONAL: No fever, chills. Fair appetite. ENMT: Eyes: No diplopia; Nose: No epistaxis. Mouth: No sore throat. RESPIRATORY: No hemoptysis, shortness of breath, cough. CARDIOVASCULAR: No chest pain, palpitations. GASTROINTESTINAL: No nausea/vomiting, abdominal pain, diarrhea/constipation. GENITOURINARY: No dysuria, urinary frequency, hematuria. NEURO: No syncope, presyncope, headache.   Skin: residual pruritic areas from shingles  Remainder:  ROS NEGATIVE    Past Medical History:      Diagnosis Date    Anemia     Pt reports she was dx in her teens, w/o proper w/u, with iron deficiency anemia and was on oral iron replacement for many years, resulting in iron overload    Aplastic anemia ([address] ~7184-7288    Possibly d/t chemotherapy for breast cancer    Breast cancer ([address] 2009    right side; pt underwent radical mastectomy followed by radiation therapy and chemotherapy and was then on oral chemo pill for 5 yrs; no recurrence as of 01/2019    Chickenpox     Hypothyroidism     Measles     Mild depression ([address]     Mumps     Myelodysplastic syndrome ([address] ~2016    Pulmonary tuberculosis ~0185-7670    in her early 25s    Scarlet fever     Sensory neuropathy     beyond the ankle b/l     Patient Active Problem List   Diagnosis    MDS (myelodysplastic syndrome) (HCC)    Bronchitis    Macrocytic anemia with high RDW    Hypothyroidism    Peripheral sensory neuropathy    Breast cancer (HCC)    Anemia of chronic disease        Past Surgical History:      Procedure Laterality Date    ADENOIDECTOMY      BONE MARROW BIOPSY      BREAST BIOPSY      CHOLECYSTECTOMY      MASTECTOMY      right breast    OTHER SURGICAL HISTORY      blood transfusions    TONSILLECTOMY AND ADENOIDECTOMY      TUBAL LIGATION         Family History:  Family History   Problem Relation Age of Onset   Nhi Her Cancer Mother         lung    Cancer Father         lung    Cancer Sister         ovarian cancer and breast cancer    Cancer Brother         unknown    Cancer Maternal Aunt         unknown    Cancer Other         brain    Other Brother         MI       Medications:  Reviewed and reconciled. Social History:  Social History     Socioeconomic History    Marital status:       Spouse name: Not on file    Number of children: Not on file    Years of education: Not on file    Highest education level: Not on file   Occupational History    Not on file   Tobacco Use    Smoking status: Former Smoker     Packs/day: 1.00     Years: 37.00     Pack years: 37.00     Types: Cigarettes     Start date:      Quit date:      Years since quittin.6    Smokeless tobacco: Never Used   Substance and Sexual Activity    Alcohol use: No    Drug use: Never    Sexual activity: Not Currently   Other Topics Concern    Not on file   Social History Narrative    Not on file     Social Determinants of Health     Financial Resource Strain:     Difficulty of Paying Living Expenses:    Food Insecurity:     Worried About Running Out of Food in the Last Year:     [address] in the Last Year:    Transportation Needs:     Lack of Transportation (Medical):  Lack of Transportation (Non-Medical):    Physical Activity:     Days of Exercise per Week:     Minutes of Exercise per Session:    Stress:     Feeling of Stress :    Social Connections:     Frequency of Communication with Friends and Family:     Frequency of Social Gatherings with Friends and Family:     Attends Hindu Services:     Active Member of Clubs or Organizations:     Attends Club or Organization Meetings:     Marital Status:    Intimate Partner Violence:     Fear of Current or Ex-Partner:     Emotionally Abused:     Physically Abused:     Sexually Abused: Allergies: Allergies   Allergen Reactions    Bee Venom Swelling and Dermatitis    Penicillins Hives, Swelling and Dermatitis       Physical Exam:  /78   Pulse 70   Temp 97.2 °F (36.2 °C)   Ht 5' 1\" (1.549 m)   Wt 134 lb (60.8 kg)   LMP 2016   SpO2 96%   BMI 25.32 kg/m²   GENERAL: Alert, oriented x 3, not in acute distress. HEENT: PERRLA; EOMI. Oropharynx clear. NECK: Supple. No palpable cervical or supraclavicular lymphadenopathy. LUNGS: Good air entry bilaterally. No wheezing, crackles or rhonchi.    BREASTS: She is s/p right mastectomy, she has telangiectasias, edema and warmth of the right chest wall that is resolved, the rash. CARDIOVASCULAR: Regular rate. No murmurs, rubs or gallops. ABDOMEN: Soft. Non-tender, non-distended. Positive bowel sounds. EXTREMITIES: Without clubbing, cyanosis, or edema. BACK: Erythematous rash. NEUROLOGIC: No focal deficits. ECOG PS 1      Impression/Plan:     1. The patient is a very pleasant 78 y.o. lady with a PMH significant for Right Breast Cancer, stage III, HR pos, was diagnosed in 2009, she had a right mastectomy done, followed by adjuvant chemo, she received 8 cycles, probably AC followed by T, then PMRT, and 5 years of adjuvant ET with Arimidex, was completed in 2015. She was treated in [facility] under the care of Dr. Wallace Echeverria. She is doing well clinically without any evidence of recurrence of her disease. Discussed with her the importance of monthly SBE, and routine screening mammograms, she had a left breast screening mammogram done on 6/22/2021, there was no evidence of malignancy, she will be due for a repeat left breast screening mammogram on 6/23/2022. The patient has warmth and edema of the right chest wall, as well as a skin rash, prescribed doxycycline and Medrol Dosepak, a chest CT was ordered for further evaluation. She will seek immediate medical attention if she has worsening symptoms. 2. She has MDS, diagnosed in 2017, she received ESAs and PRBCs transfusion, has transfusion related iron overload, hgb was 8.5, ferritin 3647, was restarted on Aranesp on 4/23/2019. On 7/20/2021, hemoglobin was 8 g/dl hematocrit 24.7,  1.3, normal white count, platelet count 540E, fit test is negative. Received Aranesp, she will be due again for Aranesp in 1 week. A bone marrow biopsy and aspirate might be warranted if she does not have an adequate response to the Aranesp. Skin rash, the patient was diagnosed with shingles, the rash had improved with the steroids.  Reports some residual areas that itch her where she had shingles-is taking Benedryl with some relief. She had some pain to her right axillary area after doing yard work-this pain has resolved, she will let me know if there is reoccurrence. Hypothyroidism, continue Synthroid follow-up with PCP. Today, 08/06/2021 hgb:9.7 hct:30.9 MCV:119.3 plt:135  Discussed with patient need for Aranesp today for a goal hgb of 10, patient stated that her goal is 9.5 and refused Aranesp. RTC in 2 week with labs, possible Aranesp. Thank you for allowing us to participate in the care of Ms. Ventura.     Dyana Yeboah [address] [place] [address]   [phone]

## 2021-08-06 NOTE — PROGRESS NOTES
Upon discussing with Doron Avery NP patient meets parameters for aranesp with her HGB but her HCT is above parameters so Aranesp is being held today after being released. Medication return to pharmacy.

## 2021-08-24 ENCOUNTER — HOSPITAL ENCOUNTER (OUTPATIENT)
Dept: INFUSION THERAPY | Age: 81
Discharge: HOME | End: 2021-08-24
Payer: MEDICARE

## 2021-08-24 ENCOUNTER — OFFICE VISIT (OUTPATIENT)
Dept: ONCOLOGY | Age: 81
End: 2021-08-24
Payer: MEDICARE

## 2021-08-24 VITALS
WEIGHT: 137 LBS | TEMPERATURE: 96.8 F | HEIGHT: 61 IN | DIASTOLIC BLOOD PRESSURE: 63 MMHG | HEART RATE: 63 BPM | SYSTOLIC BLOOD PRESSURE: 138 MMHG | BODY MASS INDEX: 25.86 KG/M2 | OXYGEN SATURATION: 96 %

## 2021-08-24 DIAGNOSIS — C50.919 MALIGNANT NEOPLASM OF FEMALE BREAST, UNSPECIFIED ESTROGEN RECEPTOR STATUS, UNSPECIFIED LATERALITY, UNSPECIFIED SITE OF BREAST (HCC): ICD-10-CM

## 2021-08-24 DIAGNOSIS — D46.9 MDS (MYELODYSPLASTIC SYNDROME) (HCC): Primary | ICD-10-CM

## 2021-08-24 DIAGNOSIS — D63.8 ANEMIA OF CHRONIC DISEASE: ICD-10-CM

## 2021-08-24 LAB
ANISOCYTOSIS: ABNORMAL
BASOPHILS ABSOLUTE: 0 E9/L (ref 0–0.2)
BASOPHILS RELATIVE PERCENT: 0.4 % (ref 0–2)
EOSINOPHILS ABSOLUTE: 0.14 E9/L (ref 0.05–0.5)
EOSINOPHILS RELATIVE PERCENT: 2.6 % (ref 0–6)
HCT VFR BLD CALC: 25.6 % (ref 34–48)
HEMOGLOBIN: 8.3 G/DL (ref 11.5–15.5)
HYPOCHROMIA: ABNORMAL
LYMPHOCYTES ABSOLUTE: 3.39 E9/L (ref 1.5–4)
LYMPHOCYTES RELATIVE PERCENT: 64.3 % (ref 20–42)
MCH RBC QN AUTO: 36.7 PG (ref 26–35)
MCHC RBC AUTO-ENTMCNC: 32.4 % (ref 32–34.5)
MCV RBC AUTO: 113.3 FL (ref 80–99.9)
MONOCYTES ABSOLUTE: 0.21 E9/L (ref 0.1–0.95)
MONOCYTES RELATIVE PERCENT: 4.3 % (ref 2–12)
NEUTROPHILS ABSOLUTE: 1.54 E9/L (ref 1.8–7.3)
NEUTROPHILS RELATIVE PERCENT: 28.7 % (ref 43–80)
OVALOCYTES: ABNORMAL
PDW BLD-RTO: 23.9 FL (ref 11.5–15)
PLATELET # BLD: 171 E9/L (ref 130–450)
PMV BLD AUTO: 10.4 FL (ref 7–12)
POIKILOCYTES: ABNORMAL
POLYCHROMASIA: ABNORMAL
RBC # BLD: 2.26 E12/L (ref 3.5–5.5)
SCHISTOCYTES: ABNORMAL
STOMATOCYTES: ABNORMAL
TARGET CELLS: ABNORMAL
TEAR DROP CELLS: ABNORMAL
WBC # BLD: 5.3 E9/L (ref 4.5–11.5)

## 2021-08-24 PROCEDURE — 1036F TOBACCO NON-USER: CPT | Performed by: INTERNAL MEDICINE

## 2021-08-24 PROCEDURE — G8417 CALC BMI ABV UP PARAM F/U: HCPCS | Performed by: INTERNAL MEDICINE

## 2021-08-24 PROCEDURE — 99214 OFFICE O/P EST MOD 30 MIN: CPT | Performed by: INTERNAL MEDICINE

## 2021-08-24 PROCEDURE — 4040F PNEUMOC VAC/ADMIN/RCVD: CPT | Performed by: INTERNAL MEDICINE

## 2021-08-24 PROCEDURE — 96372 THER/PROPH/DIAG INJ SC/IM: CPT

## 2021-08-24 PROCEDURE — 99212 OFFICE O/P EST SF 10 MIN: CPT

## 2021-08-24 PROCEDURE — 36415 COLL VENOUS BLD VENIPUNCTURE: CPT

## 2021-08-24 PROCEDURE — G8427 DOCREV CUR MEDS BY ELIG CLIN: HCPCS | Performed by: INTERNAL MEDICINE

## 2021-08-24 PROCEDURE — 1090F PRES/ABSN URINE INCON ASSESS: CPT | Performed by: INTERNAL MEDICINE

## 2021-08-24 PROCEDURE — 85025 COMPLETE CBC W/AUTO DIFF WBC: CPT

## 2021-08-24 PROCEDURE — 6360000002 HC RX W HCPCS: Performed by: NURSE PRACTITIONER

## 2021-08-24 PROCEDURE — G8399 PT W/DXA RESULTS DOCUMENT: HCPCS | Performed by: INTERNAL MEDICINE

## 2021-08-24 PROCEDURE — 1123F ACP DISCUSS/DSCN MKR DOCD: CPT | Performed by: INTERNAL MEDICINE

## 2021-08-24 RX ADMIN — DARBEPOETIN ALFA 500 MCG: 500 INJECTION, SOLUTION INTRAVENOUS; SUBCUTANEOUS at 13:37

## 2021-08-24 NOTE — PROGRESS NOTES
[address] MED ONCOLOGY  [address] [facility] [address]  Dept: [phone]  Attending Progress Note      Reason for Visit:   1. Right Breast Cancer. 2. MDS. Referring Physician:  CONNIE Dong CNP    PCP:  CONNIE Dong CNP    History of Present Illness: The patient is a very pleasant 78 y.o. lady with a PMH significant for Right Breast Cancer, stage III, HR pos, was diagnosed in 2009, she had a right mastectomy done, followed by adjuvant chemo, she received 8 cycles, probably AC followed by T, then PMRT, and 5 years of adjuvant ET with Arimidex, was completed in 2015. She was treated in [facility] under the care of Dr. Laura Rousseau. She was diagnosed with MDS in 2017, she received ESAs and PRBCs transfusion. She has transfusion related iron overload. She was started on Aranesp on 4/24/2020. No SE from Aranesp. The patient returns for a follow-up visit,she  is doing well overall. The skin rash has completely resolved. No chest wall pain or edema or warmth. She had declined the Aranesp injection the last time. Review of Systems;  CONSTITUTIONAL: No fever, chills. Fair appetite. ENMT: Eyes: No diplopia; Nose: No epistaxis. Mouth: No sore throat. RESPIRATORY: No hemoptysis, shortness of breath, cough. CARDIOVASCULAR: No chest pain, palpitations. GASTROINTESTINAL: No nausea/vomiting, abdominal pain, diarrhea/constipation. GENITOURINARY: No dysuria, urinary frequency, hematuria. NEURO: No syncope, presyncope, headache.   Remainder:  ROS NEGATIVE    Past Medical History:      Diagnosis Date    Anemia     Pt reports she was dx in her teens, w/o proper w/u, with iron deficiency anemia and was on oral iron replacement for many years, resulting in iron overload    Aplastic anemia ([address] ~0676-8848    Possibly d/t chemotherapy for breast cancer    Breast cancer [facility] 2009    right side; pt underwent radical mastectomy followed by radiation therapy and chemotherapy and was then on oral chemo pill for 5 yrs; no recurrence as of 2019    Chickenpox     Hypothyroidism     Measles     Mild depression (HCC)     Mumps     Myelodysplastic syndrome ([facility] ~2016    Pulmonary tuberculosis ~4551-6335    in her early 25s    Scarlet fever     Sensory neuropathy     beyond the ankle b/l     Patient Active Problem List   Diagnosis    MDS (myelodysplastic syndrome) (HCC)    Bronchitis    Macrocytic anemia with high RDW    Hypothyroidism    Peripheral sensory neuropathy    Breast cancer (HCC)    Anemia of chronic disease        Past Surgical History:      Procedure Laterality Date    ADENOIDECTOMY      BONE MARROW BIOPSY      BREAST BIOPSY      CHOLECYSTECTOMY      MASTECTOMY      right breast    OTHER SURGICAL HISTORY      blood transfusions    TONSILLECTOMY AND ADENOIDECTOMY      TUBAL LIGATION         Family History:  Family History   Problem Relation Age of Onset   Patrica Guerrae Cancer Mother         lung    Cancer Father         lung    Cancer Sister         ovarian cancer and breast cancer    Cancer Brother         unknown    Cancer Maternal Aunt         unknown    Cancer Other         brain    Other Brother         MI       Medications:  Reviewed and reconciled. Social History:  Social History     Socioeconomic History    Marital status:       Spouse name: Not on file    Number of children: Not on file    Years of education: Not on file    Highest education level: Not on file   Occupational History    Not on file   Tobacco Use    Smoking status: Former Smoker     Packs/day: 1.00     Years: 37.00     Pack years: 37.00     Types: Cigarettes     Start date:      Quit date:      Years since quittin.6    Smokeless tobacco: Never Used   Substance and Sexual Activity    Alcohol use: No    Drug use: Never    Sexual activity: Not Currently   Other Topics Concern    Not on file   Social History Narrative    Not on file     Social Determinants of Health     Financial Resource Strain:     Difficulty of Paying Living Expenses:    Food Insecurity:     Worried About Running Out of Food in the Last Year:     [address] in the Last Year:    Transportation Needs:     Lack of Transportation (Medical):  Lack of Transportation (Non-Medical):    Physical Activity:     Days of Exercise per Week:     Minutes of Exercise per Session:    Stress:     Feeling of Stress :    Social Connections:     Frequency of Communication with Friends and Family:     Frequency of Social Gatherings with Friends and Family:     Attends Evangelical Services:     Active Member of Clubs or Organizations:     Attends Club or Organization Meetings:     Marital Status:    Intimate Partner Violence:     Fear of Current or Ex-Partner:     Emotionally Abused:     Physically Abused:     Sexually Abused: Allergies: Allergies   Allergen Reactions    Bee Venom Swelling and Dermatitis    Penicillins Hives, Swelling and Dermatitis       Physical Exam:  /63   Pulse 63   Temp 96.8 °F (36 °C)   Ht 5' 1\" (1.549 m)   Wt 137 lb (62.1 kg)   LMP 05/28/2016   SpO2 96%   BMI 25.89 kg/m²   GENERAL: Alert, oriented x 3, not in acute distress. HEENT: PERRLA; EOMI. Oropharynx clear. NECK: Supple. No palpable cervical or supraclavicular lymphadenopathy. LUNGS: Good air entry bilaterally. No wheezing, crackles or rhonchi. BREASTS: She is s/p right mastectomy, no suspicious lesions, she has telangiectasias. CARDIOVASCULAR: Regular rate. No murmurs, rubs or gallops. ABDOMEN: Soft. Non-tender, non-distended. Positive bowel sounds. EXTREMITIES: Without clubbing, cyanosis, or edema. NEUROLOGIC: No focal deficits. ECOG PS 1      Impression/Plan:     1.  The patient is a very pleasant 78 y.o. lady with a PMH significant for Right Breast Cancer, stage III, HR pos, was diagnosed in 2009, she had a right mastectomy done, followed by adjuvant chemo, she received 8 cycles, probably AC followed by T, then PMRT, and 5 years of adjuvant ET with Arimidex, was completed in 2015. She was treated in [facility] under the care of Dr. Gery Jeans. She is doing well clinically without any evidence of recurrence of her disease. Discussed with her the importance of monthly SBE, and routine screening mammograms, she had a left breast screening mammogram done on 6/22/2021, there was no evidence of malignancy, she will be due for a repeat left breast screening mammogram on 6/23/2022.       2. She has MDS, diagnosed in 2017, she received ESAs and PRBCs transfusion, has transfusion related iron overload, hgb was 8.5, ferritin 3647, was restarted on Aranesp on 4/23/2019. On 7/20/2021, hemoglobin was 8 g/dl hematocrit 24.7,  1.3, normal white count, platelet count 907S, fit test is negative. Hgb/hct had improved to 9.7/30.9 on 8/6/2021, she had declined Aranesp, today 8/24/2021, hemoglobin is 8.3, hematocrit 25.6, .3, white count 5.3, platelet count 332M, hemoglobin is less than 10 G/DL, proceed with Aranesp. Hypothyroidism, continue Synthroid follow-up with PCP. RTC in 2 weeks with labs, possible Aranesp. Thank you for allowing us to participate in the care of Ms. Ventura.     Kelli Patel MD   HEMATOLOGY/MEDICAL ONCOLOGY  [address] [place] [address] ONCOLOGY  [facility] [address] [facility] [address]  Dept: [phone]

## 2021-08-26 DIAGNOSIS — Z51.5 PALLIATIVE CARE BY SPECIALIST: ICD-10-CM

## 2021-08-26 DIAGNOSIS — G89.3 PAIN DUE TO NEOPLASM: ICD-10-CM

## 2021-08-26 NOTE — TELEPHONE ENCOUNTER
Pt called requesting refill on her Norco.    Last refill  21    Next appt 21    [facility], [address]

## 2021-08-27 RX ORDER — HYDROCODONE BITARTRATE AND ACETAMINOPHEN 5; 325 MG/1; MG/1
1 TABLET ORAL EVERY 12 HOURS PRN
Qty: 60 TABLET | Refills: 0 | Status: SHIPPED
Start: 2021-08-27 | End: 2021-10-19 | Stop reason: SDUPTHER

## 2021-09-07 ENCOUNTER — OFFICE VISIT (OUTPATIENT)
Dept: ONCOLOGY | Age: 81
End: 2021-09-07
Payer: MEDICARE

## 2021-09-07 ENCOUNTER — HOSPITAL ENCOUNTER (OUTPATIENT)
Dept: INFUSION THERAPY | Age: 81
Discharge: HOME | End: 2021-09-07
Payer: MEDICARE

## 2021-09-07 ENCOUNTER — OFFICE VISIT (OUTPATIENT)
Dept: PALLATIVE CARE | Age: 81
End: 2021-09-07
Payer: MEDICARE

## 2021-09-07 VITALS
TEMPERATURE: 97.8 F | OXYGEN SATURATION: 98 % | DIASTOLIC BLOOD PRESSURE: 61 MMHG | HEIGHT: 61 IN | SYSTOLIC BLOOD PRESSURE: 131 MMHG | BODY MASS INDEX: 25.98 KG/M2 | WEIGHT: 137.6 LBS | HEART RATE: 61 BPM

## 2021-09-07 DIAGNOSIS — Z51.5 PALLIATIVE CARE BY SPECIALIST: Primary | ICD-10-CM

## 2021-09-07 DIAGNOSIS — D46.9 MDS (MYELODYSPLASTIC SYNDROME) (HCC): Primary | ICD-10-CM

## 2021-09-07 DIAGNOSIS — D63.8 ANEMIA OF CHRONIC DISEASE: Primary | ICD-10-CM

## 2021-09-07 DIAGNOSIS — C50.919 MALIGNANT NEOPLASM OF FEMALE BREAST, UNSPECIFIED ESTROGEN RECEPTOR STATUS, UNSPECIFIED LATERALITY, UNSPECIFIED SITE OF BREAST (HCC): ICD-10-CM

## 2021-09-07 DIAGNOSIS — D46.9 MDS (MYELODYSPLASTIC SYNDROME) (HCC): ICD-10-CM

## 2021-09-07 DIAGNOSIS — G89.3 PAIN DUE TO NEOPLASM: ICD-10-CM

## 2021-09-07 LAB
ANISOCYTOSIS: ABNORMAL
BASOPHILIC STIPPLING: ABNORMAL
BASOPHILS ABSOLUTE: 0 E9/L (ref 0–0.2)
BASOPHILS RELATIVE PERCENT: 0.4 % (ref 0–2)
EOSINOPHILS ABSOLUTE: 0.05 E9/L (ref 0.05–0.5)
EOSINOPHILS RELATIVE PERCENT: 0.9 % (ref 0–6)
HCT VFR BLD CALC: 27.4 % (ref 34–48)
HEMOGLOBIN: 9.1 G/DL (ref 11.5–15.5)
LYMPHOCYTES ABSOLUTE: 2.91 E9/L (ref 1.5–4)
LYMPHOCYTES RELATIVE PERCENT: 55.7 % (ref 20–42)
MCH RBC QN AUTO: 38.2 PG (ref 26–35)
MCHC RBC AUTO-ENTMCNC: 33.2 % (ref 32–34.5)
MCV RBC AUTO: 115.1 FL (ref 80–99.9)
MONOCYTES ABSOLUTE: 0.21 E9/L (ref 0.1–0.95)
MONOCYTES RELATIVE PERCENT: 4.3 % (ref 2–12)
MYELOCYTE PERCENT: 1.7 % (ref 0–0)
NEUTROPHILS ABSOLUTE: 2.03 E9/L (ref 1.8–7.3)
NEUTROPHILS RELATIVE PERCENT: 37.4 % (ref 43–80)
NUCLEATED RED BLOOD CELLS: 1.7 /100 WBC
OVALOCYTES: ABNORMAL
PDW BLD-RTO: 25.5 FL (ref 11.5–15)
PLATELET # BLD: 158 E9/L (ref 130–450)
PMV BLD AUTO: 11.7 FL (ref 7–12)
POIKILOCYTES: ABNORMAL
POLYCHROMASIA: ABNORMAL
RBC # BLD: 2.38 E12/L (ref 3.5–5.5)
TARGET CELLS: ABNORMAL
TEAR DROP CELLS: ABNORMAL
WBC # BLD: 5.2 E9/L (ref 4.5–11.5)

## 2021-09-07 PROCEDURE — 85025 COMPLETE CBC W/AUTO DIFF WBC: CPT

## 2021-09-07 PROCEDURE — G8428 CUR MEDS NOT DOCUMENT: HCPCS | Performed by: NURSE PRACTITIONER

## 2021-09-07 PROCEDURE — 96372 THER/PROPH/DIAG INJ SC/IM: CPT

## 2021-09-07 PROCEDURE — 1090F PRES/ABSN URINE INCON ASSESS: CPT | Performed by: INTERNAL MEDICINE

## 2021-09-07 PROCEDURE — 99212 OFFICE O/P EST SF 10 MIN: CPT | Performed by: NURSE PRACTITIONER

## 2021-09-07 PROCEDURE — 1036F TOBACCO NON-USER: CPT | Performed by: INTERNAL MEDICINE

## 2021-09-07 PROCEDURE — 1090F PRES/ABSN URINE INCON ASSESS: CPT | Performed by: NURSE PRACTITIONER

## 2021-09-07 PROCEDURE — G8417 CALC BMI ABV UP PARAM F/U: HCPCS | Performed by: NURSE PRACTITIONER

## 2021-09-07 PROCEDURE — G8399 PT W/DXA RESULTS DOCUMENT: HCPCS | Performed by: NURSE PRACTITIONER

## 2021-09-07 PROCEDURE — G8427 DOCREV CUR MEDS BY ELIG CLIN: HCPCS | Performed by: INTERNAL MEDICINE

## 2021-09-07 PROCEDURE — 36415 COLL VENOUS BLD VENIPUNCTURE: CPT

## 2021-09-07 PROCEDURE — G8417 CALC BMI ABV UP PARAM F/U: HCPCS | Performed by: INTERNAL MEDICINE

## 2021-09-07 PROCEDURE — 4040F PNEUMOC VAC/ADMIN/RCVD: CPT | Performed by: INTERNAL MEDICINE

## 2021-09-07 PROCEDURE — 1123F ACP DISCUSS/DSCN MKR DOCD: CPT | Performed by: NURSE PRACTITIONER

## 2021-09-07 PROCEDURE — 99212 OFFICE O/P EST SF 10 MIN: CPT | Performed by: INTERNAL MEDICINE

## 2021-09-07 PROCEDURE — 99214 OFFICE O/P EST MOD 30 MIN: CPT | Performed by: INTERNAL MEDICINE

## 2021-09-07 PROCEDURE — 4040F PNEUMOC VAC/ADMIN/RCVD: CPT | Performed by: NURSE PRACTITIONER

## 2021-09-07 PROCEDURE — 1123F ACP DISCUSS/DSCN MKR DOCD: CPT | Performed by: INTERNAL MEDICINE

## 2021-09-07 PROCEDURE — G8399 PT W/DXA RESULTS DOCUMENT: HCPCS | Performed by: INTERNAL MEDICINE

## 2021-09-07 PROCEDURE — 6360000002 HC RX W HCPCS: Performed by: NURSE PRACTITIONER

## 2021-09-07 PROCEDURE — 1036F TOBACCO NON-USER: CPT | Performed by: NURSE PRACTITIONER

## 2021-09-07 RX ADMIN — DARBEPOETIN ALFA 500 MCG: 500 INJECTION, SOLUTION INTRAVENOUS; SUBCUTANEOUS at 15:29

## 2021-09-07 NOTE — PROGRESS NOTES
[address] MED ONCOLOGY  [facility] [address]  Dept: [phone]  Attending Progress Note      Reason for Visit:   1. Right Breast Cancer. 2. MDS. Referring Physician:  CONNIE Edwards CNP    PCP:  CONNIE Edwards CNP    History of Present Illness: The patient is a very pleasant 78 y.o. lady with a PMH significant for Right Breast Cancer, stage III, HR pos, was diagnosed in 2009, she had a right mastectomy done, followed by adjuvant chemo, she received 8 cycles, probably AC followed by T, then PMRT, and 5 years of adjuvant ET with Arimidex, was completed in 2015. She was treated in [place] under the care of Dr. mEi Wray. She was diagnosed with MDS in 2017, she received ESAs and PRBCs transfusion. She has transfusion related iron overload. She was started on Aranesp on 4/24/2020. No SE from Aranesp. The patient returns for a follow-up visit, she  is doing well overall, fair energy level. Review of Systems;  CONSTITUTIONAL: No fever, chills. Fair appetite. ENMT: Eyes: No diplopia; Nose: No epistaxis. Mouth: No sore throat. RESPIRATORY: No hemoptysis, shortness of breath, cough. CARDIOVASCULAR: No chest pain, palpitations. GASTROINTESTINAL: No nausea/vomiting, abdominal pain, diarrhea/constipation. GENITOURINARY: No dysuria, urinary frequency, hematuria. NEURO: No syncope, presyncope, headache.   Remainder:  ROS NEGATIVE    Past Medical History:      Diagnosis Date    Anemia     Pt reports she was dx in her teens, w/o proper w/u, with iron deficiency anemia and was on oral iron replacement for many years, resulting in iron overload    Aplastic anemia ([address] ~6328-2107    Possibly d/t chemotherapy for breast cancer    Breast cancer ([facility] 2009    right side; pt underwent radical mastectomy followed by radiation therapy and chemotherapy and was then on oral chemo pill for 5 yrs; no recurrence as of 2019    Chickenpox     Hypothyroidism     Measles     Mild depression (HCC)     Mumps     Myelodysplastic syndrome ([facility] ~2016    Pulmonary tuberculosis ~8425-6638    in her early 25s    Scarlet fever     Sensory neuropathy     beyond the ankle b/l     Patient Active Problem List   Diagnosis    MDS (myelodysplastic syndrome) (HCC)    Bronchitis    Macrocytic anemia with high RDW    Hypothyroidism    Peripheral sensory neuropathy    Breast cancer (HCC)    Anemia of chronic disease        Past Surgical History:      Procedure Laterality Date    ADENOIDECTOMY      BONE MARROW BIOPSY      BREAST BIOPSY      CHOLECYSTECTOMY      MASTECTOMY      right breast    OTHER SURGICAL HISTORY      blood transfusions    TONSILLECTOMY AND ADENOIDECTOMY      TUBAL LIGATION         Family History:  Family History   Problem Relation Age of Onset   [address] [facility] Cancer Mother         lung    Cancer Father         lung    Cancer Sister         ovarian cancer and breast cancer    Cancer Brother         unknown    Cancer Maternal Aunt         unknown    Cancer Other         brain    Other Brother         MI       Medications:  Reviewed and reconciled. Social History:  Social History     Socioeconomic History    Marital status:       Spouse name: Not on file    Number of children: Not on file    Years of education: Not on file    Highest education level: Not on file   Occupational History    Not on file   Tobacco Use    Smoking status: Former Smoker     Packs/day: 1.00     Years: 37.00     Pack years: 37.00     Types: Cigarettes     Start date:      Quit date:      Years since quittin.6    Smokeless tobacco: Never Used   Substance and Sexual Activity    Alcohol use: No    Drug use: Never    Sexual activity: Not Currently   Other Topics Concern    Not on file   Social History Narrative    Not on file     Social Determinants of Health     Financial Resource Strain:     Difficulty of Paying Living Expenses:    Food Insecurity:     Worried About [facility] in the Last Year:     [address] in the Last Year:    Transportation Needs:     Lack of Transportation (Medical):  Lack of Transportation (Non-Medical):    Physical Activity:     Days of Exercise per Week:     Minutes of Exercise per Session:    Stress:     Feeling of Stress :    Social Connections:     Frequency of Communication with Friends and Family:     Frequency of Social Gatherings with Friends and Family:     Attends Hindu Services:     Active Member of Clubs or Organizations:     Attends Club or Organization Meetings:     Marital Status:    Intimate Partner Violence:     Fear of Current or Ex-Partner:     Emotionally Abused:     Physically Abused:     Sexually Abused: Allergies: Allergies   Allergen Reactions    Bee Venom Swelling and Dermatitis    Penicillins Hives, Swelling and Dermatitis       Physical Exam:  /61   Pulse 61   Temp 97.8 °F (36.6 °C)   Ht 5' 1\" (1.549 m)   Wt 137 lb 9.6 oz (62.4 kg)   LMP 05/28/2016   SpO2 98%   BMI 26.00 kg/m²   GENERAL: Alert, oriented x 3, not in acute distress. HEENT: PERRLA; EOMI. Oropharynx clear. NECK: Supple. No palpable cervical or supraclavicular lymphadenopathy. LUNGS: Good air entry bilaterally. No wheezing, crackles or rhonchi. BREASTS: She is s/p right mastectomy, no suspicious lesions, she has telangiectasias. CARDIOVASCULAR: Regular rate. No murmurs, rubs or gallops. ABDOMEN: Soft. Non-tender, non-distended. Positive bowel sounds. EXTREMITIES: Without clubbing, cyanosis, or edema. NEUROLOGIC: No focal deficits. ECOG PS 1      Impression/Plan:     1.  The patient is a very pleasant 78 y.o. lady with a PMH significant for Right Breast Cancer, stage III, HR pos, was diagnosed in 2009, she had a right mastectomy done, followed by adjuvant chemo, she received 8 cycles, probably AC followed by T, then PMRT, and 5 years of adjuvant ET with Arimidex, was completed in 2015. She was treated in [facility] under the care of Dr. Yan Lewis. She is doing well clinically without any evidence of recurrence of her disease. Discussed with her the importance of monthly SBE, and routine screening mammograms, she had a left breast screening mammogram done on 6/22/2021, there was no evidence of malignancy, she will be due for a repeat left breast screening mammogram on 6/23/2022.       2. She has MDS, diagnosed in 2017, she received ESAs and PRBCs transfusion, has transfusion related iron overload, hgb was 8.5, ferritin 3647, was restarted on Aranesp on 4/23/2019. On 7/20/2021, hemoglobin was 8 g/dl hematocrit 24.7,  1.3, normal white count, platelet count 867C, fit test is negative. Today 9/7/2021, labs reviewed, hemoglobin is 9.1G/DL, had improved, hematocrit 27.4, .1, white count 5.2, platelet count 686J, her hemoglobin is less than 10 G/DL, proceed with Aranesp. Hypothyroidism, continue Synthroid follow-up with PCP. RTC in 2 weeks with labs, possible Aranesp. Thank you for allowing us to participate in the care of Ms. Ventura.     Royce Gonzalez MD   HEMATOLOGY/MEDICAL ONCOLOGY  [address] [place] [address] ONCOLOGY  [address] [facility] [address] [facility] [address]  Dept: [phone]

## 2021-09-07 NOTE — PROGRESS NOTES
Department of Palliative Medicine  Ambulatory Note  Provider: CONNIE Alas - CNP       Chief Complaint: Checo Joshua is a 78 y.o. female with chief complaint of Pain    HPI:  Checo Joshua is a 78 y.o. female with significant past medical history of stage II HR positive breast cancer, diagnosed in 2009, status post right mastectomy, followed by adjuvant therapy, and 5 years of adjuvant Arimidex which was completed in 2015, with all treatment performed in [facility] [place] under the care of Dr. Josep Ramsay. She is currently being followed locally by Dr. Familia Sarkar, without evidence of recurrent disease, whom she also follows for management of myelodysplastic syndrome, which was diagnosed in 2017. She was referred to [address] [place] [address] for assistance with symptom management related to chemotherapy-induced peripheral neuropathy. Assessment/Plan      History of Breast Cancer stage III, HR positive:              -Diagnosed, treated, and managed in [place] in 2009              -Status post right mastectomy              -Followed by adjuvant chemotherapy              -Completed her Demadex 2015              -Currently followed locally by Dr. Familia Sarkar, she also follows with regarding MDS diagnosed 2017     Chemotherapy related peripheral Neuropathy/Pain:              -  Worse bilateral feet and LEs, burning and pressure              -  May try stopping gabapentin 300 mg at nighttime   -  Noted improvement in nighttime neuropathy with Tumeric tablets              -  Continue Norco 5-325 mg every 12 hours as needed, 1-1.5/day,              -  Currently she is very high functioning, enjoys gardening, and is quite active.     Prognosis: unknown     Follow Up:  8 weeks. They were encouraged to call with any questions, concerns, needs, or changes in symptoms. Subjective:     Met with Car Boggs today in the clinic she is in good spirits and has been doing well, overall appearing to be at her baseline.   She continues to have some discomfort primarily in her hip, as well as some continued burning in her feet. She continues utilize Norco 5-325 mg 1 tablet often at nighttime, and on occasion she will take 1/2 tablet in the morning. She also continues gabapentin 3 mg at bedtime. She states that she began utilizing a turmeric tablet, she is found significant provement in her feet with using this. Discussed options regarding her neuropathy further, and she will titrate down and stop the gabapentin and see if she continues to have improvement with the turmeric tablets alone, however if her symptoms worsen at lower doses or after stopping she may restart the gabapentin. We will discuss that further at her next appointment. Otherwise will not making further changes to her plan of care, will plan to see her again approximately 8 weeks.     Objective:     Physical Exam  Wt Readings from Last 3 Encounters:   09/07/21 137 lb 9.6 oz (62.4 kg)   08/24/21 137 lb (62.1 kg)   08/06/21 134 lb (60.8 kg)     LMP 05/28/2016     Gen:  Alert, appears stated age, well nourished, in no acute distress  HEENT:  Normocephalic, conjunctiva pink, no drainage, mucosa moist  Neck:  Supple  Lungs:  CTA bilaterally, no audible rhonchi or wheezes noted  Heart[de-identified]  RRR, no murmur, rub, or gallop noted during exam  Abd:  Soft, non tender, non distended, BS+  M/S/Ext:  Moving all extremities, no edema, pulses present  Skin:  Warm and dry, raised erythema of the right chest  Neuro:  Alert, oriented x 3; following commands     [place] Symptom Assessment Score   [place] Score 9/7/2021 7/20/2021 4/27/2021 11/24/2020 10/13/2020   Pain Score 4 6 5 5 8   Tiredness Score 6 3 4 3 7   Nausea Score Not nauseated Not nauseated Not nauseated Not nauseated Not nauseated   Depression Score Not depressed Not depressed Not depressed Not depressed Not depressed   Anxiety Score Not anxious Not anxious Not anxious Not anxious Not anxious   Drowsiness Score 4 1 7 2 5 Appetite Score Best appetite Best appetite Best appetite Best appetite 1   Wellbeing Score Best feeling of wellbeing Best feeling of wellbeing 1 Best feeling of wellbeing Best feeling of wellbeing   Dyspnea Score 3 5 8 3 6   Other Problem Score Best possible response Best possible response Best possible response Best possible response Best possible response   Total Assessment Score(calculated) 17 15 25 13 27     Assessed by: patient. Current Medications:  Medications reviewed: yes    Controlled Substances Monitoring: OARRS reviewed 9/7/21. CONNIE Ragsdale CNP   Palliative Care Department     Time/Communication  Greater than 50% of time spent, total 25 minutes in face-to-face counseling and coordination of care regarding symptom management. Note: This report was completed using computerEastbeam voiced recognition software. Every effort has been made to ensure accuracy; however, inadvertent computerized transcription errors may be present.

## 2021-09-21 ENCOUNTER — OFFICE VISIT (OUTPATIENT)
Dept: ONCOLOGY | Age: 81
End: 2021-09-21
Payer: MEDICARE

## 2021-09-21 ENCOUNTER — HOSPITAL ENCOUNTER (OUTPATIENT)
Dept: INFUSION THERAPY | Age: 81
Discharge: HOME | End: 2021-09-21
Payer: MEDICARE

## 2021-09-21 VITALS
RESPIRATION RATE: 14 BRPM | BODY MASS INDEX: 25.86 KG/M2 | DIASTOLIC BLOOD PRESSURE: 63 MMHG | OXYGEN SATURATION: 98 % | HEART RATE: 71 BPM | HEIGHT: 61 IN | WEIGHT: 137 LBS | TEMPERATURE: 97.3 F | SYSTOLIC BLOOD PRESSURE: 136 MMHG

## 2021-09-21 DIAGNOSIS — D46.9 MDS (MYELODYSPLASTIC SYNDROME) (HCC): Primary | ICD-10-CM

## 2021-09-21 DIAGNOSIS — D63.8 ANEMIA OF CHRONIC DISEASE: ICD-10-CM

## 2021-09-21 LAB
ANISOCYTOSIS: ABNORMAL
BASOPHILS ABSOLUTE: 0 E9/L (ref 0–0.2)
BASOPHILS RELATIVE PERCENT: 0.3 % (ref 0–2)
EOSINOPHILS ABSOLUTE: 0.16 E9/L (ref 0.05–0.5)
EOSINOPHILS RELATIVE PERCENT: 2.6 % (ref 0–6)
HCT VFR BLD CALC: 27 % (ref 34–48)
HEMOGLOBIN: 8.7 G/DL (ref 11.5–15.5)
HYPOCHROMIA: ABNORMAL
LYMPHOCYTES ABSOLUTE: 2.76 E9/L (ref 1.5–4)
LYMPHOCYTES RELATIVE PERCENT: 46.1 % (ref 20–42)
MCH RBC QN AUTO: 36 PG (ref 26–35)
MCHC RBC AUTO-ENTMCNC: 32.2 % (ref 32–34.5)
MCV RBC AUTO: 111.6 FL (ref 80–99.9)
MONOCYTES ABSOLUTE: 0.24 E9/L (ref 0.1–0.95)
MONOCYTES RELATIVE PERCENT: 3.5 % (ref 2–12)
NEUTROPHILS ABSOLUTE: 2.88 E9/L (ref 1.8–7.3)
NEUTROPHILS RELATIVE PERCENT: 47.8 % (ref 43–80)
NUCLEATED RED BLOOD CELLS: 0.9 /100 WBC
OVALOCYTES: ABNORMAL
PDW BLD-RTO: 24.1 FL (ref 11.5–15)
PLATELET # BLD: 167 E9/L (ref 130–450)
PMV BLD AUTO: 10.7 FL (ref 7–12)
POIKILOCYTES: ABNORMAL
POLYCHROMASIA: ABNORMAL
RBC # BLD: 2.42 E12/L (ref 3.5–5.5)
TARGET CELLS: ABNORMAL
TEAR DROP CELLS: ABNORMAL
WBC # BLD: 6 E9/L (ref 4.5–11.5)

## 2021-09-21 PROCEDURE — G8399 PT W/DXA RESULTS DOCUMENT: HCPCS | Performed by: INTERNAL MEDICINE

## 2021-09-21 PROCEDURE — 99212 OFFICE O/P EST SF 10 MIN: CPT | Performed by: INTERNAL MEDICINE

## 2021-09-21 PROCEDURE — 6360000002 HC RX W HCPCS: Performed by: NURSE PRACTITIONER

## 2021-09-21 PROCEDURE — 99214 OFFICE O/P EST MOD 30 MIN: CPT | Performed by: INTERNAL MEDICINE

## 2021-09-21 PROCEDURE — 1123F ACP DISCUSS/DSCN MKR DOCD: CPT | Performed by: INTERNAL MEDICINE

## 2021-09-21 PROCEDURE — G8417 CALC BMI ABV UP PARAM F/U: HCPCS | Performed by: INTERNAL MEDICINE

## 2021-09-21 PROCEDURE — G8427 DOCREV CUR MEDS BY ELIG CLIN: HCPCS | Performed by: INTERNAL MEDICINE

## 2021-09-21 PROCEDURE — 85025 COMPLETE CBC W/AUTO DIFF WBC: CPT

## 2021-09-21 PROCEDURE — 96372 THER/PROPH/DIAG INJ SC/IM: CPT

## 2021-09-21 PROCEDURE — 4040F PNEUMOC VAC/ADMIN/RCVD: CPT | Performed by: INTERNAL MEDICINE

## 2021-09-21 PROCEDURE — 1036F TOBACCO NON-USER: CPT | Performed by: INTERNAL MEDICINE

## 2021-09-21 PROCEDURE — 36415 COLL VENOUS BLD VENIPUNCTURE: CPT

## 2021-09-21 PROCEDURE — 1090F PRES/ABSN URINE INCON ASSESS: CPT | Performed by: INTERNAL MEDICINE

## 2021-09-21 RX ADMIN — DARBEPOETIN ALFA 500 MCG: 500 INJECTION, SOLUTION INTRAVENOUS; SUBCUTANEOUS at 14:54

## 2021-09-21 NOTE — PROGRESS NOTES
[address] MED ONCOLOGY  [address] [facility] [address]  Dept: [phone]  Attending Progress Note      Reason for Visit:   1. Right Breast Cancer. 2. MDS. Referring Physician:  CONNIE Araiza CNP    PCP:  CONNIE Araiza CNP    History of Present Illness: The patient is a very pleasant 78 y.o. lady with a PMH significant for Right Breast Cancer, stage III, HR pos, was diagnosed in 2009, she had a right mastectomy done, followed by adjuvant chemo, she received 8 cycles, probably AC followed by T, then PMRT, and 5 years of adjuvant ET with Arimidex, was completed in 2015. She was treated in [facility] under the care of Dr. Marisa Cruz. She was diagnosed with MDS in 2017, she received ESAs and PRBCs transfusion. She has transfusion related iron overload. She was started on Aranesp on 4/24/2020. No SE from Aranesp. The patient returns for a follow-up visit, she is feeling tired. No new bony pain. Review of Systems;  CONSTITUTIONAL: No fever, chills. Fair appetite. ENMT: Eyes: No diplopia; Nose: No epistaxis. Mouth: No sore throat. RESPIRATORY: No hemoptysis, shortness of breath, cough. CARDIOVASCULAR: No chest pain, palpitations. GASTROINTESTINAL: No nausea/vomiting, abdominal pain, diarrhea/constipation. GENITOURINARY: No dysuria, urinary frequency, hematuria. NEURO: No syncope, presyncope, headache.   Remainder:  ROS NEGATIVE    Past Medical History:      Diagnosis Date    Anemia     Pt reports she was dx in her teens, w/o proper w/u, with iron deficiency anemia and was on oral iron replacement for many years, resulting in iron overload    Aplastic anemia ([address] ~5113-1588    Possibly d/t chemotherapy for breast cancer    Breast cancer ([facility] [address] [facility] 2009    right side; pt underwent radical mastectomy followed by radiation therapy and chemotherapy and was then on oral chemo pill for 5 yrs; no recurrence as of 2019    Chickenpox     Hypothyroidism     Measles     Mild depression (HCC)     Mumps     Myelodysplastic syndrome ([facility] [address] [facility] ~2016    Pulmonary tuberculosis ~1394-0986    in her early 25s    Scarlet fever     Sensory neuropathy     beyond the ankle b/l     Patient Active Problem List   Diagnosis    MDS (myelodysplastic syndrome) (HCC)    Bronchitis    Macrocytic anemia with high RDW    Hypothyroidism    Peripheral sensory neuropathy    Breast cancer (HCC)    Anemia of chronic disease        Past Surgical History:      Procedure Laterality Date    ADENOIDECTOMY      BONE MARROW BIOPSY      BREAST BIOPSY      CHOLECYSTECTOMY      MASTECTOMY      right breast    OTHER SURGICAL HISTORY      blood transfusions    TONSILLECTOMY AND ADENOIDECTOMY      TUBAL LIGATION         Family History:  Family History   Problem Relation Age of Onset    Cancer Mother         lung    Cancer Father         lung    Cancer Sister         ovarian cancer and breast cancer    Cancer Brother         unknown    Cancer Maternal Aunt         unknown    Cancer Other         brain    Other Brother         MI       Medications:  Reviewed and reconciled. Social History:  Social History     Socioeconomic History    Marital status:       Spouse name: Not on file    Number of children: Not on file    Years of education: Not on file    Highest education level: Not on file   Occupational History    Not on file   Tobacco Use    Smoking status: Former Smoker     Packs/day: 1.00     Years: 37.00     Pack years: 37.00     Types: Cigarettes     Start date:      Quit date:      Years since quittin.7    Smokeless tobacco: Never Used   Substance and Sexual Activity    Alcohol use: No    Drug use: Never    Sexual activity: Not Currently   Other Topics Concern    Not on file   Social History Narrative    Not on file     Social Determinants of Health     Financial Resource Strain:     Difficulty of Paying Living Expenses:    Food Insecurity:     Worried About Running Out of Food in the Last Year:     [address] in the Last Year:    Transportation Needs:     Lack of Transportation (Medical):  Lack of Transportation (Non-Medical):    Physical Activity:     Days of Exercise per Week:     Minutes of Exercise per Session:    Stress:     Feeling of Stress :    Social Connections:     Frequency of Communication with Friends and Family:     Frequency of Social Gatherings with Friends and Family:     Attends Confucianism Services:     Active Member of Clubs or Organizations:     Attends Club or Organization Meetings:     Marital Status:    Intimate Partner Violence:     Fear of Current or Ex-Partner:     Emotionally Abused:     Physically Abused:     Sexually Abused: Allergies: Allergies   Allergen Reactions    Bee Venom Swelling and Dermatitis    Penicillins Hives, Swelling and Dermatitis       Physical Exam:  /63 (Site: Left Upper Arm, Position: Sitting)   Pulse 71   Temp 97.3 °F (36.3 °C) (Temporal)   Resp 14   Ht 5' 1\" (1.549 m)   Wt 137 lb (62.1 kg)   LMP 05/28/2016   SpO2 98%   BMI 25.89 kg/m²   GENERAL: Alert, oriented x 3, not in acute distress. HEENT: PERRLA; EOMI. Oropharynx clear. NECK: Supple. No palpable cervical or supraclavicular lymphadenopathy. LUNGS: Good air entry bilaterally. No wheezing, crackles or rhonchi. BREASTS: She is s/p right mastectomy, no suspicious lesions, she has telangiectasias. CARDIOVASCULAR: Regular rate. No murmurs, rubs or gallops. ABDOMEN: Soft. Non-tender, non-distended. Positive bowel sounds. EXTREMITIES: Without clubbing, cyanosis, or edema. NEUROLOGIC: No focal deficits. ECOG PS 1      Impression/Plan:     1.  The patient is a very pleasant 78 y.o. lady with a PMH significant for Right Breast Cancer, stage III, HR pos, was diagnosed in 2009, she had a right mastectomy done, followed by adjuvant chemo, she received 8 cycles, probably AC followed by T, then PMRT, and 5 years of adjuvant ET with Arimidex, was completed in 2015. She was treated in [facility] under the care of Dr. Yash Durant. She is doing well clinically without any evidence of recurrence of her disease. Discussed with her the importance of monthly SBE, and routine screening mammograms, she had a left breast screening mammogram done on 6/22/2021, there was no evidence of malignancy, she will be due for a repeat left breast screening mammogram on 6/23/2022.       2. She has MDS, diagnosed in 2017, she received ESAs and PRBCs transfusion, has transfusion related iron overload, hgb was 8.5, ferritin 3647, was restarted on Aranesp on 4/23/2019. On 7/20/2021, hemoglobin was 8 g/dl hematocrit 24.7,  1.3, normal white count, platelet count 110I, fit test is negative. Today 9/21/2021, labs reviewed, hemoglobin is 8.7 G/DL, hematocrit 27, .6, white count 6, platelet count 764 K, her hemoglobin is less than 10 G/DL, proceed with Aranesp. Hypothyroidism, continue Synthroid follow-up with PCP. RTC in 2 weeks with labs, possible Aranesp. Thank you for allowing us to participate in the care of Ms. Ventura.     Victorina Cano MD   HEMATOLOGY/MEDICAL ONCOLOGY  [address] [place] [address] ONCOLOGY  [facility] [address] [facility] [address]  Dept: [phone]

## 2021-10-05 ENCOUNTER — HOSPITAL ENCOUNTER (OUTPATIENT)
Dept: INFUSION THERAPY | Age: 81
Discharge: HOME | End: 2021-10-05
Payer: MEDICARE

## 2021-10-05 ENCOUNTER — OFFICE VISIT (OUTPATIENT)
Dept: ONCOLOGY | Age: 81
End: 2021-10-05
Payer: MEDICARE

## 2021-10-05 VITALS
HEART RATE: 75 BPM | HEIGHT: 60 IN | DIASTOLIC BLOOD PRESSURE: 86 MMHG | OXYGEN SATURATION: 97 % | WEIGHT: 139 LBS | BODY MASS INDEX: 27.29 KG/M2 | SYSTOLIC BLOOD PRESSURE: 145 MMHG

## 2021-10-05 DIAGNOSIS — D46.9 MDS (MYELODYSPLASTIC SYNDROME) (HCC): Primary | ICD-10-CM

## 2021-10-05 DIAGNOSIS — D63.8 ANEMIA OF CHRONIC DISEASE: ICD-10-CM

## 2021-10-05 DIAGNOSIS — M25.552 LEFT HIP PAIN: Primary | ICD-10-CM

## 2021-10-05 LAB
ANISOCYTOSIS: ABNORMAL
BASOPHILS ABSOLUTE: 0 E9/L (ref 0–0.2)
BASOPHILS RELATIVE PERCENT: 0.2 % (ref 0–2)
EOSINOPHILS ABSOLUTE: 0.18 E9/L (ref 0.05–0.5)
EOSINOPHILS RELATIVE PERCENT: 3.5 % (ref 0–6)
HCT VFR BLD CALC: 30 % (ref 34–48)
HEMOGLOBIN: 9.5 G/DL (ref 11.5–15.5)
HYPOCHROMIA: ABNORMAL
LYMPHOCYTES ABSOLUTE: 2.65 E9/L (ref 1.5–4)
LYMPHOCYTES RELATIVE PERCENT: 52.2 % (ref 20–42)
MCH RBC QN AUTO: 37.8 PG (ref 26–35)
MCHC RBC AUTO-ENTMCNC: 31.7 % (ref 32–34.5)
MCV RBC AUTO: 119.5 FL (ref 80–99.9)
MONOCYTES ABSOLUTE: 0.2 E9/L (ref 0.1–0.95)
MONOCYTES RELATIVE PERCENT: 4.4 % (ref 2–12)
MYELOCYTE PERCENT: 0.9 % (ref 0–0)
NEUTROPHILS ABSOLUTE: 2.04 E9/L (ref 1.8–7.3)
NEUTROPHILS RELATIVE PERCENT: 38.9 % (ref 43–80)
NUCLEATED RED BLOOD CELLS: 1.8 /100 WBC
OVALOCYTES: ABNORMAL
PDW BLD-RTO: 27.4 FL (ref 11.5–15)
PLATELET # BLD: 136 E9/L (ref 130–450)
PMV BLD AUTO: 11.2 FL (ref 7–12)
POIKILOCYTES: ABNORMAL
POLYCHROMASIA: ABNORMAL
RBC # BLD: 2.51 E12/L (ref 3.5–5.5)
TARGET CELLS: ABNORMAL
TEAR DROP CELLS: ABNORMAL
WBC # BLD: 5.1 E9/L (ref 4.5–11.5)

## 2021-10-05 PROCEDURE — G8484 FLU IMMUNIZE NO ADMIN: HCPCS | Performed by: INTERNAL MEDICINE

## 2021-10-05 PROCEDURE — 96372 THER/PROPH/DIAG INJ SC/IM: CPT

## 2021-10-05 PROCEDURE — 1090F PRES/ABSN URINE INCON ASSESS: CPT | Performed by: INTERNAL MEDICINE

## 2021-10-05 PROCEDURE — 4040F PNEUMOC VAC/ADMIN/RCVD: CPT | Performed by: INTERNAL MEDICINE

## 2021-10-05 PROCEDURE — 1036F TOBACCO NON-USER: CPT | Performed by: INTERNAL MEDICINE

## 2021-10-05 PROCEDURE — 85025 COMPLETE CBC W/AUTO DIFF WBC: CPT

## 2021-10-05 PROCEDURE — 36415 COLL VENOUS BLD VENIPUNCTURE: CPT

## 2021-10-05 PROCEDURE — 6360000002 HC RX W HCPCS: Performed by: NURSE PRACTITIONER

## 2021-10-05 PROCEDURE — G8399 PT W/DXA RESULTS DOCUMENT: HCPCS | Performed by: INTERNAL MEDICINE

## 2021-10-05 PROCEDURE — G8427 DOCREV CUR MEDS BY ELIG CLIN: HCPCS | Performed by: INTERNAL MEDICINE

## 2021-10-05 PROCEDURE — 99213 OFFICE O/P EST LOW 20 MIN: CPT | Performed by: INTERNAL MEDICINE

## 2021-10-05 PROCEDURE — 1123F ACP DISCUSS/DSCN MKR DOCD: CPT | Performed by: INTERNAL MEDICINE

## 2021-10-05 PROCEDURE — G8417 CALC BMI ABV UP PARAM F/U: HCPCS | Performed by: INTERNAL MEDICINE

## 2021-10-05 PROCEDURE — 99214 OFFICE O/P EST MOD 30 MIN: CPT | Performed by: INTERNAL MEDICINE

## 2021-10-05 RX ADMIN — DARBEPOETIN ALFA 500 MCG: 500 INJECTION, SOLUTION INTRAVENOUS; SUBCUTANEOUS at 15:13

## 2021-10-05 NOTE — PROGRESS NOTES
[address] MED ONCOLOGY  [address] [facility] [address]  Dept: [phone]  Attending Progress Note      Reason for Visit:   1. Right Breast Cancer. 2. MDS. Referring Physician:  CONNIE Elaine CNP    PCP:  CONNIE Elaine CNP    History of Present Illness: The patient is a very pleasant 78 y.o. lady with a PMH significant for Right Breast Cancer, stage III, HR pos, was diagnosed in , she had a right mastectomy done, followed by adjuvant chemo, she received 8 cycles, probably AC followed by T, then PMRT, and 5 years of adjuvant ET with Arimidex, was completed in . She was treated in [facility] [place] under the care of Dr. Rochelle Apgar. She was diagnosed with MDS in , she received ESAs and PRBCs transfusion. She has transfusion related iron overload. She was started on Aranesp on 2020. No SE from Aranesp. The patient returns for a follow-up visit, her energy level had improved, she has left hip pain, she had imaging done in 2020, revealing dystrophic calcification, likely at the hamstring insertion. She was told she has bursitis. Review of Systems;  CONSTITUTIONAL: No fever, chills. Fair appetite. ENMT: Eyes: No diplopia; Nose: No epistaxis. Mouth: No sore throat. RESPIRATORY: No hemoptysis, shortness of breath, cough. CARDIOVASCULAR: No chest pain, palpitations. GASTROINTESTINAL: No nausea/vomiting, abdominal pain, diarrhea/constipation. GENITOURINARY: No dysuria, urinary frequency, hematuria. NEURO: No syncope, presyncope, headache.   Remainder:  ROS NEGATIVE    Past Medical History:      Diagnosis Date    Anemia     Pt reports she was dx in her teens, w/o proper w/u, with iron deficiency anemia and was on oral iron replacement for many years, resulting in iron overload    Aplastic anemia ([address] ~4865-3658    Possibly d/t chemotherapy for breast cancer    Breast cancer ([address]  right side; pt underwent radical mastectomy followed by radiation therapy and chemotherapy and was then on oral chemo pill for 5 yrs; no recurrence as of 2019    Chickenpox     Hypothyroidism     Measles     Mild depression (HCC)     Mumps     Myelodysplastic syndrome ([facility] [address] ~2016    Pulmonary tuberculosis ~6889-0963    in her early 25s    Scarlet fever     Sensory neuropathy     beyond the ankle b/l     Patient Active Problem List   Diagnosis    MDS (myelodysplastic syndrome) (HCC)    Bronchitis    Macrocytic anemia with high RDW    Hypothyroidism    Peripheral sensory neuropathy    Breast cancer (HCC)    Anemia of chronic disease        Past Surgical History:      Procedure Laterality Date    ADENOIDECTOMY      BONE MARROW BIOPSY      BREAST BIOPSY      CHOLECYSTECTOMY      MASTECTOMY      right breast    OTHER SURGICAL HISTORY      blood transfusions    TONSILLECTOMY AND ADENOIDECTOMY      TUBAL LIGATION         Family History:  Family History   Problem Relation Age of Onset   Izabel Solders Cancer Mother         lung    Cancer Father         lung    Cancer Sister         ovarian cancer and breast cancer    Cancer Brother         unknown    Cancer Maternal Aunt         unknown    Cancer Other         brain    Other Brother         MI       Medications:  Reviewed and reconciled. Social History:  Social History     Socioeconomic History    Marital status:       Spouse name: Not on file    Number of children: Not on file    Years of education: Not on file    Highest education level: Not on file   Occupational History    Not on file   Tobacco Use    Smoking status: Former Smoker     Packs/day: 1.00     Years: 37.00     Pack years: 37.00     Types: Cigarettes     Start date:      Quit date: 2000     Years since quittin.7    Smokeless tobacco: Never Used   Substance and Sexual Activity    Alcohol use: No    Drug use: Never    Sexual activity: Not Currently   Other Topics Concern    Not on file   Social History Narrative    Not on file     Social Determinants of Health     Financial Resource Strain:     Difficulty of Paying Living Expenses:    Food Insecurity:     Worried About Running Out of Food in the Last Year:     [address] in the Last Year:    Transportation Needs:     Lack of Transportation (Medical):  Lack of Transportation (Non-Medical):    Physical Activity:     Days of Exercise per Week:     Minutes of Exercise per Session:    Stress:     Feeling of Stress :    Social Connections:     Frequency of Communication with Friends and Family:     Frequency of Social Gatherings with Friends and Family:     Attends Pentecostal Services:     Active Member of Clubs or Organizations:     Attends Club or Organization Meetings:     Marital Status:    Intimate Partner Violence:     Fear of Current or Ex-Partner:     Emotionally Abused:     Physically Abused:     Sexually Abused: Allergies: Allergies   Allergen Reactions    Bee Venom Swelling and Dermatitis    Penicillins Hives, Swelling and Dermatitis       Physical Exam:  BP (!) 145/86   Pulse 75   Ht 5' (1.524 m)   Wt 139 lb (63 kg)   LMP 05/28/2016   SpO2 97%   BMI 27.15 kg/m²   GENERAL: Alert, oriented x 3, not in acute distress. HEENT: PERRLA; EOMI. Oropharynx clear. NECK: Supple. No palpable cervical or supraclavicular lymphadenopathy. LUNGS: Good air entry bilaterally. No wheezing, crackles or rhonchi. BREASTS: She is s/p right mastectomy, no suspicious lesions, she has telangiectasias. CARDIOVASCULAR: Regular rate. No murmurs, rubs or gallops. ABDOMEN: Soft. Non-tender, non-distended. Positive bowel sounds. EXTREMITIES: Without clubbing, cyanosis, or edema. NEUROLOGIC: No focal deficits. ECOG PS 1      Impression/Plan:     1.  The patient is a very pleasant 78 y.o. lady with a PMH significant for Right Breast Cancer, stage III, HR pos, was diagnosed in 2009, she had a right mastectomy done, followed by adjuvant chemo, she received 8 cycles, probably AC followed by T, then PMRT, and 5 years of adjuvant ET with Arimidex, was completed in 2015. She was treated in [facility] under the care of Dr. Vickie Agosto. She is doing well clinically without any evidence of recurrence of her disease. Discussed with her the importance of monthly SBE, and routine screening mammograms, she had a left breast screening mammogram done on 6/22/2021, there was no evidence of malignancy, she will be due for a repeat left breast screening mammogram on 6/23/2022.       2. She has MDS, diagnosed in 2017, she received ESAs and PRBCs transfusion, has transfusion related iron overload, hgb was 8.5, ferritin 3647, was restarted on Aranesp on 4/23/2019. On 7/20/2021, hemoglobin was 8 g/dl hematocrit 24.7,  1.3, normal white count, platelet count 831W, fit test is negative. Today 10/5/2021, labs reviewed, hemoglobin had improved from 8.7-9.5 G/DL, hematocrit is 30, .5, white count 5.1, platelet count 213 K, her hemoglobin is less than 10 G/DL, proceed with Aranesp. Left hip pain, will repeat x-ray of the hip and left pelvis x-ray. Hypothyroidism, continue Synthroid follow-up with PCP. RTC in 2 weeks with labs, possible Aranesp. Thank you for allowing us to participate in the care of Ms. Ventura.     Emeka Holguin MD   HEMATOLOGY/MEDICAL ONCOLOGY  [address] [place] [address] ONCOLOGY  [facility] [address] [facility] [address]  Dept: [phone]

## 2021-10-19 ENCOUNTER — HOSPITAL ENCOUNTER (OUTPATIENT)
Dept: INFUSION THERAPY | Age: 81
Discharge: HOME | End: 2021-10-19
Payer: MEDICARE

## 2021-10-19 ENCOUNTER — OFFICE VISIT (OUTPATIENT)
Dept: PALLATIVE CARE | Age: 81
End: 2021-10-19
Payer: MEDICARE

## 2021-10-19 ENCOUNTER — OFFICE VISIT (OUTPATIENT)
Dept: ONCOLOGY | Age: 81
End: 2021-10-19
Payer: MEDICARE

## 2021-10-19 VITALS
TEMPERATURE: 97.4 F | RESPIRATION RATE: 16 BRPM | WEIGHT: 138 LBS | HEART RATE: 58 BPM | DIASTOLIC BLOOD PRESSURE: 60 MMHG | OXYGEN SATURATION: 98 % | BODY MASS INDEX: 26.95 KG/M2 | SYSTOLIC BLOOD PRESSURE: 141 MMHG

## 2021-10-19 DIAGNOSIS — Z51.5 PALLIATIVE CARE BY SPECIALIST: ICD-10-CM

## 2021-10-19 DIAGNOSIS — G89.3 PAIN DUE TO NEOPLASM: ICD-10-CM

## 2021-10-19 DIAGNOSIS — D46.9 MDS (MYELODYSPLASTIC SYNDROME) (HCC): Primary | ICD-10-CM

## 2021-10-19 DIAGNOSIS — D63.8 ANEMIA OF CHRONIC DISEASE: ICD-10-CM

## 2021-10-19 LAB
ANISOCYTOSIS: ABNORMAL
BASOPHILS ABSOLUTE: 0.06 E9/L (ref 0–0.2)
BASOPHILS RELATIVE PERCENT: 0.9 % (ref 0–2)
EOSINOPHILS ABSOLUTE: 0.12 E9/L (ref 0.05–0.5)
EOSINOPHILS RELATIVE PERCENT: 1.8 % (ref 0–6)
HCT VFR BLD CALC: 27.2 % (ref 34–48)
HEMOGLOBIN: 9 G/DL (ref 11.5–15.5)
HYPOCHROMIA: ABNORMAL
LYMPHOCYTES ABSOLUTE: 4.16 E9/L (ref 1.5–4)
LYMPHOCYTES RELATIVE PERCENT: 63.2 % (ref 20–42)
MCH RBC QN AUTO: 38.3 PG (ref 26–35)
MCHC RBC AUTO-ENTMCNC: 33.1 % (ref 32–34.5)
MCV RBC AUTO: 115.7 FL (ref 80–99.9)
MONOCYTES ABSOLUTE: 0.26 E9/L (ref 0.1–0.95)
MONOCYTES RELATIVE PERCENT: 4.4 % (ref 2–12)
NEUTROPHILS ABSOLUTE: 1.98 E9/L (ref 1.8–7.3)
NEUTROPHILS RELATIVE PERCENT: 29.8 % (ref 43–80)
OVALOCYTES: ABNORMAL
PDW BLD-RTO: 25.1 FL (ref 11.5–15)
PLATELET # BLD: 171 E9/L (ref 130–450)
PMV BLD AUTO: 10.3 FL (ref 7–12)
POIKILOCYTES: ABNORMAL
POLYCHROMASIA: ABNORMAL
RBC # BLD: 2.35 E12/L (ref 3.5–5.5)
TEAR DROP CELLS: ABNORMAL
WBC # BLD: 6.6 E9/L (ref 4.5–11.5)

## 2021-10-19 PROCEDURE — 1036F TOBACCO NON-USER: CPT | Performed by: INTERNAL MEDICINE

## 2021-10-19 PROCEDURE — G8417 CALC BMI ABV UP PARAM F/U: HCPCS | Performed by: NURSE PRACTITIONER

## 2021-10-19 PROCEDURE — 1090F PRES/ABSN URINE INCON ASSESS: CPT | Performed by: NURSE PRACTITIONER

## 2021-10-19 PROCEDURE — 99212 OFFICE O/P EST SF 10 MIN: CPT | Performed by: NURSE PRACTITIONER

## 2021-10-19 PROCEDURE — G8417 CALC BMI ABV UP PARAM F/U: HCPCS | Performed by: INTERNAL MEDICINE

## 2021-10-19 PROCEDURE — 1123F ACP DISCUSS/DSCN MKR DOCD: CPT | Performed by: INTERNAL MEDICINE

## 2021-10-19 PROCEDURE — 4040F PNEUMOC VAC/ADMIN/RCVD: CPT | Performed by: INTERNAL MEDICINE

## 2021-10-19 PROCEDURE — 96372 THER/PROPH/DIAG INJ SC/IM: CPT

## 2021-10-19 PROCEDURE — 36415 COLL VENOUS BLD VENIPUNCTURE: CPT

## 2021-10-19 PROCEDURE — G8427 DOCREV CUR MEDS BY ELIG CLIN: HCPCS | Performed by: INTERNAL MEDICINE

## 2021-10-19 PROCEDURE — G8399 PT W/DXA RESULTS DOCUMENT: HCPCS | Performed by: INTERNAL MEDICINE

## 2021-10-19 PROCEDURE — 1090F PRES/ABSN URINE INCON ASSESS: CPT | Performed by: INTERNAL MEDICINE

## 2021-10-19 PROCEDURE — 85025 COMPLETE CBC W/AUTO DIFF WBC: CPT

## 2021-10-19 PROCEDURE — G8484 FLU IMMUNIZE NO ADMIN: HCPCS | Performed by: INTERNAL MEDICINE

## 2021-10-19 PROCEDURE — 99211 OFF/OP EST MAY X REQ PHY/QHP: CPT | Performed by: NURSE PRACTITIONER

## 2021-10-19 PROCEDURE — 4040F PNEUMOC VAC/ADMIN/RCVD: CPT | Performed by: NURSE PRACTITIONER

## 2021-10-19 PROCEDURE — 1036F TOBACCO NON-USER: CPT | Performed by: NURSE PRACTITIONER

## 2021-10-19 PROCEDURE — 1123F ACP DISCUSS/DSCN MKR DOCD: CPT | Performed by: NURSE PRACTITIONER

## 2021-10-19 PROCEDURE — G8484 FLU IMMUNIZE NO ADMIN: HCPCS | Performed by: NURSE PRACTITIONER

## 2021-10-19 PROCEDURE — 99214 OFFICE O/P EST MOD 30 MIN: CPT | Performed by: INTERNAL MEDICINE

## 2021-10-19 PROCEDURE — G8399 PT W/DXA RESULTS DOCUMENT: HCPCS | Performed by: NURSE PRACTITIONER

## 2021-10-19 PROCEDURE — G8428 CUR MEDS NOT DOCUMENT: HCPCS | Performed by: NURSE PRACTITIONER

## 2021-10-19 PROCEDURE — 99212 OFFICE O/P EST SF 10 MIN: CPT

## 2021-10-19 PROCEDURE — 6360000002 HC RX W HCPCS: Performed by: NURSE PRACTITIONER

## 2021-10-19 RX ORDER — GABAPENTIN 300 MG/1
900 CAPSULE ORAL DAILY
COMMUNITY
End: 2021-10-19 | Stop reason: ALTCHOICE

## 2021-10-19 RX ORDER — GABAPENTIN 300 MG/1
900 CAPSULE ORAL NIGHTLY
Qty: 270 CAPSULE | Refills: 3 | Status: SHIPPED
Start: 2021-10-19 | End: 2022-10-17 | Stop reason: SDUPTHER

## 2021-10-19 RX ORDER — HYDROCODONE BITARTRATE AND ACETAMINOPHEN 5; 325 MG/1; MG/1
1 TABLET ORAL EVERY 12 HOURS PRN
COMMUNITY
End: 2021-10-19 | Stop reason: ALTCHOICE

## 2021-10-19 RX ORDER — HYDROCODONE BITARTRATE AND ACETAMINOPHEN 5; 325 MG/1; MG/1
1 TABLET ORAL EVERY 12 HOURS PRN
Qty: 60 TABLET | Refills: 0 | Status: SHIPPED
Start: 2021-10-19 | End: 2021-12-06 | Stop reason: SDUPTHER

## 2021-10-19 RX ADMIN — DARBEPOETIN ALFA 500 MCG: 500 INJECTION, SOLUTION INTRAVENOUS; SUBCUTANEOUS at 15:27

## 2021-10-19 NOTE — PROGRESS NOTES
Score(calculated) 25 17 15 25 13     Assessed by: patient. Current Medications:  Medications reviewed: yes    Controlled Substances Monitoring: OARRS reviewed 10/19/21. CONNIE Dave CNP   Palliative Care Department     Time/Communication  Greater than 50% of time spent, total 15 minutes in face-to-face counseling and coordination of care regarding symptom management. Note: This report was completed using computerize voiced recognition software. Every effort has been made to ensure accuracy; however, inadvertent computerized transcription errors may be present.

## 2021-10-19 NOTE — PROGRESS NOTES
[address] MED ONCOLOGY  [address] [facility] [address]  Dept: [phone]  Attending Progress Note      Reason for Visit:   1. Right Breast Cancer. 2. MDS. Referring Physician:  CONNIE Pierre CNP    PCP:  CONNIE Pierre CNP    History of Present Illness: The patient is a very pleasant 78 y.o. lady with a PMH significant for Right Breast Cancer, stage III, HR pos, was diagnosed in 2009, she had a right mastectomy done, followed by adjuvant chemo, she received 8 cycles, probably AC followed by T, then PMRT, and 5 years of adjuvant ET with Arimidex, was completed in 2015. She was treated in [facility] under the care of Dr. Vega Alanis. She was diagnosed with MDS in 2017, she received ESAs and PRBCs transfusion. She has transfusion related iron overload. She was started on Aranesp on 4/24/2020. No SE from Aranesp. The patient returns for a follow-up visit, energy level is fair, she has left hip pain, she had imaging done in June 2020, revealing dystrophic calcification, likely at the hamstring insertion. She was told she has bursitis. Left hip x-ray was ordered, she did not have it done yet. Review of Systems;  CONSTITUTIONAL: No fever, chills. Fair appetite. ENMT: Eyes: No diplopia; Nose: No epistaxis. Mouth: No sore throat. RESPIRATORY: No hemoptysis, shortness of breath, cough. CARDIOVASCULAR: No chest pain, palpitations. GASTROINTESTINAL: No nausea/vomiting, abdominal pain, diarrhea/constipation. GENITOURINARY: No dysuria, urinary frequency, hematuria. NEURO: No syncope, presyncope, headache.   Remainder:  ROS NEGATIVE    Past Medical History:      Diagnosis Date    Anemia     Pt reports she was dx in her teens, w/o proper w/u, with iron deficiency anemia and was on oral iron replacement for many years, resulting in iron overload    Aplastic anemia ([facility] [address] ~0704-0516    Possibly d/t chemotherapy for breast cancer    Breast cancer [facility])     right side; pt underwent radical mastectomy followed by radiation therapy and chemotherapy and was then on oral chemo pill for 5 yrs; no recurrence as of 2019    Chickenpox     Hypothyroidism     Measles     Mild depression ([facility] [address]     Mumps     Myelodysplastic syndrome ([facility] [address] ~2016    Pulmonary tuberculosis ~[id number]    in her early 25s    Scarlet fever     Sensory neuropathy     beyond the ankle b/l     Patient Active Problem List   Diagnosis    MDS (myelodysplastic syndrome) (HCC)    Bronchitis    Macrocytic anemia with high RDW    Hypothyroidism    Peripheral sensory neuropathy    Breast cancer (HCC)    Anemia of chronic disease        Past Surgical History:      Procedure Laterality Date    ADENOIDECTOMY      BONE MARROW BIOPSY      BREAST BIOPSY      CHOLECYSTECTOMY      MASTECTOMY      right breast    OTHER SURGICAL HISTORY      blood transfusions    TONSILLECTOMY AND ADENOIDECTOMY      TUBAL LIGATION         Family History:  Family History   Problem Relation Age of Onset   Ze Chago Cancer Mother         lung    Cancer Father         lung    Cancer Sister         ovarian cancer and breast cancer    Cancer Brother         unknown    Cancer Maternal Aunt         unknown    Cancer Other         brain    Other Brother         MI       Medications:  Reviewed and reconciled. Social History:  Social History     Socioeconomic History    Marital status:       Spouse name: Not on file    Number of children: Not on file    Years of education: Not on file    Highest education level: Not on file   Occupational History    Not on file   Tobacco Use    Smoking status: Former Smoker     Packs/day: 1.00     Years: 37.00     Pack years: 37.00     Types: Cigarettes     Start date:      Quit date: 2000     Years since quittin.8    Smokeless tobacco: Never Used   Substance and Sexual Activity    Alcohol use: No    Drug use: Never  Sexual activity: Not Currently   Other Topics Concern    Not on file   Social History Narrative    Not on file     Social Determinants of Health     Financial Resource Strain:     Difficulty of Paying Living Expenses:    Food Insecurity:     Worried About Running Out of Food in the Last Year:     [address] in the Last Year:    Transportation Needs:     Lack of Transportation (Medical):  Lack of Transportation (Non-Medical):    Physical Activity:     Days of Exercise per Week:     Minutes of Exercise per Session:    Stress:     Feeling of Stress :    Social Connections:     Frequency of Communication with Friends and Family:     Frequency of Social Gatherings with Friends and Family:     Attends Sikhism Services:     Active Member of Clubs or Organizations:     Attends Club or Organization Meetings:     Marital Status:    Intimate Partner Violence:     Fear of Current or Ex-Partner:     Emotionally Abused:     Physically Abused:     Sexually Abused: Allergies: Allergies   Allergen Reactions    Bee Venom Swelling and Dermatitis    Penicillins Hives, Swelling and Dermatitis       Physical Exam:  BP (!) 141/60   Pulse 58   Temp 97.4 °F (36.3 °C)   Resp 16   Wt 138 lb (62.6 kg)   LMP 05/28/2016   SpO2 98%   BMI 26.95 kg/m²   GENERAL: Alert, oriented x 3, not in acute distress. HEENT: PERRLA; EOMI. Oropharynx clear. NECK: Supple. No palpable cervical or supraclavicular lymphadenopathy. LUNGS: Good air entry bilaterally. No wheezing, crackles or rhonchi. BREASTS: She is s/p right mastectomy, no suspicious lesions, she has telangiectasias. CARDIOVASCULAR: Regular rate. No murmurs, rubs or gallops. ABDOMEN: Soft. Non-tender, non-distended. Positive bowel sounds. EXTREMITIES: Without clubbing, cyanosis, or edema. NEUROLOGIC: No focal deficits. ECOG PS 1      Impression/Plan:     1.  The patient is a very pleasant 78 y.o. lady with a PMH significant for Right Breast Cancer, stage III, HR pos, was diagnosed in 2009, she had a right mastectomy done, followed by adjuvant chemo, she received 8 cycles, probably AC followed by T, then PMRT, and 5 years of adjuvant ET with Arimidex, was completed in 2015. She was treated in [facility] under the care of Dr. Mariajose Arauz. She is doing well clinically without any evidence of recurrence of her disease. Discussed with her the importance of monthly SBE, and routine screening mammograms, she had a left breast screening mammogram done on 6/22/2021, there was no evidence of malignancy, she will be due for a repeat left breast screening mammogram on 6/23/2022.       2. She has MDS, diagnosed in 2017, she received ESAs and PRBCs transfusion, has transfusion related iron overload, hgb was 8.5, ferritin 3647, was restarted on Aranesp on 4/23/2019. On 7/20/2021, hemoglobin was 8 g/dl hematocrit 24.7,  1.3, normal white count, platelet count 420Q, fit test is negative. Today 10/5/2021, labs reviewed, hemoglobin is 9 G/DL, hematocrit is 27.2, .7, white count 6.6, platelet count 622S, her hemoglobin is less than 10 G/DL, proceed with Aranesp. Left hip pain, ordered x-ray of the hip and left pelvis x-ray. Hypothyroidism, continue Synthroid follow-up with PCP. RTC in 2 weeks with labs, possible Aranesp. Thank you for allowing us to participate in the care of Ms. Ventura.     Rosi Dumont MD   HEMATOLOGY/MEDICAL ONCOLOGY  [address] [place] [address] ONCOLOGY  [address]  [place] [address]  Dept: [phone]

## 2021-11-02 ENCOUNTER — OFFICE VISIT (OUTPATIENT)
Dept: ONCOLOGY | Age: 81
End: 2021-11-02
Payer: MEDICARE

## 2021-11-02 ENCOUNTER — HOSPITAL ENCOUNTER (OUTPATIENT)
Dept: INFUSION THERAPY | Age: 81
Discharge: HOME | End: 2021-11-02
Payer: MEDICARE

## 2021-11-02 VITALS
OXYGEN SATURATION: 98 % | BODY MASS INDEX: 26.24 KG/M2 | WEIGHT: 139 LBS | DIASTOLIC BLOOD PRESSURE: 60 MMHG | SYSTOLIC BLOOD PRESSURE: 124 MMHG | TEMPERATURE: 97.1 F | HEART RATE: 59 BPM | HEIGHT: 61 IN

## 2021-11-02 DIAGNOSIS — D46.9 MDS (MYELODYSPLASTIC SYNDROME) (HCC): Primary | ICD-10-CM

## 2021-11-02 DIAGNOSIS — D63.8 ANEMIA OF CHRONIC DISEASE: ICD-10-CM

## 2021-11-02 LAB
ANISOCYTOSIS: ABNORMAL
BASOPHILS ABSOLUTE: 0.02 E9/L (ref 0–0.2)
BASOPHILS RELATIVE PERCENT: 0.3 % (ref 0–2)
EOSINOPHILS ABSOLUTE: 0.14 E9/L (ref 0.05–0.5)
EOSINOPHILS RELATIVE PERCENT: 2.3 % (ref 0–6)
HCT VFR BLD CALC: 30.4 % (ref 34–48)
HEMOGLOBIN: 9.9 G/DL (ref 11.5–15.5)
HYPOCHROMIA: ABNORMAL
IMMATURE GRANULOCYTES #: 0.04 E9/L
IMMATURE GRANULOCYTES %: 0.7 % (ref 0–5)
LYMPHOCYTES ABSOLUTE: 3.61 E9/L (ref 1.5–4)
LYMPHOCYTES RELATIVE PERCENT: 58.8 % (ref 20–42)
MCH RBC QN AUTO: 38.1 PG (ref 26–35)
MCHC RBC AUTO-ENTMCNC: 32.6 % (ref 32–34.5)
MCV RBC AUTO: 116.9 FL (ref 80–99.9)
MONOCYTES ABSOLUTE: 0.32 E9/L (ref 0.1–0.95)
MONOCYTES RELATIVE PERCENT: 5.2 % (ref 2–12)
NEUTROPHILS ABSOLUTE: 2.01 E9/L (ref 1.8–7.3)
NEUTROPHILS RELATIVE PERCENT: 32.7 % (ref 43–80)
OVALOCYTES: ABNORMAL
PDW BLD-RTO: 26 FL (ref 11.5–15)
PLATELET # BLD: 139 E9/L (ref 130–450)
PMV BLD AUTO: 10.5 FL (ref 7–12)
POIKILOCYTES: ABNORMAL
POLYCHROMASIA: ABNORMAL
RBC # BLD: 2.6 E12/L (ref 3.5–5.5)
TEAR DROP CELLS: ABNORMAL
WBC # BLD: 6.1 E9/L (ref 4.5–11.5)

## 2021-11-02 PROCEDURE — 4040F PNEUMOC VAC/ADMIN/RCVD: CPT | Performed by: INTERNAL MEDICINE

## 2021-11-02 PROCEDURE — G8484 FLU IMMUNIZE NO ADMIN: HCPCS | Performed by: INTERNAL MEDICINE

## 2021-11-02 PROCEDURE — 6360000002 HC RX W HCPCS: Performed by: NURSE PRACTITIONER

## 2021-11-02 PROCEDURE — G8399 PT W/DXA RESULTS DOCUMENT: HCPCS | Performed by: INTERNAL MEDICINE

## 2021-11-02 PROCEDURE — 96372 THER/PROPH/DIAG INJ SC/IM: CPT

## 2021-11-02 PROCEDURE — 99212 OFFICE O/P EST SF 10 MIN: CPT

## 2021-11-02 PROCEDURE — G8417 CALC BMI ABV UP PARAM F/U: HCPCS | Performed by: INTERNAL MEDICINE

## 2021-11-02 PROCEDURE — 1123F ACP DISCUSS/DSCN MKR DOCD: CPT | Performed by: INTERNAL MEDICINE

## 2021-11-02 PROCEDURE — G8427 DOCREV CUR MEDS BY ELIG CLIN: HCPCS | Performed by: INTERNAL MEDICINE

## 2021-11-02 PROCEDURE — 99214 OFFICE O/P EST MOD 30 MIN: CPT | Performed by: INTERNAL MEDICINE

## 2021-11-02 PROCEDURE — 36415 COLL VENOUS BLD VENIPUNCTURE: CPT

## 2021-11-02 PROCEDURE — 85025 COMPLETE CBC W/AUTO DIFF WBC: CPT

## 2021-11-02 PROCEDURE — 1036F TOBACCO NON-USER: CPT | Performed by: INTERNAL MEDICINE

## 2021-11-02 PROCEDURE — 1090F PRES/ABSN URINE INCON ASSESS: CPT | Performed by: INTERNAL MEDICINE

## 2021-11-02 RX ADMIN — DARBEPOETIN ALFA 500 MCG: 500 INJECTION, SOLUTION INTRAVENOUS; SUBCUTANEOUS at 15:05

## 2021-11-02 NOTE — PROGRESS NOTES
[address] MED ONCOLOGY  [address] [place] [id number]  Dept: [phone]  Attending Progress Note      Reason for Visit:   1. Right Breast Cancer. 2. MDS. Referring Physician:  CONNIE Moyer CNP    PCP:  CONNIE Moyer CNP    History of Present Illness: The patient is a very pleasant 78 y.o. lady with a PMH significant for Right Breast Cancer, stage III, HR pos, was diagnosed in 2009, she had a right mastectomy done, followed by adjuvant chemo, she received 8 cycles, probably AC followed by T, then PMRT, and 5 years of adjuvant ET with Arimidex, was completed in 2015. She was treated in [facility] under the care of Dr. Wale Young. She was diagnosed with MDS in 2017, she received ESAs and PRBCs transfusion. She has transfusion related iron overload. She was started on Aranesp on 4/24/2020. No SE from Aranesp. The patient returns for a follow-up visit, energy level had improved, she has been doing a lot of work around the house. She has left hip pain, she had imaging done in June 2020, revealing dystrophic calcification, likely at the hamstring insertion. She was told she has bursitis. Left hip x-ray was ordered, she did not have it done yet. Review of Systems;  CONSTITUTIONAL: No fever, chills. Fair appetite. ENMT: Eyes: No diplopia; Nose: No epistaxis. Mouth: No sore throat. RESPIRATORY: No hemoptysis, shortness of breath, cough. CARDIOVASCULAR: No chest pain, palpitations. GASTROINTESTINAL: No nausea/vomiting, abdominal pain, diarrhea/constipation. GENITOURINARY: No dysuria, urinary frequency, hematuria. NEURO: No syncope, presyncope, headache.   Remainder:  ROS NEGATIVE    Past Medical History:      Diagnosis Date    Anemia     Pt reports she was dx in her teens, w/o proper w/u, with iron deficiency anemia and was on oral iron replacement for many years, resulting in iron overload    and Sexual Activity    Alcohol use: No    Drug use: Never    Sexual activity: Not Currently   Other Topics Concern    Not on file   Social History Narrative    Not on file     Social Determinants of Health     Financial Resource Strain:     Difficulty of Paying Living Expenses:    Food Insecurity:     Worried About Running Out of Food in the Last Year:     [address] in the Last Year:    Transportation Needs:     Lack of Transportation (Medical):  Lack of Transportation (Non-Medical):    Physical Activity:     Days of Exercise per Week:     Minutes of Exercise per Session:    Stress:     Feeling of Stress :    Social Connections:     Frequency of Communication with Friends and Family:     Frequency of Social Gatherings with Friends and Family:     Attends Judaism Services:     Active Member of Clubs or Organizations:     Attends Club or Organization Meetings:     Marital Status:    Intimate Partner Violence:     Fear of Current or Ex-Partner:     Emotionally Abused:     Physically Abused:     Sexually Abused: Allergies: Allergies   Allergen Reactions    Bee Venom Swelling and Dermatitis    Penicillins Hives, Swelling and Dermatitis       Physical Exam:  /60   Pulse 59   Temp 97.1 °F (36.2 °C)   Ht 5' 1\" (1.549 m)   Wt 139 lb (63 kg)   LMP 05/28/2016   SpO2 98%   BMI 26.26 kg/m²   GENERAL: Alert, oriented x 3, not in acute distress. HEENT: PERRLA; EOMI. Oropharynx clear. NECK: Supple. No palpable cervical or supraclavicular lymphadenopathy. LUNGS: Good air entry bilaterally. No wheezing, crackles or rhonchi. BREASTS: She is s/p right mastectomy, no suspicious lesions, she has telangiectasias. CARDIOVASCULAR: Regular rate. No murmurs, rubs or gallops. ABDOMEN: Soft. Non-tender, non-distended. Positive bowel sounds. EXTREMITIES: Without clubbing, cyanosis, or edema. NEUROLOGIC: No focal deficits. ECOG PS 1      Impression/Plan:     1.  The patient is a very pleasant 78 y.o. lady with a PMH significant for Right Breast Cancer, stage III, HR pos, was diagnosed in 2009, she had a right mastectomy done, followed by adjuvant chemo, she received 8 cycles, probably AC followed by T, then PMRT, and 5 years of adjuvant ET with Arimidex, was completed in 2015. She was treated in [facility] under the care of Dr. Sergei Ariza. She is doing well clinically without any evidence of recurrence of her disease. Discussed with her the importance of monthly SBE, and routine screening mammograms, she had a left breast screening mammogram done on 6/22/2021, there was no evidence of malignancy, she will be due for a repeat left breast screening mammogram on 6/23/2022.       2. She has MDS, diagnosed in 2017, she received ESAs and PRBCs transfusion, has transfusion related iron overload, hgb was 8.5, ferritin 3647, was restarted on Aranesp on 4/23/2019. On 7/20/2021, hemoglobin was 8 g/dl hematocrit 24.7,  1.3, normal white count, platelet count 012L, fit test is negative. Today 11/2/2021, labs reviewed, hemoglobin is 9 0.9 G/DL, hematocrit is 30.4, .9, white count 6.1, platelet count 557 K, her hemoglobin is less than 10 G/DL, proceed with Aranesp. Left hip pain, ordered x-ray of the hip and left pelvis x-ray. Hypothyroidism, continue Synthroid follow-up with PCP. RTC in 2 weeks with labs, possible Aranesp. Thank you for allowing us to participate in the care of Ms. Ventura.     Aren Alvarenga MD   HEMATOLOGY/MEDICAL ONCOLOGY  [address] [place] [address] ONCOLOGY  [facility] [address] [facility] [address]  Dept: [phone]

## 2021-11-16 ENCOUNTER — HOSPITAL ENCOUNTER (OUTPATIENT)
Dept: INFUSION THERAPY | Age: 81
Discharge: HOME | End: 2021-11-16
Payer: MEDICARE

## 2021-11-16 ENCOUNTER — OFFICE VISIT (OUTPATIENT)
Dept: ONCOLOGY | Age: 81
End: 2021-11-16
Payer: MEDICARE

## 2021-11-16 VITALS
BODY MASS INDEX: 25.89 KG/M2 | DIASTOLIC BLOOD PRESSURE: 65 MMHG | WEIGHT: 137.1 LBS | TEMPERATURE: 97.1 F | OXYGEN SATURATION: 99 % | SYSTOLIC BLOOD PRESSURE: 159 MMHG | HEART RATE: 75 BPM | HEIGHT: 61 IN

## 2021-11-16 DIAGNOSIS — M79.671 RIGHT FOOT PAIN: Primary | ICD-10-CM

## 2021-11-16 DIAGNOSIS — D46.9 MDS (MYELODYSPLASTIC SYNDROME) (HCC): ICD-10-CM

## 2021-11-16 LAB
BASOPHILS ABSOLUTE: 0.03 E9/L (ref 0–0.2)
BASOPHILS RELATIVE PERCENT: 0.5 % (ref 0–2)
EOSINOPHILS ABSOLUTE: 0.21 E9/L (ref 0.05–0.5)
EOSINOPHILS RELATIVE PERCENT: 3.2 % (ref 0–6)
HCT VFR BLD CALC: 34 % (ref 34–48)
HEMOGLOBIN: 10.9 G/DL (ref 11.5–15.5)
IMMATURE GRANULOCYTES #: 0.04 E9/L
IMMATURE GRANULOCYTES %: 0.6 % (ref 0–5)
LYMPHOCYTES ABSOLUTE: 3.9 E9/L (ref 1.5–4)
LYMPHOCYTES RELATIVE PERCENT: 59.5 % (ref 20–42)
MCH RBC QN AUTO: 38.7 PG (ref 26–35)
MCHC RBC AUTO-ENTMCNC: 32.1 % (ref 32–34.5)
MCV RBC AUTO: 120.6 FL (ref 80–99.9)
MONOCYTES ABSOLUTE: 0.33 E9/L (ref 0.1–0.95)
MONOCYTES RELATIVE PERCENT: 5 % (ref 2–12)
NEUTROPHILS ABSOLUTE: 2.05 E9/L (ref 1.8–7.3)
NEUTROPHILS RELATIVE PERCENT: 31.2 % (ref 43–80)
PDW BLD-RTO: 25.2 FL (ref 11.5–15)
PLATELET # BLD: 177 E9/L (ref 130–450)
PMV BLD AUTO: 10.3 FL (ref 7–12)
RBC # BLD: 2.82 E12/L (ref 3.5–5.5)
WBC # BLD: 6.6 E9/L (ref 4.5–11.5)

## 2021-11-16 PROCEDURE — 1090F PRES/ABSN URINE INCON ASSESS: CPT | Performed by: INTERNAL MEDICINE

## 2021-11-16 PROCEDURE — 99213 OFFICE O/P EST LOW 20 MIN: CPT

## 2021-11-16 PROCEDURE — G8427 DOCREV CUR MEDS BY ELIG CLIN: HCPCS | Performed by: INTERNAL MEDICINE

## 2021-11-16 PROCEDURE — 36415 COLL VENOUS BLD VENIPUNCTURE: CPT

## 2021-11-16 PROCEDURE — 99214 OFFICE O/P EST MOD 30 MIN: CPT | Performed by: INTERNAL MEDICINE

## 2021-11-16 PROCEDURE — G8399 PT W/DXA RESULTS DOCUMENT: HCPCS | Performed by: INTERNAL MEDICINE

## 2021-11-16 PROCEDURE — G8484 FLU IMMUNIZE NO ADMIN: HCPCS | Performed by: INTERNAL MEDICINE

## 2021-11-16 PROCEDURE — 85025 COMPLETE CBC W/AUTO DIFF WBC: CPT

## 2021-11-16 PROCEDURE — 4040F PNEUMOC VAC/ADMIN/RCVD: CPT | Performed by: INTERNAL MEDICINE

## 2021-11-16 PROCEDURE — 1036F TOBACCO NON-USER: CPT | Performed by: INTERNAL MEDICINE

## 2021-11-16 PROCEDURE — 1123F ACP DISCUSS/DSCN MKR DOCD: CPT | Performed by: INTERNAL MEDICINE

## 2021-11-16 PROCEDURE — G8417 CALC BMI ABV UP PARAM F/U: HCPCS | Performed by: INTERNAL MEDICINE

## 2021-11-16 NOTE — PROGRESS NOTES
[address] MED ONCOLOGY  [address] [facility] [address]  Dept: [phone]  Attending Progress Note      Reason for Visit:   1. Right Breast Cancer. 2. MDS. Referring Physician:  CONNIE Jackson CNP    PCP:  CONNIE Jackson CNP    History of Present Illness: The patient is a very pleasant 78 y.o. lady with a PMH significant for Right Breast Cancer, stage III, HR pos, was diagnosed in 2009, she had a right mastectomy done, followed by adjuvant chemo, she received 8 cycles, probably AC followed by T, then PMRT, and 5 years of adjuvant ET with Arimidex, was completed in 2015. She was treated in [facility] under the care of Dr. Sergei Ariza. She was diagnosed with MDS in 2017, she received ESAs and PRBCs transfusion. She has transfusion related iron overload. She was started on Aranesp on 4/24/2020. No SE from Aranesp. The patient returns for a follow-up visit, energy level had improved, she has been doing a lot of work around the house. She has left hip pain, she had imaging done in June 2020, revealing dystrophic calcification, likely at the hamstring insertion. She was told she has bursitis. Left hip x-ray was ordered, she did not have it done her pain had resolved. She fell recently and hurt her right foot. Review of Systems;  CONSTITUTIONAL: No fever, chills. Fair appetite. ENMT: Eyes: No diplopia; Nose: No epistaxis. Mouth: No sore throat. RESPIRATORY: No hemoptysis, shortness of breath, cough. CARDIOVASCULAR: No chest pain, palpitations. GASTROINTESTINAL: No nausea/vomiting, abdominal pain, diarrhea/constipation. GENITOURINARY: No dysuria, urinary frequency, hematuria. NEURO: No syncope, presyncope, headache.   Remainder:  ROS NEGATIVE    Past Medical History:      Diagnosis Date    Anemia     Pt reports she was dx in her teens, w/o proper w/u, with iron deficiency anemia and was on oral iron replacement for many years, resulting in iron overload    Aplastic anemia ([facility] [address] ~[id number]    Possibly d/t chemotherapy for breast cancer    Breast cancer ([facility] [address] 2009    right side; pt underwent radical mastectomy followed by radiation therapy and chemotherapy and was then on oral chemo pill for 5 yrs; no recurrence as of 01/2019    Chickenpox     Hypothyroidism     Measles     Mild depression ([facility] [address]     Mumps     Myelodysplastic syndrome ([facility] [address] ~2016    Pulmonary tuberculosis ~9480-8178    in her early 25s    Scarlet fever     Sensory neuropathy     beyond the ankle b/l     Patient Active Problem List   Diagnosis    MDS (myelodysplastic syndrome) (HCC)    Bronchitis    Macrocytic anemia with high RDW    Hypothyroidism    Peripheral sensory neuropathy    Breast cancer (HCC)    Anemia of chronic disease        Past Surgical History:      Procedure Laterality Date    ADENOIDECTOMY      BONE MARROW BIOPSY      BREAST BIOPSY      CHOLECYSTECTOMY      MASTECTOMY      right breast    OTHER SURGICAL HISTORY      blood transfusions    TONSILLECTOMY AND ADENOIDECTOMY      TUBAL LIGATION         Family History:  Family History   Problem Relation Age of Onset   Ze Chago Cancer Mother         lung    Cancer Father         lung    Cancer Sister         ovarian cancer and breast cancer    Cancer Brother         unknown    Cancer Maternal Aunt         unknown    Cancer Other         brain    Other Brother         MI       Medications:  Reviewed and reconciled. Social History:  Social History     Socioeconomic History    Marital status:       Spouse name: Not on file    Number of children: Not on file    Years of education: Not on file    Highest education level: Not on file   Occupational History    Not on file   Tobacco Use    Smoking status: Former Smoker     Packs/day: 1.00     Years: 37.00     Pack years: 37.00     Types: Cigarettes     Start date: 1963     Quit date: 2000     Years since quittin.8    Smokeless tobacco: Never Used   Substance and Sexual Activity    Alcohol use: No    Drug use: Never    Sexual activity: Not Currently   Other Topics Concern    Not on file   Social History Narrative    Not on file     Social Determinants of Health     Financial Resource Strain:     Difficulty of Paying Living Expenses: Not on file   Food Insecurity:     Worried About Running Out of Food in the Last Year: Not on file    [place] of Food in the Last Year: Not on file   Transportation Needs:     Lack of Transportation (Medical): Not on file    Lack of Transportation (Non-Medical): Not on file   Physical Activity:     Days of Exercise per Week: Not on file    Minutes of Exercise per Session: Not on file   Stress:     Feeling of Stress : Not on file   Social Connections:     Frequency of Communication with Friends and Family: Not on file    Frequency of Social Gatherings with Friends and Family: Not on file    Attends Muslim Services: Not on file    Active Member of [address] [place] [address] or Organizations: Not on file    Attends Club or Organization Meetings: Not on file    Marital Status: Not on file   Intimate Partner Violence:     Fear of Current or Ex-Partner: Not on file    Emotionally Abused: Not on file    Physically Abused: Not on file    Sexually Abused: Not on file   Housing Stability:     Unable to Pay for Housing in the Last Year: Not on file    Number of [place] in the Last Year: Not on file    Unstable Housing in the Last Year: Not on file       Allergies: Allergies   Allergen Reactions    Bee Venom Swelling and Dermatitis    Penicillins Hives, Swelling and Dermatitis       Physical Exam:  BP (!) 159/65   Pulse 75   Temp 97.1 °F (36.2 °C)   Ht 5' 1\" (1.549 m)   Wt 137 lb 1.6 oz (62.2 kg)   LMP 2016   SpO2 99%   BMI 25.90 kg/m²   GENERAL: Alert, oriented x 3, not in acute distress. HEENT: PERRLA; EOMI. Oropharynx clear. NECK: Supple.  No palpable cervical [address] MED ONCOLOGY  [address] [facility] [address]  Dept: [phone]

## 2021-11-30 ENCOUNTER — HOSPITAL ENCOUNTER (OUTPATIENT)
Dept: INFUSION THERAPY | Age: 81
End: 2021-11-30

## 2021-12-06 DIAGNOSIS — G89.3 PAIN DUE TO NEOPLASM: ICD-10-CM

## 2021-12-06 DIAGNOSIS — Z51.5 PALLIATIVE CARE BY SPECIALIST: ICD-10-CM

## 2021-12-06 RX ORDER — HYDROCODONE BITARTRATE AND ACETAMINOPHEN 5; 325 MG/1; MG/1
1 TABLET ORAL EVERY 12 HOURS PRN
Qty: 60 TABLET | Refills: 0 | Status: SHIPPED
Start: 2021-12-06 | End: 2022-02-02 | Stop reason: SDUPTHER

## 2021-12-07 ENCOUNTER — OFFICE VISIT (OUTPATIENT)
Dept: ONCOLOGY | Age: 81
End: 2021-12-07
Payer: MEDICARE

## 2021-12-07 ENCOUNTER — HOSPITAL ENCOUNTER (OUTPATIENT)
Dept: INFUSION THERAPY | Age: 81
Discharge: HOME | End: 2021-12-07
Payer: MEDICARE

## 2021-12-07 VITALS
SYSTOLIC BLOOD PRESSURE: 131 MMHG | RESPIRATION RATE: 16 BRPM | DIASTOLIC BLOOD PRESSURE: 60 MMHG | TEMPERATURE: 97.1 F | WEIGHT: 141.2 LBS | OXYGEN SATURATION: 96 % | HEART RATE: 72 BPM | BODY MASS INDEX: 26.68 KG/M2

## 2021-12-07 DIAGNOSIS — D46.9 MDS (MYELODYSPLASTIC SYNDROME) (HCC): Primary | ICD-10-CM

## 2021-12-07 DIAGNOSIS — D63.8 ANEMIA OF CHRONIC DISEASE: ICD-10-CM

## 2021-12-07 LAB
ANISOCYTOSIS: ABNORMAL
BASOPHILIC STIPPLING: ABNORMAL
BASOPHILS ABSOLUTE: 0 E9/L (ref 0–0.2)
BASOPHILS RELATIVE PERCENT: 0.4 % (ref 0–2)
EOSINOPHILS ABSOLUTE: 0.31 E9/L (ref 0.05–0.5)
EOSINOPHILS RELATIVE PERCENT: 4.4 % (ref 0–6)
HCT VFR BLD CALC: 23.9 % (ref 34–48)
HEMOGLOBIN: 8.1 G/DL (ref 11.5–15.5)
HYPOCHROMIA: ABNORMAL
LYMPHOCYTES ABSOLUTE: 3.43 E9/L (ref 1.5–4)
LYMPHOCYTES RELATIVE PERCENT: 48.7 % (ref 20–42)
MCH RBC QN AUTO: 37 PG (ref 26–35)
MCHC RBC AUTO-ENTMCNC: 33.9 % (ref 32–34.5)
MCV RBC AUTO: 109.1 FL (ref 80–99.9)
MONOCYTES ABSOLUTE: 0.14 E9/L (ref 0.1–0.95)
MONOCYTES RELATIVE PERCENT: 1.7 % (ref 2–12)
NEUTROPHILS ABSOLUTE: 3.15 E9/L (ref 1.8–7.3)
NEUTROPHILS RELATIVE PERCENT: 45.2 % (ref 43–80)
NUCLEATED RED BLOOD CELLS: 0.9 /100 WBC
OVALOCYTES: ABNORMAL
PDW BLD-RTO: 23.9 FL (ref 11.5–15)
PLATELET # BLD: 178 E9/L (ref 130–450)
PMV BLD AUTO: 10.6 FL (ref 7–12)
POIKILOCYTES: ABNORMAL
POLYCHROMASIA: ABNORMAL
RBC # BLD: 2.19 E12/L (ref 3.5–5.5)
SCHISTOCYTES: ABNORMAL
TARGET CELLS: ABNORMAL
TEAR DROP CELLS: ABNORMAL
WBC # BLD: 7 E9/L (ref 4.5–11.5)

## 2021-12-07 PROCEDURE — G8417 CALC BMI ABV UP PARAM F/U: HCPCS | Performed by: INTERNAL MEDICINE

## 2021-12-07 PROCEDURE — 1036F TOBACCO NON-USER: CPT | Performed by: INTERNAL MEDICINE

## 2021-12-07 PROCEDURE — 1090F PRES/ABSN URINE INCON ASSESS: CPT | Performed by: INTERNAL MEDICINE

## 2021-12-07 PROCEDURE — 99214 OFFICE O/P EST MOD 30 MIN: CPT | Performed by: INTERNAL MEDICINE

## 2021-12-07 PROCEDURE — G8399 PT W/DXA RESULTS DOCUMENT: HCPCS | Performed by: INTERNAL MEDICINE

## 2021-12-07 PROCEDURE — 4040F PNEUMOC VAC/ADMIN/RCVD: CPT | Performed by: INTERNAL MEDICINE

## 2021-12-07 PROCEDURE — 1123F ACP DISCUSS/DSCN MKR DOCD: CPT | Performed by: INTERNAL MEDICINE

## 2021-12-07 PROCEDURE — G8427 DOCREV CUR MEDS BY ELIG CLIN: HCPCS | Performed by: INTERNAL MEDICINE

## 2021-12-07 PROCEDURE — G8484 FLU IMMUNIZE NO ADMIN: HCPCS | Performed by: INTERNAL MEDICINE

## 2021-12-07 PROCEDURE — 85025 COMPLETE CBC W/AUTO DIFF WBC: CPT

## 2021-12-07 PROCEDURE — 6360000002 HC RX W HCPCS: Performed by: NURSE PRACTITIONER

## 2021-12-07 PROCEDURE — 36415 COLL VENOUS BLD VENIPUNCTURE: CPT

## 2021-12-07 PROCEDURE — 99212 OFFICE O/P EST SF 10 MIN: CPT

## 2021-12-07 PROCEDURE — 96372 THER/PROPH/DIAG INJ SC/IM: CPT

## 2021-12-07 RX ADMIN — DARBEPOETIN ALFA 500 MCG: 500 INJECTION, SOLUTION INTRAVENOUS; SUBCUTANEOUS at 15:18

## 2021-12-07 NOTE — PROGRESS NOTES
~[id number]    Possibly d/t chemotherapy for breast cancer    Breast cancer [facility]) 2009    right side; pt underwent radical mastectomy followed by radiation therapy and chemotherapy and was then on oral chemo pill for 5 yrs; no recurrence as of 2019    Chickenpox     Hypothyroidism     Measles     Mild depression ([address]     Mumps     Myelodysplastic syndrome ([facility] [address] ~2016    Pulmonary tuberculosis ~5868-9835    in her early 25s    Scarlet fever     Sensory neuropathy     beyond the ankle b/l     Patient Active Problem List   Diagnosis    MDS (myelodysplastic syndrome) (HCC)    Bronchitis    Macrocytic anemia with high RDW    Hypothyroidism    Peripheral sensory neuropathy    Breast cancer (HCC)    Anemia of chronic disease        Past Surgical History:      Procedure Laterality Date    ADENOIDECTOMY      BONE MARROW BIOPSY      BREAST BIOPSY      CHOLECYSTECTOMY      MASTECTOMY      right breast    OTHER SURGICAL HISTORY      blood transfusions    TONSILLECTOMY AND ADENOIDECTOMY      TUBAL LIGATION         Family History:  Family History   Problem Relation Age of Onset   Chelita Roldan Cancer Mother         lung    Cancer Father         lung    Cancer Sister         ovarian cancer and breast cancer    Cancer Brother         unknown    Cancer Maternal Aunt         unknown    Cancer Other         brain    Other Brother         MI       Medications:  Reviewed and reconciled. Social History:  Social History     Socioeconomic History    Marital status:       Spouse name: Not on file    Number of children: Not on file    Years of education: Not on file    Highest education level: Not on file   Occupational History    Not on file   Tobacco Use    Smoking status: Former Smoker     Packs/day: 1.00     Years: 37.00     Pack years: 37.00     Types: Cigarettes     Start date:      Quit date: 2000     Years since quittin.9    Smokeless tobacco: Never Used   Substance and Sexual Activity  Alcohol use: No    Drug use: Never    Sexual activity: Not Currently   Other Topics Concern    Not on file   Social History Narrative    Not on file     Social Determinants of Health     Financial Resource Strain:     Difficulty of Paying Living Expenses: Not on file   Food Insecurity:     Worried About Running Out of Food in the Last Year: Not on file    Richar of Food in the Last Year: Not on file   Transportation Needs:     Lack of Transportation (Medical): Not on file    Lack of Transportation (Non-Medical): Not on file   Physical Activity:     Days of Exercise per Week: Not on file    Minutes of Exercise per Session: Not on file   Stress:     Feeling of Stress : Not on file   Social Connections:     Frequency of Communication with Friends and Family: Not on file    Frequency of Social Gatherings with Friends and Family: Not on file    Attends Scientology Services: Not on file    Active Member of [employer] or Organizations: Not on file    Attends Club or Organization Meetings: Not on file    Marital Status: Not on file   Intimate Partner Violence:     Fear of Current or Ex-Partner: Not on file    Emotionally Abused: Not on file    Physically Abused: Not on file    Sexually Abused: Not on file   Housing Stability:     Unable to Pay for Housing in the Last Year: Not on file    Number of [place] in the Last Year: Not on file    Unstable Housing in the Last Year: Not on file       Allergies: Allergies   Allergen Reactions    Bee Venom Swelling and Dermatitis    Penicillins Hives, Swelling and Dermatitis       Physical Exam:  /60   Pulse 72   Temp 97.1 °F (36.2 °C)   Resp 16   Wt 141 lb 3.2 oz (64 kg)   LMP 05/28/2016   SpO2 96%   BMI 26.68 kg/m²   GENERAL: Alert, oriented x 3, not in acute distress. HEENT: PERRLA; EOMI. Oropharynx clear. NECK: Supple. No palpable cervical or supraclavicular lymphadenopathy. LUNGS: Good air entry bilaterally.  No wheezing, crackles or rhonchi. BREASTS: She is s/p right mastectomy, no suspicious lesions, she has telangiectasias. CARDIOVASCULAR: Regular rate. No murmurs, rubs or gallops. ABDOMEN: Soft. Non-tender, non-distended. Positive bowel sounds. EXTREMITIES: Positive for bruising of the right foot. NEUROLOGIC: No focal deficits. ECOG PS 1      Impression/Plan:     1. The patient is a very pleasant 78 y.o. lady with a PMH significant for Right Breast Cancer, stage III, HR pos, was diagnosed in 2009, she had a right mastectomy done, followed by adjuvant chemo, she received 8 cycles, probably AC followed by T, then PMRT, and 5 years of adjuvant ET with Arimidex, was completed in 2015. She was treated in [facility] under the care of Dr. Maurice Cleary. She is doing well clinically without any evidence of recurrence of her disease. Discussed with her the importance of monthly SBE, and routine screening mammograms, she had a left breast screening mammogram done on 6/22/2021, there was no evidence of malignancy, she will be due for a repeat left breast screening mammogram on 6/23/2022.       2. She has MDS, diagnosed in 2017, she received ESAs and PRBCs transfusion, has transfusion related iron overload, hgb was 8.5, ferritin 3647, was restarted on Aranesp on 4/23/2019. On 7/20/2021, hemoglobin was 8 g/dl hematocrit 24.7,  1.3, normal white count, platelet count 385G, fit test is negative. Today 12/7/2021, labs reviewed, hemoglobin is 8.1 G/DL, hematocrit is 23.9, .1, white count 7, platelet count 17 8 K, hemoglobin is less than 10 G/DL, proceed with Aranesp. Hypothyroidism, continue Synthroid follow-up with PCP. RTC in 2 weeks with labs, possible Aranesp. Thank you for allowing us to participate in the care of Ms. Ventura.     Suzan Harris MD   HEMATOLOGY/MEDICAL ONCOLOGY  [address] [place] [address] ONCOLOGY  [facility] [place] [address]  [address] [facility] [address]  Dept: [phone]

## 2021-12-21 ENCOUNTER — HOSPITAL ENCOUNTER (OUTPATIENT)
Dept: INFUSION THERAPY | Age: 81
End: 2021-12-21

## 2021-12-28 ENCOUNTER — OFFICE VISIT (OUTPATIENT)
Dept: ONCOLOGY | Age: 81
End: 2021-12-28
Payer: MEDICARE

## 2021-12-28 ENCOUNTER — HOSPITAL ENCOUNTER (OUTPATIENT)
Dept: INFUSION THERAPY | Age: 81
Discharge: HOME | End: 2021-12-28
Payer: MEDICARE

## 2021-12-28 VITALS
TEMPERATURE: 97.9 F | WEIGHT: 136.3 LBS | DIASTOLIC BLOOD PRESSURE: 63 MMHG | SYSTOLIC BLOOD PRESSURE: 135 MMHG | BODY MASS INDEX: 25.75 KG/M2 | RESPIRATION RATE: 16 BRPM | OXYGEN SATURATION: 99 % | HEART RATE: 68 BPM

## 2021-12-28 DIAGNOSIS — D46.9 MDS (MYELODYSPLASTIC SYNDROME) (HCC): Primary | ICD-10-CM

## 2021-12-28 DIAGNOSIS — Z80.3 FAMILY HISTORY OF BREAST CANCER: ICD-10-CM

## 2021-12-28 DIAGNOSIS — D63.8 ANEMIA OF CHRONIC DISEASE: ICD-10-CM

## 2021-12-28 LAB
ANISOCYTOSIS: ABNORMAL
BASOPHILIC STIPPLING: ABNORMAL
BASOPHILS ABSOLUTE: 0 E9/L (ref 0–0.2)
BASOPHILS RELATIVE PERCENT: 0.2 % (ref 0–2)
EOSINOPHILS ABSOLUTE: 0.18 E9/L (ref 0.05–0.5)
EOSINOPHILS RELATIVE PERCENT: 3.5 % (ref 0–6)
HCT VFR BLD CALC: 27.8 % (ref 34–48)
HEMOGLOBIN: 9 G/DL (ref 11.5–15.5)
HYPOCHROMIA: ABNORMAL
LYMPHOCYTES ABSOLUTE: 2.86 E9/L (ref 1.5–4)
LYMPHOCYTES RELATIVE PERCENT: 55.3 % (ref 20–42)
MCH RBC QN AUTO: 37.3 PG (ref 26–35)
MCHC RBC AUTO-ENTMCNC: 32.4 % (ref 32–34.5)
MCV RBC AUTO: 115.4 FL (ref 80–99.9)
MONOCYTES ABSOLUTE: 0.21 E9/L (ref 0.1–0.95)
MONOCYTES RELATIVE PERCENT: 4.4 % (ref 2–12)
NEUTROPHILS ABSOLUTE: 1.92 E9/L (ref 1.8–7.3)
NEUTROPHILS RELATIVE PERCENT: 36.8 % (ref 43–80)
NUCLEATED RED BLOOD CELLS: 1.8 /100 WBC
OVALOCYTES: ABNORMAL
PDW BLD-RTO: 24.9 FL (ref 11.5–15)
PLATELET # BLD: 182 E9/L (ref 130–450)
PMV BLD AUTO: 10.5 FL (ref 7–12)
POIKILOCYTES: ABNORMAL
POLYCHROMASIA: ABNORMAL
RBC # BLD: 2.41 E12/L (ref 3.5–5.5)
TARGET CELLS: ABNORMAL
TEAR DROP CELLS: ABNORMAL
WBC # BLD: 5.2 E9/L (ref 4.5–11.5)

## 2021-12-28 PROCEDURE — 85025 COMPLETE CBC W/AUTO DIFF WBC: CPT

## 2021-12-28 PROCEDURE — 6360000002 HC RX W HCPCS: Performed by: NURSE PRACTITIONER

## 2021-12-28 PROCEDURE — G8399 PT W/DXA RESULTS DOCUMENT: HCPCS | Performed by: INTERNAL MEDICINE

## 2021-12-28 PROCEDURE — 1036F TOBACCO NON-USER: CPT | Performed by: INTERNAL MEDICINE

## 2021-12-28 PROCEDURE — 36415 COLL VENOUS BLD VENIPUNCTURE: CPT

## 2021-12-28 PROCEDURE — 4040F PNEUMOC VAC/ADMIN/RCVD: CPT | Performed by: INTERNAL MEDICINE

## 2021-12-28 PROCEDURE — G8417 CALC BMI ABV UP PARAM F/U: HCPCS | Performed by: INTERNAL MEDICINE

## 2021-12-28 PROCEDURE — G8427 DOCREV CUR MEDS BY ELIG CLIN: HCPCS | Performed by: INTERNAL MEDICINE

## 2021-12-28 PROCEDURE — 96372 THER/PROPH/DIAG INJ SC/IM: CPT

## 2021-12-28 PROCEDURE — 99214 OFFICE O/P EST MOD 30 MIN: CPT | Performed by: INTERNAL MEDICINE

## 2021-12-28 PROCEDURE — 1090F PRES/ABSN URINE INCON ASSESS: CPT | Performed by: INTERNAL MEDICINE

## 2021-12-28 PROCEDURE — G8484 FLU IMMUNIZE NO ADMIN: HCPCS | Performed by: INTERNAL MEDICINE

## 2021-12-28 PROCEDURE — 1123F ACP DISCUSS/DSCN MKR DOCD: CPT | Performed by: INTERNAL MEDICINE

## 2021-12-28 RX ADMIN — DARBEPOETIN ALFA 500 MCG: 500 INJECTION, SOLUTION INTRAVENOUS; SUBCUTANEOUS at 13:53

## 2021-12-28 NOTE — PROGRESS NOTES
[address] MED ONCOLOGY  [address] [facility] [address]  Dept: [phone]  Attending Progress Note      Reason for Visit:   1. Right Breast Cancer. 2. MDS. Referring Physician:  CONNIE Colón CNP    PCP:  CONNIE Colón CNP    History of Present Illness: The patient is a very pleasant 78 y.o. lady with a PMH significant for Right Breast Cancer, stage III, HR pos, was diagnosed in 2009, she had a right mastectomy done, followed by adjuvant chemo, she received 8 cycles, probably AC followed by T, then PMRT, and 5 years of adjuvant ET with Arimidex, was completed in 2015. She was treated in [place] under the care of Dr. Angie Li. She was diagnosed with MDS in 2017, she received ESAs and PRBCs transfusion. She has transfusion related iron overload. She was started on Aranesp on 4/24/2020. No SE from Aranesp. The patient returns for a follow-up visit, she is doing well overall, except for fatigue. She has extremity lymphedema, she would like to hold off clinic at this time, she does not want to have a sleep. She has pain and tenderness in the left upper rib cage, she is having cardiac work-up done with her PCP. Review of Systems;  CONSTITUTIONAL: No fever, chills. Fair appetite. ENMT: Eyes: No diplopia; Nose: No epistaxis. Mouth: No sore throat. RESPIRATORY: No hemoptysis, shortness of breath, cough. CARDIOVASCULAR: No chest pain, palpitations. GASTROINTESTINAL: No nausea/vomiting, abdominal pain, diarrhea/constipation. GENITOURINARY: No dysuria, urinary frequency, hematuria. NEURO: No syncope, presyncope, headache.   Remainder:  ROS NEGATIVE    Past Medical History:      Diagnosis Date    Anemia     Pt reports she was dx in her teens, w/o proper w/u, with iron deficiency anemia and was on oral iron replacement for many years, resulting in iron overload    Aplastic anemia ([facility] [address] ~3465-9374 Possibly d/t chemotherapy for breast cancer    Breast cancer ([facility] [address]     right side; pt underwent radical mastectomy followed by radiation therapy and chemotherapy and was then on oral chemo pill for 5 yrs; no recurrence as of 2019    Chickenpox     Hypothyroidism     Measles     Mild depression ([facility] [address]     Mumps     Myelodysplastic syndrome ([facility] [address] ~2016    Pulmonary tuberculosis ~6370-4647    in her early 25s    Scarlet fever     Sensory neuropathy     beyond the ankle b/l     Patient Active Problem List   Diagnosis    MDS (myelodysplastic syndrome) (HCC)    Bronchitis    Macrocytic anemia with high RDW    Hypothyroidism    Peripheral sensory neuropathy    Breast cancer (HCC)    Anemia of chronic disease        Past Surgical History:      Procedure Laterality Date    ADENOIDECTOMY      BONE MARROW BIOPSY      BREAST BIOPSY      CHOLECYSTECTOMY      MASTECTOMY      right breast    OTHER SURGICAL HISTORY      blood transfusions    TONSILLECTOMY AND ADENOIDECTOMY      TUBAL LIGATION         Family History:  Family History   Problem Relation Age of Onset   [place] Cancer Mother         lung    Cancer Father         lung    Cancer Sister         ovarian cancer and breast cancer    Cancer Brother         unknown    Cancer Maternal Aunt         unknown    Cancer Other         brain    Other Brother         MI       Medications:  Reviewed and reconciled. Social History:  Social History     Socioeconomic History    Marital status:       Spouse name: Not on file    Number of children: Not on file    Years of education: Not on file    Highest education level: Not on file   Occupational History    Not on file   Tobacco Use    Smoking status: Former Smoker     Packs/day: 1.00     Years: 37.00     Pack years: 37.00     Types: Cigarettes     Start date:      Quit date: 2000     Years since quittin.0    Smokeless tobacco: Never Used   Substance and Sexual Activity    Alcohol use: No    Drug use: Never    Sexual activity: Not Currently   Other Topics Concern    Not on file   Social History Narrative    Not on file     Social Determinants of Health     Financial Resource Strain:     Difficulty of Paying Living Expenses: Not on file   Food Insecurity:     Worried About Running Out of Food in the Last Year: Not on file    Richar of Food in the Last Year: Not on file   Transportation Needs:     Lack of Transportation (Medical): Not on file    Lack of Transportation (Non-Medical): Not on file   Physical Activity:     Days of Exercise per Week: Not on file    Minutes of Exercise per Session: Not on file   Stress:     Feeling of Stress : Not on file   Social Connections:     Frequency of Communication with Friends and Family: Not on file    Frequency of Social Gatherings with Friends and Family: Not on file    Attends Temple Services: Not on file    Active Member of [address] [place] [address] [employer] or Organizations: Not on file    Attends Club or Organization Meetings: Not on file    Marital Status: Not on file   Intimate Partner Violence:     Fear of Current or Ex-Partner: Not on file    Emotionally Abused: Not on file    Physically Abused: Not on file    Sexually Abused: Not on file   Housing Stability:     Unable to Pay for Housing in the Last Year: Not on file    Number of [place] in the Last Year: Not on file    Unstable Housing in the Last Year: Not on file       Allergies: Allergies   Allergen Reactions    Bee Venom Swelling and Dermatitis    Penicillins Hives, Swelling and Dermatitis    Other      All metals except gold and platinum, welts/blisters       Physical Exam:  /63   Pulse 68   Temp 97.9 °F (36.6 °C)   Resp 16   Wt 136 lb 4.8 oz (61.8 kg)   LMP 05/28/2016   SpO2 99%   BMI 25.75 kg/m²   GENERAL: Alert, oriented x 3, not in acute distress. HEENT: PERRLA; EOMI. Oropharynx clear. NECK: Supple. No palpable cervical or supraclavicular lymphadenopathy. LUNGS: Good air entry bilaterally. No wheezing, crackles or rhonchi. BREASTS: She is s/p right mastectomy, no suspicious lesions, she has telangiectasias. She has tenderness in the left upper rib cage. CARDIOVASCULAR: Regular rate. No murmurs, rubs or gallops. ABDOMEN: Soft. Non-tender, non-distended. Positive bowel sounds. EXTREMITIES: Positive for bruising of the right foot. NEUROLOGIC: No focal deficits. ECOG PS 1      Impression/Plan:     1. The patient is a very pleasant 78 y.o. lady with a PMH significant for Right Breast Cancer, stage III, HR pos, was diagnosed in 2009, she had a right mastectomy done, followed by adjuvant chemo, she received 8 cycles, probably AC followed by T, then PMRT, and 5 years of adjuvant ET with Arimidex, was completed in 2015. She was treated in [facility] under the care of Dr. Negra Butler. She is doing well clinically without any evidence of recurrence of her disease. Discussed with her the importance of monthly SBE, and routine screening mammograms, she had a left breast screening mammogram done on 6/22/2021, there was no evidence of malignancy, she will be due for a repeat left breast screening mammogram on 6/23/2022.       2. She has MDS, diagnosed in 2017, she received ESAs and PRBCs transfusion, has transfusion related iron overload, hgb was 8.5, ferritin 3647, was restarted on Aranesp on 4/23/2019. On 7/20/2021, hemoglobin was 8 g/dl hematocrit 24.7,  1.3, normal white count, platelet count 453Q, fit test is negative. Today 12/28/2021, labs reviewed, hemoglobin 21 G/DL, hematocrit is 27.8, .4, white count 5.2, platelet count 394 K, hemoglobin is less than 10 G/DL, proceed with Aranesp. The patient will have cardiac work-up done per PCP, a bone scan was ordered to rule out metastatic disease to the ribs. Hypothyroidism, continue Synthroid follow-up with PCP. RTC in 2 weeks with labs, possible Aranesp.     Thank you for allowing us to participate in the care of Ms. Ventura.     Brayan Fierro MD   HEMATOLOGY/MEDICAL ONCOLOGY  [address] [place] [address] ONCOLOGY  [facility] [address] [facility] [address]  Dept: [phone]

## 2022-01-10 ENCOUNTER — HOSPITAL ENCOUNTER (OUTPATIENT)
Dept: NUCLEAR MEDICINE | Age: 82
Discharge: HOME | End: 2022-01-12
Payer: MEDICARE

## 2022-01-10 DIAGNOSIS — Z80.3 FAMILY HISTORY OF BREAST CANCER: ICD-10-CM

## 2022-01-10 PROCEDURE — 78306 BONE IMAGING WHOLE BODY: CPT

## 2022-01-10 PROCEDURE — A9503 TC99M MEDRONATE: HCPCS | Performed by: RADIOLOGY

## 2022-01-10 PROCEDURE — 78306 BONE IMAGING WHOLE BODY: CPT | Performed by: RADIOLOGY

## 2022-01-10 PROCEDURE — 3430000000 HC RX DIAGNOSTIC RADIOPHARMACEUTICAL: Performed by: RADIOLOGY

## 2022-01-10 RX ORDER — TC 99M MEDRONATE 20 MG/10ML
25 INJECTION, POWDER, LYOPHILIZED, FOR SOLUTION INTRAVENOUS ONCE
Status: COMPLETED | OUTPATIENT
Start: 2022-01-10 | End: 2022-01-10

## 2022-01-10 RX ADMIN — TC 99M MEDRONATE 25 MILLICURIE: 20 INJECTION, POWDER, LYOPHILIZED, FOR SOLUTION INTRAVENOUS at 09:40

## 2022-01-11 ENCOUNTER — HOSPITAL ENCOUNTER (OUTPATIENT)
Dept: INFUSION THERAPY | Age: 82
Discharge: HOME | End: 2022-01-11
Payer: MEDICARE

## 2022-01-11 ENCOUNTER — OFFICE VISIT (OUTPATIENT)
Dept: PALLATIVE CARE | Age: 82
End: 2022-01-11
Payer: MEDICARE

## 2022-01-11 ENCOUNTER — OFFICE VISIT (OUTPATIENT)
Dept: ONCOLOGY | Age: 82
End: 2022-01-11
Payer: MEDICARE

## 2022-01-11 VITALS
DIASTOLIC BLOOD PRESSURE: 65 MMHG | RESPIRATION RATE: 16 BRPM | HEIGHT: 61 IN | SYSTOLIC BLOOD PRESSURE: 138 MMHG | OXYGEN SATURATION: 97 % | WEIGHT: 139 LBS | TEMPERATURE: 97 F | HEART RATE: 55 BPM | BODY MASS INDEX: 26.24 KG/M2

## 2022-01-11 DIAGNOSIS — Z17.0 MALIGNANT NEOPLASM OF RIGHT BREAST IN FEMALE, ESTROGEN RECEPTOR POSITIVE, UNSPECIFIED SITE OF BREAST (HCC): ICD-10-CM

## 2022-01-11 DIAGNOSIS — D63.8 ANEMIA OF CHRONIC DISEASE: ICD-10-CM

## 2022-01-11 DIAGNOSIS — D46.9 MDS (MYELODYSPLASTIC SYNDROME) (HCC): Primary | ICD-10-CM

## 2022-01-11 DIAGNOSIS — D53.9 MACROCYTIC ANEMIA: ICD-10-CM

## 2022-01-11 DIAGNOSIS — Z51.5 PALLIATIVE CARE BY SPECIALIST: Primary | ICD-10-CM

## 2022-01-11 DIAGNOSIS — C50.911 MALIGNANT NEOPLASM OF RIGHT BREAST IN FEMALE, ESTROGEN RECEPTOR POSITIVE, UNSPECIFIED SITE OF BREAST (HCC): ICD-10-CM

## 2022-01-11 LAB
ANISOCYTOSIS: ABNORMAL
BASOPHILIC STIPPLING: ABNORMAL
BASOPHILS ABSOLUTE: 0 E9/L (ref 0–0.2)
BASOPHILS RELATIVE PERCENT: 0.4 % (ref 0–2)
EOSINOPHILS ABSOLUTE: 0.19 E9/L (ref 0.05–0.5)
EOSINOPHILS RELATIVE PERCENT: 3.5 % (ref 0–6)
HCT VFR BLD CALC: 27.4 % (ref 34–48)
HEMOGLOBIN: 8.7 G/DL (ref 11.5–15.5)
LYMPHOCYTES ABSOLUTE: 2.7 E9/L (ref 1.5–4)
LYMPHOCYTES RELATIVE PERCENT: 50 % (ref 20–42)
MCH RBC QN AUTO: 37.5 PG (ref 26–35)
MCHC RBC AUTO-ENTMCNC: 31.8 % (ref 32–34.5)
MCV RBC AUTO: 118.1 FL (ref 80–99.9)
MONOCYTES ABSOLUTE: 0.27 E9/L (ref 0.1–0.95)
MONOCYTES RELATIVE PERCENT: 5.3 % (ref 2–12)
NEUTROPHILS ABSOLUTE: 2.21 E9/L (ref 1.8–7.3)
NEUTROPHILS RELATIVE PERCENT: 41.2 % (ref 43–80)
NUCLEATED RED BLOOD CELLS: 0.9 /100 WBC
OVALOCYTES: ABNORMAL
PDW BLD-RTO: 26.9 FL (ref 11.5–15)
PLATELET # BLD: 140 E9/L (ref 130–450)
PMV BLD AUTO: 10.8 FL (ref 7–12)
POIKILOCYTES: ABNORMAL
POLYCHROMASIA: ABNORMAL
RBC # BLD: 2.32 E12/L (ref 3.5–5.5)
SCHISTOCYTES: ABNORMAL
TARGET CELLS: ABNORMAL
TEAR DROP CELLS: ABNORMAL
WBC # BLD: 5.4 E9/L (ref 4.5–11.5)

## 2022-01-11 PROCEDURE — 85025 COMPLETE CBC W/AUTO DIFF WBC: CPT

## 2022-01-11 PROCEDURE — 1036F TOBACCO NON-USER: CPT | Performed by: INTERNAL MEDICINE

## 2022-01-11 PROCEDURE — 99212 OFFICE O/P EST SF 10 MIN: CPT

## 2022-01-11 PROCEDURE — 4040F PNEUMOC VAC/ADMIN/RCVD: CPT | Performed by: NURSE PRACTITIONER

## 2022-01-11 PROCEDURE — 96372 THER/PROPH/DIAG INJ SC/IM: CPT

## 2022-01-11 PROCEDURE — 99214 OFFICE O/P EST MOD 30 MIN: CPT | Performed by: INTERNAL MEDICINE

## 2022-01-11 PROCEDURE — 99212 OFFICE O/P EST SF 10 MIN: CPT | Performed by: NURSE PRACTITIONER

## 2022-01-11 PROCEDURE — 6360000002 HC RX W HCPCS: Performed by: NURSE PRACTITIONER

## 2022-01-11 PROCEDURE — 1123F ACP DISCUSS/DSCN MKR DOCD: CPT | Performed by: NURSE PRACTITIONER

## 2022-01-11 PROCEDURE — G8484 FLU IMMUNIZE NO ADMIN: HCPCS | Performed by: NURSE PRACTITIONER

## 2022-01-11 PROCEDURE — G8399 PT W/DXA RESULTS DOCUMENT: HCPCS | Performed by: INTERNAL MEDICINE

## 2022-01-11 PROCEDURE — G8417 CALC BMI ABV UP PARAM F/U: HCPCS | Performed by: NURSE PRACTITIONER

## 2022-01-11 PROCEDURE — G8484 FLU IMMUNIZE NO ADMIN: HCPCS | Performed by: INTERNAL MEDICINE

## 2022-01-11 PROCEDURE — 1036F TOBACCO NON-USER: CPT | Performed by: NURSE PRACTITIONER

## 2022-01-11 PROCEDURE — 4040F PNEUMOC VAC/ADMIN/RCVD: CPT | Performed by: INTERNAL MEDICINE

## 2022-01-11 PROCEDURE — 1123F ACP DISCUSS/DSCN MKR DOCD: CPT | Performed by: INTERNAL MEDICINE

## 2022-01-11 PROCEDURE — G8428 CUR MEDS NOT DOCUMENT: HCPCS | Performed by: NURSE PRACTITIONER

## 2022-01-11 PROCEDURE — G8399 PT W/DXA RESULTS DOCUMENT: HCPCS | Performed by: NURSE PRACTITIONER

## 2022-01-11 PROCEDURE — G8427 DOCREV CUR MEDS BY ELIG CLIN: HCPCS | Performed by: INTERNAL MEDICINE

## 2022-01-11 PROCEDURE — 1090F PRES/ABSN URINE INCON ASSESS: CPT | Performed by: INTERNAL MEDICINE

## 2022-01-11 PROCEDURE — 99211 OFF/OP EST MAY X REQ PHY/QHP: CPT

## 2022-01-11 PROCEDURE — 1090F PRES/ABSN URINE INCON ASSESS: CPT | Performed by: NURSE PRACTITIONER

## 2022-01-11 PROCEDURE — G8417 CALC BMI ABV UP PARAM F/U: HCPCS | Performed by: INTERNAL MEDICINE

## 2022-01-11 PROCEDURE — 36415 COLL VENOUS BLD VENIPUNCTURE: CPT

## 2022-01-11 RX ADMIN — EPOETIN ALFA-EPBX 2000 UNITS: 2000 INJECTION, SOLUTION INTRAVENOUS; SUBCUTANEOUS at 13:49

## 2022-01-11 NOTE — PROGRESS NOTES
[address]  [facility] MED ONCOLOGY  [facility] [address]  Dept: [address]: [phone]  Attending Progress Note      Reason for Visit:   1. Right Breast Cancer. 2. MDS. Referring Physician:  CONNIE Alfredo CNP    PCP:  CONNIE Alfredo CNP    History of Present Illness: The patient is a very pleasant 78 y.o. lady with a PMH significant for Right Breast Cancer, stage III, HR pos, was diagnosed in 2009, she had a right mastectomy done, followed by adjuvant chemo, she received 8 cycles, probably AC followed by T, then PMRT, and 5 years of adjuvant ET with Arimidex, was completed in 2015. She was treated in [facility] under the care of Dr. Emelia Jimenez. She was diagnosed with MDS in 2017, she received ESAs and PRBCs transfusion. She has transfusion related iron overload. She was started on Aranesp on 4/24/2020. No SE from Aranesp. The patient returns for a follow-up visit, she is doing well overall, except for fatigue. She has extremity lymphedema, she would like to hold off clinic at this time, she does not want to have a sleep. The pain and tenderness in the left upper rib cage had resolved, she is having cardiac work-up done with her PCP. Review of Systems;  CONSTITUTIONAL: No fever, chills. Fair appetite. ENMT: Eyes: No diplopia; Nose: No epistaxis. Mouth: No sore throat. RESPIRATORY: No hemoptysis, shortness of breath, cough. CARDIOVASCULAR: No chest pain, palpitations. GASTROINTESTINAL: No nausea/vomiting, abdominal pain, diarrhea/constipation. GENITOURINARY: No dysuria, urinary frequency, hematuria. NEURO: No syncope, presyncope, headache.   Remainder:  ROS NEGATIVE    Past Medical History:      Diagnosis Date    Anemia     Pt reports she was dx in her teens, w/o proper w/u, with iron deficiency anemia and was on oral iron replacement for many years, resulting in iron overload  Aplastic anemia ([facility] [address] ~3111-7885    Possibly d/t chemotherapy for breast cancer    Breast cancer ([facility] [address] 2009    right side; pt underwent radical mastectomy followed by radiation therapy and chemotherapy and was then on oral chemo pill for 5 yrs; no recurrence as of 2019    Chickenpox     Hypothyroidism     Measles     Mild depression ([facility] [address]     Mumps     Myelodysplastic syndrome ([facility] [address] ~2016    Pulmonary tuberculosis ~6877-5154    in her early 25s    Scarlet fever     Sensory neuropathy     beyond the ankle b/l     Patient Active Problem List   Diagnosis    MDS (myelodysplastic syndrome) (HCC)    Bronchitis    Macrocytic anemia with high RDW    Hypothyroidism    Peripheral sensory neuropathy    Breast cancer (HCC)    Anemia of chronic disease        Past Surgical History:      Procedure Laterality Date    ADENOIDECTOMY      BONE MARROW BIOPSY      BREAST BIOPSY      CHOLECYSTECTOMY      MASTECTOMY      right breast    OTHER SURGICAL HISTORY      blood transfusions    TONSILLECTOMY AND ADENOIDECTOMY      TUBAL LIGATION         Family History:  Family History   Problem Relation Age of Onset   [place] Cancer Mother         lung    Cancer Father         lung    Cancer Sister         ovarian cancer and breast cancer    Cancer Brother         unknown    Cancer Maternal Aunt         unknown    Cancer Other         brain    Other Brother         MI       Medications:  Reviewed and reconciled. Social History:  Social History     Socioeconomic History    Marital status:       Spouse name: Not on file    Number of children: Not on file    Years of education: Not on file    Highest education level: Not on file   Occupational History    Not on file   Tobacco Use    Smoking status: Former Smoker     Packs/day: 1.00     Years: 37.00     Pack years: 37.00     Types: Cigarettes     Start date:      Quit date: 2000     Years since quittin.0    Smokeless tobacco: Never Used Substance and Sexual Activity    Alcohol use: No    Drug use: Never    Sexual activity: Not Currently   Other Topics Concern    Not on file   Social History Narrative    Not on file     Social Determinants of Health     Financial Resource Strain:     Difficulty of Paying Living Expenses: Not on file   Food Insecurity:     Worried About Running Out of Food in the Last Year: Not on file    Richar of Food in the Last Year: Not on file   Transportation Needs:     Lack of Transportation (Medical): Not on file    Lack of Transportation (Non-Medical): Not on file   Physical Activity:     Days of Exercise per Week: Not on file    Minutes of Exercise per Session: Not on file   Stress:     Feeling of Stress : Not on file   Social Connections:     Frequency of Communication with Friends and Family: Not on file    Frequency of Social Gatherings with Friends and Family: Not on file    Attends Sikhism Services: Not on file    Active Member of [address] [place] [address] or Organizations: Not on file    Attends Club or Organization Meetings: Not on file    Marital Status: Not on file   Intimate Partner Violence:     Fear of Current or Ex-Partner: Not on file    Emotionally Abused: Not on file    Physically Abused: Not on file    Sexually Abused: Not on file   Housing Stability:     Unable to Pay for Housing in the Last Year: Not on file    Number of [place] in the Last Year: Not on file    Unstable Housing in the Last Year: Not on file       Allergies:   Allergies   Allergen Reactions    Bee Venom Swelling and Dermatitis    Penicillins Hives, Swelling and Dermatitis    Other      All metals except gold and platinum, welts/blisters       Physical Exam:  /65 (Site: Left Upper Arm, Position: Sitting, Cuff Size: Medium Adult)   Pulse 55   Temp 97 °F (36.1 °C) (Infrared)   Resp 16   Ht 5' 1\" (1.549 m)   Wt 139 lb (63 kg)   LMP 05/28/2016   SpO2 97%   BMI 26.26 kg/m²   GENERAL: Alert, oriented x 3, not in acute distress. HEENT: PERRLA; EOMI. Oropharynx clear. NECK: Supple. No palpable cervical or supraclavicular lymphadenopathy. LUNGS: Good air entry bilaterally. No wheezing, crackles or rhonchi. BREASTS: She is s/p right mastectomy, no suspicious lesions, she has telangiectasias. She has tenderness in the left upper rib cage. CARDIOVASCULAR: Regular rate. No murmurs, rubs or gallops. ABDOMEN: Soft. Non-tender, non-distended. Positive bowel sounds. EXTREMITIES: Positive for bruising of the right foot. NEUROLOGIC: No focal deficits. ECOG PS 1      Impression/Plan:     1. The patient is a very pleasant 78 y.o. lady with a PMH significant for Right Breast Cancer, stage III, HR pos, was diagnosed in 2009, she had a right mastectomy done, followed by adjuvant chemo, she received 8 cycles, probably AC followed by T, then PMRT, and 5 years of adjuvant ET with Arimidex, was completed in 2015. She was treated in [facility] under the care of Dr. Dada Dinero. She is doing well clinically without any evidence of recurrence of her disease. Discussed with her the importance of monthly SBE, and routine screening mammograms, she had a left breast screening mammogram done on 6/22/2021, there was no evidence of malignancy, she will be due for a repeat left breast screening mammogram on 6/23/2022.       2. She has MDS, diagnosed in 2017, she received ESAs and PRBCs transfusion, has transfusion related iron overload, hgb was 8.5, ferritin 3647, was restarted on Aranesp on 4/23/2019. On 7/20/2021, hemoglobin was 8 g/dl hematocrit 24.7,  1.3, normal white count, platelet count 383C, fit test is negative. Today 1/11/2022, labs reviewed, hemoglobin 8.7 G/DL, hematocrit is 27.4, .1, white count 5.4, platelet count 341 K, hemoglobin is less than 10 G/DL, proceed with Aranesp. The patient will have cardiac work-up done per PCP, a bone scan was done on 1/10/22, was negative for metastatic disease.     Hypothyroidism, continue Synthroid follow-up with PCP. RTC in 2 weeks with labs, possible Aranesp. Thank you for allowing us to participate in the care of Ms. Ventura.     Kyle Albert MD   HEMATOLOGY/MEDICAL [address] [place] [address] Rd MED ONCOLOGY  [address] [place] [address]  Dept: [address] [facility]: [phone]

## 2022-01-11 NOTE — PROGRESS NOTES
Department of Palliative Medicine  Ambulatory Note  Provider: CONNIE Guillen - CNP       Chief Complaint: Carlee Briggs is a 78 y.o. female with chief complaint of Pain    HPI:  Carlee Briggs is a 78 y.o. female with significant past medical history of stage II HR positive breast cancer, diagnosed in 2009, status post right mastectomy, followed by adjuvant therapy, and 5 years of adjuvant Arimidex which was completed in 2015, with all treatment performed in [facility] under the care of Dr. Constantino Winston. She is currently being followed locally by Dr. Jose R Lepe, without evidence of recurrent disease, whom she also follows for management of myelodysplastic syndrome, which was diagnosed in 2017. She was referred to [address] [place] [address] for assistance with symptom management related to chemotherapy-induced peripheral neuropathy. Assessment/Plan      History of Breast Cancer stage III, HR positive:              -Diagnosed, treated, and managed in [place] in 2009              -Status post right mastectomy              -Followed by adjuvant chemotherapy              -Completed her Demadex 2015              -Currently followed locally by Dr. Jose R Lepe, she also follows with regarding MDS diagnosed 2017     Chemotherapy related peripheral Neuropathy/Pain:              -  Worse bilateral feet and LEs, burning and pressure              -  May try stopping gabapentin 300 mg at nighttime   -  Noted improvement in nighttime neuropathy with Tumeric tablets              -  Continue Norco 5-325 mg every 12 hours as needed, 1-1.5/day,              -  Currently she is very high functioning, enjoys gardening, and is quite active.     Prognosis: unknown     Follow Up: 6 months . They were encouraged to call with any questions, concerns, needs, or changes in symptoms. Subjective:     Met with Wendy Harding today in the clinic she is in good spirits and has been doing well, overall appearing to be at her baseline.   States that overall she does not have any new complaints. She does have periodic pain, but this is minimal, using Norco on a very sparing basis, sometimes no doses at all. She does continue to take her gabapentin 3 tablets at nighttime, this continues to work well for her. She denies any additional difficulties, continues to function well within her home, when we changed her plan of care. We will plan to see her again in approximately 6 months to reassess her needs.     Objective:     Physical Exam  Wt Readings from Last 3 Encounters:   01/11/22 139 lb (63 kg)   12/28/21 136 lb 4.8 oz (61.8 kg)   12/07/21 141 lb 3.2 oz (64 kg)     LMP 05/28/2016     Gen:  Alert, appears stated age, well nourished, in no acute distress  HEENT:  Normocephalic, conjunctiva pink, no drainage, mucosa moist  Neck:  Supple  Lungs:  CTA bilaterally, no audible rhonchi or wheezes noted  Heart[de-identified]  RRR, no murmur, rub, or gallop noted during exam  Abd:  Soft, non tender, non distended, BS+  M/S/Ext:  Moving all extremities, no edema, pulses present  Skin:  Warm and dry, raised erythema of the right chest  Neuro:  Alert, oriented x 3; following commands     [place] Symptom Assessment Score   [place] Score 1/11/2022 10/19/2021 9/7/2021 7/20/2021 4/27/2021   Pain Score 6 7 4 6 5   Tiredness Score 7 7 6 3 4   Nausea Score Not nauseated Not nauseated Not nauseated Not nauseated Not nauseated   Depression Score Not depressed Not depressed Not depressed Not depressed Not depressed   Anxiety Score Not anxious Not anxious Not anxious Not anxious Not anxious   Drowsiness Score 7 5 4 1 7   Appetite Score Best appetite 2 Best appetite Best appetite Best appetite   Wellbeing Score Best feeling of wellbeing Best feeling of wellbeing Best feeling of wellbeing Best feeling of wellbeing 1   Dyspnea Score 6 4 3 5 8   Other Problem Score Best possible response Best possible response Best possible response Best possible response Best possible response   Total Assessment Score(calculated) 26 25 17 15 25     Assessed by: patient. Current Medications:  Medications reviewed: yes    Controlled Substances Monitoring: [place]AR[place] reviewed 1/11/22. CONNIE Ulloa CNP   Palliative Care Department     Time/Communication  Greater than 50% of time spent, total 15 minutes in face-to-face counseling and coordination of care regarding symptom management. Note: This report was completed using computerize voiced recognition software. Every effort has been made to ensure accuracy; however, inadvertent computerized transcription errors may be present.

## 2022-01-25 ENCOUNTER — HOSPITAL ENCOUNTER (OUTPATIENT)
Dept: INFUSION THERAPY | Age: 82
End: 2022-01-25

## 2022-02-01 ENCOUNTER — OFFICE VISIT (OUTPATIENT)
Dept: ONCOLOGY | Age: 82
End: 2022-02-01
Payer: MEDICARE

## 2022-02-01 ENCOUNTER — TELEPHONE (OUTPATIENT)
Dept: ONCOLOGY | Age: 82
End: 2022-02-01

## 2022-02-01 ENCOUNTER — HOSPITAL ENCOUNTER (OUTPATIENT)
Dept: INFUSION THERAPY | Age: 82
Discharge: HOME | End: 2022-02-01
Payer: MEDICARE

## 2022-02-01 VITALS
WEIGHT: 141 LBS | HEART RATE: 74 BPM | DIASTOLIC BLOOD PRESSURE: 58 MMHG | HEIGHT: 61 IN | TEMPERATURE: 97.3 F | OXYGEN SATURATION: 100 % | SYSTOLIC BLOOD PRESSURE: 136 MMHG | BODY MASS INDEX: 26.62 KG/M2

## 2022-02-01 DIAGNOSIS — D63.8 ANEMIA OF CHRONIC DISEASE: ICD-10-CM

## 2022-02-01 DIAGNOSIS — D53.9 MACROCYTIC ANEMIA: ICD-10-CM

## 2022-02-01 DIAGNOSIS — D46.9 MDS (MYELODYSPLASTIC SYNDROME) (HCC): Primary | ICD-10-CM

## 2022-02-01 LAB
ANISOCYTOSIS: ABNORMAL
BASOPHILIC STIPPLING: ABNORMAL
BASOPHILS ABSOLUTE: 0 E9/L (ref 0–0.2)
BASOPHILS RELATIVE PERCENT: 0.5 % (ref 0–2)
EOSINOPHILS ABSOLUTE: 0.15 E9/L (ref 0.05–0.5)
EOSINOPHILS RELATIVE PERCENT: 2.6 % (ref 0–6)
HCT VFR BLD CALC: 22 % (ref 34–48)
HEMOGLOBIN: 7.1 G/DL (ref 11.5–15.5)
LYMPHOCYTES ABSOLUTE: 2.32 E9/L (ref 1.5–4)
LYMPHOCYTES RELATIVE PERCENT: 40.3 % (ref 20–42)
MCH RBC QN AUTO: 37.8 PG (ref 26–35)
MCHC RBC AUTO-ENTMCNC: 32.3 % (ref 32–34.5)
MCV RBC AUTO: 117 FL (ref 80–99.9)
METAMYELOCYTES RELATIVE PERCENT: 0.9 % (ref 0–1)
MONOCYTES ABSOLUTE: 0 E9/L (ref 0.1–0.95)
MONOCYTES RELATIVE PERCENT: 6.1 % (ref 2–12)
MYELOCYTE PERCENT: 0.9 % (ref 0–0)
NEUTROPHILS ABSOLUTE: 3.25 E9/L (ref 1.8–7.3)
NEUTROPHILS RELATIVE PERCENT: 54.4 % (ref 43–80)
NUCLEATED RED BLOOD CELLS: 2.6 /100 WBC
OVALOCYTES: ABNORMAL
PDW BLD-RTO: 27.3 FL (ref 11.5–15)
PLATELET # BLD: 156 E9/L (ref 130–450)
PMV BLD AUTO: 10.7 FL (ref 7–12)
POIKILOCYTES: ABNORMAL
POLYCHROMASIA: ABNORMAL
PROMYELOCYTES PERCENT: 0.9 % (ref 0–0)
RBC # BLD: 1.88 E12/L (ref 3.5–5.5)
SCHISTOCYTES: ABNORMAL
TARGET CELLS: ABNORMAL
TEAR DROP CELLS: ABNORMAL
WBC # BLD: 5.8 E9/L (ref 4.5–11.5)

## 2022-02-01 PROCEDURE — 1090F PRES/ABSN URINE INCON ASSESS: CPT | Performed by: INTERNAL MEDICINE

## 2022-02-01 PROCEDURE — 96372 THER/PROPH/DIAG INJ SC/IM: CPT

## 2022-02-01 PROCEDURE — 1123F ACP DISCUSS/DSCN MKR DOCD: CPT | Performed by: INTERNAL MEDICINE

## 2022-02-01 PROCEDURE — 6360000002 HC RX W HCPCS: Performed by: NURSE PRACTITIONER

## 2022-02-01 PROCEDURE — 99214 OFFICE O/P EST MOD 30 MIN: CPT | Performed by: INTERNAL MEDICINE

## 2022-02-01 PROCEDURE — G8417 CALC BMI ABV UP PARAM F/U: HCPCS | Performed by: INTERNAL MEDICINE

## 2022-02-01 PROCEDURE — 1036F TOBACCO NON-USER: CPT | Performed by: INTERNAL MEDICINE

## 2022-02-01 PROCEDURE — G8399 PT W/DXA RESULTS DOCUMENT: HCPCS | Performed by: INTERNAL MEDICINE

## 2022-02-01 PROCEDURE — G8484 FLU IMMUNIZE NO ADMIN: HCPCS | Performed by: INTERNAL MEDICINE

## 2022-02-01 PROCEDURE — 4040F PNEUMOC VAC/ADMIN/RCVD: CPT | Performed by: INTERNAL MEDICINE

## 2022-02-01 PROCEDURE — 99212 OFFICE O/P EST SF 10 MIN: CPT

## 2022-02-01 PROCEDURE — G8427 DOCREV CUR MEDS BY ELIG CLIN: HCPCS | Performed by: INTERNAL MEDICINE

## 2022-02-01 RX ADMIN — EPOETIN ALFA-EPBX 2000 UNITS: 2000 INJECTION, SOLUTION INTRAVENOUS; SUBCUTANEOUS at 13:48

## 2022-02-01 NOTE — TELEPHONE ENCOUNTER
Patient cancelled appointment on 01/25/22 due to weather, requesting reschedule to 02/01/22. Patient stated she was struggling with transportation for this appointment, she will have transportation thru [facility] starting in 2 weeks. Faxed over vouchers 01/31/22 for [employer] for round trip appointment 02/01/22. Called this morning, 02/01/22, to verify with [employer] over the phone they will be picking patient up. IT dispatcher stated they received the faxed vouchers and will pick patient up  @ 10:15 am.  Patient called and notified to be ready, she verbalized her agreement.

## 2022-02-01 NOTE — PROGRESS NOTES
[address]  [facility] MED ONCOLOGY  [facility] [address]  Dept: [address]: [phone]  Attending Progress Note      Reason for Visit:   1. Right Breast Cancer. 2. MDS. Referring Physician:  CONNIE Hodges CNP    PCP:  CONNIE Hodges CNP    History of Present Illness: The patient is a very pleasant 78 y.o. lady with a PMH significant for Right Breast Cancer, stage III, HR pos, was diagnosed in 2009, she had a right mastectomy done, followed by adjuvant chemo, she received 8 cycles, probably AC followed by T, then PMRT, and 5 years of adjuvant ET with Arimidex, was completed in 2015. She was treated in [facility] under the care of Dr. Melia Vaz. She was diagnosed with MDS in 2017, she received ESAs and PRBCs transfusion. She has transfusion related iron overload. She was started on Aranesp on 4/24/2020. No SE from Aranesp. The patient returns for a follow-up visit, she is doing well overall, except for fatigue. She has several deaths in the family. Review of Systems;  CONSTITUTIONAL: No fever, chills. Fair appetite. ENMT: Eyes: No diplopia; Nose: No epistaxis. Mouth: No sore throat. RESPIRATORY: No hemoptysis, shortness of breath, cough. CARDIOVASCULAR: No chest pain, palpitations. GASTROINTESTINAL: No nausea/vomiting, abdominal pain, diarrhea/constipation. GENITOURINARY: No dysuria, urinary frequency, hematuria. NEURO: No syncope, presyncope, headache.   Remainder:  ROS NEGATIVE    Past Medical History:      Diagnosis Date    Anemia     Pt reports she was dx in her teens, w/o proper w/u, with iron deficiency anemia and was on oral iron replacement for many years, resulting in iron overload    Aplastic anemia ([address] ~1917-9523    Possibly d/t chemotherapy for breast cancer    Breast cancer ([facility] 2009    right side; pt underwent radical mastectomy followed by radiation therapy Social Determinants of Health     Financial Resource Strain:     Difficulty of Paying Living Expenses: Not on file   Food Insecurity:     Worried About Running Out of Food in the Last Year: Not on file    Richar of Food in the Last Year: Not on file   Transportation Needs:     Lack of Transportation (Medical): Not on file    Lack of Transportation (Non-Medical): Not on file   Physical Activity:     Days of Exercise per Week: Not on file    Minutes of Exercise per Session: Not on file   Stress:     Feeling of Stress : Not on file   Social Connections:     Frequency of Communication with Friends and Family: Not on file    Frequency of Social Gatherings with Friends and Family: Not on file    Attends Druze Services: Not on file    Active Member of [address] [place] [address] [employer] or Organizations: Not on file    Attends Club or Organization Meetings: Not on file    Marital Status: Not on file   Intimate Partner Violence:     Fear of Current or Ex-Partner: Not on file    Emotionally Abused: Not on file    Physically Abused: Not on file    Sexually Abused: Not on file   Housing Stability:     Unable to Pay for Housing in the Last Year: Not on file    Number of [place] in the Last Year: Not on file    Unstable Housing in the Last Year: Not on file       Allergies: Allergies   Allergen Reactions    Bee Venom Swelling and Dermatitis    Penicillins Hives, Swelling and Dermatitis    Other      All metals except gold and platinum, welts/blisters       Physical Exam:  BP (!) 136/58   Pulse 74   Temp 97.3 °F (36.3 °C)   Ht 5' 1\" (1.549 m)   Wt 141 lb (64 kg)   LMP 05/28/2016   SpO2 100%   BMI 26.64 kg/m²   GENERAL: Alert, oriented x 3, not in acute distress. HEENT: PERRLA; EOMI. Oropharynx clear. NECK: Supple. No palpable cervical or supraclavicular lymphadenopathy. LUNGS: Good air entry bilaterally. No wheezing, crackles or rhonchi.    BREASTS: She is s/p right mastectomy, no suspicious lesions, she has telangiectasias. She has tenderness in the left upper rib cage. CARDIOVASCULAR: Regular rate. No murmurs, rubs or gallops. ABDOMEN: Soft. Non-tender, non-distended. Positive bowel sounds. EXTREMITIES: Positive for bruising of the right foot. NEUROLOGIC: No focal deficits. ECOG PS 1      Impression/Plan:     1. The patient is a very pleasant 78 y.o. lady with a PMH significant for Right Breast Cancer, stage III, HR pos, was diagnosed in 2009, she had a right mastectomy done, followed by adjuvant chemo, she received 8 cycles, probably AC followed by T, then PMRT, and 5 years of adjuvant ET with Arimidex, was completed in 2015. She was treated in [facility] under the care of Dr. Melia Vaz. She is doing well clinically without any evidence of recurrence of her disease. Discussed with her the importance of monthly SBE, and routine screening mammograms, she had a left breast screening mammogram done on 6/22/2021, there was no evidence of malignancy, she will be due for a repeat left breast screening mammogram on 6/23/2022.       2. She has MDS, diagnosed in 2017, she received ESAs and PRBCs transfusion, has transfusion related iron overload, hgb was 8.5, ferritin 3647, was restarted on Aranesp on 4/23/2019. On 7/20/2021, hemoglobin was 8 g/dl hematocrit 24.7,  1.3, normal white count, platelet count 023C, fit test is negative. Today 12/1/2020, labs reviewed, hemoglobin decreased to 7.1 G/DL, hematocrit is 20, , white count five-point, platelet count 1 5 K, patient missed the injection last week, hemoglobin is less than 10 G/DL, proceed with Aranesp. If she has worsening fatigue, she will let me know A bone scan was done on 1/10/22, was negative for metastatic disease. Hypothyroidism, continue Synthroid follow-up with PCP. RTC in 2 weeks with labs, possible Aranesp. Thank you for allowing us to participate in the care of Ms. Ventura.     Teena Ramirez MD   HEMATOLOGY/MEDICAL ONCOLOGY  [facility] [address] [facility] MED ONCOLOGY  [address] [place] [id number]  Dept: [address]: [phone]

## 2022-02-02 DIAGNOSIS — G89.3 PAIN DUE TO NEOPLASM: ICD-10-CM

## 2022-02-02 DIAGNOSIS — Z51.5 PALLIATIVE CARE BY SPECIALIST: ICD-10-CM

## 2022-02-02 RX ORDER — HYDROCODONE BITARTRATE AND ACETAMINOPHEN 5; 325 MG/1; MG/1
1 TABLET ORAL EVERY 12 HOURS PRN
Qty: 60 TABLET | Refills: 0 | Status: SHIPPED
Start: 2022-02-02 | End: 2022-03-14 | Stop reason: SDUPTHER

## 2022-02-02 NOTE — TELEPHONE ENCOUNTER
Call from Pedro Del Cid requesting refill for [place] to be sent to [facility] [address]. [facility] natalie 7/11/22.

## 2022-02-15 ENCOUNTER — HOSPITAL ENCOUNTER (OUTPATIENT)
Dept: INFUSION THERAPY | Age: 82
End: 2022-02-15

## 2022-02-22 ENCOUNTER — OFFICE VISIT (OUTPATIENT)
Dept: ONCOLOGY | Age: 82
End: 2022-02-22
Payer: MEDICARE

## 2022-02-22 ENCOUNTER — HOSPITAL ENCOUNTER (OUTPATIENT)
Dept: INFUSION THERAPY | Age: 82
Discharge: HOME | End: 2022-02-22
Payer: MEDICARE

## 2022-02-22 VITALS
WEIGHT: 142 LBS | BODY MASS INDEX: 26.81 KG/M2 | TEMPERATURE: 97.9 F | SYSTOLIC BLOOD PRESSURE: 152 MMHG | DIASTOLIC BLOOD PRESSURE: 66 MMHG | OXYGEN SATURATION: 98 % | HEIGHT: 61 IN | HEART RATE: 62 BPM

## 2022-02-22 DIAGNOSIS — D46.9 MDS (MYELODYSPLASTIC SYNDROME) (HCC): Primary | ICD-10-CM

## 2022-02-22 DIAGNOSIS — D53.9 MACROCYTIC ANEMIA: ICD-10-CM

## 2022-02-22 DIAGNOSIS — D63.8 ANEMIA OF CHRONIC DISEASE: ICD-10-CM

## 2022-02-22 LAB
ANISOCYTOSIS: ABNORMAL
BASOPHILIC STIPPLING: ABNORMAL
BASOPHILS ABSOLUTE: 0 E9/L (ref 0–0.2)
BASOPHILS RELATIVE PERCENT: 0.6 % (ref 0–2)
EOSINOPHILS ABSOLUTE: 0.25 E9/L (ref 0.05–0.5)
EOSINOPHILS RELATIVE PERCENT: 5.3 % (ref 0–6)
HCT VFR BLD CALC: 23.2 % (ref 34–48)
HEMOGLOBIN: 7.5 G/DL (ref 11.5–15.5)
HYPOCHROMIA: ABNORMAL
LYMPHOCYTES ABSOLUTE: 2.91 E9/L (ref 1.5–4)
LYMPHOCYTES RELATIVE PERCENT: 61.6 % (ref 20–42)
MCH RBC QN AUTO: 37.9 PG (ref 26–35)
MCHC RBC AUTO-ENTMCNC: 32.3 % (ref 32–34.5)
MCV RBC AUTO: 117.2 FL (ref 80–99.9)
MONOCYTES ABSOLUTE: 0.24 E9/L (ref 0.1–0.95)
MONOCYTES RELATIVE PERCENT: 4.5 % (ref 2–12)
NEUTROPHILS ABSOLUTE: 1.36 E9/L (ref 1.8–7.3)
NEUTROPHILS RELATIVE PERCENT: 28.6 % (ref 43–80)
OVALOCYTES: ABNORMAL
PDW BLD-RTO: 27.3 FL (ref 11.5–15)
PLATELET # BLD: 173 E9/L (ref 130–450)
PMV BLD AUTO: 10.9 FL (ref 7–12)
POIKILOCYTES: ABNORMAL
POLYCHROMASIA: ABNORMAL
RBC # BLD: 1.98 E12/L (ref 3.5–5.5)
SCHISTOCYTES: ABNORMAL
WBC # BLD: 4.7 E9/L (ref 4.5–11.5)

## 2022-02-22 PROCEDURE — 6360000002 HC RX W HCPCS: Performed by: NURSE PRACTITIONER

## 2022-02-22 PROCEDURE — G8417 CALC BMI ABV UP PARAM F/U: HCPCS | Performed by: INTERNAL MEDICINE

## 2022-02-22 PROCEDURE — 99214 OFFICE O/P EST MOD 30 MIN: CPT | Performed by: INTERNAL MEDICINE

## 2022-02-22 PROCEDURE — 85025 COMPLETE CBC W/AUTO DIFF WBC: CPT

## 2022-02-22 PROCEDURE — 96372 THER/PROPH/DIAG INJ SC/IM: CPT

## 2022-02-22 PROCEDURE — G8427 DOCREV CUR MEDS BY ELIG CLIN: HCPCS | Performed by: INTERNAL MEDICINE

## 2022-02-22 PROCEDURE — G8399 PT W/DXA RESULTS DOCUMENT: HCPCS | Performed by: INTERNAL MEDICINE

## 2022-02-22 PROCEDURE — 4040F PNEUMOC VAC/ADMIN/RCVD: CPT | Performed by: INTERNAL MEDICINE

## 2022-02-22 PROCEDURE — 1123F ACP DISCUSS/DSCN MKR DOCD: CPT | Performed by: INTERNAL MEDICINE

## 2022-02-22 PROCEDURE — G8484 FLU IMMUNIZE NO ADMIN: HCPCS | Performed by: INTERNAL MEDICINE

## 2022-02-22 PROCEDURE — 36415 COLL VENOUS BLD VENIPUNCTURE: CPT

## 2022-02-22 PROCEDURE — 99212 OFFICE O/P EST SF 10 MIN: CPT

## 2022-02-22 PROCEDURE — 1090F PRES/ABSN URINE INCON ASSESS: CPT | Performed by: INTERNAL MEDICINE

## 2022-02-22 PROCEDURE — 1036F TOBACCO NON-USER: CPT | Performed by: INTERNAL MEDICINE

## 2022-02-22 RX ADMIN — EPOETIN ALFA-EPBX 2000 UNITS: 2000 INJECTION, SOLUTION INTRAVENOUS; SUBCUTANEOUS at 13:48

## 2022-02-22 NOTE — PROGRESS NOTES
[address]  [facility] MED ONCOLOGY  [address] [facility] [address]  Dept: [address]: [phone]  Attending Progress Note      Reason for Visit:   1. Right Breast Cancer. 2. MDS. Referring Physician:  CONNIE Leonardo CNP    PCP:  CONNIE Leonardo CNP    History of Present Illness: The patient is a very pleasant 78 y.o. lady with a PMH significant for Right Breast Cancer, stage III, HR pos, was diagnosed in 2009, she had a right mastectomy done, followed by adjuvant chemo, she received 8 cycles, probably AC followed by T, then PMRT, and 5 years of adjuvant ET with Arimidex, was completed in 2015. She was treated in [facility] under the care of Dr. Sabra Carrizales. She was diagnosed with MDS in 2017, she received ESAs and PRBCs transfusion. She has transfusion related iron overload. She was started on Aranesp on 4/24/2020. No SE from Aranesp. The patient returns for a follow-up visit, she is doing well overall, except for fatigue. She has several deaths in the family. She missed her appointment last week. Review of Systems;  CONSTITUTIONAL: No fever, chills. Fair appetite. ENMT: Eyes: No diplopia; Nose: No epistaxis. Mouth: No sore throat. RESPIRATORY: No hemoptysis, shortness of breath, cough. CARDIOVASCULAR: No chest pain, palpitations. GASTROINTESTINAL: No nausea/vomiting, abdominal pain, diarrhea/constipation. GENITOURINARY: No dysuria, urinary frequency, hematuria. NEURO: No syncope, presyncope, headache.   Remainder:  ROS NEGATIVE    Past Medical History:      Diagnosis Date    Anemia     Pt reports she was dx in her teens, w/o proper w/u, with iron deficiency anemia and was on oral iron replacement for many years, resulting in iron overload    Aplastic anemia ([address] ~9241-6600    Possibly d/t chemotherapy for breast cancer    Breast cancer ([address] 2009    right side; pt underwent radical mastectomy followed by radiation therapy and chemotherapy and was then on oral chemo pill for 5 yrs; no recurrence as of 2019    Chickenpox     Hypothyroidism     Measles     Mild depression (HCC)     Mumps     Myelodysplastic syndrome ([facility] ~2016    Pulmonary tuberculosis ~7619-0684    in her early 25s    Scarlet fever     Sensory neuropathy     beyond the ankle b/l     Patient Active Problem List   Diagnosis    MDS (myelodysplastic syndrome) (HCC)    Bronchitis    Macrocytic anemia with high RDW    Hypothyroidism    Peripheral sensory neuropathy    Breast cancer (HCC)    Anemia of chronic disease        Past Surgical History:      Procedure Laterality Date    ADENOIDECTOMY      BONE MARROW BIOPSY      BREAST BIOPSY      CHOLECYSTECTOMY      MASTECTOMY      right breast    OTHER SURGICAL HISTORY      blood transfusions    TONSILLECTOMY AND ADENOIDECTOMY      TUBAL LIGATION         Family History:  Family History   Problem Relation Age of Onset   Lawler Saliva Cancer Mother         lung    Cancer Father         lung    Cancer Sister         ovarian cancer and breast cancer    Cancer Brother         unknown    Cancer Maternal Aunt         unknown    Cancer Other         brain    Other Brother         MI       Medications:  Reviewed and reconciled. Social History:  Social History     Socioeconomic History    Marital status:       Spouse name: Not on file    Number of children: Not on file    Years of education: Not on file    Highest education level: Not on file   Occupational History    Not on file   Tobacco Use    Smoking status: Former Smoker     Packs/day: 1.00     Years: 37.00     Pack years: 37.00     Types: Cigarettes     Start date:      Quit date:      Years since quittin.1    Smokeless tobacco: Never Used   Substance and Sexual Activity    Alcohol use: No    Drug use: Never    Sexual activity: Not Currently   Other Topics Concern    Not on file   Social mastectomy, no suspicious lesions, she has telangiectasias. She has tenderness in the left upper rib cage. CARDIOVASCULAR: Regular rate. No murmurs, rubs or gallops. ABDOMEN: Soft. Non-tender, non-distended. Positive bowel sounds. EXTREMITIES: Positive for bruising of the right foot. NEUROLOGIC: No focal deficits. ECOG PS 1      Impression/Plan:     1. The patient is a very pleasant 78 y.o. lady with a PMH significant for Right Breast Cancer, stage III, HR pos, was diagnosed in 2009, she had a right mastectomy done, followed by adjuvant chemo, she received 8 cycles, probably AC followed by T, then PMRT, and 5 years of adjuvant ET with Arimidex, was completed in 2015. She was treated in [facility] under the care of Dr. Melia Vaz. She is doing well clinically without any evidence of recurrence of her disease. Discussed with her the importance of monthly SBE, and routine screening mammograms, she had a left breast screening mammogram done on 6/22/2021, there was no evidence of malignancy, she will be due for a repeat left breast screening mammogram on 6/23/2022.       2. She has MDS, diagnosed in 2017, she received ESAs and PRBCs transfusion, has transfusion related iron overload, hgb was 8.5, ferritin 3647, was restarted on Aranesp on 4/23/2019. On 7/20/2021, hemoglobin was 8 g/dl hematocrit 24.7,  1.3, normal white count, platelet count 302O, fit test is negative. Today 2/22/2022, hemoglobin had improved to 7.5G/DL, .2, white count 4.7, platelet count 195R, patient missed the injection last week, hemoglobin is less than 10 G/DL, proceed with Aranesp. If she has worsening fatigue, she will let me know. She will be due for retacrit again in 2 weeks, if she is not having adequate increase in her hemoglobin, will increase the dose    Hypothyroidism, continue Synthroid follow-up with PCP.     RTC in 2 weeks with labs, possible Retacrit    Thank you for allowing us to participate in the care of Ms. Lorenzo.     Shirin Montiel MD   HEMATOLOGY/MEDICAL [address] [address] MED ONCOLOGY  [facility] [address]  Dept:  [address]: [phone]

## 2022-03-08 ENCOUNTER — HOSPITAL ENCOUNTER (OUTPATIENT)
Dept: INFUSION THERAPY | Age: 82
Discharge: HOME | End: 2022-03-08
Payer: MEDICARE

## 2022-03-08 ENCOUNTER — OFFICE VISIT (OUTPATIENT)
Dept: ONCOLOGY | Age: 82
End: 2022-03-08
Payer: MEDICARE

## 2022-03-08 VITALS
OXYGEN SATURATION: 100 % | DIASTOLIC BLOOD PRESSURE: 61 MMHG | TEMPERATURE: 97.6 F | BODY MASS INDEX: 26.81 KG/M2 | HEART RATE: 72 BPM | WEIGHT: 142 LBS | HEIGHT: 61 IN | RESPIRATION RATE: 16 BRPM | SYSTOLIC BLOOD PRESSURE: 139 MMHG

## 2022-03-08 DIAGNOSIS — D53.9 MACROCYTIC ANEMIA: ICD-10-CM

## 2022-03-08 DIAGNOSIS — D46.9 MDS (MYELODYSPLASTIC SYNDROME) (HCC): Primary | ICD-10-CM

## 2022-03-08 DIAGNOSIS — D63.8 ANEMIA OF CHRONIC DISEASE: ICD-10-CM

## 2022-03-08 LAB
ANISOCYTOSIS: ABNORMAL
BASOPHILS ABSOLUTE: 0.02 E9/L (ref 0–0.2)
BASOPHILS RELATIVE PERCENT: 0.4 % (ref 0–2)
EOSINOPHILS ABSOLUTE: 0.11 E9/L (ref 0.05–0.5)
EOSINOPHILS RELATIVE PERCENT: 2.1 % (ref 0–6)
HCT VFR BLD CALC: 24.4 % (ref 34–48)
HEMOGLOBIN: 7.8 G/DL (ref 11.5–15.5)
IMMATURE GRANULOCYTES #: 0.04 E9/L
IMMATURE GRANULOCYTES %: 0.8 % (ref 0–5)
LYMPHOCYTES ABSOLUTE: 2.5 E9/L (ref 1.5–4)
LYMPHOCYTES RELATIVE PERCENT: 47.3 % (ref 20–42)
MCH RBC QN AUTO: 37.3 PG (ref 26–35)
MCHC RBC AUTO-ENTMCNC: 32 % (ref 32–34.5)
MCV RBC AUTO: 116.7 FL (ref 80–99.9)
MONOCYTES ABSOLUTE: 0.38 E9/L (ref 0.1–0.95)
MONOCYTES RELATIVE PERCENT: 7.2 % (ref 2–12)
NEUTROPHILS ABSOLUTE: 2.23 E9/L (ref 1.8–7.3)
NEUTROPHILS RELATIVE PERCENT: 42.2 % (ref 43–80)
OVALOCYTES: ABNORMAL
PDW BLD-RTO: 27.1 FL (ref 11.5–15)
PLATELET # BLD: 174 E9/L (ref 130–450)
PMV BLD AUTO: 10.7 FL (ref 7–12)
POIKILOCYTES: ABNORMAL
POLYCHROMASIA: ABNORMAL
RBC # BLD: 2.09 E12/L (ref 3.5–5.5)
WBC # BLD: 5.3 E9/L (ref 4.5–11.5)

## 2022-03-08 PROCEDURE — 1123F ACP DISCUSS/DSCN MKR DOCD: CPT | Performed by: INTERNAL MEDICINE

## 2022-03-08 PROCEDURE — 1036F TOBACCO NON-USER: CPT | Performed by: INTERNAL MEDICINE

## 2022-03-08 PROCEDURE — 96372 THER/PROPH/DIAG INJ SC/IM: CPT

## 2022-03-08 PROCEDURE — 6360000002 HC RX W HCPCS: Performed by: NURSE PRACTITIONER

## 2022-03-08 PROCEDURE — G8399 PT W/DXA RESULTS DOCUMENT: HCPCS | Performed by: INTERNAL MEDICINE

## 2022-03-08 PROCEDURE — 99212 OFFICE O/P EST SF 10 MIN: CPT

## 2022-03-08 PROCEDURE — 99214 OFFICE O/P EST MOD 30 MIN: CPT | Performed by: INTERNAL MEDICINE

## 2022-03-08 PROCEDURE — 1090F PRES/ABSN URINE INCON ASSESS: CPT | Performed by: INTERNAL MEDICINE

## 2022-03-08 PROCEDURE — G8484 FLU IMMUNIZE NO ADMIN: HCPCS | Performed by: INTERNAL MEDICINE

## 2022-03-08 PROCEDURE — 85025 COMPLETE CBC W/AUTO DIFF WBC: CPT

## 2022-03-08 PROCEDURE — G8417 CALC BMI ABV UP PARAM F/U: HCPCS | Performed by: INTERNAL MEDICINE

## 2022-03-08 PROCEDURE — 4040F PNEUMOC VAC/ADMIN/RCVD: CPT | Performed by: INTERNAL MEDICINE

## 2022-03-08 PROCEDURE — G8427 DOCREV CUR MEDS BY ELIG CLIN: HCPCS | Performed by: INTERNAL MEDICINE

## 2022-03-08 PROCEDURE — 36415 COLL VENOUS BLD VENIPUNCTURE: CPT

## 2022-03-08 RX ADMIN — EPOETIN ALFA-EPBX 2000 UNITS: 2000 INJECTION, SOLUTION INTRAVENOUS; SUBCUTANEOUS at 13:39

## 2022-03-08 NOTE — PROGRESS NOTES
708  [facility] MED ONCOLOGY  [address] [facility] [address]  Dept: [address]: [phone]  Attending Progress Note      Reason for Visit:   1. Right Breast Cancer. 2. MDS. Referring Physician:  CONNIE Saleem CNP    PCP:  CONNIE Saleem CNP    History of Present Illness: The patient is a very pleasant 78 y.o. lady with a PMH significant for Right Breast Cancer, stage III, HR pos, was diagnosed in 2009, she had a right mastectomy done, followed by adjuvant chemo, she received 8 cycles, probably AC followed by T, then PMRT, and 5 years of adjuvant ET with Arimidex, was completed in 2015. She was treated in [facility] under the care of Dr. Amador Bolton. She was diagnosed with MDS in 2017, she received ESAs and PRBCs transfusion. She has transfusion related iron overload. She was started on Aranesp on 4/24/2020. No SE from Aranesp. The patient returns for a follow-up visit, she is doing well overall, except for fatigue. Review of Systems;  CONSTITUTIONAL: No fever, chills. Fair appetite. ENMT: Eyes: No diplopia; Nose: No epistaxis. Mouth: No sore throat. RESPIRATORY: No hemoptysis, shortness of breath, cough. CARDIOVASCULAR: No chest pain, palpitations. GASTROINTESTINAL: No nausea/vomiting, abdominal pain, diarrhea/constipation. GENITOURINARY: No dysuria, urinary frequency, hematuria. NEURO: No syncope, presyncope, headache.   Remainder:  ROS NEGATIVE    Past Medical History:      Diagnosis Date    Anemia     Pt reports she was dx in her teens, w/o proper w/u, with iron deficiency anemia and was on oral iron replacement for many years, resulting in iron overload    Aplastic anemia ([address] ~4323-0427    Possibly d/t chemotherapy for breast cancer    Breast cancer ([facility] [address] 2009    right side; pt underwent radical mastectomy followed by radiation therapy and chemotherapy and was then on oral chemo pill for 5 yrs; no recurrence as of 2019    Chickenpox     Hypothyroidism     Measles     Mild depression (HCC)     Mumps     Myelodysplastic syndrome ([facility] [address] ~2016    Pulmonary tuberculosis ~    in her early 25s    Scarlet fever     Sensory neuropathy     beyond the ankle b/l     Patient Active Problem List   Diagnosis    MDS (myelodysplastic syndrome) (HCC)    Bronchitis    Macrocytic anemia with high RDW    Hypothyroidism    Peripheral sensory neuropathy    Breast cancer (HCC)    Anemia of chronic disease        Past Surgical History:      Procedure Laterality Date    ADENOIDECTOMY      BONE MARROW BIOPSY      BREAST BIOPSY      CHOLECYSTECTOMY      MASTECTOMY      right breast    OTHER SURGICAL HISTORY      blood transfusions    TONSILLECTOMY AND ADENOIDECTOMY      TUBAL LIGATION         Family History:  Family History   Problem Relation Age of Onset    Cancer Mother         lung    Cancer Father         lung    Cancer Sister         ovarian cancer and breast cancer    Cancer Brother         unknown    Cancer Maternal Aunt         unknown    Cancer Other         brain    Other Brother         MI       Medications:  Reviewed and reconciled. Social History:  Social History     Socioeconomic History    Marital status:       Spouse name: Not on file    Number of children: Not on file    Years of education: Not on file    Highest education level: Not on file   Occupational History    Not on file   Tobacco Use    Smoking status: Former Smoker     Packs/day: 1.00     Years: 37.00     Pack years: 37.00     Types: Cigarettes     Start date:      Quit date:      Years since quittin.1    Smokeless tobacco: Never Used   Substance and Sexual Activity    Alcohol use: No    Drug use: Never    Sexual activity: Not Currently   Other Topics Concern    Not on file   Social History Narrative    Not on file     Social Determinants of Health Financial Resource Strain:     Difficulty of Paying Living Expenses: Not on file   Food Insecurity:     Worried About Running Out of Food in the Last Year: Not on file    Richar of Food in the Last Year: Not on file   Transportation Needs:     Lack of Transportation (Medical): Not on file    Lack of Transportation (Non-Medical): Not on file   Physical Activity:     Days of Exercise per Week: Not on file    Minutes of Exercise per Session: Not on file   Stress:     Feeling of Stress : Not on file   Social Connections:     Frequency of Communication with Friends and Family: Not on file    Frequency of Social Gatherings with Friends and Family: Not on file    Attends Shinto Services: Not on file    Active Member of [address] [place] [address] or Organizations: Not on file    Attends Club or Organization Meetings: Not on file    Marital Status: Not on file   Intimate Partner Violence:     Fear of Current or Ex-Partner: Not on file    Emotionally Abused: Not on file    Physically Abused: Not on file    Sexually Abused: Not on file   Housing Stability:     Unable to Pay for Housing in the Last Year: Not on file    Number of [place] in the Last Year: Not on file    Unstable Housing in the Last Year: Not on file       Allergies: Allergies   Allergen Reactions    Bee Venom Swelling and Dermatitis    Penicillins Hives, Swelling and Dermatitis    Other      All metals except gold and platinum, welts/blisters       Physical Exam:  /61 (Site: Left Upper Arm, Position: Sitting, Cuff Size: Medium Adult)   Pulse 72   Temp 97.6 °F (36.4 °C)   Resp 16   Ht 5' 1\" (1.549 m)   Wt 142 lb (64.4 kg)   LMP 05/28/2016   SpO2 100%   BMI 26.83 kg/m²   GENERAL: Alert, oriented x 3, not in acute distress. HEENT: PERRLA; EOMI. Oropharynx clear. NECK: Supple. No palpable cervical or supraclavicular lymphadenopathy. LUNGS: Good air entry bilaterally. No wheezing, crackles or rhonchi.    BREASTS: She is s/p right mastectomy, no suspicious lesions, she has telangiectasias. She has tenderness in the left upper rib cage. CARDIOVASCULAR: Regular rate. No murmurs, rubs or gallops. ABDOMEN: Soft. Non-tender, non-distended. Positive bowel sounds. EXTREMITIES: Positive for bruising of the right foot. NEUROLOGIC: No focal deficits. ECOG PS 1      Impression/Plan:     1. The patient is a very pleasant 78 y.o. lady with a PMH significant for Right Breast Cancer, stage III, HR pos, was diagnosed in 2009, she had a right mastectomy done, followed by adjuvant chemo, she received 8 cycles, probably AC followed by T, then PMRT, and 5 years of adjuvant ET with Arimidex, was completed in 2015. She was treated in [facility] under the care of Dr. Charanjit Mayers. She is doing well clinically without any evidence of recurrence of her disease. Discussed with her the importance of monthly SBE, and routine screening mammograms, she had a left breast screening mammogram done on 6/22/2021, there was no evidence of malignancy, she will be due for a repeat left breast screening mammogram on 6/23/2022.       2. She has MDS, diagnosed in 2017, she received ESAs and PRBCs transfusion, has transfusion related iron overload, hgb was 8.5, ferritin 3647, was restarted on Aranesp on 4/23/2019. On 7/20/2021, hemoglobin was 8 g/dl hematocrit 24.7,  1.3, normal white count, platelet count 701D, fit test is negative. Today 3/8/2022, hemoglobin is 7.8, hematocrit 24.4, white count 5.3, platelet count of 791G, proceed with Retacrit, the dose will be increased once approved by insurance as she is not having adequate response to the current dose. If she has worsening fatigue, she will let me know. Hypothyroidism, continue Synthroid follow-up with PCP. RTC in 2 weeks with labs, possible Retacrit. Thank you for allowing us to participate in the care of Ms. Ventura.     Isabel Cordova MD   HEMATOLOGY/MEDICAL ONCOLOGY  [address] [place] [address]

## 2022-03-14 DIAGNOSIS — Z51.5 PALLIATIVE CARE BY SPECIALIST: ICD-10-CM

## 2022-03-14 DIAGNOSIS — G89.3 PAIN DUE TO NEOPLASM: ICD-10-CM

## 2022-03-14 RX ORDER — HYDROCODONE BITARTRATE AND ACETAMINOPHEN 5; 325 MG/1; MG/1
1 TABLET ORAL EVERY 12 HOURS PRN
Qty: 60 TABLET | Refills: 0 | Status: SHIPPED
Start: 2022-03-14 | End: 2022-04-15 | Stop reason: SDUPTHER

## 2022-03-14 NOTE — TELEPHONE ENCOUNTER
Call from Gen requesting refill for Ravenden 5/325 mg. Pharmacy is [facility] on [address] [facility]  Next natalie 7/11/22
FREE:[LAST:[PMD],PHONE:[(   )    -],FAX:[(   )    -]]

## 2022-03-22 ENCOUNTER — HOSPITAL ENCOUNTER (OUTPATIENT)
Dept: INFUSION THERAPY | Age: 82
Discharge: HOME | End: 2022-03-22
Payer: MEDICARE

## 2022-03-22 ENCOUNTER — OFFICE VISIT (OUTPATIENT)
Dept: ONCOLOGY | Age: 82
End: 2022-03-22
Payer: MEDICARE

## 2022-03-22 VITALS
WEIGHT: 141 LBS | HEART RATE: 67 BPM | OXYGEN SATURATION: 98 % | DIASTOLIC BLOOD PRESSURE: 73 MMHG | HEIGHT: 61 IN | TEMPERATURE: 97.5 F | SYSTOLIC BLOOD PRESSURE: 148 MMHG | BODY MASS INDEX: 26.62 KG/M2

## 2022-03-22 DIAGNOSIS — D46.9 MDS (MYELODYSPLASTIC SYNDROME) (HCC): Primary | ICD-10-CM

## 2022-03-22 DIAGNOSIS — D63.8 ANEMIA OF CHRONIC DISEASE: ICD-10-CM

## 2022-03-22 DIAGNOSIS — D53.9 MACROCYTIC ANEMIA: ICD-10-CM

## 2022-03-22 LAB
ANISOCYTOSIS: ABNORMAL
BASOPHILS ABSOLUTE: 0.05 E9/L (ref 0–0.2)
BASOPHILS RELATIVE PERCENT: 0.9 % (ref 0–2)
EOSINOPHILS ABSOLUTE: 0.18 E9/L (ref 0.05–0.5)
EOSINOPHILS RELATIVE PERCENT: 3.5 % (ref 0–6)
HCT VFR BLD CALC: 23.8 % (ref 34–48)
HEMOGLOBIN: 7.5 G/DL (ref 11.5–15.5)
HYPOCHROMIA: ABNORMAL
LYMPHOCYTES ABSOLUTE: 2.19 E9/L (ref 1.5–4)
LYMPHOCYTES RELATIVE PERCENT: 42.6 % (ref 20–42)
MCH RBC QN AUTO: 36.9 PG (ref 26–35)
MCHC RBC AUTO-ENTMCNC: 31.5 % (ref 32–34.5)
MCV RBC AUTO: 117.2 FL (ref 80–99.9)
MONOCYTES ABSOLUTE: 0.2 E9/L (ref 0.1–0.95)
MONOCYTES RELATIVE PERCENT: 3.5 % (ref 2–12)
NEUTROPHILS ABSOLUTE: 2.55 E9/L (ref 1.8–7.3)
NEUTROPHILS RELATIVE PERCENT: 49.5 % (ref 43–80)
NUCLEATED RED BLOOD CELLS: 0.9 /100 WBC
OVALOCYTES: ABNORMAL
PDW BLD-RTO: 27.4 FL (ref 11.5–15)
PLATELET # BLD: 163 E9/L (ref 130–450)
PMV BLD AUTO: 11.5 FL (ref 7–12)
POIKILOCYTES: ABNORMAL
POLYCHROMASIA: ABNORMAL
RBC # BLD: 2.03 E12/L (ref 3.5–5.5)
TARGET CELLS: ABNORMAL
TEAR DROP CELLS: ABNORMAL
WBC # BLD: 5.1 E9/L (ref 4.5–11.5)

## 2022-03-22 PROCEDURE — 85025 COMPLETE CBC W/AUTO DIFF WBC: CPT

## 2022-03-22 PROCEDURE — 99214 OFFICE O/P EST MOD 30 MIN: CPT | Performed by: INTERNAL MEDICINE

## 2022-03-22 PROCEDURE — 1090F PRES/ABSN URINE INCON ASSESS: CPT | Performed by: INTERNAL MEDICINE

## 2022-03-22 PROCEDURE — G8427 DOCREV CUR MEDS BY ELIG CLIN: HCPCS | Performed by: INTERNAL MEDICINE

## 2022-03-22 PROCEDURE — G8484 FLU IMMUNIZE NO ADMIN: HCPCS | Performed by: INTERNAL MEDICINE

## 2022-03-22 PROCEDURE — G8399 PT W/DXA RESULTS DOCUMENT: HCPCS | Performed by: INTERNAL MEDICINE

## 2022-03-22 PROCEDURE — G8417 CALC BMI ABV UP PARAM F/U: HCPCS | Performed by: INTERNAL MEDICINE

## 2022-03-22 PROCEDURE — 1123F ACP DISCUSS/DSCN MKR DOCD: CPT | Performed by: INTERNAL MEDICINE

## 2022-03-22 PROCEDURE — 96372 THER/PROPH/DIAG INJ SC/IM: CPT

## 2022-03-22 PROCEDURE — 4040F PNEUMOC VAC/ADMIN/RCVD: CPT | Performed by: INTERNAL MEDICINE

## 2022-03-22 PROCEDURE — 99212 OFFICE O/P EST SF 10 MIN: CPT

## 2022-03-22 PROCEDURE — 1036F TOBACCO NON-USER: CPT | Performed by: INTERNAL MEDICINE

## 2022-03-22 PROCEDURE — 36415 COLL VENOUS BLD VENIPUNCTURE: CPT

## 2022-03-22 PROCEDURE — 6360000002 HC RX W HCPCS: Performed by: INTERNAL MEDICINE

## 2022-03-22 RX ADMIN — EPOETIN ALFA-EPBX 10000 UNITS: 10000 INJECTION, SOLUTION INTRAVENOUS; SUBCUTANEOUS at 13:48

## 2022-03-22 NOTE — PROGRESS NOTES
707  [facility] MED ONCOLOGY  [facility] [facility] [address]  Dept: [address]: [phone]  Attending Progress Note      Reason for Visit:   1. Right Breast Cancer. 2. MDS. Referring Physician:  CONNIE Caceres CNP    PCP:  CONNIE Caceres CNP    History of Present Illness: The patient is a very pleasant 78 y.o. lady with a PMH significant for Right Breast Cancer, stage III, HR pos, was diagnosed in 2009, she had a right mastectomy done, followed by adjuvant chemo, she received 8 cycles, probably AC followed by T, then PMRT, and 5 years of adjuvant ET with Arimidex, was completed in 2015. She was treated in [facility] under the care of Dr. Marques Whitfield. She was diagnosed with MDS in 2017, she received ESAs and PRBCs transfusion. She has transfusion related iron overload. She was started on Aranesp on 4/24/2020. No SE from Aranesp. The patient returns for a follow-up visit, she is doing well overall, except for fatigue. Review of Systems;  CONSTITUTIONAL: No fever, chills. Fair appetite. ENMT: Eyes: No diplopia; Nose: No epistaxis. Mouth: No sore throat. RESPIRATORY: No hemoptysis, shortness of breath, cough. CARDIOVASCULAR: No chest pain, palpitations. GASTROINTESTINAL: No nausea/vomiting, abdominal pain, diarrhea/constipation. GENITOURINARY: No dysuria, urinary frequency, hematuria. NEURO: No syncope, presyncope, headache.   Remainder:  ROS NEGATIVE    Past Medical History:      Diagnosis Date    Anemia     Pt reports she was dx in her teens, w/o proper w/u, with iron deficiency anemia and was on oral iron replacement for many years, resulting in iron overload    Aplastic anemia ([address] ~9941-0686    Possibly d/t chemotherapy for breast cancer    Breast cancer ([facility] [address] 2009    right side; pt underwent radical mastectomy followed by radiation therapy and chemotherapy and was then on oral chemo pill for 5 yrs; no recurrence as of 2019    Chickenpox     Hypothyroidism     Measles     Mild depression (HCC)     Mumps     Myelodysplastic syndrome ([facility] [address] ~2016    Pulmonary tuberculosis ~2795-5474    in her early 25s    Scarlet fever     Sensory neuropathy     beyond the ankle b/l     Patient Active Problem List   Diagnosis    MDS (myelodysplastic syndrome) (HCC)    Bronchitis    Macrocytic anemia with high RDW    Hypothyroidism    Peripheral sensory neuropathy    Breast cancer (HCC)    Anemia of chronic disease        Past Surgical History:      Procedure Laterality Date    ADENOIDECTOMY      BONE MARROW BIOPSY      BREAST BIOPSY      CHOLECYSTECTOMY      MASTECTOMY      right breast    OTHER SURGICAL HISTORY      blood transfusions    TONSILLECTOMY AND ADENOIDECTOMY      TUBAL LIGATION         Family History:  Family History   Problem Relation Age of Onset    Cancer Mother         lung    Cancer Father         lung    Cancer Sister         ovarian cancer and breast cancer    Cancer Brother         unknown    Cancer Maternal Aunt         unknown    Cancer Other         brain    Other Brother         MI       Medications:  Reviewed and reconciled. Social History:  Social History     Socioeconomic History    Marital status:       Spouse name: Not on file    Number of children: Not on file    Years of education: Not on file    Highest education level: Not on file   Occupational History    Not on file   Tobacco Use    Smoking status: Former Smoker     Packs/day: 1.00     Years: 37.00     Pack years: 37.00     Types: Cigarettes     Start date:      Quit date:      Years since quittin.2    Smokeless tobacco: Never Used   Substance and Sexual Activity    Alcohol use: No    Drug use: Never    Sexual activity: Not Currently   Other Topics Concern    Not on file   Social History Narrative    Not on file     Social Determinants of Health Financial Resource Strain:     Difficulty of Paying Living Expenses: Not on file   Food Insecurity:     Worried About Running Out of Food in the Last Year: Not on file    Richar of Food in the Last Year: Not on file   Transportation Needs:     Lack of Transportation (Medical): Not on file    Lack of Transportation (Non-Medical): Not on file   Physical Activity:     Days of Exercise per Week: Not on file    Minutes of Exercise per Session: Not on file   Stress:     Feeling of Stress : Not on file   Social Connections:     Frequency of Communication with Friends and Family: Not on file    Frequency of Social Gatherings with Friends and Family: Not on file    Attends Mosque Services: Not on file    Active Member of [address] [place] [address] or Organizations: Not on file    Attends Club or Organization Meetings: Not on file    Marital Status: Not on file   Intimate Partner Violence:     Fear of Current or Ex-Partner: Not on file    Emotionally Abused: Not on file    Physically Abused: Not on file    Sexually Abused: Not on file   Housing Stability:     Unable to Pay for Housing in the Last Year: Not on file    Number of [place] in the Last Year: Not on file    Unstable Housing in the Last Year: Not on file       Allergies: Allergies   Allergen Reactions    Bee Venom Swelling and Dermatitis    Penicillins Hives, Swelling and Dermatitis    Other      All metals except gold and platinum, welts/blisters       Physical Exam:  BP (!) 148/73   Pulse 67   Temp 97.5 °F (36.4 °C)   Ht 5' 1\" (1.549 m)   Wt 141 lb (64 kg)   LMP 05/28/2016   SpO2 98%   BMI 26.64 kg/m²   GENERAL: Alert, oriented x 3, not in acute distress. HEENT: PERRLA; EOMI. Oropharynx clear. NECK: Supple. No palpable cervical or supraclavicular lymphadenopathy. LUNGS: Good air entry bilaterally. No wheezing, crackles or rhonchi. BREASTS: She is s/p right mastectomy, no suspicious lesions, she has telangiectasias.   She has tenderness in the left upper rib cage. CARDIOVASCULAR: Regular rate. No murmurs, rubs or gallops. ABDOMEN: Soft. Non-tender, non-distended. Positive bowel sounds. EXTREMITIES: Positive for bruising of the right foot. NEUROLOGIC: No focal deficits. ECOG PS 1      Impression/Plan:     1. The patient is a very pleasant 78 y.o. lady with a PMH significant for Right Breast Cancer, stage III, HR pos, was diagnosed in 2009, she had a right mastectomy done, followed by adjuvant chemo, she received 8 cycles, probably AC followed by T, then PMRT, and 5 years of adjuvant ET with Arimidex, was completed in 2015. She was treated in [facility] under the care of Dr. Sandeep Sen. She is doing well clinically without any evidence of recurrence of her disease. Discussed with her the importance of monthly SBE, and routine screening mammograms, she had a left breast screening mammogram done on 6/22/2021, there was no evidence of malignancy, she will be due for a repeat left breast screening mammogram on 6/23/2022.       2. She has MDS, diagnosed in 2017, she received ESAs and PRBCs transfusion, has transfusion related iron overload, hgb was 8.5, ferritin 3647, was restarted on Aranesp on 4/23/2019. On 7/20/2021, hemoglobin was 8 g/dl hematocrit 24.7,  1.3, normal white count, platelet count 853M, fit test is negative. Today 3/22/2022, hemoglobin is worse, 7.5G/DL, the patient has not been responding to Retacrit, the dose was increased to 10,000 units, the patient was responding to Aranesp, will switch back to Aranesp if possible. Proceed with Retacrit. Hypothyroidism, continue Synthroid follow-up with PCP. RTC in 2 weeks with labs, possible Retacrit. Thank you for allowing us to participate in the care of Ms. Ventura.     Fany Gross MD   HEMATOLOGY/MEDICAL [address] [place] [address] ONCOLOGY  [facility] [address] [facility] [address]  Dept: [phone]  Loc: [phone]

## 2022-03-22 NOTE — PROGRESS NOTES
Patient not responding well to the Retacrit, hgb remains low despite retacrit injection. We will change her back to Aranesp which she responded to. 500 mcg dose ordered d/t MDSBasim notified.

## 2022-03-24 ENCOUNTER — OFFICE VISIT (OUTPATIENT)
Dept: PODIATRY | Age: 82
End: 2022-03-24

## 2022-03-24 VITALS
BODY MASS INDEX: 26.83 KG/M2 | TEMPERATURE: 97.5 F | WEIGHT: 142 LBS | DIASTOLIC BLOOD PRESSURE: 72 MMHG | SYSTOLIC BLOOD PRESSURE: 110 MMHG

## 2022-03-24 DIAGNOSIS — B35.1 TINEA UNGUIUM: Primary | ICD-10-CM

## 2022-03-24 DIAGNOSIS — L84 CORN OR CALLUS: ICD-10-CM

## 2022-03-24 DIAGNOSIS — I73.9 PERIPHERAL VASCULAR DISEASE, UNSPECIFIED (HCC): ICD-10-CM

## 2022-03-24 DIAGNOSIS — G60.8 PERIPHERAL SENSORY NEUROPATHY: ICD-10-CM

## 2022-03-24 DIAGNOSIS — M79.675 PAIN IN LEFT TOE(S): ICD-10-CM

## 2022-03-24 DIAGNOSIS — M79.674 PAIN IN TOE OF RIGHT FOOT: ICD-10-CM

## 2022-03-24 PROCEDURE — G8484 FLU IMMUNIZE NO ADMIN: HCPCS | Performed by: PODIATRIST

## 2022-03-24 PROCEDURE — 4040F PNEUMOC VAC/ADMIN/RCVD: CPT | Performed by: PODIATRIST

## 2022-03-24 PROCEDURE — G8399 PT W/DXA RESULTS DOCUMENT: HCPCS | Performed by: PODIATRIST

## 2022-03-24 PROCEDURE — 1090F PRES/ABSN URINE INCON ASSESS: CPT | Performed by: PODIATRIST

## 2022-03-24 PROCEDURE — 99203 OFFICE O/P NEW LOW 30 MIN: CPT | Performed by: PODIATRIST

## 2022-03-24 PROCEDURE — G8427 DOCREV CUR MEDS BY ELIG CLIN: HCPCS | Performed by: PODIATRIST

## 2022-03-24 PROCEDURE — 1123F ACP DISCUSS/DSCN MKR DOCD: CPT | Performed by: PODIATRIST

## 2022-03-24 PROCEDURE — G8417 CALC BMI ABV UP PARAM F/U: HCPCS | Performed by: PODIATRIST

## 2022-03-24 PROCEDURE — 1036F TOBACCO NON-USER: CPT | Performed by: PODIATRIST

## 2022-03-24 NOTE — PROGRESS NOTES
Dialloa Speak  New Patient    Chief Complaint   Patient presents with   Ewelina Porter Doctor     saw pcp Dr. Cely Robison 3/21/2022    Referral - General     referred by Cely Robison for nail care foot ck toes are discolored pt has Psiarosis     Toe Pain       Subjective: This Malka Reyez comes to office for foot and nail care. Pt currently has complaint of thickened, painful, elongated nails that he/she cannot manage by themselves. Pt. Relates pain to nails with shoe gear. Pt's primary care physician is CONNIE Hodges - CNP. Past Medical History:   Diagnosis Date    Anemia     Pt reports she was dx in her teens, w/o proper w/u, with iron deficiency anemia and was on oral iron replacement for many years, resulting in iron overload    Aplastic anemia ([facility] [address] ~7902-2260    Possibly d/t chemotherapy for breast cancer    Breast cancer ([facility] [address] 2009    right side; pt underwent radical mastectomy followed by radiation therapy and chemotherapy and was then on oral chemo pill for 5 yrs; no recurrence as of 01/2019    Chickenpox     Hypothyroidism     Measles     Mild depression (HCC)     Mumps     Myelodysplastic syndrome ([facility] [address] ~2016    Pulmonary tuberculosis ~9394-8985    in her early 25s    Scarlet fever     Sensory neuropathy     beyond the ankle b/l       Allergies   Allergen Reactions    Bee Venom Swelling and Dermatitis    Penicillins Hives, Swelling and Dermatitis    Other      All metals except gold and platinum, welts/blisters     Current Outpatient Medications on File Prior to Visit   Medication Sig Dispense Refill    HYDROcodone-acetaminophen (NORCO) 5-325 MG per tablet Take 1 tablet by mouth every 12 hours as needed for Pain for up to 30 days. 60 tablet 0    sertraline (ZOLOFT) 50 MG tablet Take 1 tablet by mouth nightly 90 tablet 3    gabapentin (NEURONTIN) 300 MG capsule Take 3 capsules by mouth nightly.  TAKE 3 CAPSULES BY MOUTH EVERY NIGHT 270 capsule 3    levothyroxine (SYNTHROID) 200 MCG tablet TAKE 1 TABLET BY MOUTH EVERY DAY IN THE MORNING ON AN EMPTY STOMACH      ibuprofen (ADVIL;MOTRIN) 400 MG tablet Take 1 tablet by mouth every 6 hours as needed for Pain 120 tablet 3    Misc. Devices [facility]) [facility] Multiple thoracic fractures with L3 fracture 1 each 0     No current facility-administered medications on file prior to visit. Review of Systems  Objective:  General: AAO x 3 in NAD. Derm  Toenail Description  Sites of Onychomycosis Involvement (Check affected area)  [x] [x] [x] [x] [x] [x] [x] [x] [x] [x]  5 4 3 2 1 1 2 3 4 5                          Right                                        Left    Thickness  [x] [x] [x] [x] [x] [x] [x] [x] [x] [x]  5 4 3 2 1 1 2 3 4 5                         Right                                        Left    Dystrophic Changes   [x] [x] [x] [x] [x] [x] [x] [x] [x] [x]  5 4 3 2 1 1 2 3 4 5                         Right                                        Left    Color  [x] [x] [x] [x] [x] [x] [x] [x] [x] [x]  5 4 3 2 1 1 2 3 4 5                          Right                                        Left    Incurvation/Ingrowin   [x] [x] [x] [x] [x] [x] [x] [x] [x] [x]  5 4 3 2 1 1 2 3 4 5                         Right                                        Left    Inflammation/Pain   [x] [x] [x] [x] [x] [x] [x] [x] [x] [x]  5 4 3  2 1 1 2 3 4 5                         Right                                        Left      Nails that are described above are all elongated thickened pitting mycotic yellowish incurvated causing pain with both shoe gear.  Palpation nails greater then 3 mm thick painful       Dermatologic Exam:hair loss noted  lower extremity    Skin lesion/ulceration   Skin   Callus   Musculoskeletal:     1st MPJ ROM normal, Bilateral.  Muscle strength 5/5, Bilateral.  Pain present upon palpation of toenails 1-5  Bilateral., Bilateral.  Ankle ROM normal,Bilateral.    Dorsally contracted digits , Bilateral. Vascular:  Pulses   bilateral DP present    PT absent     Neurological:  Sensation present to light touch to level of digits, Bilateral.    Foot Exam     Ortho Exam  Q7   []Yes    []No                Q8   [x]Yes    []No                     Q9   []Yes    []No  Assessment:  78 y.o. female with:   1. Tinea unguium    2. Pain in left toe(s)    3. Pain in toe of right foot    4. Peripheral vascular disease, unspecified ([address]    5. Corn or callus    6. Peripheral sensory neuropathy     No orders of the defined types were placed in this encounter. Plan: Doppler pulses were palpable of the DP the erythematous that was noted to the right foot I feel is more psoriasis than any vascular status we will continue to monitor  Pt was evaluated and examined. Patient was given personalized discharge instructions. Nails 1-10 were debrided in length and thickness sharply with a nail nipper and  without incident. Pt will follow up in 9 weeks or sooner if any problems arise. Diagnosis was discussed with the pt and all of their questions were answered in detail. Proper foot hygiene and care was discussed with the pt. Patient to check feet daily and contact the office with any questions/problems/concerns. Other comorbidity noted and will be managed by PCP. Pain waiver discussed with patient and confirmed.    3/24/2022      Electronically signed by Ziggy Puente DPM on 3/24/2022 at 12:42 PM  3/24/2022

## 2022-03-29 ENCOUNTER — APPOINTMENT (OUTPATIENT)
Dept: GENERAL RADIOLOGY | Age: 82
End: 2022-03-29
Payer: MEDICARE

## 2022-03-29 ENCOUNTER — APPOINTMENT (OUTPATIENT)
Dept: CT IMAGING | Age: 82
End: 2022-03-29
Payer: MEDICARE

## 2022-03-29 ENCOUNTER — HOSPITAL ENCOUNTER (EMERGENCY)
Age: 82
Discharge: HOME | End: 2022-03-29
Attending: STUDENT IN AN ORGANIZED HEALTH CARE EDUCATION/TRAINING PROGRAM
Payer: MEDICARE

## 2022-03-29 VITALS
WEIGHT: 142 LBS | DIASTOLIC BLOOD PRESSURE: 74 MMHG | HEART RATE: 81 BPM | SYSTOLIC BLOOD PRESSURE: 129 MMHG | RESPIRATION RATE: 18 BRPM | OXYGEN SATURATION: 99 % | TEMPERATURE: 98.1 F | BODY MASS INDEX: 26.83 KG/M2

## 2022-03-29 DIAGNOSIS — N12 PYELONEPHRITIS: Primary | ICD-10-CM

## 2022-03-29 DIAGNOSIS — D64.9 CHRONIC ANEMIA: ICD-10-CM

## 2022-03-29 LAB
ABO/RH: NORMAL
ALBUMIN SERPL-MCNC: 4.6 G/DL (ref 3.5–5.2)
ALP BLD-CCNC: 47 U/L (ref 35–104)
ALT SERPL-CCNC: 41 U/L (ref 0–32)
ANION GAP SERPL CALCULATED.3IONS-SCNC: 11 MMOL/L (ref 7–16)
ANISOCYTOSIS: ABNORMAL
ANTIBODY IDENTIFICATION: NORMAL
ANTIBODY IDENTIFICATION: NORMAL
ANTIBODY SCREEN: NORMAL
AST SERPL-CCNC: 37 U/L (ref 0–31)
BACTERIA: ABNORMAL /HPF
BASOPHILIC STIPPLING: ABNORMAL
BASOPHILS ABSOLUTE: 0 E9/L (ref 0–0.2)
BASOPHILS RELATIVE PERCENT: 0.4 % (ref 0–2)
BILIRUB SERPL-MCNC: 1 MG/DL (ref 0–1.2)
BILIRUBIN URINE: NEGATIVE
BLOOD, URINE: NEGATIVE
BUN BLDV-MCNC: 18 MG/DL (ref 6–23)
CALCIUM SERPL-MCNC: 9 MG/DL (ref 8.6–10.2)
CHLORIDE BLD-SCNC: 105 MMOL/L (ref 98–107)
CLARITY: CLEAR
CO2: 22 MMOL/L (ref 22–29)
COLOR: YELLOW
CREAT SERPL-MCNC: 0.8 MG/DL (ref 0.5–1)
D DIMER: 213 NG/ML DDU
DAT POLYSPECIFIC: NORMAL
DR. NOTIFY: NORMAL
EKG ATRIAL RATE: 73 BPM
EKG P AXIS: 66 DEGREES
EKG P-R INTERVAL: 192 MS
EKG Q-T INTERVAL: 386 MS
EKG QRS DURATION: 80 MS
EKG QTC CALCULATION (BAZETT): 425 MS
EKG R AXIS: 6 DEGREES
EKG T AXIS: 40 DEGREES
EKG VENTRICULAR RATE: 73 BPM
EOSINOPHILS ABSOLUTE: 0.14 E9/L (ref 0.05–0.5)
EOSINOPHILS RELATIVE PERCENT: 1.8 % (ref 0–6)
GFR AFRICAN AMERICAN: >60
GFR NON-AFRICAN AMERICAN: >60 ML/MIN/1.73
GLUCOSE BLD-MCNC: 94 MG/DL (ref 74–99)
GLUCOSE URINE: NEGATIVE MG/DL
HCT VFR BLD CALC: 24.6 % (ref 34–48)
HEMOGLOBIN: 7.9 G/DL (ref 11.5–15.5)
HYPOCHROMIA: ABNORMAL
KETONES, URINE: NEGATIVE MG/DL
LACTIC ACID: 1.7 MMOL/L (ref 0.5–2.2)
LEUKOCYTE ESTERASE, URINE: ABNORMAL
LIPASE: 52 U/L (ref 13–60)
LYMPHOCYTES ABSOLUTE: 3.12 E9/L (ref 1.5–4)
LYMPHOCYTES RELATIVE PERCENT: 39.8 % (ref 20–42)
MCH RBC QN AUTO: 37.4 PG (ref 26–35)
MCHC RBC AUTO-ENTMCNC: 32.1 % (ref 32–34.5)
MCV RBC AUTO: 116.6 FL (ref 80–99.9)
MONOCYTES ABSOLUTE: 0.23 E9/L (ref 0.1–0.95)
MONOCYTES RELATIVE PERCENT: 2.6 % (ref 2–12)
MYELOCYTE PERCENT: 0.9 % (ref 0–0)
NEUTROPHILS ABSOLUTE: 4.37 E9/L (ref 1.8–7.3)
NEUTROPHILS RELATIVE PERCENT: 54.9 % (ref 43–80)
NITRITE, URINE: NEGATIVE
OVALOCYTES: ABNORMAL
PDW BLD-RTO: 26.8 FL (ref 11.5–15)
PH UA: 5.5 (ref 5–9)
PLATELET # BLD: 225 E9/L (ref 130–450)
PMV BLD AUTO: 11.2 FL (ref 7–12)
POIKILOCYTES: ABNORMAL
POLYCHROMASIA: ABNORMAL
POTASSIUM REFLEX MAGNESIUM: 4.3 MMOL/L (ref 3.5–5)
PRO-BNP: 406 PG/ML (ref 0–450)
PROTEIN UA: NEGATIVE MG/DL
RBC # BLD: 2.11 E12/L (ref 3.5–5.5)
RBC UA: ABNORMAL /HPF (ref 0–2)
SODIUM BLD-SCNC: 138 MMOL/L (ref 132–146)
SPECIFIC GRAVITY UA: 1.02 (ref 1–1.03)
TARGET CELLS: ABNORMAL
TOTAL PROTEIN: 7.3 G/DL (ref 6.4–8.3)
TROPONIN, HIGH SENSITIVITY: 9 NG/L (ref 0–9)
UROBILINOGEN, URINE: 0.2 E.U./DL
WBC # BLD: 7.8 E9/L (ref 4.5–11.5)
WBC UA: ABNORMAL /HPF (ref 0–5)

## 2022-03-29 PROCEDURE — 2580000003 HC RX 258: Performed by: STUDENT IN AN ORGANIZED HEALTH CARE EDUCATION/TRAINING PROGRAM

## 2022-03-29 PROCEDURE — 83690 ASSAY OF LIPASE: CPT

## 2022-03-29 PROCEDURE — 86880 COOMBS TEST DIRECT: CPT

## 2022-03-29 PROCEDURE — 81001 URINALYSIS AUTO W/SCOPE: CPT

## 2022-03-29 PROCEDURE — 80053 COMPREHEN METABOLIC PANEL: CPT

## 2022-03-29 PROCEDURE — 87088 URINE BACTERIA CULTURE: CPT

## 2022-03-29 PROCEDURE — 36415 COLL VENOUS BLD VENIPUNCTURE: CPT

## 2022-03-29 PROCEDURE — 85378 FIBRIN DEGRADE SEMIQUANT: CPT

## 2022-03-29 PROCEDURE — 71046 X-RAY EXAM CHEST 2 VIEWS: CPT

## 2022-03-29 PROCEDURE — 84484 ASSAY OF TROPONIN QUANT: CPT

## 2022-03-29 PROCEDURE — 93005 ELECTROCARDIOGRAM TRACING: CPT | Performed by: PHYSICIAN ASSISTANT

## 2022-03-29 PROCEDURE — 85025 COMPLETE CBC W/AUTO DIFF WBC: CPT

## 2022-03-29 PROCEDURE — 86901 BLOOD TYPING SEROLOGIC RH(D): CPT

## 2022-03-29 PROCEDURE — 86870 RBC ANTIBODY IDENTIFICATION: CPT

## 2022-03-29 PROCEDURE — 83605 ASSAY OF LACTIC ACID: CPT

## 2022-03-29 PROCEDURE — 86900 BLOOD TYPING SEROLOGIC ABO: CPT

## 2022-03-29 PROCEDURE — 99285 EMERGENCY DEPT VISIT HI MDM: CPT

## 2022-03-29 PROCEDURE — 86850 RBC ANTIBODY SCREEN: CPT

## 2022-03-29 PROCEDURE — 6360000002 HC RX W HCPCS: Performed by: STUDENT IN AN ORGANIZED HEALTH CARE EDUCATION/TRAINING PROGRAM

## 2022-03-29 PROCEDURE — 93010 ELECTROCARDIOGRAM REPORT: CPT | Performed by: INTERNAL MEDICINE

## 2022-03-29 PROCEDURE — 74176 CT ABD & PELVIS W/O CONTRAST: CPT

## 2022-03-29 PROCEDURE — 83880 ASSAY OF NATRIURETIC PEPTIDE: CPT

## 2022-03-29 PROCEDURE — 96374 THER/PROPH/DIAG INJ IV PUSH: CPT

## 2022-03-29 RX ORDER — CEFDINIR 300 MG/1
300 CAPSULE ORAL 2 TIMES DAILY
Qty: 20 CAPSULE | Refills: 0 | Status: SHIPPED | OUTPATIENT
Start: 2022-03-29 | End: 2022-04-08

## 2022-03-29 RX ORDER — ASPIRIN 81 MG/1
81 TABLET ORAL 2 TIMES DAILY
Status: ON HOLD | COMMUNITY
End: 2022-08-24 | Stop reason: SDUPTHER

## 2022-03-29 RX ADMIN — WATER 1000 MG: 1 INJECTION INTRAMUSCULAR; INTRAVENOUS; SUBCUTANEOUS at 21:47

## 2022-03-29 ASSESSMENT — PAIN DESCRIPTION - FREQUENCY
FREQUENCY: INTERMITTENT
FREQUENCY: CONTINUOUS

## 2022-03-29 ASSESSMENT — ENCOUNTER SYMPTOMS
NAUSEA: 0
SHORTNESS OF BREATH: 0
PHOTOPHOBIA: 0
CHEST TIGHTNESS: 0
VOMITING: 0
DIARRHEA: 0
COUGH: 0
ABDOMINAL DISTENTION: 0
ABDOMINAL PAIN: 0

## 2022-03-29 ASSESSMENT — PAIN DESCRIPTION - PAIN TYPE: TYPE: ACUTE PAIN

## 2022-03-29 ASSESSMENT — PAIN - FUNCTIONAL ASSESSMENT: PAIN_FUNCTIONAL_ASSESSMENT: 0-10

## 2022-03-29 ASSESSMENT — PAIN SCALES - GENERAL: PAINLEVEL_OUTOF10: 9

## 2022-03-29 ASSESSMENT — PAIN DESCRIPTION - ORIENTATION: ORIENTATION: RIGHT

## 2022-03-29 ASSESSMENT — PAIN DESCRIPTION - DESCRIPTORS: DESCRIPTORS: ACHING

## 2022-03-29 ASSESSMENT — PAIN DESCRIPTION - LOCATION: LOCATION: FLANK

## 2022-03-29 NOTE — ED NOTES
Department of Emergency Medicine    FIRST PROVIDER TRIAGE NOTE             Independent MAKENZIE           3/29/22  1:47 PM EDT    Date of Encounter: 3/29/22   MRN: [id number]    Vitals:    03/29/22 1342 03/29/22 1347   BP:  (!) 124/34   Pulse: 68    Resp: 16    Temp: 98.1 °F (36.7 °C)    TempSrc: Oral    SpO2: 97% 100%   Weight:  142 lb (64.4 kg)      HPI: Umu Son is a 78 y.o. female who presents to the ED for Flank Pain (started in the lower right side back and has been traveling up her flank/back pain. Denies chest pain but admits to SOB. )  Patient states she is very SOB. She states the pain in her right flank and RUQ is worse with deep breathing  Hx cholecystectomy    ROS: Negative for fever, vomiting or diarrhea. Denies urinary complaints     Physical Exam:   Gen Appearance/Constitutional: alert  CV: regular rate  Pulm: CTA bilat     Initial Plan of Care: All treatment areas with department are currently occupied. Plan to order/Initiate the following while awaiting opening in ED: labs, EKG and imaging studies.     Initial Plan of Care: Initiate Treatment-Testing, Proceed toTreatment Area When Bed Available for ED Attending/MLP to Continue Care    Electronically signed by Daniel Reddy PA-C   DD: 3/29/22       Daniel Reddy PA-C  03/29/22 3825

## 2022-03-29 NOTE — ED PROVIDER NOTES
Meghana Pabon is an 78year old female with PMH of MDS, present emergency department concern for right-sided flank pain started Sunday night is worsening. Patient is having pain to the right flank that radiates around to the periumbilical area. Patient denies any trauma. Patient has a history of a cholecystectomy about 7 years ago. Patient denies fever, chills, nausea, vomiting. Nothing make symptoms better or worse patient denies any dysuria or constipation. Patient denies any blood in stool. Symptoms are constant moderate severity worsening. Patient denies chest pain or shortness of breath. Patient has hx of chronic anemia and MDS denies current chemotherapy. The history is provided by the patient and medical records. Review of Systems   Constitutional: Positive for fatigue. Negative for chills, diaphoresis and fever. Eyes: Negative for photophobia and visual disturbance. Respiratory: Negative for cough, chest tightness and shortness of breath. Cardiovascular: Negative for chest pain, palpitations and leg swelling. Gastrointestinal: Negative for abdominal distention, abdominal pain, diarrhea, nausea and vomiting. Genitourinary: Positive for flank pain. Negative for dysuria. Musculoskeletal: Negative for neck pain and neck stiffness. Skin: Negative for pallor and rash. Neurological: Negative for headaches. Psychiatric/Behavioral: Negative for confusion. Physical Exam  Vitals and nursing note reviewed. Constitutional:       General: She is not in acute distress. Appearance: Normal appearance. She is not ill-appearing. HENT:      Head: Normocephalic and atraumatic. Eyes:      General: No scleral icterus. Conjunctiva/sclera: Conjunctivae normal.      Pupils: Pupils are equal, round, and reactive to light. Cardiovascular:      Rate and Rhythm: Normal rate and regular rhythm.    Pulmonary:      Effort: Pulmonary effort is normal.      Breath sounds: Normal breath sounds. Abdominal:      General: Bowel sounds are normal. There is no distension. Palpations: Abdomen is soft. Tenderness: There is abdominal tenderness. There is right CVA tenderness. There is no guarding or rebound. Musculoskeletal:      Cervical back: Normal range of motion and neck supple. No rigidity. No muscular tenderness. Right lower leg: No edema. Left lower leg: No edema. Skin:     General: Skin is warm and dry. Capillary Refill: Capillary refill takes less than 2 seconds. Coloration: Skin is not pale. Findings: No erythema or rash. Neurological:      Mental Status: She is alert and oriented to person, place, and time. Psychiatric:         Mood and Affect: Mood normal.          Procedures     MDM  Number of Diagnoses or Management Options  Chronic anemia  Pyelonephritis  Diagnosis management comments: Zully Zuniga is a 78year old female who presented to ED with concern for right flank pain. Patient found to have likely UTI with pyelonephritis. Patient given rocephin, patient stable while in ED afebrile and well appearing cultures pending. Anemia stable, patient does not have current bleeding  Patient will be discharged home with omnicef and advised to return to ED if symptoms do not improve in 24 hours or worsen at all she is agreeable to plan and well appearing at time of discharge.                   --------------------------------------------- PAST HISTORY ---------------------------------------------  Past Medical History:  has a past medical history of Anemia, Aplastic anemia ([facility] [address] Breast cancer ([facility] [address] Chickenpox, Hypothyroidism, Measles, Mild depression ([facility] [address] Mumps, Myelodysplastic syndrome ([facility] [address] Pulmonary tuberculosis, Scarlet fever, and Sensory neuropathy. Past Surgical History:  has a past surgical history that includes Breast biopsy; Tubal ligation; Mastectomy; Cholecystectomy; other surgical history;  Adenoidectomy; bone marrow biopsy; and Tonsillectomy and adenoidectomy. Social History:  reports that she quit smoking about 22 years ago. Her smoking use included cigarettes. She started smoking about 59 years ago. She has a 37.00 pack-year smoking history. She has never used smokeless tobacco. She reports that she does not drink alcohol and does not use drugs. Family History: family history includes Cancer in her brother, father, maternal aunt, mother, sister, and another family member; Other in her brother. The patients home medications have been reviewed.     Allergies: Bee venom, Penicillins, and Other    -------------------------------------------------- RESULTS -------------------------------------------------  Labs:  Results for orders placed or performed during the hospital encounter of 03/29/22   CBC with Auto Differential   Result Value Ref Range    WBC 7.8 4.5 - 11.5 E9/L    RBC 2.11 (L) 3.50 - 5.50 E12/L    Hemoglobin 7.9 (L) 11.5 - 15.5 g/dL    Hematocrit 24.6 (L) 34.0 - 48.0 %    .6 (H) 80.0 - 99.9 fL    MCH 37.4 (H) 26.0 - 35.0 pg    MCHC 32.1 32.0 - 34.5 %    RDW 26.8 (H) 11.5 - 15.0 fL    Platelets 739 834 - 532 E9/L    MPV 11.2 7.0 - 12.0 fL    Neutrophils % 54.9 43.0 - 80.0 %    Lymphocytes % 39.8 20.0 - 42.0 %    Monocytes % 2.6 2.0 - 12.0 %    Eosinophils % 1.8 0.0 - 6.0 %    Basophils % 0.4 0.0 - 2.0 %    Neutrophils Absolute 4.37 1.80 - 7.30 E9/L    Lymphocytes Absolute 3.12 1.50 - 4.00 E9/L    Monocytes Absolute 0.23 0.10 - 0.95 E9/L    Eosinophils Absolute 0.14 0.05 - 0.50 E9/L    Basophils Absolute 0.00 0.00 - 0.20 E9/L    Myelocyte Percent 0.9 0 - 0 %    Anisocytosis 3+     Polychromasia 1+     Hypochromia 2+     Poikilocytes 2+     Ovalocytes 2+     Target Cells 1+     Basophilic Stippling 1+    Comprehensive Metabolic Panel w/ Reflex to MG   Result Value Ref Range    Sodium 138 132 - 146 mmol/L    Potassium reflex Magnesium 4.3 3.5 - 5.0 mmol/L    Chloride 105 98 - 107 mmol/L    CO2 22 22 - 29 mmol/L Anion Gap 11 7 - 16 mmol/L    Glucose 94 74 - 99 mg/dL    BUN 18 6 - 23 mg/dL    CREATININE 0.8 0.5 - 1.0 mg/dL    GFR Non-African American >60 >=60 mL/min/1.73    GFR African American >60     Calcium 9.0 8.6 - 10.2 mg/dL    Total Protein 7.3 6.4 - 8.3 g/dL    Albumin 4.6 3.5 - 5.2 g/dL    Total Bilirubin 1.0 0.0 - 1.2 mg/dL    Alkaline Phosphatase 47 35 - 104 U/L    ALT 41 (H) 0 - 32 U/L    AST 37 (H) 0 - 31 U/L   Troponin   Result Value Ref Range    Troponin, High Sensitivity 9 0 - 9 ng/L   Brain Natriuretic Peptide   Result Value Ref Range    Pro- 0 - 450 pg/mL   Lipase   Result Value Ref Range    Lipase 52 13 - 60 U/L   Lactic Acid   Result Value Ref Range    Lactic Acid 1.7 0.5 - 2.2 mmol/L   D-Dimer, Quantitative   Result Value Ref Range    D-Dimer, Quant 213 ng/mL DDU   Doctor Notification   Result Value Ref Range    DR. WEBB COMPL    Urinalysis with Microscopic   Result Value Ref Range    Color, UA Yellow Straw/Yellow    Clarity, UA Clear Clear    Glucose, Ur Negative Negative mg/dL    Bilirubin Urine Negative Negative    Ketones, Urine Negative Negative mg/dL    Specific Gravity, UA 1.025 1.005 - 1.030    Blood, Urine Negative Negative    pH, UA 5.5 5.0 - 9.0    Protein, UA Negative Negative mg/dL    Urobilinogen, Urine 0.2 <2.0 E.U./dL    Nitrite, Urine Negative Negative    Leukocyte Esterase, Urine SMALL (A) Negative    WBC, UA 5-10 (A) 0 - 5 /HPF    RBC, UA 0-1 0 - 2 /HPF    Bacteria, UA FEW (A) None Seen /HPF   EKG 12 Lead   Result Value Ref Range    Ventricular Rate 73 BPM    Atrial Rate 73 BPM    P-R Interval 192 ms    QRS Duration 80 ms    Q-T Interval 386 ms    QTc Calculation (Bazett) 425 ms    P Axis 66 degrees    R Axis 6 degrees    T Axis 40 degrees   TYPE AND SCREEN   Result Value Ref Range    ABO/Rh A NEG     Antibody Screen POS    ANTIBODY IDENTIFICATION   Result Value Ref Range    Antibody ID POS, Anti-D     Antibody ID POS, Anti-IH    DIRECT ANTIGLOBULIN TEST   Result Value Ref Range    Polyspecific Chip NEG        Radiology:  CT ABDOMEN PELVIS WO CONTRAST Additional Contrast? None   Final Result   1. Subtle right perinephric fat stranding. Clinical correlation recommended   for possibility of acute pyelonephritis. 2. No renal or ureter calculus. XR CHEST (2 VW)   Final Result   No acute process detected.             ------------------------- NURSING NOTES AND VITALS REVIEWED ---------------------------  Date / Time Roomed:  3/29/2022  5:16 PM  ED Bed Assignment:  18B/18B-18    The nursing notes within the ED encounter and vital signs as below have been reviewed. /74   Pulse 81   Temp 98.1 °F (36.7 °C) (Oral)   Resp 18   Wt 142 lb (64.4 kg)   LMP 05/28/2016   SpO2 99%   BMI 26.83 kg/m²   Oxygen Saturation Interpretation: Normal      ------------------------------------------ PROGRESS NOTES ------------------------------------------  4:05 AM EDT  I have spoken with the patient and discussed todays results, in addition to providing specific details for the plan of care and counseling regarding the diagnosis and prognosis. Their questions are answered at this time and they are agreeable with the plan. I discussed at length with them reasons for immediate return here for re evaluation. They will followup with their primary care physician by calling their office tomorrow. --------------------------------- ADDITIONAL PROVIDER NOTES ---------------------------------  At this time the patient is without objective evidence of an acute process requiring hospitalization or inpatient management. They have remained hemodynamically stable throughout their entire ED visit and are stable for discharge with outpatient follow-up. The plan has been discussed in detail and they are aware of the specific conditions for emergent return, as well as the importance of follow-up.       Discharge Medication List as of 3/29/2022  9:50 PM      START taking these medications Details   cefdinir (OMNICEF) 300 MG capsule Take 1 capsule by mouth 2 times daily for 10 days, Disp-20 capsule, R-0Normal             Diagnosis:  1. Pyelonephritis    2. Chronic anemia        Disposition:  Patient's disposition: Discharge to home  Patient's condition is stable.           Sarah Humphrey MD  03/30/22 [id number]

## 2022-04-01 LAB — URINE CULTURE, ROUTINE: NORMAL

## 2022-04-05 ENCOUNTER — HOSPITAL ENCOUNTER (OUTPATIENT)
Dept: INFUSION THERAPY | Age: 82
Discharge: HOME | End: 2022-04-05
Payer: MEDICARE

## 2022-04-05 ENCOUNTER — OFFICE VISIT (OUTPATIENT)
Dept: ONCOLOGY | Age: 82
End: 2022-04-05
Payer: MEDICARE

## 2022-04-05 VITALS
BODY MASS INDEX: 26.62 KG/M2 | HEART RATE: 77 BPM | TEMPERATURE: 97.1 F | SYSTOLIC BLOOD PRESSURE: 119 MMHG | DIASTOLIC BLOOD PRESSURE: 52 MMHG | WEIGHT: 141 LBS | HEIGHT: 61 IN | OXYGEN SATURATION: 96 %

## 2022-04-05 DIAGNOSIS — D46.9 MDS (MYELODYSPLASTIC SYNDROME) (HCC): Primary | ICD-10-CM

## 2022-04-05 DIAGNOSIS — D63.8 ANEMIA OF CHRONIC DISEASE: ICD-10-CM

## 2022-04-05 LAB
ANISOCYTOSIS: ABNORMAL
BASOPHILIC STIPPLING: ABNORMAL
BASOPHILS ABSOLUTE: 0.03 E9/L (ref 0–0.2)
BASOPHILS RELATIVE PERCENT: 0.6 % (ref 0–2)
EOSINOPHILS ABSOLUTE: 0.2 E9/L (ref 0.05–0.5)
EOSINOPHILS RELATIVE PERCENT: 4.1 % (ref 0–6)
HCT VFR BLD CALC: 24.6 % (ref 34–48)
HEMOGLOBIN: 8 G/DL (ref 11.5–15.5)
HYPOCHROMIA: ABNORMAL
IMMATURE GRANULOCYTES #: 0.03 E9/L
IMMATURE GRANULOCYTES %: 0.6 % (ref 0–5)
LYMPHOCYTES ABSOLUTE: 2.28 E9/L (ref 1.5–4)
LYMPHOCYTES RELATIVE PERCENT: 46.4 % (ref 20–42)
MCH RBC QN AUTO: 37 PG (ref 26–35)
MCHC RBC AUTO-ENTMCNC: 32.5 % (ref 32–34.5)
MCV RBC AUTO: 113.9 FL (ref 80–99.9)
MONOCYTES ABSOLUTE: 0.3 E9/L (ref 0.1–0.95)
MONOCYTES RELATIVE PERCENT: 6.1 % (ref 2–12)
NEUTROPHILS ABSOLUTE: 2.07 E9/L (ref 1.8–7.3)
NEUTROPHILS RELATIVE PERCENT: 42.2 % (ref 43–80)
OVALOCYTES: ABNORMAL
PDW BLD-RTO: 26.5 FL (ref 11.5–15)
PLATELET # BLD: 171 E9/L (ref 130–450)
PMV BLD AUTO: 9.7 FL (ref 7–12)
POIKILOCYTES: ABNORMAL
POLYCHROMASIA: ABNORMAL
RBC # BLD: 2.16 E12/L (ref 3.5–5.5)
TARGET CELLS: ABNORMAL
TEAR DROP CELLS: ABNORMAL
WBC # BLD: 4.9 E9/L (ref 4.5–11.5)

## 2022-04-05 PROCEDURE — G8427 DOCREV CUR MEDS BY ELIG CLIN: HCPCS | Performed by: INTERNAL MEDICINE

## 2022-04-05 PROCEDURE — 1123F ACP DISCUSS/DSCN MKR DOCD: CPT | Performed by: INTERNAL MEDICINE

## 2022-04-05 PROCEDURE — G8417 CALC BMI ABV UP PARAM F/U: HCPCS | Performed by: INTERNAL MEDICINE

## 2022-04-05 PROCEDURE — 1090F PRES/ABSN URINE INCON ASSESS: CPT | Performed by: INTERNAL MEDICINE

## 2022-04-05 PROCEDURE — 99214 OFFICE O/P EST MOD 30 MIN: CPT | Performed by: INTERNAL MEDICINE

## 2022-04-05 PROCEDURE — 85025 COMPLETE CBC W/AUTO DIFF WBC: CPT

## 2022-04-05 PROCEDURE — 99212 OFFICE O/P EST SF 10 MIN: CPT

## 2022-04-05 PROCEDURE — 6360000002 HC RX W HCPCS: Performed by: NURSE PRACTITIONER

## 2022-04-05 PROCEDURE — 36415 COLL VENOUS BLD VENIPUNCTURE: CPT

## 2022-04-05 PROCEDURE — G8399 PT W/DXA RESULTS DOCUMENT: HCPCS | Performed by: INTERNAL MEDICINE

## 2022-04-05 PROCEDURE — 1036F TOBACCO NON-USER: CPT | Performed by: INTERNAL MEDICINE

## 2022-04-05 PROCEDURE — 96372 THER/PROPH/DIAG INJ SC/IM: CPT

## 2022-04-05 PROCEDURE — 4040F PNEUMOC VAC/ADMIN/RCVD: CPT | Performed by: INTERNAL MEDICINE

## 2022-04-05 RX ORDER — TRAMADOL HYDROCHLORIDE 50 MG/1
TABLET ORAL
COMMUNITY
Start: 2022-04-04 | End: 2022-10-17

## 2022-04-05 RX ADMIN — DARBEPOETIN ALFA 500 MCG: 500 INJECTION, SOLUTION INTRAVENOUS; SUBCUTANEOUS at 13:42

## 2022-04-05 NOTE — PROGRESS NOTES
[address]  [facility] MED ONCOLOGY  [facility] [facility] [address]  Dept: [address]: [phone]  Attending Progress Note      Reason for Visit:   1. Right Breast Cancer. 2. MDS. Referring Physician:  CONNIE Dickinson CNP    PCP:  CONNIE Dickinson CNP    History of Present Illness: The patient is a very pleasant 78 y.o. lady with a PMH significant for Right Breast Cancer, stage III, HR pos, was diagnosed in 2009, she had a right mastectomy done, followed by adjuvant chemo, she received 8 cycles, probably AC followed by T, then PMRT, and 5 years of adjuvant ET with Arimidex, was completed in 2015. She was treated in [facility] under the care of Dr. Sarah Jamison. She was diagnosed with MDS in 2017, she received ESAs and PRBCs transfusion. She has transfusion related iron overload. She was started on Aranesp on 4/24/2020. No SE from Aranesp. The patient returns for a follow-up visit, she is on [address]  for urinary tract infection. She is feeling well at this time, except for fatigue. Review of Systems;  CONSTITUTIONAL: No fever, chills. Fair appetite. ENMT: Eyes: No diplopia; Nose: No epistaxis. Mouth: No sore throat. RESPIRATORY: No hemoptysis, shortness of breath, cough. CARDIOVASCULAR: No chest pain, palpitations. GASTROINTESTINAL: No nausea/vomiting, abdominal pain, diarrhea/constipation. GENITOURINARY: No dysuria, urinary frequency, hematuria. NEURO: No syncope, presyncope, headache.   Remainder:  ROS NEGATIVE    Past Medical History:      Diagnosis Date    Anemia     Pt reports she was dx in her teens, w/o proper w/u, with iron deficiency anemia and was on oral iron replacement for many years, resulting in iron overload    Aplastic anemia ([address] ~1049-7808    Possibly d/t chemotherapy for breast cancer    Breast cancer ([address] 2009    right side; pt underwent radical mastectomy followed by radiation therapy and chemotherapy and was then on oral chemo pill for 5 yrs; no recurrence as of 2019    Chickenpox     Hypothyroidism     Measles     Mild depression (HCC)     Mumps     Myelodysplastic syndrome ([facility] [address] ~2016    Pulmonary tuberculosis ~2949-4195    in her early 25s    Scarlet fever     Sensory neuropathy     beyond the ankle b/l     Patient Active Problem List   Diagnosis    MDS (myelodysplastic syndrome) (HCC)    Bronchitis    Macrocytic anemia with high RDW    Hypothyroidism    Peripheral sensory neuropathy    Breast cancer (HCC)    Anemia of chronic disease        Past Surgical History:      Procedure Laterality Date    ADENOIDECTOMY      BONE MARROW BIOPSY      BREAST BIOPSY      CHOLECYSTECTOMY      MASTECTOMY      right breast    OTHER SURGICAL HISTORY      blood transfusions    TONSILLECTOMY AND ADENOIDECTOMY      TUBAL LIGATION         Family History:  Family History   Problem Relation Age of Onset   Elba Curl Cancer Mother         lung    Cancer Father         lung    Cancer Sister         ovarian cancer and breast cancer    Cancer Brother         unknown    Cancer Maternal Aunt         unknown    Cancer Other         brain    Other Brother         MI       Medications:  Reviewed and reconciled. Social History:  Social History     Socioeconomic History    Marital status:       Spouse name: Not on file    Number of children: Not on file    Years of education: Not on file    Highest education level: Not on file   Occupational History    Not on file   Tobacco Use    Smoking status: Former Smoker     Packs/day: 1.00     Years: 37.00     Pack years: 37.00     Types: Cigarettes     Start date:      Quit date: 2000     Years since quittin.2    Smokeless tobacco: Never Used   Substance and Sexual Activity    Alcohol use: No    Drug use: Never    Sexual activity: Not Currently   Other Topics Concern    Not on file   Social History Narrative    Not on file     Social Determinants of Health     Financial Resource Strain:     Difficulty of Paying Living Expenses: Not on file   Food Insecurity:     Worried About Running Out of Food in the Last Year: Not on file    Richar of Food in the Last Year: Not on file   Transportation Needs:     Lack of Transportation (Medical): Not on file    Lack of Transportation (Non-Medical): Not on file   Physical Activity:     Days of Exercise per Week: Not on file    Minutes of Exercise per Session: Not on file   Stress:     Feeling of Stress : Not on file   Social Connections:     Frequency of Communication with Friends and Family: Not on file    Frequency of Social Gatherings with Friends and Family: Not on file    Attends Christian Services: Not on file    Active Member of [address] [place] [address] [employer] or Organizations: Not on file    Attends Club or Organization Meetings: Not on file    Marital Status: Not on file   Intimate Partner Violence:     Fear of Current or Ex-Partner: Not on file    Emotionally Abused: Not on file    Physically Abused: Not on file    Sexually Abused: Not on file   Housing Stability:     Unable to Pay for Housing in the Last Year: Not on file    Number of [place] in the Last Year: Not on file    Unstable Housing in the Last Year: Not on file       Allergies: Allergies   Allergen Reactions    Bee Venom Swelling and Dermatitis    Penicillins Hives, Swelling and Dermatitis    Other      All metals except gold and platinum, welts/blisters       Physical Exam:  BP (!) 119/52   Pulse 77   Temp 97.1 °F (36.2 °C)   Ht 5' 1\" (1.549 m)   Wt 141 lb (64 kg)   LMP 05/28/2016   SpO2 96%   BMI 26.64 kg/m²   GENERAL: Alert, oriented x 3, not in acute distress. HEENT: PERRLA; EOMI. Oropharynx clear. NECK: Supple. No palpable cervical or supraclavicular lymphadenopathy. LUNGS: Good air entry bilaterally. No wheezing, crackles or rhonchi.    BREASTS: She is s/p right mastectomy, no suspicious lesions, she has telangiectasias. She has tenderness in the left upper rib cage. CARDIOVASCULAR: Regular rate. No murmurs, rubs or gallops. ABDOMEN: Soft. Non-tender, non-distended. Positive bowel sounds. EXTREMITIES: Positive for bruising of the right foot. NEUROLOGIC: No focal deficits. ECOG PS 1      Impression/Plan:     1. The patient is a very pleasant 78 y.o. lady with a PMH significant for Right Breast Cancer, stage III, HR pos, was diagnosed in 2009, she had a right mastectomy done, followed by adjuvant chemo, she received 8 cycles, probably AC followed by T, then PMRT, and 5 years of adjuvant ET with Arimidex, was completed in 2015. She was treated in [facility] under the care of Dr. Raciel Delgado. She is doing well clinically without any evidence of recurrence of her disease. Discussed with her the importance of monthly SBE, and routine screening mammograms, she had a left breast screening mammogram done on 6/22/2021, there was no evidence of malignancy, she will be due for a repeat left breast screening mammogram on 6/23/2022.       2. She has MDS, diagnosed in 2017, she received ESAs and PRBCs transfusion, has transfusion related iron overload, hgb was 8.5, ferritin 3647, was restarted on Aranesp on 4/23/2019. On 7/20/2021, hemoglobin was 8 g/dl hematocrit 24.7,  1.3, normal white count, platelet count 905Y, fit test is negative. Today 4/5/2022, hemoglobin is worse, hemoglobin is 8G/DL, the patient has not been having adequate response to Retacrit, she was responding better to Aranesp, her hemoglobin is less than 10 G/DL, proceed with Aranesp. Hypothyroidism, continue Synthroid follow-up with PCP. RTC in 2 weeks with labs. Thank you for allowing us to participate in the care of Ms. Ventura.     Candy Herrera MD   HEMATOLOGY/MEDICAL [address] [place] [address] ONCOLOGY  [address] [facility] [address] [facility] [address]  Dept: [phone]  Loc: [phone]

## 2022-04-15 DIAGNOSIS — G89.3 PAIN DUE TO NEOPLASM: ICD-10-CM

## 2022-04-15 DIAGNOSIS — Z51.5 PALLIATIVE CARE BY SPECIALIST: ICD-10-CM

## 2022-04-15 RX ORDER — HYDROCODONE BITARTRATE AND ACETAMINOPHEN 5; 325 MG/1; MG/1
1 TABLET ORAL EVERY 12 HOURS PRN
Qty: 60 TABLET | Refills: 0 | Status: SHIPPED
Start: 2022-04-15 | End: 2022-06-06 | Stop reason: SDUPTHER

## 2022-04-15 NOTE — TELEPHONE ENCOUNTER
Call from [facility] requesting refill for [facility] is [employer] on [address]. Pending sale to [facility] natalie 7/11/22.

## 2022-04-19 ENCOUNTER — OFFICE VISIT (OUTPATIENT)
Dept: ONCOLOGY | Age: 82
End: 2022-04-19
Payer: MEDICARE

## 2022-04-19 ENCOUNTER — HOSPITAL ENCOUNTER (OUTPATIENT)
Dept: INFUSION THERAPY | Age: 82
Discharge: HOME | End: 2022-04-19
Payer: MEDICARE

## 2022-04-19 VITALS
WEIGHT: 144.3 LBS | SYSTOLIC BLOOD PRESSURE: 129 MMHG | DIASTOLIC BLOOD PRESSURE: 60 MMHG | OXYGEN SATURATION: 97 % | HEIGHT: 61 IN | TEMPERATURE: 97.6 F | BODY MASS INDEX: 27.24 KG/M2 | HEART RATE: 84 BPM

## 2022-04-19 DIAGNOSIS — D63.8 ANEMIA OF CHRONIC DISEASE: ICD-10-CM

## 2022-04-19 DIAGNOSIS — Z17.0 MALIGNANT NEOPLASM OF RIGHT BREAST IN FEMALE, ESTROGEN RECEPTOR POSITIVE, UNSPECIFIED SITE OF BREAST (HCC): Primary | ICD-10-CM

## 2022-04-19 DIAGNOSIS — C50.911 MALIGNANT NEOPLASM OF RIGHT BREAST IN FEMALE, ESTROGEN RECEPTOR POSITIVE, UNSPECIFIED SITE OF BREAST (HCC): Primary | ICD-10-CM

## 2022-04-19 DIAGNOSIS — D46.9 MDS (MYELODYSPLASTIC SYNDROME) (HCC): Primary | ICD-10-CM

## 2022-04-19 DIAGNOSIS — D46.9 MDS (MYELODYSPLASTIC SYNDROME) (HCC): ICD-10-CM

## 2022-04-19 LAB
ANISOCYTOSIS: ABNORMAL
BASOPHILIC STIPPLING: ABNORMAL
BASOPHILS ABSOLUTE: 0 E9/L (ref 0–0.2)
BASOPHILS RELATIVE PERCENT: 0.2 % (ref 0–2)
EOSINOPHILS ABSOLUTE: 0.15 E9/L (ref 0.05–0.5)
EOSINOPHILS RELATIVE PERCENT: 2.6 % (ref 0–6)
HCT VFR BLD CALC: 25 % (ref 34–48)
HEMOGLOBIN: 8 G/DL (ref 11.5–15.5)
LYMPHOCYTES ABSOLUTE: 2.51 E9/L (ref 1.5–4)
LYMPHOCYTES RELATIVE PERCENT: 44.3 % (ref 20–42)
MCH RBC QN AUTO: 36.5 PG (ref 26–35)
MCHC RBC AUTO-ENTMCNC: 32 % (ref 32–34.5)
MCV RBC AUTO: 114.2 FL (ref 80–99.9)
MONOCYTES ABSOLUTE: 0.51 E9/L (ref 0.1–0.95)
MONOCYTES RELATIVE PERCENT: 8.7 % (ref 2–12)
NEUTROPHILS ABSOLUTE: 2.51 E9/L (ref 1.8–7.3)
NEUTROPHILS RELATIVE PERCENT: 44.4 % (ref 43–80)
NUCLEATED RED BLOOD CELLS: 0.9 /100 WBC
OVALOCYTES: ABNORMAL
PDW BLD-RTO: 26 FL (ref 11.5–15)
PLATELET # BLD: 135 E9/L (ref 130–450)
PMV BLD AUTO: 11.6 FL (ref 7–12)
POIKILOCYTES: ABNORMAL
POLYCHROMASIA: ABNORMAL
RBC # BLD: 2.19 E12/L (ref 3.5–5.5)
SCHISTOCYTES: ABNORMAL
TEAR DROP CELLS: ABNORMAL
WBC # BLD: 5.7 E9/L (ref 4.5–11.5)

## 2022-04-19 PROCEDURE — 6360000002 HC RX W HCPCS: Performed by: NURSE PRACTITIONER

## 2022-04-19 PROCEDURE — 4040F PNEUMOC VAC/ADMIN/RCVD: CPT | Performed by: NURSE PRACTITIONER

## 2022-04-19 PROCEDURE — 1123F ACP DISCUSS/DSCN MKR DOCD: CPT | Performed by: NURSE PRACTITIONER

## 2022-04-19 PROCEDURE — G8427 DOCREV CUR MEDS BY ELIG CLIN: HCPCS | Performed by: NURSE PRACTITIONER

## 2022-04-19 PROCEDURE — 85025 COMPLETE CBC W/AUTO DIFF WBC: CPT

## 2022-04-19 PROCEDURE — G8399 PT W/DXA RESULTS DOCUMENT: HCPCS | Performed by: NURSE PRACTITIONER

## 2022-04-19 PROCEDURE — 1090F PRES/ABSN URINE INCON ASSESS: CPT | Performed by: NURSE PRACTITIONER

## 2022-04-19 PROCEDURE — G8417 CALC BMI ABV UP PARAM F/U: HCPCS | Performed by: NURSE PRACTITIONER

## 2022-04-19 PROCEDURE — 1036F TOBACCO NON-USER: CPT | Performed by: NURSE PRACTITIONER

## 2022-04-19 PROCEDURE — 99212 OFFICE O/P EST SF 10 MIN: CPT

## 2022-04-19 PROCEDURE — 99213 OFFICE O/P EST LOW 20 MIN: CPT | Performed by: NURSE PRACTITIONER

## 2022-04-19 PROCEDURE — 96372 THER/PROPH/DIAG INJ SC/IM: CPT

## 2022-04-19 RX ADMIN — DARBEPOETIN ALFA 500 MCG: 500 INJECTION, SOLUTION INTRAVENOUS; SUBCUTANEOUS at 13:44

## 2022-04-19 NOTE — PROGRESS NOTES
[address]  [facility] MED ONCOLOGY  [facility] [facility] [address]  Dept: [address]: [phone]  Attending Progress Note      Reason for Visit:   1. Right Breast Cancer. 2. MDS. Referring Physician:  CONNIE Harris CNP    PCP:  CONNIE Harris CNP    History of Present Illness: The patient is a very pleasant 78 y.o. lady with a PMH significant for Right Breast Cancer, stage III, HR pos, was diagnosed in 2009, she had a right mastectomy done, followed by adjuvant chemo, she received 8 cycles, probably AC followed by T, then PMRT, and 5 years of adjuvant ET with Arimidex, was completed in 2015. She was treated in [place] under the care of Dr. Jayden Dodson. She was diagnosed with MDS in 2017, she received ESAs and PRBCs transfusion. She has transfusion related iron overload. She was started on Aranesp on 4/24/2020. No SE from Aranesp. The patient returns for a follow-up visit, she completed Omnicef for urinary tract infection on 4/18 and symptoms are improved. She is feeling well at this time, improved fatigue     Review of Systems;  CONSTITUTIONAL: No fever, chills. Fair appetite. ENMT: Eyes: No diplopia; Nose: No epistaxis. Mouth: No sore throat. RESPIRATORY: No hemoptysis, shortness of breath, cough. CARDIOVASCULAR: No chest pain, palpitations. GASTROINTESTINAL: No nausea/vomiting, abdominal pain, diarrhea/constipation. GENITOURINARY: No dysuria, urinary frequency, hematuria. NEURO: No syncope, presyncope, headache.   Remainder:  ROS NEGATIVE    Past Medical History:      Diagnosis Date    Anemia     Pt reports she was dx in her teens, w/o proper w/u, with iron deficiency anemia and was on oral iron replacement for many years, resulting in iron overload    Aplastic anemia ([address] ~3503-7123    Possibly d/t chemotherapy for breast cancer    Breast cancer ([address] 2009    right side; pt underwent radical mastectomy followed by radiation therapy and chemotherapy and was then on oral chemo pill for 5 yrs; no recurrence as of 2019    Chickenpox     Hypothyroidism     Measles     Mild depression (HCC)     Mumps     Myelodys[facility] syndrome ([facility] [address] [facility] ~2016    Pulmonary tuberculosis ~1874-8937    in her early 25s    Scarlet fever     Sensory neuropathy     beyond the ankle b/l     Patient Active Problem List   Diagnosis    MDS (myelodysplastic syndrome) (HCC)    Bronchitis    Macrocytic anemia with high RDW    Hypothyroidism    Peripheral sensory neuropathy    Breast cancer (HCC)    Anemia of chronic disease        Past Surgical History:      Procedure Laterality Date    ADENOIDECTOMY      BONE MARROW BIOPSY      BREAST BIOPSY      CHOLECYSTECTOMY      MASTECTOMY      right breast    OTHER SURGICAL HISTORY      blood transfusions    TONSILLECTOMY AND ADENOIDECTOMY      TUBAL LIGATION         Family History:  Family History   Problem Relation Age of Onset   Unk Fujisawa Cancer Mother         lung    Cancer Father         lung    Cancer Sister         ovarian cancer and breast cancer    Cancer Brother         unknown    Cancer Maternal Aunt         unknown    Cancer Other         brain    Other Brother         MI       Medications:  Reviewed and reconciled. Social History:  Social History     Socioeconomic History    Marital status:       Spouse name: Not on file    Number of children: Not on file    Years of education: Not on file    Highest education level: Not on file   Occupational History    Not on file   Tobacco Use    Smoking status: Former Smoker     Packs/day: 1.00     Years: 37.00     Pack years: 37.00     Types: Cigarettes     Start date:      Quit date: 2000     Years since quittin.3    Smokeless tobacco: Never Used   Substance and Sexual Activity    Alcohol use: No    Drug use: Never    Sexual activity: Not Currently   Other Topics Concern    Not s/p right mastectomy, no suspicious lesions, she has telangiectasias. She has tenderness in the left upper rib cage. CARDIOVASCULAR: Regular rate. No murmurs, rubs or gallops. ABDOMEN: Soft. Non-tender, non-distended. Positive bowel sounds. EXTREMITIES: Scattered bruising to arms   NEUROLOGIC: No focal deficits. ECOG PS 1      Impression/Plan:     1. The patient is a very pleasant 78 y.o. lady with a PMH significant for Right Breast Cancer, stage III, HR pos, was diagnosed in 2009, she had a right mastectomy done, followed by adjuvant chemo, she received 8 cycles, probably AC followed by T, then PMRT, and 5 years of adjuvant ET with Arimidex, was completed in 2015. She was treated in [facility] under the care of Dr. Rafael Colon. She is doing well clinically without any evidence of recurrence of her disease. Discussed with her the importance of monthly SBE, and routine screening mammograms, she had a left breast screening mammogram done on 6/22/2021, there was no evidence of malignancy, she will be due for a repeat left breast screening mammogram on 6/23/2022.       2. She has MDS, diagnosed in 2017, she received ESAs and PRBCs transfusion, has transfusion related iron overload, hgb was 8.5, ferritin 3647, was restarted on Aranesp on 4/23/2019. On 7/20/2021, hemoglobin was 8 g/dl hematocrit 24.7,  1.3, normal white count, platelet count 371P, fit test is negative. Today 4/19/2022, hemoglobin is stable at 8G/DL, she received Aranesp at last visit. She will receive another dose today. Hypothyroidism, continue Synthroid follow-up with PCP.     RT in 2 weeks for labs    Emperatriz Eng [address] [facility]   [phone]

## 2022-04-25 ENCOUNTER — TELEPHONE (OUTPATIENT)
Dept: PALLATIVE CARE | Age: 82
End: 2022-04-25

## 2022-04-25 NOTE — TELEPHONE ENCOUNTER
Call from Gen stating she just needed to talk with someone and to catie call back. Called back and spoke with Blevins, she shares that she has been running a low hemoglobin and has been \" wiped out'. Gen states that her pain is not any worse. But that she is sleepy and tired after doing just a few small chores. Support provided through active listening and reassurance. Gen knows she can call with any questions or concerns.

## 2022-05-03 ENCOUNTER — HOSPITAL ENCOUNTER (OUTPATIENT)
Dept: INFUSION THERAPY | Age: 82
Discharge: HOME | End: 2022-05-03
Payer: MEDICARE

## 2022-05-03 ENCOUNTER — OFFICE VISIT (OUTPATIENT)
Dept: ONCOLOGY | Age: 82
End: 2022-05-03
Payer: MEDICARE

## 2022-05-03 VITALS
RESPIRATION RATE: 18 BRPM | BODY MASS INDEX: 26.06 KG/M2 | TEMPERATURE: 97.2 F | HEART RATE: 74 BPM | SYSTOLIC BLOOD PRESSURE: 153 MMHG | DIASTOLIC BLOOD PRESSURE: 69 MMHG | WEIGHT: 138 LBS | OXYGEN SATURATION: 100 % | HEIGHT: 61 IN

## 2022-05-03 DIAGNOSIS — Z17.0 MALIGNANT NEOPLASM OF RIGHT BREAST IN FEMALE, ESTROGEN RECEPTOR POSITIVE, UNSPECIFIED SITE OF BREAST (HCC): Primary | ICD-10-CM

## 2022-05-03 DIAGNOSIS — C50.911 MALIGNANT NEOPLASM OF RIGHT BREAST IN FEMALE, ESTROGEN RECEPTOR POSITIVE, UNSPECIFIED SITE OF BREAST (HCC): Primary | ICD-10-CM

## 2022-05-03 DIAGNOSIS — D63.8 ANEMIA OF CHRONIC DISEASE: ICD-10-CM

## 2022-05-03 DIAGNOSIS — D46.9 MDS (MYELODYSPLASTIC SYNDROME) (HCC): Primary | ICD-10-CM

## 2022-05-03 LAB
ANISOCYTOSIS: ABNORMAL
BASOPHILS ABSOLUTE: 0 E9/L (ref 0–0.2)
BASOPHILS RELATIVE PERCENT: 0.4 % (ref 0–2)
EOSINOPHILS ABSOLUTE: 0.05 E9/L (ref 0.05–0.5)
EOSINOPHILS RELATIVE PERCENT: 0.9 % (ref 0–6)
HCT VFR BLD CALC: 27.2 % (ref 34–48)
HEMOGLOBIN: 8.9 G/DL (ref 11.5–15.5)
HYPOCHROMIA: ABNORMAL
LYMPHOCYTES ABSOLUTE: 2.63 E9/L (ref 1.5–4)
LYMPHOCYTES RELATIVE PERCENT: 47.4 % (ref 20–42)
MCH RBC QN AUTO: 37.6 PG (ref 26–35)
MCHC RBC AUTO-ENTMCNC: 32.7 % (ref 32–34.5)
MCV RBC AUTO: 114.8 FL (ref 80–99.9)
MONOCYTES ABSOLUTE: 0.22 E9/L (ref 0.1–0.95)
MONOCYTES RELATIVE PERCENT: 4.4 % (ref 2–12)
NEUTROPHILS ABSOLUTE: 2.63 E9/L (ref 1.8–7.3)
NEUTROPHILS RELATIVE PERCENT: 47.3 % (ref 43–80)
NUCLEATED RED BLOOD CELLS: 0.9 /100 WBC
OVALOCYTES: ABNORMAL
PDW BLD-RTO: 27.6 FL (ref 11.5–15)
PLATELET # BLD: 155 E9/L (ref 130–450)
PMV BLD AUTO: 10.5 FL (ref 7–12)
POIKILOCYTES: ABNORMAL
POLYCHROMASIA: ABNORMAL
RBC # BLD: 2.37 E12/L (ref 3.5–5.5)
SCHISTOCYTES: ABNORMAL
TARGET CELLS: ABNORMAL
WBC # BLD: 5.6 E9/L (ref 4.5–11.5)

## 2022-05-03 PROCEDURE — G8399 PT W/DXA RESULTS DOCUMENT: HCPCS | Performed by: INTERNAL MEDICINE

## 2022-05-03 PROCEDURE — 99214 OFFICE O/P EST MOD 30 MIN: CPT | Performed by: INTERNAL MEDICINE

## 2022-05-03 PROCEDURE — 1036F TOBACCO NON-USER: CPT | Performed by: INTERNAL MEDICINE

## 2022-05-03 PROCEDURE — 36415 COLL VENOUS BLD VENIPUNCTURE: CPT

## 2022-05-03 PROCEDURE — 85025 COMPLETE CBC W/AUTO DIFF WBC: CPT

## 2022-05-03 PROCEDURE — 96372 THER/PROPH/DIAG INJ SC/IM: CPT

## 2022-05-03 PROCEDURE — 1123F ACP DISCUSS/DSCN MKR DOCD: CPT | Performed by: INTERNAL MEDICINE

## 2022-05-03 PROCEDURE — 4040F PNEUMOC VAC/ADMIN/RCVD: CPT | Performed by: INTERNAL MEDICINE

## 2022-05-03 PROCEDURE — 1090F PRES/ABSN URINE INCON ASSESS: CPT | Performed by: INTERNAL MEDICINE

## 2022-05-03 PROCEDURE — G8427 DOCREV CUR MEDS BY ELIG CLIN: HCPCS | Performed by: INTERNAL MEDICINE

## 2022-05-03 PROCEDURE — 6360000002 HC RX W HCPCS: Performed by: NURSE PRACTITIONER

## 2022-05-03 PROCEDURE — G8417 CALC BMI ABV UP PARAM F/U: HCPCS | Performed by: INTERNAL MEDICINE

## 2022-05-03 PROCEDURE — 99212 OFFICE O/P EST SF 10 MIN: CPT

## 2022-05-03 RX ADMIN — DARBEPOETIN ALFA 500 MCG: 500 INJECTION, SOLUTION INTRAVENOUS; SUBCUTANEOUS at 14:14

## 2022-05-03 NOTE — PROGRESS NOTES
[id number]  [facility] MED ONCOLOGY  [address] [facility] [address]  Dept: [address]: [phone]  Attending Progress Note      Reason for Visit:   1. Right Breast Cancer. 2. MDS. Referring Physician:  CONNIE Aldrich CNP    PCP:  CONNIE Aldrich CNP    History of Present Illness: The patient is a very pleasant 78 y.o. lady with a PMH significant for Right Breast Cancer, stage III, HR pos, was diagnosed in 2009, she had a right mastectomy done, followed by adjuvant chemo, she received 8 cycles, probably AC followed by T, then PMRT, and 5 years of adjuvant ET with Arimidex, was completed in 2015. She was treated in [facility] under the care of Dr. Socorro Juarez. She was diagnosed with MDS in 2017, she received ESAs and PRBCs transfusion. She has transfusion related iron overload. She was started on Aranesp on 4/24/2020. No SE from Aranesp. The patient is feeling tired, otherwise doing well. Review of Systems;  CONSTITUTIONAL: No fever, chills. Fair appetite. ENMT: Eyes: No diplopia; Nose: No epistaxis. Mouth: No sore throat. RESPIRATORY: No hemoptysis, shortness of breath, cough. CARDIOVASCULAR: No chest pain, palpitations. GASTROINTESTINAL: No nausea/vomiting, abdominal pain, diarrhea/constipation. GENITOURINARY: No dysuria, urinary frequency, hematuria. NEURO: No syncope, presyncope, headache.   Remainder:  ROS NEGATIVE    Past Medical History:      Diagnosis Date    Anemia     Pt reports she was dx in her teens, w/o proper w/u, with iron deficiency anemia and was on oral iron replacement for many years, resulting in iron overload    Aplastic anemia ([address] ~9118-1902    Possibly d/t chemotherapy for breast cancer    Breast cancer ([facility] [address] 2009    right side; pt underwent radical mastectomy followed by radiation therapy and chemotherapy and was then on oral chemo pill for 5 yrs; no recurrence as of 2019    Chickenpox     Hypothyroidism     Measles     Mild depression (HCC)     Mumps     Myelodysplastic syndrome ([facility] [address] ~2016    Pulmonary tuberculosis ~3737-6173    in her early 25s    Scarlet fever     Sensory neuropathy     beyond the ankle b/l     Patient Active Problem List   Diagnosis    MDS (myelodysplastic syndrome) (HCC)    Bronchitis    Macrocytic anemia with high RDW    Hypothyroidism    Peripheral sensory neuropathy    Breast cancer (HCC)    Anemia of chronic disease        Past Surgical History:      Procedure Laterality Date    ADENOIDECTOMY      BONE MARROW BIOPSY      BREAST BIOPSY      CHOLECYSTECTOMY      MASTECTOMY      right breast    OTHER SURGICAL HISTORY      blood transfusions    TONSILLECTOMY AND ADENOIDECTOMY      TUBAL LIGATION         Family History:  Family History   Problem Relation Age of Onset   Brittnee Gallegos Cancer Mother         lung    Cancer Father         lung    Cancer Sister         ovarian cancer and breast cancer    Cancer Brother         unknown    Cancer Maternal Aunt         unknown    Cancer Other         brain    Other Brother         MI       Medications:  Reviewed and reconciled. Social History:  Social History     Socioeconomic History    Marital status:       Spouse name: Not on file    Number of children: Not on file    Years of education: Not on file    Highest education level: Not on file   Occupational History    Not on file   Tobacco Use    Smoking status: Former Smoker     Packs/day: 1.00     Years: 37.00     Pack years: 37.00     Types: Cigarettes     Start date:      Quit date:      Years since quittin.3    Smokeless tobacco: Never Used   Substance and Sexual Activity    Alcohol use: No    Drug use: Never    Sexual activity: Not Currently   Other Topics Concern    Not on file   Social History Narrative    Not on file     Social Determinants of Health     Financial Resource Strain:     Difficulty of Paying Living Expenses: Not on file   Food Insecurity:     Worried About Running Out of Food in the Last Year: Not on file    Ran Out of Food in the Last Year: Not on file   Transportation Needs:     Lack of Transportation (Medical): Not on file    Lack of Transportation (Non-Medical): Not on file   Physical Activity:     Days of Exercise per Week: Not on file    Minutes of Exercise per Session: Not on file   Stress:     Feeling of Stress : Not on file   Social Connections:     Frequency of Communication with Friends and Family: Not on file    Frequency of Social Gatherings with Friends and Family: Not on file    Attends Pent[employer]ostal Services: Not on file    Active Member of [address] [place] [address] [employer] or Organizations: Not on file    Attends Club or Organization Meetings: Not on file    Marital Status: Not on file   Intimate Partner Violence:     Fear of Current or Ex-Partner: Not on file    Emotionally Abused: Not on file    Physically Abused: Not on file    Sexually Abused: Not on file   Housing Stability:     Unable to Pay for Housing in the Last Year: Not on file    Number of [place] in the Last Year: Not on file    Unstable Housing in the Last Year: Not on file       Allergies: Allergies   Allergen Reactions    Bee Venom Swelling and Dermatitis    Penicillins Hives, Swelling and Dermatitis    Other      All metals except gold and platinum, welts/blisters       Physical Exam:  BP (!) 153/69 (Site: Right Upper Arm, Position: Sitting, Cuff Size: Medium Adult)   Pulse 74   Temp 97.2 °F (36.2 °C) (Axillary)   Resp 18   Ht 5' 1\" (1.549 m)   Wt 138 lb (62.6 kg)   LMP 05/28/2016   SpO2 100%   BMI 26.07 kg/m²   GENERAL: Alert, oriented x 3, not in acute distress. HEENT: PERRLA; EOMI. Oropharynx clear. NECK: Supple. No palpable cervical or supraclavicular lymphadenopathy. LUNGS: Good air entry bilaterally. No wheezing, crackles or rhonchi.    BREASTS: She is s/p right mastectomy, no suspicious lesions, she has telangiectasias. She has tenderness in the left upper rib cage. CARDIOVASCULAR: Regular rate. No murmurs, rubs or gallops. ABDOMEN: Soft. Non-tender, non-distended. Positive bowel sounds. EXTREMITIES: Positive for bruising of the right foot. NEUROLOGIC: No focal deficits. ECOG PS 1      Impression/Plan:     1. The patient is a very pleasant 78 y.o. lady with a PMH significant for Right Breast Cancer, stage III, HR pos, was diagnosed in 2009, she had a right mastectomy done, followed by adjuvant chemo, she received 8 cycles, probably AC followed by T, then PMRT, and 5 years of adjuvant ET with Arimidex, was completed in 2015. She was treated in [facility] under the care of Dr. Lili Sharma. She is doing well clinically without any evidence of recurrence of her disease. Discussed with her the importance of monthly SBE, and routine screening mammograms, she had a left breast screening mammogram done on 6/22/2021, there was no evidence of malignancy, she will be due for a repeat left breast screening mammogram on 6/23/2022.       2. She has MDS, diagnosed in 2017, she received ESAs and PRBCs transfusion, has transfusion related iron overload, hgb was 8.5, ferritin 3647, was restarted on Aranesp on 4/23/2019. On 7/20/2021, hemoglobin was 8 g/dl hematocrit 24.7,  1.3, normal white count, platelet count 168Z, fit test is negative. The patient does not have an adequate response to Retacrit, she was changed back to Aranesp, today 5/3/2022, the patient is responding nicely to the Aranesp with an increase in her hemoglobin to 8. 9G/DL, white count is 5.6, .8, platelet count 350V. Hemoglobin is less than 10 G/DL, proceed with Aranesp. Hypothyroidism, continue Synthroid follow-up with PCP. RTC in 2 weeks with labs. Thank you for allowing us to participate in the care of Ms. Ventura.     Jarrod Herrera MD   HEMATOLOGY/MEDICAL [address] [facility]  [address] [facility] ONCOLOGY  [facility] [address]  Dept: [address]: [phone]

## 2022-05-17 ENCOUNTER — HOSPITAL ENCOUNTER (OUTPATIENT)
Dept: INFUSION THERAPY | Age: 82
End: 2022-05-17

## 2022-05-20 ENCOUNTER — OFFICE VISIT (OUTPATIENT)
Dept: ONCOLOGY | Age: 82
End: 2022-05-20
Payer: MEDICARE

## 2022-05-20 ENCOUNTER — HOSPITAL ENCOUNTER (OUTPATIENT)
Dept: INFUSION THERAPY | Age: 82
Discharge: HOME | End: 2022-05-20
Payer: MEDICARE

## 2022-05-20 VITALS
BODY MASS INDEX: 25.94 KG/M2 | WEIGHT: 137.4 LBS | TEMPERATURE: 97.3 F | OXYGEN SATURATION: 99 % | HEIGHT: 61 IN | DIASTOLIC BLOOD PRESSURE: 64 MMHG | HEART RATE: 63 BPM | SYSTOLIC BLOOD PRESSURE: 145 MMHG

## 2022-05-20 DIAGNOSIS — D46.9 MDS (MYELODYSPLASTIC SYNDROME) (HCC): Primary | ICD-10-CM

## 2022-05-20 DIAGNOSIS — D63.8 ANEMIA OF CHRONIC DISEASE: ICD-10-CM

## 2022-05-20 DIAGNOSIS — C50.911 MALIGNANT NEOPLASM OF RIGHT BREAST IN FEMALE, ESTROGEN RECEPTOR POSITIVE, UNSPECIFIED SITE OF BREAST (HCC): Primary | ICD-10-CM

## 2022-05-20 DIAGNOSIS — Z17.0 MALIGNANT NEOPLASM OF RIGHT BREAST IN FEMALE, ESTROGEN RECEPTOR POSITIVE, UNSPECIFIED SITE OF BREAST (HCC): Primary | ICD-10-CM

## 2022-05-20 DIAGNOSIS — D46.9 MDS (MYELODYSPLASTIC SYNDROME) (HCC): ICD-10-CM

## 2022-05-20 LAB
ANISOCYTOSIS: ABNORMAL
BASOPHILS ABSOLUTE: 0.04 E9/L (ref 0–0.2)
BASOPHILS RELATIVE PERCENT: 0.7 % (ref 0–2)
EOSINOPHILS ABSOLUTE: 0.12 E9/L (ref 0.05–0.5)
EOSINOPHILS RELATIVE PERCENT: 2.2 % (ref 0–6)
HCT VFR BLD CALC: 25.9 % (ref 34–48)
HEMOGLOBIN: 8.4 G/DL (ref 11.5–15.5)
HYPOCHROMIA: ABNORMAL
IMMATURE GRANULOCYTES #: 0.02 E9/L
IMMATURE GRANULOCYTES %: 0.4 % (ref 0–5)
LYMPHOCYTES ABSOLUTE: 2.91 E9/L (ref 1.5–4)
LYMPHOCYTES RELATIVE PERCENT: 54.2 % (ref 20–42)
MCH RBC QN AUTO: 37.3 PG (ref 26–35)
MCHC RBC AUTO-ENTMCNC: 32.4 % (ref 32–34.5)
MCV RBC AUTO: 115.1 FL (ref 80–99.9)
MONOCYTES ABSOLUTE: 0.35 E9/L (ref 0.1–0.95)
MONOCYTES RELATIVE PERCENT: 6.5 % (ref 2–12)
NEUTROPHILS ABSOLUTE: 1.93 E9/L (ref 1.8–7.3)
NEUTROPHILS RELATIVE PERCENT: 36 % (ref 43–80)
OVALOCYTES: ABNORMAL
PDW BLD-RTO: 27.3 FL (ref 11.5–15)
PLATELET # BLD: 164 E9/L (ref 130–450)
PMV BLD AUTO: 10 FL (ref 7–12)
POIKILOCYTES: ABNORMAL
POLYCHROMASIA: ABNORMAL
RBC # BLD: 2.25 E12/L (ref 3.5–5.5)
REASON FOR REJECTION: NORMAL
REJECTED TEST: NORMAL
SCHISTOCYTES: ABNORMAL
TEAR DROP CELLS: ABNORMAL
WBC # BLD: 5.4 E9/L (ref 4.5–11.5)

## 2022-05-20 PROCEDURE — G8399 PT W/DXA RESULTS DOCUMENT: HCPCS | Performed by: INTERNAL MEDICINE

## 2022-05-20 PROCEDURE — 1036F TOBACCO NON-USER: CPT | Performed by: INTERNAL MEDICINE

## 2022-05-20 PROCEDURE — 4040F PNEUMOC VAC/ADMIN/RCVD: CPT | Performed by: INTERNAL MEDICINE

## 2022-05-20 PROCEDURE — 6360000002 HC RX W HCPCS: Performed by: NURSE PRACTITIONER

## 2022-05-20 PROCEDURE — 99212 OFFICE O/P EST SF 10 MIN: CPT

## 2022-05-20 PROCEDURE — 99214 OFFICE O/P EST MOD 30 MIN: CPT | Performed by: INTERNAL MEDICINE

## 2022-05-20 PROCEDURE — G8427 DOCREV CUR MEDS BY ELIG CLIN: HCPCS | Performed by: INTERNAL MEDICINE

## 2022-05-20 PROCEDURE — 1123F ACP DISCUSS/DSCN MKR DOCD: CPT | Performed by: INTERNAL MEDICINE

## 2022-05-20 PROCEDURE — 36415 COLL VENOUS BLD VENIPUNCTURE: CPT

## 2022-05-20 PROCEDURE — G8417 CALC BMI ABV UP PARAM F/U: HCPCS | Performed by: INTERNAL MEDICINE

## 2022-05-20 PROCEDURE — 96372 THER/PROPH/DIAG INJ SC/IM: CPT

## 2022-05-20 PROCEDURE — 1090F PRES/ABSN URINE INCON ASSESS: CPT | Performed by: INTERNAL MEDICINE

## 2022-05-20 RX ADMIN — DARBEPOETIN ALFA 500 MCG: 500 INJECTION, SOLUTION INTRAVENOUS; SUBCUTANEOUS at 11:33

## 2022-05-20 NOTE — PROGRESS NOTES
[address]  [facility] MED ONCOLOGY  [facility] [address]  Dept: [address]: [phone]  Attending Progress Note      Reason for Visit:   1. Right Breast Cancer. 2. MDS. Referring Physician:  CONNIE Maloney CNP    PCP:  CONNIE Maloney CNP    History of Present Illness: The patient is a very pleasant 78 y.o. lady with a PMH significant for Right Breast Cancer, stage III, HR pos, was diagnosed in 2009, she had a right mastectomy done, followed by adjuvant chemo, she received 8 cycles, probably AC followed by T, then PMRT, and 5 years of adjuvant ET with Arimidex, was completed in 2015. She was treated in [facility] under the care of Dr. Jesi Krause. She was diagnosed with MDS in 2017, she received ESAs and PRBCs transfusion. She has transfusion related iron overload. She was started on Aranesp on 4/24/2020. No SE from Aranesp. The patient had a GI viral illness last week, she is feeling better, but still feeling tired. Review of Systems;  CONSTITUTIONAL: No fever, chills. Fair appetite. ENMT: Eyes: No diplopia; Nose: No epistaxis. Mouth: No sore throat. RESPIRATORY: No hemoptysis, shortness of breath, cough. CARDIOVASCULAR: No chest pain, palpitations. GASTROINTESTINAL: No nausea/vomiting, abdominal pain, diarrhea/constipation. GENITOURINARY: No dysuria, urinary frequency, hematuria. NEURO: No syncope, presyncope, headache.   Remainder:  ROS NEGATIVE    Past Medical History:      Diagnosis Date    Anemia     Pt reports she was dx in her teens, w/o proper w/u, with iron deficiency anemia and was on oral iron replacement for many years, resulting in iron overload    Aplastic anemia ([address] ~9164-7924    Possibly d/t chemotherapy for breast cancer    Breast cancer ([facility] [address] 2009    right side; pt underwent radical mastectomy followed by radiation therapy and chemotherapy and was then on oral chemo pill for 5 yrs; no recurrence as of 2019    Chickenpox     Hypothyroidism     Measles     Mild depression (HCC)     Mumps     Myelodysplastic syndrome ([facility] [address] ~2016    Pulmonary tuberculosis ~3609-5161    in her early 25s    Scarlet fever     Sensory neuropathy     beyond the ankle b/l     Patient Active Problem List   Diagnosis    MDS (myelodysplastic syndrome) (HCC)    Bronchitis    Macrocytic anemia with high RDW    Hypothyroidism    Peripheral sensory neuropathy    Breast cancer (HCC)    Anemia of chronic disease        Past Surgical History:      Procedure Laterality Date    ADENOIDECTOMY      BONE MARROW BIOPSY      BREAST BIOPSY      CHOLECYSTECTOMY      MASTECTOMY      right breast    OTHER SURGICAL HISTORY      blood transfusions    TONSILLECTOMY AND ADENOIDECTOMY      TUBAL LIGATION         Family History:  Family History   Problem Relation Age of Onset    Cancer Mother         lung    Cancer Father         lung    Cancer Sister         ovarian cancer and breast cancer    Cancer Brother         unknown    Cancer Maternal Aunt         unknown    Cancer Other         brain    Other Brother         MI       Medications:  Reviewed and reconciled. Social History:  Social History     Socioeconomic History    Marital status:       Spouse name: Not on file    Number of children: Not on file    Years of education: Not on file    Highest education level: Not on file   Occupational History    Not on file   Tobacco Use    Smoking status: Former Smoker     Packs/day: 1.00     Years: 37.00     Pack years: 37.00     Types: Cigarettes     Start date:      Quit date:      Years since quittin.3    Smokeless tobacco: Never Used   Substance and Sexual Activity    Alcohol use: No    Drug use: Never    Sexual activity: Not Currently   Other Topics Concern    Not on file   Social History Narrative    Not on file     Social Determinants of Health Financial Resource Strain:     Difficulty of Paying Living Expenses: Not on file   Food Insecurity:     Worried About Running Out of Food in the Last Year: Not on file    [place] of Food in the Last Year: Not on file   Transportation Needs:     Lack of Transportation (Medical): Not on file    Lack of Transportation (Non-Medical): Not on file   Physical Activity:     Days of Exercise per Week: Not on file    Minutes of Exercise per Session: Not on file   Stress:     Feeling of Stress : Not on file   Social Connections:     Frequency of Communication with Friends and Family: Not on file    Frequency of Social Gatherings with Friends and Family: Not on file    Attends Yarsanism Services: Not on file    Active Member of [address] [place] [address] or Organizations: Not on file    Attends Club or Organization Meetings: Not on file    Marital Status: Not on file   Intimate Partner Violence:     Fear of Current or Ex-Partner: Not on file    Emotionally Abused: Not on file    Physically Abused: Not on file    Sexually Abused: Not on file   Housing Stability:     Unable to Pay for Housing in the Last Year: Not on file    Number of [place] in the Last Year: Not on file    Unstable Housing in the Last Year: Not on file       Allergies: Allergies   Allergen Reactions    Bee Venom Swelling and Dermatitis    Penicillins Hives, Swelling and Dermatitis    Other      All metals except gold and platinum, welts/blisters       Physical Exam:  BP (!) 145/64   Pulse 63   Temp 97.3 °F (36.3 °C)   Ht 5' 1\" (1.549 m)   Wt 137 lb 6.4 oz (62.3 kg)   LMP 05/28/2016   SpO2 99%   BMI 25.96 kg/m²   GENERAL: Alert, oriented x 3, not in acute distress. HEENT: PERRLA; EOMI. Oropharynx clear. NECK: Supple. No palpable cervical or supraclavicular lymphadenopathy. LUNGS: Good air entry bilaterally. No wheezing, crackles or rhonchi. BREASTS: She is s/p right mastectomy, no suspicious lesions, she has telangiectasias.   She has tenderness in the left upper rib cage. CARDIOVASCULAR: Regular rate. No murmurs, rubs or gallops. ABDOMEN: Soft. Non-tender, non-distended. Positive bowel sounds. EXTREMITIES: Positive for bruising of the right foot. NEUROLOGIC: No focal deficits. ECOG PS 1      Impression/Plan:     1. The patient is a very pleasant 78 y.o. lady with a PMH significant for Right Breast Cancer, stage III, HR pos, was diagnosed in 2009, she had a right mastectomy done, followed by adjuvant chemo, she received 8 cycles, probably AC followed by T, then PMRT, and 5 years of adjuvant ET with Arimidex, was completed in 2015. She was treated in [place] under the care of Dr. Brain Yign. She is doing well clinically without any evidence of recurrence of her disease. Discussed with her the importance of monthly SBE, and routine screening mammograms, she had a left breast screening mammogram done on 6/22/2021, there was no evidence of malignancy, she will be due for a repeat left breast screening mammogram on 6/23/2022.       2. She has MDS, diagnosed in 2017, she received ESAs and PRBCs transfusion, has transfusion related iron overload, hgb was 8.5, ferritin 3647, was restarted on Aranesp on 4/23/2019. On 7/20/2021, hemoglobin was 8 g/dl hematocrit 24.7,  1.3, normal white count, platelet count 214G, fit test is negative. The patient does not have an adequate response to Retacrit, she was changed back to Aranesp, today 5/20/2022, labs reviewed, hemoglobin is 8.4G/DL, she did have a viral illness recently, white count is 5.4, .1 platelet count 500 K. Hemoglobin is less than 10 G/DL, proceed with Aranesp. Hypothyroidism, continue Synthroid follow-up with PCP. RTC in 2 weeks with labs. Thank you for allowing us to participate in the care of Ms. Ventura.     Ila Garcia MD   HEMATOLOGY/MEDICAL [address] ONCOLOGY  [address] [place] [id number]  Dept: [address]: [phone]

## 2022-06-06 DIAGNOSIS — Z51.5 PALLIATIVE CARE BY SPECIALIST: ICD-10-CM

## 2022-06-06 DIAGNOSIS — G89.3 PAIN DUE TO NEOPLASM: ICD-10-CM

## 2022-06-06 RX ORDER — HYDROCODONE BITARTRATE AND ACETAMINOPHEN 5; 325 MG/1; MG/1
1 TABLET ORAL EVERY 12 HOURS PRN
Qty: 60 TABLET | Refills: 0 | Status: SHIPPED
Start: 2022-06-06 | End: 2022-07-18 | Stop reason: SDUPTHER

## 2022-06-06 NOTE — TELEPHONE ENCOUNTER
Call from Gen stating she want to make sure her follow up appt was scheduled in July. Patricia's son passed away recently and she will be traveling to [place]. Next appt 7/11/22. Gen requested refill. Confirmed pharmacy.

## 2022-06-07 ENCOUNTER — HOSPITAL ENCOUNTER (OUTPATIENT)
Dept: INFUSION THERAPY | Age: 82
Discharge: HOME | End: 2022-06-07
Payer: MEDICARE

## 2022-06-07 ENCOUNTER — OFFICE VISIT (OUTPATIENT)
Dept: ONCOLOGY | Age: 82
End: 2022-06-07
Payer: MEDICARE

## 2022-06-07 VITALS
HEIGHT: 61 IN | RESPIRATION RATE: 16 BRPM | TEMPERATURE: 97.5 F | WEIGHT: 137 LBS | OXYGEN SATURATION: 100 % | HEART RATE: 74 BPM | DIASTOLIC BLOOD PRESSURE: 67 MMHG | SYSTOLIC BLOOD PRESSURE: 160 MMHG | BODY MASS INDEX: 25.86 KG/M2

## 2022-06-07 DIAGNOSIS — Z12.31 ENCOUNTER FOR SCREENING MAMMOGRAM FOR MALIGNANT NEOPLASM OF BREAST: Primary | ICD-10-CM

## 2022-06-07 DIAGNOSIS — D46.9 MDS (MYELODYSPLASTIC SYNDROME) (HCC): Primary | ICD-10-CM

## 2022-06-07 DIAGNOSIS — D63.8 ANEMIA OF CHRONIC DISEASE: ICD-10-CM

## 2022-06-07 LAB
ANISOCYTOSIS: ABNORMAL
BASOPHILS ABSOLUTE: 0 E9/L (ref 0–0.2)
BASOPHILS RELATIVE PERCENT: 0.2 % (ref 0–2)
EOSINOPHILS ABSOLUTE: 0.18 E9/L (ref 0.05–0.5)
EOSINOPHILS RELATIVE PERCENT: 3.5 % (ref 0–6)
HCT VFR BLD CALC: 23 % (ref 34–48)
HEMOGLOBIN: 7.6 G/DL (ref 11.5–15.5)
HYPOCHROMIA: ABNORMAL
LYMPHOCYTES ABSOLUTE: 2.75 E9/L (ref 1.5–4)
LYMPHOCYTES RELATIVE PERCENT: 55.3 % (ref 20–42)
MCH RBC QN AUTO: 37.4 PG (ref 26–35)
MCHC RBC AUTO-ENTMCNC: 33 % (ref 32–34.5)
MCV RBC AUTO: 113.3 FL (ref 80–99.9)
MONOCYTES ABSOLUTE: 0.25 E9/L (ref 0.1–0.95)
MONOCYTES RELATIVE PERCENT: 5.2 % (ref 2–12)
NEUTROPHILS ABSOLUTE: 1.8 E9/L (ref 1.8–7.3)
NEUTROPHILS RELATIVE PERCENT: 36 % (ref 43–80)
NUCLEATED RED BLOOD CELLS: 0.9 /100 WBC
OVALOCYTES: ABNORMAL
PDW BLD-RTO: 27 FL (ref 11.5–15)
PLATELET # BLD: 154 E9/L (ref 130–450)
PMV BLD AUTO: 11.3 FL (ref 7–12)
POIKILOCYTES: ABNORMAL
POLYCHROMASIA: ABNORMAL
RBC # BLD: 2.03 E12/L (ref 3.5–5.5)
SCHISTOCYTES: ABNORMAL
TEAR DROP CELLS: ABNORMAL
WBC # BLD: 5 E9/L (ref 4.5–11.5)

## 2022-06-07 PROCEDURE — 1123F ACP DISCUSS/DSCN MKR DOCD: CPT | Performed by: INTERNAL MEDICINE

## 2022-06-07 PROCEDURE — 85025 COMPLETE CBC W/AUTO DIFF WBC: CPT

## 2022-06-07 PROCEDURE — G8417 CALC BMI ABV UP PARAM F/U: HCPCS | Performed by: INTERNAL MEDICINE

## 2022-06-07 PROCEDURE — 96372 THER/PROPH/DIAG INJ SC/IM: CPT

## 2022-06-07 PROCEDURE — G8399 PT W/DXA RESULTS DOCUMENT: HCPCS | Performed by: INTERNAL MEDICINE

## 2022-06-07 PROCEDURE — 36415 COLL VENOUS BLD VENIPUNCTURE: CPT

## 2022-06-07 PROCEDURE — 99214 OFFICE O/P EST MOD 30 MIN: CPT | Performed by: INTERNAL MEDICINE

## 2022-06-07 PROCEDURE — 99213 OFFICE O/P EST LOW 20 MIN: CPT

## 2022-06-07 PROCEDURE — 1036F TOBACCO NON-USER: CPT | Performed by: INTERNAL MEDICINE

## 2022-06-07 PROCEDURE — G8427 DOCREV CUR MEDS BY ELIG CLIN: HCPCS | Performed by: INTERNAL MEDICINE

## 2022-06-07 PROCEDURE — 1090F PRES/ABSN URINE INCON ASSESS: CPT | Performed by: INTERNAL MEDICINE

## 2022-06-07 PROCEDURE — 6360000002 HC RX W HCPCS: Performed by: NURSE PRACTITIONER

## 2022-06-07 RX ADMIN — DARBEPOETIN ALFA 500 MCG: 500 INJECTION, SOLUTION INTRAVENOUS; SUBCUTANEOUS at 10:40

## 2022-06-07 NOTE — PROGRESS NOTES
709  [facility] MED ONCOLOGY  [address] [facility] [address]  Dept: [address]: [phone]  Attending Progress Note      Reason for Visit:   1. Right Breast Cancer. 2. MDS. Referring Physician:  CONNIE Mitchell CNP    PCP:  CONNIE Mitchell CNP    History of Present Illness: The patient is a very pleasant 78 y.o. lady with a PMH significant for Right Breast Cancer, stage III, HR pos, was diagnosed in , she had a right mastectomy done, followed by adjuvant chemo, she received 8 cycles, probably AC followed by T, then PMRT, and 5 years of adjuvant ET with Arimidex, was completed in . She was treated in [facility] under the care of Dr. Gema Estevez. She was diagnosed with MDS in , she received ESAs and PRBCs transfusion. She has transfusion related iron overload. She was started on Aranesp on 2020. No SE from Aranesp. The patient returns for a follow-up visit, she lost her son, he  in a parasailing accident she is going to [place] for his .  She is feeling tired. Review of Systems;  CONSTITUTIONAL: No fever, chills. Fair appetite. ENMT: Eyes: No diplopia; Nose: No epistaxis. Mouth: No sore throat. RESPIRATORY: No hemoptysis, shortness of breath, cough. CARDIOVASCULAR: No chest pain, palpitations. GASTROINTESTINAL: No nausea/vomiting, abdominal pain, diarrhea/constipation. GENITOURINARY: No dysuria, urinary frequency, hematuria. NEURO: No syncope, presyncope, headache.   Remainder:  ROS NEGATIVE    Past Medical History:      Diagnosis Date    Anemia     Pt reports she was dx in her teens, w/o proper w/u, with iron deficiency anemia and was on oral iron replacement for many years, resulting in iron overload    Aplastic anemia ([address] ~7985-6636    Possibly d/t chemotherapy for breast cancer    Breast cancer ([address]     right side; pt underwent radical mastectomy followed by radiation therapy and chemotherapy and was then on oral chemo pill for 5 yrs; no recurrence as of 2019    Chickenpox     Hypothyroidism     Measles     Mild depression (HCC)     Mumps     Myelodysplastic syndrome [facility] ~2016    Pulmonary tuberculosis ~[id number]    in her early 25s    Scarlet fever     Sensory neuropathy     beyond the ankle b/l     Patient Active Problem List   Diagnosis    MDS (myelodysplastic syndrome) (HCC)    Bronchitis    Macrocytic anemia with high RDW    Hypothyroidism    Peripheral sensory neuropathy    Breast cancer (HCC)    Anemia of chronic disease        Past Surgical History:      Procedure Laterality Date    ADENOIDECTOMY      BONE MARROW BIOPSY      BREAST BIOPSY      CHOLECYSTECTOMY      MASTECTOMY      right breast    OTHER SURGICAL HISTORY      blood transfusions    TONSILLECTOMY AND ADENOIDECTOMY      TUBAL LIGATION         Family History:  Family History   Problem Relation Age of Onset   Allyson Cantu Cancer Mother         lung    Cancer Father         lung    Cancer Sister         ovarian cancer and breast cancer    Cancer Brother         unknown    Cancer Maternal Aunt         unknown    Cancer Other         brain    Other Brother         MI       Medications:  Reviewed and reconciled. Social History:  Social History     Socioeconomic History    Marital status:       Spouse name: Not on file    Number of children: Not on file    Years of education: Not on file    Highest education level: Not on file   Occupational History    Not on file   Tobacco Use    Smoking status: Former Smoker     Packs/day: 1.00     Years: 37.00     Pack years: 37.00     Types: Cigarettes     Start date:      Quit date:      Years since quittin.4    Smokeless tobacco: Never Used   Substance and Sexual Activity    Alcohol use: No    Drug use: Never    Sexual activity: Not Currently   Other Topics Concern    Not on file   Social History Narrative    Not on file     Social Determinants of Health     Financial Resource Strain:     Difficulty of Paying Living Expenses: Not on file   Food Insecurity:     Worried About Running Out of Food in the Last Year: Not on file    Richar of Food in the Last Year: Not on file   Transportation Needs:     Lack of Transportation (Medical): Not on file    Lack of Transportation (Non-Medical): Not on file   Physical Activity:     Days of Exercise per Week: Not on file    Minutes of Exercise per Session: Not on file   Stress:     Feeling of Stress : Not on file   Social Connections:     Frequency of Communication with Friends and Family: Not on file    Frequency of Social Gatherings with Friends and Family: Not on file    Attends Jewish Services: Not on file    Active Member of [address] [place] [address] [employer] or Organizations: Not on file    Attends Club or Organization Meetings: Not on file    Marital Status: Not on file   Intimate Partner Violence:     Fear of Current or Ex-Partner: Not on file    Emotionally Abused: Not on file    Physically Abused: Not on file    Sexually Abused: Not on file   Housing Stability:     Unable to Pay for Housing in the Last Year: Not on file    Number of [place] in the Last Year: Not on file    Unstable Housing in the Last Year: Not on file       Allergies: Allergies   Allergen Reactions    Bee Venom Swelling and Dermatitis    Penicillins Hives, Swelling and Dermatitis    Other      All metals except gold and platinum, welts/blisters       Physical Exam:  BP (!) 160/67   Pulse 74   Temp 97.5 °F (36.4 °C) (Infrared)   Resp 16   Ht 5' 1\" (1.549 m)   Wt 137 lb (62.1 kg)   LMP 05/28/2016   SpO2 100%   BMI 25.89 kg/m²   GENERAL: Alert, oriented x 3, not in acute distress. HEENT: PERRLA; EOMI. Oropharynx clear. NECK: Supple. No palpable cervical or supraclavicular lymphadenopathy. LUNGS: Good air entry bilaterally. No wheezing, crackles or rhonchi.    BREASTS: She is s/p right mastectomy, no suspicious lesions, she has telangiectasias. She has tenderness in the left upper rib cage. CARDIOVASCULAR: Regular rate. No murmurs, rubs or gallops. ABDOMEN: Soft. Non-tender, non-distended. Positive bowel sounds. EXTREMITIES: Positive for bruising of the right foot. NEUROLOGIC: No focal deficits. ECOG PS 1      Impression/Plan:     1. The patient is a very pleasant 78 y.o. lady with a PMH significant for Right Breast Cancer, stage III, HR pos, was diagnosed in 2009, she had a right mastectomy done, followed by adjuvant chemo, she received 8 cycles, probably AC followed by T, then PMRT, and 5 years of adjuvant ET with Arimidex, was completed in 2015. She was treated in [facility] under the care of Dr. Alex Nelson. She is doing well clinically without any evidence of recurrence of her disease. Discussed with her the importance of monthly SBE, and routine screening mammograms, she had a left breast screening mammogram done on 6/22/2021, there was no evidence of malignancy, she will be due for a repeat left breast screening mammogram on 6/23/2022 was ordered. 2. She has MDS, diagnosed in 2017, she received ESAs and PRBCs transfusion, has transfusion related iron overload, hgb was 8.5, ferritin 3647, was restarted on Aranesp on 4/23/2019. On 7/20/2021, hemoglobin was 8 g/dl hematocrit 24.7,  1.3, normal white count, platelet count 842H, fit test is negative. The patient does not have an adequate response to Retacrit, she was changed back to Aranesp, today 6/7/2022 labs reviewed, hemoglobin is 7.6 G/DL, white count 5, platelet count 336P. Hemoglobin is less than 10 G/DL, proceed with Aranesp. The patient like to hold off on rechecking her labs prior to her trip to [place]. Hypothyroidism, continue Synthroid follow-up with PCP. RTC in 2 weeks with labs. Thank you for allowing us to participate in the care of Ms. Ventura.     Jennifer Richardson MD   HEMATOLOGY/MEDICAL [address] [facility]  [address] MED ONCOLOGY  [facility] [address]. [address]  Dept: [address]: [phone]

## 2022-06-24 ENCOUNTER — OFFICE VISIT (OUTPATIENT)
Dept: ONCOLOGY | Age: 82
End: 2022-06-24
Payer: MEDICARE

## 2022-06-24 ENCOUNTER — HOSPITAL ENCOUNTER (OUTPATIENT)
Dept: INFUSION THERAPY | Age: 82
Discharge: HOME | End: 2022-06-24
Payer: MEDICARE

## 2022-06-24 VITALS
RESPIRATION RATE: 16 BRPM | SYSTOLIC BLOOD PRESSURE: 155 MMHG | BODY MASS INDEX: 25.47 KG/M2 | OXYGEN SATURATION: 96 % | WEIGHT: 134.8 LBS | HEART RATE: 75 BPM | TEMPERATURE: 97.5 F | DIASTOLIC BLOOD PRESSURE: 58 MMHG

## 2022-06-24 DIAGNOSIS — D46.9 MDS (MYELODYSPLASTIC SYNDROME) (HCC): Primary | ICD-10-CM

## 2022-06-24 DIAGNOSIS — D63.8 ANEMIA OF CHRONIC DISEASE: ICD-10-CM

## 2022-06-24 DIAGNOSIS — Z17.0 MALIGNANT NEOPLASM OF RIGHT BREAST IN FEMALE, ESTROGEN RECEPTOR POSITIVE, UNSPECIFIED SITE OF BREAST (HCC): Primary | ICD-10-CM

## 2022-06-24 DIAGNOSIS — C50.911 MALIGNANT NEOPLASM OF RIGHT BREAST IN FEMALE, ESTROGEN RECEPTOR POSITIVE, UNSPECIFIED SITE OF BREAST (HCC): Primary | ICD-10-CM

## 2022-06-24 LAB
ANISOCYTOSIS: ABNORMAL
BASOPHILS ABSOLUTE: 0 E9/L (ref 0–0.2)
BASOPHILS RELATIVE PERCENT: 0.3 % (ref 0–2)
EOSINOPHILS ABSOLUTE: 0.23 E9/L (ref 0.05–0.5)
EOSINOPHILS RELATIVE PERCENT: 3.5 % (ref 0–6)
HCT VFR BLD CALC: 26.4 % (ref 34–48)
HEMOGLOBIN: 8.3 G/DL (ref 11.5–15.5)
HYPOCHROMIA: ABNORMAL
LYMPHOCYTES ABSOLUTE: 1.78 E9/L (ref 1.5–4)
LYMPHOCYTES RELATIVE PERCENT: 27.2 % (ref 20–42)
MCH RBC QN AUTO: 36.7 PG (ref 26–35)
MCHC RBC AUTO-ENTMCNC: 31.4 % (ref 32–34.5)
MCV RBC AUTO: 116.8 FL (ref 80–99.9)
MONOCYTES ABSOLUTE: 0.26 E9/L (ref 0.1–0.95)
MONOCYTES RELATIVE PERCENT: 3.5 % (ref 2–12)
NEUTROPHILS ABSOLUTE: 4.36 E9/L (ref 1.8–7.3)
NEUTROPHILS RELATIVE PERCENT: 65.8 % (ref 43–80)
OVALOCYTES: ABNORMAL
PDW BLD-RTO: 27.3 FL (ref 11.5–15)
PLATELET # BLD: 140 E9/L (ref 130–450)
PMV BLD AUTO: 10.2 FL (ref 7–12)
POIKILOCYTES: ABNORMAL
POLYCHROMASIA: ABNORMAL
RBC # BLD: 2.26 E12/L (ref 3.5–5.5)
TARGET CELLS: ABNORMAL
TEAR DROP CELLS: ABNORMAL
WBC # BLD: 6.6 E9/L (ref 4.5–11.5)

## 2022-06-24 PROCEDURE — 6360000002 HC RX W HCPCS: Performed by: NURSE PRACTITIONER

## 2022-06-24 PROCEDURE — 1123F ACP DISCUSS/DSCN MKR DOCD: CPT | Performed by: INTERNAL MEDICINE

## 2022-06-24 PROCEDURE — 96372 THER/PROPH/DIAG INJ SC/IM: CPT

## 2022-06-24 PROCEDURE — G8399 PT W/DXA RESULTS DOCUMENT: HCPCS | Performed by: INTERNAL MEDICINE

## 2022-06-24 PROCEDURE — 99214 OFFICE O/P EST MOD 30 MIN: CPT | Performed by: INTERNAL MEDICINE

## 2022-06-24 PROCEDURE — 36415 COLL VENOUS BLD VENIPUNCTURE: CPT

## 2022-06-24 PROCEDURE — 99212 OFFICE O/P EST SF 10 MIN: CPT

## 2022-06-24 PROCEDURE — G8417 CALC BMI ABV UP PARAM F/U: HCPCS | Performed by: INTERNAL MEDICINE

## 2022-06-24 PROCEDURE — 85025 COMPLETE CBC W/AUTO DIFF WBC: CPT

## 2022-06-24 PROCEDURE — 1090F PRES/ABSN URINE INCON ASSESS: CPT | Performed by: INTERNAL MEDICINE

## 2022-06-24 PROCEDURE — G8427 DOCREV CUR MEDS BY ELIG CLIN: HCPCS | Performed by: INTERNAL MEDICINE

## 2022-06-24 PROCEDURE — 1036F TOBACCO NON-USER: CPT | Performed by: INTERNAL MEDICINE

## 2022-06-24 RX ADMIN — DARBEPOETIN ALFA 500 MCG: 500 INJECTION, SOLUTION INTRAVENOUS; SUBCUTANEOUS at 11:07

## 2022-06-24 NOTE — PROGRESS NOTES
[address]  [facility] MED ONCOLOGY  [address] [address]  Dept: [address]: [phone]  Attending Progress Note      Reason for Visit:   1. Right Breast Cancer. 2. MDS. Referring Physician:  CONNIE Dickinson CNP    PCP:  CONNIE Dickinson CNP    History of Present Illness: The patient is a very pleasant 78 y.o. lady with a PMH significant for Right Breast Cancer, stage III, HR pos, was diagnosed in , she had a right mastectomy done, followed by adjuvant chemo, she received 8 cycles, probably AC followed by T, then PMRT, and 5 years of adjuvant ET with Arimidex, was completed in . She was treated in [facility] under the care of Dr. Sarah Jamison. She was diagnosed with MDS in , she received ESAs and PRBCs transfusion. She has transfusion related iron overload. She was started on Aranesp on 2020. No SE from Aranesp. The patient returns for a follow-up visit, her tooth broke, she will need to have dental work done. She was in [place] for her son's .    Review of Systems;  CONSTITUTIONAL: No fever, chills. Fair appetite. ENMT: Eyes: No diplopia; Nose: No epistaxis. Mouth: No sore throat. RESPIRATORY: No hemoptysis, shortness of breath, cough. CARDIOVASCULAR: No chest pain, palpitations. GASTROINTESTINAL: No nausea/vomiting, abdominal pain, diarrhea/constipation. GENITOURINARY: No dysuria, urinary frequency, hematuria. NEURO: No syncope, presyncope, headache.   Remainder:  ROS NEGATIVE    Past Medical History:      Diagnosis Date    Anemia     Pt reports she was dx in her teens, w/o proper w/u, with iron deficiency anemia and was on oral iron replacement for many years, resulting in iron overload    Aplastic anemia ([address] ~6074-0146    Possibly d/t chemotherapy for breast cancer    Breast cancer ([address]     right side; pt underwent radical mastectomy followed by radiation therapy and chemotherapy and was then on oral chemo pill for 5 yrs; no recurrence as of 2019    Chickenpox     Hypothyroidism     Measles     Mild depression (HCC)     Mumps     Myelodysplastic syndrome ([facility] ~2016    Pulmonary tuberculosis ~0292-5992    in her early 25s    Scarlet fever     Sensory neuropathy     beyond the ankle b/l     Patient Active Problem List   Diagnosis    MDS (myelodysplastic syndrome) (HCC)    Bronchitis    Macrocytic anemia with high RDW    Hypothyroidism    Peripheral sensory neuropathy    Breast cancer (HCC)    Anemia of chronic disease        Past Surgical History:      Procedure Laterality Date    ADENOIDECTOMY      BONE MARROW BIOPSY      BREAST BIOPSY      CHOLECYSTECTOMY      MASTECTOMY      right breast    OTHER SURGICAL HISTORY      blood transfusions    TONSILLECTOMY AND ADENOIDECTOMY      TUBAL LIGATION         Family History:  Family History   Problem Relation Age of Onset   Gloria Acuna Cancer Mother         lung    Cancer Father         lung    Cancer Sister         ovarian cancer and breast cancer    Cancer Brother         unknown    Cancer Maternal Aunt         unknown    Cancer Other         brain    Other Brother         MI       Medications:  Reviewed and reconciled. Social History:  Social History     Socioeconomic History    Marital status:       Spouse name: Not on file    Number of children: Not on file    Years of education: Not on file    Highest education level: Not on file   Occupational History    Not on file   Tobacco Use    Smoking status: Former Smoker     Packs/day: 1.00     Years: 37.00     Pack years: 37.00     Types: Cigarettes     Start date:      Quit date: 2000     Years since quittin.4    Smokeless tobacco: Never Used   Substance and Sexual Activity    Alcohol use: No    Drug use: Never    Sexual activity: Not Currently   Other Topics Concern    Not on file   Social History Narrative    Not on file     Social Determinants of Health     Financial Resource Strain:     Difficulty of Paying Living Expenses: Not on file   Food Insecurity:     Worried About Running Out of Food in the Last Year: Not on file    Richar of Food in the Last Year: Not on file   Transportation Needs:     Lack of Transportation (Medical): Not on file    Lack of Transportation (Non-Medical): Not on file   Physical Activity:     Days of Exercise per Week: Not on file    Minutes of Exercise per Session: Not on file   Stress:     Feeling of Stress : Not on file   Social Connections:     Frequency of Communication with Friends and Family: Not on file    Frequency of Social Gatherings with Friends and Family: Not on file    Attends [employer] Services: Not on file    Active Member of [address] [place] [address] or Organizations: Not on file    Attends Club or Organization Meetings: Not on file    Marital Status: Not on file   Intimate Partner Violence:     Fear of Current or Ex-Partner: Not on file    Emotionally Abused: Not on file    Physically Abused: Not on file    Sexually Abused: Not on file   Housing Stability:     Unable to Pay for Housing in the Last Year: Not on file    Number of [place] in the Last Year: Not on file    Unstable Housing in the Last Year: Not on file       Allergies: Allergies   Allergen Reactions    Bee Venom Swelling and Dermatitis    Penicillins Hives, Swelling and Dermatitis    Other      All metals except gold and platinum, welts/blisters       Physical Exam:  BP (!) 155/58   Pulse 75   Temp 97.5 °F (36.4 °C)   Resp 16   Wt 134 lb 12.8 oz (61.1 kg)   LMP 05/28/2016   SpO2 96%   BMI 25.47 kg/m²   GENERAL: Alert, oriented x 3, not in acute distress. HEENT: PERRLA; EOMI. Oropharynx clear. NECK: Supple. No palpable cervical or supraclavicular lymphadenopathy. LUNGS: Good air entry bilaterally. No wheezing, crackles or rhonchi.    BREASTS: She is s/p right mastectomy, no suspicious lesions, she has telangiectasias. She has tenderness in the left upper rib cage. CARDIOVASCULAR: Regular rate. No murmurs, rubs or gallops. ABDOMEN: Soft. Non-tender, non-distended. Positive bowel sounds. EXTREMITIES: Positive for bruising of the right foot. NEUROLOGIC: No focal deficits. ECOG PS 1      Impression/Plan:     1. The patient is a very pleasant 78 y.o. lady with a PMH significant for Right Breast Cancer, stage III, HR pos, was diagnosed in 2009, she had a right mastectomy done, followed by adjuvant chemo, she received 8 cycles, probably AC followed by T, then PMRT, and 5 years of adjuvant ET with Arimidex, was completed in 2015. She was treated in [facility] under the care of Dr. Sigifredo Avery. She is doing well clinically without any evidence of recurrence of her disease. Discussed with her the importance of monthly SBE, and routine screening mammograms, she had a left breast screening mammogram done on 6/22/2021, there was no evidence of malignancy, she will be due for a repeat left breast screening mammogram was ordered. She will have it done today. 2. She has MDS, diagnosed in 2017, she received ESAs and PRBCs transfusion, has transfusion related iron overload, hgb was 8.5, ferritin 3647, was restarted on Aranesp on 4/23/2019. On 7/20/2021, hemoglobin was 8 g/dl hematocrit 24.7,  1.3, normal white count, platelet count 336W, fit test is negative. The patient did not have an adequate response to Retacrit, she was changed back to Aranesp, today 6/24/2022 labs reviewed, hemoglobin had improved to 8.3G/DL G/DL, white count 6.6, platelet count 381 K. Hemoglobin is less than 10 G/DL, proceed with Aranesp. Hypothyroidism, continue Synthroid follow-up with PCP. RTC in 2 weeks with labs. Thank you for allowing us to participate in the care of Ms. Ventura.     Lenetta Gitelman, [address] MED ONCOLOGY  [address] [place] [address]. [facility] [address]  Dept: [address]: [phone]

## 2022-06-26 ENCOUNTER — APPOINTMENT (OUTPATIENT)
Dept: GENERAL RADIOLOGY | Age: 82
End: 2022-06-26
Payer: MEDICARE

## 2022-06-26 ENCOUNTER — HOSPITAL ENCOUNTER (EMERGENCY)
Age: 82
Discharge: HOME | End: 2022-06-26
Attending: EMERGENCY MEDICINE
Payer: MEDICARE

## 2022-06-26 VITALS
TEMPERATURE: 99.8 F | SYSTOLIC BLOOD PRESSURE: 113 MMHG | OXYGEN SATURATION: 97 % | HEART RATE: 79 BPM | DIASTOLIC BLOOD PRESSURE: 54 MMHG | RESPIRATION RATE: 20 BRPM

## 2022-06-26 DIAGNOSIS — D64.9 ANEMIA, UNSPECIFIED TYPE: ICD-10-CM

## 2022-06-26 DIAGNOSIS — R11.0 NAUSEA: ICD-10-CM

## 2022-06-26 DIAGNOSIS — U07.1 COVID-19: Primary | ICD-10-CM

## 2022-06-26 LAB
ALBUMIN SERPL-MCNC: 4.1 G/DL (ref 3.5–5.2)
ALP BLD-CCNC: 33 U/L (ref 35–104)
ALT SERPL-CCNC: 62 U/L (ref 0–32)
ANION GAP SERPL CALCULATED.3IONS-SCNC: 11 MMOL/L (ref 7–16)
ANISOCYTOSIS: ABNORMAL
AST SERPL-CCNC: 65 U/L (ref 0–31)
BASOPHILIC STIPPLING: ABNORMAL
BASOPHILS ABSOLUTE: 0.02 E9/L (ref 0–0.2)
BASOPHILS RELATIVE PERCENT: 0.5 % (ref 0–2)
BILIRUB SERPL-MCNC: 1.2 MG/DL (ref 0–1.2)
BUN BLDV-MCNC: 17 MG/DL (ref 6–23)
CALCIUM SERPL-MCNC: 8.7 MG/DL (ref 8.6–10.2)
CHLORIDE BLD-SCNC: 103 MMOL/L (ref 98–107)
CO2: 24 MMOL/L (ref 22–29)
CREAT SERPL-MCNC: 0.8 MG/DL (ref 0.5–1)
EOSINOPHILS ABSOLUTE: 0.02 E9/L (ref 0.05–0.5)
EOSINOPHILS RELATIVE PERCENT: 0.5 % (ref 0–6)
GFR AFRICAN AMERICAN: >60
GFR NON-AFRICAN AMERICAN: >60 ML/MIN/1.73
GLUCOSE BLD-MCNC: 101 MG/DL (ref 74–99)
HCT VFR BLD CALC: 23.4 % (ref 34–48)
HEMOGLOBIN: 7.3 G/DL (ref 11.5–15.5)
HYPOCHROMIA: ABNORMAL
IMMATURE GRANULOCYTES #: 0.03 E9/L
IMMATURE GRANULOCYTES %: 0.7 % (ref 0–5)
INFLUENZA A BY PCR: NOT DETECTED
INFLUENZA B BY PCR: NOT DETECTED
LYMPHOCYTES ABSOLUTE: 1.95 E9/L (ref 1.5–4)
LYMPHOCYTES RELATIVE PERCENT: 43.9 % (ref 20–42)
MCH RBC QN AUTO: 37.1 PG (ref 26–35)
MCHC RBC AUTO-ENTMCNC: 31.2 % (ref 32–34.5)
MCV RBC AUTO: 118.8 FL (ref 80–99.9)
MONOCYTES ABSOLUTE: 0.34 E9/L (ref 0.1–0.95)
MONOCYTES RELATIVE PERCENT: 7.7 % (ref 2–12)
NEUTROPHILS ABSOLUTE: 2.08 E9/L (ref 1.8–7.3)
NEUTROPHILS RELATIVE PERCENT: 46.7 % (ref 43–80)
OVALOCYTES: ABNORMAL
PDW BLD-RTO: 26.9 FL (ref 11.5–15)
PLATELET # BLD: 90 E9/L (ref 130–450)
PLATELET CONFIRMATION: NORMAL
PMV BLD AUTO: 9.8 FL (ref 7–12)
POIKILOCYTES: ABNORMAL
POLYCHROMASIA: ABNORMAL
POTASSIUM REFLEX MAGNESIUM: 3.7 MMOL/L (ref 3.5–5)
RBC # BLD: 1.97 E12/L (ref 3.5–5.5)
REASON FOR REJECTION: NORMAL
REJECTED TEST: NORMAL
SARS-COV-2, NAAT: DETECTED
SODIUM BLD-SCNC: 138 MMOL/L (ref 132–146)
STREP GRP A PCR: NEGATIVE
TEAR DROP CELLS: ABNORMAL
TOTAL PROTEIN: 6.5 G/DL (ref 6.4–8.3)
TROPONIN, HIGH SENSITIVITY: 7 NG/L (ref 0–9)
WBC # BLD: 4.4 E9/L (ref 4.5–11.5)

## 2022-06-26 PROCEDURE — 87502 INFLUENZA DNA AMP PROBE: CPT

## 2022-06-26 PROCEDURE — 96374 THER/PROPH/DIAG INJ IV PUSH: CPT

## 2022-06-26 PROCEDURE — 87635 SARS-COV-2 COVID-19 AMP PRB: CPT

## 2022-06-26 PROCEDURE — 99285 EMERGENCY DEPT VISIT HI MDM: CPT

## 2022-06-26 PROCEDURE — 84484 ASSAY OF TROPONIN QUANT: CPT

## 2022-06-26 PROCEDURE — 6360000002 HC RX W HCPCS: Performed by: EMERGENCY MEDICINE

## 2022-06-26 PROCEDURE — 2580000003 HC RX 258: Performed by: EMERGENCY MEDICINE

## 2022-06-26 PROCEDURE — 71045 X-RAY EXAM CHEST 1 VIEW: CPT

## 2022-06-26 PROCEDURE — 80053 COMPREHEN METABOLIC PANEL: CPT

## 2022-06-26 PROCEDURE — 93005 ELECTROCARDIOGRAM TRACING: CPT | Performed by: EMERGENCY MEDICINE

## 2022-06-26 PROCEDURE — 87880 STREP A ASSAY W/OPTIC: CPT

## 2022-06-26 PROCEDURE — 85025 COMPLETE CBC W/AUTO DIFF WBC: CPT

## 2022-06-26 RX ORDER — ONDANSETRON 4 MG/1
4 TABLET, ORALLY DISINTEGRATING ORAL EVERY 8 HOURS PRN
Qty: 10 TABLET | Refills: 0 | Status: SHIPPED | OUTPATIENT
Start: 2022-06-26

## 2022-06-26 RX ORDER — BENZONATATE 100 MG/1
100 CAPSULE ORAL 3 TIMES DAILY PRN
Qty: 21 CAPSULE | Refills: 0 | Status: SHIPPED | OUTPATIENT
Start: 2022-06-26 | End: 2022-07-03

## 2022-06-26 RX ORDER — 0.9 % SODIUM CHLORIDE 0.9 %
500 INTRAVENOUS SOLUTION INTRAVENOUS ONCE
Status: COMPLETED | OUTPATIENT
Start: 2022-06-26 | End: 2022-06-26

## 2022-06-26 RX ORDER — ONDANSETRON 2 MG/ML
4 INJECTION INTRAMUSCULAR; INTRAVENOUS ONCE
Status: COMPLETED | OUTPATIENT
Start: 2022-06-26 | End: 2022-06-26

## 2022-06-26 RX ORDER — ALBUTEROL SULFATE 90 UG/1
2 AEROSOL, METERED RESPIRATORY (INHALATION) 4 TIMES DAILY PRN
Qty: 18 G | Refills: 5 | Status: SHIPPED | OUTPATIENT
Start: 2022-06-26

## 2022-06-26 RX ADMIN — SODIUM CHLORIDE 500 ML: 9 INJECTION, SOLUTION INTRAVENOUS at 16:09

## 2022-06-26 RX ADMIN — ONDANSETRON 4 MG: 2 INJECTION INTRAMUSCULAR; INTRAVENOUS at 16:09

## 2022-06-26 ASSESSMENT — ENCOUNTER SYMPTOMS
NAUSEA: 0
VOMITING: 0
ABDOMINAL PAIN: 0
EYE REDNESS: 0
COUGH: 1
SHORTNESS OF BREATH: 1

## 2022-06-26 NOTE — ED NOTES
Patient remained at a steady 93% oxygen saturation while ambulating on room air.      Venancio Moore RN  06/26/22 [id number]

## 2022-06-26 NOTE — ED PROVIDER NOTES
Chief complaint: Fever, cough and congestion      HPI:  6/26/22, Time: 2:14 PM EDT    HPI           Meri Medina is a 78 y.o. female presenting to the ED for fever, cough and congestion. The history is obtained from the patient as well as the patient's medical record. Patient is presenting for a 4-day history of fever, cough, congestion and sneezing. The patient states that she has had nasal congestion cough which is nonproductive. States she has been febrile with a temperature of 102. This is moderate in severity. Nothing makes it better. Nothing makes it worse. She also states she had multiple episodes of loose stool. She reports that she recently was traveling on an airplane a few days prior to the onset of symptoms noted that a passenger was significantly coughing behind her. She is vaccinated and boosted COVID-19. She does also complain of a sore throat. ROS:   Review of Systems   Constitutional: Positive for fatigue and fever. Negative for chills. HENT: Positive for congestion. Eyes: Negative for redness. Respiratory: Positive for cough and shortness of breath. Cardiovascular: Negative for chest pain. Gastrointestinal: Negative for abdominal pain, nausea and vomiting. Genitourinary: Negative for dysuria. Musculoskeletal: Negative for arthralgias. Skin: Negative for rash. Neurological: Negative for light-headedness. Psychiatric/Behavioral: Negative for confusion. All other systems reviewed and are negative.      --------------------------------------------- PAST HISTORY ---------------------------------------------  Past Medical History:  has a past medical history of Anemia, Aplastic anemia ([facility] [address] Breast cancer ([address] Chickenpox, Hypothyroidism, Measles, Mild depression ([address] Mumps, Myelodysplastic syndrome ([address] Pulmonary tuberculosis, Scarlet fever, and Sensory neuropathy. Past Surgical History:  has a past surgical history that includes Breast biopsy;  Tubal ligation; Mastectomy; Cholecystectomy; other surgical history; Adenoidectomy; bone marrow biopsy; and Tonsillectomy and adenoidectomy. Social History:  reports that she quit smoking about 22 years ago. Her smoking use included cigarettes. She started smoking about 59 years ago. She has a 37.00 pack-year smoking history. She has never used smokeless tobacco. She reports that she does not drink alcohol and does not use drugs. Family History: family history includes Cancer in her brother, father, maternal aunt, mother, sister, and another family member; Other in her brother. The patients home medications have been reviewed. Allergies: Bee venom, Penicillins, and Other    ---------------------------------------------------PHYSICAL EXAM--------------------------------------      Constitutional/General: Alert and oriented x3, well appearing, non toxic in NAD  Head: Normocephalic and atraumatic  Mouth: Oropharynx clear, handling secretions, no trismus  Neck: Supple, full ROM,  Pulmonary: Mildly coarse breath sounds, no rales, rhonchi or wheezing  Cardiovascular:  Regular rate. Regular rhythm. No murmurs  Chest: no chest wall tenderness  Abdomen: Soft. Non tender. Non distended. No rebound, guarding, or rigidity. No pulsatile masses appreciated. Musculoskeletal: Moves all extremities x 4. Warm and well perfused, no clubbing, cyanosis, or edema. Capillary refill <3 seconds  Skin: warm and dry. No rashes. Neurologic: GCS 15, no gross focal neurologic deficits  Psych: Normal Affect    -------------------------------------------------- RESULTS -------------------------------------------------  I have personally reviewed all laboratory and imaging results for this patient. Results are listed below.      LABS:  Results for orders placed or performed during the hospital encounter of 06/26/22   COVID-19, Rapid    Specimen: Nasopharyngeal Swab   Result Value Ref Range    SARS-CoV-2, NAAT DETECTED (A) Not Detected RAPID INFLUENZA A/B ANTIGENS    Specimen: Nasopharyngeal   Result Value Ref Range    Influenza A by PCR Not Detected Not Detected    Influenza B by PCR Not Detected Not Detected   Strep Screen Group A Throat    Specimen: Throat   Result Value Ref Range    Strep Grp A PCR Negative Negative   CBC with Auto Differential   Result Value Ref Range    WBC 4.4 (L) 4.5 - 11.5 E9/L    RBC 1.97 (L) 3.50 - 5.50 E12/L    Hemoglobin 7.3 (L) 11.5 - 15.5 g/dL    Hematocrit 23.4 (L) 34.0 - 48.0 %    .8 (H) 80.0 - 99.9 fL    MCH 37.1 (H) 26.0 - 35.0 pg    MCHC 31.2 (L) 32.0 - 34.5 %    RDW 26.9 (H) 11.5 - 15.0 fL    Platelets 90 (L) 118 - 450 E9/L    MPV 9.8 7.0 - 12.0 fL    Neutrophils % 46.7 43.0 - 80.0 %    Immature Granulocytes % 0.7 0.0 - 5.0 %    Lymphocytes % 43.9 (H) 20.0 - 42.0 %    Monocytes % 7.7 2.0 - 12.0 %    Eosinophils % 0.5 0.0 - 6.0 %    Basophils % 0.5 0.0 - 2.0 %    Neutrophils Absolute 2.08 1.80 - 7.30 E9/L    Immature Granulocytes # 0.03 E9/L    Lymphocytes Absolute 1.95 1.50 - 4.00 E9/L    Monocytes Absolute 0.34 0.10 - 0.95 E9/L    Eosinophils Absolute 0.02 (L) 0.05 - 0.50 E9/L    Basophils Absolute 0.02 0.00 - 0.20 E9/L    Anisocytosis 3+     Polychromasia 2+     Hypochromia 1+     Poikilocytes 2+     Ovalocytes 1+     Tear Drop Cells 2+     Basophilic Stippling 1+    Comprehensive Metabolic Panel w/ Reflex to MG   Result Value Ref Range    Sodium 138 132 - 146 mmol/L    Potassium reflex Magnesium 3.7 3.5 - 5.0 mmol/L    Chloride 103 98 - 107 mmol/L    CO2 24 22 - 29 mmol/L    Anion Gap 11 7 - 16 mmol/L    Glucose 101 (H) 74 - 99 mg/dL    BUN 17 6 - 23 mg/dL    CREATININE 0.8 0.5 - 1.0 mg/dL    GFR Non-African American >60 >=60 mL/min/1.73    GFR African American >60     Calcium 8.7 8.6 - 10.2 mg/dL    Total Protein 6.5 6.4 - 8.3 g/dL    Albumin 4.1 3.5 - 5.2 g/dL    Total Bilirubin 1.2 0.0 - 1.2 mg/dL    Alkaline Phosphatase 33 (L) 35 - 104 U/L    ALT 62 (H) 0 - 32 U/L    AST 65 (H) 0 - 31 U/L Troponin   Result Value Ref Range    Troponin, High Sensitivity 7 0 - 9 ng/L   SPECIMEN REJECTION   Result Value Ref Range    Rejected Test [facility]     Reason for Rejection see below    Platelet Confirmation   Result Value Ref Range    Platelet Confirmation CONFIRMED    EKG 12 Lead   Result Value Ref Range    Ventricular Rate 71 BPM    Atrial Rate 71 BPM    P-R Interval 178 ms    QRS Duration 86 ms    Q-T Interval 388 ms    QTc Calculation (Bazett) 421 ms    P Axis 44 degrees    R Axis -24 degrees    T Axis 27 degrees       RADIOLOGY:  Interpreted by Radiologist.  XR CHEST PORTABLE   Final Result   No acute process. EKG:  This EKG is signed and interpreted by me. Normal sinus rhythm rate of 71, no ST segment elevation or depression, ID interval 178 MS, QRS 86 MS,  ms  Interpreted by me      ------------------------- NURSING NOTES AND VITALS REVIEWED ---------------------------   The nursing notes within the ED encounter and vital signs as below have been reviewed by myself. BP (!) 113/54   Pulse 79   Temp 99.8 °F (37.7 °C) (Oral)   Resp 20   LMP 05/28/2016   SpO2 97%   Oxygen Saturation Interpretation: Normal    The patients available past medical records and past encounters were reviewed. ------------------------------ ED COURSE/MEDICAL DECISION MAKING----------------------  Medications   0.9 % sodium chloride bolus (0 mLs IntraVENous Stopped 6/26/22 1700)   ondansetron (ZOFRAN) injection 4 mg (4 mg IntraVENous Given 6/26/22 1609)             Medical Decision Making:   I, Dr. Austin Josue am the primary physician of record.     Tr Michaels is a 78 y.o. female who presents to the ED for fever, congestion and cough differential diagnosis includes but is not limited to COVID-19, pneumonia, influenza, anemia the patient does have a past medical history of   has a past medical history of Anemia, Aplastic anemia ([address] Breast cancer ([facility] [address] Chickenpox, Hypothyroidism, Measles, Mild depression ([facility] [address] Mumps, Myelodysplastic syndrome ([facility] [address] Pulmonary tuberculosis, Scarlet fever, and Sensory neuropathy. . The patient was given IV fluid and Zofran. Labs and imaging obtained, reviewed. Patient had a CBC revealing mild anemia. The patient does have a history of aplastic anemia, CMP fairly unremarkable, high-sensitivity troponin was 7, EKG with no ischemic changes. Chest x-ray with no acute process. The patient was treated symptomatically. The patient was able to be ambulated and will be discharged with symptomatic treatment. She is instructed to maintain pulse oximetry above 90%. Patient will be admitted for further treatment and evaluation        Re-Evaluations/Consultations:           Patient in the bed no acute distress. Patient was able to be ambulated without difficulty. This patient's ED course included: History, physical examination, reevaluation prior to disposition, labs, imaging, telemetry monitoring, EKG       This patient has remained hemodynamically stable during their ED course. Counseling: The emergency provider has spoken with the patient and discussed todays results, in addition to providing specific details for the plan of care and counseling regarding the diagnosis and prognosis. Questions are answered at this time and they are agreeable with the plan.       --------------------------------- IMPRESSION AND DISPOSITION ---------------------------------    IMPRESSION  1. COVID-19    2. Anemia, unspecified type    3. Nausea        DISPOSITION  Disposition: Discharge to home  Patient condition is stable        NOTE: This report was transcribed using voice recognition software.  Every effort was made to ensure accuracy; however, inadvertent computerized transcription errors may be present         Remus Pod, DO  06/27/22 5958

## 2022-06-27 LAB
EKG ATRIAL RATE: 71 BPM
EKG P AXIS: 44 DEGREES
EKG P-R INTERVAL: 178 MS
EKG Q-T INTERVAL: 388 MS
EKG QRS DURATION: 86 MS
EKG QTC CALCULATION (BAZETT): 421 MS
EKG R AXIS: -24 DEGREES
EKG T AXIS: 27 DEGREES
EKG VENTRICULAR RATE: 71 BPM

## 2022-07-08 ENCOUNTER — HOSPITAL ENCOUNTER (OUTPATIENT)
Dept: INFUSION THERAPY | Age: 82
Discharge: HOME | End: 2022-07-08
Payer: MEDICARE

## 2022-07-08 ENCOUNTER — OFFICE VISIT (OUTPATIENT)
Dept: ONCOLOGY | Age: 82
End: 2022-07-08
Payer: MEDICARE

## 2022-07-08 VITALS
HEIGHT: 61 IN | HEART RATE: 80 BPM | BODY MASS INDEX: 24.98 KG/M2 | DIASTOLIC BLOOD PRESSURE: 75 MMHG | OXYGEN SATURATION: 94 % | TEMPERATURE: 97.5 F | WEIGHT: 132.3 LBS | SYSTOLIC BLOOD PRESSURE: 118 MMHG

## 2022-07-08 DIAGNOSIS — D63.8 ANEMIA OF CHRONIC DISEASE: ICD-10-CM

## 2022-07-08 DIAGNOSIS — D46.9 MDS (MYELODYSPLASTIC SYNDROME) (HCC): Primary | ICD-10-CM

## 2022-07-08 LAB
ANISOCYTOSIS: ABNORMAL
BASOPHILIC STIPPLING: ABNORMAL
BASOPHILS ABSOLUTE: 0.03 E9/L (ref 0–0.2)
BASOPHILS RELATIVE PERCENT: 0.4 % (ref 0–2)
EOSINOPHILS ABSOLUTE: 0.14 E9/L (ref 0.05–0.5)
EOSINOPHILS RELATIVE PERCENT: 1.8 % (ref 0–6)
HCT VFR BLD CALC: 29.3 % (ref 34–48)
HEMOGLOBIN: 9.3 G/DL (ref 11.5–15.5)
HYPOCHROMIA: ABNORMAL
IMMATURE GRANULOCYTES #: 0.03 E9/L
IMMATURE GRANULOCYTES %: 0.4 % (ref 0–5)
LYMPHOCYTES ABSOLUTE: 2.09 E9/L (ref 1.5–4)
LYMPHOCYTES RELATIVE PERCENT: 26.2 % (ref 20–42)
MCH RBC QN AUTO: 36.9 PG (ref 26–35)
MCHC RBC AUTO-ENTMCNC: 31.7 % (ref 32–34.5)
MCV RBC AUTO: 116.3 FL (ref 80–99.9)
MONOCYTES ABSOLUTE: 0.54 E9/L (ref 0.1–0.95)
MONOCYTES RELATIVE PERCENT: 6.8 % (ref 2–12)
NEUTROPHILS ABSOLUTE: 5.16 E9/L (ref 1.8–7.3)
NEUTROPHILS RELATIVE PERCENT: 64.4 % (ref 43–80)
OVALOCYTES: ABNORMAL
PDW BLD-RTO: 24.4 FL (ref 11.5–15)
PLATELET # BLD: 195 E9/L (ref 130–450)
PMV BLD AUTO: 10.1 FL (ref 7–12)
POIKILOCYTES: ABNORMAL
POLYCHROMASIA: ABNORMAL
RBC # BLD: 2.52 E12/L (ref 3.5–5.5)
SCHISTOCYTES: ABNORMAL
TARGET CELLS: ABNORMAL
TEAR DROP CELLS: ABNORMAL
WBC # BLD: 8 E9/L (ref 4.5–11.5)

## 2022-07-08 PROCEDURE — G8417 CALC BMI ABV UP PARAM F/U: HCPCS | Performed by: INTERNAL MEDICINE

## 2022-07-08 PROCEDURE — G8399 PT W/DXA RESULTS DOCUMENT: HCPCS | Performed by: INTERNAL MEDICINE

## 2022-07-08 PROCEDURE — 1090F PRES/ABSN URINE INCON ASSESS: CPT | Performed by: INTERNAL MEDICINE

## 2022-07-08 PROCEDURE — 6360000002 HC RX W HCPCS: Performed by: NURSE PRACTITIONER

## 2022-07-08 PROCEDURE — 99214 OFFICE O/P EST MOD 30 MIN: CPT | Performed by: INTERNAL MEDICINE

## 2022-07-08 PROCEDURE — 99212 OFFICE O/P EST SF 10 MIN: CPT

## 2022-07-08 PROCEDURE — 85025 COMPLETE CBC W/AUTO DIFF WBC: CPT

## 2022-07-08 PROCEDURE — 96372 THER/PROPH/DIAG INJ SC/IM: CPT

## 2022-07-08 PROCEDURE — G8427 DOCREV CUR MEDS BY ELIG CLIN: HCPCS | Performed by: INTERNAL MEDICINE

## 2022-07-08 PROCEDURE — 1123F ACP DISCUSS/DSCN MKR DOCD: CPT | Performed by: INTERNAL MEDICINE

## 2022-07-08 PROCEDURE — 36415 COLL VENOUS BLD VENIPUNCTURE: CPT

## 2022-07-08 PROCEDURE — 1036F TOBACCO NON-USER: CPT | Performed by: INTERNAL MEDICINE

## 2022-07-08 RX ADMIN — DARBEPOETIN ALFA 500 MCG: 500 INJECTION, SOLUTION INTRAVENOUS; SUBCUTANEOUS at 11:17

## 2022-07-08 NOTE — PROGRESS NOTES
[address]  [facility] MED ONCOLOGY  [facility] [address]  Dept: [address] [place]: [phone]  Attending Progress Note      Reason for Visit:   1. Right Breast Cancer. 2. MDS. Referring Physician:  CONNIE Calderon CNP    PCP:  CONNIE Calderon CNP    History of Present Illness: The patient is a very pleasant 78 y.o. lady with a PMH significant for Right Breast Cancer, stage III, HR pos, was diagnosed in 2009, she had a right mastectomy done, followed by adjuvant chemo, she received 8 cycles, probably AC followed by T, then PMRT, and 5 years of adjuvant ET with Arimidex, was completed in 2015. She was treated in [facility] under the care of Dr. Sherwin Dumont. She was diagnosed with MDS in 2017, she received ESAs and PRBCs transfusion. She has transfusion related iron overload. She was started on Aranesp on 4/24/2020. No SE from Aranesp. The patient returns for a follow-up visit, she was diagnosed with COVID, she had fever, breathing was okay. She required intravenous hydration in the ED. Review of Systems;  CONSTITUTIONAL: No fever, chills. Fair appetite. ENMT: Eyes: No diplopia; Nose: No epistaxis. Mouth: No sore throat. RESPIRATORY: No hemoptysis, shortness of breath, cough. CARDIOVASCULAR: No chest pain, palpitations. GASTROINTESTINAL: No nausea/vomiting, abdominal pain, diarrhea/constipation. GENITOURINARY: No dysuria, urinary frequency, hematuria. NEURO: No syncope, presyncope, headache.   Remainder:  ROS NEGATIVE    Past Medical History:      Diagnosis Date    Anemia     Pt reports she was dx in her teens, w/o proper w/u, with iron deficiency anemia and was on oral iron replacement for many years, resulting in iron overload    Aplastic anemia ([address] ~4863-4392    Possibly d/t chemotherapy for breast cancer    Breast cancer ([address] 2009    right side; pt underwent radical mastectomy followed by radiation therapy and chemotherapy and was then on oral chemo pill for 5 yrs; no recurrence as of 2019    Chickenpox     Hypothyroidism     Measles     Mild depression (HCC)     Mumps     Myelodysplastic syndrome ([facility] [address] ~2016    Pulmonary tuberculosis ~3057-6648    in her early 25s    Scarlet fever     Sensory neuropathy     beyond the ankle b/l     Patient Active Problem List   Diagnosis    MDS (myelodysplastic syndrome) (HCC)    Bronchitis    Macrocytic anemia with high RDW    Hypothyroidism    Peripheral sensory neuropathy    Breast cancer (HCC)    Anemia of chronic disease        Past Surgical History:      Procedure Laterality Date    ADENOIDECTOMY      BONE MARROW BIOPSY      BREAST BIOPSY      CHOLECYSTECTOMY      MASTECTOMY      right breast    OTHER SURGICAL HISTORY      blood transfusions    TONSILLECTOMY AND ADENOIDECTOMY      TUBAL LIGATION         Family History:  Family History   Problem Relation Age of Onset   [place] Bark Cancer Mother         lung    Cancer Father         lung    Cancer Sister         ovarian cancer and breast cancer    Cancer Brother         unknown    Cancer Maternal Aunt         unknown    Cancer Other         brain    Other Brother         MI       Medications:  Reviewed and reconciled. Social History:  Social History     Socioeconomic History    Marital status:       Spouse name: Not on file    Number of children: Not on file    Years of education: Not on file    Highest education level: Not on file   Occupational History    Not on file   Tobacco Use    Smoking status: Former Smoker     Packs/day: 1.00     Years: 37.00     Pack years: 37.00     Types: Cigarettes     Start date:      Quit date: 2000     Years since quittin.5    Smokeless tobacco: Never Used   Substance and Sexual Activity    Alcohol use: No    Drug use: Never    Sexual activity: Not Currently   Other Topics Concern    Not on file   Social History Narrative    Not on file     Social Determinants of Health     Financial Resource Strain:     Difficulty of Paying Living Expenses: Not on file   Food Insecurity:     Worried About Running Out of Food in the Last Year: Not on file    Richar of Food in the Last Year: Not on file   Transportation Needs:     Lack of Transportation (Medical): Not on file    Lack of Transportation (Non-Medical): Not on file   Physical Activity:     Days of Exercise per Week: Not on file    Minutes of Exercise per Session: Not on file   Stress:     Feeling of Stress : Not on file   Social Connections:     Frequency of Communication with Friends and Family: Not on file    Frequency of Social Gatherings with Friends and Family: Not on file    Attends Anabaptism Services: Not on file    Active Member of [address] [place] [address] [place] or Organizations: Not on file    Attends Club or Organization Meetings: Not on file    Marital Status: Not on file   Intimate Partner Violence:     Fear of Current or Ex-Partner: Not on file    Emotionally Abused: Not on file    Physically Abused: Not on file    Sexually Abused: Not on file   Housing Stability:     Unable to Pay for Housing in the Last Year: Not on file    Number of [place] in the Last Year: Not on file    Unstable Housing in the Last Year: Not on file       Allergies: Allergies   Allergen Reactions    Bee Venom Swelling and Dermatitis    Penicillins Hives, Swelling and Dermatitis    Other      All metals except gold and platinum, welts/blisters       Physical Exam:  /75   Pulse 80   Temp 97.5 °F (36.4 °C)   Ht 5' 1\" (1.549 m)   Wt 132 lb 4.8 oz (60 kg)   LMP 05/28/2016   SpO2 94%   BMI 25.00 kg/m²   GENERAL: Alert, oriented x 3, not in acute distress. HEENT: PERRLA; EOMI. Oropharynx clear. NECK: Supple. No palpable cervical or supraclavicular lymphadenopathy. LUNGS: Good air entry bilaterally. No wheezing, crackles or rhonchi.    BREASTS: She is s/p right mastectomy, no [place] [id number]  Dept: [address]: [phone]

## 2022-07-11 ENCOUNTER — TELEPHONE (OUTPATIENT)
Dept: PALLATIVE CARE | Age: 82
End: 2022-07-11

## 2022-07-11 NOTE — TELEPHONE ENCOUNTER
LSW returned call to pt in regards to wanting to reschedule her appt for today. Pt was tearful on phone as she was anticipating having to put her dog down due to some health issues it was having, pt stated she was waiting for her family member to come pick her up and take her and dog to vet office to get dog checked out. Pt stated she has had a very rough year and continued to be tearful on the phone as she recently lost her son back in May and had traveled to [place] for a [employer] for him and upon her return home became sick with COVID and had to be seen in the emergency department and is now dealing with her dog being sick and possibly being put down. Provided empathic responses and supportive listening to pt over the phone and encouraged her to contact Palliative medicine in future if needed. Pt appt was rescheduled for 7-25-22 at 2pm for a telephone visit, pt was appreciative of understanding of staff. Will follow as needed.

## 2022-07-14 ENCOUNTER — APPOINTMENT (OUTPATIENT)
Dept: GENERAL RADIOLOGY | Age: 82
End: 2022-07-14
Payer: MEDICARE

## 2022-07-14 ENCOUNTER — HOSPITAL ENCOUNTER (EMERGENCY)
Age: 82
Discharge: HOME | End: 2022-07-14
Attending: STUDENT IN AN ORGANIZED HEALTH CARE EDUCATION/TRAINING PROGRAM
Payer: MEDICARE

## 2022-07-14 VITALS
OXYGEN SATURATION: 99 % | TEMPERATURE: 97.7 F | WEIGHT: 132 LBS | RESPIRATION RATE: 16 BRPM | HEART RATE: 81 BPM | DIASTOLIC BLOOD PRESSURE: 79 MMHG | HEIGHT: 61 IN | BODY MASS INDEX: 24.92 KG/M2 | SYSTOLIC BLOOD PRESSURE: 116 MMHG

## 2022-07-14 DIAGNOSIS — S82.832A CLOSED FRACTURE OF DISTAL END OF LEFT FIBULA, UNSPECIFIED FRACTURE MORPHOLOGY, INITIAL ENCOUNTER: Primary | ICD-10-CM

## 2022-07-14 PROCEDURE — 99283 EMERGENCY DEPT VISIT LOW MDM: CPT

## 2022-07-14 PROCEDURE — 73610 X-RAY EXAM OF ANKLE: CPT

## 2022-07-14 PROCEDURE — 73630 X-RAY EXAM OF FOOT: CPT

## 2022-07-14 PROCEDURE — 73562 X-RAY EXAM OF KNEE 3: CPT

## 2022-07-14 ASSESSMENT — PAIN - FUNCTIONAL ASSESSMENT: PAIN_FUNCTIONAL_ASSESSMENT: 0-10

## 2022-07-14 ASSESSMENT — PAIN SCALES - GENERAL: PAINLEVEL_OUTOF10: 7

## 2022-07-14 NOTE — ED PROVIDER NOTES
Department of Emergency Medicine   ED  Provider Note  Admit Date/RoomTime: 7/14/2022  5:53 PM  ED Room: [facility]          History of Present Illness:  7/14/22, Time: 7:08 PM EDT  Chief Complaint   Patient presents with    Fall     walking up hill and fell, fell onto left ankle, denies hitting head, no thinners    Ankle Pain     left          Debbi Crow is a 78 y.o. female presenting to the ED for fall, beginning just prior to arrival.  The complaint has been persistent, moderate in severity, and worsened by nothing. The patient is an 38-year-old female who presents the emergency department for a fall and left ankle pain. The patient symptoms were sudden onset, persistent, moderate in severity, nothing makes it better or worse. She states that she was walking from [address] [facility] [address] [facility] to [facility] [address] [place] [address] up a hill and she slipped on some brittany and fell landing onto her left ankle. She describes pain as aching in the left ankle and nonradiating. She did not take anything for symptoms prior to arrival.  She states that she is not injured this ankle or leg in the past.  She denies hitting her head, loss of consciousness, blood thinner use, chest pain, shortness of breath, abdominal pain, numbness, tingling, other injuries, or other acute symptoms or concerns. Review of Systems:   A complete review of systems was performed and pertinent positives and negatives are stated within HPI, all other systems reviewed and are negative.        --------------------------------------------- PAST HISTORY ---------------------------------------------  Past Medical History:  has a past medical history of Anemia, Aplastic anemia ([facility] Breast cancer ([facility] Chickenpox, Hypothyroidism, Measles, Mild depression ([facility] Mumps, Myelodysplastic syndrome ([facility] Pulmonary tuberculosis, Scarlet fever, and Sensory neuropathy. Past Surgical History:  has a past surgical history that includes Breast biopsy; Tubal ligation;  Mastectomy; Cholecystectomy; other surgical history; Adenoidectomy; bone marrow biopsy; and Tonsillectomy and adenoidectomy. Social History:  reports that she quit smoking about 22 years ago. Her smoking use included cigarettes. She started smoking about 59 years ago. She has a 37.00 pack-year smoking history. She has never used smokeless tobacco. She reports that she does not drink alcohol and does not use drugs. Family History: family history includes Cancer in her brother, father, maternal aunt, mother, sister, and another family member; Other in her brother. . Unless otherwise noted, family history is non contributory    The patients home medications have been reviewed. Allergies: Bee venom, Penicillins, and Other    I have reviewed the past medical history, past surgical history, social history, and family history    ---------------------------------------------------PHYSICAL EXAM--------------------------------------    Constitutional/General: Alert and oriented x3  Head: Normocephalic and atraumatic  Eyes:  EOMI, sclera non icteric  ENT: Oropharynx clear, handling secretions, no trismus, no asymmetry of the posterior oropharynx or uvular edema  Neck: Supple, full ROM, no stridor, no meningeal signs  Respiratory: Lungs clear to auscultation bilaterally, no wheezes, rales, or rhonchi. Not in respiratory distress  Cardiovascular:  Regular rate. Regular rhythm. No murmurs, no gallops, no rubs. 2+ distal pulses. Equal extremity pulses. Gastrointestinal:  Abdomen Soft, Non tender, Non distended. No rebound, guarding, or rigidity. No pulsatile masses. Musculoskeletal: Moves all extremities x 4. Warm and well perfused, no clubbing, no cyanosis, the patient's left ankle is tender over the left lateral malleolus and there is moderate edema over the left ankle diffusely worse over the lateral malleolus. Sensation intact and equal to bilateral lower extremities. DP pulses are +2 and equal bilaterally.   Compartments are soft and compressible. No tenderness over the left knee or fibular head or proximal tibia. Skin: skin warm and dry. No rashes. Neurologic: GCS 15, no focal deficits, symmetric strength 5/5 in the upper and lower extremities bilaterally  Psychiatric: Normal Affect      -------------------------------------------------- RESULTS -------------------------------------------------  I have personally reviewed all laboratory and imaging results for this patient. Results are listed below. LABS: (Lab results interpreted by me)  No results found for this visit on 07/14/22.,       RADIOLOGY:  Interpreted by Radiologist unless otherwise specified  XR ANKLE LEFT (MIN 3 VIEWS)   Final Result   Displaced fracture distal shaft of the fibula. Lateral ankle edema. XR FOOT LEFT (MIN 3 VIEWS)   Final Result   No acute osseous abnormality. Dorsal and plantar calcaneal spurs. XR KNEE LEFT (3 VIEWS)   Final Result   No acute abnormality of the knee.               ------------------------- NURSING NOTES AND VITALS REVIEWED ---------------------------   The nursing notes within the ED encounter and vital signs as below have been reviewed by myself  /79   Pulse 81   Temp 97.7 °F (36.5 °C) (Oral)   Resp 16   Ht 5' 1\" (1.549 m)   Wt 132 lb (59.9 kg)   LMP 05/28/2016   SpO2 99%   BMI 24.94 kg/m²     Oxygen Saturation Interpretation: Normal    The patients available past medical records and past encounters were reviewed. ------------------------------ ED COURSE/MEDICAL DECISION MAKING----------------------  Medications - No data to display         I, Dr. Betzy Dean, am the primary provider of record    Medical Decision Making:   The patient is an 56-year-old female presents emerged department for fall and left ankle pain. She is hemodynamically stable, nontoxic, and in no acute distress. X-ray does show evidence of a distal fibular fracture. It is minimally displaced.   She is hemodynamically stable and neurovascularly intact. Compartments are soft and compressible. Patient was placed in a walking boot and was able to walk in the emergency department. States that she does have a walker at home. Recommend her to use the walking boot and walker and follow-up closely with orthopedics. She not have any other injuries. Strict return precautions were given and she was discharged home in stable condition. Oxygen Saturation Interpretation: 99 % on room air. Re-Evaluations:  ED Course as of 07/15/22 0049   Thu Jul 14, 2022 2114 Patient ambulated ok with the boot per nursing staff. She has a walker at home. She also is on pain medications at home and doesn't need a prescription. [KG]      ED Course User Index  [KG] Riley López DO         This patient's ED course included: a personal history and physicial examination, re-evaluation prior to disposition, multiple bedside re-evaluations, IV medications, cardiac monitoring, continuous pulse oximetry and complex medical decision making and emergency management    This patient has remained hemodynamically stable during their ED course. Counseling: The emergency provider has spoken with the patient and discussed todays results, in addition to providing specific details for the plan of care and counseling regarding the diagnosis and prognosis. Questions are answered at this time and they are agreeable with the plan.       --------------------------------- IMPRESSION AND DISPOSITION ---------------------------------    IMPRESSION  1. Closed fracture of distal end of left fibula, unspecified fracture morphology, initial encounter        DISPOSITION  Disposition: Discharge to home  Patient condition is stable        NOTE: This report was transcribed using voice recognition software.  Every effort was made to ensure accuracy; however, inadvertent computerized transcription errors may be present       Riley López DO  07/15/22 2988

## 2022-07-15 NOTE — ED NOTES
Orthopedic boot applied left foot. Patient gait unsteady during ambulation. Stated 8 of 10 pain radiating to knee while ambulating.      Mauro Parker LPN  12/15/95 9030

## 2022-07-18 DIAGNOSIS — Z51.5 PALLIATIVE CARE BY SPECIALIST: ICD-10-CM

## 2022-07-18 DIAGNOSIS — G89.3 PAIN DUE TO NEOPLASM: ICD-10-CM

## 2022-07-18 RX ORDER — HYDROCODONE BITARTRATE AND ACETAMINOPHEN 5; 325 MG/1; MG/1
1 TABLET ORAL EVERY 12 HOURS PRN
Qty: 60 TABLET | Refills: 0 | Status: SHIPPED
Start: 2022-07-18 | End: 2022-08-17 | Stop reason: SDUPTHER

## 2022-07-18 NOTE — TELEPHONE ENCOUNTER
Uriel Garcia calling for refill of her Taylor. Confirmed pharmacy. Last appt 1/11/2022  Next appt 7/25/2022     Pt also informed staff she had a recent fall on 7/14/22 which resulted in a broken leg. She will be following up with an orthopedic surgeon this week. Mesha Sousa

## 2022-07-22 ENCOUNTER — HOSPITAL ENCOUNTER (OUTPATIENT)
Dept: INFUSION THERAPY | Age: 82
Discharge: HOME | End: 2022-07-22
Payer: MEDICARE

## 2022-07-22 ENCOUNTER — OFFICE VISIT (OUTPATIENT)
Dept: ONCOLOGY | Age: 82
End: 2022-07-22
Payer: MEDICARE

## 2022-07-22 VITALS
DIASTOLIC BLOOD PRESSURE: 59 MMHG | WEIGHT: 134.3 LBS | BODY MASS INDEX: 25.38 KG/M2 | RESPIRATION RATE: 12 BRPM | TEMPERATURE: 97.6 F | SYSTOLIC BLOOD PRESSURE: 119 MMHG | OXYGEN SATURATION: 100 % | HEART RATE: 59 BPM

## 2022-07-22 DIAGNOSIS — D63.8 ANEMIA OF CHRONIC DISEASE: Primary | ICD-10-CM

## 2022-07-22 DIAGNOSIS — D46.9 MDS (MYELODYSPLASTIC SYNDROME) (HCC): ICD-10-CM

## 2022-07-22 DIAGNOSIS — D46.9 MDS (MYELODYSPLASTIC SYNDROME) (HCC): Primary | ICD-10-CM

## 2022-07-22 LAB
ANISOCYTOSIS: ABNORMAL
BASOPHILIC STIPPLING: ABNORMAL
BASOPHILS ABSOLUTE: 0.02 E9/L (ref 0–0.2)
BASOPHILS RELATIVE PERCENT: 0.5 % (ref 0–2)
EOSINOPHILS ABSOLUTE: 0.2 E9/L (ref 0.05–0.5)
EOSINOPHILS RELATIVE PERCENT: 4.7 % (ref 0–6)
HCT VFR BLD CALC: 28 % (ref 34–48)
HEMOGLOBIN: 9 G/DL (ref 11.5–15.5)
HYPOCHROMIA: ABNORMAL
IMMATURE GRANULOCYTES #: 0.02 E9/L
IMMATURE GRANULOCYTES %: 0.5 % (ref 0–5)
LYMPHOCYTES ABSOLUTE: 2.11 E9/L (ref 1.5–4)
LYMPHOCYTES RELATIVE PERCENT: 49.8 % (ref 20–42)
MCH RBC QN AUTO: 36.4 PG (ref 26–35)
MCHC RBC AUTO-ENTMCNC: 32.1 % (ref 32–34.5)
MCV RBC AUTO: 113.4 FL (ref 80–99.9)
MONOCYTES ABSOLUTE: 0.24 E9/L (ref 0.1–0.95)
MONOCYTES RELATIVE PERCENT: 5.7 % (ref 2–12)
NEUTROPHILS ABSOLUTE: 1.65 E9/L (ref 1.8–7.3)
NEUTROPHILS RELATIVE PERCENT: 38.8 % (ref 43–80)
OVALOCYTES: ABNORMAL
PDW BLD-RTO: 24.8 FL (ref 11.5–15)
PLATELET # BLD: 160 E9/L (ref 130–450)
PMV BLD AUTO: 10.3 FL (ref 7–12)
POIKILOCYTES: ABNORMAL
POLYCHROMASIA: ABNORMAL
RBC # BLD: 2.47 E12/L (ref 3.5–5.5)
WBC # BLD: 4.2 E9/L (ref 4.5–11.5)

## 2022-07-22 PROCEDURE — 36415 COLL VENOUS BLD VENIPUNCTURE: CPT

## 2022-07-22 PROCEDURE — 85025 COMPLETE CBC W/AUTO DIFF WBC: CPT

## 2022-07-22 PROCEDURE — 96372 THER/PROPH/DIAG INJ SC/IM: CPT

## 2022-07-22 PROCEDURE — 6360000002 HC RX W HCPCS: Performed by: NURSE PRACTITIONER

## 2022-07-22 PROCEDURE — 99212 OFFICE O/P EST SF 10 MIN: CPT

## 2022-07-22 PROCEDURE — 99214 OFFICE O/P EST MOD 30 MIN: CPT | Performed by: INTERNAL MEDICINE

## 2022-07-22 PROCEDURE — 1123F ACP DISCUSS/DSCN MKR DOCD: CPT | Performed by: INTERNAL MEDICINE

## 2022-07-22 RX ADMIN — DARBEPOETIN ALFA 500 MCG: 500 INJECTION, SOLUTION INTRAVENOUS; SUBCUTANEOUS at 10:50

## 2022-07-22 NOTE — PROGRESS NOTES
[address]  [facility] MED ONCOLOGY  [address] [facility] [address]  Dept: [address]: [phone]  Attending Progress Note      Reason for Visit:   1. Right Breast Cancer. 2. MDS. Referring Physician:  CONNIE Mata CNP    PCP:  CONNIE Mata CNP    History of Present Illness: The patient is a very pleasant 78 y.o. lady with a PMH significant for Right Breast Cancer, stage III, HR pos, was diagnosed in 2009, she had a right mastectomy done, followed by adjuvant chemo, she received 8 cycles, probably AC followed by T, then PMRT, and 5 years of adjuvant ET with Arimidex, was completed in 2015. She was treated in [facility] under the care of Dr. Jose Guadalupe Butt. She was diagnosed with MDS in 2017, she received ESAs and PRBCs transfusion. She has transfusion related iron overload. She was started on Aranesp on 4/24/2020. No SE from Aranesp. The patient returns for a follow-up visit, she had a mechanical fall, she ended up with a displaced fracture of the distal shaft of the fibula, she is wearing boots, no surgical interventions were recommended. Review of Systems;  CONSTITUTIONAL: No fever, chills. Fair appetite. ENMT: Eyes: No diplopia; Nose: No epistaxis. Mouth: No sore throat. RESPIRATORY: No hemoptysis, shortness of breath, cough. CARDIOVASCULAR: No chest pain, palpitations. GASTROINTESTINAL: No nausea/vomiting, abdominal pain, diarrhea/constipation. GENITOURINARY: No dysuria, urinary frequency, hematuria. NEURO: No syncope, presyncope, headache.   Remainder:  ROS NEGATIVE    Past Medical History:      Diagnosis Date    Anemia     Pt reports she was dx in her teens, w/o proper w/u, with iron deficiency anemia and was on oral iron replacement for many years, resulting in iron overload    Aplastic anemia ([address] ~2442-8658    Possibly d/t chemotherapy for breast cancer    Breast cancer ([address] 2009 right side; pt underwent radical mastectomy followed by radiation therapy and chemotherapy and was then on oral chemo pill for 5 yrs; no recurrence as of 2019    Chickenpox     Hypothyroidism     Measles     Mild depression ([address]     Mumps     Myelodysplastic syndrome (HCC) ~2016    Pulmonary tuberculosis ~3569-2319    in her early 25s    Scarlet fever     Sensory neuropathy     beyond the ankle b/l     Patient Active Problem List   Diagnosis    MDS (myelodysplastic syndrome) (HCC)    Bronchitis    Macrocytic anemia with high RDW    Hypothyroidism    Peripheral sensory neuropathy    Breast cancer ([address]    Anemia of chronic disease        Past Surgical History:      Procedure Laterality Date    ADENOIDECTOMY      BONE MARROW BIOPSY      BREAST BIOPSY      CHOLECYSTECTOMY      MASTECTOMY      right breast    OTHER SURGICAL HISTORY      blood transfusions    TONSILLECTOMY AND ADENOIDECTOMY      TUBAL LIGATION         Family History:  Family History   Problem Relation Age of Onset    Cancer Mother         lung    Cancer Father         lung    Cancer Sister         ovarian cancer and breast cancer    Cancer Brother         unknown    Cancer Maternal Aunt         unknown    Cancer Other         brain    Other Brother         MI       Medications:  Reviewed and reconciled. Social History:  Social History     Socioeconomic History    Marital status:       Spouse name: Not on file    Number of children: Not on file    Years of education: Not on file    Highest education level: Not on file   Occupational History    Not on file   Tobacco Use    Smoking status: Former     Packs/day: 1.00     Years: 37.00     Pack years: 37.00     Types: Cigarettes     Start date:      Quit date:      Years since quittin.5    Smokeless tobacco: Never   Substance and Sexual Activity    Alcohol use: No    Drug use: Never    Sexual activity: Not Currently   Other Topics Concern    Not on file   Social History Narrative Not on file     Social Determinants of Health     Financial Resource Strain: Not on file   Food Insecurity: Not on file   Transportation Needs: Not on file   Physical Activity: Not on file   Stress: Not on file   Social Connections: Not on file   Intimate Partner Violence: Not on file   Housing Stability: Not on file       Allergies: Allergies   Allergen Reactions    Bee Venom Swelling and Dermatitis    Penicillins Hives, Swelling and Dermatitis    Other      All metals except gold and platinum, welts/blisters       Physical Exam:  BP (!) 119/59 (Site: Left Upper Arm, Position: Sitting, Cuff Size: Medium Adult)   Pulse 59   Temp 97.6 °F (36.4 °C)   Resp 12   Wt 134 lb 4.8 oz (60.9 kg)   LMP 05/28/2016   SpO2 100%   BMI 25.38 kg/m²   GENERAL: Alert, oriented x 3, not in acute distress. HEENT: PERRLA; EOMI. Oropharynx clear. NECK: Supple. No palpable cervical or supraclavicular lymphadenopathy. LUNGS: Good air entry bilaterally. No wheezing, crackles or rhonchi. BREASTS: She is s/p right mastectomy, no suspicious lesions, she has telangiectasias. She has tenderness in the left upper rib cage. CARDIOVASCULAR: Regular rate. No murmurs, rubs or gallops. ABDOMEN: Soft. Non-tender, non-distended. Positive bowel sounds. EXTREMITIES: Left lower extremity in boots. NEUROLOGIC: No focal deficits. ECOG PS 1      Impression/Plan:     1. The patient is a very pleasant 78 y.o. lady with a PMH significant for Right Breast Cancer, stage III, HR pos, was diagnosed in 2009, she had a right mastectomy done, followed by adjuvant chemo, she received 8 cycles, probably AC followed by T, then PMRT, and 5 years of adjuvant ET with Arimidex, was completed in 2015. She was treated in [facility] under the care of Dr. Edda Mccoy. She is doing well clinically without any evidence of recurrence of her disease.  Discussed with her the importance of monthly SBE, and routine screening mammograms, she had a left breast screening mammogram done on 6/22/2021, there was no evidence of malignancy, she is due for a repeat left breast screening mammogram was ordered. 2. She has MDS, diagnosed in 2017, she received ESAs and PRBCs transfusion, has transfusion related iron overload, hgb was 8.5, ferritin 3647, was restarted on Aranesp on 4/23/2019. On 7/20/2021, hemoglobin was 8 g/dl hematocrit 24.7,  1.3, normal white count, platelet count 211X, fit test is negative. The patient did not have an adequate response to Retacrit, she was changed back to Aranesp, today 7/22/2020 labs reviewed, hemoglobin is 9 G/DL, white count had decreased to 4.2, platelet count 6 0 K. Hemoglobin is less than 10 G/DL, proceed with Aranesp. Hypothyroidism, continue Synthroid follow-up with PCP. RTC in 2 weeks with labs. Thank you for allowing us to participate in the care of Ms. Ventura.     Rebekah Chawla MD   HEMATOLOGY/MEDICAL [facility]  [address] MED ONCOLOGY  [address] [place] [id number]  [facility] [address] [facility] [phone]

## 2022-07-25 ENCOUNTER — SCHEDULED TELEPHONE ENCOUNTER (OUTPATIENT)
Dept: PALLATIVE CARE | Age: 82
End: 2022-07-25
Payer: MEDICARE

## 2022-07-25 DIAGNOSIS — Z51.5 PALLIATIVE CARE BY SPECIALIST: Primary | ICD-10-CM

## 2022-07-25 PROCEDURE — 99442 PR PHYS/QHP TELEPHONE EVALUATION 11-20 MIN: CPT | Performed by: NURSE PRACTITIONER

## 2022-07-25 NOTE — PROGRESS NOTES
Palliative Care Department  Palliative Care Telephone Encounter  Provider: CONNIE Albarran CNP    Gautam Vera is a 78 y.o. female evaluated via telephone on 7/25/2022. Consent:  She and/or health care decision maker is aware that that she may receive a bill for this telephone service, depending on her insurance coverage, and has provided verbal consent to proceed: Yes      Documentation:  I communicated with the patient and/or health care decision maker regarding pain, depression. Assessment/Plan   History of Breast Cancer stage III, HR positive:              -Diagnosed, treated, and managed in [place] in 2009              -Status post right mastectomy              -Followed by adjuvant chemotherapy              -Completed her Demadex 2015              -Currently followed locally by Dr. Danitza Tovar, she also follows with regarding MDS diagnosed 2017     Chemotherapy related peripheral Neuropathy/Pain:              -  May try stopping gabapentin 300 mg at nighttime   -  Tumeric tablets helpful              -  Continue Norco 5-325 mg every 12 hours as needed, 1-2/day,              -  Currently she is very high functioning and is quite active. Depression:   -  Mostly related to grief from the recent death of her son and having to put down her dog   -  She also had a recent fall resulting in a fractured fibula   -  Encouraged support of a counselor or support from our [facility]    Follow Up:  3 months. They were encouraged to call with any questions, concerns, needs, or changes in symptoms. Subjective   Details of this discussion including any medical advice provided:   A telephonic virtual visit was completed via telephone with Gautam Vera today regarding the issues listed above. Overall Crystal Many reports that she is doing well, does report some issues recently with depression, primarily related to the recent death of her son, as well as her need to put her dog down.   She continues to grieve these losses, she does note the depression that accompanies this. She does have good family support, I offered her further support from our licensed social worker, as well as encouraged her to seek counseling if she feels the need to do so. She additionally has increased stress due to a recent fall and fracture of her fibula, she is recovering fairly well from this, and is mobile wearing a boot. For the most part her pain has not significantly changed, she utilizes her Norco anywhere from 1-2 times per day with good relief. She denies further needs at this point time, and we will follow-up in approximately 3 months to reassess her needs. Controlled Substance Monitoring: [place] reviewed     I affirm this episode is with an Established Patient. Total Time: minutes: 11-20 minutes      [facility]    Note: not billable if this call serves to triage the patient into an appointment for the relevant concern    Note: This report was completed using computerize voice recognition software. Every effort has been made to ensure accuracy; however, inadvertent computerized transcription errors may be present.

## 2022-08-03 DIAGNOSIS — D46.9 MDS (MYELODYSPLASTIC SYNDROME) (HCC): Primary | ICD-10-CM

## 2022-08-05 ENCOUNTER — HOSPITAL ENCOUNTER (OUTPATIENT)
Dept: INFUSION THERAPY | Age: 82
Discharge: HOME | End: 2022-08-05
Payer: MEDICARE

## 2022-08-05 ENCOUNTER — OFFICE VISIT (OUTPATIENT)
Dept: ONCOLOGY | Age: 82
End: 2022-08-05
Payer: MEDICARE

## 2022-08-05 VITALS
BODY MASS INDEX: 25.94 KG/M2 | SYSTOLIC BLOOD PRESSURE: 115 MMHG | TEMPERATURE: 97.2 F | WEIGHT: 137.4 LBS | RESPIRATION RATE: 16 BRPM | HEART RATE: 63 BPM | DIASTOLIC BLOOD PRESSURE: 55 MMHG | HEIGHT: 61 IN | OXYGEN SATURATION: 97 %

## 2022-08-05 DIAGNOSIS — Z17.0 MALIGNANT NEOPLASM OF RIGHT BREAST IN FEMALE, ESTROGEN RECEPTOR POSITIVE, UNSPECIFIED SITE OF BREAST (HCC): Primary | ICD-10-CM

## 2022-08-05 DIAGNOSIS — D46.9 MDS (MYELODYSPLASTIC SYNDROME) (HCC): Primary | ICD-10-CM

## 2022-08-05 DIAGNOSIS — C50.911 MALIGNANT NEOPLASM OF RIGHT BREAST IN FEMALE, ESTROGEN RECEPTOR POSITIVE, UNSPECIFIED SITE OF BREAST (HCC): Primary | ICD-10-CM

## 2022-08-05 DIAGNOSIS — D46.9 MDS (MYELODYSPLASTIC SYNDROME) (HCC): ICD-10-CM

## 2022-08-05 DIAGNOSIS — D63.8 ANEMIA OF CHRONIC DISEASE: ICD-10-CM

## 2022-08-05 LAB
ANISOCYTOSIS: ABNORMAL
BASOPHILIC STIPPLING: ABNORMAL
BASOPHILS ABSOLUTE: 0.02 E9/L (ref 0–0.2)
BASOPHILS RELATIVE PERCENT: 0.4 % (ref 0–2)
EOSINOPHILS ABSOLUTE: 0.17 E9/L (ref 0.05–0.5)
EOSINOPHILS RELATIVE PERCENT: 3.7 % (ref 0–6)
HCT VFR BLD CALC: 29.1 % (ref 34–48)
HEMOGLOBIN: 9.4 G/DL (ref 11.5–15.5)
IMMATURE GRANULOCYTES #: 0.02 E9/L
IMMATURE GRANULOCYTES %: 0.4 % (ref 0–5)
LYMPHOCYTES ABSOLUTE: 2.82 E9/L (ref 1.5–4)
LYMPHOCYTES RELATIVE PERCENT: 61 % (ref 20–42)
MCH RBC QN AUTO: 37.2 PG (ref 26–35)
MCHC RBC AUTO-ENTMCNC: 32.3 % (ref 32–34.5)
MCV RBC AUTO: 115 FL (ref 80–99.9)
MONOCYTES ABSOLUTE: 0.24 E9/L (ref 0.1–0.95)
MONOCYTES RELATIVE PERCENT: 5.2 % (ref 2–12)
NEUTROPHILS ABSOLUTE: 1.35 E9/L (ref 1.8–7.3)
NEUTROPHILS RELATIVE PERCENT: 29.3 % (ref 43–80)
OVALOCYTES: ABNORMAL
PDW BLD-RTO: 26.8 FL (ref 11.5–15)
PLATELET # BLD: 132 E9/L (ref 130–450)
PMV BLD AUTO: 11 FL (ref 7–12)
POIKILOCYTES: ABNORMAL
POLYCHROMASIA: ABNORMAL
RBC # BLD: 2.53 E12/L (ref 3.5–5.5)
WBC # BLD: 4.6 E9/L (ref 4.5–11.5)

## 2022-08-05 PROCEDURE — 96372 THER/PROPH/DIAG INJ SC/IM: CPT

## 2022-08-05 PROCEDURE — 99214 OFFICE O/P EST MOD 30 MIN: CPT | Performed by: INTERNAL MEDICINE

## 2022-08-05 PROCEDURE — 99212 OFFICE O/P EST SF 10 MIN: CPT

## 2022-08-05 PROCEDURE — 36415 COLL VENOUS BLD VENIPUNCTURE: CPT

## 2022-08-05 PROCEDURE — 1123F ACP DISCUSS/DSCN MKR DOCD: CPT | Performed by: INTERNAL MEDICINE

## 2022-08-05 PROCEDURE — 85025 COMPLETE CBC W/AUTO DIFF WBC: CPT

## 2022-08-05 PROCEDURE — 6360000002 HC RX W HCPCS: Performed by: NURSE PRACTITIONER

## 2022-08-05 RX ADMIN — DARBEPOETIN ALFA 500 MCG: 500 INJECTION, SOLUTION INTRAVENOUS; SUBCUTANEOUS at 12:18

## 2022-08-05 NOTE — PROGRESS NOTES
[address]  [facility] MED ONCOLOGY  [facility] [address]  Dept: [address]: [phone]  Attending Progress Note      Reason for Visit:   1. Right Breast Cancer. 2. MDS. Referring Physician:  CONNIE Menendez CNP    PCP:  CONNIE Menendez CNP    History of Present Illness: The patient is a very pleasant 78 y.o. lady with a PMH significant for Right Breast Cancer, stage III, HR pos, was diagnosed in 2009, she had a right mastectomy done, followed by adjuvant chemo, she received 8 cycles, probably AC followed by T, then PMRT, and 5 years of adjuvant ET with Arimidex, was completed in 2015. She was treated in [facility] under the care of Dr. Mecca Benjamin. She was diagnosed with MDS in 2017, she received ESAs and PRBCs transfusion. She has transfusion related iron overload. She was started on Aranesp on 4/24/2020. No SE from Aranesp. The patient returns for a follow-up visit, she had a mechanical fall, she ended up with a displaced fracture of the distal shaft of the fibula, she is wearing boots, no surgical interventions were recommended. She is staying with her daughter. Review of Systems;  CONSTITUTIONAL: No fever, chills. Fair appetite. ENMT: Eyes: No diplopia; Nose: No epistaxis. Mouth: No sore throat. RESPIRATORY: No hemoptysis, shortness of breath, cough. CARDIOVASCULAR: No chest pain, palpitations. GASTROINTESTINAL: No nausea/vomiting, abdominal pain, diarrhea/constipation. GENITOURINARY: No dysuria, urinary frequency, hematuria. NEURO: No syncope, presyncope, headache.   Remainder:  ROS NEGATIVE    Past Medical History:      Diagnosis Date    Anemia     Pt reports she was dx in her teens, w/o proper w/u, with iron deficiency anemia and was on oral iron replacement for many years, resulting in iron overload    Aplastic anemia ([facility] ~1133-8113    Possibly d/t chemotherapy for breast cancer    Breast cancer ([facility] 2009    right side; pt underwent radical mastectomy followed by radiation therapy and chemotherapy and was then on oral chemo pill for 5 yrs; no recurrence as of 2019    Chickenpox     Hypothyroidism     Measles     Mild depression ([facility]     Mumps     Myelodysplastic syndrome (HCC) ~    Pulmonary tuberculosis ~9908-6803    in her early 25s    Scarlet fever     Sensory neuropathy     beyond the ankle b/l     Patient Active Problem List   Diagnosis    MDS (myelodysplastic syndrome) (HCC)    Bronchitis    Macrocytic anemia with high RDW    Hypothyroidism    Peripheral sensory neuropathy    Breast cancer ([facility] [address] [address]    Anemia of chronic disease        Past Surgical History:      Procedure Laterality Date    ADENOIDECTOMY      BONE MARROW BIOPSY      BREAST BIOPSY      CHOLECYSTECTOMY      MASTECTOMY      right breast    OTHER SURGICAL HISTORY      blood transfusions    TONSILLECTOMY AND ADENOIDECTOMY      TUBAL LIGATION         Family History:  Family History   Problem Relation Age of Onset    Cancer Mother         lung    Cancer Father         lung    Cancer Sister         ovarian cancer and breast cancer    Cancer Brother         unknown    Cancer Maternal Aunt         unknown    Cancer Other         brain    Other Brother         MI       Medications:  Reviewed and reconciled. Social History:  Social History     Socioeconomic History    Marital status:       Spouse name: Not on file    Number of children: Not on file    Years of education: Not on file    Highest education level: Not on file   Occupational History    Not on file   Tobacco Use    Smoking status: Former     Packs/day: 1.00     Years: 37.00     Pack years: 37.00     Types: Cigarettes     Start date:      Quit date:      Years since quittin.6    Smokeless tobacco: Never   Substance and Sexual Activity    Alcohol use: No    Drug use: Never    Sexual activity: Not Currently   Other Topics Concern    Not mammograms, she had a left breast screening mammogram done on 6/22/2021, there was no evidence of malignancy, she is due for a repeat left breast screening mammogram was ordered. She will have it scheduled when she is able to move more. 2. She has MDS, diagnosed in 2017, she received ESAs and PRBCs transfusion, has transfusion related iron overload, hgb was 8.5, ferritin 3647, was restarted on Aranesp on 4/23/2019. On 7/20/2021, hemoglobin was 8 g/dl hematocrit 24.7,  1.3, normal white count, platelet count 026C, fit test is negative. The patient did not have an adequate response to Retacrit, she was changed back to Aranesp, today 8/2022 labs reviewed, hemoglobin is 9.4G/DL, white count is 4.6, had normalized, platelet count 042Q. Hemoglobin is less than 10 G/DL, proceed with Aranesp. Hypothyroidism, continue Synthroid follow-up with PCP. RTC in 2 weeks with labs. Thank you for allowing us to participate in the care of Ms. Ventura.     Altagracia Duncan MD   HEMATOLOGY/MEDICAL [address] [facility]  [address] MED ONCOLOGY  [phone] [address] [place] [phone]  Dept: [address]: [phone]

## 2022-08-17 DIAGNOSIS — Z51.5 PALLIATIVE CARE BY SPECIALIST: ICD-10-CM

## 2022-08-17 DIAGNOSIS — G89.3 PAIN DUE TO NEOPLASM: ICD-10-CM

## 2022-08-17 RX ORDER — HYDROCODONE BITARTRATE AND ACETAMINOPHEN 5; 325 MG/1; MG/1
1 TABLET ORAL EVERY 12 HOURS PRN
Qty: 60 TABLET | Refills: 0 | Status: SHIPPED | OUTPATIENT
Start: 2022-08-17 | End: 2022-09-16

## 2022-08-17 NOTE — TELEPHONE ENCOUNTER
Call from Gen requesting refill for Magy Sánchez states she had been conservative in the past , not taking Norco very often but since having a fractured leg she is taking more often. Refill needed. Pharmacy is [facility] on [address].  Next natalie 10/17/22

## 2022-08-19 ENCOUNTER — HOSPITAL ENCOUNTER (OUTPATIENT)
Dept: INFUSION THERAPY | Age: 82
Discharge: HOME | End: 2022-08-19
Payer: MEDICARE

## 2022-08-19 ENCOUNTER — OFFICE VISIT (OUTPATIENT)
Dept: ONCOLOGY | Age: 82
End: 2022-08-19
Payer: MEDICARE

## 2022-08-19 VITALS
DIASTOLIC BLOOD PRESSURE: 58 MMHG | SYSTOLIC BLOOD PRESSURE: 133 MMHG | HEIGHT: 61 IN | RESPIRATION RATE: 18 BRPM | HEART RATE: 54 BPM | BODY MASS INDEX: 25.86 KG/M2 | WEIGHT: 137 LBS | TEMPERATURE: 97.1 F | OXYGEN SATURATION: 96 %

## 2022-08-19 DIAGNOSIS — D46.9 MDS (MYELODYSPLASTIC SYNDROME) (HCC): ICD-10-CM

## 2022-08-19 DIAGNOSIS — D46.9 MDS (MYELODYSPLASTIC SYNDROME) (HCC): Primary | ICD-10-CM

## 2022-08-19 LAB
ANISOCYTOSIS: ABNORMAL
BASOPHILS ABSOLUTE: 0.14 E9/L (ref 0–0.2)
BASOPHILS RELATIVE PERCENT: 2.6 % (ref 0–2)
EOSINOPHILS ABSOLUTE: 0.32 E9/L (ref 0.05–0.5)
EOSINOPHILS RELATIVE PERCENT: 6.1 % (ref 0–6)
HCT VFR BLD CALC: 33.7 % (ref 34–48)
HEMOGLOBIN: 10.8 G/DL (ref 11.5–15.5)
HYPOCHROMIA: ABNORMAL
LYMPHOCYTES ABSOLUTE: 2.55 E9/L (ref 1.5–4)
LYMPHOCYTES RELATIVE PERCENT: 48.7 % (ref 20–42)
MCH RBC QN AUTO: 37.6 PG (ref 26–35)
MCHC RBC AUTO-ENTMCNC: 32 % (ref 32–34.5)
MCV RBC AUTO: 117.4 FL (ref 80–99.9)
MONOCYTES ABSOLUTE: 0.31 E9/L (ref 0.1–0.95)
MONOCYTES RELATIVE PERCENT: 6.1 % (ref 2–12)
NEUTROPHILS ABSOLUTE: 1.92 E9/L (ref 1.8–7.3)
NEUTROPHILS RELATIVE PERCENT: 36.5 % (ref 43–80)
OVALOCYTES: ABNORMAL
PDW BLD-RTO: 28.2 FL (ref 11.5–15)
PLATELET # BLD: 132 E9/L (ref 130–450)
PMV BLD AUTO: 11.1 FL (ref 7–12)
POIKILOCYTES: ABNORMAL
POLYCHROMASIA: ABNORMAL
RBC # BLD: 2.87 E12/L (ref 3.5–5.5)
STOMATOCYTES: ABNORMAL
TARGET CELLS: ABNORMAL
WBC # BLD: 5.2 E9/L (ref 4.5–11.5)

## 2022-08-19 PROCEDURE — 99214 OFFICE O/P EST MOD 30 MIN: CPT | Performed by: INTERNAL MEDICINE

## 2022-08-19 PROCEDURE — 1123F ACP DISCUSS/DSCN MKR DOCD: CPT | Performed by: INTERNAL MEDICINE

## 2022-08-19 PROCEDURE — 85025 COMPLETE CBC W/AUTO DIFF WBC: CPT

## 2022-08-19 PROCEDURE — 36415 COLL VENOUS BLD VENIPUNCTURE: CPT

## 2022-08-19 PROCEDURE — 99212 OFFICE O/P EST SF 10 MIN: CPT

## 2022-08-19 NOTE — PROGRESS NOTES
underwent radical mastectomy followed by radiation therapy and chemotherapy and was then on oral chemo pill for 5 yrs; no recurrence as of 2019    Chickenpox     Hypothyroidism     Measles     Mild depression ([address]     Mumps     Myelodysplastic syndrome (HCC) ~    Pulmonary tuberculosis ~[id number]    in her early 25s    Scarlet fever     Sensory neuropathy     beyond the ankle b/l     Patient Active Problem List   Diagnosis    MDS (myelodysplastic syndrome) (HCC)    Bronchitis    Macrocytic anemia with high RDW    Hypothyroidism    Peripheral sensory neuropathy    Breast cancer ([address]    Anemia of chronic disease        Past Surgical History:      Procedure Laterality Date    ADENOIDECTOMY      BONE MARROW BIOPSY      BREAST BIOPSY      CHOLECYSTECTOMY      MASTECTOMY      right breast    OTHER SURGICAL HISTORY      blood transfusions    TONSILLECTOMY AND ADENOIDECTOMY      TUBAL LIGATION         Family History:  Family History   Problem Relation Age of Onset    Cancer Mother         lung    Cancer Father         lung    Cancer Sister         ovarian cancer and breast cancer    Cancer Brother         unknown    Cancer Maternal Aunt         unknown    Cancer Other         brain    Other Brother         MI       Medications:  Reviewed and reconciled. Social History:  Social History     Socioeconomic History    Marital status:       Spouse name: Not on file    Number of children: Not on file    Years of education: Not on file    Highest education level: Not on file   Occupational History    Not on file   Tobacco Use    Smoking status: Former     Packs/day: 1.00     Years: 37.00     Pack years: 37.00     Types: Cigarettes     Start date:      Quit date:      Years since quittin.6    Smokeless tobacco: Never   Substance and Sexual Activity    Alcohol use: No    Drug use: Never    Sexual activity: Not Currently   Other Topics Concern    Not on file   Social History Narrative    Not on file Social Determinants of Health     Financial Resource Strain: Not on file   Food Insecurity: Not on file   Transportation Needs: Not on file   Physical Activity: Not on file   Stress: Not on file   Social Connections: Not on file   Intimate Partner Violence: Not on file   Housing Stability: Not on file       Allergies: Allergies   Allergen Reactions    Bee Venom Swelling and Dermatitis    Penicillins Hives, Swelling and Dermatitis    Other      All metals except gold and platinum, welts/blisters       Physical Exam:  BP (!) 133/58   Pulse 54   Temp 97.1 °F (36.2 °C) (Infrared)   Resp 18   Ht 5' 1\" (1.549 m)   Wt 137 lb (62.1 kg)   LMP 05/28/2016   SpO2 96%   BMI 25.89 kg/m²   GENERAL: Alert, oriented x 3, not in acute distress. HEENT: PERRLA; EOMI. Oropharynx clear. NECK: Supple. No palpable cervical or supraclavicular lymphadenopathy. LUNGS: Good air entry bilaterally. No wheezing, crackles or rhonchi. BREASTS: She is s/p right mastectomy, no suspicious lesions, she has telangiectasias. She has tenderness in the left upper rib cage. CARDIOVASCULAR: Regular rate. No murmurs, rubs or gallops. ABDOMEN: Soft. Non-tender, non-distended. Positive bowel sounds. EXTREMITIES: Left lower extremity in boots. NEUROLOGIC: No focal deficits. ECOG PS 1      Impression/Plan:     1. The patient is a very pleasant 78 y.o. lady with a PMH significant for Right Breast Cancer, stage III, HR pos, was diagnosed in 2009, she had a right mastectomy done, followed by adjuvant chemo, she received 8 cycles, probably AC followed by T, then PMRT, and 5 years of adjuvant ET with Arimidex, was completed in 2015. She was treated in [facility] under the care of Dr. Jo-Ann Dawson. She is doing well clinically without any evidence of recurrence of her disease.  Discussed with her the importance of monthly SBE, and routine screening mammograms, she had a left breast screening mammogram done on 6/22/2021, there was no evidence of malignancy, she is due for a repeat left breast screening mammogram was ordered. She will have it scheduled when she is able to move more. 2. She has MDS, diagnosed in 2017, she received ESAs and PRBCs transfusion, has transfusion related iron overload, hgb was 8.5, ferritin 3647, was restarted on Aranesp on 4/23/2019. On 7/20/2021, hemoglobin was 8 g/dl hematocrit 24.7,  1.3, normal white count, platelet count 624Y, fit test is negative. The patient did not have an adequate response to Retacrit, she was changed back to Aranesp, today 8/19/2022 labs reviewed, hemoglobin is 10.8 G/DL, white count is 5.2, had normalized, platelet count 699M. Hemoglobin is greater than 10 G/DL, hold Aranesp. Hypothyroidism, continue Synthroid follow-up with PCP. RTC in 2 weeks with labs. Thank you for allowing us to participate in the care of Ms. Ventura.     Camelia Ag MD   HEMATOLOGY/MEDICAL [facility]  [address] MED ONCOLOGY  [address] [place] [id number]  Dept: [address] [facility]: [phone]

## 2022-08-22 NOTE — PROGRESS NOTES
Reviewed pt history , last CBC, BMP with Dr. Sarah Jacinto and informed her that pt admits to a H/O delayed awakening after anesthesia. ...  No orders at this time

## 2022-08-22 NOTE — PROGRESS NOTES
[facility] PRE-ADMISSION TESTING INSTRUCTIONS    The Preadmission Testing patient is instructed accordingly using the following criteria (check applicable):    ARRIVAL INSTRUCTIONS:  [x] Parking the day of Surgery is located in the Main Entrance lot. Upon entering the door, make an immediate right to the surgery reception desk    [x] Bring photo ID and insurance card    [x] Bring in a copy of Living will or Durable Power of  papers. [x] Please be sure to arrange for responsible adult to provide transportation to and from the hospital    [x] Please arrange for responsible adult to be with you for the 24 hour period post procedure due to having anesthesia    [x] If you awake am of surgery not feeling well or have temperature >100 please call [phone]    GENERAL INSTRUCTIONS:    [x] Nothing by mouth after midnight, including gum, candy, mints or water    [x] You may brush your teeth, but do not swallow any water    [x] Take medications as instructed with 1-2 oz of water    [] Stop herbal supplements and vitamins 5 days prior to procedure    [x] Follow preop dosing of blood thinners per physician instructions    [] Take 1/2 dose of evening insulin, but no insulin after midnight    [] No oral diabetic medications after midnight    [] If diabetic and have low blood sugar or feel symptomatic, take 1-2oz apple juice only    [x] Bring inhalers day of surgery    [] Bring C-PAP/ Bi-Pap day of surgery    [] Bring urine specimen day of surgery    [x] Shower or bath with soap, lather and rinse well, AM of Surgery, no lotion, powders or creams     [] Follow bowel prep as instructed per surgeon    [x] No tobacco products within 24 hours of surgery     [x] No alcohol or illegal drug use within 24 hours of surgery.     [x] Jewelry, body piercing's, eyeglasses, contact lenses and dentures are not permitted into surgery (bring cases)      [x] Please do not wear any nail polish, make up or hair products on the day of surgery    [x] You can expect a call the business day prior to procedure to notify you if your arrival time changes    [x] If you receive a survey after surgery we would greatly appreciate your comments    [] Parent/guardian of a minor must accompany their child and remain on the premises  the entire time they are under our care     [] Pediatric patients may bring favorite toy, blanket or comfort item with them    [] A caregiver or family member must remain with the patient during their stay if they are mentally handicapped, have dementia, disoriented or unable to use a call light or would be a safety concern if left unattended    [x] Please notify surgeon if you develop any illness between now and time of surgery (cold, cough, sore throat, fever, nausea, vomiting) or any signs of infections  including skin, wounds, and dental.    [x]  The Outpatient Pharmacy is available to fill your prescription here on your day of surgery, ask your preop nurse for details    [x] Other instructions: wear loose, comfortable clothing    EDUCATIONAL MATERIALS PROVIDED:    [] PAT Preoperative Education Packet/Booklet     [] Medication List    [] Transfusion bracelet applied with instructions    [] Shower with soap, lather and rinse well, and use CHG wipes provided the evening before surgery as instructed    [] Incentive spirometer with instructions

## 2022-08-23 NOTE — H&P
History and Physical      CHIEF COMPLAINT: Left ankle pain    History of Present Illness:  Ryland Estrada is a 78y.o. year-old female presents for open reduction internal fixation of the left ankle. This patient is a pleasant 78-year-old female. She was initially seen in my office with a minimally displaced fracture of the left ankle. Gravity stress external rotation examination was performed by office demonstrating maintenance of the syndesmotic ligaments and we attempted to treat this injury nonoperatively. However, on subsequent follow-up, her fracture was found to have shortened and displaced. We discussed both operative and nonoperative treatment options. Patient elected to proceed with surgery. The surgery was discussed at length in my office. She presents today for open reduction internal fixation of the left fibula. She denies any change in symptoms. She has continued with weightbearing as tolerated in a walking boot up to this point.       Past Medical History:   Diagnosis Date    Anemia     Pt reports she was dx in her teens, w/o proper w/u, with iron deficiency anemia and was on oral iron replacement for many years, resulting in iron overload    Aplastic anemia ([address] ~7851-1442    Possibly d/t chemotherapy for breast cancer    Breast cancer ([address] 2009    right side; pt underwent radical mastectomy followed by radiation therapy and chemotherapy and was then on oral chemo pill for 5 yrs; no recurrence as of 01/2019    Chickenpox     History of blood transfusion     Hypothyroidism     Measles     Mild depression ([address]     Mumps     Myelodysplastic syndrome (HCC) ~2016    Neuropathy     feet and ankles murali    Prolonged emergence from general anesthesia     Pulmonary tuberculosis ~3644-4846    in her early 25s    Scarlet fever     Sensory neuropathy     beyond the ankle b/l         Past Surgical History:   Procedure Laterality Date    ADENOIDECTOMY      BONE MARROW BIOPSY      BREAST BIOPSY CHOLECYSTECTOMY      MASTECTOMY      right breast    OTHER SURGICAL HISTORY      blood transfusions    TONSILLECTOMY AND ADENOIDECTOMY      TUBAL LIGATION         Medications Prior to Admission:    Prior to Admission medications    Medication Sig Start Date End Date Taking? Authorizing Provider   HYDROcodone-acetaminophen (NORCO) 5-325 MG per tablet Take 1 tablet by mouth every 12 hours as needed for Pain for up to 30 days. 8/17/22 9/16/22  CONNIE Veloz CNP   albuterol sulfate HFA (VENTOLIN HFA) 108 (90 Base) MCG/ACT inhaler Inhale 2 puffs into the lungs 4 times daily as needed for Wheezing 6/26/22   Darlene Mccormick, DO   ondansetron (ZOFRAN ODT) 4 MG disintegrating tablet Take 1 tablet by mouth every 8 hours as needed for Nausea or Vomiting 6/26/22   Sonido Common, DO   traMADol [place] [employer]) 50 MG tablet  4/4/22   Historical Provider, MD   aspirin 81 MG EC tablet Take 81 mg by mouth in the morning and at bedtime    Historical Provider, MD   sertraline (ZOLOFT) 50 MG tablet Take 1 tablet by mouth nightly 3/4/22   Laray Hashimoto, APRN - CNP   gabapentin (NEURONTIN) 300 MG capsule Take 3 capsules by mouth nightly. TAKE 3 CAPSULES BY MOUTH EVERY NIGHT 10/19/21 10/19/22  CONNIE Veloz CNP   levothyroxine (SYNTHROID) 200 MCG tablet TAKE 1 TABLET BY MOUTH EVERY DAY IN THE MORNING ON AN EMPTY STOMACH 4/13/21   Historical Provider, MD       Allergies:    Bee venom, Penicillins, and Other    Social History:    reports that she quit smoking about 22 years ago. Her smoking use included cigarettes. She started smoking about 59 years ago. She has a 37.00 pack-year smoking history. She has never used smokeless tobacco. She reports that she does not drink alcohol and does not use drugs. Family History:   family history includes Cancer in her brother, father, maternal aunt, mother, sister, and another family member; Other in her brother.     REVIEW OF SYSTEMS:  As above in the HPI, otherwise negative    PHYSICAL EXAM:    Vitals:  Ht 5' 1\" (1.549 m)   Wt 132 lb (59.9 kg)   LMP 05/28/2016   BMI 24.94 kg/m²     General:  Awake, alert, oriented X 3. Well developed, well nourished, well groomed. No apparent distress. HEENT:  Normocephalic, atraumatic. Pupils equal, round, reactive to light. No scleral icterus. No conjunctival injection. Normal lips, teeth, and gums. No nasal discharge. Neck:  Supple No palpable cervical or supraclavicular nodes. Carotids brisk in upstroke , without carotid bruits. No abnormal JVP at 45°. Thyroid not palpable. Chest: Even excursion. Heart:  Regular regular, S1> S2 no murmurs, gallops, or rubs. PMI 5th intercostal space midclavicular line. Lungs: CTA bilaterally, bilat symmetrical expansion, no wheeze, rales, or rhonchi. No decreased tactile fremitus. Abdomen: Bowel sounds present, soft, nontender, no masses, no organomegaly, no peritoneal signs  Extremities: Examination about the left ankle unchanged from assessment in my office. Skin is circumferentially intact. Patient has minimal pain about the distal fibula. She is able to mobilize all digits without difficulty. Dorsiflexion/plantarflexion, EHL strength 5 out of 5. Sensation grossly intact light touch, L3-S1.   Skin: Warm and dry, no open lesions or rash  Neuro:  Cranial nerves 2-12 intact, no focal sensory or motor deficits  Breast: deferred  Rectal: deferred  Genitalia:  deferred    LABS:  CBC:   Lab Results   Component Value Date/Time    WBC 5.2 08/19/2022 09:21 AM    RBC 2.87 08/19/2022 09:21 AM    HGB 10.8 08/19/2022 09:21 AM    HCT 33.7 08/19/2022 09:21 AM    .4 08/19/2022 09:21 AM    MCH 37.6 08/19/2022 09:21 AM    MCHC 32.0 08/19/2022 09:21 AM    RDW 28.2 08/19/2022 09:21 AM     08/19/2022 09:21 AM    MPV 11.1 08/19/2022 09:21 AM         ASSESSMENT:      Patient Active Problem List   Diagnosis    MDS (myelodysplastic syndrome) (HCC)    Bronchitis    Macrocytic anemia with high RDW    Hypothyroidism Peripheral sensory neuropathy    Breast cancer (HCC)    Anemia of chronic disease       PLAN:    As mentioned, x-rays were obtained in my office which revealed shortening of the patient's previously noted fracture about the distal fibula. Consequently, I recommended open reduction internal fixation. All risk, benefits alternatives treatment including but not limited to loss of life, loss of limb, bleeding, infection, nonunion, malunion, damage to surrounding structures and potential need for further surgery was reviewed. Informed sent was obtained. Patient will be discharged home in a splint, with nonweightbearing instructions. She will follow-up in my office in 2 weeks for conversion back into her boot and follow-up x-rays.     Kyle Ellington DO   10:17 AM  8/23/2022

## 2022-08-24 ENCOUNTER — ANESTHESIA EVENT (OUTPATIENT)
Dept: OPERATING ROOM | Age: 82
End: 2022-08-24
Payer: MEDICARE

## 2022-08-24 ENCOUNTER — ANESTHESIA (OUTPATIENT)
Dept: OPERATING ROOM | Age: 82
End: 2022-08-24
Payer: MEDICARE

## 2022-08-24 ENCOUNTER — HOSPITAL ENCOUNTER (OUTPATIENT)
Age: 82
Setting detail: OUTPATIENT SURGERY
Discharge: HOME | End: 2022-08-24
Attending: GENERAL ACUTE CARE HOSPITAL | Admitting: GENERAL ACUTE CARE HOSPITAL
Payer: MEDICARE

## 2022-08-24 ENCOUNTER — HOSPITAL ENCOUNTER (OUTPATIENT)
Dept: GENERAL RADIOLOGY | Age: 82
Setting detail: OUTPATIENT SURGERY
Discharge: HOME | End: 2022-08-26
Attending: GENERAL ACUTE CARE HOSPITAL
Payer: MEDICARE

## 2022-08-24 VITALS
TEMPERATURE: 97.4 F | HEIGHT: 61 IN | RESPIRATION RATE: 18 BRPM | WEIGHT: 132 LBS | OXYGEN SATURATION: 97 % | DIASTOLIC BLOOD PRESSURE: 50 MMHG | SYSTOLIC BLOOD PRESSURE: 119 MMHG | BODY MASS INDEX: 24.92 KG/M2 | HEART RATE: 61 BPM

## 2022-08-24 DIAGNOSIS — R52 PAIN: ICD-10-CM

## 2022-08-24 DIAGNOSIS — S82.892A CLOSED FRACTURE OF LEFT ANKLE, INITIAL ENCOUNTER: Primary | ICD-10-CM

## 2022-08-24 PROCEDURE — 2580000003 HC RX 258: Performed by: GENERAL ACUTE CARE HOSPITAL

## 2022-08-24 PROCEDURE — 3600000013 HC SURGERY LEVEL 3 ADDTL 15MIN: Performed by: GENERAL ACUTE CARE HOSPITAL

## 2022-08-24 PROCEDURE — 76942 ECHO GUIDE FOR BIOPSY: CPT

## 2022-08-24 PROCEDURE — 3700000000 HC ANESTHESIA ATTENDED CARE: Performed by: GENERAL ACUTE CARE HOSPITAL

## 2022-08-24 PROCEDURE — 3600000003 HC SURGERY LEVEL 3 BASE: Performed by: GENERAL ACUTE CARE HOSPITAL

## 2022-08-24 PROCEDURE — C1713 ANCHOR/SCREW BN/BN,TIS/BN: HCPCS | Performed by: GENERAL ACUTE CARE HOSPITAL

## 2022-08-24 PROCEDURE — 2709999900 HC NON-CHARGEABLE SUPPLY: Performed by: GENERAL ACUTE CARE HOSPITAL

## 2022-08-24 PROCEDURE — 2500000003 HC RX 250 WO HCPCS: Performed by: ANESTHESIOLOGIST ASSISTANT

## 2022-08-24 PROCEDURE — 6360000002 HC RX W HCPCS: Performed by: ANESTHESIOLOGIST ASSISTANT

## 2022-08-24 PROCEDURE — 7100000011 HC PHASE II RECOVERY - ADDTL 15 MIN: Performed by: GENERAL ACUTE CARE HOSPITAL

## 2022-08-24 PROCEDURE — 64447 NJX AA&/STRD FEMORAL NRV IMG: CPT | Performed by: ANESTHESIOLOGY

## 2022-08-24 PROCEDURE — 7100000001 HC PACU RECOVERY - ADDTL 15 MIN: Performed by: GENERAL ACUTE CARE HOSPITAL

## 2022-08-24 PROCEDURE — 76942 ECHO GUIDE FOR BIOPSY: CPT | Performed by: ANESTHESIOLOGY

## 2022-08-24 PROCEDURE — 6360000002 HC RX W HCPCS: Performed by: ANESTHESIOLOGY

## 2022-08-24 PROCEDURE — 3209999900 FLUORO FOR SURGICAL PROCEDURES

## 2022-08-24 PROCEDURE — 3700000001 HC ADD 15 MINUTES (ANESTHESIA): Performed by: GENERAL ACUTE CARE HOSPITAL

## 2022-08-24 PROCEDURE — 6360000002 HC RX W HCPCS: Performed by: GENERAL ACUTE CARE HOSPITAL

## 2022-08-24 PROCEDURE — 2580000003 HC RX 258: Performed by: ANESTHESIOLOGIST ASSISTANT

## 2022-08-24 PROCEDURE — 2500000003 HC RX 250 WO HCPCS: Performed by: ANESTHESIOLOGY

## 2022-08-24 PROCEDURE — 7100000010 HC PHASE II RECOVERY - FIRST 15 MIN: Performed by: GENERAL ACUTE CARE HOSPITAL

## 2022-08-24 PROCEDURE — 7100000000 HC PACU RECOVERY - FIRST 15 MIN: Performed by: GENERAL ACUTE CARE HOSPITAL

## 2022-08-24 DEVICE — PLATE BONE 5 H LT DSTL FIB TI LCK: Type: IMPLANTABLE DEVICE | Site: ANKLE | Status: FUNCTIONAL

## 2022-08-24 DEVICE — SCREW BNE L16MM DIA3MM FT ANK TI VAR ANG LOK LO PROF FOR: Type: IMPLANTABLE DEVICE | Site: ANKLE | Status: FUNCTIONAL

## 2022-08-24 DEVICE — SCREW BNE L10MM DIA3MM FT ANK TI VAR ANG LOK LO PROF FOR: Type: IMPLANTABLE DEVICE | Site: ANKLE | Status: FUNCTIONAL

## 2022-08-24 DEVICE — SCREW BNE L12MM DIA3.5MM ANK TI LO PROF: Type: IMPLANTABLE DEVICE | Site: ANKLE | Status: FUNCTIONAL

## 2022-08-24 DEVICE — SCREW BNE L14MM DIA3MM FT ANK TI VAR ANG LCK LO PROF: Type: IMPLANTABLE DEVICE | Site: ANKLE | Status: FUNCTIONAL

## 2022-08-24 DEVICE — SCREW BNE L12MM DIA3MM FT ANK TI VAR ANG LOK LO PROF FOR: Type: IMPLANTABLE DEVICE | Site: ANKLE | Status: FUNCTIONAL

## 2022-08-24 RX ORDER — FENTANYL CITRATE 50 UG/ML
25 INJECTION, SOLUTION INTRAMUSCULAR; INTRAVENOUS PRN
Status: DISCONTINUED | OUTPATIENT
Start: 2022-08-24 | End: 2022-08-24 | Stop reason: HOSPADM

## 2022-08-24 RX ORDER — FENTANYL CITRATE 50 UG/ML
INJECTION, SOLUTION INTRAMUSCULAR; INTRAVENOUS PRN
Status: DISCONTINUED | OUTPATIENT
Start: 2022-08-24 | End: 2022-08-24 | Stop reason: SDUPTHER

## 2022-08-24 RX ORDER — ROPIVACAINE HYDROCHLORIDE 5 MG/ML
30 INJECTION, SOLUTION EPIDURAL; INFILTRATION; PERINEURAL
Status: COMPLETED | OUTPATIENT
Start: 2022-08-24 | End: 2022-08-24

## 2022-08-24 RX ORDER — DEXAMETHASONE SODIUM PHOSPHATE 4 MG/ML
INJECTION, SOLUTION INTRA-ARTICULAR; INTRALESIONAL; INTRAMUSCULAR; INTRAVENOUS; SOFT TISSUE PRN
Status: DISCONTINUED | OUTPATIENT
Start: 2022-08-24 | End: 2022-08-24 | Stop reason: SDUPTHER

## 2022-08-24 RX ORDER — EPHEDRINE SULFATE/0.9% NACL/PF 50 MG/5 ML
SYRINGE (ML) INTRAVENOUS PRN
Status: DISCONTINUED | OUTPATIENT
Start: 2022-08-24 | End: 2022-08-24 | Stop reason: SDUPTHER

## 2022-08-24 RX ORDER — ROCURONIUM BROMIDE 10 MG/ML
INJECTION, SOLUTION INTRAVENOUS PRN
Status: DISCONTINUED | OUTPATIENT
Start: 2022-08-24 | End: 2022-08-24 | Stop reason: SDUPTHER

## 2022-08-24 RX ORDER — FENTANYL CITRATE 50 UG/ML
50 INJECTION, SOLUTION INTRAMUSCULAR; INTRAVENOUS EVERY 5 MIN PRN
Status: DISCONTINUED | OUTPATIENT
Start: 2022-08-24 | End: 2022-08-24 | Stop reason: HOSPADM

## 2022-08-24 RX ORDER — ROPIVACAINE HYDROCHLORIDE 5 MG/ML
15 INJECTION, SOLUTION EPIDURAL; INFILTRATION; PERINEURAL ONCE
Status: COMPLETED | OUTPATIENT
Start: 2022-08-24 | End: 2022-08-24

## 2022-08-24 RX ORDER — HYDROCODONE BITARTRATE AND ACETAMINOPHEN 5; 325 MG/1; MG/1
1 TABLET ORAL EVERY 6 HOURS PRN
Qty: 28 TABLET | Refills: 0 | Status: SHIPPED | OUTPATIENT
Start: 2022-08-24 | End: 2022-09-19 | Stop reason: SDUPTHER

## 2022-08-24 RX ORDER — LIDOCAINE HYDROCHLORIDE 10 MG/ML
10 INJECTION, SOLUTION INFILTRATION; PERINEURAL
Status: COMPLETED | OUTPATIENT
Start: 2022-08-24 | End: 2022-08-24

## 2022-08-24 RX ORDER — SODIUM CHLORIDE 9 MG/ML
INJECTION, SOLUTION INTRAVENOUS PRN
Status: DISCONTINUED | OUTPATIENT
Start: 2022-08-24 | End: 2022-08-24 | Stop reason: HOSPADM

## 2022-08-24 RX ORDER — SODIUM CHLORIDE 0.9 % (FLUSH) 0.9 %
5-40 SYRINGE (ML) INJECTION PRN
Status: CANCELLED | OUTPATIENT
Start: 2022-08-24

## 2022-08-24 RX ORDER — SODIUM CHLORIDE 0.9 % (FLUSH) 0.9 %
5-40 SYRINGE (ML) INJECTION PRN
Status: DISCONTINUED | OUTPATIENT
Start: 2022-08-24 | End: 2022-08-24 | Stop reason: HOSPADM

## 2022-08-24 RX ORDER — SODIUM CHLORIDE 0.9 % (FLUSH) 0.9 %
5-40 SYRINGE (ML) INJECTION EVERY 12 HOURS SCHEDULED
Status: CANCELLED | OUTPATIENT
Start: 2022-08-24

## 2022-08-24 RX ORDER — SODIUM CHLORIDE, SODIUM LACTATE, POTASSIUM CHLORIDE, CALCIUM CHLORIDE 600; 310; 30; 20 MG/100ML; MG/100ML; MG/100ML; MG/100ML
INJECTION, SOLUTION INTRAVENOUS CONTINUOUS PRN
Status: DISCONTINUED | OUTPATIENT
Start: 2022-08-24 | End: 2022-08-24 | Stop reason: SDUPTHER

## 2022-08-24 RX ORDER — ONDANSETRON 2 MG/ML
INJECTION INTRAMUSCULAR; INTRAVENOUS PRN
Status: DISCONTINUED | OUTPATIENT
Start: 2022-08-24 | End: 2022-08-24 | Stop reason: SDUPTHER

## 2022-08-24 RX ORDER — PROCHLORPERAZINE EDISYLATE 5 MG/ML
5 INJECTION INTRAMUSCULAR; INTRAVENOUS
Status: DISCONTINUED | OUTPATIENT
Start: 2022-08-24 | End: 2022-08-24 | Stop reason: HOSPADM

## 2022-08-24 RX ORDER — SODIUM CHLORIDE 9 MG/ML
25 INJECTION, SOLUTION INTRAVENOUS PRN
Status: DISCONTINUED | OUTPATIENT
Start: 2022-08-24 | End: 2022-08-24 | Stop reason: HOSPADM

## 2022-08-24 RX ORDER — ROPIVACAINE HYDROCHLORIDE 2 MG/ML
15 INJECTION, SOLUTION EPIDURAL; INFILTRATION; PERINEURAL ONCE
Status: DISCONTINUED | OUTPATIENT
Start: 2022-08-24 | End: 2022-08-24 | Stop reason: HOSPADM

## 2022-08-24 RX ORDER — SODIUM CHLORIDE 9 MG/ML
INJECTION, SOLUTION INTRAVENOUS PRN
Status: CANCELLED | OUTPATIENT
Start: 2022-08-24

## 2022-08-24 RX ORDER — PROPOFOL 10 MG/ML
INJECTION, EMULSION INTRAVENOUS PRN
Status: DISCONTINUED | OUTPATIENT
Start: 2022-08-24 | End: 2022-08-24 | Stop reason: SDUPTHER

## 2022-08-24 RX ORDER — BUPIVACAINE HYDROCHLORIDE 7.5 MG/ML
1.6 INJECTION, SOLUTION INTRASPINAL ONCE
Status: DISCONTINUED | OUTPATIENT
Start: 2022-08-24 | End: 2022-08-24 | Stop reason: HOSPADM

## 2022-08-24 RX ORDER — NEOSTIGMINE METHYLSULFATE 1 MG/ML
INJECTION, SOLUTION INTRAVENOUS PRN
Status: DISCONTINUED | OUTPATIENT
Start: 2022-08-24 | End: 2022-08-24 | Stop reason: SDUPTHER

## 2022-08-24 RX ORDER — MIDAZOLAM HYDROCHLORIDE 2 MG/2ML
0.5 INJECTION, SOLUTION INTRAMUSCULAR; INTRAVENOUS PRN
Status: DISCONTINUED | OUTPATIENT
Start: 2022-08-24 | End: 2022-08-24 | Stop reason: HOSPADM

## 2022-08-24 RX ORDER — SODIUM CHLORIDE 0.9 % (FLUSH) 0.9 %
5-40 SYRINGE (ML) INJECTION EVERY 12 HOURS SCHEDULED
Status: DISCONTINUED | OUTPATIENT
Start: 2022-08-24 | End: 2022-08-24 | Stop reason: HOSPADM

## 2022-08-24 RX ORDER — ONDANSETRON 4 MG/1
4 TABLET, ORALLY DISINTEGRATING ORAL EVERY 8 HOURS PRN
Status: CANCELLED | OUTPATIENT
Start: 2022-08-24

## 2022-08-24 RX ORDER — LIDOCAINE HYDROCHLORIDE 20 MG/ML
INJECTION, SOLUTION EPIDURAL; INFILTRATION; INTRACAUDAL; PERINEURAL PRN
Status: DISCONTINUED | OUTPATIENT
Start: 2022-08-24 | End: 2022-08-24 | Stop reason: SDUPTHER

## 2022-08-24 RX ORDER — ASPIRIN 325 MG
325 TABLET, DELAYED RELEASE (ENTERIC COATED) ORAL DAILY
Qty: 14 TABLET | Refills: 0 | Status: SHIPPED | OUTPATIENT
Start: 2022-08-25 | End: 2022-09-30

## 2022-08-24 RX ORDER — GLYCOPYRROLATE 0.2 MG/ML
INJECTION INTRAMUSCULAR; INTRAVENOUS PRN
Status: DISCONTINUED | OUTPATIENT
Start: 2022-08-24 | End: 2022-08-24 | Stop reason: SDUPTHER

## 2022-08-24 RX ORDER — ONDANSETRON 2 MG/ML
4 INJECTION INTRAMUSCULAR; INTRAVENOUS EVERY 6 HOURS PRN
Status: CANCELLED | OUTPATIENT
Start: 2022-08-24

## 2022-08-24 RX ORDER — ASPIRIN 81 MG/1
325 TABLET ORAL 2 TIMES DAILY
Qty: 112 TABLET | Refills: 0 | Status: SHIPPED | OUTPATIENT
Start: 2022-08-25 | End: 2022-09-30

## 2022-08-24 RX ADMIN — LIDOCAINE HYDROCHLORIDE 40 MG: 20 INJECTION, SOLUTION EPIDURAL; INFILTRATION; INTRACAUDAL; PERINEURAL at 13:47

## 2022-08-24 RX ADMIN — ONDANSETRON 4 MG: 2 INJECTION INTRAMUSCULAR; INTRAVENOUS at 14:57

## 2022-08-24 RX ADMIN — FENTANYL CITRATE 25 MCG: 50 INJECTION, SOLUTION INTRAMUSCULAR; INTRAVENOUS at 12:07

## 2022-08-24 RX ADMIN — CEFAZOLIN 2000 MG: 2 INJECTION, POWDER, FOR SOLUTION INTRAMUSCULAR; INTRAVENOUS at 13:55

## 2022-08-24 RX ADMIN — FENTANYL CITRATE 25 MCG: 50 INJECTION, SOLUTION INTRAMUSCULAR; INTRAVENOUS at 15:39

## 2022-08-24 RX ADMIN — SODIUM CHLORIDE, POTASSIUM CHLORIDE, SODIUM LACTATE AND CALCIUM CHLORIDE: 600; 310; 30; 20 INJECTION, SOLUTION INTRAVENOUS at 14:38

## 2022-08-24 RX ADMIN — ROPIVACAINE HYDROCHLORIDE 20 ML: 5 INJECTION, SOLUTION EPIDURAL; INFILTRATION; PERINEURAL at 13:49

## 2022-08-24 RX ADMIN — Medication 3 MG: at 15:03

## 2022-08-24 RX ADMIN — Medication 10 MG: at 13:55

## 2022-08-24 RX ADMIN — SUGAMMADEX 200 MG: 100 INJECTION, SOLUTION INTRAVENOUS at 15:30

## 2022-08-24 RX ADMIN — MIDAZOLAM HYDROCHLORIDE 0.25 MG: 2 INJECTION, SOLUTION INTRAMUSCULAR; INTRAVENOUS at 12:07

## 2022-08-24 RX ADMIN — DEXAMETHASONE SODIUM PHOSPHATE 4 MG: 4 INJECTION, SOLUTION INTRAMUSCULAR; INTRAVENOUS at 13:58

## 2022-08-24 RX ADMIN — FENTANYL CITRATE 25 MCG: 50 INJECTION, SOLUTION INTRAMUSCULAR; INTRAVENOUS at 15:10

## 2022-08-24 RX ADMIN — PROPOFOL 110 MG: 10 INJECTION, EMULSION INTRAVENOUS at 13:47

## 2022-08-24 RX ADMIN — Medication 10 MG: at 15:00

## 2022-08-24 RX ADMIN — SODIUM CHLORIDE: 9 INJECTION, SOLUTION INTRAVENOUS at 13:35

## 2022-08-24 RX ADMIN — Medication 10 MG: at 14:10

## 2022-08-24 RX ADMIN — ROCURONIUM BROMIDE 30 MG: 10 INJECTION, SOLUTION INTRAVENOUS at 13:47

## 2022-08-24 RX ADMIN — ROPIVACAINE HYDROCHLORIDE 30 ML: 5 INJECTION, SOLUTION EPIDURAL; INFILTRATION; PERINEURAL at 12:05

## 2022-08-24 RX ADMIN — FENTANYL CITRATE 25 MCG: 50 INJECTION, SOLUTION INTRAMUSCULAR; INTRAVENOUS at 15:33

## 2022-08-24 RX ADMIN — ROPIVACAINE HYDROCHLORIDE 30 ML: 5 INJECTION, SOLUTION EPIDURAL; INFILTRATION; PERINEURAL at 12:10

## 2022-08-24 RX ADMIN — FENTANYL CITRATE 25 MCG: 50 INJECTION, SOLUTION INTRAMUSCULAR; INTRAVENOUS at 13:47

## 2022-08-24 RX ADMIN — Medication 10 MG: at 14:46

## 2022-08-24 RX ADMIN — GLYCOPYRROLATE 0.5 MG: 0.2 INJECTION INTRAMUSCULAR; INTRAVENOUS at 15:03

## 2022-08-24 RX ADMIN — LIDOCAINE HYDROCHLORIDE 5 ML: 10 INJECTION, SOLUTION INFILTRATION; PERINEURAL at 13:03

## 2022-08-24 RX ADMIN — Medication 10 MG: at 13:53

## 2022-08-24 ASSESSMENT — ENCOUNTER SYMPTOMS: SHORTNESS OF BREATH: 0

## 2022-08-24 ASSESSMENT — PAIN - FUNCTIONAL ASSESSMENT: PAIN_FUNCTIONAL_ASSESSMENT: 0-10

## 2022-08-24 ASSESSMENT — LIFESTYLE VARIABLES: SMOKING_STATUS: 0

## 2022-08-24 NOTE — DISCHARGE INSTRUCTIONS
Maintain splint, keeping clean and dry  You may ice and elevate your left lower extremity to help with swelling  Take pain medication as needed  Try to wean off of narcotic pain medication as symptoms become more tolerable  Call the office with any questions or concerns  Take aspirin as prescribed once daily for the next 2 weeks  Follow-up in office in 2 weeks  Call the office if you do not have this appointment scheduled

## 2022-08-24 NOTE — ANESTHESIA PROCEDURE NOTES
Peripheral Block    Patient location during procedure: procedure area  Reason for block: post-op pain management and at surgeon's request  Start time: 8/24/2022 12:05 PM  End time: 8/24/2022 12:15 PM  Staffing  Performed: anesthesiologist   Anesthesiologist: Hernán Perales MD  Preanesthetic Checklist  Completed: patient identified, IV checked, site marked, risks and benefits discussed, surgical/procedural consents, equipment checked, pre-op evaluation, timeout performed, anesthesia consent given, oxygen available, monitors applied/VS acknowledged, fire risk safety assessment completed and verbalized and blood product R/B/A discussed and consented  Peripheral Block   Patient position: left lateral decubitus  Prep: ChloraPrep  Provider prep: mask and sterile gloves  Patient monitoring: cardiac monitor, frequent blood pressure checks, IV access and continuous pulse ox  Block type: Sciatic  Popliteal  Laterality: left  Injection technique: single-shot  Guidance: nerve stimulator and ultrasound guided  Local infiltration: lidocaine  Infiltration strength: 1 %  Local infiltration: lidocaine  Dose: 5 mL    Needle   Needle type: long-bevel   Needle gauge: 20 G  Needle localization: nerve stimulator and ultrasound guidance  Needle length: 10 cm  Assessment   Injection assessment: negative aspiration for heme, local visualized surrounding nerve on ultrasound and no paresthesia on injection  Paresthesia pain: none  Hemodynamics: stable  Real-time US image taken/store: yes    Medications Administered  ropivacaine (NAROPIN) 0.5% injection 30 mL - Infiltration   30 mL - 8/24/2022 12:05:00 PM

## 2022-08-24 NOTE — ANESTHESIA PROCEDURE NOTES
Peripheral Block    Patient location during procedure: procedure area  Reason for block: post-op pain management and at surgeon's request  Start time: 8/24/2022 1:49 PM  End time: 8/24/2022 12:49 PM  Staffing  Performed: anesthesiologist   Anesthesiologist: Lamont Ulrich MD  Preanesthetic Checklist  Completed: patient identified, IV checked, site marked, risks and benefits discussed, surgical/procedural consents, equipment checked, pre-op evaluation, timeout performed, anesthesia consent given, oxygen available and monitors applied/VS acknowledged  Peripheral Block   Patient position: supine  Prep: ChloraPrep  Provider prep: mask and sterile gloves  Patient monitoring: IV access, frequent blood pressure checks, continuous pulse ox and cardiac monitor  Block type: Saphenous  Laterality: left  Injection technique: single-shot  Guidance: ultrasound guided  Local infiltration: lidocaine  Infiltration strength: 1 %  Local infiltration: lidocaine  Dose: 5 mL    Needle   Needle type: combined needle/nerve stimulator   Needle gauge: 20 G  Needle localization: ultrasound guidance  Needle length: 10 cm  Assessment   Injection assessment: local visualized surrounding nerve on ultrasound and negative aspiration for heme  Paresthesia pain: none  Slow fractionated injection: yes  Hemodynamics: stable  Real-time US image taken/store: yes    Medications Administered  ropivacaine (NAROPIN) 0.5% injection 15 mL - Epidural   20 mL - 8/24/2022 1:49:00 PM

## 2022-08-24 NOTE — OP NOTE
Operative Note      Patient: Bill Gan  YOB: 1940  MRN: [id number]    Date of Procedure: 8/24/2022    Pre-Op Diagnosis: Closed fracture of left ankle, initial encounter [F21.021D]    Post-Op Diagnosis: Same       Procedure(s):  LEFT ANKLE OPEN REDUCTION INTERNAL FIXATION    Surgeon(s):  Hannah Moore DO    Assistant:   Surgical Assistant: Marco A Núñez    Anesthesia: General    Estimated Blood Loss (mL): Minimal    Complications: None    Specimens:   * No specimens in log *    Implants:  Implant Name Type Inv. Item Serial No.  Lot No. LRB No. Used Action   PLATE BONE 5 H LT DSTL FIB TI LCK - [id number]  PLATE BONE 5 H LT DSTL FIB TI LCK  [employer]  Left 1 Implanted   SCREW BNE L14MM DIA3MM FT ANK TI DEEPA ANG LCK LO PROF - [id number]  SCREW BNE L14MM DIA3MM FT ANK TI DEEPA ANG LCK LO PROF  [employer]  Left 2 Implanted   SCREW BNE L16MM DIA3MM FT ANK TI DEEPA ANG SHARON LO PROF FOR - [id number]  SCREW BNE L16MM DIA3MM FT ANK TI DEEPA ANG SHARON LO PROF FOR  [employer]  Left 1 Implanted   SCREW BNE L12MM DIA3MM FT ANK TI DEEPA ANG SHARON LO PROF FOR - [id number]  SCREW BNE L12MM DIA3MM FT ANK TI DEEPA ANG SHARON LO PROF FOR  [employer]  Left 1 Implanted   SCREW BNE L10MM DIA3MM FT ANK TI DEEPA ANG SHARON LO PROF FOR - [id number]  SCREW BNE L10MM DIA3MM FT ANK TI DEEPA ANG SHARON LO PROF FOR  [employer]  Left 1 Implanted   SCREW BNE L12MM DIA3. 5MM ANK TI LO PROF - [id number]  SCREW BNE L12MM DIA3. 5MM ANK TI LO PROF  [employer]  Left 2 Implanted   [employer] 4 X 12 CANCELLOUS SCREW      Left 1 Implanted         Drains: * No LDAs found *    Findings: Partially healed fracture about the distal fibula with malunion    Indications for procedure: This patient was initially seen by me several weeks ago after sustaining a closed, essentially nondisplaced fracture about the left distal fibula. Because of fracture alignment, we elected to treat her injury nonoperatively.   She was placed into a walking boot and permitted to weight-bear as tolerated. However, on subsequent follow-up, her fracture is noted to have displaced, and partially healed in a malunited type position. Risk versus benefits were discussed with continuing nonoperative treatment versus considering open reduction internal fixation. After discussion, both the patient and her family agreed to proceed with ORIF of the distal fibula. All risk, benefits alternatives treatment were reviewed in my office. Informed consent was obtained at that time and verified today prior to the procedure. Detailed Description of Procedure:   Patient was greeted in the preoperative holding area and all final questions were answered. I marked the operative lower extremity with indelible skin marker. Informed consent was verified. Patient was then taken to the operative suite and laid supine on the operating table. All bony prominences were well padded as general anesthesia was induced per the anesthesia team.  A pretested pneumatic tourniquet was applied to the proximal aspect of the operative lower extremity. The operative lower extremity was then prepped and draped in a sterile orthopedic fashion. A timeout was performed which the patient, operative extremity as well as the procedure were confirmed by all parties present. An Esmarch was used to exsanguinate the operative lower extremity and the previously applied tourniquet was insufflated 280 mmHg. A #15 scalpel blade was then used to create a standard longitudinal incision over the distal aspect of the fibular shaft. The incision was carried through the subcutaneous tissues and any bleeding vessels were coagulated using the Bovie cautery. I sharply incised down to the outer cortex laterally of the distal fibula, dissecting through the periosteum with the Bovie cautery. Full-thickness flaps were created as I dissected the lateral cortex of the distal fibula.   There was abundant bony callus noted at the site of the fracture. The fracture site was confirmed with fluoroscopy. Using a combination of a dental pick, quarter inch osteotome as well as a synovial rongeur, the fracture site was debrided. The fibula had partially healed, in a shortened and malunited type position. However, after debridement of the callus, there was good mobilization at the fracture site. However, the bone was noted to be quite osteopenic, making lag fixation very difficult. I then chose a 4-hole left-sided distal fibular locking plate from the back table and applied in appropriate anatomical plan to match the patient's native fibula contour. Plate positioning was determined using the fluoroscopy. I temporarily held the plate into place using a BB tack. I first fixed the plate distally, beginning to sequentially drill the locking holes distally within the plate. Using depth gauge, I determined screw length and inserted several locking screws for optimal fixation distally. With good cortical fixation distally, I then used a pointed reduction clamp in order to temporarily reduce the fracture site. This also allowed me to achieve the desired anatomical length at the fracture site. I then turned my attention to the proximal shaft holes within the plate. These were drilled bicortically, and several 3.5 mm cortex screws were inserted. At this point, final imaging was obtained using the fluoroscopy. A dynamic external rotation stress radiograph was performed indicating no excessive widening of the medial clear space. All wounds were copiously irrigated using normal saline. The tourniquet was deflated and any bleeding vessels were coagulated using the Bovie cautery. 0 Vicryl suture was used to repair the defects in the periosteum. A standard layered closure was performed with 2-0 Vicryl suture and 3-0 nylon suture. TA sterile dressing was then placed.   The operative lower extremity was immobilized into a well-padded AO plaster splint. Anesthesia was reversed and patient was taken recovery in stable condition. There were no apparent complications.     Electronically signed by Amanda Sanchez DO on 8/24/2022 at 3:34 PM

## 2022-08-24 NOTE — PROGRESS NOTES
Left popliteal nerve block completed per Dr Rajeev Monet with good toleration. Call light within reach.

## 2022-08-24 NOTE — ANESTHESIA PRE PROCEDURE
Department of Anesthesiology  Preprocedure Note       Name:  Tamiko Tomas   Age:  78 y.o.  :  1940                                          MRN:  [id number]         Date:  2022      Surgeon: Shobha Lombardi):  Kate Rowland DO    Procedure: Procedure(s):  LEFT ANKLE OPEN REDUCTION INTERNAL FIXATION   ++[employer]++     ++PNB++   ++METAL ALLERGY++    Medications prior to admission:   Prior to Admission medications    Medication Sig Start Date End Date Taking? Authorizing Provider   HYDROcodone-acetaminophen (NORCO) 5-325 MG per tablet Take 1 tablet by mouth every 12 hours as needed for Pain for up to 30 days. 22  CONNIE López CNP   albuterol sulfate HFA (VENTOLIN HFA) 108 (90 Base) MCG/ACT inhaler Inhale 2 puffs into the lungs 4 times daily as needed for Wheezing 22   Uche Mccormick,    ondansetron (ZOFRAN ODT) 4 MG disintegrating tablet Take 1 tablet by mouth every 8 hours as needed for Nausea or Vomiting 22   Lalita Sewell DO   traMADol Page Ken) 50 MG tablet  22   Historical Provider, MD   aspirin 81 MG EC tablet Take 81 mg by mouth in the morning and at bedtime    Historical Provider, MD   sertraline (ZOLOFT) 50 MG tablet Take 1 tablet by mouth nightly 3/4/22   CONNIE Lamar CNP   gabapentin (NEURONTIN) 300 MG capsule Take 3 capsules by mouth nightly.  TAKE 3 CAPSULES BY MOUTH EVERY NIGHT 10/19/21 10/19/22  CONNIE López CNP   levothyroxine (SYNTHROID) 200 MCG tablet TAKE 1 TABLET BY MOUTH EVERY DAY IN THE MORNING ON AN EMPTY STOMACH 21   Historical Provider, MD       Current medications:    Current Facility-Administered Medications   Medication Dose Route Frequency Provider Last Rate Last Admin    sodium chloride flush 0.9 % injection 5-40 mL  5-40 mL IntraVENous 2 times per day Jeffry Whitehead DO        sodium chloride flush 0.9 % injection 5-40 mL  5-40 mL IntraVENous PRN Kate Rowland DO        0.9 % sodium chloride infusion IntraVENous PRN Guillaume Rowland, DO        ceFAZolin (ANCEF) 2,000 mg in sterile water 20 mL IV syringe  2,000 mg IntraVENous On Call to [address], DO        ropivacaine (NAROPIN) 0.2% injection 0.2% 15 mL  15 mL Epidural Once [employer], DO        ropivacaine (NAROPIN) 0.5% injection 15 mL  15 mL Epidural Once [employer], DO        bupivacaine 0.75% in dextrose 8.25% (intrathecal) (SENSORCAINE) 0.75-8.25 % injection 12 mg  1.6 mL Intrathecal Once [employer], DO           Allergies:     Allergies   Allergen Reactions    Bee Venom Swelling and Dermatitis    Penicillins Hives, Swelling and Dermatitis    Other      All metals except gold and platinum, welts/blisters       Problem List:    Patient Active Problem List   Diagnosis Code    MDS (myelodysplastic syndrome) ([facility]) D46.9    Bronchitis J40    Macrocytic anemia with high RDW D53.9    Hypothyroidism E03.9    Peripheral sensory neuropathy G60.8    Breast cancer ([facility]) C50.919    Anemia of chronic disease D63.8       Past Medical History:        Diagnosis Date    Anemia     Pt reports she was dx in her teens, w/o proper w/u, with iron deficiency anemia and was on oral iron replacement for many years, resulting in iron overload    Aplastic anemia ([facility] [address] ~5665-5701    Possibly d/t chemotherapy for breast cancer    Breast cancer ([facility] [address] 2009    right side; pt underwent radical mastectomy followed by radiation therapy and chemotherapy and was then on oral chemo pill for 5 yrs; no recurrence as of 01/2019    Chickenpox     History of blood transfusion     Hypothyroidism     Measles     Mild depression ([facility])     Mumps     Myelodysplastic syndrome ([facility]) ~2016    Neuropathy     feet and ankles murali    Prolonged emergence from general anesthesia     Pulmonary tuberculosis ~4865-4069    in her early 25s    Scarlet fever     Sensory neuropathy     beyond the ankle b/l       Past Surgical History:        Procedure Laterality Date    ADENOIDECTOMY      BONE MARROW BIOPSY      BREAST BIOPSY      CHOLECYSTECTOMY      MASTECTOMY      right breast    OTHER SURGICAL HISTORY      blood transfusions    TONSILLECTOMY AND ADENOIDECTOMY      TUBAL LIGATION         Social History:    Social History     Tobacco Use    Smoking status: Former     Packs/day: 1.00     Years: 37.00     Pack years: 37.00     Types: Cigarettes     Start date:      Quit date:      Years since quittin.6    Smokeless tobacco: Never   Substance Use Topics    Alcohol use: No                                Counseling given: Not Answered      Vital Signs (Current):   Vitals:    22 1436 22 1054   BP:  (!) 114/59   Pulse:  54   Resp:  16   Temp:  97.1 °F (36.2 °C)   TempSrc:  Temporal   SpO2:  96%   Weight: 132 lb (59.9 kg) 132 lb (59.9 kg)   Height: 5' 1\" (1.549 m) 5' 1\" (1.549 m)                                              BP Readings from Last 3 Encounters:   22 (!) 114/59   22 (!) 133/58   22 (!) 115/55       NPO Status: Time of last liquid consumption:                         Time of last solid consumption:                         Date of last liquid consumption: 22                        Date of last solid food consumption: 22    BMI:   Wt Readings from Last 3 Encounters:   22 132 lb (59.9 kg)   22 137 lb (62.1 kg)   22 137 lb 6.4 oz (62.3 kg)     Body mass index is 24.94 kg/m².     CBC:   Lab Results   Component Value Date/Time    WBC 5.2 2022 09:21 AM    RBC 2.87 2022 09:21 AM    HGB 10.8 2022 09:21 AM    HCT 33.7 2022 09:21 AM    .4 2022 09:21 AM    RDW 28.2 2022 09:21 AM     2022 09:21 AM       CMP:   Lab Results   Component Value Date/Time     2022 02:17 PM    K 3.7 2022 02:17 PM     2022 02:17 PM    CO2 24 2022 02:17 PM    BUN 17 2022 02:17 PM    CREATININE 0.8 2022 02:17 PM GFRAA >60 06/26/2022 02:17 PM    LABGLOM >60 06/26/2022 02:17 PM    GLUCOSE 101 06/26/2022 02:17 PM    PROT 6.5 06/26/2022 02:17 PM    CALCIUM 8.7 06/26/2022 02:17 PM    BILITOT 1.2 06/26/2022 02:17 PM    ALKPHOS 33 06/26/2022 02:17 PM    AST 65 06/26/2022 02:17 PM    ALT 62 06/26/2022 02:17 PM       POC Tests: No results for input(s): POCGLU, POCNA, POCK, POCCL, POCBUN, POCHEMO, POCHCT in the last 72 hours. Coags: No results found for: PROTIME, INR, APTT    HCG (If Applicable): No results found for: PREGTESTUR, PREGSERUM, HCG, HCGQUANT     ABGs: No results found for: PHART, PO2ART, UYZ8DKS, COJ6DYR, BEART, J8AJDUHA     Type & Screen (If Applicable):  No results found for: LABABO, LABRH    Drug/Infectious Status (If Applicable):  No results found for: HIV, HEPCAB    COVID-19 Screening (If Applicable):   Lab Results   Component Value Date/Time    COVID19 DETECTED 06/26/2022 02:17 PM    COVID19 Not Detected 08/20/2020 12:00 PM           Anesthesia Evaluation  Patient summary reviewed and Nursing notes reviewed no history of anesthetic complications:   Airway: Mallampati: II  TM distance: >3 FB   Neck ROM: full  Mouth opening: > = 3 FB   Dental:    (+) caps and poor dentition      Pulmonary: breath sounds clear to auscultation      (-) shortness of breath and not a current smoker                           Cardiovascular:  Exercise tolerance: good (>4 METS),           Rhythm: regular  Rate: normal           Beta Blocker:  Not on Beta Blocker         Neuro/Psych:   (+) neuromuscular disease:, depression/anxiety              ROS comment: Neuropathy feet bilateral GI/Hepatic/Renal: Neg GI/Hepatic/Renal ROS            Endo/Other:    (+) hypothyroidism::., malignancy/cancer. Abdominal:             Vascular: negative vascular ROS. Other Findings:           Anesthesia Plan      general     ASA 3       Induction: intravenous. Anesthetic plan and risks discussed with patient.     Use of blood products discussed with patient whom consented to blood products. Inocente Velazquez MD   8/24/2022        PNB Consent  Benefits, alternatives and risks of PNBs were discussed with patient. Risks include but are not limited to; bleeding, LAST, infection and possible nerve trauma. All questions answered. PNB was recommended and encouraged as part of multi-modal pain therapy. After consideration;    patient agrees to:   left        Popliteal Sciatic Block/adductor. for management of post-op pain.       Inocente Velazquez MD  Staff Anesthesiologist  August 24, 2022  11:49 AM

## 2022-08-24 NOTE — PROGRESS NOTES
Left adductor canal block completed per Dr Raoul Ledesma with good toleration. Call light within reach.

## 2022-08-30 NOTE — ANESTHESIA POSTPROCEDURE EVALUATION
Department of Anesthesiology  Postprocedure Note    Patient: Michell Swanson  MRN: [id number]  [place]: 1940  Date of evaluation: 8/30/2022      Procedure Summary     Date: 08/24/22 Room / Location: [facility] OR 02 / [facility]    Anesthesia Start: 7676 Anesthesia Stop: 1539    Procedure: LEFT ANKLE OPEN REDUCTION INTERNAL FIXATION (Left: Ankle) Diagnosis:       Closed nondisplaced fracture of lateral malleolus of left fibula, initial encounter      (Closed fracture of left ankle, initial encounter [O76.680J])    Surgeons: Al Warren DO Responsible Provider: Jerome Abdul MD    Anesthesia Type: General ASA Status: 3          Anesthesia Type: General    Denise Phase I: Denise Score: 10    Denise Phase II: Denise Score: 10      Anesthesia Post Evaluation    Patient location during evaluation: PACU  Patient participation: complete - patient participated  Level of consciousness: awake and alert  Airway patency: patent  Nausea & Vomiting: no vomiting and no nausea  Complications: no  Cardiovascular status: blood pressure returned to baseline  Respiratory status: acceptable  Hydration status: euvolemic  Multimodal analgesia pain management approach

## 2022-09-02 ENCOUNTER — HOSPITAL ENCOUNTER (OUTPATIENT)
Dept: INFUSION THERAPY | Age: 82
Discharge: HOME | End: 2022-09-02
Payer: MEDICARE

## 2022-09-02 ENCOUNTER — OFFICE VISIT (OUTPATIENT)
Dept: ONCOLOGY | Age: 82
End: 2022-09-02
Payer: MEDICARE

## 2022-09-02 VITALS
HEART RATE: 67 BPM | HEIGHT: 61 IN | DIASTOLIC BLOOD PRESSURE: 58 MMHG | WEIGHT: 132 LBS | OXYGEN SATURATION: 96 % | TEMPERATURE: 97.9 F | BODY MASS INDEX: 24.92 KG/M2 | SYSTOLIC BLOOD PRESSURE: 127 MMHG | RESPIRATION RATE: 16 BRPM

## 2022-09-02 DIAGNOSIS — D46.9 MDS (MYELODYSPLASTIC SYNDROME) (HCC): Primary | ICD-10-CM

## 2022-09-02 DIAGNOSIS — D63.8 ANEMIA OF CHRONIC DISEASE: ICD-10-CM

## 2022-09-02 DIAGNOSIS — C50.911 MALIGNANT NEOPLASM OF RIGHT BREAST IN FEMALE, ESTROGEN RECEPTOR POSITIVE, UNSPECIFIED SITE OF BREAST (HCC): ICD-10-CM

## 2022-09-02 DIAGNOSIS — Z17.0 MALIGNANT NEOPLASM OF RIGHT BREAST IN FEMALE, ESTROGEN RECEPTOR POSITIVE, UNSPECIFIED SITE OF BREAST (HCC): ICD-10-CM

## 2022-09-02 LAB
ANISOCYTOSIS: ABNORMAL
BASOPHILIC STIPPLING: ABNORMAL
BASOPHILS ABSOLUTE: 0 E9/L (ref 0–0.2)
BASOPHILS RELATIVE PERCENT: 0.6 % (ref 0–2)
EOSINOPHILS ABSOLUTE: 0.22 E9/L (ref 0.05–0.5)
EOSINOPHILS RELATIVE PERCENT: 4.3 % (ref 0–6)
HCT VFR BLD CALC: 28.3 % (ref 34–48)
HEMOGLOBIN: 9.6 G/DL (ref 11.5–15.5)
HYPOCHROMIA: ABNORMAL
LYMPHOCYTES ABSOLUTE: 2.3 E9/L (ref 1.5–4)
LYMPHOCYTES RELATIVE PERCENT: 46.1 % (ref 20–42)
MCH RBC QN AUTO: 38.4 PG (ref 26–35)
MCHC RBC AUTO-ENTMCNC: 33.9 % (ref 32–34.5)
MCV RBC AUTO: 113.2 FL (ref 80–99.9)
MONOCYTES ABSOLUTE: 0.35 E9/L (ref 0.1–0.95)
MONOCYTES RELATIVE PERCENT: 7 % (ref 2–12)
NEUTROPHILS ABSOLUTE: 2.15 E9/L (ref 1.8–7.3)
NEUTROPHILS RELATIVE PERCENT: 42.6 % (ref 43–80)
NUCLEATED RED BLOOD CELLS: 0.9 /100 WBC
OVALOCYTES: ABNORMAL
PDW BLD-RTO: 25.5 FL (ref 11.5–15)
PLATELET # BLD: 151 E9/L (ref 130–450)
PMV BLD AUTO: 10.1 FL (ref 7–12)
POIKILOCYTES: ABNORMAL
POLYCHROMASIA: ABNORMAL
RBC # BLD: 2.5 E12/L (ref 3.5–5.5)
SCHISTOCYTES: ABNORMAL
TARGET CELLS: ABNORMAL
TEAR DROP CELLS: ABNORMAL
WBC # BLD: 5 E9/L (ref 4.5–11.5)

## 2022-09-02 PROCEDURE — 36415 COLL VENOUS BLD VENIPUNCTURE: CPT

## 2022-09-02 PROCEDURE — 99212 OFFICE O/P EST SF 10 MIN: CPT

## 2022-09-02 PROCEDURE — 99214 OFFICE O/P EST MOD 30 MIN: CPT | Performed by: INTERNAL MEDICINE

## 2022-09-02 PROCEDURE — 6360000002 HC RX W HCPCS: Performed by: NURSE PRACTITIONER

## 2022-09-02 PROCEDURE — 85025 COMPLETE CBC W/AUTO DIFF WBC: CPT

## 2022-09-02 PROCEDURE — 96372 THER/PROPH/DIAG INJ SC/IM: CPT

## 2022-09-02 PROCEDURE — 1123F ACP DISCUSS/DSCN MKR DOCD: CPT | Performed by: INTERNAL MEDICINE

## 2022-09-02 RX ADMIN — DARBEPOETIN ALFA 500 MCG: 500 INJECTION, SOLUTION INTRAVENOUS; SUBCUTANEOUS at 11:19

## 2022-09-02 NOTE — PROGRESS NOTES
[address]  [facility] MED ONCOLOGY  [facility] [address]  Dept: [address]: [phone]  Attending Progress Note      Reason for Visit:   1. Right Breast Cancer. 2. MDS. Referring Physician:  CONNIE Mendez CNP    PCP:  CONNIE Mendez CNP    History of Present Illness: The patient is a very pleasant 78 y.o. lady with a PMH significant for Right Breast Cancer, stage III, HR pos, was diagnosed in 2009, she had a right mastectomy done, followed by adjuvant chemo, she received 8 cycles, probably AC followed by T, then PMRT, and 5 years of adjuvant ET with Arimidex, was completed in 2015. She was treated in [facility] under the care of Dr. Gareth Henao. She was diagnosed with MDS in 2017, she received ESAs and PRBCs transfusion. She has transfusion related iron overload. She was started on Aranesp on 4/24/2020. No SE from Aranesp. The patient returns for a follow-up visit, she had a mechanical fall, she ended up with a displaced fracture of the distal shaft of the fibula, she underwent on 8/24/2022 left ankle open reduction internal fixation. Recovered well from the surgery    Review of Systems;  CONSTITUTIONAL: No fever, chills. Fair appetite. ENMT: Eyes: No diplopia; Nose: No epistaxis. Mouth: No sore throat. RESPIRATORY: No hemoptysis, shortness of breath, cough. CARDIOVASCULAR: No chest pain, palpitations. GASTROINTESTINAL: No nausea/vomiting, abdominal pain, diarrhea/constipation. GENITOURINARY: No dysuria, urinary frequency, hematuria. NEURO: No syncope, presyncope, headache.   Remainder:  ROS NEGATIVE    Past Medical History:      Diagnosis Date    Anemia     Pt reports she was dx in her teens, w/o proper w/u, with iron deficiency anemia and was on oral iron replacement for many years, resulting in iron overload    Aplastic anemia ([facility] [address] ~7896-9169    Possibly d/t chemotherapy for breast cancer    Breast cancer ([facility] [address] 2009    right side; pt underwent radical mastectomy followed by radiation therapy and chemotherapy and was then on oral chemo pill for 5 yrs; no recurrence as of 01/2019    Chickenpox     History of blood transfusion     Hypothyroidism     Measles     Mild depression (HCC)     Mumps     Myelodysplastic syndrome ([facility]) ~2016    Neuropathy     feet and ankles murali    Prolonged emergence from general anesthesia     Pulmonary tuberculosis ~9679-8147    in her early 25s    Scarlet fever     Sensory neuropathy     beyond the ankle b/l     Patient Active Problem List   Diagnosis    MDS (myelodysplastic syndrome) (HCC)    Bronchitis    Macrocytic anemia with high RDW    Hypothyroidism    Peripheral sensory neuropathy    Breast cancer ([facility] [address]    Anemia of chronic disease        Past Surgical History:      Procedure Laterality Date    ADENOIDECTOMY      ANKLE FRACTURE SURGERY Left 8/24/2022    LEFT ANKLE OPEN REDUCTION INTERNAL FIXATION performed by Kait Canales DO at [address] [place] [address]      right breast    OTHER SURGICAL HISTORY      blood transfusions    TONSILLECTOMY AND ADENOIDECTOMY      TUBAL LIGATION         Family History:  Family History   Problem Relation Age of Onset    Cancer Mother         lung    Cancer Father         lung    Cancer Sister         ovarian cancer and breast cancer    Cancer Brother         unknown    Cancer Maternal Aunt         unknown    Cancer Other         brain    Other Brother         MI       Medications:  Reviewed and reconciled. Social History:  Social History     Socioeconomic History    Marital status:       Spouse name: Not on file    Number of children: Not on file    Years of education: Not on file    Highest education level: Not on file   Occupational History    Not on file   Tobacco Use    Smoking status: Former     Packs/day: 1.00     Years: 37.00     Pack years: 37.00     Types: Cigarettes     Start date:      Quit date: 2000     Years since quittin.6    Smokeless tobacco: Never   Vaping Use    Vaping Use: Never used   Substance and Sexual Activity    Alcohol use: No    Drug use: Never    Sexual activity: Not Currently   Other Topics Concern    Not on file   Social History Narrative    Not on file     Social Determinants of Health     Financial Resource Strain: Not on file   Food Insecurity: Not on file   Transportation Needs: Not on file   Physical Activity: Not on file   Stress: Not on file   Social Connections: Not on file   Intimate Partner Violence: Not on file   Housing Stability: Not on file       Allergies: Allergies   Allergen Reactions    Bee Venom Swelling and Dermatitis    Penicillins Hives, Swelling and Dermatitis    Other      All metals except gold and platinum, welts/blisters       Physical Exam:  BP (!) 127/58   Pulse 67   Temp 97.9 °F (36.6 °C) (Infrared)   Resp 16   Ht 5' 1\" (1.549 m)   Wt 132 lb (59.9 kg) Comment: patient stated  LMP 2016   SpO2 96%   BMI 24.94 kg/m²   GENERAL: Alert, oriented x 3, not in acute distress. HEENT: PERRLA; EOMI. Oropharynx clear. NECK: Supple. No palpable cervical or supraclavicular lymphadenopathy. LUNGS: Good air entry bilaterally. No wheezing, crackles or rhonchi. BREASTS: She is s/p right mastectomy, no suspicious lesions, she has telangiectasias. She has tenderness in the left upper rib cage. CARDIOVASCULAR: Regular rate. No murmurs, rubs or gallops. ABDOMEN: Soft. Non-tender, non-distended. Positive bowel sounds. EXTREMITIES: Dorsum of the foot bruising  NEUROLOGIC: No focal deficits. ECOG PS 1      Impression/Plan:     1.  The patient is a very pleasant 78 y.o. lady with a PMH significant for Right Breast Cancer, stage III, HR pos, was diagnosed in , she had a right mastectomy done, followed by adjuvant chemo, she received 8 cycles, probably AC followed by T, then PMRT, and 5 years of adjuvant ET with Arimidex, was completed in 2015. She was treated in [facility] under the care of Dr. Adonis Almonte. She is doing well clinically without any evidence of recurrence of her disease. Discussed with her the importance of monthly SBE, and routine screening mammograms, she had a left breast screening mammogram done on 6/22/2021, there was no evidence of malignancy, she is due for a repeat left breast screening mammogram was ordered. She will have it scheduled when she is able to move more. 2. She has MDS, diagnosed in 2017, she received ESAs and PRBCs transfusion, has transfusion related iron overload, hgb was 8.5, ferritin 3647, was restarted on Aranesp on 4/23/2019. On 7/20/2021, hemoglobin was 8 g/dl hematocrit 24.7,  1.3, normal white count, platelet count 312A, fit test is negative. The patient did not have an adequate response to Retacrit, she was changed back to Aranesp, today 8/19/2022 labs reviewed, hemoglobin is 9.6 G/DL, hemoglobin is less than 10 G/DL, proceed with Aranesp. Hypothyroidism, continue Synthroid follow-up with PCP. RTC in 2 weeks with labs. Thank you for allowing us to participate in the care of Ms. Ventura.     Camelia Ag MD   HEMATOLOGY/MEDICAL [facility]  [address] MED ONCOLOGY  [address] [place] [id number]  Dept: [address]: [phone]

## 2022-09-16 ENCOUNTER — OFFICE VISIT (OUTPATIENT)
Dept: ONCOLOGY | Age: 82
End: 2022-09-16
Payer: MEDICARE

## 2022-09-16 ENCOUNTER — HOSPITAL ENCOUNTER (OUTPATIENT)
Dept: INFUSION THERAPY | Age: 82
Discharge: HOME | End: 2022-09-16
Payer: MEDICARE

## 2022-09-16 VITALS
DIASTOLIC BLOOD PRESSURE: 57 MMHG | HEART RATE: 58 BPM | WEIGHT: 133.7 LBS | HEIGHT: 61 IN | SYSTOLIC BLOOD PRESSURE: 117 MMHG | OXYGEN SATURATION: 98 % | TEMPERATURE: 97.2 F | BODY MASS INDEX: 25.24 KG/M2

## 2022-09-16 DIAGNOSIS — D63.8 ANEMIA OF CHRONIC DISEASE: ICD-10-CM

## 2022-09-16 DIAGNOSIS — R22.1 LUMP IN NECK: Primary | ICD-10-CM

## 2022-09-16 DIAGNOSIS — D46.9 MDS (MYELODYSPLASTIC SYNDROME) (HCC): Primary | ICD-10-CM

## 2022-09-16 LAB
ANISOCYTOSIS: ABNORMAL
BASOPHILS ABSOLUTE: 0.08 E9/L (ref 0–0.2)
BASOPHILS RELATIVE PERCENT: 1.7 % (ref 0–2)
EOSINOPHILS ABSOLUTE: 0.17 E9/L (ref 0.05–0.5)
EOSINOPHILS RELATIVE PERCENT: 3.5 % (ref 0–6)
HCT VFR BLD CALC: 29.3 % (ref 34–48)
HEMOGLOBIN: 9.4 G/DL (ref 11.5–15.5)
HYPOCHROMIA: ABNORMAL
LYMPHOCYTES ABSOLUTE: 1.67 E9/L (ref 1.5–4)
LYMPHOCYTES RELATIVE PERCENT: 33.9 % (ref 20–42)
MCH RBC QN AUTO: 36.6 PG (ref 26–35)
MCHC RBC AUTO-ENTMCNC: 32.1 % (ref 32–34.5)
MCV RBC AUTO: 114 FL (ref 80–99.9)
MONOCYTES ABSOLUTE: 0.24 E9/L (ref 0.1–0.95)
MONOCYTES RELATIVE PERCENT: 5.2 % (ref 2–12)
NEUTROPHILS ABSOLUTE: 2.74 E9/L (ref 1.8–7.3)
NEUTROPHILS RELATIVE PERCENT: 55.7 % (ref 43–80)
OVALOCYTES: ABNORMAL
PDW BLD-RTO: 25.6 FL (ref 11.5–15)
PLATELET # BLD: 154 E9/L (ref 130–450)
PMV BLD AUTO: 10.5 FL (ref 7–12)
POIKILOCYTES: ABNORMAL
POLYCHROMASIA: ABNORMAL
RBC # BLD: 2.57 E12/L (ref 3.5–5.5)
STOMATOCYTES: ABNORMAL
TARGET CELLS: ABNORMAL
WBC # BLD: 4.9 E9/L (ref 4.5–11.5)

## 2022-09-16 PROCEDURE — 6360000002 HC RX W HCPCS: Performed by: NURSE PRACTITIONER

## 2022-09-16 PROCEDURE — 1123F ACP DISCUSS/DSCN MKR DOCD: CPT | Performed by: INTERNAL MEDICINE

## 2022-09-16 PROCEDURE — 99213 OFFICE O/P EST LOW 20 MIN: CPT

## 2022-09-16 PROCEDURE — 99214 OFFICE O/P EST MOD 30 MIN: CPT | Performed by: INTERNAL MEDICINE

## 2022-09-16 PROCEDURE — 36415 COLL VENOUS BLD VENIPUNCTURE: CPT

## 2022-09-16 PROCEDURE — 96372 THER/PROPH/DIAG INJ SC/IM: CPT

## 2022-09-16 RX ADMIN — DARBEPOETIN ALFA 500 MCG: 500 INJECTION, SOLUTION INTRAVENOUS; SUBCUTANEOUS at 10:38

## 2022-09-16 NOTE — PROGRESS NOTES
708  [facility] MED ONCOLOGY  [address] [facility] [address]  Dept: [address]: [phone]  Attending Progress Note      Reason for Visit:   1. Right Breast Cancer. 2. MDS. Referring Physician:  CONNIE Serna CNP    PCP:  CONNIE Serna CNP    History of Present Illness: The patient is a very pleasant 78 y.o. lady with a PMH significant for Right Breast Cancer, stage III, HR pos, was diagnosed in 2009, she had a right mastectomy done, followed by adjuvant chemo, she received 8 cycles, probably AC followed by T, then PMRT, and 5 years of adjuvant ET with Arimidex, was completed in 2015. She was treated in [facility] under the care of Dr. Aron Castellon. She was diagnosed with MDS in 2017, she received ESAs and PRBCs transfusion. She has transfusion related iron overload. She was started on Aranesp on 4/24/2020. No SE from Aranesp. The patient returns for a follow-up visit, she had a mechanical fall, she ended up with a displaced fracture of the distal shaft of the fibula, she underwent on 8/24/2022 left ankle open reduction internal fixation. Recovered well from the surgery. Feeling a lump in the right neck. Review of Systems;  CONSTITUTIONAL: No fever, chills. Fair appetite. ENMT: Eyes: No diplopia; Nose: No epistaxis. Mouth: No sore throat. RESPIRATORY: No hemoptysis, shortness of breath, cough. CARDIOVASCULAR: No chest pain, palpitations. GASTROINTESTINAL: No nausea/vomiting, abdominal pain, diarrhea/constipation. GENITOURINARY: No dysuria, urinary frequency, hematuria. NEURO: No syncope, presyncope, headache.   Remainder:  ROS NEGATIVE    Past Medical History:      Diagnosis Date    Anemia     Pt reports she was dx in her teens, w/o proper w/u, with iron deficiency anemia and was on oral iron replacement for many years, resulting in iron overload    Aplastic anemia ([facility] [address] ~2556-1318 Possibly d/t chemotherapy for breast cancer    Breast cancer ([facility] [address] 2009    right side; pt underwent radical mastectomy followed by radiation therapy and chemotherapy and was then on oral chemo pill for 5 yrs; no recurrence as of 01/2019    Chickenpox     History of blood transfusion     Hypothyroidism     Measles     Mild depression ([address]     Mumps     Myelodysplastic syndrome (HCC) ~2016    Neuropathy     feet and ankles murali    Prolonged emergence from general anesthesia     Pulmonary tuberculosis ~2026-5329    in her early 25s    Scarlet fever     Sensory neuropathy     beyond the ankle b/l     Patient Active Problem List   Diagnosis    MDS (myelodysplastic syndrome) (HCC)    Bronchitis    Macrocytic anemia with high RDW    Hypothyroidism    Peripheral sensory neuropathy    Breast cancer ([facility] [address]    Anemia of chronic disease        Past Surgical History:      Procedure Laterality Date    ADENOIDECTOMY      ANKLE FRACTURE SURGERY Left 8/24/2022    LEFT ANKLE OPEN REDUCTION INTERNAL FIXATION performed by Mena Hilliard DO at [address] [place] [address]      right breast    OTHER SURGICAL HISTORY      blood transfusions    TONSILLECTOMY AND ADENOIDECTOMY      TUBAL LIGATION         Family History:  Family History   Problem Relation Age of Onset    Cancer Mother         lung    Cancer Father         lung    Cancer Sister         ovarian cancer and breast cancer    Cancer Brother         unknown    Cancer Maternal Aunt         unknown    Cancer Other         brain    Other Brother         MI       Medications:  Reviewed and reconciled. Social History:  Social History     Socioeconomic History    Marital status:       Spouse name: Not on file    Number of children: Not on file    Years of education: Not on file    Highest education level: Not on file   Occupational History    Not on file   Tobacco Use    Smoking status: Former     Packs/day: 1.00 Years: 37.00     Pack years: 37.00     Types: Cigarettes     Start date:      Quit date: 2000     Years since quittin.7    Smokeless tobacco: Never   Vaping Use    Vaping Use: Never used   Substance and Sexual Activity    Alcohol use: No    Drug use: Never    Sexual activity: Not Currently   Other Topics Concern    Not on file   Social History Narrative    Not on file     Social Determinants of Health     Financial Resource Strain: Not on file   Food Insecurity: Not on file   Transportation Needs: Not on file   Physical Activity: Not on file   Stress: Not on file   Social Connections: Not on file   Intimate Partner Violence: Not on file   Housing Stability: Not on file       Allergies: Allergies   Allergen Reactions    Bee Venom Swelling and Dermatitis    Penicillins Hives, Swelling and Dermatitis    Other      All metals except gold and platinum, welts/blisters       Physical Exam:  BP (!) 117/57   Pulse 58   Temp 97.2 °F (36.2 °C)   Ht 5' 1\" (1.549 m)   Wt 133 lb 11.2 oz (60.6 kg)   LMP 2016   SpO2 98%   BMI 25.26 kg/m²   GENERAL: Alert, oriented x 3, not in acute distress. HEENT: PERRLA; EOMI. Oropharynx clear. NECK: Supple. fullness in the right neck. LUNGS: Good air entry bilaterally. No wheezing, crackles or rhonchi. BREASTS: She is s/p right mastectomy, no suspicious lesions, she has telangiectasias. She has tenderness in the left upper rib cage. CARDIOVASCULAR: Regular rate. No murmurs, rubs or gallops. ABDOMEN: Soft. Non-tender, non-distended. Positive bowel sounds. EXTREMITIES: Dorsum of the foot bruising  NEUROLOGIC: No focal deficits. ECOG PS 1      Impression/Plan:     1.  The patient is a very pleasant 78 y.o. lady with a PMH significant for Right Breast Cancer, stage III, HR pos, was diagnosed in , she had a right mastectomy done, followed by adjuvant chemo, she received 8 cycles, probably AC followed by T, then PMRT, and 5 years of adjuvant ET with Arimidex, was completed in 2015. She was treated in [facility] under the care of Dr. Sergei Ariza. She is doing well clinically without any evidence of recurrence of her disease. Discussed with her the importance of monthly SBE, and routine screening mammograms, she had a left breast screening mammogram done on 6/22/2021, there was no evidence of malignancy, she is due for a repeat left breast screening mammogram was ordered. She will have it scheduled when she is able to move more. 2. She has MDS, diagnosed in 2017, she received ESAs and PRBCs transfusion, has transfusion related iron overload, hgb was 8.5, ferritin 3647, was restarted on Aranesp on 4/23/2019. On 7/20/2021, hemoglobin was 8 g/dl hematocrit 24.7,  1.3, normal white count, platelet count 859T, fit test is negative. The patient did not have an adequate response to Retacrit, she was changed back to Aranesp, today 16 2022 labs reviewed, hemoglobin is 9.4 G/DL, hemoglobin is less than 10 G/DL, proceed with Aranesp. Right neck lump, ultrasound was ordered. Hypothyroidism, continue Synthroid follow-up with PCP. RTC in 2 weeks with labs. Thank you for allowing us to participate in the care of Ms. Ventura.     Aren Alvarenga MD   HEMATOLOGY/MEDICAL 150 [facility]  [address] MED ONCOLOGY  [address] [place] [id number]  Dept: [address]: [phone]

## 2022-09-19 DIAGNOSIS — S82.892A CLOSED FRACTURE OF LEFT ANKLE, INITIAL ENCOUNTER: ICD-10-CM

## 2022-09-19 RX ORDER — HYDROCODONE BITARTRATE AND ACETAMINOPHEN 5; 325 MG/1; MG/1
1 TABLET ORAL EVERY 6 HOURS PRN
Qty: 28 TABLET | Refills: 0 | Status: SHIPPED
Start: 2022-09-19 | End: 2022-10-04 | Stop reason: SDUPTHER

## 2022-09-19 NOTE — TELEPHONE ENCOUNTER
Call from Gen requesting refill for [place] is [facility] on [address]. [facility] natalie 10/17/22.

## 2022-09-20 ENCOUNTER — TELEPHONE (OUTPATIENT)
Dept: PALLATIVE CARE | Age: 82
End: 2022-09-20

## 2022-09-20 NOTE — TELEPHONE ENCOUNTER
Poppy Aj picked up her new prescription of Norco and saw that it was only for 7 days and the prescription states every 6 hours as needed. Poppy Aj states she does take her pain med now due to having broken leg every 8-12 hours. She knows to call when she is down to a couple days of meds.

## 2022-09-30 ENCOUNTER — HOSPITAL ENCOUNTER (OUTPATIENT)
Dept: INFUSION THERAPY | Age: 82
Discharge: HOME | End: 2022-09-30
Payer: MEDICARE

## 2022-09-30 ENCOUNTER — OFFICE VISIT (OUTPATIENT)
Dept: ONCOLOGY | Age: 82
End: 2022-09-30
Payer: MEDICARE

## 2022-09-30 VITALS
DIASTOLIC BLOOD PRESSURE: 60 MMHG | HEIGHT: 61 IN | SYSTOLIC BLOOD PRESSURE: 125 MMHG | TEMPERATURE: 97 F | OXYGEN SATURATION: 98 % | WEIGHT: 132.8 LBS | HEART RATE: 70 BPM | BODY MASS INDEX: 25.07 KG/M2

## 2022-09-30 DIAGNOSIS — D46.9 MDS (MYELODYSPLASTIC SYNDROME) (HCC): ICD-10-CM

## 2022-09-30 DIAGNOSIS — D46.9 MDS (MYELODYSPLASTIC SYNDROME) (HCC): Primary | ICD-10-CM

## 2022-09-30 DIAGNOSIS — R22.1 LUMP IN NECK: ICD-10-CM

## 2022-09-30 LAB
ANISOCYTOSIS: ABNORMAL
BASOPHILIC STIPPLING: ABNORMAL
BASOPHILS ABSOLUTE: 0 E9/L (ref 0–0.2)
BASOPHILS RELATIVE PERCENT: 0.5 % (ref 0–2)
EOSINOPHILS ABSOLUTE: 0.06 E9/L (ref 0.05–0.5)
EOSINOPHILS RELATIVE PERCENT: 0.9 % (ref 0–6)
HCT VFR BLD CALC: 32.9 % (ref 34–48)
HEMOGLOBIN: 10.7 G/DL (ref 11.5–15.5)
HYPOCHROMIA: ABNORMAL
LYMPHOCYTES ABSOLUTE: 2.24 E9/L (ref 1.5–4)
LYMPHOCYTES RELATIVE PERCENT: 33.9 % (ref 20–42)
MCH RBC QN AUTO: 36.9 PG (ref 26–35)
MCHC RBC AUTO-ENTMCNC: 32.5 % (ref 32–34.5)
MCV RBC AUTO: 113.4 FL (ref 80–99.9)
MONOCYTES ABSOLUTE: 0.2 E9/L (ref 0.1–0.95)
MONOCYTES RELATIVE PERCENT: 2.6 % (ref 2–12)
NEUTROPHILS ABSOLUTE: 4.16 E9/L (ref 1.8–7.3)
NEUTROPHILS RELATIVE PERCENT: 62.6 % (ref 43–80)
NUCLEATED RED BLOOD CELLS: 0.9 /100 WBC
OVALOCYTES: ABNORMAL
PDW BLD-RTO: 26.3 FL (ref 11.5–15)
PLATELET # BLD: 170 E9/L (ref 130–450)
PMV BLD AUTO: 10.7 FL (ref 7–12)
POIKILOCYTES: ABNORMAL
POLYCHROMASIA: ABNORMAL
RBC # BLD: 2.9 E12/L (ref 3.5–5.5)
REASON FOR REJECTION: NORMAL
REJECTED TEST: NORMAL
WBC # BLD: 6.6 E9/L (ref 4.5–11.5)

## 2022-09-30 PROCEDURE — 99212 OFFICE O/P EST SF 10 MIN: CPT

## 2022-09-30 PROCEDURE — 99214 OFFICE O/P EST MOD 30 MIN: CPT | Performed by: INTERNAL MEDICINE

## 2022-09-30 PROCEDURE — 1123F ACP DISCUSS/DSCN MKR DOCD: CPT | Performed by: INTERNAL MEDICINE

## 2022-09-30 PROCEDURE — 36415 COLL VENOUS BLD VENIPUNCTURE: CPT

## 2022-09-30 RX ORDER — OXYCODONE HYDROCHLORIDE AND ACETAMINOPHEN 5; 325 MG/1; MG/1
TABLET ORAL
COMMUNITY
Start: 2022-09-08 | End: 2022-10-17 | Stop reason: ALTCHOICE

## 2022-09-30 NOTE — PROGRESS NOTES
701  [facility] MED ONCOLOGY  [address] [facility] [address]  Dept: [address]: [phone]  Attending Progress Note      Reason for Visit:   1. Right Breast Cancer. 2. MDS. Referring Physician:  CONNIE Dang CNP    PCP:  CONNIE Dang CNP    History of Present Illness: The patient is a very pleasant 78 y.o. lady with a PMH significant for Right Breast Cancer, stage III, HR pos, was diagnosed in 2009, she had a right mastectomy done, followed by adjuvant chemo, she received 8 cycles, probably AC followed by T, then PMRT, and 5 years of adjuvant ET with Arimidex, was completed in 2015. She was treated in [facility] under the care of Dr. Yury Saeed. She was diagnosed with MDS in 2017, she received ESAs and PRBCs transfusion. She has transfusion related iron overload. She was started on Aranesp on 4/24/2020. No SE from Aranesp. The patient returns for a follow-up visit, she had a mechanical fall, she ended up with a displaced fracture of the distal shaft of the fibula, she underwent on 8/24/2022 left ankle open reduction internal fixation. Recovered well from the surgery. Feeling a lump in the right neck, neck US pending. Review of Systems;  CONSTITUTIONAL: No fever, chills. Fair appetite. ENMT: Eyes: No diplopia; Nose: No epistaxis. Mouth: No sore throat. RESPIRATORY: No hemoptysis, shortness of breath, cough. CARDIOVASCULAR: No chest pain, palpitations. GASTROINTESTINAL: No nausea/vomiting, abdominal pain, diarrhea/constipation. GENITOURINARY: No dysuria, urinary frequency, hematuria. NEURO: No syncope, presyncope, headache.   Remainder:  ROS NEGATIVE    Past Medical History:      Diagnosis Date    Anemia     Pt reports she was dx in her teens, w/o proper w/u, with iron deficiency anemia and was on oral iron replacement for many years, resulting in iron overload    Aplastic anemia [facility]) ~1847-5269    Possibly d/t chemotherapy for breast cancer    Breast cancer ([facility] [address] 2009    right side; pt underwent radical mastectomy followed by radiation therapy and chemotherapy and was then on oral chemo pill for 5 yrs; no recurrence as of 01/2019    Chickenpox     History of blood transfusion     Hypothyroidism     Measles     Mild depression (HCC)     Mumps     Myelodysplastic syndrome (HCC) ~2016    Neuropathy     feet and ankles murali    Prolonged emergence from general anesthesia     Pulmonary tuberculosis ~1609-6491    in her early 25s    Scarlet fever     Sensory neuropathy     beyond the ankle b/l     Patient Active Problem List   Diagnosis    MDS (myelodysplastic syndrome) (HCC)    Bronchitis    Macrocytic anemia with high RDW    Hypothyroidism    Peripheral sensory neuropathy    Breast cancer ([facility] [address]    Anemia of chronic disease        Past Surgical History:      Procedure Laterality Date    ADENOIDECTOMY      ANKLE FRACTURE SURGERY Left 8/24/2022    LEFT ANKLE OPEN REDUCTION INTERNAL FIXATION performed by Patricia Mg DO at [address] [place] [address]      right breast    OTHER SURGICAL HISTORY      blood transfusions    TONSILLECTOMY AND ADENOIDECTOMY      TUBAL LIGATION         Family History:  Family History   Problem Relation Age of Onset    Cancer Mother         lung    Cancer Father         lung    Cancer Sister         ovarian cancer and breast cancer    Cancer Brother         unknown    Cancer Maternal Aunt         unknown    Cancer Other         brain    Other Brother         MI       Medications:  Reviewed and reconciled. Social History:  Social History     Socioeconomic History    Marital status:       Spouse name: Not on file    Number of children: Not on file    Years of education: Not on file    Highest education level: Not on file   Occupational History    Not on file   Tobacco Use    Smoking status: Former Packs/day: 1.00     Years: 37.00     Pack years: 37.00     Types: Cigarettes     Start date:      Quit date: 2000     Years since quittin.7    Smokeless tobacco: Never   Vaping Use    Vaping Use: Never used   Substance and Sexual Activity    Alcohol use: No    Drug use: Never    Sexual activity: Not Currently   Other Topics Concern    Not on file   Social History Narrative    Not on file     Social Determinants of Health     Financial Resource Strain: Not on file   Food Insecurity: Not on file   Transportation Needs: Not on file   Physical Activity: Not on file   Stress: Not on file   Social Connections: Not on file   Intimate Partner Violence: Not on file   Housing Stability: Not on file       Allergies: Allergies   Allergen Reactions    Bee Venom Swelling and Dermatitis    Penicillins Hives, Swelling and Dermatitis    Other      All metals except gold and platinum, welts/blisters       Physical Exam:  /60   Pulse 70   Temp 97 °F (36.1 °C)   Ht 5' 1\" (1.549 m)   Wt 132 lb 12.8 oz (60.2 kg)   LMP 2016   SpO2 98%   BMI 25.09 kg/m²   GENERAL: Alert, oriented x 3, not in acute distress. HEENT: PERRLA; EOMI. Oropharynx clear. NECK: Supple. fullness in the right neck. LUNGS: Good air entry bilaterally. No wheezing, crackles or rhonchi. BREASTS: She is s/p right mastectomy, no suspicious lesions, she has telangiectasias. She has tenderness in the left upper rib cage. CARDIOVASCULAR: Regular rate. No murmurs, rubs or gallops. ABDOMEN: Soft. Non-tender, non-distended. Positive bowel sounds. EXTREMITIES: Dorsum of the foot bruising  NEUROLOGIC: No focal deficits. ECOG PS 1      Impression/Plan:     1.  The patient is a very pleasant 78 y.o. lady with a PMH significant for Right Breast Cancer, stage III, HR pos, was diagnosed in , she had a right mastectomy done, followed by adjuvant chemo, she received 8 cycles, probably AC followed by T, then PMRT, and 5 years of adjuvant ET with Arimidex, was completed in 2015. She was treated in [facility] under the care of Dr. Mae Doran. She is doing well clinically without any evidence of recurrence of her disease. Discussed with her the importance of monthly SBE, and routine screening mammograms, she had a left breast screening mammogram done on 6/22/2021, there was no evidence of malignancy, she is due for a repeat left breast screening mammogram was ordered. She will have it scheduled when she is able to move more. 2. She has MDS, diagnosed in 2017, she received ESAs and PRBCs transfusion, has transfusion related iron overload, hgb was 8.5, ferritin 3647, was restarted on Aranesp on 4/23/2019. On 7/20/2021, hemoglobin was 8 g/dl hematocrit 24.7,  1.3, normal white count, platelet count 870O, fit test is negative. The patient did not have an adequate response to Retacrit, she was changed back to Aranesp, today 9/30/2022, labs reviewed, hemoglobin is 10. 7G/DL, Hold Aranesp. Right neck lump, ultrasound was ordered and is pending. Hypothyroidism, continue Synthroid follow-up with PCP. RTC in 2 weeks with labs. Thank you for allowing us to participate in the care of Ms. Ventura.     Arian Galan MD   HEMATOLOGY/MEDICAL 150 [facility]  [address] MED ONCOLOGY  [address] [place] [id number]  Dept: [address]: [phone]

## 2022-10-04 DIAGNOSIS — S82.892A CLOSED FRACTURE OF LEFT ANKLE, INITIAL ENCOUNTER: ICD-10-CM

## 2022-10-04 RX ORDER — HYDROCODONE BITARTRATE AND ACETAMINOPHEN 5; 325 MG/1; MG/1
1 TABLET ORAL EVERY 6 HOURS PRN
Qty: 120 TABLET | Refills: 0 | Status: SHIPPED | OUTPATIENT
Start: 2022-10-04 | End: 2022-11-03

## 2022-10-04 NOTE — TELEPHONE ENCOUNTER
Call from Toma Bahena requesting refill for Mario Purdy would like to have prescription for a longer period of time as she takes medications more frequently and has a hard time getting to pharmacy. Pharmacy is [facility] on [address]. Next natalie 10/17/22.

## 2022-10-14 ENCOUNTER — HOSPITAL ENCOUNTER (OUTPATIENT)
Dept: INFUSION THERAPY | Age: 82
Discharge: HOME | End: 2022-10-14
Payer: MEDICARE

## 2022-10-14 ENCOUNTER — OFFICE VISIT (OUTPATIENT)
Dept: ONCOLOGY | Age: 82
End: 2022-10-14
Payer: MEDICARE

## 2022-10-14 VITALS
HEART RATE: 58 BPM | RESPIRATION RATE: 16 BRPM | BODY MASS INDEX: 25.68 KG/M2 | TEMPERATURE: 97.2 F | DIASTOLIC BLOOD PRESSURE: 62 MMHG | HEIGHT: 61 IN | SYSTOLIC BLOOD PRESSURE: 131 MMHG | WEIGHT: 136 LBS | OXYGEN SATURATION: 97 %

## 2022-10-14 DIAGNOSIS — D46.9 MDS (MYELODYSPLASTIC SYNDROME) (HCC): Primary | ICD-10-CM

## 2022-10-14 DIAGNOSIS — D46.9 MDS (MYELODYSPLASTIC SYNDROME) (HCC): ICD-10-CM

## 2022-10-14 DIAGNOSIS — R22.1 LUMP IN NECK: ICD-10-CM

## 2022-10-14 LAB
ANISOCYTOSIS: ABNORMAL
ATYPICAL LYMPHOCYTE RELATIVE PERCENT: 9.6 % (ref 0–4)
BASOPHILS ABSOLUTE: 0.06 E9/L (ref 0–0.2)
BASOPHILS RELATIVE PERCENT: 0.9 % (ref 0–2)
EOSINOPHILS ABSOLUTE: 0.11 E9/L (ref 0.05–0.5)
EOSINOPHILS RELATIVE PERCENT: 1.7 % (ref 0–6)
HCT VFR BLD CALC: 31.2 % (ref 34–48)
HEMOGLOBIN: 10.3 G/DL (ref 11.5–15.5)
LYMPHOCYTES ABSOLUTE: 2.71 E9/L (ref 1.5–4)
LYMPHOCYTES RELATIVE PERCENT: 31.3 % (ref 20–42)
MCH RBC QN AUTO: 36.7 PG (ref 26–35)
MCHC RBC AUTO-ENTMCNC: 33 % (ref 32–34.5)
MCV RBC AUTO: 111 FL (ref 80–99.9)
MONOCYTES ABSOLUTE: 0.33 E9/L (ref 0.1–0.95)
MONOCYTES RELATIVE PERCENT: 5.2 % (ref 2–12)
NEUTROPHILS ABSOLUTE: 3.37 E9/L (ref 1.8–7.3)
NEUTROPHILS RELATIVE PERCENT: 51.3 % (ref 43–80)
NUCLEATED RED BLOOD CELLS: 0.9 /100 WBC
OVALOCYTES: ABNORMAL
PDW BLD-RTO: 25.1 FL (ref 11.5–15)
PLATELET # BLD: 177 E9/L (ref 130–450)
PMV BLD AUTO: 10.1 FL (ref 7–12)
POIKILOCYTES: ABNORMAL
POLYCHROMASIA: ABNORMAL
RBC # BLD: 2.81 E12/L (ref 3.5–5.5)
TARGET CELLS: ABNORMAL
WBC # BLD: 6.6 E9/L (ref 4.5–11.5)

## 2022-10-14 PROCEDURE — 99214 OFFICE O/P EST MOD 30 MIN: CPT | Performed by: INTERNAL MEDICINE

## 2022-10-14 PROCEDURE — 99212 OFFICE O/P EST SF 10 MIN: CPT

## 2022-10-14 PROCEDURE — 85025 COMPLETE CBC W/AUTO DIFF WBC: CPT

## 2022-10-14 PROCEDURE — 36415 COLL VENOUS BLD VENIPUNCTURE: CPT

## 2022-10-14 PROCEDURE — 1123F ACP DISCUSS/DSCN MKR DOCD: CPT | Performed by: INTERNAL MEDICINE

## 2022-10-14 NOTE — PROGRESS NOTES
153  [facility] MED ONCOLOGY  [facility] [facility] [address]  Dept: [address]: [phone]  Attending Progress Note      Reason for Visit:   1. Right Breast Cancer. 2. MDS. Referring Physician:  CONNIE Rosas CNP    PCP:  CONNIE Rosas CNP    History of Present Illness: The patient is a very pleasant 78 y.o. lady with a PMH significant for Right Breast Cancer, stage III, HR pos, was diagnosed in 2009, she had a right mastectomy done, followed by adjuvant chemo, she received 8 cycles, probably AC followed by T, then PMRT, and 5 years of adjuvant ET with Arimidex, was completed in 2015. She was treated in [facility] under the care of Dr. Raymundo Singer. She was diagnosed with MDS in 2017, she received ESAs and PRBCs transfusion. She has transfusion related iron overload. She was started on Aranesp on 4/24/2020. No SE from Aranesp. The patient returns for a follow-up visit, she had a mechanical fall, she ended up with a displaced fracture of the distal shaft of the fibula, she underwent on 8/24/2022 left ankle open reduction internal fixation. Recovered well from the surgery. Pain well at this time. Energy level had improved feeling a lump in the right neck, neck US pending. Review of Systems;  CONSTITUTIONAL: No fever, chills. Fair appetite. ENMT: Eyes: No diplopia; Nose: No epistaxis. Mouth: No sore throat. RESPIRATORY: No hemoptysis, shortness of breath, cough. CARDIOVASCULAR: No chest pain, palpitations. GASTROINTESTINAL: No nausea/vomiting, abdominal pain, diarrhea/constipation. GENITOURINARY: No dysuria, urinary frequency, hematuria. NEURO: No syncope, presyncope, headache.   Remainder:  ROS NEGATIVE    Past Medical History:      Diagnosis Date    Anemia     Pt reports she was dx in her teens, w/o proper w/u, with iron deficiency anemia and was on oral iron replacement for many years, resulting in iron overload    Aplastic anemia ([address] ~8406-2678    Possibly d/t chemotherapy for breast cancer    Breast cancer ([address] 2009    right side; pt underwent radical mastectomy followed by radiation therapy and chemotherapy and was then on oral chemo pill for 5 yrs; no recurrence as of 01/2019    Chickenpox     History of blood transfusion     Hypothyroidism     Measles     Mild depression ([address]     Mumps     Myelodysplastic syndrome (HCC) ~2016    Neuropathy     feet and ankles murali    Prolonged emergence from general anesthesia     Pulmonary tuberculosis ~0046-2179    in her early 25s    Scarlet fever     Sensory neuropathy     beyond the ankle b/l     Patient Active Problem List   Diagnosis    MDS (myelodysplastic syndrome) (HCC)    Bronchitis    Macrocytic anemia with high RDW    Hypothyroidism    Peripheral sensory neuropathy    Breast cancer ([address]    Anemia of chronic disease        Past Surgical History:      Procedure Laterality Date    ADENOIDECTOMY      ANKLE FRACTURE SURGERY Left 8/24/2022    LEFT ANKLE OPEN REDUCTION INTERNAL FIXATION performed by Kelsie Bain DO at [address] [place] [address]      right breast    OTHER SURGICAL HISTORY      blood transfusions    TONSILLECTOMY AND ADENOIDECTOMY      TUBAL LIGATION         Family History:  Family History   Problem Relation Age of Onset    Cancer Mother         lung    Cancer Father         lung    Cancer Sister         ovarian cancer and breast cancer    Cancer Brother         unknown    Cancer Maternal Aunt         unknown    Cancer Other         brain    Other Brother         MI       Medications:  Reviewed and reconciled. Social History:  Social History     Socioeconomic History    Marital status:       Spouse name: Not on file    Number of children: Not on file    Years of education: Not on file    Highest education level: Not on file   Occupational History    Not on file   Tobacco Use    Smoking status: Former     Packs/day: 1.00     Years: 37.00     Pack years: 37.00     Types: Cigarettes     Start date:      Quit date: 2000     Years since quittin.8    Smokeless tobacco: Never   Vaping Use    Vaping Use: Never used   Substance and Sexual Activity    Alcohol use: No    Drug use: Never    Sexual activity: Not Currently   Other Topics Concern    Not on file   Social History Narrative    Not on file     Social Determinants of Health     Financial Resource Strain: Not on file   Food Insecurity: Not on file   Transportation Needs: Not on file   Physical Activity: Not on file   Stress: Not on file   Social Connections: Not on file   Intimate Partner Violence: Not on file   Housing Stability: Not on file       Allergies: Allergies   Allergen Reactions    Bee Venom Swelling and Dermatitis    Penicillins Hives, Swelling and Dermatitis    Other      All metals except gold and platinum, welts/blisters       Physical Exam:  /62   Pulse 58   Temp (!) 72 °F (22.2 °C) (Infrared)   Resp 16   Ht 5' 1\" (1.549 m)   Wt 136 lb (61.7 kg)   LMP 2016   SpO2 97%   BMI 25.70 kg/m²   GENERAL: Alert, oriented x 3, not in acute distress. HEENT: PERRLA; EOMI. Oropharynx clear. NECK: Supple. fullness in the right neck. LUNGS: Good air entry bilaterally. No wheezing, crackles or rhonchi. BREASTS: She is s/p right mastectomy, no suspicious lesions, she has telangiectasias. She has tenderness in the left upper rib cage. CARDIOVASCULAR: Regular rate. No murmurs, rubs or gallops. ABDOMEN: Soft. Non-tender, non-distended. Positive bowel sounds. EXTREMITIES: Dorsum of the foot bruising  NEUROLOGIC: No focal deficits. ECOG PS 1      Impression/Plan:     1.  The patient is a very pleasant 78 y.o. lady with a PMH significant for Right Breast Cancer, stage III, HR pos, was diagnosed in , she had a right mastectomy done, followed by adjuvant chemo, she received 8 cycles, probably AC followed by T, then PMRT, and 5 years of adjuvant ET with Arimidex, was completed in 2015. She was treated in [facility] under the care of Dr. Dwaine Shaffer. She is doing well clinically without any evidence of recurrence of her disease. Discussed with her the importance of monthly SBE, and routine screening mammograms, she had a left breast screening mammogram done on 6/22/2021, there was no evidence of malignancy, she is due for a repeat left breast screening mammogram was ordered. She will have it scheduled when she is able to move more. 2. She has MDS, diagnosed in 2017, she received ESAs and PRBCs transfusion, has transfusion related iron overload, hgb was 8.5, ferritin 3647, was restarted on Aranesp on 4/23/2019. On 7/20/2021, hemoglobin was 8 g/dl hematocrit 24.7,  1.3, normal white count, platelet count 217O, fit test is negative. The patient did not have an adequate response to Retacrit, she was changed back to Aranesp, today 10/14/2022, labs reviewed, hemoglobin is 10. 3G/DL, Hold Aranesp. Right neck lump, ultrasound was ordered and is pending. Hypothyroidism, continue Synthroid follow-up with PCP. RTC in 2 weeks with labs. Thank you for allowing us to participate in the care of Ms. Ventura.     oLida Evans MD   HEMATOLOGY/MEDICAL 150 [facility]  [address] MED ONCOLOGY  [address] [place] [id number]  Dept: [address]: [phone]

## 2022-10-17 ENCOUNTER — OFFICE VISIT (OUTPATIENT)
Dept: PALLATIVE CARE | Age: 82
End: 2022-10-17
Payer: MEDICARE

## 2022-10-17 ENCOUNTER — HOSPITAL ENCOUNTER (OUTPATIENT)
Dept: GENERAL RADIOLOGY | Age: 82
Discharge: HOME | End: 2022-10-19
Payer: MEDICARE

## 2022-10-17 VITALS — HEIGHT: 61 IN | BODY MASS INDEX: 24.92 KG/M2 | WEIGHT: 132 LBS

## 2022-10-17 VITALS
HEART RATE: 110 BPM | WEIGHT: 132 LBS | SYSTOLIC BLOOD PRESSURE: 134 MMHG | DIASTOLIC BLOOD PRESSURE: 55 MMHG | BODY MASS INDEX: 24.94 KG/M2 | OXYGEN SATURATION: 96 %

## 2022-10-17 DIAGNOSIS — G89.3 PAIN DUE TO NEOPLASM: ICD-10-CM

## 2022-10-17 DIAGNOSIS — Z51.5 PALLIATIVE CARE BY SPECIALIST: Primary | ICD-10-CM

## 2022-10-17 DIAGNOSIS — S82.892A CLOSED FRACTURE OF LEFT ANKLE, INITIAL ENCOUNTER: ICD-10-CM

## 2022-10-17 DIAGNOSIS — Z12.31 ENCOUNTER FOR SCREENING MAMMOGRAM FOR MALIGNANT NEOPLASM OF BREAST: ICD-10-CM

## 2022-10-17 PROCEDURE — G0279 TOMOSYNTHESIS, MAMMO: HCPCS

## 2022-10-17 PROCEDURE — 99212 OFFICE O/P EST SF 10 MIN: CPT | Performed by: NURSE PRACTITIONER

## 2022-10-17 PROCEDURE — 99211 OFF/OP EST MAY X REQ PHY/QHP: CPT

## 2022-10-17 PROCEDURE — 77065 DX MAMMO INCL CAD UNI: CPT

## 2022-10-17 PROCEDURE — 1123F ACP DISCUSS/DSCN MKR DOCD: CPT | Performed by: NURSE PRACTITIONER

## 2022-10-17 RX ORDER — GABAPENTIN 300 MG/1
900 CAPSULE ORAL NIGHTLY
Qty: 270 CAPSULE | Refills: 3 | Status: SHIPPED | OUTPATIENT
Start: 2022-10-17 | End: 2023-10-17

## 2022-10-17 NOTE — PROGRESS NOTES
Department of Palliative Medicine  Ambulatory Note  Provider: CONNIE Ceja - CNP       Chief Complaint: Robe Lee is a 78 y.o. female with chief complaint of Pain    HPI:  Robe Lee is a 78 y.o. female with significant past medical history of stage II HR positive breast cancer, diagnosed in 2009, status post right mastectomy, followed by adjuvant therapy, and 5 years of adjuvant Arimidex which was completed in 2015, with all treatment performed in [facility] under the care of Dr. Vega Newman. She is currently being followed locally by Dr. Eliana Kern, without evidence of recurrent disease, whom she also follows for management of myelodysplastic syndrome, which was diagnosed in 2017. She was referred to [address] [place] [address] for assistance with symptom management related to chemotherapy-induced peripheral neuropathy. Assessment/Plan      History of Breast Cancer stage III, HR positive:              -Diagnosed, treated, and managed in [place] in 2009              -Status post right mastectomy              -Followed by adjuvant chemotherapy              -Completed her Demadex 2015              -Currently followed locally by Dr. Eliana Kern, she also follows with regarding MDS diagnosed 2017     Chemotherapy related peripheral Neuropathy/Pain:              -  Worse bilateral feet and LEs, burning and pressure              -  May try stopping gabapentin 300 mg at nighttime   -  Noted improvement in nighttime neuropathy with Tumeric tablets              -  Continue Norco 5-325 mg every 12 hours as needed, 1-1.5/day,              -  Currently she is very high functioning, enjoys gardening, and is quite active. Prognosis: unknown     Follow Up: 3 months . They were encouraged to call with any questions, concerns, needs, or changes in symptoms. Subjective:     Met with Kori Colorado today in the clinic she is in good spirits and has been doing well, overall appearing to be at her baseline. Continues to have some periodic pain, but this is managed very well utilizing Norco 5-325 mg, typically only taking 1/2 tablet every night with good relief. She also continues to get good relief utilizing gabapentin 300 mg at nighttime. Overall her functional status continues to improve, she has no new significant complaints. Would not make any changes to her plan of care, we will plan to see her again in approximately 3 months to reassess her needs.     Objective:     Physical Exam  Wt Readings from Last 3 Encounters:   10/17/22 132 lb (59.9 kg)   10/17/22 132 lb (59.9 kg)   10/14/22 136 lb (61.7 kg)     BP (!) 134/55   Pulse (!) 110   Wt 132 lb (59.9 kg)   LMP 05/28/2016   SpO2 96%   BMI 24.94 kg/m²     Gen:  Alert, appears stated age, well nourished, in no acute distress  HEENT:  Normocephalic, conjunctiva pink, no drainage, mucosa moist  Neck:  Supple  Lungs:  CTA bilaterally, no audible rhonchi or wheezes noted  Heart[de-identified]  RRR, no murmur, rub, or gallop noted during exam  Abd:  Soft, non tender, non distended, BS+  M/S/Ext:  Moving all extremities, no edema, pulses present  Skin:  Warm and dry, raised erythema of the right chest  Neuro:  Alert, oriented x 3; following commands     [place] Symptom Assessment Score   [place] Score 10/17/2022 1/11/2022 10/19/2021 9/7/2021 7/20/2021   Pain Score 2 6 7 4 6   Tiredness Score 2 7 7 6 3   Nausea Score Not nauseated Not nauseated Not nauseated Not nauseated Not nauseated   Depression Score Not depressed Not depressed Not depressed Not depressed Not depressed   Anxiety Score Not anxious Not anxious Not anxious Not anxious Not anxious   Drowsiness Score Not drowsy 7 5 4 1   Appetite Score 1 Best appetite 2 Best appetite Best appetite   Wellbeing Score 1 Best feeling of wellbeing Best feeling of wellbeing Best feeling of wellbeing Best feeling of wellbeing   Dyspnea Score 6 6 4 3 5   Other Problem Score Best possible response Best possible response Best possible response Best possible response Best possible response   Total Assessment Score(calculated) 12 26 25 17 15     Assessed by: patient. Current Medications:  Medications reviewed: yes    Controlled Substances Monitoring: [place]ARRS reviewed 10/18/22. RX Monitoring 10/18/2022   Periodic Controlled Substance Monitoring Possible medication side effects, risk of tolerance/dependence & alternative treatments discussed. ;No signs of potential drug abuse or diversion identified. ;Assessed functional status. ;Obtaining appropriate analgesic effect of treatment. Chronic Pain > 50 AMAURI -       CONNIE Ferrera - CNP   Palliative Care Department     Time/Communication  Greater than 50% of time spent, total 15 minutes in face-to-face counseling and coordination of care regarding symptom management. Note: This report was completed using computerize voiced recognition software. Every effort has been made to ensure accuracy; however, inadvertent computerized transcription errors may be present.

## 2022-10-28 ENCOUNTER — HOSPITAL ENCOUNTER (OUTPATIENT)
Dept: INFUSION THERAPY | Age: 82
Discharge: HOME | End: 2022-10-28
Payer: MEDICARE

## 2022-10-28 ENCOUNTER — OFFICE VISIT (OUTPATIENT)
Dept: ONCOLOGY | Age: 82
End: 2022-10-28
Payer: MEDICARE

## 2022-10-28 VITALS
OXYGEN SATURATION: 99 % | HEIGHT: 61 IN | TEMPERATURE: 96.8 F | HEART RATE: 66 BPM | SYSTOLIC BLOOD PRESSURE: 115 MMHG | BODY MASS INDEX: 25.3 KG/M2 | DIASTOLIC BLOOD PRESSURE: 58 MMHG | WEIGHT: 134 LBS | RESPIRATION RATE: 16 BRPM

## 2022-10-28 DIAGNOSIS — D46.9 MDS (MYELODYSPLASTIC SYNDROME) (HCC): Primary | ICD-10-CM

## 2022-10-28 DIAGNOSIS — D63.8 ANEMIA OF CHRONIC DISEASE: ICD-10-CM

## 2022-10-28 LAB
ANISOCYTOSIS: ABNORMAL
BASOPHILS ABSOLUTE: 0 E9/L (ref 0–0.2)
BASOPHILS RELATIVE PERCENT: 0.4 % (ref 0–2)
EOSINOPHILS ABSOLUTE: 0.1 E9/L (ref 0.05–0.5)
EOSINOPHILS RELATIVE PERCENT: 1.8 % (ref 0–6)
HCT VFR BLD CALC: 25.5 % (ref 34–48)
HEMOGLOBIN: 8.5 G/DL (ref 11.5–15.5)
HYPOCHROMIA: ABNORMAL
LYMPHOCYTES ABSOLUTE: 2.7 E9/L (ref 1.5–4)
LYMPHOCYTES RELATIVE PERCENT: 50 % (ref 20–42)
MCH RBC QN AUTO: 35.9 PG (ref 26–35)
MCHC RBC AUTO-ENTMCNC: 33.3 % (ref 32–34.5)
MCV RBC AUTO: 107.6 FL (ref 80–99.9)
MONOCYTES ABSOLUTE: 0.16 E9/L (ref 0.1–0.95)
MONOCYTES RELATIVE PERCENT: 2.6 % (ref 2–12)
MYELOCYTE PERCENT: 0.9 % (ref 0–0)
NEUTROPHILS ABSOLUTE: 2.48 E9/L (ref 1.8–7.3)
NEUTROPHILS RELATIVE PERCENT: 44.7 % (ref 43–80)
NUCLEATED RED BLOOD CELLS: 0.9 /100 WBC
OVALOCYTES: ABNORMAL
PDW BLD-RTO: 24.4 FL (ref 11.5–15)
PLATELET # BLD: 166 E9/L (ref 130–450)
PMV BLD AUTO: 9.9 FL (ref 7–12)
POIKILOCYTES: ABNORMAL
POLYCHROMASIA: ABNORMAL
RBC # BLD: 2.37 E12/L (ref 3.5–5.5)
TEAR DROP CELLS: ABNORMAL
WBC # BLD: 5.4 E9/L (ref 4.5–11.5)

## 2022-10-28 PROCEDURE — 85025 COMPLETE CBC W/AUTO DIFF WBC: CPT

## 2022-10-28 PROCEDURE — 96372 THER/PROPH/DIAG INJ SC/IM: CPT

## 2022-10-28 PROCEDURE — 36415 COLL VENOUS BLD VENIPUNCTURE: CPT

## 2022-10-28 PROCEDURE — 99214 OFFICE O/P EST MOD 30 MIN: CPT | Performed by: INTERNAL MEDICINE

## 2022-10-28 PROCEDURE — 99212 OFFICE O/P EST SF 10 MIN: CPT

## 2022-10-28 PROCEDURE — 6360000002 HC RX W HCPCS: Performed by: NURSE PRACTITIONER

## 2022-10-28 PROCEDURE — 1123F ACP DISCUSS/DSCN MKR DOCD: CPT | Performed by: INTERNAL MEDICINE

## 2022-10-28 RX ADMIN — DARBEPOETIN ALFA 500 MCG: 500 INJECTION, SOLUTION INTRAVENOUS; SUBCUTANEOUS at 10:45

## 2022-10-28 NOTE — PROGRESS NOTES
[address]  [facility] MED ONCOLOGY  [address] [facility] [address]  Dept: [address]: [phone]  Attending Progress Note      Reason for Visit:   1. Right Breast Cancer. 2. MDS. Referring Physician:  CONNIE Dunn CNP    PCP:  CONNIE Dunn CNP    History of Present Illness: The patient is a very pleasant 78 y.o. lady with a PMH significant for Right Breast Cancer, stage III, HR pos, was diagnosed in 2009, she had a right mastectomy done, followed by adjuvant chemo, she received 8 cycles, probably AC followed by T, then PMRT, and 5 years of adjuvant ET with Arimidex, was completed in 2015. She was treated in [facility] under the care of Dr. Tana Adam. She was diagnosed with MDS in 2017, she received ESAs and PRBCs transfusion. She has transfusion related iron overload. She was started on Aranesp on 4/24/2020. No SE from Aranesp. The patient returns for a follow-up visit, she had a mechanical fall, she ended up with a displaced fracture of the distal shaft of the fibula, she underwent on 8/24/2022 left ankle open reduction internal fixation. Recovered well from the surgery. The patient is feeling tired. The lump of the right neck had resolved    Review of Systems;  CONSTITUTIONAL: No fever, chills. Fair appetite. ENMT: Eyes: No diplopia; Nose: No epistaxis. Mouth: No sore throat. RESPIRATORY: No hemoptysis, shortness of breath, cough. CARDIOVASCULAR: No chest pain, palpitations. GASTROINTESTINAL: No nausea/vomiting, abdominal pain, diarrhea/constipation. GENITOURINARY: No dysuria, urinary frequency, hematuria. NEURO: No syncope, presyncope, headache.   Remainder:  ROS NEGATIVE    Past Medical History:      Diagnosis Date    Anemia     Pt reports she was dx in her teens, w/o proper w/u, with iron deficiency anemia and was on oral iron replacement for many years, resulting in iron overload    Aplastic anemia ([facility] [address] ~3252-2765    Possibly d/t chemotherapy for breast cancer    Breast cancer ([facility] [address] 2009    right side; pt underwent radical mastectomy followed by radiation therapy and chemotherapy and was then on oral chemo pill for 5 yrs; no recurrence as of 01/2019    Chickenpox     History of blood transfusion     Hypothyroidism     Measles     Mild depression     Mumps     Myelodysplastic syndrome (HCC) ~2016    Neuropathy     feet and ankles murali    Prolonged emergence from general anesthesia     Pulmonary tuberculosis ~7230-2769    in her early 25s    Scarlet fever     Sensory neuropathy     beyond the ankle b/l     Patient Active Problem List   Diagnosis    MDS (myelodysplastic syndrome) (HCC)    Bronchitis    Macrocytic anemia with high RDW    Hypothyroidism    Peripheral sensory neuropathy    Breast cancer ([facility] [address]    Anemia of chronic disease        Past Surgical History:      Procedure Laterality Date    ADENOIDECTOMY      ANKLE FRACTURE SURGERY Left 08/24/2022    LEFT ANKLE OPEN REDUCTION INTERNAL FIXATION performed by Haritha Kuo DO at [address] [place] [address]      right breast    OTHER SURGICAL HISTORY      blood transfusions    TONSILLECTOMY AND ADENOIDECTOMY      TUBAL LIGATION         Family History:  Family History   Problem Relation Age of Onset    Cancer Mother         lung    Cancer Father         lung    Breast Cancer Sister     Cancer Sister         ovarian cancer and breast cancer    Cancer Brother         unknown    Other Brother         MI    Cancer Maternal Aunt         unknown    Cancer Other         brain       Medications:  Reviewed and reconciled. Social History:  Social History     Socioeconomic History    Marital status:       Spouse name: Not on file    Number of children: Not on file    Years of education: Not on file    Highest education level: Not on file   Occupational History    Not on file Tobacco Use    Smoking status: Former     Packs/day: 1.00     Years: 37.00     Pack years: 37.00     Types: Cigarettes     Start date:      Quit date: 2000     Years since quittin.8    Smokeless tobacco: Never   Vaping Use    Vaping Use: Never used   Substance and Sexual Activity    Alcohol use: No    Drug use: Never    Sexual activity: Not Currently   Other Topics Concern    Not on file   Social History Narrative    Not on file     Social Determinants of Health     Financial Resource Strain: Not on file   Food Insecurity: Not on file   Transportation Needs: Not on file   Physical Activity: Not on file   Stress: Not on file   Social Connections: Not on file   Intimate Partner Violence: Not on file   Housing Stability: Not on file       Allergies: Allergies   Allergen Reactions    Bee Venom Swelling and Dermatitis    Penicillins Hives, Swelling and Dermatitis    Other      All metals except gold and platinum, welts/blisters       Physical Exam:  BP (!) 115/58   Pulse 66   Temp 96.8 °F (36 °C) (Infrared)   Resp 16   Ht 5' 1\" (1.549 m)   Wt 134 lb (60.8 kg)   LMP 2016   SpO2 99%   BMI 25.32 kg/m²   GENERAL: Alert, oriented x 3, not in acute distress. HEENT: PERRLA; EOMI. Oropharynx clear. NECK: Supple. No palpable lymphadenopathy. LUNGS: Good air entry bilaterally. No wheezing, crackles or rhonchi. BREASTS: She is s/p right mastectomy, no suspicious lesions, she has telangiectasias. She has tenderness in the left upper rib cage. CARDIOVASCULAR: Regular rate. No murmurs, rubs or gallops. ABDOMEN: Soft. Non-tender, non-distended. Positive bowel sounds. EXTREMITIES: Dorsum of the foot bruising  NEUROLOGIC: No focal deficits. ECOG PS 1      Impression/Plan:     1.  The patient is a very pleasant 78 y.o. lady with a PMH significant for Right Breast Cancer, stage III, HR pos, was diagnosed in , she had a right mastectomy done, followed by adjuvant chemo, she received 8 cycles, probably AC followed by T, then PMRT, and 5 years of adjuvant ET with Arimidex, was completed in 2015. She was treated in [facility] under the care of Dr. Elina Davis. She is doing well clinically without any evidence of recurrence of her disease. Discussed with her the importance of monthly SBE, and routine screening mammograms, she had a repeat left breast screening mammogram done on 10/17/2020, was negative for malignancy. 2. She has MDS, diagnosed in 2017, she received ESAs and PRBCs transfusion, has transfusion related iron overload, hgb was 8.5, ferritin 3647, was restarted on Aranesp on 4/23/2019. On 7/20/2021, hemoglobin was 8 g/dl hematocrit 24.7,  1.3, normal white count, platelet count 015B, fit test is negative. The patient did not have an adequate response to Retacrit, she was changed back to Aranesp, today 10/20/2022, labs reviewed, hemoglobin is 8.5 G/DL, it is less than 10, proceed with Aranesp, will monitor her counts closely    Hypothyroidism, continue Synthroid follow-up with PCP. RTC in 2 weeks with labs. Thank you for allowing us to participate in the care of Ms. Ventura.     Danae Jacobson MD   HEMATOLOGY/MEDICAL [facility]  [address] [facility] MED ONCOLOGY  [address] [place] [address]  [address] [facility] [address]  Dept: [address]: [phone]

## 2022-11-11 ENCOUNTER — HOSPITAL ENCOUNTER (OUTPATIENT)
Dept: INFUSION THERAPY | Age: 82
Discharge: HOME | End: 2022-11-11
Payer: MEDICARE

## 2022-11-11 ENCOUNTER — OFFICE VISIT (OUTPATIENT)
Dept: ONCOLOGY | Age: 82
End: 2022-11-11
Payer: MEDICARE

## 2022-11-11 VITALS
WEIGHT: 132.8 LBS | TEMPERATURE: 97.7 F | SYSTOLIC BLOOD PRESSURE: 137 MMHG | OXYGEN SATURATION: 93 % | HEIGHT: 61 IN | BODY MASS INDEX: 25.07 KG/M2 | HEART RATE: 87 BPM | DIASTOLIC BLOOD PRESSURE: 60 MMHG

## 2022-11-11 DIAGNOSIS — D46.9 MDS (MYELODYSPLASTIC SYNDROME) (HCC): Primary | ICD-10-CM

## 2022-11-11 DIAGNOSIS — D63.8 ANEMIA OF CHRONIC DISEASE: ICD-10-CM

## 2022-11-11 LAB
ANISOCYTOSIS: ABNORMAL
BASOPHILIC STIPPLING: ABNORMAL
BASOPHILS ABSOLUTE: 0 E9/L (ref 0–0.2)
BASOPHILS RELATIVE PERCENT: 0.2 % (ref 0–2)
EOSINOPHILS ABSOLUTE: 0.09 E9/L (ref 0.05–0.5)
EOSINOPHILS RELATIVE PERCENT: 1.7 % (ref 0–6)
HCT VFR BLD CALC: 26.8 % (ref 34–48)
HEMOGLOBIN: 8.8 G/DL (ref 11.5–15.5)
HYPOCHROMIA: ABNORMAL
LYMPHOCYTES ABSOLUTE: 2.7 E9/L (ref 1.5–4)
LYMPHOCYTES RELATIVE PERCENT: 48.7 % (ref 20–42)
MCH RBC QN AUTO: 36.7 PG (ref 26–35)
MCHC RBC AUTO-ENTMCNC: 32.8 % (ref 32–34.5)
MCV RBC AUTO: 111.7 FL (ref 80–99.9)
MONOCYTES ABSOLUTE: 0.38 E9/L (ref 0.1–0.95)
MONOCYTES RELATIVE PERCENT: 7 % (ref 2–12)
NEUTROPHILS ABSOLUTE: 2.37 E9/L (ref 1.8–7.3)
NEUTROPHILS RELATIVE PERCENT: 42.6 % (ref 43–80)
NUCLEATED RED BLOOD CELLS: 1.7 /100 WBC
OVALOCYTES: ABNORMAL
PDW BLD-RTO: 26.6 FL (ref 11.5–15)
PLATELET # BLD: 165 E9/L (ref 130–450)
PMV BLD AUTO: 12.4 FL (ref 7–12)
POIKILOCYTES: ABNORMAL
POLYCHROMASIA: ABNORMAL
RBC # BLD: 2.4 E12/L (ref 3.5–5.5)
TARGET CELLS: ABNORMAL
WBC # BLD: 5.5 E9/L (ref 4.5–11.5)

## 2022-11-11 PROCEDURE — 99214 OFFICE O/P EST MOD 30 MIN: CPT | Performed by: INTERNAL MEDICINE

## 2022-11-11 PROCEDURE — 96372 THER/PROPH/DIAG INJ SC/IM: CPT

## 2022-11-11 PROCEDURE — 99212 OFFICE O/P EST SF 10 MIN: CPT

## 2022-11-11 PROCEDURE — 36415 COLL VENOUS BLD VENIPUNCTURE: CPT

## 2022-11-11 PROCEDURE — 85025 COMPLETE CBC W/AUTO DIFF WBC: CPT

## 2022-11-11 PROCEDURE — 1123F ACP DISCUSS/DSCN MKR DOCD: CPT | Performed by: INTERNAL MEDICINE

## 2022-11-11 PROCEDURE — 6360000002 HC RX W HCPCS: Performed by: NURSE PRACTITIONER

## 2022-11-11 RX ADMIN — DARBEPOETIN ALFA 500 MCG: 500 INJECTION, SOLUTION INTRAVENOUS; SUBCUTANEOUS at 10:34

## 2022-11-29 ENCOUNTER — HOSPITAL ENCOUNTER (OUTPATIENT)
Dept: INFUSION THERAPY | Age: 82
End: 2022-11-29

## 2022-12-02 ENCOUNTER — OFFICE VISIT (OUTPATIENT)
Dept: ONCOLOGY | Age: 82
End: 2022-12-02
Payer: MEDICARE

## 2022-12-02 ENCOUNTER — HOSPITAL ENCOUNTER (OUTPATIENT)
Dept: INFUSION THERAPY | Age: 82
Discharge: HOME | End: 2022-12-02
Payer: MEDICARE

## 2022-12-02 VITALS
RESPIRATION RATE: 16 BRPM | DIASTOLIC BLOOD PRESSURE: 61 MMHG | BODY MASS INDEX: 24.92 KG/M2 | HEIGHT: 61 IN | WEIGHT: 132 LBS | OXYGEN SATURATION: 97 % | HEART RATE: 66 BPM | TEMPERATURE: 97.2 F | SYSTOLIC BLOOD PRESSURE: 137 MMHG

## 2022-12-02 DIAGNOSIS — D46.9 MDS (MYELODYSPLASTIC SYNDROME) (HCC): Primary | ICD-10-CM

## 2022-12-02 DIAGNOSIS — Z17.0 MALIGNANT NEOPLASM OF RIGHT BREAST IN FEMALE, ESTROGEN RECEPTOR POSITIVE, UNSPECIFIED SITE OF BREAST (HCC): ICD-10-CM

## 2022-12-02 DIAGNOSIS — D63.8 ANEMIA OF CHRONIC DISEASE: ICD-10-CM

## 2022-12-02 DIAGNOSIS — C50.911 MALIGNANT NEOPLASM OF RIGHT BREAST IN FEMALE, ESTROGEN RECEPTOR POSITIVE, UNSPECIFIED SITE OF BREAST (HCC): ICD-10-CM

## 2022-12-02 LAB
ANISOCYTOSIS: ABNORMAL
BASOPHILIC STIPPLING: ABNORMAL
BASOPHILS ABSOLUTE: 0 E9/L (ref 0–0.2)
BASOPHILS RELATIVE PERCENT: 0.5 % (ref 0–2)
EOSINOPHILS ABSOLUTE: 0.11 E9/L (ref 0.05–0.5)
EOSINOPHILS RELATIVE PERCENT: 2.6 % (ref 0–6)
HCT VFR BLD CALC: 28.1 % (ref 34–48)
HEMOGLOBIN: 9 G/DL (ref 11.5–15.5)
LYMPHOCYTES ABSOLUTE: 2.6 E9/L (ref 1.5–4)
LYMPHOCYTES RELATIVE PERCENT: 59.1 % (ref 20–42)
MCH RBC QN AUTO: 37 PG (ref 26–35)
MCHC RBC AUTO-ENTMCNC: 32 % (ref 32–34.5)
MCV RBC AUTO: 115.6 FL (ref 80–99.9)
MONOCYTES ABSOLUTE: 0.18 E9/L (ref 0.1–0.95)
MONOCYTES RELATIVE PERCENT: 4.4 % (ref 2–12)
NEUTROPHILS ABSOLUTE: 1.5 E9/L (ref 1.8–7.3)
NEUTROPHILS RELATIVE PERCENT: 33.9 % (ref 43–80)
NUCLEATED RED BLOOD CELLS: 1.7 /100 WBC
OVALOCYTES: ABNORMAL
PDW BLD-RTO: 28.2 FL (ref 11.5–15)
PLATELET # BLD: 164 E9/L (ref 130–450)
PMV BLD AUTO: 9.9 FL (ref 7–12)
POIKILOCYTES: ABNORMAL
POLYCHROMASIA: ABNORMAL
RBC # BLD: 2.43 E12/L (ref 3.5–5.5)
TARGET CELLS: ABNORMAL
TEAR DROP CELLS: ABNORMAL
WBC # BLD: 4.4 E9/L (ref 4.5–11.5)

## 2022-12-02 PROCEDURE — 1123F ACP DISCUSS/DSCN MKR DOCD: CPT | Performed by: INTERNAL MEDICINE

## 2022-12-02 PROCEDURE — 6360000002 HC RX W HCPCS: Performed by: NURSE PRACTITIONER

## 2022-12-02 PROCEDURE — 96372 THER/PROPH/DIAG INJ SC/IM: CPT

## 2022-12-02 PROCEDURE — 85025 COMPLETE CBC W/AUTO DIFF WBC: CPT

## 2022-12-02 PROCEDURE — 36415 COLL VENOUS BLD VENIPUNCTURE: CPT

## 2022-12-02 PROCEDURE — 99214 OFFICE O/P EST MOD 30 MIN: CPT | Performed by: INTERNAL MEDICINE

## 2022-12-02 RX ADMIN — DARBEPOETIN ALFA 500 MCG: 500 INJECTION, SOLUTION INTRAVENOUS; SUBCUTANEOUS at 10:11

## 2022-12-02 NOTE — PROGRESS NOTES
"Occupational Therapy   Treatment    Name: Della Farrell  MRN: [id number]  Admitting Diagnosis:  Hip pain  1 Day Post-Op    Recommendations:     Discharge Recommendations: home health PT, home health OT  Discharge Equipment Recommendations:  bedside commode  Barriers to discharge:  None    Subjective     Communicated with: nsg prior to session.  Pain/Comfort:  · Pain Rating 1:  (reports " a little" in buttocks L )  · Pain Addressed 1: Pre-medicate for activity, Distraction, Reposition    Objective:     Patient found with:      General Precautions: Standard, fall   Orthopedic Precautions:LLE posterior precautions, LLE weight bearing as tolerated   Braces: N/A     Occupational Performance:    Bed Mobility:    · Patient completed Supine to Sit with contact guard assistance and minimum assistance  · Patient completed Sit to Supine with contact guard assistance and minimum assistance     Functional Mobility/Transfers:  · Patient completed Sit <> Stand Transfer with contact guard assistance  with  rolling walker     Activities of Daily Living:  · LB Dressing: total assistance      Patient left supine with all lines intact, call button in reach, bed alarm on, nsg notified and mother present    [facility] 6 Click:  [facility] Total Score: 17    Treatment & Education:  Pt performing functional mobility during session for prep of ADLs; maximal cues throughout session to maintain posterior hip precautions; pt and mother re-education on hip precaution   Education:    Assessment:     Della Farrell is a 43 y.o. female with a medical diagnosis of Hip pain.  She presents with s/p L RAYMON posterior hip precautions .  Performance deficits affecting function are gait instability, impaired endurance, weakness, impaired balance, impaired functional mobilty, impaired self care skills, impaired cognition, decreased safety awareness, decreased lower extremity function, orthopedic precautions, pain.      Rehab Prognosis:  good; patient " Patient provided with discharge instructions. All questions answered. Patient understands follow up plan of care. would benefit from acute skilled OT services to address these deficits and reach maximum level of function.       Plan:     Patient to be seen 5 x/week to address the above listed problems via self-care/home management, therapeutic activities, therapeutic exercises  · Plan of Care Expires: 12/22/17  · Plan of Care Reviewed with: patient    This Plan of care has been discussed with the patient who was involved in its development and understands and is in agreement with the identified goals and treatment plan    GOALS:    Occupational Therapy Goals        Problem: Occupational Therapy Goal    Goal Priority Disciplines Outcome Interventions   Occupational Therapy Goal     OT, PT/OT Ongoing (interventions implemented as appropriate)    Description:  Goals to be met by: 12/22/17     Patient will increase functional independence with ADLs by performing:    Grooming while standing with Stand-by Assistance.  Toileting from toilet with Stand-by Assistance for hygiene and clothing management.   Supine to sit with Stand-by Assistance.  Stand pivot transfers with Stand-by Assistance.  Toilet transfer to toilet with Stand-by Assistance.                      Time Tracking:     OT Date of Treatment: 12/20/17  OT Start Time: 0909  OT Stop Time: 0939  OT Total Time (min): 30 min    Billable Minutes:Therapeutic Activity 15    Tammy Randall OT  12/20/2017

## 2022-12-02 NOTE — PROGRESS NOTES
025  [facility] MED ONCOLOGY  [address] [place] [address]  Dept: [address]: [phone]  Attending Progress Note      Reason for Visit:   1. Right Breast Cancer. 2. MDS. Referring Physician:  CONNIE Steele CNP    PCP:  CONNIE Steele CNP    History of Present Illness: The patient is a very pleasant 78 y.o. lady with a PMH significant for Right Breast Cancer, stage III, HR pos, was diagnosed in 2009, she had a right mastectomy done, followed by adjuvant chemo, she received 8 cycles, probably AC followed by T, then PMRT, and 5 years of adjuvant ET with Arimidex, was completed in 2015. She was treated in [facility] under the care of Dr. Zee Woods. She was diagnosed with MDS in 2017, she received ESAs and PRBCs transfusion. She has transfusion related iron overload. She was started on Aranesp on 4/24/2020. No SE from Aranesp. The patient returns for a follow-up visit, feeling better overall. Energy level has improved. Review of Systems;  CONSTITUTIONAL: No fever, chills. Fair appetite. ENMT: Eyes: No diplopia; Nose: No epistaxis. Mouth: No sore throat. RESPIRATORY: No hemoptysis, shortness of breath, cough. CARDIOVASCULAR: No chest pain, palpitations. GASTROINTESTINAL: No nausea/vomiting, abdominal pain, diarrhea/constipation. GENITOURINARY: No dysuria, urinary frequency, hematuria. NEURO: No syncope, presyncope, headache.   Remainder:  ROS NEGATIVE    Past Medical History:      Diagnosis Date    Anemia     Pt reports she was dx in her teens, w/o proper w/u, with iron deficiency anemia and was on oral iron replacement for many years, resulting in iron overload    Aplastic anemia ([address] ~0766-1846    Possibly d/t chemotherapy for breast cancer    Breast cancer ([facility] [address] 2009    right side; pt underwent radical mastectomy followed by radiation therapy and chemotherapy and was then on oral chemo pill for 5 yrs; no recurrence as of 2019    Chickenpox     History of blood transfusion     Hypothyroidism     Measles     Mild depression     Mumps     Myelodysplastic syndrome (HCC) ~2016    Neuropathy     feet and ankles murali    Prolonged emergence from general anesthesia     Pulmonary tuberculosis ~9392-4166    in her early 25s    Scarlet fever     Sensory neuropathy     beyond the ankle b/l     Patient Active Problem List   Diagnosis    MDS (myelodysplastic syndrome) (HCC)    Bronchitis    Macrocytic anemia with high RDW    Hypothyroidism    Peripheral sensory neuropathy    Breast cancer ([facility] [address]    Anemia of chronic disease        Past Surgical History:      Procedure Laterality Date    ADENOIDECTOMY      ANKLE FRACTURE SURGERY Left 2022    LEFT ANKLE OPEN REDUCTION INTERNAL FIXATION performed by Verona Trevizo DO at [address] [place] [address]      right breast    OTHER SURGICAL HISTORY      blood transfusions    TONSILLECTOMY AND ADENOIDECTOMY      TUBAL LIGATION         Family History:  Family History   Problem Relation Age of Onset    Cancer Mother         lung    Cancer Father         lung    Breast Cancer Sister     Cancer Sister         ovarian cancer and breast cancer    Cancer Brother         unknown    Other Brother         MI    Cancer Maternal Aunt         unknown    Cancer Other         brain       Medications:  Reviewed and reconciled. Social History:  Social History     Socioeconomic History    Marital status:       Spouse name: Not on file    Number of children: Not on file    Years of education: Not on file    Highest education level: Not on file   Occupational History    Not on file   Tobacco Use    Smoking status: Former     Packs/day: 1.00     Years: 37.00     Pack years: 37.00     Types: Cigarettes     Start date:      Quit date:      Years since quittin.9    Smokeless tobacco: Never   Vaping Use Vaping Use: Never used   Substance and Sexual Activity    Alcohol use: No    Drug use: Never    Sexual activity: Not Currently   Other Topics Concern    Not on file   Social History Narrative    Not on file     Social Determinants of Health     Financial Resource Strain: Not on file   Food Insecurity: Not on file   Transportation Needs: Not on file   Physical Activity: Not on file   Stress: Not on file   Social Connections: Not on file   Intimate Partner Violence: Not on file   Housing Stability: Not on file       Allergies: Allergies   Allergen Reactions    Bee Venom Swelling and Dermatitis    Penicillins Hives, Swelling and Dermatitis    Other      All metals except gold and platinum, welts/blisters       Physical Exam:  /61   Pulse 66   Temp 97.2 °F (36.2 °C) (Infrared)   Resp 16   Ht 5' 1\" (1.549 m)   Wt 132 lb (59.9 kg)   LMP 05/28/2016   SpO2 97%   BMI 24.94 kg/m²   GENERAL: Alert, oriented x 3, not in acute distress. HEENT: PERRLA; EOMI. Oropharynx clear. NECK: Supple. No palpable lymphadenopathy. LUNGS: Good air entry bilaterally. No wheezing, crackles or rhonchi. BREASTS: She is s/p right mastectomy, no suspicious lesions, she has telangiectasias. She has tenderness in the left upper rib cage. CARDIOVASCULAR: Regular rate. No murmurs, rubs or gallops. ABDOMEN: Soft. Non-tender, non-distended. Positive bowel sounds. EXTREMITIES: Dorsum of the foot bruising  NEUROLOGIC: No focal deficits. ECOG PS 1      Impression/Plan:     1. The patient is a very pleasant 78 y.o. lady with a PMH significant for Right Breast Cancer, stage III, HR pos, was diagnosed in 2009, she had a right mastectomy done, followed by adjuvant chemo, she received 8 cycles, probably AC followed by T, then PMRT, and 5 years of adjuvant ET with Arimidex, was completed in 2015. She was treated in [facility] under the care of Dr. Lien Penn. She is doing well clinically without any evidence of recurrence of her disease. Discussed with her the importance of monthly SBE, and routine screening mammograms, she had a repeat left breast screening mammogram done on 10/17/2020, was negative for malignancy. 2. She has MDS, diagnosed in 2017, she received ESAs and PRBCs transfusion, has transfusion related iron overload, hgb was 8.5, ferritin 3647, was restarted on Aranesp on 4/23/2019. On 7/20/2021, hemoglobin was 8 g/dl hematocrit 24.7,  1.3, normal white count, platelet count 504X, fit test is negative. The patient did not have an adequate response to Retacrit, she was changed back to Aranesp, today 12/2/2022, labs reviewed, hemoglobin is 9 G/DL, had improved, it is still less than 10, proceed with Aranesp, will monitor her counts closely    Hypothyroidism, continue Synthroid follow-up with PCP. RTC in 2 weeks with labs. Thank you for allowing us to participate in the care of Ms. Ventura.     Marcia Orlando MD   HEMATOLOGY/MEDICAL [facility]  [address] [place] [address] MED ONCOLOGY  [address] [place] [id number]  Dept: [address] [facility]: [phone]

## 2022-12-06 DIAGNOSIS — S82.892A CLOSED FRACTURE OF LEFT ANKLE, INITIAL ENCOUNTER: ICD-10-CM

## 2022-12-06 RX ORDER — HYDROCODONE BITARTRATE AND ACETAMINOPHEN 5; 325 MG/1; MG/1
1 TABLET ORAL EVERY 6 HOURS PRN
Qty: 120 TABLET | Refills: 0 | Status: SHIPPED | OUTPATIENT
Start: 2022-12-06 | End: 2023-01-05

## 2022-12-06 NOTE — TELEPHONE ENCOUNTER
Call from Gen requesting a refill for Norco, she is down to a couple pills. Next natalie 12/19/22. Pharmacy is [facility] on [address].

## 2022-12-16 ENCOUNTER — HOSPITAL ENCOUNTER (OUTPATIENT)
Dept: INFUSION THERAPY | Age: 82
Discharge: HOME | End: 2022-12-16
Payer: MEDICARE

## 2022-12-16 ENCOUNTER — OFFICE VISIT (OUTPATIENT)
Dept: ONCOLOGY | Age: 82
End: 2022-12-16
Payer: MEDICARE

## 2022-12-16 VITALS
OXYGEN SATURATION: 94 % | RESPIRATION RATE: 16 BRPM | DIASTOLIC BLOOD PRESSURE: 70 MMHG | TEMPERATURE: 96.5 F | HEART RATE: 75 BPM | HEIGHT: 61 IN | WEIGHT: 136 LBS | BODY MASS INDEX: 25.68 KG/M2 | SYSTOLIC BLOOD PRESSURE: 149 MMHG

## 2022-12-16 DIAGNOSIS — C50.911 MALIGNANT NEOPLASM OF RIGHT BREAST IN FEMALE, ESTROGEN RECEPTOR POSITIVE, UNSPECIFIED SITE OF BREAST (HCC): ICD-10-CM

## 2022-12-16 DIAGNOSIS — D46.9 MDS (MYELODYSPLASTIC SYNDROME) (HCC): Primary | ICD-10-CM

## 2022-12-16 DIAGNOSIS — Z17.0 MALIGNANT NEOPLASM OF RIGHT BREAST IN FEMALE, ESTROGEN RECEPTOR POSITIVE, UNSPECIFIED SITE OF BREAST (HCC): ICD-10-CM

## 2022-12-16 DIAGNOSIS — D46.9 MDS (MYELODYSPLASTIC SYNDROME) (HCC): ICD-10-CM

## 2022-12-16 LAB
ANISOCYTOSIS: ABNORMAL
BASOPHILIC STIPPLING: ABNORMAL
BASOPHILS ABSOLUTE: 0 E9/L (ref 0–0.2)
BASOPHILS RELATIVE PERCENT: 0.5 % (ref 0–2)
EOSINOPHILS ABSOLUTE: 0.26 E9/L (ref 0.05–0.5)
EOSINOPHILS RELATIVE PERCENT: 4.4 % (ref 0–6)
HCT VFR BLD CALC: 31.8 % (ref 34–48)
HEMOGLOBIN: 10.4 G/DL (ref 11.5–15.5)
HYPOCHROMIA: ABNORMAL
LYMPHOCYTES ABSOLUTE: 3.54 E9/L (ref 1.5–4)
LYMPHOCYTES RELATIVE PERCENT: 60.5 % (ref 20–42)
MCH RBC QN AUTO: 38.7 PG (ref 26–35)
MCHC RBC AUTO-ENTMCNC: 32.7 % (ref 32–34.5)
MCV RBC AUTO: 118.2 FL (ref 80–99.9)
MONOCYTES ABSOLUTE: 0.29 E9/L (ref 0.1–0.95)
MONOCYTES RELATIVE PERCENT: 5.3 % (ref 2–12)
MYELOCYTE PERCENT: 0.9 % (ref 0–0)
NEUTROPHILS ABSOLUTE: 1.74 E9/L (ref 1.8–7.3)
NEUTROPHILS RELATIVE PERCENT: 28.9 % (ref 43–80)
NUCLEATED RED BLOOD CELLS: 0.9 /100 WBC
OVALOCYTES: ABNORMAL
PDW BLD-RTO: 28.6 FL (ref 11.5–15)
PLATELET # BLD: 169 E9/L (ref 130–450)
PMV BLD AUTO: 11.8 FL (ref 7–12)
POIKILOCYTES: ABNORMAL
POLYCHROMASIA: ABNORMAL
RBC # BLD: 2.69 E12/L (ref 3.5–5.5)
SCHISTOCYTES: ABNORMAL
TARGET CELLS: ABNORMAL
TEAR DROP CELLS: ABNORMAL
WBC # BLD: 5.8 E9/L (ref 4.5–11.5)

## 2022-12-16 PROCEDURE — 85025 COMPLETE CBC W/AUTO DIFF WBC: CPT

## 2022-12-16 PROCEDURE — 1123F ACP DISCUSS/DSCN MKR DOCD: CPT | Performed by: INTERNAL MEDICINE

## 2022-12-16 PROCEDURE — 36415 COLL VENOUS BLD VENIPUNCTURE: CPT

## 2022-12-16 PROCEDURE — 99214 OFFICE O/P EST MOD 30 MIN: CPT | Performed by: INTERNAL MEDICINE

## 2022-12-16 NOTE — PROGRESS NOTES
293  [facility] MED ONCOLOGY  [address] [facility] [address]  Dept: [address]: [phone]  Attending Progress Note      Reason for Visit:   1. Right Breast Cancer. 2. MDS. Referring Physician:  CONNIE Paul CNP    PCP:  CONNIE Paul CNP    History of Present Illness: The patient is a very pleasant 78 y.o. lady with a PMH significant for Right Breast Cancer, stage III, HR pos, was diagnosed in 2009, she had a right mastectomy done, followed by adjuvant chemo, she received 8 cycles, probably AC followed by T, then PMRT, and 5 years of adjuvant ET with Arimidex, was completed in 2015. She was treated in [facility] under the care of Dr. Jimmy Zavaleta. She was diagnosed with MDS in 2017, she received ESAs and PRBCs transfusion. She has transfusion related iron overload. She was started on Aranesp on 4/24/2020. No SE from Aranesp. The patient returns for a follow-up visit, she is feeling well, energy level had improved. Review of Systems;  CONSTITUTIONAL: No fever, chills. Fair appetite. ENMT: Eyes: No diplopia; Nose: No epistaxis. Mouth: No sore throat. RESPIRATORY: No hemoptysis, shortness of breath, cough. CARDIOVASCULAR: No chest pain, palpitations. GASTROINTESTINAL: No nausea/vomiting, abdominal pain, diarrhea/constipation. GENITOURINARY: No dysuria, urinary frequency, hematuria. NEURO: No syncope, presyncope, headache.   Remainder:  ROS NEGATIVE    Past Medical History:      Diagnosis Date    Anemia     Pt reports she was dx in her teens, w/o proper w/u, with iron deficiency anemia and was on oral iron replacement for many years, resulting in iron overload    Aplastic anemia ([address] ~7277-8658    Possibly d/t chemotherapy for breast cancer    Breast cancer ([facility] [address] 2009    right side; pt underwent radical mastectomy followed by radiation therapy and chemotherapy and was then on oral chemo pill for 5 yrs; no recurrence as of 2019    Chickenpox     History of blood transfusion     Hypothyroidism     Measles     Mild depression     Mumps     Myelodysplastic syndrome (HCC) ~2016    Neuropathy     feet and ankles murali    Prolonged emergence from general anesthesia     Pulmonary tuberculosis ~8679-8899    in her early 25s    Scarlet fever     Sensory neuropathy     beyond the ankle b/l     Patient Active Problem List   Diagnosis    MDS (myelodysplastic syndrome) (HCC)    Bronchitis    Macrocytic anemia with high RDW    Hypothyroidism    Peripheral sensory neuropathy    Breast cancer ([facility] [address]    Anemia of chronic disease        Past Surgical History:      Procedure Laterality Date    ADENOIDECTOMY      ANKLE FRACTURE SURGERY Left 2022    LEFT ANKLE OPEN REDUCTION INTERNAL FIXATION performed by Kenton Olivarez DO at [address] [place] [address]      right breast    OTHER SURGICAL HISTORY      blood transfusions    TONSILLECTOMY AND ADENOIDECTOMY      TUBAL LIGATION         Family History:  Family History   Problem Relation Age of Onset    Cancer Mother         lung    Cancer Father         lung    Breast Cancer Sister     Cancer Sister         ovarian cancer and breast cancer    Cancer Brother         unknown    Other Brother         MI    Cancer Maternal Aunt         unknown    Cancer Other         brain       Medications:  Reviewed and reconciled. Social History:  Social History     Socioeconomic History    Marital status:       Spouse name: Not on file    Number of children: Not on file    Years of education: Not on file    Highest education level: Not on file   Occupational History    Not on file   Tobacco Use    Smoking status: Former     Packs/day: 1.00     Years: 37.00     Pack years: 37.00     Types: Cigarettes     Start date:      Quit date:      Years since quittin.9    Smokeless tobacco: Never   Vaping Use Vaping Use: Never used   Substance and Sexual Activity    Alcohol use: No    Drug use: Never    Sexual activity: Not Currently   Other Topics Concern    Not on file   Social History Narrative    Not on file     Social Determinants of Health     Financial Resource Strain: Not on file   Food Insecurity: Not on file   Transportation Needs: Not on file   Physical Activity: Not on file   Stress: Not on file   Social Connections: Not on file   Intimate Partner Violence: Not on file   Housing Stability: Not on file       Allergies: Allergies   Allergen Reactions    Bee Venom Swelling and Dermatitis    Penicillins Hives, Swelling and Dermatitis    Other      All metals except gold and platinum, welts/blisters       Physical Exam:  BP (!) 149/70   Pulse 75   Temp (!) 96.5 °F (35.8 °C) (Infrared)   Resp 16   Ht 5' 1\" (1.549 m)   Wt 136 lb (61.7 kg)   LMP 05/28/2016   SpO2 94%   BMI 25.70 kg/m²   GENERAL: Alert, oriented x 3, not in acute distress. HEENT: PERRLA; EOMI. Oropharynx clear. NECK: Supple. No palpable lymphadenopathy. LUNGS: Good air entry bilaterally. No wheezing, crackles or rhonchi. BREASTS: She is s/p right mastectomy, no suspicious lesions, she has telangiectasias. She has tenderness in the left upper rib cage. CARDIOVASCULAR: Regular rate. No murmurs, rubs or gallops. ABDOMEN: Soft. Non-tender, non-distended. Positive bowel sounds. EXTREMITIES: Dorsum of the foot bruising  NEUROLOGIC: No focal deficits. ECOG PS 1      Impression/Plan:     1. The patient is a very pleasant 78 y.o. lady with a PMH significant for Right Breast Cancer, stage III, HR pos, was diagnosed in 2009, she had a right mastectomy done, followed by adjuvant chemo, she received 8 cycles, probably AC followed by T, then PMRT, and 5 years of adjuvant ET with Arimidex, was completed in 2015. She was treated in [facility] [place] under the care of Dr. Des Cordoba.  She is doing well clinically without any evidence of recurrence of her disease. Discussed with her the importance of monthly SBE, and routine screening mammograms, she had a repeat left breast screening mammogram done on 10/17/2020, was negative for malignancy. 2. She has MDS, diagnosed in 2017, she received ESAs and PRBCs transfusion, has transfusion related iron overload, hgb was 8.5, ferritin 3647, was restarted on Aranesp on 4/23/2019. On 7/20/2021, hemoglobin was 8 g/dl hematocrit 24.7,  1.3, normal white count, platelet count 379H, fit test is negative. The patient did not have an adequate response to Retacrit, she was changed back to Aranesp, today 12/16/2022, labs reviewed, hemoglobin is 10. 4G/DL, had improved, it is greater than 10, holdAranesp, will monitor her counts closely    Hypothyroidism, continue Synthroid follow-up with PCP. RTC in 2 weeks with labs. Thank you for allowing us to participate in the care of Ms. Ventura.     Kourtney De La Fuente MD   HEMATOLOGY/MEDICAL [address] [facility]  [address] MED ONCOLOGY  [address] [place] [id number]  Dept: [address] [facility]: [phone]

## 2022-12-19 ENCOUNTER — OFFICE VISIT (OUTPATIENT)
Dept: PALLATIVE CARE | Age: 82
End: 2022-12-19
Payer: MEDICARE

## 2022-12-19 VITALS
SYSTOLIC BLOOD PRESSURE: 121 MMHG | BODY MASS INDEX: 25.49 KG/M2 | HEART RATE: 73 BPM | DIASTOLIC BLOOD PRESSURE: 47 MMHG | HEIGHT: 61 IN | OXYGEN SATURATION: 94 % | WEIGHT: 135 LBS

## 2022-12-19 DIAGNOSIS — Z51.5 ENCOUNTER FOR PALLIATIVE CARE: Primary | ICD-10-CM

## 2022-12-19 PROCEDURE — 99211 OFF/OP EST MAY X REQ PHY/QHP: CPT | Performed by: NURSE PRACTITIONER

## 2022-12-19 PROCEDURE — 1123F ACP DISCUSS/DSCN MKR DOCD: CPT | Performed by: NURSE PRACTITIONER

## 2022-12-19 PROCEDURE — 99212 OFFICE O/P EST SF 10 MIN: CPT | Performed by: NURSE PRACTITIONER

## 2022-12-19 NOTE — PROGRESS NOTES
Department of Palliative Medicine  Ambulatory Note  Provider: Karoline Boast, APRN - CNP       Chief Complaint: Mandeep Sow is a 78 y.o. female with chief complaint of Pain    HPI:  Mandeep Sow is a 78 y.o. female with significant past medical history of stage II HR positive breast cancer, diagnosed in 2009, status post right mastectomy, followed by adjuvant therapy, and 5 years of adjuvant Arimidex which was completed in 2015, with all treatment performed in [facility] under the care of Dr. Cher Vicente. She is currently being followed locally by Dr. Soila Rodriguez, without evidence of recurrent disease, whom she also follows for management of myelodysplastic syndrome, which was diagnosed in 2017. She was referred to [address] [place] [address] for assistance with symptom management related to chemotherapy-induced peripheral neuropathy. Assessment/Plan      History of Breast Cancer stage III, HR positive:              -Diagnosed, treated, and managed in [place] in 2009              -Status post right mastectomy              -Followed by adjuvant chemotherapy              -Completed her Demadex 2015              -Currently followed locally by Dr. Soila Rodriguez, she also follows with regarding MDS diagnosed 2017     Chemotherapy related peripheral Neuropathy/Pain:              -  Worse bilateral feet and LEs, burning and pressure              -  Gabapentin 900 mg at nighttime              -  Continue Norco 5-325 mg every 12 hours as needed, 1-1.5/day,     Prognosis: unknown     Follow Up: 3 months . They were encouraged to call with any questions, concerns, needs, or changes in symptoms. Subjective:     Met with Amrit Bledsoe today in the clinic she is in good spirits and has been doing well, overall appearing to be at her baseline. Continues to have some periodic pain, but this is managed very well utilizing Norco 5-325 mg, typically only taking 1 tablet every night with good relief.   She also continues to get good relief utilizing gabapentin 900 mg at nighttime. Overall her functional status remains very good, she has no new significant complaints, or any new needs. Objective:     Physical Exam  Wt Readings from Last 3 Encounters:   12/19/22 135 lb (61.2 kg)   12/16/22 136 lb (61.7 kg)   12/02/22 132 lb (59.9 kg)     BP (!) 121/47   Pulse 73   Ht 5' 1\" (1.549 m)   Wt 135 lb (61.2 kg)   LMP 05/28/2016   SpO2 94%   BMI 25.51 kg/m²     Gen:  Alert, appears stated age, well nourished, in no acute distress  HEENT:  Normocephalic, conjunctiva pink, no drainage, mucosa moist  Neck:  Supple  Lungs:  CTA bilaterally, no audible rhonchi or wheezes noted  Heart[de-identified]  RRR, no murmur, rub, or gallop noted during exam  Abd:  Soft, non tender, non distended, BS+  M/S/Ext:  Moving all extremities, no edema, pulses present  Skin:  Warm and dry, raised erythema of the right chest  Neuro:  Alert, oriented x 3; following commands     Glasgow Symptom Assessment Score   Glasgow Score 12/19/2022 10/17/2022 1/11/2022 10/19/2021 9/7/2021   Pain Score 3 2 6 7 4   Tiredness Score 7 2 7 7 6   Nausea Score Not nauseated Not nauseated Not nauseated Not nauseated Not nauseated   Depression Score Not depressed Not depressed Not depressed Not depressed Not depressed   Anxiety Score Not anxious Not anxious Not anxious Not anxious Not anxious   Drowsiness Score 5 Not drowsy 7 5 4   Appetite Score Best appetite 1 Best appetite 2 Best appetite   Wellbeing Score Best feeling of wellbeing 1 Best feeling of wellbeing Best feeling of wellbeing Best feeling of wellbeing   Dyspnea Score 8 6 6 4 3   Other Problem Score Best possible response Best possible response Best possible response Best possible response Best possible response   Total Assessment Score(calculated) 23 12 26 25 17     Assessed by: patient. Current Medications:  Medications reviewed: yes    Controlled Substances Monitoring: [place] reviewed 12/19/22.   RX Monitoring 12/19/2022   Periodic Controlled Substance Monitoring Possible medication side effects, risk of tolerance/dependence & alternative treatments discussed. ;No signs of potential drug abuse or diversion identified. ;Assessed functional status. ;Obtaining appropriate analgesic effect of treatment. Chronic Pain > 50 CONNIE Gordon CNP   Palliative Care Department     Time/Communication  Greater than 50% of time spent, total 15 minutes in face-to-face counseling and coordination of care regarding symptom management. Note: This report was completed using computerKamida voiced recognition software. Every effort has been made to ensure accuracy; however, inadvertent computerized transcription errors may be present.

## 2023-01-06 ENCOUNTER — OFFICE VISIT (OUTPATIENT)
Dept: ONCOLOGY | Age: 83
End: 2023-01-06
Payer: MEDICARE

## 2023-01-06 ENCOUNTER — HOSPITAL ENCOUNTER (OUTPATIENT)
Dept: INFUSION THERAPY | Age: 83
Discharge: HOME | End: 2023-01-06
Payer: MEDICARE

## 2023-01-06 VITALS
DIASTOLIC BLOOD PRESSURE: 65 MMHG | HEART RATE: 100 BPM | SYSTOLIC BLOOD PRESSURE: 144 MMHG | WEIGHT: 135 LBS | HEIGHT: 61 IN | TEMPERATURE: 98.2 F | BODY MASS INDEX: 25.49 KG/M2 | OXYGEN SATURATION: 97 %

## 2023-01-06 DIAGNOSIS — D46.9 MDS (MYELODYSPLASTIC SYNDROME) (HCC): Primary | ICD-10-CM

## 2023-01-06 DIAGNOSIS — D63.8 ANEMIA OF CHRONIC DISEASE: ICD-10-CM

## 2023-01-06 DIAGNOSIS — Z17.0 MALIGNANT NEOPLASM OF RIGHT BREAST IN FEMALE, ESTROGEN RECEPTOR POSITIVE, UNSPECIFIED SITE OF BREAST (HCC): ICD-10-CM

## 2023-01-06 DIAGNOSIS — C50.911 MALIGNANT NEOPLASM OF RIGHT BREAST IN FEMALE, ESTROGEN RECEPTOR POSITIVE, UNSPECIFIED SITE OF BREAST (HCC): ICD-10-CM

## 2023-01-06 LAB
ANISOCYTOSIS: ABNORMAL
BASOPHILIC STIPPLING: ABNORMAL
BASOPHILS ABSOLUTE: 0 E9/L (ref 0–0.2)
BASOPHILS RELATIVE PERCENT: 0.5 % (ref 0–2)
EOSINOPHILS ABSOLUTE: 0.16 E9/L (ref 0.05–0.5)
EOSINOPHILS RELATIVE PERCENT: 2.6 % (ref 0–6)
HCT VFR BLD CALC: 26.3 % (ref 34–48)
HEMOGLOBIN: 8.8 G/DL (ref 11.5–15.5)
LYMPHOCYTES ABSOLUTE: 3.48 E9/L (ref 1.5–4)
LYMPHOCYTES RELATIVE PERCENT: 57.4 % (ref 20–42)
MCH RBC QN AUTO: 38.1 PG (ref 26–35)
MCHC RBC AUTO-ENTMCNC: 33.5 % (ref 32–34.5)
MCV RBC AUTO: 113.9 FL (ref 80–99.9)
MONOCYTES ABSOLUTE: 0.18 E9/L (ref 0.1–0.95)
MONOCYTES RELATIVE PERCENT: 2.6 % (ref 2–12)
NEUTROPHILS ABSOLUTE: 2.26 E9/L (ref 1.8–7.3)
NEUTROPHILS RELATIVE PERCENT: 37.4 % (ref 43–80)
NUCLEATED RED BLOOD CELLS: 1.7 /100 WBC
OVALOCYTES: ABNORMAL
PDW BLD-RTO: 24.5 FL (ref 11.5–15)
PLATELET # BLD: 226 E9/L (ref 130–450)
PMV BLD AUTO: 10.3 FL (ref 7–12)
POIKILOCYTES: ABNORMAL
POLYCHROMASIA: ABNORMAL
RBC # BLD: 2.31 E12/L (ref 3.5–5.5)
TARGET CELLS: ABNORMAL
TEAR DROP CELLS: ABNORMAL
WBC # BLD: 6.1 E9/L (ref 4.5–11.5)

## 2023-01-06 PROCEDURE — 96372 THER/PROPH/DIAG INJ SC/IM: CPT

## 2023-01-06 PROCEDURE — 85025 COMPLETE CBC W/AUTO DIFF WBC: CPT

## 2023-01-06 PROCEDURE — 6360000002 HC RX W HCPCS: Performed by: NURSE PRACTITIONER

## 2023-01-06 PROCEDURE — 1123F ACP DISCUSS/DSCN MKR DOCD: CPT | Performed by: INTERNAL MEDICINE

## 2023-01-06 PROCEDURE — 99214 OFFICE O/P EST MOD 30 MIN: CPT | Performed by: INTERNAL MEDICINE

## 2023-01-06 PROCEDURE — 36415 COLL VENOUS BLD VENIPUNCTURE: CPT

## 2023-01-06 RX ADMIN — DARBEPOETIN ALFA 500 MCG: 500 INJECTION, SOLUTION INTRAVENOUS; SUBCUTANEOUS at 10:28

## 2023-01-06 NOTE — PROGRESS NOTES
[address]  [facility] MED ONCOLOGY  [address] [facility] [address]  Dept: [address]: [phone]  Attending Progress Note      Reason for Visit:   1. Right Breast Cancer. 2. MDS. Referring Physician:  CONNIE Welch CNP    PCP:  CONNIE Welch CNP    History of Present Illness: The patient is a very pleasant 78 y.o. lady with a PMH significant for Right Breast Cancer, stage III, HR pos, was diagnosed in 2009, she had a right mastectomy done, followed by adjuvant chemo, she received 8 cycles, probably AC followed by T, then PMRT, and 5 years of adjuvant ET with Arimidex, was completed in 2015. She was treated in [facility] under the care of Dr. Maurice Cleary. She was diagnosed with MDS in 2017, she received ESAs and PRBCs transfusion. She has transfusion related iron overload. She was started on Aranesp on 4/24/2020. No SE from Aranesp. The patient returns for a follow-up visit, patient felt tired yesterday, otherwise doing well. Review of Systems;  CONSTITUTIONAL: No fever, chills. Fair appetite. ENMT: Eyes: No diplopia; Nose: No epistaxis. Mouth: No sore throat. RESPIRATORY: No hemoptysis, shortness of breath, cough. CARDIOVASCULAR: No chest pain, palpitations. GASTROINTESTINAL: No nausea/vomiting, abdominal pain, diarrhea/constipation. GENITOURINARY: No dysuria, urinary frequency, hematuria. NEURO: No syncope, presyncope, headache.   Remainder:  ROS NEGATIVE    Past Medical History:      Diagnosis Date    Anemia     Pt reports she was dx in her teens, w/o proper w/u, with iron deficiency anemia and was on oral iron replacement for many years, resulting in iron overload    Aplastic anemia ([address] ~6066-6848    Possibly d/t chemotherapy for breast cancer    Breast cancer ([facility] [address] [address] 2009    right side; pt underwent radical mastectomy followed by radiation therapy and chemotherapy and was then on oral chemo pill for 5 yrs; no recurrence as of 2019    Chickenpox     History of blood transfusion     Hypothyroidism     Measles     Mild depression     Mumps     Myelodysplastic syndrome (HCC) ~2016    Neuropathy     feet and ankles murali    Prolonged emergence from general anesthesia     Pulmonary tuberculosis ~5009-3371    in her early 25s    Scarlet fever     Sensory neuropathy     beyond the ankle b/l     Patient Active Problem List   Diagnosis    MDS (myelodysplastic syndrome) (HCC)    Bronchitis    Macrocytic anemia with high RDW    Hypothyroidism    Peripheral sensory neuropathy    Breast cancer ([facility] [address]    Anemia of chronic disease        Past Surgical History:      Procedure Laterality Date    ADENOIDECTOMY      ANKLE FRACTURE SURGERY Left 2022    LEFT ANKLE OPEN REDUCTION INTERNAL FIXATION performed by Becky Quezada DO at [address] [place] [address]      right breast    OTHER SURGICAL HISTORY      blood transfusions    TONSILLECTOMY AND ADENOIDECTOMY      TUBAL LIGATION         Family History:  Family History   Problem Relation Age of Onset    Cancer Mother         lung    Cancer Father         lung    Breast Cancer Sister     Cancer Sister         ovarian cancer and breast cancer    Cancer Brother         unknown    Other Brother         MI    Cancer Maternal Aunt         unknown    Cancer Other         brain       Medications:  Reviewed and reconciled. Social History:  Social History     Socioeconomic History    Marital status:       Spouse name: Not on file    Number of children: Not on file    Years of education: Not on file    Highest education level: Not on file   Occupational History    Not on file   Tobacco Use    Smoking status: Former     Packs/day: 1.00     Years: 37.00     Pack years: 37.00     Types: Cigarettes     Start date:      Quit date:      Years since quittin.0    Smokeless tobacco: Never   Vaping Use Vaping Use: Never used   Substance and Sexual Activity    Alcohol use: No    Drug use: Never    Sexual activity: Not Currently   Other Topics Concern    Not on file   Social History Narrative    Not on file     Social Determinants of Health     Financial Resource Strain: Not on file   Food Insecurity: Not on file   Transportation Needs: Not on file   Physical Activity: Not on file   Stress: Not on file   Social Connections: Not on file   Intimate Partner Violence: Not on file   Housing Stability: Not on file       Allergies: Allergies   Allergen Reactions    Bee Venom Swelling and Dermatitis    Penicillins Hives, Swelling and Dermatitis    Other      All metals except gold and platinum, welts/blisters       Physical Exam:  BP (!) 144/65   Pulse 100   Temp 98.2 °F (36.8 °C)   Ht 5' 1\" (1.549 m)   Wt 135 lb (61.2 kg)   LMP 05/28/2016   SpO2 97%   BMI 25.51 kg/m²   GENERAL: Alert, oriented x 3, not in acute distress. HEENT: PERRLA; EOMI. Oropharynx clear. NECK: Supple. No palpable lymphadenopathy. LUNGS: Good air entry bilaterally. No wheezing, crackles or rhonchi. BREASTS: She is s/p right mastectomy, no suspicious lesions, she has telangiectasias. She has tenderness in the left upper rib cage. CARDIOVASCULAR: Regular rate. No murmurs, rubs or gallops. ABDOMEN: Soft. Non-tender, non-distended. Positive bowel sounds. EXTREMITIES: Dorsum of the foot bruising  NEUROLOGIC: No focal deficits. ECOG PS 1      Impression/Plan:     1. The patient is a very pleasant 78 y.o. lady with a PMH significant for Right Breast Cancer, stage III, HR pos, was diagnosed in 2009, she had a right mastectomy done, followed by adjuvant chemo, she received 8 cycles, probably AC followed by T, then PMRT, and 5 years of adjuvant ET with Arimidex, was completed in 2015. She was treated in [facility] under the care of Dr. Jayshree Jacobs. She is doing well clinically without any evidence of recurrence of her disease.  Discussed with her the importance of monthly SBE, and routine screening mammograms, she had a repeat left breast screening mammogram done on 10/17/2020, was negative for malignancy. 2. She has MDS, diagnosed in 2017, she received ESAs and PRBCs transfusion, has transfusion related iron overload, hgb was 8.5, ferritin 3647, was restarted on Aranesp on 4/23/2019. On 7/20/2021, hemoglobin was 8 g/dl hematocrit 24.7,  1.3, normal white count, platelet count 541W, fit test is negative. The patient did not have an adequate response to Retacrit, it was was changed back to Aranesp, today 1/6/2023, labs reviewed, hemoglobin is decreased to 8.8G/DL, it is less then 10, proceed with Aranesp, will monitor her counts closely    Hypothyroidism, continue Synthroid follow-up with PCP. RTC in 2 weeks with labs. Thank you for allowing us to participate in the care of Ms. Ventura.     Bernadine Mckee MD   HEMATOLOGY/MEDICAL [address] [address] [address] MED ONCOLOGY  [address] [place] [address]  [address] [facility] [address]  Dept: [address]: [phone]

## 2023-01-10 ENCOUNTER — TELEPHONE (OUTPATIENT)
Dept: INFUSION THERAPY | Age: 83
End: 2023-01-10

## 2023-01-10 NOTE — TELEPHONE ENCOUNTER
APPLIED FOR ASST THROUGH THE [employer] FOR THE PTS [place] AND SHE WAS APPROVED THE RAEGAN WILL PAY AFTER INS PAYS, THE FUND WILL EXP 12/6/23

## 2023-01-20 ENCOUNTER — OFFICE VISIT (OUTPATIENT)
Dept: ONCOLOGY | Age: 83
End: 2023-01-20
Payer: MEDICARE

## 2023-01-20 ENCOUNTER — HOSPITAL ENCOUNTER (OUTPATIENT)
Dept: INFUSION THERAPY | Age: 83
Discharge: HOME | End: 2023-01-20
Payer: MEDICARE

## 2023-01-20 VITALS
OXYGEN SATURATION: 96 % | DIASTOLIC BLOOD PRESSURE: 75 MMHG | SYSTOLIC BLOOD PRESSURE: 129 MMHG | RESPIRATION RATE: 16 BRPM | WEIGHT: 135 LBS | BODY MASS INDEX: 25.49 KG/M2 | HEART RATE: 71 BPM | HEIGHT: 61 IN | TEMPERATURE: 97.5 F

## 2023-01-20 DIAGNOSIS — C50.911 MALIGNANT NEOPLASM OF RIGHT BREAST IN FEMALE, ESTROGEN RECEPTOR POSITIVE, UNSPECIFIED SITE OF BREAST (HCC): ICD-10-CM

## 2023-01-20 DIAGNOSIS — D46.9 MDS (MYELODYSPLASTIC SYNDROME) (HCC): Primary | ICD-10-CM

## 2023-01-20 DIAGNOSIS — Z17.0 MALIGNANT NEOPLASM OF RIGHT BREAST IN FEMALE, ESTROGEN RECEPTOR POSITIVE, UNSPECIFIED SITE OF BREAST (HCC): ICD-10-CM

## 2023-01-20 DIAGNOSIS — D63.8 ANEMIA OF CHRONIC DISEASE: ICD-10-CM

## 2023-01-20 LAB
ANISOCYTOSIS: ABNORMAL
BASOPHILIC STIPPLING: ABNORMAL
BASOPHILS ABSOLUTE: 0.02 E9/L (ref 0–0.2)
BASOPHILS RELATIVE PERCENT: 0.4 % (ref 0–2)
EOSINOPHILS ABSOLUTE: 0.21 E9/L (ref 0.05–0.5)
EOSINOPHILS RELATIVE PERCENT: 4.4 % (ref 0–6)
HCT VFR BLD CALC: 27.7 % (ref 34–48)
HEMOGLOBIN: 9 G/DL (ref 11.5–15.5)
IMMATURE GRANULOCYTES #: 0.02 E9/L
IMMATURE GRANULOCYTES %: 0.4 % (ref 0–5)
LYMPHOCYTES ABSOLUTE: 2.58 E9/L (ref 1.5–4)
LYMPHOCYTES RELATIVE PERCENT: 54.4 % (ref 20–42)
MCH RBC QN AUTO: 37.7 PG (ref 26–35)
MCHC RBC AUTO-ENTMCNC: 32.5 % (ref 32–34.5)
MCV RBC AUTO: 115.9 FL (ref 80–99.9)
MONOCYTES ABSOLUTE: 0.3 E9/L (ref 0.1–0.95)
MONOCYTES RELATIVE PERCENT: 6.3 % (ref 2–12)
NEUTROPHILS ABSOLUTE: 1.61 E9/L (ref 1.8–7.3)
NEUTROPHILS RELATIVE PERCENT: 34.1 % (ref 43–80)
OVALOCYTES: ABNORMAL
PDW BLD-RTO: 25.2 FL (ref 11.5–15)
PLATELET # BLD: 128 E9/L (ref 130–450)
PMV BLD AUTO: 11 FL (ref 7–12)
POIKILOCYTES: ABNORMAL
POLYCHROMASIA: ABNORMAL
RBC # BLD: 2.39 E12/L (ref 3.5–5.5)
TEAR DROP CELLS: ABNORMAL
WBC # BLD: 4.7 E9/L (ref 4.5–11.5)

## 2023-01-20 PROCEDURE — 85025 COMPLETE CBC W/AUTO DIFF WBC: CPT

## 2023-01-20 PROCEDURE — 99214 OFFICE O/P EST MOD 30 MIN: CPT | Performed by: INTERNAL MEDICINE

## 2023-01-20 PROCEDURE — 6360000002 HC RX W HCPCS: Performed by: NURSE PRACTITIONER

## 2023-01-20 PROCEDURE — 96372 THER/PROPH/DIAG INJ SC/IM: CPT

## 2023-01-20 PROCEDURE — 1123F ACP DISCUSS/DSCN MKR DOCD: CPT | Performed by: INTERNAL MEDICINE

## 2023-01-20 RX ADMIN — DARBEPOETIN ALFA 500 MCG: 500 INJECTION, SOLUTION INTRAVENOUS; SUBCUTANEOUS at 11:10

## 2023-01-20 NOTE — PROGRESS NOTES
337  [facility] MED ONCOLOGY  [address] [facility] [address]  Dept: Quyen Wayne: [phone]  Attending Progress Note      Reason for Visit:   1. Right Breast Cancer. 2. MDS. Referring Physician:  CONNIE Mckenna CNP    PCP:  CONNIE Mckenna CNP    History of Present Illness: The patient is a very pleasant 78 y.o. lady with a PMH significant for Right Breast Cancer, stage III, HR pos, was diagnosed in 2009, she had a right mastectomy done, followed by adjuvant chemo, she received 8 cycles, probably AC followed by T, then PMRT, and 5 years of adjuvant ET with Arimidex, was completed in 2015. She was treated in [facility] under the care of Dr. Albania Driscoll. She was diagnosed with MDS in 2017, she received ESAs and PRBCs transfusion. She has transfusion related iron overload. She was started on Aranesp on 4/24/2020. No SE from Aranesp. The patient returns for a follow-up visit, feeling better overall, energy level had improved. No new problems. Review of Systems;  CONSTITUTIONAL: No fever, chills. Fair appetite. ENMT: Eyes: No diplopia; Nose: No epistaxis. Mouth: No sore throat. RESPIRATORY: No hemoptysis, shortness of breath, cough. CARDIOVASCULAR: No chest pain, palpitations. GASTROINTESTINAL: No nausea/vomiting, abdominal pain, diarrhea/constipation. GENITOURINARY: No dysuria, urinary frequency, hematuria. NEURO: No syncope, presyncope, headache.   Remainder:  ROS NEGATIVE    Past Medical History:      Diagnosis Date    Anemia     Pt reports she was dx in her teens, w/o proper w/u, with iron deficiency anemia and was on oral iron replacement for many years, resulting in iron overload    Aplastic anemia ([address] ~9532-7623    Possibly d/t chemotherapy for breast cancer    Breast cancer ([facility] [address] 2009    right side; pt underwent radical mastectomy followed by radiation therapy and chemotherapy and was then on oral chemo pill for 5 yrs; no recurrence as of 2019    Chickenpox     History of blood transfusion     Hypothyroidism     Measles     Mild depression     Mumps     Myelodysplastic syndrome (HCC) ~2016    Neuropathy     feet and ankles murali    Prolonged emergence from general anesthesia     Pulmonary tuberculosis ~7573-8024    in her early 25s    Scarlet fever     Sensory neuropathy     beyond the ankle b/l     Patient Active Problem List   Diagnosis    MDS (myelodysplastic syndrome) (HCC)    Bronchitis    Macrocytic anemia with high RDW    Hypothyroidism    Peripheral sensory neuropathy    Breast cancer ([facility] [address]    Anemia of chronic disease        Past Surgical History:      Procedure Laterality Date    ADENOIDECTOMY      ANKLE FRACTURE SURGERY Left 2022    LEFT ANKLE OPEN REDUCTION INTERNAL FIXATION performed by Lia Anguiano DO at [address] [place] [address]      right breast    OTHER SURGICAL HISTORY      blood transfusions    TONSILLECTOMY AND ADENOIDECTOMY      TUBAL LIGATION         Family History:  Family History   Problem Relation Age of Onset    Cancer Mother         lung    Cancer Father         lung    Breast Cancer Sister     Cancer Sister         ovarian cancer and breast cancer    Cancer Brother         unknown    Other Brother         MI    Cancer Maternal Aunt         unknown    Cancer Other         brain       Medications:  Reviewed and reconciled. Social History:  Social History     Socioeconomic History    Marital status:       Spouse name: Not on file    Number of children: Not on file    Years of education: Not on file    Highest education level: Not on file   Occupational History    Not on file   Tobacco Use    Smoking status: Former     Packs/day: 1.00     Years: 37.00     Pack years: 37.00     Types: Cigarettes     Start date:      Quit date:      Years since quittin.0    Smokeless tobacco: Never   Vaping Use    Vaping Use: Never used   Substance and Sexual Activity    Alcohol use: No    Drug use: Never    Sexual activity: Not Currently   Other Topics Concern    Not on file   Social History Narrative    Not on file     Social Determinants of Health     Financial Resource Strain: Not on file   Food Insecurity: Not on file   Transportation Needs: Not on file   Physical Activity: Not on file   Stress: Not on file   Social Connections: Not on file   Intimate Partner Violence: Not on file   Housing Stability: Not on file       Allergies: Allergies   Allergen Reactions    Bee Venom Swelling and Dermatitis    Penicillins Hives, Swelling and Dermatitis    Other      All metals except gold and platinum, welts/blisters       Physical Exam:  /75 (Site: Left Upper Arm, Position: Sitting)   Pulse 71   Temp 97.5 °F (36.4 °C) (Infrared)   Resp 16   Ht 5' 1\" (1.549 m)   Wt 135 lb (61.2 kg)   LMP 05/28/2016   SpO2 96%   BMI 25.51 kg/m²   GENERAL: Alert, oriented x 3, not in acute distress. HEENT: PERRLA; EOMI. Oropharynx clear. NECK: Supple. No palpable lymphadenopathy. LUNGS: Good air entry bilaterally. No wheezing, crackles or rhonchi. BREASTS: She is s/p right mastectomy, no suspicious lesions, she has telangiectasias. She has tenderness in the left upper rib cage. CARDIOVASCULAR: Regular rate. No murmurs, rubs or gallops. ABDOMEN: Soft. Non-tender, non-distended. Positive bowel sounds. EXTREMITIES: Dorsum of the foot bruising  NEUROLOGIC: No focal deficits. ECOG PS 1      Impression/Plan:     1. The patient is a very pleasant 78 y.o. lady with a PMH significant for Right Breast Cancer, stage III, HR pos, was diagnosed in 2009, she had a right mastectomy done, followed by adjuvant chemo, she received 8 cycles, probably AC followed by T, then PMRT, and 5 years of adjuvant ET with Arimidex, was completed in 2015. She was treated in [facility] under the care of Dr. Luz Hatch.  She is doing well clinically without any evidence of recurrence of her disease. Discussed with her the importance of monthly SBE, and routine screening mammograms, she had a repeat left breast screening mammogram done on 10/17/2020, was negative for malignancy. 2. She has MDS, diagnosed in 2017, she received ESAs and PRBCs transfusion, has transfusion related iron overload, hgb was 8.5, ferritin 3647, was restarted on Aranesp on 4/23/2019. On 7/20/2021, hemoglobin was 8 g/dl hematocrit 24.7,  1.3, normal white count, platelet count 868K, fit test is negative. The patient did not have an adequate response to Retacrit, it was was changed back to Aranesp, today 1/20/2023, labs reviewed, hemoglobin is slightly improved to 9G/DL, it is less then 10, proceed with Aranesp, will monitor her counts closely    Hypothyroidism, continue Synthroid follow-up with PCP. RTC in 2 weeks with labs. Thank you for allowing us to participate in the care of Ms. Ventura.     Lion Sánchez MD   HEMATOLOGY/MEDICAL 150 [facility]  [address] MED ONCOLOGY  [address] [place] [address]  Silvia Forman [address]  Dept: [address] [place]: [phone]

## 2023-02-02 DIAGNOSIS — D63.8 ANEMIA OF CHRONIC DISEASE: Primary | ICD-10-CM

## 2023-02-03 ENCOUNTER — OFFICE VISIT (OUTPATIENT)
Dept: ONCOLOGY | Age: 83
End: 2023-02-03

## 2023-02-03 ENCOUNTER — HOSPITAL ENCOUNTER (OUTPATIENT)
Dept: INFUSION THERAPY | Age: 83
Discharge: HOME | End: 2023-02-03
Payer: MEDICARE

## 2023-02-03 VITALS
SYSTOLIC BLOOD PRESSURE: 111 MMHG | BODY MASS INDEX: 25.58 KG/M2 | DIASTOLIC BLOOD PRESSURE: 68 MMHG | OXYGEN SATURATION: 96 % | HEART RATE: 73 BPM | HEIGHT: 61 IN | WEIGHT: 135.5 LBS | TEMPERATURE: 97.4 F

## 2023-02-03 DIAGNOSIS — D46.9 MDS (MYELODYSPLASTIC SYNDROME) (HCC): Primary | ICD-10-CM

## 2023-02-03 DIAGNOSIS — D63.8 ANEMIA OF CHRONIC DISEASE: ICD-10-CM

## 2023-02-03 LAB
ALBUMIN SERPL-MCNC: 4.3 G/DL (ref 3.5–5.2)
ALP BLD-CCNC: 44 U/L (ref 35–104)
ALT SERPL-CCNC: 39 U/L (ref 0–32)
ANION GAP SERPL CALCULATED.3IONS-SCNC: 13 MMOL/L (ref 7–16)
ANISOCYTOSIS: ABNORMAL
AST SERPL-CCNC: 43 U/L (ref 0–31)
BASOPHILS ABSOLUTE: 0 E9/L (ref 0–0.2)
BASOPHILS RELATIVE PERCENT: 0.4 % (ref 0–2)
BILIRUB SERPL-MCNC: 1 MG/DL (ref 0–1.2)
BUN BLDV-MCNC: 20 MG/DL (ref 6–23)
CALCIUM SERPL-MCNC: 9.1 MG/DL (ref 8.6–10.2)
CHLORIDE BLD-SCNC: 106 MMOL/L (ref 98–107)
CO2: 20 MMOL/L (ref 22–29)
CREAT SERPL-MCNC: 0.7 MG/DL (ref 0.5–1)
EOSINOPHILS ABSOLUTE: 0.31 E9/L (ref 0.05–0.5)
EOSINOPHILS RELATIVE PERCENT: 6.1 % (ref 0–6)
GFR SERPL CREATININE-BSD FRML MDRD: >60 ML/MIN/1.73
GLUCOSE BLD-MCNC: 107 MG/DL (ref 74–99)
HCT VFR BLD CALC: 27.6 % (ref 34–48)
HEMOGLOBIN: 9.1 G/DL (ref 11.5–15.5)
LYMPHOCYTES ABSOLUTE: 2.75 E9/L (ref 1.5–4)
LYMPHOCYTES RELATIVE PERCENT: 53.9 % (ref 20–42)
MCH RBC QN AUTO: 37.9 PG (ref 26–35)
MCHC RBC AUTO-ENTMCNC: 33 % (ref 32–34.5)
MCV RBC AUTO: 115 FL (ref 80–99.9)
MONOCYTES ABSOLUTE: 0.36 E9/L (ref 0.1–0.95)
MONOCYTES RELATIVE PERCENT: 7 % (ref 2–12)
NEUTROPHILS ABSOLUTE: 1.68 E9/L (ref 1.8–7.3)
NEUTROPHILS RELATIVE PERCENT: 33 % (ref 43–80)
NUCLEATED RED BLOOD CELLS: 0.9 /100 WBC
OVALOCYTES: ABNORMAL
PDW BLD-RTO: 24.9 FL (ref 11.5–15)
PLATELET # BLD: 168 E9/L (ref 130–450)
PMV BLD AUTO: 10.6 FL (ref 7–12)
POIKILOCYTES: ABNORMAL
POLYCHROMASIA: ABNORMAL
POTASSIUM SERPL-SCNC: 4.4 MMOL/L (ref 3.5–5)
RBC # BLD: 2.4 E12/L (ref 3.5–5.5)
SODIUM BLD-SCNC: 139 MMOL/L (ref 132–146)
TOTAL PROTEIN: 6.9 G/DL (ref 6.4–8.3)
WBC # BLD: 5.1 E9/L (ref 4.5–11.5)

## 2023-02-03 PROCEDURE — 6360000002 HC RX W HCPCS: Performed by: NURSE PRACTITIONER

## 2023-02-03 PROCEDURE — 36415 COLL VENOUS BLD VENIPUNCTURE: CPT

## 2023-02-03 PROCEDURE — 85025 COMPLETE CBC W/AUTO DIFF WBC: CPT

## 2023-02-03 PROCEDURE — 96372 THER/PROPH/DIAG INJ SC/IM: CPT

## 2023-02-03 PROCEDURE — 80053 COMPREHEN METABOLIC PANEL: CPT

## 2023-02-03 RX ORDER — METRONIDAZOLE 500 MG/1
TABLET ORAL
COMMUNITY
Start: 2023-01-30

## 2023-02-03 RX ADMIN — DARBEPOETIN ALFA 500 MCG: 500 INJECTION, SOLUTION INTRAVENOUS; SUBCUTANEOUS at 11:03

## 2023-02-03 NOTE — PROGRESS NOTES
859  [facility] MED ONCOLOGY  [address] [place] [address]  Dept: [address] [place] [place]: [phone]  Attending Progress Note      Reason for Visit:   1. Right Breast Cancer. 2. MDS. Referring Physician:  Einar Phoenix, APRN - CNP    PCP:  Einar Phoenix, APRN - CNP    History of Present Illness: The patient is a very pleasant 78 y.o. lady with a PMH significant for Right Breast Cancer, stage III, HR pos, was diagnosed in 2009, she had a right mastectomy done, followed by adjuvant chemo, she received 8 cycles, probably AC followed by T, then PMRT, and 5 years of adjuvant ET with Arimidex, was completed in 2015. She was treated in [facility] under the care of Dr. Franaz Edwards. She was diagnosed with MDS in 2017, she received ESAs and PRBCs transfusion. She has transfusion related iron overload. She was started on Aranesp on 4/24/2020. No SE from Aranesp. The patient returns for a follow-up visit, she had gastroenteritis, was seen by PCP, doing well at this time. Review of Systems;  CONSTITUTIONAL: No fever, chills. Fair appetite. ENMT: Eyes: No diplopia; Nose: No epistaxis. Mouth: No sore throat. RESPIRATORY: No hemoptysis, shortness of breath, cough. CARDIOVASCULAR: No chest pain, palpitations. GASTROINTESTINAL: No nausea/vomiting, abdominal pain, diarrhea/constipation. GENITOURINARY: No dysuria, urinary frequency, hematuria. NEURO: No syncope, presyncope, headache.   Remainder:  ROS NEGATIVE    Past Medical History:      Diagnosis Date    Anemia     Pt reports she was dx in her teens, w/o proper w/u, with iron deficiency anemia and was on oral iron replacement for many years, resulting in iron overload    Aplastic anemia ([address] ~5439-1021    Possibly d/t chemotherapy for breast cancer    Breast cancer ([address] 2009    right side; pt underwent radical mastectomy followed by radiation therapy and chemotherapy and was then on oral chemo pill for 5 yrs; no recurrence as of 2019    Chickenpox     History of blood transfusion     Hypothyroidism     Measles     Mild depression     Mumps     Myelodysplastic syndrome (HCC) ~2016    Neuropathy     feet and ankles murali    Prolonged emergence from general anesthesia     Pulmonary tuberculosis ~5386-2342    in her early 25s    Scarlet fever     Sensory neuropathy     beyond the ankle b/l     Patient Active Problem List   Diagnosis    MDS (myelodysplastic syndrome) (HCC)    Bronchitis    Macrocytic anemia with high RDW    Hypothyroidism    Peripheral sensory neuropathy    Breast cancer ([facility] [address]    Anemia of chronic disease        Past Surgical History:      Procedure Laterality Date    ADENOIDECTOMY      ANKLE FRACTURE SURGERY Left 2022    LEFT ANKLE OPEN REDUCTION INTERNAL FIXATION performed by Mehreen Maldonado DO at [address] [place] [address]      right breast    OTHER SURGICAL HISTORY      blood transfusions    TONSILLECTOMY AND ADENOIDECTOMY      TUBAL LIGATION         Family History:  Family History   Problem Relation Age of Onset    Cancer Mother         lung    Cancer Father         lung    Breast Cancer Sister     Cancer Sister         ovarian cancer and breast cancer    Cancer Brother         unknown    Other Brother         MI    Cancer Maternal Aunt         unknown    Cancer Other         brain       Medications:  Reviewed and reconciled. Social History:  Social History     Socioeconomic History    Marital status:       Spouse name: Not on file    Number of children: Not on file    Years of education: Not on file    Highest education level: Not on file   Occupational History    Not on file   Tobacco Use    Smoking status: Former     Packs/day: 1.00     Years: 37.00     Pack years: 37.00     Types: Cigarettes     Start date:      Quit date:      Years since quittin.1    Smokeless tobacco: Never   Vaping Use    Vaping Use: Never used   Substance and Sexual Activity    Alcohol use: No    Drug use: Never    Sexual activity: Not Currently   Other Topics Concern    Not on file   Social History Narrative    Not on file     Social Determinants of Health     Financial Resource Strain: Not on file   Food Insecurity: Not on file   Transportation Needs: Not on file   Physical Activity: Not on file   Stress: Not on file   Social Connections: Not on file   Intimate Partner Violence: Not on file   Housing Stability: Not on file       Allergies: Allergies   Allergen Reactions    Bee Venom Swelling and Dermatitis    Penicillins Hives, Swelling and Dermatitis    Other      All metals except gold and platinum, welts/blisters       Physical Exam:  /68   Pulse 73   Temp 97.4 °F (36.3 °C)   Ht 5' 1\" (1.549 m)   Wt 135 lb 8 oz (61.5 kg)   LMP 05/28/2016   SpO2 96%   BMI 25.60 kg/m²   GENERAL: Alert, oriented x 3, not in acute distress. HEENT: PERRLA; EOMI. Oropharynx clear. NECK: Supple. No palpable lymphadenopathy. LUNGS: Good air entry bilaterally. No wheezing, crackles or rhonchi. BREASTS: She is s/p right mastectomy, no suspicious lesions, she has telangiectasias. She has tenderness in the left upper rib cage. CARDIOVASCULAR: Regular rate. No murmurs, rubs or gallops. ABDOMEN: Soft. Non-tender, non-distended. Positive bowel sounds. EXTREMITIES: Dorsum of the foot bruising  NEUROLOGIC: No focal deficits. ECOG PS 1      Impression/Plan:     1. The patient is a very pleasant 78 y.o. lady with a PMH significant for Right Breast Cancer, stage III, HR pos, was diagnosed in 2009, she had a right mastectomy done, followed by adjuvant chemo, she received 8 cycles, probably AC followed by T, then PMRT, and 5 years of adjuvant ET with Arimidex, was completed in 2015. She was treated in [facility] under the care of Dr. Mae Doran. She is doing well clinically without any evidence of recurrence of her disease. Discussed with her the importance of monthly SBE, and routine screening mammograms, she had a repeat left breast screening mammogram done on 10/17/2020, was negative for malignancy. 2. She has MDS, diagnosed in 2017, she received ESAs and PRBCs transfusion, has transfusion related iron overload, hgb was 8.5, ferritin 3647, was restarted on Aranesp on 4/23/2019. On 7/20/2021, hemoglobin was 8 g/dl hematocrit 24.7,  1.3, normal white count, platelet count 950S, fit test is negative. The patient did not have an adequate response to Retacrit, it was was changed back to Aranesp, today 2/3/2023, I reviewed her CBCD, hemoglobin had slightly improved to 9.1 G/DL, it is less then 10, proceed with Aranesp, will monitor her counts closely. White count is 5.1, platelet count 573K. Hypothyroidism, continue Synthroid follow-up with PCP. RTC in 2 weeks with labs. Thank you for allowing us to participate in the care of Ms. Ventura.     Ben Christopher MD   HEMATOLOGY/MEDICAL [address] [place] [address] MED ONCOLOGY  [address] [place] [id number]  Dept: [address]: [phone]

## 2023-02-08 DIAGNOSIS — S82.892A CLOSED FRACTURE OF LEFT ANKLE, INITIAL ENCOUNTER: ICD-10-CM

## 2023-02-08 RX ORDER — HYDROCODONE BITARTRATE AND ACETAMINOPHEN 5; 325 MG/1; MG/1
1 TABLET ORAL EVERY 6 HOURS PRN
Qty: 120 TABLET | Refills: 0 | Status: SHIPPED | OUTPATIENT
Start: 2023-02-08 | End: 2023-03-10

## 2023-02-17 ENCOUNTER — OFFICE VISIT (OUTPATIENT)
Dept: ONCOLOGY | Age: 83
End: 2023-02-17
Payer: MEDICARE

## 2023-02-17 ENCOUNTER — HOSPITAL ENCOUNTER (OUTPATIENT)
Dept: INFUSION THERAPY | Age: 83
Discharge: HOME | End: 2023-02-17
Payer: MEDICARE

## 2023-02-17 VITALS
WEIGHT: 134 LBS | HEART RATE: 87 BPM | DIASTOLIC BLOOD PRESSURE: 56 MMHG | HEIGHT: 61 IN | SYSTOLIC BLOOD PRESSURE: 128 MMHG | OXYGEN SATURATION: 95 % | TEMPERATURE: 97.3 F | BODY MASS INDEX: 25.3 KG/M2

## 2023-02-17 DIAGNOSIS — D63.8 ANEMIA OF CHRONIC DISEASE: ICD-10-CM

## 2023-02-17 DIAGNOSIS — D46.9 MDS (MYELODYSPLASTIC SYNDROME) (HCC): Primary | ICD-10-CM

## 2023-02-17 LAB
ANISOCYTOSIS: ABNORMAL
BASOPHILS ABSOLUTE: 0 E9/L (ref 0–0.2)
BASOPHILS RELATIVE PERCENT: 0.8 % (ref 0–2)
EOSINOPHILS ABSOLUTE: 0.44 E9/L (ref 0.05–0.5)
EOSINOPHILS RELATIVE PERCENT: 8.7 % (ref 0–6)
HCT VFR BLD CALC: 25.3 % (ref 34–48)
HEMOGLOBIN: 8.5 G/DL (ref 11.5–15.5)
HYPOCHROMIA: ABNORMAL
LYMPHOCYTES ABSOLUTE: 2.4 E9/L (ref 1.5–4)
LYMPHOCYTES RELATIVE PERCENT: 47.8 % (ref 20–42)
MCH RBC QN AUTO: 37.1 PG (ref 26–35)
MCHC RBC AUTO-ENTMCNC: 33.6 % (ref 32–34.5)
MCV RBC AUTO: 110.5 FL (ref 80–99.9)
MONOCYTES ABSOLUTE: 0.35 E9/L (ref 0.1–0.95)
MONOCYTES RELATIVE PERCENT: 7 % (ref 2–12)
NEUTROPHILS ABSOLUTE: 1.85 E9/L (ref 1.8–7.3)
NEUTROPHILS RELATIVE PERCENT: 36.5 % (ref 43–80)
NUCLEATED RED BLOOD CELLS: 0.9 /100 WBC
OVALOCYTES: ABNORMAL
PDW BLD-RTO: 25.2 FL (ref 11.5–15)
PLATELET # BLD: 155 E9/L (ref 130–450)
PMV BLD AUTO: 11.5 FL (ref 7–12)
POIKILOCYTES: ABNORMAL
POLYCHROMASIA: ABNORMAL
RBC # BLD: 2.29 E12/L (ref 3.5–5.5)
WBC # BLD: 5 E9/L (ref 4.5–11.5)

## 2023-02-17 PROCEDURE — 96372 THER/PROPH/DIAG INJ SC/IM: CPT

## 2023-02-17 PROCEDURE — 1123F ACP DISCUSS/DSCN MKR DOCD: CPT | Performed by: INTERNAL MEDICINE

## 2023-02-17 PROCEDURE — 85025 COMPLETE CBC W/AUTO DIFF WBC: CPT

## 2023-02-17 PROCEDURE — 99214 OFFICE O/P EST MOD 30 MIN: CPT | Performed by: INTERNAL MEDICINE

## 2023-02-17 PROCEDURE — 6360000002 HC RX W HCPCS: Performed by: NURSE PRACTITIONER

## 2023-02-17 RX ADMIN — DARBEPOETIN ALFA 500 MCG: 500 INJECTION, SOLUTION INTRAVENOUS; SUBCUTANEOUS at 10:53

## 2023-02-17 NOTE — PROGRESS NOTES
061  [facility] MED ONCOLOGY  [address] [facility] [address]  Dept: [address]: [phone]  Attending Progress Note      Reason for Visit:   1. Right Breast Cancer. 2. MDS. Referring Physician:  Earlean Fleischer, APRN - CNP    PCP:  Earlean Fleischer, APRN - CNP    History of Present Illness: The patient is a very pleasant 78 y.o. lady with a PMH significant for Right Breast Cancer, stage III, HR pos, was diagnosed in 2009, she had a right mastectomy done, followed by adjuvant chemo, she received 8 cycles, probably AC followed by T, then PMRT, and 5 years of adjuvant ET with Arimidex, was completed in 2015. She was treated in [facility] under the care of Dr. Wilfredo Venegas. She was diagnosed with MDS in 2017, she received ESAs and PRBCs transfusion. She has transfusion related iron overload. She was started on Aranesp on 4/24/2020. No SE from Aranesp. The patient returns for a follow-up visit, she is feeling tired, no bleeding. Review of Systems;  CONSTITUTIONAL: No fever, chills. Fair appetite. ENMT: Eyes: No diplopia; Nose: No epistaxis. Mouth: No sore throat. RESPIRATORY: No hemoptysis, shortness of breath, cough. CARDIOVASCULAR: No chest pain, palpitations. GASTROINTESTINAL: No nausea/vomiting, abdominal pain, diarrhea/constipation. GENITOURINARY: No dysuria, urinary frequency, hematuria. NEURO: No syncope, presyncope, headache.   Remainder:  ROS NEGATIVE    Past Medical History:      Diagnosis Date    Anemia     Pt reports she was dx in her teens, w/o proper w/u, with iron deficiency anemia and was on oral iron replacement for many years, resulting in iron overload    Aplastic anemia ([address] ~1139-5174    Possibly d/t chemotherapy for breast cancer    Breast cancer ([facility] [address] 2009    right side; pt underwent radical mastectomy followed by radiation therapy and chemotherapy and was then on oral chemo pill for 5 yrs; no recurrence as of 2019    Chickenpox     History of blood transfusion     Hypothyroidism     Measles     Mild depression     Mumps     Myelodysplastic syndrome (HCC) ~2016    Neuropathy     feet and ankles murali    Prolonged emergence from general anesthesia     Pulmonary tuberculosis ~8149-7060    in her early 25s    Scarlet fever     Sensory neuropathy     beyond the ankle b/l     Patient Active Problem List   Diagnosis    MDS (myelodysplastic syndrome) (HCC)    Bronchitis    Macrocytic anemia with high RDW    Hypothyroidism    Peripheral sensory neuropathy    Breast cancer ([facility] [address]    Anemia of chronic disease        Past Surgical History:      Procedure Laterality Date    ADENOIDECTOMY      ANKLE FRACTURE SURGERY Left 2022    LEFT ANKLE OPEN REDUCTION INTERNAL FIXATION performed by Remington Wray DO at [address] [place] [address]      right breast    OTHER SURGICAL HISTORY      blood transfusions    TONSILLECTOMY AND ADENOIDECTOMY      TUBAL LIGATION         Family History:  Family History   Problem Relation Age of Onset    Cancer Mother         lung    Cancer Father         lung    Breast Cancer Sister     Cancer Sister         ovarian cancer and breast cancer    Cancer Brother         unknown    Other Brother         MI    Cancer Maternal Aunt         unknown    Cancer Other         brain       Medications:  Reviewed and reconciled. Social History:  Social History     Socioeconomic History    Marital status:       Spouse name: Not on file    Number of children: Not on file    Years of education: Not on file    Highest education level: Not on file   Occupational History    Not on file   Tobacco Use    Smoking status: Former     Packs/day: 1.00     Years: 37.00     Pack years: 37.00     Types: Cigarettes     Start date:      Quit date:      Years since quittin.1    Smokeless tobacco: Never   Vaping Use    Vaping Use: Never used   Substance and Sexual Activity    Alcohol use: No    Drug use: Never    Sexual activity: Not Currently   Other Topics Concern    Not on file   Social History Narrative    Not on file     Social Determinants of Health     Financial Resource Strain: Not on file   Food Insecurity: Not on file   Transportation Needs: Not on file   Physical Activity: Not on file   Stress: Not on file   Social Connections: Not on file   Intimate Partner Violence: Not on file   Housing Stability: Not on file       Allergies: Allergies   Allergen Reactions    Bee Venom Swelling and Dermatitis    Penicillins Hives, Swelling and Dermatitis    Other      All metals except gold and platinum, welts/blisters       Physical Exam:  BP (!) 128/56   Pulse 87   Temp 97.3 °F (36.3 °C)   Ht 5' 1\" (1.549 m)   Wt 134 lb (60.8 kg)   LMP 05/28/2016   SpO2 95%   BMI 25.32 kg/m²   GENERAL: Alert, oriented x 3, not in acute distress. HEENT: PERRLA; EOMI. Oropharynx clear. NECK: Supple. No palpable lymphadenopathy. LUNGS: Good air entry bilaterally. No wheezing, crackles or rhonchi. BREASTS: She is s/p right mastectomy, no suspicious lesions, she has telangiectasias. She has tenderness in the left upper rib cage. CARDIOVASCULAR: Regular rate. No murmurs, rubs or gallops. ABDOMEN: Soft. Non-tender, non-distended. Positive bowel sounds. EXTREMITIES: Dorsum of the foot bruising  NEUROLOGIC: No focal deficits. ECOG PS 1      Impression/Plan:     1. The patient is a very pleasant 78 y.o. lady with a PMH significant for Right Breast Cancer, stage III, HR pos, was diagnosed in 2009, she had a right mastectomy done, followed by adjuvant chemo, she received 8 cycles, probably AC followed by T, then PMRT, and 5 years of adjuvant ET with Arimidex, was completed in 2015. She was treated in [facility] under the care of Dr. Mariano Osorio. She is doing well clinically without any evidence of recurrence of her disease.  Discussed with her the importance of monthly SBE, and routine screening mammograms, she had a repeat left breast screening mammogram done on 10/17/2020, was negative for malignancy. 2. She has MDS, diagnosed in 2017, she received ESAs and PRBCs transfusion, has transfusion related iron overload, hgb was 8.5, ferritin 3647, was restarted on Aranesp on 4/23/2019. On 7/20/2021, hemoglobin was 8 g/dl hematocrit 24.7,  1.3, normal white count, platelet count 125A, fit test is negative. The patient did not have an adequate response to Retacrit, it was was changed back to Aranesp, today 2/17/2023, I reviewed her CBCD, hemoglobin had decreased from 9.1G/DL to 8.5G/DL, it is less then 10, proceed with Aranesp, will order iron studies with her next blood work to evaluate if she is iron deficient, will monitor her counts closely. White count is 5, platelet count is 644R. Hypothyroidism, continue Synthroid follow-up with PCP. RTC in 2 weeks with labs. Thank you for allowing us to participate in the care of Ms. Ventura.     Gino Phillips MD   HEMATOLOGY/MEDICAL 150 [facility]  [address] MED ONCOLOGY  [address] [place] [phone]  Dept: [address]: [phone]

## 2023-03-03 ENCOUNTER — OFFICE VISIT (OUTPATIENT)
Dept: ONCOLOGY | Age: 83
End: 2023-03-03
Payer: COMMERCIAL

## 2023-03-03 ENCOUNTER — HOSPITAL ENCOUNTER (OUTPATIENT)
Dept: INFUSION THERAPY | Age: 83
Discharge: HOME | End: 2023-03-03
Payer: COMMERCIAL

## 2023-03-03 VITALS
TEMPERATURE: 97.5 F | BODY MASS INDEX: 25.17 KG/M2 | OXYGEN SATURATION: 97 % | SYSTOLIC BLOOD PRESSURE: 142 MMHG | HEART RATE: 66 BPM | WEIGHT: 133.3 LBS | DIASTOLIC BLOOD PRESSURE: 62 MMHG | HEIGHT: 61 IN

## 2023-03-03 DIAGNOSIS — C50.911 MALIGNANT NEOPLASM OF RIGHT BREAST IN FEMALE, ESTROGEN RECEPTOR POSITIVE, UNSPECIFIED SITE OF BREAST (HCC): ICD-10-CM

## 2023-03-03 DIAGNOSIS — Z17.0 MALIGNANT NEOPLASM OF RIGHT BREAST IN FEMALE, ESTROGEN RECEPTOR POSITIVE, UNSPECIFIED SITE OF BREAST (HCC): ICD-10-CM

## 2023-03-03 DIAGNOSIS — D46.9 MDS (MYELODYSPLASTIC SYNDROME) (HCC): Primary | ICD-10-CM

## 2023-03-03 DIAGNOSIS — D46.9 MDS (MYELODYSPLASTIC SYNDROME) (HCC): ICD-10-CM

## 2023-03-03 LAB
ANISOCYTOSIS: ABNORMAL
BASOPHILIC STIPPLING: ABNORMAL
BASOPHILS ABSOLUTE: 0 E9/L (ref 0–0.2)
BASOPHILS RELATIVE PERCENT: 0.8 % (ref 0–2)
EOSINOPHILS ABSOLUTE: 0.17 E9/L (ref 0.05–0.5)
EOSINOPHILS RELATIVE PERCENT: 3.6 % (ref 0–6)
FERRITIN: >2000 NG/ML
HCT VFR BLD CALC: 27.3 % (ref 34–48)
HEMOGLOBIN: 8.8 G/DL (ref 11.5–15.5)
HYPOCHROMIA: ABNORMAL
IRON SATURATION: 94 % (ref 15–50)
IRON: 206 MCG/DL (ref 37–145)
LYMPHOCYTES ABSOLUTE: 2.21 E9/L (ref 1.5–4)
LYMPHOCYTES RELATIVE PERCENT: 46.4 % (ref 20–42)
MCH RBC QN AUTO: 38.1 PG (ref 26–35)
MCHC RBC AUTO-ENTMCNC: 32.2 % (ref 32–34.5)
MCV RBC AUTO: 118.2 FL (ref 80–99.9)
MONOCYTES ABSOLUTE: 0.29 E9/L (ref 0.1–0.95)
MONOCYTES RELATIVE PERCENT: 6.2 % (ref 2–12)
NEUTROPHILS ABSOLUTE: 2.06 E9/L (ref 1.8–7.3)
NEUTROPHILS RELATIVE PERCENT: 42.9 % (ref 43–80)
NUCLEATED RED BLOOD CELLS: 0.9 /100 WBC
OVALOCYTES: ABNORMAL
PDW BLD-RTO: 26.9 FL (ref 11.5–15)
PLASMA CELLS PERCENT: 0.9 % (ref 0–0)
PLATELET # BLD: 151 E9/L (ref 130–450)
PMV BLD AUTO: 10.7 FL (ref 7–12)
POIKILOCYTES: ABNORMAL
POLYCHROMASIA: ABNORMAL
RBC # BLD: 2.31 E12/L (ref 3.5–5.5)
TARGET CELLS: ABNORMAL
TEAR DROP CELLS: ABNORMAL
TOTAL IRON BINDING CAPACITY: 218 MCG/DL (ref 250–450)
WBC # BLD: 4.8 E9/L (ref 4.5–11.5)

## 2023-03-03 PROCEDURE — 1123F ACP DISCUSS/DSCN MKR DOCD: CPT | Performed by: INTERNAL MEDICINE

## 2023-03-03 PROCEDURE — 99214 OFFICE O/P EST MOD 30 MIN: CPT | Performed by: INTERNAL MEDICINE

## 2023-03-03 PROCEDURE — 82728 ASSAY OF FERRITIN: CPT

## 2023-03-03 PROCEDURE — 83550 IRON BINDING TEST: CPT

## 2023-03-03 PROCEDURE — 36415 COLL VENOUS BLD VENIPUNCTURE: CPT

## 2023-03-03 PROCEDURE — 85025 COMPLETE CBC W/AUTO DIFF WBC: CPT

## 2023-03-03 PROCEDURE — 83540 ASSAY OF IRON: CPT

## 2023-03-03 NOTE — PROGRESS NOTES
701  [facility] MED ONCOLOGY  [facility] [address]  Dept:  Codi Wayne: [phone]  Attending Progress Note      Reason for Visit:   1. Right Breast Cancer. 2. MDS. Referring Physician:  CONNIE Pittman CNP    PCP:  CONNIE Pittman CNP    History of Present Illness: The patient is a very pleasant 78 y.o. lady with a PMH significant for Right Breast Cancer, stage III, HR pos, was diagnosed in 2009, she had a right mastectomy done, followed by adjuvant chemo, she received 8 cycles, probably AC followed by T, then PMRT, and 5 years of adjuvant ET with Arimidex, was completed in 2015. She was treated in [facility] under the care of Dr. Serena Kwon. She was diagnosed with MDS in 2017, she received ESAs and PRBCs transfusion. She has transfusion related iron overload. She was started on Aranesp on 4/24/2020. No SE from Aranesp. The patient returns for a follow-up visit, she is feeling tired, overall better than her last visit, no bleeding. Review of Systems;  CONSTITUTIONAL: No fever, chills. Fair appetite. ENMT: Eyes: No diplopia; Nose: No epistaxis. Mouth: No sore throat. RESPIRATORY: No hemoptysis, shortness of breath, cough. CARDIOVASCULAR: No chest pain, palpitations. GASTROINTESTINAL: No nausea/vomiting, abdominal pain, diarrhea/constipation. GENITOURINARY: No dysuria, urinary frequency, hematuria. NEURO: No syncope, presyncope, headache.   Remainder:  ROS NEGATIVE    Past Medical History:      Diagnosis Date    Anemia     Pt reports she was dx in her teens, w/o proper w/u, with iron deficiency anemia and was on oral iron replacement for many years, resulting in iron overload    Aplastic anemia ([address] ~1877-6922    Possibly d/t chemotherapy for breast cancer    Breast cancer ([facility] [address] 2009    right side; pt underwent radical mastectomy followed by radiation therapy and chemotherapy and was then on oral chemo pill for 5 yrs; no recurrence as of 2019    Chickenpox     History of blood transfusion     Hypothyroidism     Measles     Mild depression     Mumps     Myelodysplastic syndrome (HCC) ~2016    Neuropathy     feet and ankles murali    Prolonged emergence from general anesthesia     Pulmonary tuberculosis ~9179-8423    in her early 25s    Scarlet fever     Sensory neuropathy     beyond the ankle b/l     Patient Active Problem List   Diagnosis    MDS (myelodysplastic syndrome) (HCC)    Bronchitis    Macrocytic anemia with high RDW    Hypothyroidism    Peripheral sensory neuropathy    Breast cancer ([facility] [address]    Anemia of chronic disease        Past Surgical History:      Procedure Laterality Date    ADENOIDECTOMY      ANKLE FRACTURE SURGERY Left 2022    LEFT ANKLE OPEN REDUCTION INTERNAL FIXATION performed by Ysabel Gan DO at [address] [place] [address]      right breast    OTHER SURGICAL HISTORY      blood transfusions    TONSILLECTOMY AND ADENOIDECTOMY      TUBAL LIGATION         Family History:  Family History   Problem Relation Age of Onset    Cancer Mother         lung    Cancer Father         lung    Breast Cancer Sister     Cancer Sister         ovarian cancer and breast cancer    Cancer Brother         unknown    Other Brother         MI    Cancer Maternal Aunt         unknown    Cancer Other         brain       Medications:  Reviewed and reconciled. Social History:  Social History     Socioeconomic History    Marital status:       Spouse name: Not on file    Number of children: Not on file    Years of education: Not on file    Highest education level: Not on file   Occupational History    Not on file   Tobacco Use    Smoking status: Former     Packs/day: 1.00     Years: 37.00     Pack years: 37.00     Types: Cigarettes     Start date:      Quit date:      Years since quittin.1    Smokeless tobacco: Never   Vaping Use    Vaping Use: Never used   Substance and Sexual Activity    Alcohol use: No    Drug use: Never    Sexual activity: Not Currently   Other Topics Concern    Not on file   Social History Narrative    Not on file     Social Determinants of Health     Financial Resource Strain: Not on file   Food Insecurity: Not on file   Transportation Needs: Not on file   Physical Activity: Not on file   Stress: Not on file   Social Connections: Not on file   Intimate Partner Violence: Not on file   Housing Stability: Not on file       Allergies: Allergies   Allergen Reactions    Bee Venom Swelling and Dermatitis    Penicillins Hives, Swelling and Dermatitis    Other      All metals except gold and platinum, welts/blisters       Physical Exam:  BP (!) 142/62   Pulse 66   Temp 97.5 °F (36.4 °C)   Ht 5' 1\" (1.549 m)   Wt 133 lb 4.8 oz (60.5 kg)   LMP 05/28/2016   SpO2 97%   BMI 25.19 kg/m²   GENERAL: Alert, oriented x 3, not in acute distress. HEENT: PERRLA; EOMI. Oropharynx clear. NECK: Supple. No palpable lymphadenopathy. LUNGS: Good air entry bilaterally. No wheezing, crackles or rhonchi. BREASTS: She is s/p right mastectomy, no suspicious lesions, she has telangiectasias. She has tenderness in the left upper rib cage. CARDIOVASCULAR: Regular rate. No murmurs, rubs or gallops. ABDOMEN: Soft. Non-tender, non-distended. Positive bowel sounds. EXTREMITIES: Dorsum of the foot bruising  NEUROLOGIC: No focal deficits. ECOG PS 1      Impression/Plan:     1. The patient is a very pleasant 78 y.o. lady with a PMH significant for Right Breast Cancer, stage III, HR pos, was diagnosed in 2009, she had a right mastectomy done, followed by adjuvant chemo, she received 8 cycles, probably AC followed by T, then PMRT, and 5 years of adjuvant ET with Arimidex, was completed in 2015. She was treated in [facility] under the care of Dr. Hi Garcia.  She is doing well clinically without any evidence of recurrence of her disease. Discussed with her the importance of monthly SBE, and routine screening mammograms, she had a repeat left breast screening mammogram done on 10/17/2020, was negative for malignancy. 2. She has MDS, diagnosed in 2017, she received ESAs and PRBCs transfusion, has transfusion related iron overload, hgb was 8.5, ferritin 3647, was restarted on Aranesp on 4/23/2019. On 7/20/2021, hemoglobin was 8 g/dl hematocrit 24.7,  1.3, normal white count, platelet count 060Z, fit test is negative. The patient did not have an adequate response to Retacrit, it was was changed back to Aranesp, today 2/17/2023, I reviewed her CBCD, hemoglobin had decreased from 9.1G/DL to 8.8 G/DL, it is less then 10, proceed with Aranesp once authorized by her new insurance, she is not iron deficient, she has iron overload secondary to prior transfusions, will monitor her counts closely. White count is 4.9, overall stable, platelet count is normal 151K. Hypothyroidism, continue Synthroid follow-up with PCP. RTC in 2 weeks with labs. Thank you for allowing us to participate in the care of Ms. Ventura.     Mila Encarnacion MD   HEMATOLOGY/MEDICAL 150 [facility]  [address] MED ONCOLOGY  [address] [place] [id number]  Dept: 71 Codi Wayne: [phone]

## 2023-03-03 NOTE — PROGRESS NOTES
Patient and Dr. Gonzalo Garcia aware that patient is unable to receive aranesp today due to insurance issues.  Labs done and can schedule patient when insurance cleared

## 2023-03-03 NOTE — PROGRESS NOTES
Patient did stop at check out window, ok'd to leave  without AVS.  Will call patient with next follow up appointment. Patient did not receive Aranesp injection due to insurance did not authorize. Will call patient to schedule Aranesp injection once authorization is obtained, then will schedule next rtc follow up appointment and notify patient.

## 2023-03-06 ENCOUNTER — HOSPITAL ENCOUNTER (OUTPATIENT)
Dept: INFUSION THERAPY | Age: 83
Discharge: HOME | End: 2023-03-06
Payer: COMMERCIAL

## 2023-03-06 DIAGNOSIS — D46.9 MDS (MYELODYSPLASTIC SYNDROME) (HCC): ICD-10-CM

## 2023-03-06 DIAGNOSIS — D63.8 ANEMIA OF CHRONIC DISEASE: Primary | ICD-10-CM

## 2023-03-06 PROCEDURE — 96372 THER/PROPH/DIAG INJ SC/IM: CPT

## 2023-03-06 PROCEDURE — 6360000002 HC RX W HCPCS: Performed by: NURSE PRACTITIONER

## 2023-03-06 RX ADMIN — DARBEPOETIN ALFA 500 MCG: 500 INJECTION, SOLUTION INTRAVENOUS; SUBCUTANEOUS at 09:21

## 2023-03-13 ENCOUNTER — SCHEDULED TELEPHONE ENCOUNTER (OUTPATIENT)
Dept: PALLATIVE CARE | Age: 83
End: 2023-03-13
Payer: COMMERCIAL

## 2023-03-13 DIAGNOSIS — G89.3 PAIN DUE TO NEOPLASM: Primary | ICD-10-CM

## 2023-03-13 DIAGNOSIS — Z51.5 PALLIATIVE CARE BY SPECIALIST: ICD-10-CM

## 2023-03-13 PROCEDURE — 99442 PR PHYS/QHP TELEPHONE EVALUATION 11-20 MIN: CPT | Performed by: NURSE PRACTITIONER

## 2023-03-13 NOTE — PROGRESS NOTES
Palliative Care Department  Palliative Care Telephone Encounter  Provider: CONNIE Mccray - SHANKAR    Pablito Roldan is a 78 y.o. female evaluated via telephone on 3/13/2023. Consent:  She and/or health care decision maker is aware that that she may receive a bill for this telephone service, depending on her insurance coverage, and has provided verbal consent to proceed: Yes      Documentation:  I communicated with the patient and/or health care decision maker regarding pain. Assessment/Plan      History of Breast Cancer stage III, HR positive:              -Diagnosed, treated, and managed in [place] in 2009              -Status post right mastectomy              -Followed by adjuvant chemotherapy              -Completed her Demadex 2015              -Currently followed locally by Dr. Teo Huntley, she also follows with regarding MDS diagnosed 2017     Chemotherapy related peripheral Neuropathy/Pain:              -Worse bilateral feet and LEs, burning and pressure              -Gabapentin 900 mg at nighttime              -Continue Norco 5-325 mg every 12 hours as needed, 1-1.5/day,    Follow Up:  3 months. They were encouraged to call with any questions, concerns, needs, or changes in symptoms. Subjective   HPI:  Pablito Roldan is a 78 y.o. female with significant past medical history of stage II HR positive breast cancer, diagnosed in 2009, status post right mastectomy, followed by adjuvant therapy, and 5 years of adjuvant Arimidex which was completed in 2015, with all treatment performed in [facility] under the care of Dr. Carmen Fuchs. She is currently being followed locally by Dr. Teo Huntley, without evidence of recurrent disease, whom she also follows for management of myelodysplastic syndrome, which was diagnosed in 2017. She was referred to [address] [place] [address] for assistance with symptom management related to chemotherapy-induced peripheral neuropathy.     Details of this discussion including any medical advice provided:   A telephonic virtual visit was completed via telephone with Karine Better today regarding the issues listed above. Overall she reports that she is doing fairly well, but does state she is experiencing more pain. Most notable in her right hip with radiation to her right knee. She is using Bengay, which is helpful. Lidocaine patch are also used at times, but not frequently. She uses 1/2 tablet of her norco typically once during the day and 1 tablet at nighttime, and this is working well for her. She denies constipation, nausea, drowsiness, or any other significant complaints. Controlled Substance Monitoring: [place] reviewed 3/13/23      I affirm this episode is with an Established Patient. Total Time: minutes: 11-20 minutes      [facility]    Note: not billable if this call serves to triage the patient into an appointment for the relevant concern    Note: This report was completed using computerize voice recognition software. Every effort has been made to ensure accuracy; however, inadvertent computerized transcription errors may be present.

## 2023-03-21 ENCOUNTER — OFFICE VISIT (OUTPATIENT)
Dept: ONCOLOGY | Age: 83
End: 2023-03-21
Payer: COMMERCIAL

## 2023-03-21 ENCOUNTER — HOSPITAL ENCOUNTER (OUTPATIENT)
Dept: INFUSION THERAPY | Age: 83
Discharge: HOME | End: 2023-03-21
Payer: COMMERCIAL

## 2023-03-21 VITALS
BODY MASS INDEX: 24.92 KG/M2 | OXYGEN SATURATION: 96 % | TEMPERATURE: 97.8 F | HEIGHT: 61 IN | HEART RATE: 71 BPM | WEIGHT: 132 LBS | SYSTOLIC BLOOD PRESSURE: 132 MMHG | DIASTOLIC BLOOD PRESSURE: 60 MMHG

## 2023-03-21 DIAGNOSIS — D63.8 ANEMIA OF CHRONIC DISEASE: ICD-10-CM

## 2023-03-21 DIAGNOSIS — D46.9 MDS (MYELODYSPLASTIC SYNDROME) (HCC): Primary | ICD-10-CM

## 2023-03-21 LAB
ANISOCYTOSIS: ABNORMAL
BASOPHILS # BLD: 0 E9/L (ref 0–0.2)
BASOPHILS NFR BLD: 0.4 % (ref 0–2)
EOSINOPHIL # BLD: 0.22 E9/L (ref 0.05–0.5)
EOSINOPHIL NFR BLD: 4.4 % (ref 0–6)
ERYTHROCYTE [DISTWIDTH] IN BLOOD BY AUTOMATED COUNT: 25.8 FL (ref 11.5–15)
HCT VFR BLD AUTO: 29.6 % (ref 34–48)
HGB BLD-MCNC: 9.5 G/DL (ref 11.5–15.5)
HYPOCHROMIA: ABNORMAL
LYMPHOCYTES # BLD: 2.5 E9/L (ref 1.5–4)
LYMPHOCYTES NFR BLD: 50.4 % (ref 20–42)
MCH RBC QN AUTO: 38.2 PG (ref 26–35)
MCHC RBC AUTO-ENTMCNC: 32.1 % (ref 32–34.5)
MCV RBC AUTO: 118.9 FL (ref 80–99.9)
MONOCYTES # BLD: 0.25 E9/L (ref 0.1–0.95)
MONOCYTES NFR BLD: 5.2 % (ref 2–12)
NEUTROPHILS # BLD: 2 E9/L (ref 1.8–7.3)
NEUTS SEG NFR BLD: 40 % (ref 43–80)
OVALOCYTES: ABNORMAL
PLATELET # BLD AUTO: 122 E9/L (ref 130–450)
PMV BLD AUTO: 9.6 FL (ref 7–12)
POIKILOCYTES: ABNORMAL
POLYCHROMASIA: ABNORMAL
RBC # BLD AUTO: 2.49 E12/L (ref 3.5–5.5)
TEAR DROP CELLS: ABNORMAL
WBC # BLD: 5 E9/L (ref 4.5–11.5)

## 2023-03-21 PROCEDURE — 99214 OFFICE O/P EST MOD 30 MIN: CPT | Performed by: INTERNAL MEDICINE

## 2023-03-21 PROCEDURE — 85025 COMPLETE CBC W/AUTO DIFF WBC: CPT

## 2023-03-21 PROCEDURE — 1123F ACP DISCUSS/DSCN MKR DOCD: CPT | Performed by: INTERNAL MEDICINE

## 2023-03-21 PROCEDURE — 6360000002 HC RX W HCPCS: Performed by: NURSE PRACTITIONER

## 2023-03-21 PROCEDURE — 96372 THER/PROPH/DIAG INJ SC/IM: CPT

## 2023-03-21 RX ADMIN — DARBEPOETIN ALFA 500 MCG: 500 INJECTION, SOLUTION INTRAVENOUS; SUBCUTANEOUS at 10:45

## 2023-03-21 NOTE — PROGRESS NOTES
Patient provided with discharge instructions, received printed AVS.  All questions answered. Patient understands follow up plan of care.
on oral chemo pill for 5 yrs; no recurrence as of 2019    Chickenpox     History of blood transfusion     Hypothyroidism     Measles     Mild depression     Mumps     Myelodysplastic syndrome (HCC) ~2016    Neuropathy     feet and ankles murali    Prolonged emergence from general anesthesia     Pulmonary tuberculosis ~6486-6263    in her early 25s    Scarlet fever     Sensory neuropathy     beyond the ankle b/l     Patient Active Problem List   Diagnosis    MDS (myelodysplastic syndrome) (HCC)    Bronchitis    Macrocytic anemia with high RDW    Hypothyroidism    Peripheral sensory neuropathy    Breast cancer ([facility] [address]    Anemia of chronic disease        Past Surgical History:      Procedure Laterality Date    ADENOIDECTOMY      ANKLE FRACTURE SURGERY Left 2022    LEFT ANKLE OPEN REDUCTION INTERNAL FIXATION performed by Dee Dee Veronica DO at [address] [place] [address]      right breast    OTHER SURGICAL HISTORY      blood transfusions    TONSILLECTOMY AND ADENOIDECTOMY      TUBAL LIGATION         Family History:  Family History   Problem Relation Age of Onset    Cancer Mother         lung    Cancer Father         lung    Breast Cancer Sister     Cancer Sister         ovarian cancer and breast cancer    Cancer Brother         unknown    Other Brother         MI    Cancer Maternal Aunt         unknown    Cancer Other         brain       Medications:  Reviewed and reconciled. Social History:  Social History     Socioeconomic History    Marital status:       Spouse name: Not on file    Number of children: Not on file    Years of education: Not on file    Highest education level: Not on file   Occupational History    Not on file   Tobacco Use    Smoking status: Former     Packs/day: 1.00     Years: 37.00     Pack years: 37.00     Types: Cigarettes     Start date:      Quit date:      Years since quittin.2    Smokeless tobacco: Never

## 2023-03-28 DIAGNOSIS — S82.892A CLOSED FRACTURE OF LEFT ANKLE, INITIAL ENCOUNTER: ICD-10-CM

## 2023-03-28 RX ORDER — HYDROCODONE BITARTRATE AND ACETAMINOPHEN 5; 325 MG/1; MG/1
1 TABLET ORAL EVERY 6 HOURS PRN
Qty: 120 TABLET | Refills: 0 | Status: SHIPPED | OUTPATIENT
Start: 2023-03-28 | End: 2023-04-27

## 2023-03-28 NOTE — TELEPHONE ENCOUNTER
Call from Gen requesting refill for [place] is [facility] on [address]. [facility], [facility] natalie 6/5/23.

## 2023-04-04 ENCOUNTER — HOSPITAL ENCOUNTER (OUTPATIENT)
Dept: INFUSION THERAPY | Age: 83
Discharge: HOME | End: 2023-04-04
Payer: COMMERCIAL

## 2023-04-04 ENCOUNTER — OFFICE VISIT (OUTPATIENT)
Dept: ONCOLOGY | Age: 83
End: 2023-04-04
Payer: COMMERCIAL

## 2023-04-04 VITALS
BODY MASS INDEX: 24.7 KG/M2 | HEIGHT: 61 IN | SYSTOLIC BLOOD PRESSURE: 149 MMHG | WEIGHT: 130.8 LBS | TEMPERATURE: 98 F | HEART RATE: 70 BPM | OXYGEN SATURATION: 100 % | DIASTOLIC BLOOD PRESSURE: 61 MMHG

## 2023-04-04 DIAGNOSIS — Z17.0 MALIGNANT NEOPLASM OF RIGHT BREAST IN FEMALE, ESTROGEN RECEPTOR POSITIVE, UNSPECIFIED SITE OF BREAST (HCC): ICD-10-CM

## 2023-04-04 DIAGNOSIS — C50.911 MALIGNANT NEOPLASM OF RIGHT BREAST IN FEMALE, ESTROGEN RECEPTOR POSITIVE, UNSPECIFIED SITE OF BREAST (HCC): ICD-10-CM

## 2023-04-04 DIAGNOSIS — D46.9 MDS (MYELODYSPLASTIC SYNDROME) (HCC): ICD-10-CM

## 2023-04-04 DIAGNOSIS — D46.9 MDS (MYELODYSPLASTIC SYNDROME) (HCC): Primary | ICD-10-CM

## 2023-04-04 LAB
ANISOCYTOSIS: ABNORMAL
BASOPHILS # BLD: 0 E9/L (ref 0–0.2)
BASOPHILS NFR BLD: 0.4 % (ref 0–2)
EOSINOPHIL # BLD: 0.35 E9/L (ref 0.05–0.5)
EOSINOPHIL NFR BLD: 6.9 % (ref 0–6)
ERYTHROCYTE [DISTWIDTH] IN BLOOD BY AUTOMATED COUNT: 25.6 FL (ref 11.5–15)
HCT VFR BLD AUTO: 31.9 % (ref 34–48)
HGB BLD-MCNC: 10 G/DL (ref 11.5–15.5)
HYPOCHROMIA: ABNORMAL
LYMPHOCYTES # BLD: 2.7 E9/L (ref 1.5–4)
LYMPHOCYTES NFR BLD: 53.9 % (ref 20–42)
MCH RBC QN AUTO: 37.9 PG (ref 26–35)
MCHC RBC AUTO-ENTMCNC: 31.3 % (ref 32–34.5)
MCV RBC AUTO: 120.8 FL (ref 80–99.9)
MONOCYTES # BLD: 0.35 E9/L (ref 0.1–0.95)
MONOCYTES NFR BLD: 7 % (ref 2–12)
NEUTROPHILS # BLD: 1.6 E9/L (ref 1.8–7.3)
NEUTS SEG NFR BLD: 32.2 % (ref 43–80)
OVALOCYTES: ABNORMAL
PLATELET # BLD AUTO: 151 E9/L (ref 130–450)
PMV BLD AUTO: 11.1 FL (ref 7–12)
POIKILOCYTES: ABNORMAL
POLYCHROMASIA: ABNORMAL
RBC # BLD AUTO: 2.64 E12/L (ref 3.5–5.5)
TARGET CELLS: ABNORMAL
TEAR DROP CELLS: ABNORMAL
WBC # BLD: 5 E9/L (ref 4.5–11.5)

## 2023-04-04 PROCEDURE — 36415 COLL VENOUS BLD VENIPUNCTURE: CPT

## 2023-04-04 PROCEDURE — 99214 OFFICE O/P EST MOD 30 MIN: CPT | Performed by: INTERNAL MEDICINE

## 2023-04-04 PROCEDURE — 1123F ACP DISCUSS/DSCN MKR DOCD: CPT | Performed by: INTERNAL MEDICINE

## 2023-04-04 PROCEDURE — 99212 OFFICE O/P EST SF 10 MIN: CPT

## 2023-04-04 PROCEDURE — 85025 COMPLETE CBC W/AUTO DIFF WBC: CPT

## 2023-04-04 RX ORDER — B-COMPLEX WITH VITAMIN C
1 CAPSULE ORAL DAILY
COMMUNITY

## 2023-04-04 NOTE — PROGRESS NOTES
Dr. Posadas Show notifed of abnormal vital signs 149/61  on paper as requested.
Patient provided with discharge instructions, received printed AVS.  All questions answered. Patient understands follow up plan of care.
Discussed with her the importance of monthly SBE, and routine screening mammograms, she had a repeat left breast screening mammogram done on 10/17/2020, was negative for malignancy. 2. She has MDS, diagnosed in 2017, she received ESAs and PRBCs transfusion, has transfusion related iron overload, hgb was 8.5, ferritin 3647, was restarted on Aranesp on 4/23/2019. On 7/20/2021, hemoglobin was 8 g/dl hematocrit 24.7,  1.3, normal white count, platelet count 236G, fit test is negative. The patient did not have an adequate response to Retacrit, it was changed back to Aranesp, today 4/4/2023, labs reviewed, her CBCD is remarkable for hemoglobin of 10 G/DL, hematocrit 31.9, had improved, white count is 5, .8, platelet count 993 K, hold Aranesp her hemoglobin is at 10, she had iron studies done, she is not iron deficient, she has iron overload secondary to prior transfusions, it is improving, will monitor her counts closely. Hypothyroidism, continue Synthroid follow-up with PCP. RTC in 2 weeks with labs. Thank you for allowing us to participate in the care of Ms. Ventura.     Chencho Miller MD   HEMATOLOGY/MEDICAL 150 [facility]  [address] MED ONCOLOGY  [address] [place] [id number]  Dept: 71 Codi Wayne: [phone]

## 2023-04-18 ENCOUNTER — HOSPITAL ENCOUNTER (OUTPATIENT)
Dept: INFUSION THERAPY | Age: 83
Discharge: HOME | End: 2023-04-18
Payer: COMMERCIAL

## 2023-04-18 ENCOUNTER — OFFICE VISIT (OUTPATIENT)
Dept: ONCOLOGY | Age: 83
End: 2023-04-18
Payer: COMMERCIAL

## 2023-04-18 VITALS
TEMPERATURE: 97.4 F | SYSTOLIC BLOOD PRESSURE: 120 MMHG | DIASTOLIC BLOOD PRESSURE: 52 MMHG | WEIGHT: 136 LBS | OXYGEN SATURATION: 94 % | HEART RATE: 87 BPM | HEIGHT: 61 IN | BODY MASS INDEX: 25.68 KG/M2

## 2023-04-18 DIAGNOSIS — D46.9 MDS (MYELODYSPLASTIC SYNDROME) (HCC): Primary | ICD-10-CM

## 2023-04-18 DIAGNOSIS — D63.8 ANEMIA OF CHRONIC DISEASE: ICD-10-CM

## 2023-04-18 LAB
ANISOCYTOSIS: ABNORMAL
BASOPHILS # BLD: 0 E9/L (ref 0–0.2)
BASOPHILS NFR BLD: 0.6 % (ref 0–2)
EOSINOPHIL # BLD: 0.22 E9/L (ref 0.05–0.5)
EOSINOPHIL NFR BLD: 4.4 % (ref 0–6)
ERYTHROCYTE [DISTWIDTH] IN BLOOD BY AUTOMATED COUNT: 23.9 FL (ref 11.5–15)
HCT VFR BLD AUTO: 26 % (ref 34–48)
HGB BLD-MCNC: 8.4 G/DL (ref 11.5–15.5)
HYPOCHROMIA: ABNORMAL
LYMPHOCYTES # BLD: 2.11 E9/L (ref 1.5–4)
LYMPHOCYTES NFR BLD: 43 % (ref 20–42)
MCH RBC QN AUTO: 37.5 PG (ref 26–35)
MCHC RBC AUTO-ENTMCNC: 32.3 % (ref 32–34.5)
MCV RBC AUTO: 116.1 FL (ref 80–99.9)
MONOCYTES # BLD: 0.24 E9/L (ref 0.1–0.95)
MONOCYTES NFR BLD: 5.2 % (ref 2–12)
NEUTROPHILS # BLD: 2.3 E9/L (ref 1.8–7.3)
NEUTS SEG NFR BLD: 47.4 % (ref 43–80)
NRBC BLD-RTO: 0.9 /100 WBC
OVALOCYTES: ABNORMAL
PLATELET # BLD AUTO: 158 E9/L (ref 130–450)
PMV BLD AUTO: 10.5 FL (ref 7–12)
POIKILOCYTES: ABNORMAL
POLYCHROMASIA: ABNORMAL
RBC # BLD AUTO: 2.24 E12/L (ref 3.5–5.5)
TARGET CELLS: ABNORMAL
TEAR DROP CELLS: ABNORMAL
WBC # BLD: 4.9 E9/L (ref 4.5–11.5)

## 2023-04-18 PROCEDURE — 85025 COMPLETE CBC W/AUTO DIFF WBC: CPT

## 2023-04-18 PROCEDURE — 6360000002 HC RX W HCPCS: Performed by: NURSE PRACTITIONER

## 2023-04-18 PROCEDURE — 96372 THER/PROPH/DIAG INJ SC/IM: CPT

## 2023-04-18 PROCEDURE — 36415 COLL VENOUS BLD VENIPUNCTURE: CPT

## 2023-04-18 PROCEDURE — 99214 OFFICE O/P EST MOD 30 MIN: CPT | Performed by: INTERNAL MEDICINE

## 2023-04-18 PROCEDURE — 1123F ACP DISCUSS/DSCN MKR DOCD: CPT | Performed by: INTERNAL MEDICINE

## 2023-04-18 RX ADMIN — DARBEPOETIN ALFA 500 MCG: 500 INJECTION, SOLUTION INTRAVENOUS; SUBCUTANEOUS at 10:45

## 2023-04-18 NOTE — PROGRESS NOTES
701  [facility] MED ONCOLOGY  [address] [facility] [address]  Dept: 71 Codi Wayne: [phone]  Attending Progress Note      Reason for Visit:   1. Right Breast Cancer. 2. MDS. Referring Physician:  CONNIE Boo CNP    PCP:  CONNIE Boo CNP    History of Present Illness: The patient is a very pleasant 78 y.o. lady with a PMH significant for Right Breast Cancer, stage III, HR pos, was diagnosed in 2009, she had a right mastectomy done, followed by adjuvant chemo, she received 8 cycles, probably AC followed by T, then PMRT, and 5 years of adjuvant ET with Arimidex, was completed in 2015. She was treated in [facility] under the care of Dr. Rafael Lopez. She was diagnosed with MDS in 2017, she received ESAs and PRBCs transfusion. She has transfusion related iron overload. She was started on Aranesp on 4/24/2020. No SE from Aranesp. The patient returns for a follow-up visit, feeling tired, otherwise doing well, no bleeding. Review of Systems;  CONSTITUTIONAL: No fever, chills. Fair appetite. ENMT: Eyes: No diplopia; Nose: No epistaxis. Mouth: No sore throat. RESPIRATORY: No hemoptysis, shortness of breath, cough. CARDIOVASCULAR: No chest pain, palpitations. GASTROINTESTINAL: No nausea/vomiting, abdominal pain, diarrhea/constipation. GENITOURINARY: No dysuria, urinary frequency, hematuria. NEURO: No syncope, presyncope, headache.   Remainder:  ROS NEGATIVE    Past Medical History:      Diagnosis Date    Anemia     Pt reports she was dx in her teens, w/o proper w/u, with iron deficiency anemia and was on oral iron replacement for many years, resulting in iron overload    Aplastic anemia ([address] ~0456-2598    Possibly d/t chemotherapy for breast cancer    Breast cancer ([facility] [address] 2009    right side; pt underwent radical mastectomy followed by radiation therapy and chemotherapy and was then on oral

## 2023-05-02 ENCOUNTER — HOSPITAL ENCOUNTER (OUTPATIENT)
Dept: INFUSION THERAPY | Age: 83
Discharge: HOME | End: 2023-05-02
Payer: COMMERCIAL

## 2023-05-02 ENCOUNTER — OFFICE VISIT (OUTPATIENT)
Dept: ONCOLOGY | Age: 83
End: 2023-05-02
Payer: COMMERCIAL

## 2023-05-02 VITALS
DIASTOLIC BLOOD PRESSURE: 64 MMHG | BODY MASS INDEX: 24.55 KG/M2 | OXYGEN SATURATION: 96 % | HEIGHT: 61 IN | TEMPERATURE: 98.2 F | HEART RATE: 75 BPM | WEIGHT: 130 LBS | SYSTOLIC BLOOD PRESSURE: 154 MMHG

## 2023-05-02 DIAGNOSIS — D46.9 MDS (MYELODYSPLASTIC SYNDROME) (HCC): Primary | ICD-10-CM

## 2023-05-02 DIAGNOSIS — C50.911 MALIGNANT NEOPLASM OF RIGHT BREAST IN FEMALE, ESTROGEN RECEPTOR POSITIVE, UNSPECIFIED SITE OF BREAST (HCC): ICD-10-CM

## 2023-05-02 DIAGNOSIS — D46.9 MDS (MYELODYSPLASTIC SYNDROME) (HCC): ICD-10-CM

## 2023-05-02 DIAGNOSIS — Z17.0 MALIGNANT NEOPLASM OF RIGHT BREAST IN FEMALE, ESTROGEN RECEPTOR POSITIVE, UNSPECIFIED SITE OF BREAST (HCC): ICD-10-CM

## 2023-05-02 LAB
ANISOCYTOSIS: ABNORMAL
BASOPHILIC STIPPLING: ABNORMAL
BASOPHILS # BLD: 0 E9/L (ref 0–0.2)
BASOPHILS NFR BLD: 0.6 % (ref 0–2)
EOSINOPHIL # BLD: 0.22 E9/L (ref 0.05–0.5)
EOSINOPHIL NFR BLD: 4.3 % (ref 0–6)
ERYTHROCYTE [DISTWIDTH] IN BLOOD BY AUTOMATED COUNT: 25.6 FL (ref 11.5–15)
HCT VFR BLD AUTO: 31.3 % (ref 34–48)
HGB BLD-MCNC: 10.2 G/DL (ref 11.5–15.5)
HYPOCHROMIA: ABNORMAL
LYMPHOCYTES # BLD: 2.08 E9/L (ref 1.5–4)
LYMPHOCYTES NFR BLD: 40 % (ref 20–42)
MCH RBC QN AUTO: 38.1 PG (ref 26–35)
MCHC RBC AUTO-ENTMCNC: 32.6 % (ref 32–34.5)
MCV RBC AUTO: 116.8 FL (ref 80–99.9)
METAMYELOCYTES NFR BLD MANUAL: 1.7 % (ref 0–1)
MONOCYTES # BLD: 0.36 E9/L (ref 0.1–0.95)
MONOCYTES NFR BLD: 7 % (ref 2–12)
NEUTROPHILS # BLD: 2.55 E9/L (ref 1.8–7.3)
NEUTS SEG NFR BLD: 47 % (ref 43–80)
NRBC BLD-RTO: 2.6 /100 WBC
OVALOCYTES: ABNORMAL
PLATELET # BLD AUTO: 149 E9/L (ref 130–450)
PMV BLD AUTO: 10.9 FL (ref 7–12)
POIKILOCYTES: ABNORMAL
POLYCHROMASIA: ABNORMAL
RBC # BLD AUTO: 2.68 E12/L (ref 3.5–5.5)
SCHISTOCYTES: ABNORMAL
TARGET CELLS: ABNORMAL
TEAR DROP CELLS: ABNORMAL
WBC # BLD: 5.2 E9/L (ref 4.5–11.5)

## 2023-05-02 PROCEDURE — 99214 OFFICE O/P EST MOD 30 MIN: CPT | Performed by: INTERNAL MEDICINE

## 2023-05-02 PROCEDURE — 85025 COMPLETE CBC W/AUTO DIFF WBC: CPT

## 2023-05-02 PROCEDURE — 99212 OFFICE O/P EST SF 10 MIN: CPT

## 2023-05-02 PROCEDURE — 36415 COLL VENOUS BLD VENIPUNCTURE: CPT

## 2023-05-02 PROCEDURE — 1123F ACP DISCUSS/DSCN MKR DOCD: CPT | Performed by: INTERNAL MEDICINE

## 2023-05-02 NOTE — PROGRESS NOTES
410  [facility] MED ONCOLOGY  [facility] [address]  Dept: Quyen Codi Wayne: [phone]  Attending Progress Note      Reason for Visit:   1. Right Breast Cancer. 2. MDS. Referring Physician:  CONNIE Damico CNP    PCP:  CONNIE Damico CNP    History of Present Illness: The patient is a very pleasant 78 y.o. lady with a PMH significant for Right Breast Cancer, stage III, HR pos, was diagnosed in 2009, she had a right mastectomy done, followed by adjuvant chemo, she received 8 cycles, probably AC followed by T, then PMRT, and 5 years of adjuvant ET with Arimidex, was completed in 2015. She was treated in [facility] under the care of Dr. Tunde Morrissey. She was diagnosed with MDS in 2017, she received ESAs and PRBCs transfusion. She has transfusion related iron overload. She was started on Aranesp on 4/24/2020. No SE from Aranesp. The patient returns for a follow-up visit, the patient had a formal walking sister's dog, she was seen at the urgent care. Review of Systems;  CONSTITUTIONAL: No fever, chills. Fair appetite. ENMT: Eyes: No diplopia; Nose: No epistaxis. Mouth: No sore throat. RESPIRATORY: No hemoptysis, shortness of breath, cough. CARDIOVASCULAR: No chest pain, palpitations. GASTROINTESTINAL: No nausea/vomiting, abdominal pain, diarrhea/constipation. GENITOURINARY: No dysuria, urinary frequency, hematuria. NEURO: No syncope, presyncope, headache.   Remainder:  ROS NEGATIVE    Past Medical History:      Diagnosis Date    Anemia     Pt reports she was dx in her teens, w/o proper w/u, with iron deficiency anemia and was on oral iron replacement for many years, resulting in iron overload    Aplastic anemia ([address] ~7437-9780    Possibly d/t chemotherapy for breast cancer    Breast cancer ([facility] [address] 2009    right side; pt underwent radical mastectomy followed by radiation therapy and

## 2023-05-16 ENCOUNTER — OFFICE VISIT (OUTPATIENT)
Dept: ONCOLOGY | Age: 83
End: 2023-05-16
Payer: COMMERCIAL

## 2023-05-16 ENCOUNTER — HOSPITAL ENCOUNTER (OUTPATIENT)
Dept: INFUSION THERAPY | Age: 83
Discharge: HOME | End: 2023-05-16
Payer: COMMERCIAL

## 2023-05-16 VITALS
TEMPERATURE: 97.3 F | BODY MASS INDEX: 25.07 KG/M2 | WEIGHT: 132.8 LBS | HEART RATE: 69 BPM | DIASTOLIC BLOOD PRESSURE: 63 MMHG | HEIGHT: 61 IN | OXYGEN SATURATION: 97 % | SYSTOLIC BLOOD PRESSURE: 142 MMHG

## 2023-05-16 DIAGNOSIS — C50.919 MALIGNANT NEOPLASM OF FEMALE BREAST, UNSPECIFIED ESTROGEN RECEPTOR STATUS, UNSPECIFIED LATERALITY, UNSPECIFIED SITE OF BREAST (HCC): ICD-10-CM

## 2023-05-16 DIAGNOSIS — D46.9 MDS (MYELODYSPLASTIC SYNDROME) (HCC): Primary | ICD-10-CM

## 2023-05-16 DIAGNOSIS — D63.8 ANEMIA OF CHRONIC DISEASE: ICD-10-CM

## 2023-05-16 LAB
ANISOCYTOSIS: ABNORMAL
BASOPHILS # BLD: 0.03 E9/L (ref 0–0.2)
BASOPHILS NFR BLD: 0.6 % (ref 0–2)
EOSINOPHIL # BLD: 0.11 E9/L (ref 0.05–0.5)
EOSINOPHIL NFR BLD: 2.1 % (ref 0–6)
ERYTHROCYTE [DISTWIDTH] IN BLOOD BY AUTOMATED COUNT: 24.6 FL (ref 11.5–15)
HCT VFR BLD AUTO: 25.5 % (ref 34–48)
HGB BLD-MCNC: 8.3 G/DL (ref 11.5–15.5)
HYPOCHROMIA: ABNORMAL
IMM GRANULOCYTES # BLD: 0.02 E9/L
IMM GRANULOCYTES NFR BLD: 0.4 % (ref 0–5)
LYMPHOCYTES # BLD: 2.24 E9/L (ref 1.5–4)
LYMPHOCYTES NFR BLD: 43.6 % (ref 20–42)
MCH RBC QN AUTO: 36.9 PG (ref 26–35)
MCHC RBC AUTO-ENTMCNC: 32.5 % (ref 32–34.5)
MCV RBC AUTO: 113.3 FL (ref 80–99.9)
MONOCYTES # BLD: 0.43 E9/L (ref 0.1–0.95)
MONOCYTES NFR BLD: 8.4 % (ref 2–12)
NEUTROPHILS # BLD: 2.31 E9/L (ref 1.8–7.3)
NEUTS SEG NFR BLD: 44.9 % (ref 43–80)
OVALOCYTES: ABNORMAL
PLATELET # BLD AUTO: 196 E9/L (ref 130–450)
PMV BLD AUTO: 10.1 FL (ref 7–12)
POIKILOCYTES: ABNORMAL
POLYCHROMASIA: ABNORMAL
RBC # BLD AUTO: 2.25 E12/L (ref 3.5–5.5)
TEAR DROP CELLS: ABNORMAL
WBC # BLD: 5.1 E9/L (ref 4.5–11.5)

## 2023-05-16 PROCEDURE — 6360000002 HC RX W HCPCS: Performed by: INTERNAL MEDICINE

## 2023-05-16 PROCEDURE — 85025 COMPLETE CBC W/AUTO DIFF WBC: CPT

## 2023-05-16 PROCEDURE — 99214 OFFICE O/P EST MOD 30 MIN: CPT | Performed by: INTERNAL MEDICINE

## 2023-05-16 PROCEDURE — 1123F ACP DISCUSS/DSCN MKR DOCD: CPT | Performed by: INTERNAL MEDICINE

## 2023-05-16 PROCEDURE — 96372 THER/PROPH/DIAG INJ SC/IM: CPT

## 2023-05-16 RX ADMIN — DARBEPOETIN ALFA 500 MCG: 500 INJECTION, SOLUTION INTRAVENOUS; SUBCUTANEOUS at 11:26

## 2023-05-16 NOTE — PROGRESS NOTES
701  [facility] MED ONCOLOGY  [address] [facility] [address]  Dept: Quyen Wayne: [phone]  Attending Progress Note      Reason for Visit:   1. Right Breast Cancer. 2. MDS. Referring Physician:  CONNIE Paul CNP    PCP:  CONNIE Paul CNP    History of Present Illness: The patient is a very pleasant 78 y.o. lady with a PMH significant for Right Breast Cancer, stage III, HR pos, was diagnosed in 2009, she had a right mastectomy done, followed by adjuvant chemo, she received 8 cycles, probably AC followed by T, then PMRT, and 5 years of adjuvant ET with Arimidex, was completed in 2015. She was treated in [facility] under the care of Dr. Jimmy Zavaleta. She was diagnosed with MDS in 2017, she received ESAs and PRBCs transfusion. She has transfusion related iron overload. She was started on Aranesp on 4/24/2020. No SE from Aranesp. The patient returns for a follow-up visit, she is doing well at this time, except for fatigue. No bleeding. Review of Systems;  CONSTITUTIONAL: No fever, chills. Fair appetite. ENMT: Eyes: No diplopia; Nose: No epistaxis. Mouth: No sore throat. RESPIRATORY: No hemoptysis, shortness of breath, cough. CARDIOVASCULAR: No chest pain, palpitations. GASTROINTESTINAL: No nausea/vomiting, abdominal pain, diarrhea/constipation. GENITOURINARY: No dysuria, urinary frequency, hematuria. NEURO: No syncope, presyncope, headache.   Remainder:  ROS NEGATIVE    Past Medical History:      Diagnosis Date    Anemia     Pt reports she was dx in her teens, w/o proper w/u, with iron deficiency anemia and was on oral iron replacement for many years, resulting in iron overload    Aplastic anemia ([address] ~5578-7478    Possibly d/t chemotherapy for breast cancer    Breast cancer ([facility] [address] 2009    right side; pt underwent radical mastectomy followed by radiation therapy and chemotherapy and

## 2023-05-17 DIAGNOSIS — S82.892A CLOSED FRACTURE OF LEFT ANKLE, INITIAL ENCOUNTER: ICD-10-CM

## 2023-05-17 RX ORDER — HYDROCODONE BITARTRATE AND ACETAMINOPHEN 5; 325 MG/1; MG/1
1 TABLET ORAL EVERY 6 HOURS PRN
Qty: 120 TABLET | Refills: 0 | Status: SHIPPED | OUTPATIENT
Start: 2023-05-17 | End: 2023-06-16

## 2023-05-17 NOTE — TELEPHONE ENCOUNTER
Call from Gen requesting refill for Gen Mckee shares that she has had two falls recently and is banged up but is on the mend. Gen states she has used more Norco than normal lately. Pharmacy is [facility] on [address]. Next natalie 6/5/23.

## 2023-05-30 ENCOUNTER — OFFICE VISIT (OUTPATIENT)
Dept: ONCOLOGY | Age: 83
End: 2023-05-30
Payer: COMMERCIAL

## 2023-05-30 ENCOUNTER — HOSPITAL ENCOUNTER (OUTPATIENT)
Dept: INFUSION THERAPY | Age: 83
Discharge: HOME | End: 2023-05-30
Payer: COMMERCIAL

## 2023-05-30 VITALS
SYSTOLIC BLOOD PRESSURE: 113 MMHG | DIASTOLIC BLOOD PRESSURE: 51 MMHG | BODY MASS INDEX: 25.11 KG/M2 | WEIGHT: 133 LBS | OXYGEN SATURATION: 97 % | TEMPERATURE: 97.4 F | HEIGHT: 61 IN | HEART RATE: 78 BPM

## 2023-05-30 DIAGNOSIS — D63.8 ANEMIA OF CHRONIC DISEASE: ICD-10-CM

## 2023-05-30 DIAGNOSIS — D46.9 MDS (MYELODYSPLASTIC SYNDROME) (HCC): Primary | ICD-10-CM

## 2023-05-30 LAB
ANISOCYTOSIS: ABNORMAL
BASOPHILS # BLD: 0 E9/L (ref 0–0.2)
BASOPHILS NFR BLD: 0.6 % (ref 0–2)
EOSINOPHIL # BLD: 0 E9/L (ref 0.05–0.5)
EOSINOPHIL NFR BLD: 2.3 % (ref 0–6)
ERYTHROCYTE [DISTWIDTH] IN BLOOD BY AUTOMATED COUNT: 25.9 FL (ref 11.5–15)
HCT VFR BLD AUTO: 25.8 % (ref 34–48)
HGB BLD-MCNC: 8.4 G/DL (ref 11.5–15.5)
HYPOCHROMIA: ABNORMAL
LYMPHOCYTES # BLD: 1.84 E9/L (ref 1.5–4)
LYMPHOCYTES NFR BLD: 35.6 % (ref 20–42)
MCH RBC QN AUTO: 37.8 PG (ref 26–35)
MCHC RBC AUTO-ENTMCNC: 32.6 % (ref 32–34.5)
MCV RBC AUTO: 116.2 FL (ref 80–99.9)
MONOCYTES # BLD: 0.31 E9/L (ref 0.1–0.95)
MONOCYTES NFR BLD: 6.1 % (ref 2–12)
NEUTROPHILS # BLD: 2.96 E9/L (ref 1.8–7.3)
NEUTS SEG NFR BLD: 58.3 % (ref 43–80)
NRBC BLD-RTO: 0.9 /100 WBC
PLATELET # BLD AUTO: 124 E9/L (ref 130–450)
PMV BLD AUTO: 10.9 FL (ref 7–12)
POIKILOCYTES: ABNORMAL
POLYCHROMASIA: ABNORMAL
RBC # BLD AUTO: 2.22 E12/L (ref 3.5–5.5)
TARGET CELLS: ABNORMAL
TEAR DROP CELLS: ABNORMAL
WBC # BLD: 5.1 E9/L (ref 4.5–11.5)

## 2023-05-30 PROCEDURE — 6360000002 HC RX W HCPCS: Performed by: INTERNAL MEDICINE

## 2023-05-30 PROCEDURE — 99214 OFFICE O/P EST MOD 30 MIN: CPT | Performed by: INTERNAL MEDICINE

## 2023-05-30 PROCEDURE — 96372 THER/PROPH/DIAG INJ SC/IM: CPT

## 2023-05-30 PROCEDURE — 99212 OFFICE O/P EST SF 10 MIN: CPT

## 2023-05-30 PROCEDURE — 85025 COMPLETE CBC W/AUTO DIFF WBC: CPT

## 2023-05-30 PROCEDURE — 1123F ACP DISCUSS/DSCN MKR DOCD: CPT | Performed by: INTERNAL MEDICINE

## 2023-05-30 PROCEDURE — 36415 COLL VENOUS BLD VENIPUNCTURE: CPT

## 2023-05-30 RX ADMIN — DARBEPOETIN ALFA 500 MCG: 500 INJECTION, SOLUTION INTRAVENOUS; SUBCUTANEOUS at 10:58

## 2023-05-30 NOTE — PROGRESS NOTES
704  [place] MED ONCOLOGY  [facility]9 [facility] [address]  Dept: 71 Codi Wayne: [phone]  Attending Progress Note      Reason for Visit:   1. Right Breast Cancer. 2. MDS. Referring Physician:  CONNIE Boswell CNP    PCP:  CONNIE Boswell CNP    History of Present Illness: The patient is a very pleasant 78 y.o. lady with a PMH significant for Right Breast Cancer, stage III, HR pos, was diagnosed in 2009, she had a right mastectomy done, followed by adjuvant chemo, she received 8 cycles, probably AC followed by T, then PMRT, and 5 years of adjuvant ET with Arimidex, was completed in 2015. She was treated in [facility] under the care of Dr. Kayce Al. She was diagnosed with MDS in 2017, she received ESAs and PRBCs transfusion. She has transfusion related iron overload. She was started on Aranesp on 4/24/2020. No SE from Aranesp. The patient returns for a follow-up visit, she is doing well at this time, except for fatigue. No bleeding. No new problems    Review of Systems;  CONSTITUTIONAL: No fever, chills. Fair appetite. ENMT: Eyes: No diplopia; Nose: No epistaxis. Mouth: No sore throat. RESPIRATORY: No hemoptysis, shortness of breath, cough. CARDIOVASCULAR: No chest pain, palpitations. GASTROINTESTINAL: No nausea/vomiting, abdominal pain, diarrhea/constipation. GENITOURINARY: No dysuria, urinary frequency, hematuria. NEURO: No syncope, presyncope, headache.   Remainder:  ROS NEGATIVE    Past Medical History:      Diagnosis Date    Anemia     Pt reports she was dx in her teens, w/o proper w/u, with iron deficiency anemia and was on oral iron replacement for many years, resulting in iron overload    Aplastic anemia ([address] ~1758-2379    Possibly d/t chemotherapy for breast cancer    Breast cancer ([facility] [address] 2009    right side; pt underwent radical mastectomy followed by radiation therapy and

## 2023-06-13 ENCOUNTER — HOSPITAL ENCOUNTER (OUTPATIENT)
Dept: INFUSION THERAPY | Age: 83
Discharge: HOME | End: 2023-06-13
Payer: COMMERCIAL

## 2023-06-13 DIAGNOSIS — D46.9 MDS (MYELODYSPLASTIC SYNDROME) (HCC): Primary | ICD-10-CM

## 2023-06-13 DIAGNOSIS — D63.8 ANEMIA OF CHRONIC DISEASE: ICD-10-CM

## 2023-06-13 LAB
ANISOCYTOSIS: ABNORMAL
BASOPHILIC STIPPLING: ABNORMAL
BASOPHILS # BLD: 0 E9/L (ref 0–0.2)
BASOPHILS NFR BLD: 0.4 % (ref 0–2)
EOSINOPHIL # BLD: 0.11 E9/L (ref 0.05–0.5)
EOSINOPHIL NFR BLD: 1.7 % (ref 0–6)
ERYTHROCYTE [DISTWIDTH] IN BLOOD BY AUTOMATED COUNT: 27.1 FL (ref 11.5–15)
HCT VFR BLD AUTO: 29.4 % (ref 34–48)
HGB BLD-MCNC: 9.1 G/DL (ref 11.5–15.5)
LYMPHOCYTES # BLD: 2.95 E9/L (ref 1.5–4)
LYMPHOCYTES NFR BLD: 43.9 % (ref 20–42)
MCH RBC QN AUTO: 37.6 PG (ref 26–35)
MCHC RBC AUTO-ENTMCNC: 31 % (ref 32–34.5)
MCV RBC AUTO: 121.5 FL (ref 80–99.9)
MONOCYTES # BLD: 0.27 E9/L (ref 0.1–0.95)
MONOCYTES NFR BLD: 4.4 % (ref 2–12)
MYELOCYTES NFR BLD MANUAL: 2.6 % (ref 0–0)
NEUTROPHILS # BLD: 3.35 E9/L (ref 1.8–7.3)
NEUTS SEG NFR BLD: 47.4 % (ref 43–80)
NRBC BLD-RTO: 1.8 /100 WBC
OVALOCYTES: ABNORMAL
PLATELET # BLD AUTO: 183 E9/L (ref 130–450)
PMV BLD AUTO: 11.3 FL (ref 7–12)
POIKILOCYTES: ABNORMAL
POLYCHROMASIA: ABNORMAL
RBC # BLD AUTO: 2.42 E12/L (ref 3.5–5.5)
SCHISTOCYTES: ABNORMAL
TARGET CELLS: ABNORMAL
TEAR DROP CELLS: ABNORMAL
WBC # BLD: 6.7 E9/L (ref 4.5–11.5)

## 2023-06-13 PROCEDURE — 96372 THER/PROPH/DIAG INJ SC/IM: CPT

## 2023-06-13 PROCEDURE — 85025 COMPLETE CBC W/AUTO DIFF WBC: CPT

## 2023-06-13 PROCEDURE — 6360000002 HC RX W HCPCS: Performed by: INTERNAL MEDICINE

## 2023-06-13 RX ADMIN — DARBEPOETIN ALFA 500 MCG: 500 INJECTION, SOLUTION INTRAVENOUS; SUBCUTANEOUS at 10:47

## 2023-06-27 ENCOUNTER — HOSPITAL ENCOUNTER (OUTPATIENT)
Dept: INFUSION THERAPY | Age: 83
Discharge: HOME | End: 2023-06-27
Payer: COMMERCIAL

## 2023-06-27 ENCOUNTER — OFFICE VISIT (OUTPATIENT)
Dept: ONCOLOGY | Age: 83
End: 2023-06-27
Payer: COMMERCIAL

## 2023-06-27 VITALS
HEIGHT: 61 IN | BODY MASS INDEX: 24.55 KG/M2 | WEIGHT: 130 LBS | TEMPERATURE: 97.3 F | OXYGEN SATURATION: 96 % | SYSTOLIC BLOOD PRESSURE: 112 MMHG | DIASTOLIC BLOOD PRESSURE: 52 MMHG | HEART RATE: 81 BPM

## 2023-06-27 DIAGNOSIS — D46.9 MDS (MYELODYSPLASTIC SYNDROME) (HCC): Primary | ICD-10-CM

## 2023-06-27 DIAGNOSIS — C50.919 MALIGNANT NEOPLASM OF FEMALE BREAST, UNSPECIFIED ESTROGEN RECEPTOR STATUS, UNSPECIFIED LATERALITY, UNSPECIFIED SITE OF BREAST (HCC): ICD-10-CM

## 2023-06-27 DIAGNOSIS — D63.8 ANEMIA OF CHRONIC DISEASE: ICD-10-CM

## 2023-06-27 LAB
ANISOCYTOSIS: ABNORMAL
BASOPHILS # BLD: 0 E9/L (ref 0–0.2)
BASOPHILS NFR BLD: 0.5 % (ref 0–2)
EOSINOPHIL # BLD: 0.16 E9/L (ref 0.05–0.5)
EOSINOPHIL NFR BLD: 2.6 % (ref 0–6)
ERYTHROCYTE [DISTWIDTH] IN BLOOD BY AUTOMATED COUNT: 26.7 FL (ref 11.5–15)
HCT VFR BLD AUTO: 27.8 % (ref 34–48)
HGB BLD-MCNC: 8.9 G/DL (ref 11.5–15.5)
LYMPHOCYTES # BLD: 3.15 E9/L (ref 1.5–4)
LYMPHOCYTES NFR BLD: 49.6 % (ref 20–42)
MCH RBC QN AUTO: 38 PG (ref 26–35)
MCHC RBC AUTO-ENTMCNC: 32 % (ref 32–34.5)
MCV RBC AUTO: 118.8 FL (ref 80–99.9)
METAMYELOCYTES NFR BLD MANUAL: 0.9 % (ref 0–1)
MONOCYTES # BLD: 0.25 E9/L (ref 0.1–0.95)
MONOCYTES NFR BLD: 3.5 % (ref 2–12)
NEUTROPHILS # BLD: 2.77 E9/L (ref 1.8–7.3)
NEUTS SEG NFR BLD: 43.4 % (ref 43–80)
OVALOCYTES: ABNORMAL
PLATELET # BLD AUTO: 189 E9/L (ref 130–450)
PMV BLD AUTO: 10.9 FL (ref 7–12)
POIKILOCYTES: ABNORMAL
POLYCHROMASIA: ABNORMAL
RBC # BLD AUTO: 2.34 E12/L (ref 3.5–5.5)
SCHISTOCYTES: ABNORMAL
TARGET CELLS: ABNORMAL
TEAR DROP CELLS: ABNORMAL
WBC # BLD: 6.3 E9/L (ref 4.5–11.5)

## 2023-06-27 PROCEDURE — 6360000002 HC RX W HCPCS: Performed by: INTERNAL MEDICINE

## 2023-06-27 PROCEDURE — 96372 THER/PROPH/DIAG INJ SC/IM: CPT

## 2023-06-27 PROCEDURE — 1123F ACP DISCUSS/DSCN MKR DOCD: CPT | Performed by: INTERNAL MEDICINE

## 2023-06-27 PROCEDURE — 36415 COLL VENOUS BLD VENIPUNCTURE: CPT

## 2023-06-27 PROCEDURE — 99212 OFFICE O/P EST SF 10 MIN: CPT

## 2023-06-27 PROCEDURE — 85025 COMPLETE CBC W/AUTO DIFF WBC: CPT

## 2023-06-27 PROCEDURE — 99214 OFFICE O/P EST MOD 30 MIN: CPT | Performed by: INTERNAL MEDICINE

## 2023-06-27 RX ADMIN — DARBEPOETIN ALFA 500 MCG: 500 INJECTION, SOLUTION INTRAVENOUS; SUBCUTANEOUS at 11:12

## 2023-07-11 ENCOUNTER — HOSPITAL ENCOUNTER (OUTPATIENT)
Dept: INFUSION THERAPY | Age: 83
End: 2023-07-11

## 2023-07-18 ENCOUNTER — OFFICE VISIT (OUTPATIENT)
Dept: ONCOLOGY | Age: 83
End: 2023-07-18
Payer: COMMERCIAL

## 2023-07-18 ENCOUNTER — HOSPITAL ENCOUNTER (OUTPATIENT)
Dept: INFUSION THERAPY | Age: 83
Discharge: HOME | End: 2023-07-18
Payer: COMMERCIAL

## 2023-07-18 VITALS
HEIGHT: 61 IN | DIASTOLIC BLOOD PRESSURE: 48 MMHG | BODY MASS INDEX: 24.17 KG/M2 | WEIGHT: 128 LBS | TEMPERATURE: 97.3 F | HEART RATE: 91 BPM | SYSTOLIC BLOOD PRESSURE: 114 MMHG | OXYGEN SATURATION: 93 %

## 2023-07-18 DIAGNOSIS — D46.9 MDS (MYELODYSPLASTIC SYNDROME) (HCC): Primary | ICD-10-CM

## 2023-07-18 DIAGNOSIS — D63.8 ANEMIA OF CHRONIC DISEASE: ICD-10-CM

## 2023-07-18 LAB
BASOPHILS # BLD: 0.02 K/UL (ref 0–0.2)
BASOPHILS NFR BLD: 0 % (ref 0–2)
EOSINOPHIL # BLD: 0.17 K/UL (ref 0.05–0.5)
EOSINOPHILS RELATIVE PERCENT: 4 % (ref 0–6)
ERYTHROCYTE [DISTWIDTH] IN BLOOD BY AUTOMATED COUNT: 26 % (ref 11.5–15)
HCT VFR BLD AUTO: 24.2 % (ref 34–48)
HGB BLD-MCNC: 7.4 G/DL (ref 11.5–15.5)
IMM GRANULOCYTES # BLD AUTO: <0.03 K/UL (ref 0–0.58)
IMM GRANULOCYTES NFR BLD: 0 % (ref 0–5)
LYMPHOCYTES # BLD: 44 % (ref 20–42)
LYMPHOCYTES NFR BLD: 2.12 K/UL (ref 1.5–4)
MCH RBC QN AUTO: 37 PG (ref 26–35)
MCHC RBC AUTO-ENTMCNC: 30.6 G/DL (ref 32–34.5)
MCV RBC AUTO: 121 FL (ref 80–99.9)
MONOCYTES NFR BLD: 0.37 K/UL (ref 0.1–0.95)
MONOCYTES NFR BLD: 8 % (ref 2–12)
NEUTROPHILS NFR BLD: 44 % (ref 43–80)
NEUTS SEG NFR BLD: 2.1 K/UL (ref 1.8–7.3)
PLATELET # BLD AUTO: 168 K/UL (ref 130–450)
PMV BLD AUTO: 11.4 FL (ref 7–12)
RBC # BLD AUTO: 2 M/UL (ref 3.5–5.5)
RBC # BLD: ABNORMAL 10*6/UL
WBC OTHER # BLD: 4.8 K/UL (ref 4.5–11.5)

## 2023-07-18 PROCEDURE — 99214 OFFICE O/P EST MOD 30 MIN: CPT | Performed by: INTERNAL MEDICINE

## 2023-07-18 PROCEDURE — 96372 THER/PROPH/DIAG INJ SC/IM: CPT

## 2023-07-18 PROCEDURE — 85027 COMPLETE CBC AUTOMATED: CPT

## 2023-07-18 PROCEDURE — 36415 COLL VENOUS BLD VENIPUNCTURE: CPT

## 2023-07-18 PROCEDURE — 99212 OFFICE O/P EST SF 10 MIN: CPT

## 2023-07-18 PROCEDURE — 6360000002 HC RX W HCPCS: Performed by: INTERNAL MEDICINE

## 2023-07-18 PROCEDURE — 1123F ACP DISCUSS/DSCN MKR DOCD: CPT | Performed by: INTERNAL MEDICINE

## 2023-07-18 RX ADMIN — DARBEPOETIN ALFA 500 MCG: 500 INJECTION, SOLUTION INTRAVENOUS; SUBCUTANEOUS at 11:12

## 2023-07-18 NOTE — PROGRESS NOTES
218 [facility] MED ONCOLOGY  [address] [facility] [address]  Dept: [address] [place] [address] Avenue: [phone]  Attending Progress Note      Reason for Visit:   1. Right Breast Cancer. 2. MDS. Referring Physician:  CONNIE Nix CNP    PCP:  CONNIE Nix CNP    History of Present Illness: The patient is a very pleasant 78 y.o. lady with a PMH significant for Right Breast Cancer, stage III, HR pos, was diagnosed in 2009, she had a right mastectomy done, followed by adjuvant chemo, she received 8 cycles, probably AC followed by T, then PMRT, and 5 years of adjuvant ET with Arimidex, was completed in 2015. She was treated in [facility] under the care of Dr. Jad Reyes. She was diagnosed with MDS in 2017, she received ESAs and PRBCs transfusion. She has transfusion related iron overload. She was started on Aranesp on 4/24/2020. No SE from Aranesp. The patient returns for a follow-up visit, she is feeling tired, she missed her appointment last week. Review of Systems;  CONSTITUTIONAL: No fever, chills. Fair appetite. Positive for fatigue. ENMT: Eyes: No diplopia; Nose: No epistaxis. Mouth: No sore throat. RESPIRATORY: No hemoptysis, shortness of breath, cough. CARDIOVASCULAR: No chest pain, palpitations. GASTROINTESTINAL: No nausea/vomiting, abdominal pain, diarrhea/constipation. GENITOURINARY: No dysuria, urinary frequency, hematuria. NEURO: No syncope, presyncope, headache.   Remainder:  ROS NEGATIVE    Past Medical History:      Diagnosis Date    Anemia     Pt reports she was dx in her teens, w/o proper w/u, with iron deficiency anemia and was on oral iron replacement for many years, resulting in iron overload    Aplastic anemia ([address]) ~0163-7741    Possibly d/t chemotherapy for breast cancer    Breast cancer ([address]) 2009    right side; pt underwent radical mastectomy followed by radiation therapy and

## 2023-07-26 DIAGNOSIS — G89.3 PAIN DUE TO NEOPLASM: Primary | ICD-10-CM

## 2023-07-26 DIAGNOSIS — Z51.5 PALLIATIVE CARE BY SPECIALIST: ICD-10-CM

## 2023-07-26 DIAGNOSIS — C50.919 MALIGNANT NEOPLASM OF FEMALE BREAST, UNSPECIFIED ESTROGEN RECEPTOR STATUS, UNSPECIFIED LATERALITY, UNSPECIFIED SITE OF BREAST (HCC): ICD-10-CM

## 2023-07-26 RX ORDER — HYDROCODONE BITARTRATE AND ACETAMINOPHEN 5; 325 MG/1; MG/1
1 TABLET ORAL EVERY 6 HOURS PRN
Qty: 120 TABLET | Refills: 0 | Status: SHIPPED | OUTPATIENT
Start: 2023-07-26 | End: 2023-08-25

## 2023-08-01 ENCOUNTER — HOSPITAL ENCOUNTER (OUTPATIENT)
Dept: INFUSION THERAPY | Age: 83
Discharge: HOME | End: 2023-08-01
Payer: COMMERCIAL

## 2023-08-01 ENCOUNTER — OFFICE VISIT (OUTPATIENT)
Dept: ONCOLOGY | Age: 83
End: 2023-08-01
Payer: COMMERCIAL

## 2023-08-01 VITALS
OXYGEN SATURATION: 98 % | HEIGHT: 61 IN | SYSTOLIC BLOOD PRESSURE: 142 MMHG | BODY MASS INDEX: 24.17 KG/M2 | DIASTOLIC BLOOD PRESSURE: 56 MMHG | WEIGHT: 128 LBS | TEMPERATURE: 97.4 F | HEART RATE: 73 BPM

## 2023-08-01 DIAGNOSIS — C50.919 MALIGNANT NEOPLASM OF FEMALE BREAST, UNSPECIFIED ESTROGEN RECEPTOR STATUS, UNSPECIFIED LATERALITY, UNSPECIFIED SITE OF BREAST (HCC): ICD-10-CM

## 2023-08-01 DIAGNOSIS — D46.9 MDS (MYELODYSPLASTIC SYNDROME) (HCC): Primary | ICD-10-CM

## 2023-08-01 DIAGNOSIS — D63.8 ANEMIA OF CHRONIC DISEASE: ICD-10-CM

## 2023-08-01 DIAGNOSIS — Z12.31 ENCOUNTER FOR SCREENING MAMMOGRAM FOR MALIGNANT NEOPLASM OF BREAST: Primary | ICD-10-CM

## 2023-08-01 LAB
BASOPHILS # BLD: 0 K/UL (ref 0–0.2)
BASOPHILS NFR BLD: 0 % (ref 0–2)
EOSINOPHIL # BLD: 0.47 K/UL (ref 0.05–0.5)
EOSINOPHILS RELATIVE PERCENT: 10 % (ref 0–6)
ERYTHROCYTE [DISTWIDTH] IN BLOOD BY AUTOMATED COUNT: 26.4 % (ref 11.5–15)
HCT VFR BLD AUTO: 26.2 % (ref 34–48)
HGB BLD-MCNC: 8.2 G/DL (ref 11.5–15.5)
LYMPHOCYTES NFR BLD: 2.02 K/UL (ref 1.5–4)
LYMPHOCYTES RELATIVE PERCENT: 43 % (ref 20–42)
MCH RBC QN AUTO: 37.3 PG (ref 26–35)
MCHC RBC AUTO-ENTMCNC: 31.3 G/DL (ref 32–34.5)
MCV RBC AUTO: 119.1 FL (ref 80–99.9)
MONOCYTES NFR BLD: 0.33 K/UL (ref 0.1–0.95)
MONOCYTES NFR BLD: 7 % (ref 2–12)
NEUTROPHILS NFR BLD: 40 % (ref 43–80)
NEUTS SEG NFR BLD: 1.88 K/UL (ref 1.8–7.3)
NUCLEATED RED BLOOD CELLS: 2 PER 100 WBC
PLATELET # BLD AUTO: 138 K/UL (ref 130–450)
PMV BLD AUTO: 10.9 FL (ref 7–12)
RBC # BLD AUTO: 2.2 M/UL (ref 3.5–5.5)
RBC # BLD: ABNORMAL 10*6/UL
WBC OTHER # BLD: 4.7 K/UL (ref 4.5–11.5)

## 2023-08-01 PROCEDURE — 96372 THER/PROPH/DIAG INJ SC/IM: CPT

## 2023-08-01 PROCEDURE — 6360000002 HC RX W HCPCS: Performed by: INTERNAL MEDICINE

## 2023-08-01 PROCEDURE — 99214 OFFICE O/P EST MOD 30 MIN: CPT | Performed by: INTERNAL MEDICINE

## 2023-08-01 PROCEDURE — 99213 OFFICE O/P EST LOW 20 MIN: CPT

## 2023-08-01 PROCEDURE — 85025 COMPLETE CBC W/AUTO DIFF WBC: CPT

## 2023-08-01 PROCEDURE — 1123F ACP DISCUSS/DSCN MKR DOCD: CPT | Performed by: INTERNAL MEDICINE

## 2023-08-01 PROCEDURE — 36415 COLL VENOUS BLD VENIPUNCTURE: CPT

## 2023-08-01 RX ADMIN — DARBEPOETIN ALFA 500 MCG: 500 INJECTION, SOLUTION INTRAVENOUS; SUBCUTANEOUS at 10:50

## 2023-08-10 DIAGNOSIS — D46.9 MDS (MYELODYSPLASTIC SYNDROME) (HCC): Primary | ICD-10-CM

## 2023-08-15 ENCOUNTER — HOSPITAL ENCOUNTER (OUTPATIENT)
Dept: INFUSION THERAPY | Age: 83
Discharge: HOME | End: 2023-08-15
Payer: COMMERCIAL

## 2023-08-15 ENCOUNTER — TELEPHONE (OUTPATIENT)
Dept: BREAST CENTER | Age: 83
End: 2023-08-15

## 2023-08-15 ENCOUNTER — OFFICE VISIT (OUTPATIENT)
Dept: ONCOLOGY | Age: 83
End: 2023-08-15
Payer: COMMERCIAL

## 2023-08-15 VITALS
HEART RATE: 69 BPM | TEMPERATURE: 97.5 F | DIASTOLIC BLOOD PRESSURE: 59 MMHG | BODY MASS INDEX: 23.98 KG/M2 | WEIGHT: 127 LBS | HEIGHT: 61 IN | OXYGEN SATURATION: 96 % | SYSTOLIC BLOOD PRESSURE: 127 MMHG

## 2023-08-15 DIAGNOSIS — D63.8 ANEMIA OF CHRONIC DISEASE: ICD-10-CM

## 2023-08-15 DIAGNOSIS — C50.919 MALIGNANT NEOPLASM OF FEMALE BREAST, UNSPECIFIED ESTROGEN RECEPTOR STATUS, UNSPECIFIED LATERALITY, UNSPECIFIED SITE OF BREAST (HCC): Primary | ICD-10-CM

## 2023-08-15 DIAGNOSIS — D46.9 MDS (MYELODYSPLASTIC SYNDROME) (HCC): Primary | ICD-10-CM

## 2023-08-15 DIAGNOSIS — D46.9 MDS (MYELODYSPLASTIC SYNDROME) (HCC): ICD-10-CM

## 2023-08-15 LAB
BASOPHILS # BLD: 0.03 K/UL (ref 0–0.2)
BASOPHILS NFR BLD: 1 % (ref 0–2)
EOSINOPHIL # BLD: 0.16 K/UL (ref 0.05–0.5)
EOSINOPHILS RELATIVE PERCENT: 3 % (ref 0–6)
ERYTHROCYTE [DISTWIDTH] IN BLOOD BY AUTOMATED COUNT: 26.3 % (ref 11.5–15)
HCT VFR BLD AUTO: 31.9 % (ref 34–48)
HGB BLD-MCNC: 9.8 G/DL (ref 11.5–15.5)
IMM GRANULOCYTES # BLD AUTO: <0.03 K/UL (ref 0–0.58)
IMM GRANULOCYTES NFR BLD: 0 % (ref 0–5)
LYMPHOCYTES NFR BLD: 2.11 K/UL (ref 1.5–4)
LYMPHOCYTES RELATIVE PERCENT: 44 % (ref 20–42)
MCH RBC QN AUTO: 36.7 PG (ref 26–35)
MCHC RBC AUTO-ENTMCNC: 30.7 G/DL (ref 32–34.5)
MCV RBC AUTO: 119.5 FL (ref 80–99.9)
MONOCYTES NFR BLD: 0.32 K/UL (ref 0.1–0.95)
MONOCYTES NFR BLD: 7 % (ref 2–12)
NEUTROPHILS NFR BLD: 45 % (ref 43–80)
NEUTS SEG NFR BLD: 2.11 K/UL (ref 1.8–7.3)
PLATELET # BLD AUTO: 159 K/UL (ref 130–450)
PMV BLD AUTO: 10.9 FL (ref 7–12)
RBC # BLD AUTO: 2.67 M/UL (ref 3.5–5.5)
RBC # BLD: ABNORMAL 10*6/UL
WBC OTHER # BLD: 4.8 K/UL (ref 4.5–11.5)

## 2023-08-15 PROCEDURE — 99214 OFFICE O/P EST MOD 30 MIN: CPT | Performed by: INTERNAL MEDICINE

## 2023-08-15 PROCEDURE — 6360000002 HC RX W HCPCS: Performed by: INTERNAL MEDICINE

## 2023-08-15 PROCEDURE — 85025 COMPLETE CBC W/AUTO DIFF WBC: CPT

## 2023-08-15 PROCEDURE — 96372 THER/PROPH/DIAG INJ SC/IM: CPT

## 2023-08-15 PROCEDURE — 1123F ACP DISCUSS/DSCN MKR DOCD: CPT | Performed by: INTERNAL MEDICINE

## 2023-08-15 PROCEDURE — 99212 OFFICE O/P EST SF 10 MIN: CPT

## 2023-08-15 RX ADMIN — DARBEPOETIN ALFA 500 MCG: 500 INJECTION, SOLUTION INTRAVENOUS; SUBCUTANEOUS at 11:05

## 2023-08-15 NOTE — TELEPHONE ENCOUNTER
Patient was referred by Dr.El Bernardo for right chest wall ulceration. Patient was scheduled on 8/16/2023 in [facility] office @ 9:30am with Dr. Mu Lozano. Patient was instructed to bring a photo ID, insurance card (if applicable), and list of any current medications. Patient verbalized understanding of appointment instructions.         Electronically signed by Milena Harmon RN on 8/15/23 at 10:34 AM EDT

## 2023-08-15 NOTE — PROGRESS NOTES
Patient receiving darbepoetin for MDS and Hct above holding parameter of 30%. Trials that have been published using darbepoetin for MDS utilized hemoglobin as a monitoring parameter and not hematocrit. Hemoglobin within holding parameters today.     Ben Butts, OmarD, BCOP

## 2023-08-16 ENCOUNTER — OFFICE VISIT (OUTPATIENT)
Dept: BREAST CENTER | Age: 83
End: 2023-08-16
Payer: COMMERCIAL

## 2023-08-16 VITALS
HEIGHT: 61 IN | DIASTOLIC BLOOD PRESSURE: 64 MMHG | OXYGEN SATURATION: 98 % | HEART RATE: 66 BPM | BODY MASS INDEX: 23.98 KG/M2 | WEIGHT: 127 LBS | RESPIRATION RATE: 20 BRPM | TEMPERATURE: 97.8 F | SYSTOLIC BLOOD PRESSURE: 116 MMHG

## 2023-08-16 DIAGNOSIS — C50.919 MALIGNANT NEOPLASM OF FEMALE BREAST, UNSPECIFIED ESTROGEN RECEPTOR STATUS, UNSPECIFIED LATERALITY, UNSPECIFIED SITE OF BREAST (HCC): Primary | ICD-10-CM

## 2023-08-16 PROCEDURE — 99204 OFFICE O/P NEW MOD 45 MIN: CPT | Performed by: SURGERY

## 2023-08-16 PROCEDURE — 99203 OFFICE O/P NEW LOW 30 MIN: CPT | Performed by: SURGERY

## 2023-08-16 PROCEDURE — 1123F ACP DISCUSS/DSCN MKR DOCD: CPT | Performed by: SURGERY

## 2023-08-16 RX ORDER — LIDOCAINE HYDROCHLORIDE 10 MG/ML
5 INJECTION, SOLUTION EPIDURAL; INFILTRATION; INTRACAUDAL; PERINEURAL ONCE
Status: COMPLETED | OUTPATIENT
Start: 2023-08-16 | End: 2023-08-16

## 2023-08-16 RX ADMIN — LIDOCAINE HYDROCHLORIDE 5 ML: 10 INJECTION, SOLUTION EPIDURAL; INFILTRATION; INTRACAUDAL; PERINEURAL at 10:50

## 2023-08-16 NOTE — PROGRESS NOTES
Date of Visit: 8/16/2023  New Patient    08/16/23      DIAGNOSIS:  1. (08/16/23) RIGHT mastectomy chest wall ulceration  2. (2009) Personal history RIGHT of breast cancer  * Stage III  * ER (+)    TREATMENT  1. (2009) RIGHT mastectomy  2. Adjuvant AC-T  3. Adjuvant PMRT  4. (5098-1036) Arimidex. [address]      IMAGING/PROCEDURES:  1. (10/17/23) LEFT dt-mammogram: BIRADS-1      HISTORY OF PRESENT ILLNESS  Raymond Loomis presents for evaluation of an ulcerating area on a prior mastectomy incision. Claudia Haynes was diagnosed with a right breast cancer in 2009. At that time she underwent what sounds to be a modified radical mastectomy followed by adjuvant chemotherapy and adjuvant postmastectomy radiation. There was also documentation that she was placed on an aromatase inhibitor. The treatment for her cancer was performed out of time. The patient recently moved back to the area and established with her medical oncologist.  During her recent visit there was noted to be an ulceration in the mastectomy incision. The patient now is in the office to discuss the above and receive any additional recommendations. BREAST SYMPTOMS  Claudia Haynes describes issue with her mastectomy sites since her radiation was completed in the remote past.  She described intermittent areas of what sounds to be small areas of skin sloughing with exudate. She notes that these appear and then resolve spontaneously. Over the more recent past however she noted first a blistering in the medial aspect of her mastectomy incision which then became a small opening after the blister resolved. She has been treating this at home with antibiotic ointment. She believes the depth of the ulceration may be increasing but not the diameter. PAST BREAST HISTORY  Claudia Haynes was treated for right breast cancer as described above.       PAST MEDICAL/SURGICAL HISTORY  Past Medical History:   Diagnosis Date    Anemia     Pt reports she was dx in her teens, w/o proper w/u,

## 2023-08-22 LAB — SURGICAL PATHOLOGY REPORT: NORMAL

## 2023-08-23 ENCOUNTER — TELEPHONE (OUTPATIENT)
Dept: BREAST CENTER | Age: 83
End: 2023-08-23

## 2023-08-23 NOTE — TELEPHONE ENCOUNTER
Spoke with patient and notified her of skin punch biopsy (negative) and to set up an appointment in 3 months to see Dr Chance Marshall. Appt given for Monday November 6, 2023 at 0930.     Electronically signed by Shawna Valdes RN on 8/23/23 at 8:33 AM EDT

## 2023-08-29 ENCOUNTER — OFFICE VISIT (OUTPATIENT)
Dept: ONCOLOGY | Age: 83
End: 2023-08-29
Payer: COMMERCIAL

## 2023-08-29 ENCOUNTER — HOSPITAL ENCOUNTER (OUTPATIENT)
Dept: INFUSION THERAPY | Age: 83
Discharge: HOME | End: 2023-08-29
Payer: COMMERCIAL

## 2023-08-29 VITALS
DIASTOLIC BLOOD PRESSURE: 62 MMHG | TEMPERATURE: 97.3 F | WEIGHT: 125.6 LBS | HEIGHT: 61 IN | RESPIRATION RATE: 18 BRPM | OXYGEN SATURATION: 98 % | HEART RATE: 77 BPM | SYSTOLIC BLOOD PRESSURE: 139 MMHG | BODY MASS INDEX: 23.71 KG/M2

## 2023-08-29 DIAGNOSIS — D46.9 MDS (MYELODYSPLASTIC SYNDROME) (HCC): ICD-10-CM

## 2023-08-29 DIAGNOSIS — D63.8 ANEMIA OF CHRONIC DISEASE: ICD-10-CM

## 2023-08-29 DIAGNOSIS — C50.919 MALIGNANT NEOPLASM OF FEMALE BREAST, UNSPECIFIED ESTROGEN RECEPTOR STATUS, UNSPECIFIED LATERALITY, UNSPECIFIED SITE OF BREAST (HCC): Primary | ICD-10-CM

## 2023-08-29 DIAGNOSIS — D46.9 MDS (MYELODYSPLASTIC SYNDROME) (HCC): Primary | ICD-10-CM

## 2023-08-29 LAB
BASOPHILS # BLD: 0.02 K/UL (ref 0–0.2)
BASOPHILS NFR BLD: 0 % (ref 0–2)
EOSINOPHIL # BLD: 0.21 K/UL (ref 0.05–0.5)
EOSINOPHILS RELATIVE PERCENT: 5 % (ref 0–6)
ERYTHROCYTE [DISTWIDTH] IN BLOOD BY AUTOMATED COUNT: 25.2 % (ref 11.5–15)
HCT VFR BLD AUTO: 27.2 % (ref 34–48)
HGB BLD-MCNC: 8.8 G/DL (ref 11.5–15.5)
IMM GRANULOCYTES # BLD AUTO: <0.03 K/UL (ref 0–0.58)
IMM GRANULOCYTES NFR BLD: 0 % (ref 0–5)
LYMPHOCYTES NFR BLD: 2.54 K/UL (ref 1.5–4)
LYMPHOCYTES RELATIVE PERCENT: 55 % (ref 20–42)
MCH RBC QN AUTO: 36.8 PG (ref 26–35)
MCHC RBC AUTO-ENTMCNC: 32.4 G/DL (ref 32–34.5)
MCV RBC AUTO: 113.8 FL (ref 80–99.9)
MONOCYTES NFR BLD: 0.33 K/UL (ref 0.1–0.95)
MONOCYTES NFR BLD: 7 % (ref 2–12)
NEUTROPHILS NFR BLD: 33 % (ref 43–80)
NEUTS SEG NFR BLD: 1.5 K/UL (ref 1.8–7.3)
PLATELET # BLD AUTO: 140 K/UL (ref 130–450)
PMV BLD AUTO: 10 FL (ref 7–12)
RBC # BLD AUTO: 2.39 M/UL (ref 3.5–5.5)
RBC # BLD: ABNORMAL 10*6/UL
WBC OTHER # BLD: 4.6 K/UL (ref 4.5–11.5)

## 2023-08-29 PROCEDURE — 99214 OFFICE O/P EST MOD 30 MIN: CPT | Performed by: INTERNAL MEDICINE

## 2023-08-29 PROCEDURE — 1123F ACP DISCUSS/DSCN MKR DOCD: CPT | Performed by: INTERNAL MEDICINE

## 2023-08-29 PROCEDURE — 6360000002 HC RX W HCPCS: Performed by: INTERNAL MEDICINE

## 2023-08-29 PROCEDURE — 36415 COLL VENOUS BLD VENIPUNCTURE: CPT

## 2023-08-29 PROCEDURE — 85025 COMPLETE CBC W/AUTO DIFF WBC: CPT

## 2023-08-29 PROCEDURE — 96372 THER/PROPH/DIAG INJ SC/IM: CPT

## 2023-08-29 RX ADMIN — DARBEPOETIN ALFA 500 MCG: 500 INJECTION, SOLUTION INTRAVENOUS; SUBCUTANEOUS at 10:54

## 2023-08-29 NOTE — PROGRESS NOTES
218 [facility] MED ONCOLOGY  [address] [facility] [address]  Dept: [address]: [phone]  Attending Progress Note      Reason for Visit:   1. Right Breast Cancer. 2. MDS. Referring Physician:  CONNIE Michaels CNP    PCP:  CONNIE Michaels CNP    History of Present Illness: The patient is a very pleasant 78 y.o. lady with a PMH significant for Right Breast Cancer, stage III, HR pos, was diagnosed in 2009, she had a right mastectomy done, followed by adjuvant chemo, she received 8 cycles, probably AC followed by T, then PMRT, and 5 years of adjuvant ET with Arimidex, was completed in 2015. She was treated in [facility] under the care of Dr. Brandt Zaragoza. She was diagnosed with MDS in 2017, she received ESAs and PRBCs transfusion. She has transfusion related iron overload. She was started on Aranesp on 4/24/2020. No SE from Aranesp. The patient returns for a follow-up visit, she is feeling tired, otherwise doing well, the right chest ulcer is improving. Review of Systems;  CONSTITUTIONAL: No fever, chills. Fair appetite. Positive for fatigue. ENMT: Eyes: No diplopia; Nose: No epistaxis. Mouth: No sore throat. RESPIRATORY: No hemoptysis, shortness of breath, cough. CARDIOVASCULAR: No chest pain, palpitations. GASTROINTESTINAL: No nausea/vomiting, abdominal pain, diarrhea/constipation. GENITOURINARY: No dysuria, urinary frequency, hematuria. NEURO: No syncope, presyncope, headache.   Remainder:  ROS NEGATIVE    Past Medical History:      Diagnosis Date    Anemia     Pt reports she was dx in her teens, w/o proper w/u, with iron deficiency anemia and was on oral iron replacement for many years, resulting in iron overload    Aplastic anemia ([address]) ~4447-2871    Possibly d/t chemotherapy for breast cancer    Breast cancer ([address]) 2009    right side; pt underwent radical mastectomy followed by

## 2023-08-30 DIAGNOSIS — C50.919 MALIGNANT NEOPLASM OF FEMALE BREAST, UNSPECIFIED ESTROGEN RECEPTOR STATUS, UNSPECIFIED LATERALITY, UNSPECIFIED SITE OF BREAST (HCC): ICD-10-CM

## 2023-08-30 DIAGNOSIS — Z51.5 PALLIATIVE CARE BY SPECIALIST: ICD-10-CM

## 2023-08-30 DIAGNOSIS — G89.3 PAIN DUE TO NEOPLASM: ICD-10-CM

## 2023-08-30 RX ORDER — HYDROCODONE BITARTRATE AND ACETAMINOPHEN 5; 325 MG/1; MG/1
1 TABLET ORAL EVERY 6 HOURS PRN
Qty: 120 TABLET | Refills: 0 | Status: SHIPPED | OUTPATIENT
Start: 2023-08-30 | End: 2023-09-29

## 2023-08-30 NOTE — TELEPHONE ENCOUNTER
Call from [facility] requesting refill for [employer] is [facility] on [address]. Next natalie 9/12/23.

## 2023-09-12 ENCOUNTER — OFFICE VISIT (OUTPATIENT)
Dept: PALLATIVE CARE | Age: 83
End: 2023-09-12
Payer: COMMERCIAL

## 2023-09-12 ENCOUNTER — HOSPITAL ENCOUNTER (OUTPATIENT)
Dept: INFUSION THERAPY | Age: 83
Discharge: HOME | End: 2023-09-12
Payer: COMMERCIAL

## 2023-09-12 ENCOUNTER — OFFICE VISIT (OUTPATIENT)
Dept: ONCOLOGY | Age: 83
End: 2023-09-12
Payer: COMMERCIAL

## 2023-09-12 VITALS — BODY MASS INDEX: 23.62 KG/M2 | WEIGHT: 125 LBS

## 2023-09-12 VITALS
WEIGHT: 125 LBS | OXYGEN SATURATION: 97 % | SYSTOLIC BLOOD PRESSURE: 132 MMHG | HEART RATE: 80 BPM | DIASTOLIC BLOOD PRESSURE: 62 MMHG | BODY MASS INDEX: 23.6 KG/M2 | TEMPERATURE: 97.2 F | HEIGHT: 61 IN

## 2023-09-12 DIAGNOSIS — D63.8 ANEMIA OF CHRONIC DISEASE: ICD-10-CM

## 2023-09-12 DIAGNOSIS — Z51.5 ENCOUNTER FOR PALLIATIVE CARE: Primary | ICD-10-CM

## 2023-09-12 DIAGNOSIS — D46.9 MDS (MYELODYSPLASTIC SYNDROME) (HCC): Primary | ICD-10-CM

## 2023-09-12 DIAGNOSIS — D46.9 MDS (MYELODYSPLASTIC SYNDROME) (HCC): ICD-10-CM

## 2023-09-12 DIAGNOSIS — C50.919 MALIGNANT NEOPLASM OF FEMALE BREAST, UNSPECIFIED ESTROGEN RECEPTOR STATUS, UNSPECIFIED LATERALITY, UNSPECIFIED SITE OF BREAST (HCC): ICD-10-CM

## 2023-09-12 DIAGNOSIS — G89.3 PAIN DUE TO NEOPLASM: ICD-10-CM

## 2023-09-12 LAB
BASOPHILS # BLD: 0.05 K/UL (ref 0–0.2)
BASOPHILS NFR BLD: 1 % (ref 0–2)
EOSINOPHIL # BLD: 0.05 K/UL (ref 0.05–0.5)
EOSINOPHILS RELATIVE PERCENT: 1 % (ref 0–6)
ERYTHROCYTE [DISTWIDTH] IN BLOOD BY AUTOMATED COUNT: 26.9 % (ref 11.5–15)
HCT VFR BLD AUTO: 30.1 % (ref 34–48)
HGB BLD-MCNC: 9.5 G/DL (ref 11.5–15.5)
LYMPHOCYTES NFR BLD: 2.12 K/UL (ref 1.5–4)
LYMPHOCYTES RELATIVE PERCENT: 36 % (ref 20–42)
MCH RBC QN AUTO: 36.7 PG (ref 26–35)
MCHC RBC AUTO-ENTMCNC: 31.6 G/DL (ref 32–34.5)
MCV RBC AUTO: 116.2 FL (ref 80–99.9)
MONOCYTES NFR BLD: 0.52 K/UL (ref 0.1–0.95)
MONOCYTES NFR BLD: 9 % (ref 2–12)
NEUTROPHILS NFR BLD: 54 % (ref 43–80)
NEUTS SEG NFR BLD: 3.16 K/UL (ref 1.8–7.3)
NUCLEATED RED BLOOD CELLS: 2 PER 100 WBC
PLATELET # BLD AUTO: 189 K/UL (ref 130–450)
PMV BLD AUTO: 12.1 FL (ref 7–12)
RBC # BLD AUTO: 2.59 M/UL (ref 3.5–5.5)
RBC # BLD: ABNORMAL 10*6/UL
WBC OTHER # BLD: 5.9 K/UL (ref 4.5–11.5)

## 2023-09-12 PROCEDURE — 1123F ACP DISCUSS/DSCN MKR DOCD: CPT | Performed by: INTERNAL MEDICINE

## 2023-09-12 PROCEDURE — 96372 THER/PROPH/DIAG INJ SC/IM: CPT

## 2023-09-12 PROCEDURE — 85025 COMPLETE CBC W/AUTO DIFF WBC: CPT

## 2023-09-12 PROCEDURE — 6360000002 HC RX W HCPCS: Performed by: INTERNAL MEDICINE

## 2023-09-12 PROCEDURE — 99214 OFFICE O/P EST MOD 30 MIN: CPT | Performed by: INTERNAL MEDICINE

## 2023-09-12 PROCEDURE — 36415 COLL VENOUS BLD VENIPUNCTURE: CPT

## 2023-09-12 PROCEDURE — 99211 OFF/OP EST MAY X REQ PHY/QHP: CPT | Performed by: NURSE PRACTITIONER

## 2023-09-12 RX ADMIN — DARBEPOETIN ALFA 500 MCG: 500 INJECTION, SOLUTION INTRAVENOUS; SUBCUTANEOUS at 11:08

## 2023-09-20 NOTE — PROGRESS NOTES
701  [facility] MED ONCOLOGY  [address] [facility] [address]  Dept: [address]: [phone]  Attending Progress Note      Reason for Visit:   1. Right Breast Cancer. 2. MDS. Referring Physician:  CONNIE Ruano CNP    PCP:  CONNIE Ruano CNP    History of Present Illness: The patient is a very pleasant 78 y.o. lady with a PMH significant for Right Breast Cancer, stage III, HR pos, was diagnosed in 2009, she had a right mastectomy done, followed by adjuvant chemo, she received 8 cycles, probably AC followed by T, then PMRT, and 5 years of adjuvant ET with Arimidex, was completed in 2015. She was treated in [facility] under the care of Dr. Elina Davis. She was diagnosed with MDS in 2017, she received ESAs and PRBCs transfusion. She has transfusion related iron overload. She was started on Aranesp on 4/24/2020. No SE from Aranesp. The patient returns for a follow-up visit, she had a mechanical fall, she ended up with a displaced fracture of the distal shaft of the fibula, she underwent on 8/24/2022 left ankle open reduction internal fixation. Recovered well from the surgery. The patient is feeling tired otherwise doing well. Review of Systems;  CONSTITUTIONAL: No fever, chills. Fair appetite. ENMT: Eyes: No diplopia; Nose: No epistaxis. Mouth: No sore throat. RESPIRATORY: No hemoptysis, shortness of breath, cough. CARDIOVASCULAR: No chest pain, palpitations. GASTROINTESTINAL: No nausea/vomiting, abdominal pain, diarrhea/constipation. GENITOURINARY: No dysuria, urinary frequency, hematuria. NEURO: No syncope, presyncope, headache.   Remainder:  ROS NEGATIVE    Past Medical History:      Diagnosis Date    Anemia     Pt reports she was dx in her teens, w/o proper w/u, with iron deficiency anemia and was on oral iron replacement for many years, resulting in iron overload    Aplastic anemia [facility]) ~2762-6269    Possibly d/t chemotherapy for breast cancer    Breast cancer ([facility] [address] 2009    right side; pt underwent radical mastectomy followed by radiation therapy and chemotherapy and was then on oral chemo pill for 5 yrs; no recurrence as of 01/2019    Chickenpox     History of blood transfusion     Hypothyroidism     Measles     Mild depression     Mumps     Myelodysplastic syndrome (HCC) ~2016    Neuropathy     feet and ankles murali    Prolonged emergence from general anesthesia     Pulmonary tuberculosis ~5762-2242    in her early 25s    Scarlet fever     Sensory neuropathy     beyond the ankle b/l     Patient Active Problem List   Diagnosis    MDS (myelodysplastic syndrome) (HCC)    Bronchitis    Macrocytic anemia with high RDW    Hypothyroidism    Peripheral sensory neuropathy    Breast cancer ([facility] [address]    Anemia of chronic disease        Past Surgical History:      Procedure Laterality Date    ADENOIDECTOMY      ANKLE FRACTURE SURGERY Left 08/24/2022    LEFT ANKLE OPEN REDUCTION INTERNAL FIXATION performed by Kelsie Bain DO at [address] [place] [address]      right breast    OTHER SURGICAL HISTORY      blood transfusions    TONSILLECTOMY AND ADENOIDECTOMY      TUBAL LIGATION         Family History:  Family History   Problem Relation Age of Onset    Cancer Mother         lung    Cancer Father         lung    Breast Cancer Sister     Cancer Sister         ovarian cancer and breast cancer    Cancer Brother         unknown    Other Brother         MI    Cancer Maternal Aunt         unknown    Cancer Other         brain       Medications:  Reviewed and reconciled. Social History:  Social History     Socioeconomic History    Marital status:       Spouse name: Not on file    Number of children: Not on file    Years of education: Not on file    Highest education level: Not on file   Occupational History    Not on file   Tobacco Use    Smoking status: Former     Packs/day: 1.00     Years: 37.00     Pack years: 37.00     Types: Cigarettes     Start date:      Quit date: 2000     Years since quittin.8    Smokeless tobacco: Never   Vaping Use    Vaping Use: Never used   Substance and Sexual Activity    Alcohol use: No    Drug use: Never    Sexual activity: Not Currently   Other Topics Concern    Not on file   Social History Narrative    Not on file     Social Determinants of Health     Financial Resource Strain: Not on file   Food Insecurity: Not on file   Transportation Needs: Not on file   Physical Activity: Not on file   Stress: Not on file   Social Connections: Not on file   Intimate Partner Violence: Not on file   Housing Stability: Not on file       Allergies: Allergies   Allergen Reactions    Bee Venom Swelling and Dermatitis    Penicillins Hives, Swelling and Dermatitis    Other      All metals except gold and platinum, welts/blisters       Physical Exam:  /60   Pulse 87   Temp 97.7 °F (36.5 °C)   Ht 5' 1\" (1.549 m)   Wt 132 lb 12.8 oz (60.2 kg)   LMP 2016   SpO2 93%   BMI 25.09 kg/m²   GENERAL: Alert, oriented x 3, not in acute distress. HEENT: PERRLA; EOMI. Oropharynx clear. NECK: Supple. No palpable lymphadenopathy. LUNGS: Good air entry bilaterally. No wheezing, crackles or rhonchi. BREASTS: She is s/p right mastectomy, no suspicious lesions, she has telangiectasias. She has tenderness in the left upper rib cage. CARDIOVASCULAR: Regular rate. No murmurs, rubs or gallops. ABDOMEN: Soft. Non-tender, non-distended. Positive bowel sounds. EXTREMITIES: Dorsum of the foot bruising  NEUROLOGIC: No focal deficits. ECOG PS 1      Impression/Plan:     1.  The patient is a very pleasant 78 y.o. lady with a PMH significant for Right Breast Cancer, stage III, HR pos, was diagnosed in , she had a right mastectomy done, followed by adjuvant chemo, she received 8 cycles, probably AC followed by T, then PMRT, and 5 years of adjuvant ET with Arimidex, was completed in 2015. She was treated in [facility] under the care of Dr. Aldo Kumar. She is doing well clinically without any evidence of recurrence of her disease. Discussed with her the importance of monthly SBE, and routine screening mammograms, she had a repeat left breast screening mammogram done on 10/17/2020, was negative for malignancy. 2. She has MDS, diagnosed in 2017, she received ESAs and PRBCs transfusion, has transfusion related iron overload, hgb was 8.5, ferritin 3647, was restarted on Aranesp on 4/23/2019. On 7/20/2021, hemoglobin was 8 g/dl hematocrit 24.7,  1.3, normal white count, platelet count 747M, fit test is negative. The patient did not have an adequate response to Retacrit, she was changed back to Aranesp, today 10/20/2022, labs reviewed, hemoglobin is 8.8 G/DL, had improved, it is still less than 10, proceed with Aranesp, will monitor her counts closely    Hypothyroidism, continue Synthroid follow-up with PCP. RTC in 2 weeks with labs. Thank you for allowing us to participate in the care of Ms. Ventura.     Silvestre Scruggs MD   HEMATOLOGY/MEDICAL [address] [facility]  [address] MED ONCOLOGY  [address] [place] [address]  Dept: [address]: [phone] see chief complaint quote

## 2023-09-26 ENCOUNTER — OFFICE VISIT (OUTPATIENT)
Dept: ONCOLOGY | Age: 83
End: 2023-09-26
Payer: COMMERCIAL

## 2023-09-26 ENCOUNTER — HOSPITAL ENCOUNTER (OUTPATIENT)
Dept: INFUSION THERAPY | Age: 83
Discharge: HOME | End: 2023-09-26
Payer: COMMERCIAL

## 2023-09-26 VITALS
SYSTOLIC BLOOD PRESSURE: 142 MMHG | WEIGHT: 125 LBS | DIASTOLIC BLOOD PRESSURE: 62 MMHG | BODY MASS INDEX: 23.6 KG/M2 | TEMPERATURE: 97.1 F | OXYGEN SATURATION: 95 % | HEIGHT: 61 IN | HEART RATE: 68 BPM

## 2023-09-26 DIAGNOSIS — D46.9 MDS (MYELODYSPLASTIC SYNDROME) (HCC): Primary | ICD-10-CM

## 2023-09-26 DIAGNOSIS — D63.8 ANEMIA OF CHRONIC DISEASE: ICD-10-CM

## 2023-09-26 DIAGNOSIS — C50.919 MALIGNANT NEOPLASM OF FEMALE BREAST, UNSPECIFIED ESTROGEN RECEPTOR STATUS, UNSPECIFIED LATERALITY, UNSPECIFIED SITE OF BREAST (HCC): ICD-10-CM

## 2023-09-26 LAB
BASOPHILS # BLD: 0 K/UL (ref 0–0.2)
BASOPHILS NFR BLD: 0 % (ref 0–2)
EOSINOPHIL # BLD: 0.11 K/UL (ref 0.05–0.5)
EOSINOPHILS RELATIVE PERCENT: 2 % (ref 0–6)
ERYTHROCYTE [DISTWIDTH] IN BLOOD BY AUTOMATED COUNT: 26.8 % (ref 11.5–15)
HCT VFR BLD AUTO: 28.6 % (ref 34–48)
HGB BLD-MCNC: 9.2 G/DL (ref 11.5–15.5)
LYMPHOCYTES NFR BLD: 2.6 K/UL (ref 1.5–4)
LYMPHOCYTES RELATIVE PERCENT: 49 % (ref 20–42)
MCH RBC QN AUTO: 36.8 PG (ref 26–35)
MCHC RBC AUTO-ENTMCNC: 32.2 G/DL (ref 32–34.5)
MCV RBC AUTO: 114.4 FL (ref 80–99.9)
MONOCYTES NFR BLD: 0.21 K/UL (ref 0.1–0.95)
MONOCYTES NFR BLD: 4 % (ref 2–12)
MYELOCYTES ABSOLUTE COUNT: 0.05 K/UL
MYELOCYTES: 1 %
NEUTROPHILS NFR BLD: 44 % (ref 43–80)
NEUTS SEG NFR BLD: 2.33 K/UL (ref 1.8–7.3)
NUCLEATED RED BLOOD CELLS: 2 PER 100 WBC
PLATELET # BLD AUTO: 149 K/UL (ref 130–450)
PMV BLD AUTO: 11.2 FL (ref 7–12)
RBC # BLD AUTO: 2.5 M/UL (ref 3.5–5.5)
RBC # BLD: ABNORMAL 10*6/UL
WBC OTHER # BLD: 5.3 K/UL (ref 4.5–11.5)

## 2023-09-26 PROCEDURE — 6360000002 HC RX W HCPCS: Performed by: INTERNAL MEDICINE

## 2023-09-26 PROCEDURE — 85025 COMPLETE CBC W/AUTO DIFF WBC: CPT

## 2023-09-26 PROCEDURE — 1123F ACP DISCUSS/DSCN MKR DOCD: CPT | Performed by: INTERNAL MEDICINE

## 2023-09-26 PROCEDURE — 36415 COLL VENOUS BLD VENIPUNCTURE: CPT

## 2023-09-26 PROCEDURE — 99214 OFFICE O/P EST MOD 30 MIN: CPT | Performed by: INTERNAL MEDICINE

## 2023-09-26 PROCEDURE — 96372 THER/PROPH/DIAG INJ SC/IM: CPT

## 2023-09-26 RX ADMIN — DARBEPOETIN ALFA 500 MCG: 500 INJECTION, SOLUTION INTRAVENOUS; SUBCUTANEOUS at 10:52

## 2023-09-26 NOTE — PROGRESS NOTES
218 [facility] MED ONCOLOGY  [address] [facility] [address]  Dept: [address] [place] [address] Avenue: [phone]  Attending Progress Note      Reason for Visit:   1. Right Breast Cancer. 2. MDS. Referring Physician:  CONNIE Mccollum CNP    PCP:  CONNIE Mccollum CNP    History of Present Illness: The patient is a very pleasant 78 y.o. lady with a PMH significant for Right Breast Cancer, stage III, HR pos, was diagnosed in 2009, she had a right mastectomy done, followed by adjuvant chemo, she received 8 cycles, probably AC followed by T, then PMRT, and 5 years of adjuvant ET with Arimidex, was completed in 2015. She was treated in [facility] under the care of Dr. Joce Barbosa. She was diagnosed with MDS in 2017, she received ESAs and PRBCs transfusion. She has transfusion related iron overload. She was started on Aranesp on 4/24/2020. No SE from Aranesp. The patient returns for a follow-up visit, she has fatigue, otherwise doing well, the right chest wall ulcer is healing. Review of Systems;  CONSTITUTIONAL: No fever, chills. Fair appetite. Positive for fatigue. ENMT: Eyes: No diplopia; Nose: No epistaxis. Mouth: No sore throat. RESPIRATORY: No hemoptysis, shortness of breath, cough. CARDIOVASCULAR: No chest pain, palpitations. GASTROINTESTINAL: No nausea/vomiting, abdominal pain, diarrhea/constipation. GENITOURINARY: No dysuria, urinary frequency, hematuria. NEURO: No syncope, presyncope, headache.   Remainder:  ROS NEGATIVE    Past Medical History:      Diagnosis Date    Anemia     Pt reports she was dx in her teens, w/o proper w/u, with iron deficiency anemia and was on oral iron replacement for many years, resulting in iron overload    Aplastic anemia ([address]) ~5603-3517    Possibly d/t chemotherapy for breast cancer    Breast cancer ([address]) 2009    right side; pt underwent radical mastectomy followed by

## 2023-09-27 DIAGNOSIS — C50.919 MALIGNANT NEOPLASM OF FEMALE BREAST, UNSPECIFIED ESTROGEN RECEPTOR STATUS, UNSPECIFIED LATERALITY, UNSPECIFIED SITE OF BREAST (HCC): ICD-10-CM

## 2023-09-27 DIAGNOSIS — G89.3 PAIN DUE TO NEOPLASM: ICD-10-CM

## 2023-09-27 DIAGNOSIS — Z51.5 PALLIATIVE CARE BY SPECIALIST: ICD-10-CM

## 2023-09-27 RX ORDER — HYDROCODONE BITARTRATE AND ACETAMINOPHEN 5; 325 MG/1; MG/1
1 TABLET ORAL EVERY 6 HOURS PRN
Qty: 120 TABLET | Refills: 0 | Status: SHIPPED | OUTPATIENT
Start: 2023-09-27 | End: 2023-10-27

## 2023-09-27 NOTE — TELEPHONE ENCOUNTER
Call from [facility] requesting refill for [place], just antolin. [facility] states she has requested a continence of care from her [facility], but has not received a confirmation letter yet. Support provided and explained getting insurance changed to a different company other than [facility]. [facility] has a person who helps her with insurance and will look into this. Please send refill to [facility] on [address]. Next natalie 12/5/23.

## 2023-10-10 ENCOUNTER — OFFICE VISIT (OUTPATIENT)
Dept: ONCOLOGY | Age: 83
End: 2023-10-10
Payer: COMMERCIAL

## 2023-10-10 ENCOUNTER — HOSPITAL ENCOUNTER (OUTPATIENT)
Dept: INFUSION THERAPY | Age: 83
Discharge: HOME | End: 2023-10-10
Payer: COMMERCIAL

## 2023-10-10 VITALS
BODY MASS INDEX: 23.03 KG/M2 | DIASTOLIC BLOOD PRESSURE: 59 MMHG | OXYGEN SATURATION: 96 % | WEIGHT: 122 LBS | HEART RATE: 71 BPM | TEMPERATURE: 97.2 F | SYSTOLIC BLOOD PRESSURE: 109 MMHG | HEIGHT: 61 IN

## 2023-10-10 DIAGNOSIS — D46.9 MDS (MYELODYSPLASTIC SYNDROME) (HCC): Primary | ICD-10-CM

## 2023-10-10 DIAGNOSIS — C50.919 MALIGNANT NEOPLASM OF FEMALE BREAST, UNSPECIFIED ESTROGEN RECEPTOR STATUS, UNSPECIFIED LATERALITY, UNSPECIFIED SITE OF BREAST (HCC): ICD-10-CM

## 2023-10-10 DIAGNOSIS — D63.8 ANEMIA OF CHRONIC DISEASE: ICD-10-CM

## 2023-10-10 LAB
BASOPHILS # BLD: 0.03 K/UL (ref 0–0.2)
BASOPHILS NFR BLD: 1 % (ref 0–2)
EOSINOPHIL # BLD: 0.24 K/UL (ref 0.05–0.5)
EOSINOPHILS RELATIVE PERCENT: 6 % (ref 0–6)
ERYTHROCYTE [DISTWIDTH] IN BLOOD BY AUTOMATED COUNT: 26.7 % (ref 11.5–15)
HCT VFR BLD AUTO: 27.6 % (ref 34–48)
HGB BLD-MCNC: 8.8 G/DL (ref 11.5–15.5)
IMM GRANULOCYTES # BLD AUTO: <0.03 K/UL (ref 0–0.58)
IMM GRANULOCYTES NFR BLD: 1 % (ref 0–5)
LYMPHOCYTES NFR BLD: 2.22 K/UL (ref 1.5–4)
LYMPHOCYTES RELATIVE PERCENT: 53 % (ref 20–42)
MCH RBC QN AUTO: 35.9 PG (ref 26–35)
MCHC RBC AUTO-ENTMCNC: 31.9 G/DL (ref 32–34.5)
MCV RBC AUTO: 112.7 FL (ref 80–99.9)
MONOCYTES NFR BLD: 0.27 K/UL (ref 0.1–0.95)
MONOCYTES NFR BLD: 6 % (ref 2–12)
NEUTROPHILS NFR BLD: 34 % (ref 43–80)
NEUTS SEG NFR BLD: 1.42 K/UL (ref 1.8–7.3)
PLATELET # BLD AUTO: 154 K/UL (ref 130–450)
PMV BLD AUTO: 11.1 FL (ref 7–12)
RBC # BLD AUTO: 2.45 M/UL (ref 3.5–5.5)
RBC # BLD: ABNORMAL 10*6/UL
WBC OTHER # BLD: 4.2 K/UL (ref 4.5–11.5)

## 2023-10-10 PROCEDURE — 85025 COMPLETE CBC W/AUTO DIFF WBC: CPT

## 2023-10-10 PROCEDURE — 99214 OFFICE O/P EST MOD 30 MIN: CPT | Performed by: INTERNAL MEDICINE

## 2023-10-10 PROCEDURE — 96372 THER/PROPH/DIAG INJ SC/IM: CPT

## 2023-10-10 PROCEDURE — 36415 COLL VENOUS BLD VENIPUNCTURE: CPT

## 2023-10-10 PROCEDURE — 6360000002 HC RX W HCPCS: Performed by: INTERNAL MEDICINE

## 2023-10-10 PROCEDURE — 1123F ACP DISCUSS/DSCN MKR DOCD: CPT | Performed by: INTERNAL MEDICINE

## 2023-10-10 RX ADMIN — DARBEPOETIN ALFA 500 MCG: 500 INJECTION, SOLUTION INTRAVENOUS; SUBCUTANEOUS at 11:10

## 2023-10-10 NOTE — PROGRESS NOTES
218 [facility] MED ONCOLOGY  [address] [facility] [address]  Dept: [address]: [phone]  Attending Progress Note      Reason for Visit:   1. Right Breast Cancer. 2. MDS. Referring Physician:  CONNIE Wiseman CNP    PCP:  CONNIE Wiseman CNP    History of Present Illness: The patient is a very pleasant 78 y.o. lady with a PMH significant for Right Breast Cancer, stage III, HR pos, was diagnosed in 2009, she had a right mastectomy done, followed by adjuvant chemo, she received 8 cycles, probably AC followed by T, then PMRT, and 5 years of adjuvant ET with Arimidex, was completed in 2015. She was treated in [facility] under the care of Dr. Su Egan. She was diagnosed with MDS in 2017, she received ESAs and PRBCs transfusion. She has transfusion related iron overload. She was started on Aranesp on 4/24/2020. No SE from Aranesp. The patient returns for a follow-up visit, she has fatigue, otherwise doing well, the right chest wall ulcer is healing. No new problems. Review of Systems;  CONSTITUTIONAL: No fever, chills. Fair appetite. Positive for fatigue. ENMT: Eyes: No diplopia; Nose: No epistaxis. Mouth: No sore throat. RESPIRATORY: No hemoptysis, shortness of breath, cough. CARDIOVASCULAR: No chest pain, palpitations. GASTROINTESTINAL: No nausea/vomiting, abdominal pain, diarrhea/constipation. GENITOURINARY: No dysuria, urinary frequency, hematuria. NEURO: No syncope, presyncope, headache.   Remainder:  ROS NEGATIVE    Past Medical History:      Diagnosis Date    Anemia     Pt reports she was dx in her teens, w/o proper w/u, with iron deficiency anemia and was on oral iron replacement for many years, resulting in iron overload    Aplastic anemia ([address]) ~6867-0015    Possibly d/t chemotherapy for breast cancer    Breast cancer ([address]) 2009    right side; pt underwent radical mastectomy

## 2023-10-16 DIAGNOSIS — D46.9 MDS (MYELODYSPLASTIC SYNDROME) (HCC): Primary | ICD-10-CM

## 2023-10-24 ENCOUNTER — OFFICE VISIT (OUTPATIENT)
Dept: ONCOLOGY | Age: 83
End: 2023-10-24
Payer: COMMERCIAL

## 2023-10-24 ENCOUNTER — HOSPITAL ENCOUNTER (OUTPATIENT)
Dept: INFUSION THERAPY | Age: 83
Discharge: HOME | End: 2023-10-24
Payer: COMMERCIAL

## 2023-10-24 ENCOUNTER — APPOINTMENT (OUTPATIENT)
Dept: GENERAL RADIOLOGY | Age: 83
DRG: 149 | End: 2023-10-24
Payer: MEDICARE

## 2023-10-24 ENCOUNTER — APPOINTMENT (OUTPATIENT)
Dept: CT IMAGING | Age: 83
DRG: 149 | End: 2023-10-24
Attending: EMERGENCY MEDICINE
Payer: MEDICARE

## 2023-10-24 ENCOUNTER — HOSPITAL ENCOUNTER (INPATIENT)
Age: 83
LOS: 2 days | Discharge: HOME | DRG: 149 | End: 2023-10-26
Attending: EMERGENCY MEDICINE | Admitting: INTERNAL MEDICINE
Payer: MEDICARE

## 2023-10-24 VITALS
HEART RATE: 73 BPM | BODY MASS INDEX: 23.24 KG/M2 | OXYGEN SATURATION: 96 % | SYSTOLIC BLOOD PRESSURE: 120 MMHG | TEMPERATURE: 97.2 F | DIASTOLIC BLOOD PRESSURE: 58 MMHG | WEIGHT: 123 LBS

## 2023-10-24 DIAGNOSIS — R27.0 ATAXIA: Primary | ICD-10-CM

## 2023-10-24 DIAGNOSIS — D63.8 ANEMIA OF CHRONIC DISEASE: ICD-10-CM

## 2023-10-24 DIAGNOSIS — E03.9 HYPOTHYROIDISM, UNSPECIFIED TYPE: ICD-10-CM

## 2023-10-24 DIAGNOSIS — D46.9 MDS (MYELODYSPLASTIC SYNDROME) (HCC): Primary | ICD-10-CM

## 2023-10-24 DIAGNOSIS — R74.01 TRANSAMINITIS: Primary | ICD-10-CM

## 2023-10-24 PROBLEM — R42 DIZZINESS: Status: ACTIVE | Noted: 2023-10-24

## 2023-10-24 LAB
ALBUMIN SERPL-MCNC: 4.1 G/DL (ref 3.5–5.2)
ALP SERPL-CCNC: 45 U/L (ref 35–104)
ALT SERPL-CCNC: 50 U/L (ref 0–32)
ANION GAP SERPL CALCULATED.3IONS-SCNC: 11 MMOL/L (ref 7–16)
AST SERPL-CCNC: 41 U/L (ref 0–31)
BASOPHILS # BLD: 0 K/UL (ref 0–0.2)
BASOPHILS NFR BLD: 0 % (ref 0–2)
BILIRUB SERPL-MCNC: 1.4 MG/DL (ref 0–1.2)
BUN BLD-MCNC: 24 MG/DL (ref 6–23)
BUN SERPL-MCNC: 24 MG/DL (ref 6–23)
CALCIUM SERPL-MCNC: 9.4 MG/DL (ref 8.6–10.2)
CHLORIDE BLD-SCNC: 107 MMOL/L (ref 100–108)
CHLORIDE SERPL-SCNC: 104 MMOL/L (ref 98–107)
CO2 BLD CALC-SCNC: 28 MMOL/L (ref 22–29)
CO2 SERPL-SCNC: 24 MMOL/L (ref 22–29)
CREAT BLD-MCNC: 0.6 MG/DL (ref 0.5–1)
CREAT SERPL-MCNC: 0.7 MG/DL (ref 0.5–1)
EGFR, POC: >60 ML/MIN/1.73M2
EKG ATRIAL RATE: 68 BPM
EKG P AXIS: 65 DEGREES
EKG P-R INTERVAL: 196 MS
EKG Q-T INTERVAL: 394 MS
EKG QRS DURATION: 84 MS
EKG QTC CALCULATION (BAZETT): 418 MS
EKG R AXIS: -6 DEGREES
EKG T AXIS: 44 DEGREES
EKG VENTRICULAR RATE: 68 BPM
EOSINOPHIL # BLD: 0.35 K/UL (ref 0.05–0.5)
EOSINOPHILS RELATIVE PERCENT: 8 % (ref 0–6)
ERYTHROCYTE [DISTWIDTH] IN BLOOD BY AUTOMATED COUNT: 27.5 % (ref 11.5–15)
GFR SERPL CREATININE-BSD FRML MDRD: >60 ML/MIN/1.73M2
GLUCOSE BLD-MCNC: 103 MG/DL (ref 74–99)
GLUCOSE SERPL-MCNC: 106 MG/DL (ref 74–99)
HCT VFR BLD AUTO: 27 % (ref 34–48)
HGB BLD-MCNC: 8.8 G/DL (ref 11.5–15.5)
LYMPHOCYTES NFR BLD: 2.05 K/UL (ref 1.5–4)
LYMPHOCYTES RELATIVE PERCENT: 47 % (ref 20–42)
MCH RBC QN AUTO: 37.3 PG (ref 26–35)
MCHC RBC AUTO-ENTMCNC: 32.6 G/DL (ref 32–34.5)
MCV RBC AUTO: 114.4 FL (ref 80–99.9)
MONOCYTES NFR BLD: 0.12 K/UL (ref 0.1–0.95)
MONOCYTES NFR BLD: 3 % (ref 2–12)
NEUTROPHILS NFR BLD: 43 % (ref 43–80)
NEUTS SEG NFR BLD: 1.89 K/UL (ref 1.8–7.3)
NUCLEATED RED BLOOD CELLS: 1 PER 100 WBC
PLATELET # BLD AUTO: 149 K/UL (ref 130–450)
PMV BLD AUTO: 11 FL (ref 7–12)
POC ANION GAP: 5 MMOL/L (ref 7–16)
POTASSIUM BLD-SCNC: 4.1 MMOL/L (ref 3.5–5)
POTASSIUM SERPL-SCNC: 4.5 MMOL/L (ref 3.5–5)
PROT SERPL-MCNC: 7 G/DL (ref 6.4–8.3)
RBC # BLD AUTO: 2.36 M/UL (ref 3.5–5.5)
RBC # BLD: ABNORMAL 10*6/UL
SODIUM BLD-SCNC: 140 MMOL/L (ref 132–146)
SODIUM SERPL-SCNC: 139 MMOL/L (ref 132–146)
TROPONIN I SERPL HS-MCNC: 11 NG/L (ref 0–9)
TROPONIN I SERPL HS-MCNC: 12 NG/L (ref 0–9)
WBC OTHER # BLD: 4.4 K/UL (ref 4.5–11.5)

## 2023-10-24 PROCEDURE — 72125 CT NECK SPINE W/O DYE: CPT

## 2023-10-24 PROCEDURE — 99285 EMERGENCY DEPT VISIT HI MDM: CPT

## 2023-10-24 PROCEDURE — 93005 ELECTROCARDIOGRAM TRACING: CPT | Performed by: EMERGENCY MEDICINE

## 2023-10-24 PROCEDURE — 80051 ELECTROLYTE PANEL: CPT

## 2023-10-24 PROCEDURE — 70498 CT ANGIOGRAPHY NECK: CPT

## 2023-10-24 PROCEDURE — 96372 THER/PROPH/DIAG INJ SC/IM: CPT

## 2023-10-24 PROCEDURE — 70496 CT ANGIOGRAPHY HEAD: CPT

## 2023-10-24 PROCEDURE — 93010 ELECTROCARDIOGRAM REPORT: CPT | Performed by: INTERNAL MEDICINE

## 2023-10-24 PROCEDURE — 70450 CT HEAD/BRAIN W/O DYE: CPT

## 2023-10-24 PROCEDURE — 82565 ASSAY OF CREATININE: CPT

## 2023-10-24 PROCEDURE — 6360000002 HC RX W HCPCS: Performed by: INTERNAL MEDICINE

## 2023-10-24 PROCEDURE — 82947 ASSAY GLUCOSE BLOOD QUANT: CPT

## 2023-10-24 PROCEDURE — 71046 X-RAY EXAM CHEST 2 VIEWS: CPT

## 2023-10-24 PROCEDURE — 84484 ASSAY OF TROPONIN QUANT: CPT

## 2023-10-24 PROCEDURE — 6360000004 HC RX CONTRAST MEDICATION: Performed by: RADIOLOGY

## 2023-10-24 PROCEDURE — 85025 COMPLETE CBC W/AUTO DIFF WBC: CPT

## 2023-10-24 PROCEDURE — 80053 COMPREHEN METABOLIC PANEL: CPT

## 2023-10-24 PROCEDURE — 84520 ASSAY OF UREA NITROGEN: CPT

## 2023-10-24 PROCEDURE — 36415 COLL VENOUS BLD VENIPUNCTURE: CPT

## 2023-10-24 PROCEDURE — 2060000000 HC ICU INTERMEDIATE R&B

## 2023-10-24 PROCEDURE — 99214 OFFICE O/P EST MOD 30 MIN: CPT | Performed by: INTERNAL MEDICINE

## 2023-10-24 PROCEDURE — 1123F ACP DISCUSS/DSCN MKR DOCD: CPT | Performed by: INTERNAL MEDICINE

## 2023-10-24 RX ADMIN — DARBEPOETIN ALFA 500 MCG: 500 INJECTION, SOLUTION INTRAVENOUS; SUBCUTANEOUS at 11:56

## 2023-10-24 RX ADMIN — IOPAMIDOL 75 ML: 755 INJECTION, SOLUTION INTRAVENOUS at 16:19

## 2023-10-24 ASSESSMENT — LIFESTYLE VARIABLES
HOW OFTEN DO YOU HAVE A DRINK CONTAINING ALCOHOL: NEVER
HOW MANY STANDARD DRINKS CONTAINING ALCOHOL DO YOU HAVE ON A TYPICAL DAY: PATIENT DOES NOT DRINK

## 2023-10-24 NOTE — ED NOTES
Radiology Procedure Waiver   Name: Salvador Tolentino  : 1940  MRN: [id number]   Date:  10/24/23    Time: 1:33 PM EDT    Benefits of immediately proceeding with Radiology exam(s) without pre-testing outweigh the risks or are not indicated as specified below and therefore the following is/are being waived:    [] Pregnancy test:   [] Patients LMP on-time and regular.   [] Patient had Tubal Ligation or has other Contraception Device. [] Patient  is Menopausal or Premenarcheal.    [] Patient had Full or Partial Hysterectomy. [] Protocol for Iodine allergy    [] MRI Questionnaire    [x] BUN/Creatinine   [] Patient age w/no hx of renal dysfunction. [] Patient on Dialysis. [x] Recent Normal Labs.  Labs from today    Electronically signed by Arely De La Torre MD    10/24/23    1:33 PM EDT             Triston Rivas MD  10/24/23 0165
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
## 2023-10-25 LAB
ALBUMIN SERPL-MCNC: 4 G/DL (ref 3.5–5.2)
ALP SERPL-CCNC: 43 U/L (ref 35–104)
ALT SERPL-CCNC: 43 U/L (ref 0–32)
AMMONIA PLAS-SCNC: 18 UMOL/L (ref 11–51)
ANION GAP SERPL CALCULATED.3IONS-SCNC: 12 MMOL/L (ref 7–16)
AST SERPL-CCNC: 33 U/L (ref 0–31)
B PARAP IS1001 DNA NPH QL NAA+NON-PROBE: NOT DETECTED
B PERT DNA SPEC QL NAA+PROBE: NOT DETECTED
BASOPHILS # BLD: 0 K/UL (ref 0–0.2)
BASOPHILS NFR BLD: 0 % (ref 0–2)
BILIRUB SERPL-MCNC: 1.2 MG/DL (ref 0–1.2)
BUN SERPL-MCNC: 21 MG/DL (ref 6–23)
C PNEUM DNA NPH QL NAA+NON-PROBE: NOT DETECTED
CALCIUM SERPL-MCNC: 8.9 MG/DL (ref 8.6–10.2)
CHLORIDE SERPL-SCNC: 103 MMOL/L (ref 98–107)
CO2 SERPL-SCNC: 25 MMOL/L (ref 22–29)
CREAT SERPL-MCNC: 0.7 MG/DL (ref 0.5–1)
EOSINOPHIL # BLD: 0.28 K/UL (ref 0.05–0.5)
EOSINOPHILS RELATIVE PERCENT: 6 % (ref 0–6)
ERYTHROCYTE [DISTWIDTH] IN BLOOD BY AUTOMATED COUNT: 27.4 % (ref 11.5–15)
FLUAV RNA NPH QL NAA+NON-PROBE: NOT DETECTED
FLUBV RNA NPH QL NAA+NON-PROBE: NOT DETECTED
FOLATE SERPL-MCNC: 10.8 NG/ML (ref 4.8–24.2)
GFR SERPL CREATININE-BSD FRML MDRD: >60 ML/MIN/1.73M2
GLUCOSE SERPL-MCNC: 122 MG/DL (ref 74–99)
HADV DNA NPH QL NAA+NON-PROBE: NOT DETECTED
HCOV 229E RNA NPH QL NAA+NON-PROBE: NOT DETECTED
HCOV HKU1 RNA NPH QL NAA+NON-PROBE: NOT DETECTED
HCOV NL63 RNA NPH QL NAA+NON-PROBE: NOT DETECTED
HCOV OC43 RNA NPH QL NAA+NON-PROBE: NOT DETECTED
HCT VFR BLD AUTO: 25.2 % (ref 34–48)
HGB BLD-MCNC: 8.1 G/DL (ref 11.5–15.5)
HMPV RNA NPH QL NAA+NON-PROBE: NOT DETECTED
HPIV1 RNA NPH QL NAA+NON-PROBE: NOT DETECTED
HPIV2 RNA NPH QL NAA+NON-PROBE: NOT DETECTED
HPIV3 RNA NPH QL NAA+NON-PROBE: NOT DETECTED
HPIV4 RNA NPH QL NAA+NON-PROBE: NOT DETECTED
LYMPHOCYTES NFR BLD: 1.96 K/UL (ref 1.5–4)
LYMPHOCYTES RELATIVE PERCENT: 43 % (ref 20–42)
M PNEUMO DNA NPH QL NAA+NON-PROBE: NOT DETECTED
MCH RBC QN AUTO: 36.8 PG (ref 26–35)
MCHC RBC AUTO-ENTMCNC: 32.1 G/DL (ref 32–34.5)
MCV RBC AUTO: 114.5 FL (ref 80–99.9)
MONOCYTES NFR BLD: 0.2 K/UL (ref 0.1–0.95)
MONOCYTES NFR BLD: 4 % (ref 2–12)
NEUTROPHILS NFR BLD: 47 % (ref 43–80)
NEUTS SEG NFR BLD: 2.16 K/UL (ref 1.8–7.3)
NUCLEATED RED BLOOD CELLS: 2 PER 100 WBC
PLATELET # BLD AUTO: 134 K/UL (ref 130–450)
PMV BLD AUTO: 11.9 FL (ref 7–12)
POTASSIUM SERPL-SCNC: 3.8 MMOL/L (ref 3.5–5)
PROT SERPL-MCNC: 6.4 G/DL (ref 6.4–8.3)
RBC # BLD AUTO: 2.2 M/UL (ref 3.5–5.5)
RBC # BLD: ABNORMAL 10*6/UL
RSV RNA NPH QL NAA+NON-PROBE: NOT DETECTED
RV+EV RNA NPH QL NAA+NON-PROBE: NOT DETECTED
SARS-COV-2 RNA NPH QL NAA+NON-PROBE: NOT DETECTED
SODIUM SERPL-SCNC: 140 MMOL/L (ref 132–146)
SPECIMEN DESCRIPTION: NORMAL
T4 FREE SERPL-MCNC: 1.6 NG/DL (ref 0.9–1.7)
TROPONIN I SERPL HS-MCNC: 11 NG/L (ref 0–9)
TROPONIN I SERPL HS-MCNC: 11 NG/L (ref 0–9)
TSH SERPL DL<=0.05 MIU/L-ACNC: 0.03 UIU/ML (ref 0.27–4.2)
VIT B12 SERPL-MCNC: 668 PG/ML (ref 211–946)
WBC OTHER # BLD: 4.6 K/UL (ref 4.5–11.5)

## 2023-10-25 PROCEDURE — 6370000000 HC RX 637 (ALT 250 FOR IP): Performed by: INTERNAL MEDICINE

## 2023-10-25 PROCEDURE — 85025 COMPLETE CBC W/AUTO DIFF WBC: CPT

## 2023-10-25 PROCEDURE — 80053 COMPREHEN METABOLIC PANEL: CPT

## 2023-10-25 PROCEDURE — 84443 ASSAY THYROID STIM HORMONE: CPT

## 2023-10-25 PROCEDURE — 82746 ASSAY OF FOLIC ACID SERUM: CPT

## 2023-10-25 PROCEDURE — 82140 ASSAY OF AMMONIA: CPT

## 2023-10-25 PROCEDURE — 2060000000 HC ICU INTERMEDIATE R&B

## 2023-10-25 PROCEDURE — 0202U NFCT DS 22 TRGT SARS-COV-2: CPT

## 2023-10-25 PROCEDURE — 84439 ASSAY OF FREE THYROXINE: CPT

## 2023-10-25 PROCEDURE — 82607 VITAMIN B-12: CPT

## 2023-10-25 PROCEDURE — 93306 TTE W/DOPPLER COMPLETE: CPT

## 2023-10-25 PROCEDURE — 6360000002 HC RX W HCPCS: Performed by: INTERNAL MEDICINE

## 2023-10-25 PROCEDURE — 97535 SELF CARE MNGMENT TRAINING: CPT

## 2023-10-25 PROCEDURE — 84484 ASSAY OF TROPONIN QUANT: CPT

## 2023-10-25 PROCEDURE — 36415 COLL VENOUS BLD VENIPUNCTURE: CPT

## 2023-10-25 PROCEDURE — 2580000003 HC RX 258: Performed by: INTERNAL MEDICINE

## 2023-10-25 PROCEDURE — 97165 OT EVAL LOW COMPLEX 30 MIN: CPT

## 2023-10-25 RX ORDER — MAGNESIUM HYDROXIDE/ALUMINUM HYDROXICE/SIMETHICONE 120; 1200; 1200 MG/30ML; MG/30ML; MG/30ML
30 SUSPENSION ORAL EVERY 6 HOURS PRN
Status: DISCONTINUED | OUTPATIENT
Start: 2023-10-25 | End: 2023-10-26 | Stop reason: HOSPADM

## 2023-10-25 RX ORDER — GABAPENTIN 300 MG/1
900 CAPSULE ORAL NIGHTLY
Status: DISCONTINUED | OUTPATIENT
Start: 2023-10-25 | End: 2023-10-26 | Stop reason: HOSPADM

## 2023-10-25 RX ORDER — ONDANSETRON 4 MG/1
4 TABLET, ORALLY DISINTEGRATING ORAL EVERY 8 HOURS PRN
Status: DISCONTINUED | OUTPATIENT
Start: 2023-10-25 | End: 2023-10-26 | Stop reason: HOSPADM

## 2023-10-25 RX ORDER — ENOXAPARIN SODIUM 100 MG/ML
40 INJECTION SUBCUTANEOUS DAILY
Status: DISCONTINUED | OUTPATIENT
Start: 2023-10-25 | End: 2023-10-26 | Stop reason: HOSPADM

## 2023-10-25 RX ORDER — SODIUM CHLORIDE 0.9 % (FLUSH) 0.9 %
5-40 SYRINGE (ML) INJECTION PRN
Status: DISCONTINUED | OUTPATIENT
Start: 2023-10-25 | End: 2023-10-26 | Stop reason: HOSPADM

## 2023-10-25 RX ORDER — NEPHROCAP 1 MG
1 CAP ORAL DAILY
Status: DISCONTINUED | OUTPATIENT
Start: 2023-10-25 | End: 2023-10-26 | Stop reason: HOSPADM

## 2023-10-25 RX ORDER — SODIUM CHLORIDE 0.9 % (FLUSH) 0.9 %
5-40 SYRINGE (ML) INJECTION EVERY 12 HOURS SCHEDULED
Status: DISCONTINUED | OUTPATIENT
Start: 2023-10-25 | End: 2023-10-26 | Stop reason: HOSPADM

## 2023-10-25 RX ORDER — SENNOSIDES A AND B 8.6 MG/1
1 TABLET, FILM COATED ORAL DAILY PRN
Status: DISCONTINUED | OUTPATIENT
Start: 2023-10-25 | End: 2023-10-26 | Stop reason: HOSPADM

## 2023-10-25 RX ORDER — LEVOTHYROXINE SODIUM 0.2 MG/1
200 TABLET ORAL DAILY
Status: DISCONTINUED | OUTPATIENT
Start: 2023-10-25 | End: 2023-10-26 | Stop reason: HOSPADM

## 2023-10-25 RX ORDER — ONDANSETRON 2 MG/ML
4 INJECTION INTRAMUSCULAR; INTRAVENOUS EVERY 6 HOURS PRN
Status: DISCONTINUED | OUTPATIENT
Start: 2023-10-25 | End: 2023-10-26 | Stop reason: HOSPADM

## 2023-10-25 RX ORDER — HYDROCODONE BITARTRATE AND ACETAMINOPHEN 5; 325 MG/1; MG/1
1 TABLET ORAL EVERY 6 HOURS PRN
Status: DISCONTINUED | OUTPATIENT
Start: 2023-10-25 | End: 2023-10-26 | Stop reason: HOSPADM

## 2023-10-25 RX ORDER — SODIUM CHLORIDE 9 MG/ML
INJECTION, SOLUTION INTRAVENOUS PRN
Status: DISCONTINUED | OUTPATIENT
Start: 2023-10-25 | End: 2023-10-26 | Stop reason: HOSPADM

## 2023-10-25 RX ORDER — ACETAMINOPHEN 325 MG/1
650 TABLET ORAL EVERY 6 HOURS PRN
Status: DISCONTINUED | OUTPATIENT
Start: 2023-10-25 | End: 2023-10-26 | Stop reason: HOSPADM

## 2023-10-25 RX ORDER — ACETAMINOPHEN 650 MG/1
650 SUPPOSITORY RECTAL EVERY 6 HOURS PRN
Status: DISCONTINUED | OUTPATIENT
Start: 2023-10-25 | End: 2023-10-26 | Stop reason: HOSPADM

## 2023-10-25 RX ADMIN — SODIUM CHLORIDE, PRESERVATIVE FREE 10 ML: 5 INJECTION INTRAVENOUS at 06:31

## 2023-10-25 RX ADMIN — SODIUM CHLORIDE, PRESERVATIVE FREE 10 ML: 5 INJECTION INTRAVENOUS at 19:54

## 2023-10-25 RX ADMIN — LEVOTHYROXINE SODIUM 200 MCG: 0.2 TABLET ORAL at 06:31

## 2023-10-25 RX ADMIN — SERTRALINE HYDROCHLORIDE 50 MG: 25 TABLET ORAL at 00:38

## 2023-10-25 RX ADMIN — SODIUM CHLORIDE, PRESERVATIVE FREE 10 ML: 5 INJECTION INTRAVENOUS at 08:59

## 2023-10-25 RX ADMIN — GABAPENTIN 900 MG: 300 CAPSULE ORAL at 00:36

## 2023-10-25 RX ADMIN — NEPHROCAP 1 MG: 1 CAP ORAL at 11:44

## 2023-10-25 RX ADMIN — GABAPENTIN 900 MG: 300 CAPSULE ORAL at 19:54

## 2023-10-25 RX ADMIN — SERTRALINE HYDROCHLORIDE 50 MG: 25 TABLET ORAL at 19:54

## 2023-10-25 NOTE — PROGRESS NOTES
4 Eyes Skin Assessment     NAME:  Tiara Lopez  YOB: 1940  MEDICAL RECORD NUMBER:  [id number]    The patient is being assessed for  Admission    I agree that at least one RN has performed a thorough Head to Toe Skin Assessment on the patient. ALL assessment sites listed below have been assessed. Areas assessed by both nurses:    Head, Face, Ears, Shoulders, Back, Chest, Arms, Elbows, Hands, Sacrum. Buttock, Coccyx, Ischium, and Legs. Feet and Heels        Does the Patient have a Wound?  No noted wound(s)       Sandip Prevention initiated by RN: No  Wound Care Orders initiated by RN: No    Pressure Injury (Stage 3,4, Unstageable, DTI, NWPT, and Complex wounds) if present, place Wound referral order by RN under : No    New Ostomies, if present place, Ostomy referral order under : No     Nurse 1 eSignature: Electronically signed by Angy Barrientos RN on 10/25/23 at 3:34 AM EDT    **SHARE this note so that the co-signing nurse can place an eSignature**    Nurse 2 eSignature: Electronically signed by Kenny Robert RN on 10/25/23 at 3:35 AM EDT

## 2023-10-25 NOTE — ACP (ADVANCE CARE PLANNING)
Advance Care Planning   Healthcare Decision Maker:    Primary Decision Maker: Sabra Jarrett - Child - [phone]    Click here to complete Healthcare Decision Makers including selection of the Healthcare Decision Maker Relationship (ie \"Primary\").

## 2023-10-25 NOTE — ED NOTES
Patient dressed in gown. Belongings in 2 bags at bedside. Patient also has a cane.      Trenton Olmos RN  10/25/23 9364
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
## 2023-10-25 NOTE — CARE COORDINATION
Navigator will be completed. Advance Directives:      Code Status: Full Code   Patient's Primary Decision Maker is: Named in [address]    Primary Decision Maker: Sbara Jarrett - Child - [phone]    Discharge Planning:    Patient lives with: Alone Type of Home: House  Primary Care Giver: Self  Patient Support Systems include: Children   Current Financial resources:    Current community resources:    Current services prior to admission: None            Current DME:              Type of Home Care services:  None    ADLS  Prior functional level: Independent in ADLs/IADLs  Current functional level: Other (see comment) (PT and OT to eval)    PT AM-PAC:   /24  OT AM-PAC:   /24    Family can provide assistance at DC: Other (comment) (not sure will wait for evals with therapy and then touch base with family/dtr.)  Would you like Case Management to discuss the discharge plan with any other family members/significant others, and if so, who?  Yes  Plans to Return to Present Housing: Unknown at present  Other Identified Issues/Barriers to RETURNING to current housing: PT and OT to eval.  Potential Assistance needed at discharge: N/A            Potential DME:    Patient expects to discharge to: [address] [place] [address] for transportation at discharge:      Financial    Payor: Ismael Miranda / Plan: [place] HMO / Product Type: *No Product type* /     Does insurance require precert for SNF: Yes    Potential assistance Purchasing Medications:    Meds-to-Beds request: Yes      Sidra Dahl [address] [facility] [phone] - F [phone] 2654 [address] [facility] [address]  Phone: [phone] Fax: 2021 [facility] [address] [place] [address] - [address] [phone] [facility] [phone]  [address] [facility] [address]  Phone: [phone] Fax: [phone]      Notes:    Factors facilitating achievement of predicted outcomes: Family support, Motivated, Cooperative, Pleasant, and Sense of humor    Barriers to discharge:  to be determined     Additional Case Management Notes: see above     The Plan for Transition of Care is related to the following treatment goals of Dizziness [R42]  Ataxia [B43.9]    IF APPLICABLE: The Patient and/or patient representative Lula Dorene and her family were provided with a choice of provider and agrees with the discharge plan. Freedom of choice list with basic dialogue that supports the patient's individualized plan of care/goals and shares the quality data associated with the providers was provided to:     Patient Representative Name:       The Patient and/or Patient Representative Agree with the Discharge Plan?       Landon Brittle, [place]  Case Management Department  Ph: [phone] Fax: [phone]

## 2023-10-26 ENCOUNTER — APPOINTMENT (OUTPATIENT)
Dept: MRI IMAGING | Age: 83
DRG: 149 | End: 2023-10-26
Payer: MEDICARE

## 2023-10-26 VITALS
RESPIRATION RATE: 20 BRPM | HEIGHT: 61 IN | WEIGHT: 124 LBS | HEART RATE: 76 BPM | TEMPERATURE: 98.2 F | SYSTOLIC BLOOD PRESSURE: 118 MMHG | BODY MASS INDEX: 23.41 KG/M2 | DIASTOLIC BLOOD PRESSURE: 46 MMHG | OXYGEN SATURATION: 94 %

## 2023-10-26 PROBLEM — R27.0 ATAXIA: Status: ACTIVE | Noted: 2023-10-26

## 2023-10-26 PROBLEM — T66.XXXA: Status: ACTIVE | Noted: 2023-10-26

## 2023-10-26 PROBLEM — I65.01 STENOSIS OF RIGHT VERTEBRAL ARTERY: Status: ACTIVE | Noted: 2023-10-26

## 2023-10-26 PROBLEM — G45.9 TIA (TRANSIENT ISCHEMIC ATTACK): Status: ACTIVE | Noted: 2023-10-26

## 2023-10-26 LAB
ALBUMIN SERPL-MCNC: 3.8 G/DL (ref 3.5–5.2)
ALP SERPL-CCNC: 43 U/L (ref 35–104)
ALT SERPL-CCNC: 41 U/L (ref 0–32)
ANION GAP SERPL CALCULATED.3IONS-SCNC: 8 MMOL/L (ref 7–16)
AST SERPL-CCNC: 33 U/L (ref 0–31)
BILIRUB SERPL-MCNC: 1 MG/DL (ref 0–1.2)
BUN SERPL-MCNC: 24 MG/DL (ref 6–23)
CALCIUM SERPL-MCNC: 8.7 MG/DL (ref 8.6–10.2)
CHLORIDE SERPL-SCNC: 105 MMOL/L (ref 98–107)
CO2 SERPL-SCNC: 27 MMOL/L (ref 22–29)
CREAT SERPL-MCNC: 0.7 MG/DL (ref 0.5–1)
ERYTHROCYTE [DISTWIDTH] IN BLOOD BY AUTOMATED COUNT: 27 % (ref 11.5–15)
GFR SERPL CREATININE-BSD FRML MDRD: >60 ML/MIN/1.73M2
GLUCOSE SERPL-MCNC: 100 MG/DL (ref 74–99)
HCT VFR BLD AUTO: 25 % (ref 34–48)
HGB BLD-MCNC: 8 G/DL (ref 11.5–15.5)
MCH RBC QN AUTO: 36.7 PG (ref 26–35)
MCHC RBC AUTO-ENTMCNC: 32 G/DL (ref 32–34.5)
MCV RBC AUTO: 114.7 FL (ref 80–99.9)
PLATELET # BLD AUTO: 119 K/UL (ref 130–450)
PMV BLD AUTO: 11.6 FL (ref 7–12)
POTASSIUM SERPL-SCNC: 4.3 MMOL/L (ref 3.5–5)
PROT SERPL-MCNC: 6.2 G/DL (ref 6.4–8.3)
RBC # BLD AUTO: 2.18 M/UL (ref 3.5–5.5)
SODIUM SERPL-SCNC: 140 MMOL/L (ref 132–146)
WBC OTHER # BLD: 4.3 K/UL (ref 4.5–11.5)

## 2023-10-26 PROCEDURE — 99223 1ST HOSP IP/OBS HIGH 75: CPT | Performed by: PSYCHIATRY & NEUROLOGY

## 2023-10-26 PROCEDURE — 99232 SBSQ HOSP IP/OBS MODERATE 35: CPT | Performed by: NURSE PRACTITIONER

## 2023-10-26 PROCEDURE — 97161 PT EVAL LOW COMPLEX 20 MIN: CPT

## 2023-10-26 PROCEDURE — 36415 COLL VENOUS BLD VENIPUNCTURE: CPT

## 2023-10-26 PROCEDURE — 6360000002 HC RX W HCPCS: Performed by: INTERNAL MEDICINE

## 2023-10-26 PROCEDURE — 97530 THERAPEUTIC ACTIVITIES: CPT

## 2023-10-26 PROCEDURE — 80053 COMPREHEN METABOLIC PANEL: CPT

## 2023-10-26 PROCEDURE — 85027 COMPLETE CBC AUTOMATED: CPT

## 2023-10-26 PROCEDURE — 6370000000 HC RX 637 (ALT 250 FOR IP): Performed by: INTERNAL MEDICINE

## 2023-10-26 PROCEDURE — 70551 MRI BRAIN STEM W/O DYE: CPT

## 2023-10-26 RX ORDER — CLOPIDOGREL BISULFATE 75 MG/1
75 TABLET ORAL DAILY
Qty: 30 TABLET | Refills: 0 | Status: SHIPPED | OUTPATIENT
Start: 2023-10-26

## 2023-10-26 RX ORDER — ATORVASTATIN CALCIUM 40 MG/1
40 TABLET, FILM COATED ORAL DAILY
Qty: 30 TABLET | Refills: 0 | Status: SHIPPED | OUTPATIENT
Start: 2023-10-26

## 2023-10-26 RX ORDER — ORPHENADRINE CITRATE 100 MG/1
100 TABLET, EXTENDED RELEASE ORAL 2 TIMES DAILY
Qty: 20 TABLET | Refills: 0 | Status: SHIPPED | OUTPATIENT
Start: 2023-10-26 | End: 2023-11-05

## 2023-10-26 RX ORDER — ASPIRIN 81 MG/1
81 TABLET ORAL DAILY
Qty: 90 TABLET | Refills: 0 | Status: SHIPPED | OUTPATIENT
Start: 2023-10-26

## 2023-10-26 RX ORDER — LEVOTHYROXINE SODIUM 0.15 MG/1
150 TABLET ORAL DAILY
Qty: 30 TABLET | Refills: 0 | Status: SHIPPED | OUTPATIENT
Start: 2023-10-26

## 2023-10-26 RX ADMIN — NEPHROCAP 1 MG: 1 CAP ORAL at 09:16

## 2023-10-26 RX ADMIN — ENOXAPARIN SODIUM 40 MG: 100 INJECTION SUBCUTANEOUS at 09:17

## 2023-10-26 NOTE — PROGRESS NOTES
CLINICAL PHARMACY NOTE: MEDS TO BEDS    Total # of Prescriptions Filled: 1   The following medications were delivered to the patient:  Orphenadrine 100    Additional Documentation:
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
## 2023-10-26 NOTE — PROGRESS NOTES
Physical Therapy  Physical Therapy Initial Assessment     Name: Samy Rehman  : 1940  MRN: [id number]      Date of Service: 10/26/2023    Evaluating PT:  Cody Warren PT, DPT    Room #:  7249/9382-V  Diagnosis:  Dizziness [R42]  Ataxia [R27.0]  PMHx/PSHx:  breast CA, MDS, hypothyroidism  Procedure/Surgery:  N/A  Precautions:  fall risk, BLE neuropathy  Equipment Owned: SBQC  Equipment Needs:  TBD    SUBJECTIVE:    Pt lives alone in a 1 story home with 5 steps to enter and 1 handrail. Bed/bath is on 1st floor. Pt ambulated with SBQC PRN PTA. OBJECTIVE:   Initial Evaluation  Date: 10/26/23 Treatment Short Term/ Long Term   Goals   AM-PAC 6 Clicks 97/14     Was pt agreeable to Eval/treatment? yes     Does pt have pain?  No pain     Bed Mobility  Rolling: SBA  Supine to sit: SBA  Sit to supine: NT  Scooting: SBA  Rolling: mod I  Supine to sit: mod I  Sit to supine: mod I  Scooting: mod I   Transfers Sit to stand: SBA  Stand to sit: SBA  Stand pivot: SBA with SBQC  Sit to stand: mod I  Stand to sit: mod I  Stand pivot: mod I with AAD   Ambulation    50'x2 with SBQC SBA  150'+ with AAD mod I   Stair negotiation: ascended and descended  4 steps 1 handrail SBA  4x2 steps with 1 handrail mod I     Strength/ROM:   BLE grossly 4/5  BLE AROM WFL    Balance:   Static Sitting: Supervision  Dynamic Sitting: Supervision  Static Standing: SBA with SBQC  Dynamic Standing: SBA with SBQC    Pt is A & O x 3  Sensation:  Pt reports decreased sensation in B feet  Edema:  unremarkable    Vitals:  SpO2 and HR were stable during session    Therapeutic Exercises:    Bed mobility: supine<>sit, cued for EOB positioning  Transfers: STS x1, cued for hand placement and postural correction  Ambulation: 50'x2 with SBQC  Stair negotiation: 4 steps with 1 HR  BLE AROM    Patient education  Pt educated on role of PT, importance of functional mobility during hospital stay, safety with functional mobility    Patient response to education: Pt verbalized understanding Pt demonstrated skill Pt requires further education in this area   yes yes reinforce     ASSESSMENT:    Conditions Requiring Skilled Therapeutic Intervention:    [x]Decreased strength     []Decreased ROM  [x]Decreased functional mobility  [x]Decreased balance   [x]Decreased endurance   [x]Decreased posture  [x]Decreased sensation  []Decreased coordination   []Decreased vision  []Decreased safety awareness   []Increased pain       Comments:  Pt supine in bed upon entering, pt agreeable to participate. Pt instructed to transfer to EOB, completing transfer with no physical assistance. Pt sitting upright with good sitting balance. Pt with no c/o dizziness with position change. Pt then cued for hand placement and instructed to stand from EOB. Pt standing with good balance with SBQC. Pt instructed to ambulate to tolerance. Pt ambulating with decreased ana, cueing provided for energy conservation. Pt demonstrated good tolerance to ambulation bout, and negotiated stairs safely with use of 1 HR. Pt was assisted back to room where transport was waiting for pt to take her to MRI. Pt transferred to w/c, cued for positioning. Pt remained with transport, all needs met prior to ending session. Treatment:  Patient practiced and was instructed in the following treatment:    Bed mobility training - pt given verbal and tactile cues to facilitate proper sequencing and safety during rolling and supine>sit as well as provided with stand by assistance to complete task   STS and pivot transfer training - pt educated on proper hand and foot placement, safety and sequencing, and use of verbal and tactile cues to safely complete sit<>stand and pivot transfers with stand by assistance to complete task safely   Gait training- pt was given verbal and tactile cues to facilitate pt safety with [facility] use during ambulation as well as provided with stand by assistance. Pt's/ family goals   1.  Return home    Prognosis is good for reaching above PT goals. Patient and or family understand(s) diagnosis, prognosis, and plan of care. yes    PHYSICAL THERAPY PLAN OF CARE:    PT POC is established based on physician order and patient diagnosis     Referring provider/PT Order:  Kylee Lyons MD  Diagnosis:  Dizziness [R42]  Ataxia [R27.0]  Specific instructions for next treatment:  Progress as tolerated    Current Treatment Recommendations:     [x] Strengthening to improve independence with functional mobility   [] ROM to improve independence with functional mobility   [x] Balance Training to improve static/dynamic balance and to reduce fall risk  [x] Endurance Training to improve activity tolerance during functional mobility   [x] Transfer Training to improve safety and independence with all functional transfers   [x] Gait Training to improve gait mechanics, endurance and assess need for appropriate assistive device  [x] Stair Training in preparation for safe discharge home and/or into the community   [] Positioning to prevent skin breakdown and contractures  [x] Safety and Education Training   [x] Patient/Caregiver Education   [] HEP  [] Other     PT long term treatment goals are located in above grid    Frequency of treatments: 2-5x/week x 1-2 weeks. Time in  0930  Time out  0950    Total Treatment Time  10 minutes     Evaluation Time includes thorough review of current medical information, gathering information on past medical history/social history and prior level of function, completion of standardized testing/informal observation of tasks, assessment of data and education on plan of care and goals.     CPT codes:  [x] Low Complexity PT evaluation 25239  [] Moderate Complexity PT evaluation 02527  [] High Complexity PT evaluation 32853  [] PT Re-evaluation 68964  [] Gait training 76701 -- minutes  [] Manual therapy 90328 -- minutes  [x] Therapeutic activities 55206 10 minutes  [] Therapeutic exercises
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
## 2023-10-26 NOTE — CARE COORDINATION
SOCIAL WORK/CASEMANAGEMENT TRANSITION OF CARE PLANNING( [address], [address] [facility]):  OT eval with ampac of 19 and PT seeing pt this a.m. the plan is home via pt daughter who lives 4 miles down the road and talks with pt daily. MRI of the brain pending. Neurology saw late last night. C collar removed. Echo was negative and EF of 60%. Urine cx pending. consult to Neuro Interventionalists made this a.m. .KAVYA Calderon  .10/26/2023  PT saw pt and she was SBA and ambulated 50'x2 with SBQC SBA. Abram Rivera KAVYA Calderon.  10/26/2023    Met with pt and her plan is now to her own home with [facility]. Choices offered and pt has no preference for home PT and OT. Referral made to [facility] referral made. Left note for rn for c orders. Waiting for IR neurology to see pt.  KAVYA Calderon  .10/26/2023

## 2023-10-27 ENCOUNTER — APPOINTMENT (OUTPATIENT)
Dept: CT IMAGING | Age: 83
DRG: 552 | End: 2023-10-27
Payer: MEDICARE

## 2023-10-27 ENCOUNTER — HOSPITAL ENCOUNTER (INPATIENT)
Age: 83
LOS: 2 days | Discharge: HOME | DRG: 552 | End: 2023-10-29
Attending: EMERGENCY MEDICINE | Admitting: INTERNAL MEDICINE
Payer: MEDICARE

## 2023-10-27 ENCOUNTER — APPOINTMENT (OUTPATIENT)
Dept: GENERAL RADIOLOGY | Age: 83
DRG: 552 | End: 2023-10-27
Payer: MEDICARE

## 2023-10-27 DIAGNOSIS — R27.0 ATAXIA: Primary | ICD-10-CM

## 2023-10-27 DIAGNOSIS — R53.83 FATIGUE, UNSPECIFIED TYPE: ICD-10-CM

## 2023-10-27 LAB
ALBUMIN SERPL-MCNC: 4.3 G/DL (ref 3.5–5.2)
ALP SERPL-CCNC: 45 U/L (ref 35–104)
ALT SERPL-CCNC: 48 U/L (ref 0–32)
ANION GAP SERPL CALCULATED.3IONS-SCNC: 9 MMOL/L (ref 7–16)
AST SERPL-CCNC: 51 U/L (ref 0–31)
BACTERIA URNS QL MICRO: ABNORMAL
BASOPHILS # BLD: 0 K/UL (ref 0–0.2)
BASOPHILS NFR BLD: 0 % (ref 0–2)
BILIRUB SERPL-MCNC: 1.1 MG/DL (ref 0–1.2)
BILIRUB UR QL STRIP: NEGATIVE
BNP SERPL-MCNC: 209 PG/ML (ref 0–450)
BUN SERPL-MCNC: 22 MG/DL (ref 6–23)
CALCIUM SERPL-MCNC: 9.3 MG/DL (ref 8.6–10.2)
CHLORIDE SERPL-SCNC: 106 MMOL/L (ref 98–107)
CLARITY UR: CLEAR
CO2 SERPL-SCNC: 26 MMOL/L (ref 22–29)
COLOR UR: YELLOW
CREAT SERPL-MCNC: 0.6 MG/DL (ref 0.5–1)
EKG ATRIAL RATE: 66 BPM
EKG P AXIS: 60 DEGREES
EKG P-R INTERVAL: 184 MS
EKG Q-T INTERVAL: 398 MS
EKG QRS DURATION: 82 MS
EKG QTC CALCULATION (BAZETT): 417 MS
EKG R AXIS: -12 DEGREES
EKG T AXIS: 40 DEGREES
EKG VENTRICULAR RATE: 66 BPM
EOSINOPHIL # BLD: 0.24 K/UL (ref 0.05–0.5)
EOSINOPHILS RELATIVE PERCENT: 4 % (ref 0–6)
EPI CELLS #/AREA URNS HPF: ABNORMAL /HPF
ERYTHROCYTE [DISTWIDTH] IN BLOOD BY AUTOMATED COUNT: 27 % (ref 11.5–15)
GFR SERPL CREATININE-BSD FRML MDRD: >60 ML/MIN/1.73M2
GLUCOSE SERPL-MCNC: 109 MG/DL (ref 74–99)
GLUCOSE UR STRIP-MCNC: NEGATIVE MG/DL
HCT VFR BLD AUTO: 27.8 % (ref 34–48)
HGB BLD-MCNC: 9.1 G/DL (ref 11.5–15.5)
HGB UR QL STRIP.AUTO: NEGATIVE
INFLUENZA A BY PCR: NOT DETECTED
INFLUENZA B BY PCR: NOT DETECTED
KETONES UR STRIP-MCNC: NEGATIVE MG/DL
LEUKOCYTE ESTERASE UR QL STRIP: ABNORMAL
LYMPHOCYTES NFR BLD: 2.87 K/UL (ref 1.5–4)
LYMPHOCYTES RELATIVE PERCENT: 52 % (ref 20–42)
MCH RBC QN AUTO: 37 PG (ref 26–35)
MCHC RBC AUTO-ENTMCNC: 32.7 G/DL (ref 32–34.5)
MCV RBC AUTO: 113 FL (ref 80–99.9)
METAMYELOCYTES ABSOLUTE COUNT: 0.05 K/UL (ref 0–0.12)
METAMYELOCYTES: 1 % (ref 0–1)
MONOCYTES NFR BLD: 0.24 K/UL (ref 0.1–0.95)
MONOCYTES NFR BLD: 4 % (ref 2–12)
NEUTROPHILS NFR BLD: 38 % (ref 43–80)
NEUTS SEG NFR BLD: 2.09 K/UL (ref 1.8–7.3)
NITRITE UR QL STRIP: NEGATIVE
PH UR STRIP: 6 [PH] (ref 5–9)
PLATELET # BLD AUTO: 179 K/UL (ref 130–450)
PMV BLD AUTO: 12.3 FL (ref 7–12)
POTASSIUM SERPL-SCNC: 4.7 MMOL/L (ref 3.5–5)
PROT SERPL-MCNC: 7.2 G/DL (ref 6.4–8.3)
PROT UR STRIP-MCNC: NEGATIVE MG/DL
RBC # BLD AUTO: 2.46 M/UL (ref 3.5–5.5)
RBC # BLD: ABNORMAL 10*6/UL
RBC #/AREA URNS HPF: ABNORMAL /HPF
SODIUM SERPL-SCNC: 141 MMOL/L (ref 132–146)
SP GR UR STRIP: 1.02 (ref 1–1.03)
TROPONIN I SERPL HS-MCNC: 10 NG/L (ref 0–9)
TROPONIN I SERPL HS-MCNC: 9 NG/L (ref 0–9)
TSH SERPL DL<=0.05 MIU/L-ACNC: 0.02 UIU/ML (ref 0.27–4.2)
UROBILINOGEN UR STRIP-ACNC: 0.2 EU/DL (ref 0–1)
WBC #/AREA URNS HPF: ABNORMAL /HPF
WBC OTHER # BLD: 5.5 K/UL (ref 4.5–11.5)

## 2023-10-27 PROCEDURE — 85025 COMPLETE CBC W/AUTO DIFF WBC: CPT

## 2023-10-27 PROCEDURE — 70450 CT HEAD/BRAIN W/O DYE: CPT

## 2023-10-27 PROCEDURE — 96361 HYDRATE IV INFUSION ADD-ON: CPT

## 2023-10-27 PROCEDURE — 2060000000 HC ICU INTERMEDIATE R&B

## 2023-10-27 PROCEDURE — 71101 X-RAY EXAM UNILAT RIBS/CHEST: CPT

## 2023-10-27 PROCEDURE — 93005 ELECTROCARDIOGRAM TRACING: CPT

## 2023-10-27 PROCEDURE — G0378 HOSPITAL OBSERVATION PER HR: HCPCS

## 2023-10-27 PROCEDURE — 2580000003 HC RX 258

## 2023-10-27 PROCEDURE — 99285 EMERGENCY DEPT VISIT HI MDM: CPT

## 2023-10-27 PROCEDURE — 96360 HYDRATION IV INFUSION INIT: CPT

## 2023-10-27 PROCEDURE — 84484 ASSAY OF TROPONIN QUANT: CPT

## 2023-10-27 PROCEDURE — 83880 ASSAY OF NATRIURETIC PEPTIDE: CPT

## 2023-10-27 PROCEDURE — 93010 ELECTROCARDIOGRAM REPORT: CPT | Performed by: INTERNAL MEDICINE

## 2023-10-27 PROCEDURE — 81001 URINALYSIS AUTO W/SCOPE: CPT

## 2023-10-27 PROCEDURE — 87502 INFLUENZA DNA AMP PROBE: CPT

## 2023-10-27 PROCEDURE — 84443 ASSAY THYROID STIM HORMONE: CPT

## 2023-10-27 PROCEDURE — 80053 COMPREHEN METABOLIC PANEL: CPT

## 2023-10-27 RX ORDER — SODIUM CHLORIDE 0.9 % (FLUSH) 0.9 %
5-40 SYRINGE (ML) INJECTION EVERY 12 HOURS SCHEDULED
Status: DISCONTINUED | OUTPATIENT
Start: 2023-10-28 | End: 2023-10-29 | Stop reason: HOSPADM

## 2023-10-27 RX ORDER — ACETAMINOPHEN 325 MG/1
650 TABLET ORAL EVERY 6 HOURS PRN
Status: DISCONTINUED | OUTPATIENT
Start: 2023-10-27 | End: 2023-10-29 | Stop reason: HOSPADM

## 2023-10-27 RX ORDER — ENOXAPARIN SODIUM 100 MG/ML
40 INJECTION SUBCUTANEOUS DAILY
Status: DISCONTINUED | OUTPATIENT
Start: 2023-10-28 | End: 2023-10-29 | Stop reason: HOSPADM

## 2023-10-27 RX ORDER — VITAMIN C
1 TAB ORAL DAILY
Status: DISCONTINUED | OUTPATIENT
Start: 2023-10-28 | End: 2023-10-29 | Stop reason: HOSPADM

## 2023-10-27 RX ORDER — 0.9 % SODIUM CHLORIDE 0.9 %
1000 INTRAVENOUS SOLUTION INTRAVENOUS ONCE
Status: COMPLETED | OUTPATIENT
Start: 2023-10-27 | End: 2023-10-27

## 2023-10-27 RX ORDER — ONDANSETRON 4 MG/1
4 TABLET, ORALLY DISINTEGRATING ORAL EVERY 8 HOURS PRN
Status: DISCONTINUED | OUTPATIENT
Start: 2023-10-27 | End: 2023-10-29 | Stop reason: HOSPADM

## 2023-10-27 RX ORDER — ATORVASTATIN CALCIUM 40 MG/1
40 TABLET, FILM COATED ORAL DAILY
Status: DISCONTINUED | OUTPATIENT
Start: 2023-10-28 | End: 2023-10-29 | Stop reason: HOSPADM

## 2023-10-27 RX ORDER — LEVOTHYROXINE SODIUM 0.07 MG/1
150 TABLET ORAL DAILY
Status: DISCONTINUED | OUTPATIENT
Start: 2023-10-28 | End: 2023-10-29 | Stop reason: HOSPADM

## 2023-10-27 RX ORDER — GABAPENTIN 300 MG/1
900 CAPSULE ORAL NIGHTLY
Status: DISCONTINUED | OUTPATIENT
Start: 2023-10-28 | End: 2023-10-29 | Stop reason: HOSPADM

## 2023-10-27 RX ORDER — ACETAMINOPHEN 650 MG/1
650 SUPPOSITORY RECTAL EVERY 6 HOURS PRN
Status: DISCONTINUED | OUTPATIENT
Start: 2023-10-27 | End: 2023-10-29 | Stop reason: HOSPADM

## 2023-10-27 RX ORDER — ASPIRIN 81 MG/1
81 TABLET ORAL DAILY
Status: DISCONTINUED | OUTPATIENT
Start: 2023-10-28 | End: 2023-10-29 | Stop reason: HOSPADM

## 2023-10-27 RX ORDER — MAGNESIUM HYDROXIDE/ALUMINUM HYDROXICE/SIMETHICONE 120; 1200; 1200 MG/30ML; MG/30ML; MG/30ML
30 SUSPENSION ORAL EVERY 6 HOURS PRN
Status: DISCONTINUED | OUTPATIENT
Start: 2023-10-27 | End: 2023-10-29 | Stop reason: HOSPADM

## 2023-10-27 RX ORDER — ONDANSETRON 2 MG/ML
4 INJECTION INTRAMUSCULAR; INTRAVENOUS EVERY 6 HOURS PRN
Status: DISCONTINUED | OUTPATIENT
Start: 2023-10-27 | End: 2023-10-29 | Stop reason: HOSPADM

## 2023-10-27 RX ORDER — SODIUM CHLORIDE 9 MG/ML
INJECTION, SOLUTION INTRAVENOUS PRN
Status: DISCONTINUED | OUTPATIENT
Start: 2023-10-27 | End: 2023-10-29 | Stop reason: HOSPADM

## 2023-10-27 RX ORDER — SODIUM CHLORIDE 0.9 % (FLUSH) 0.9 %
5-40 SYRINGE (ML) INJECTION PRN
Status: DISCONTINUED | OUTPATIENT
Start: 2023-10-27 | End: 2023-10-29 | Stop reason: HOSPADM

## 2023-10-27 RX ORDER — CLOPIDOGREL BISULFATE 75 MG/1
75 TABLET ORAL DAILY
Status: DISCONTINUED | OUTPATIENT
Start: 2023-10-28 | End: 2023-10-29 | Stop reason: HOSPADM

## 2023-10-27 RX ORDER — SENNOSIDES A AND B 8.6 MG/1
1 TABLET, FILM COATED ORAL DAILY PRN
Status: DISCONTINUED | OUTPATIENT
Start: 2023-10-27 | End: 2023-10-29 | Stop reason: HOSPADM

## 2023-10-27 RX ADMIN — SODIUM CHLORIDE 1000 ML: 9 INJECTION, SOLUTION INTRAVENOUS at 15:09

## 2023-10-27 ASSESSMENT — LIFESTYLE VARIABLES
HOW MANY STANDARD DRINKS CONTAINING ALCOHOL DO YOU HAVE ON A TYPICAL DAY: PATIENT DOES NOT DRINK
HOW OFTEN DO YOU HAVE A DRINK CONTAINING ALCOHOL: NEVER

## 2023-10-27 ASSESSMENT — PAIN DESCRIPTION - ORIENTATION: ORIENTATION: RIGHT;LEFT

## 2023-10-27 ASSESSMENT — PAIN DESCRIPTION - DESCRIPTORS: DESCRIPTORS: ACHING;DISCOMFORT

## 2023-10-27 ASSESSMENT — PAIN DESCRIPTION - LOCATION: LOCATION: LEG

## 2023-10-27 ASSESSMENT — PAIN SCALES - GENERAL: PAINLEVEL_OUTOF10: 5

## 2023-10-27 NOTE — H&P
Hospitalist History & Physical      PATIENT NAME:  Amy Middleton    MRN:  [id number]  SERVICE DATE:  10/27/23    Primary Care Physician: CONNIE Collins CNP       SUBJECTIVE  CHIEF COMPLAINT:  had concerns including Fatigue (Patient states she was discharged from the hospital yesterday and went home but has been weak and dizzy. States she did have a fall in the bathroom denies hitting head, denies LOC ). HPI:  Ms. Amy Middleton, a 78y.o. year old female  who  has a past medical history of Anemia, Aplastic anemia ([address]), Breast cancer ([address]), Chickenpox, History of blood transfusion, Hypothyroidism, Measles, Mild depression, Mumps, Myelodysplastic syndrome ([address]), Neuropathy, Prolonged emergence from general anesthesia, Pulmonary tuberculosis, Scarlet fever, and Sensory neuropathy. presents with Fatigue (Patient states she was discharged from the hospital yesterday and went home but has been weak and dizzy. States she did have a fall in the bathroom denies hitting head, denies LOC )  , was just discharged yesterday for ataxia, was sent home but continues to have symptoms. No chest pain or SOB no nausea or vomiting.      PAST MEDICAL HISTORY:    Past Medical History:   Diagnosis Date    Anemia     Pt reports she was dx in her teens, w/o proper w/u, with iron deficiency anemia and was on oral iron replacement for many years, resulting in iron overload    Aplastic anemia ([address]) ~6446-5000    Possibly d/t chemotherapy for breast cancer    Breast cancer ([address]) 2009    right side; pt underwent radical mastectomy followed by radiation therapy and chemotherapy and was then on oral chemo pill for 5 yrs; no recurrence as of 01/2019    Chickenpox     History of blood transfusion     Hypothyroidism     Measles     Mild depression     Mumps     Myelodysplastic syndrome (HCC) ~2016    Neuropathy     feet and ankles murali    Prolonged emergence from general anesthesia     Pulmonary tuberculosis ~1061-1664    in her

## 2023-10-27 NOTE — ED NOTES
Pt ambulated approx 10 feet w/ 2 RN assist and pt cane. Pt unsteady on feet. SpO2 ranged 99-98% RA, HR ranged 80-88.      Asif Delgado RN  10/27/23 3101

## 2023-10-28 ENCOUNTER — APPOINTMENT (OUTPATIENT)
Dept: MRI IMAGING | Age: 83
DRG: 552 | End: 2023-10-28
Payer: MEDICARE

## 2023-10-28 LAB
ALBUMIN SERPL-MCNC: 3.6 G/DL (ref 3.5–5.2)
ALP SERPL-CCNC: 41 U/L (ref 35–104)
ALT SERPL-CCNC: 39 U/L (ref 0–32)
ANION GAP SERPL CALCULATED.3IONS-SCNC: 13 MMOL/L (ref 7–16)
AST SERPL-CCNC: 42 U/L (ref 0–31)
BASOPHILS # BLD: 0.02 K/UL (ref 0–0.2)
BASOPHILS NFR BLD: 0 % (ref 0–2)
BILIRUB SERPL-MCNC: 0.9 MG/DL (ref 0–1.2)
BUN SERPL-MCNC: 18 MG/DL (ref 6–23)
CALCIUM SERPL-MCNC: 8.8 MG/DL (ref 8.6–10.2)
CHLORIDE SERPL-SCNC: 107 MMOL/L (ref 98–107)
CO2 SERPL-SCNC: 21 MMOL/L (ref 22–29)
CREAT SERPL-MCNC: 0.6 MG/DL (ref 0.5–1)
EOSINOPHIL # BLD: 0.17 K/UL (ref 0.05–0.5)
EOSINOPHILS RELATIVE PERCENT: 3 % (ref 0–6)
ERYTHROCYTE [DISTWIDTH] IN BLOOD BY AUTOMATED COUNT: 27.5 % (ref 11.5–15)
GFR SERPL CREATININE-BSD FRML MDRD: >60 ML/MIN/1.73M2
GLUCOSE SERPL-MCNC: 91 MG/DL (ref 74–99)
HCT VFR BLD AUTO: 24.1 % (ref 34–48)
HGB BLD-MCNC: 7.9 G/DL (ref 11.5–15.5)
LYMPHOCYTES NFR BLD: 2.21 K/UL (ref 1.5–4)
LYMPHOCYTES RELATIVE PERCENT: 41 % (ref 20–42)
MCH RBC QN AUTO: 37.4 PG (ref 26–35)
MCHC RBC AUTO-ENTMCNC: 32.8 G/DL (ref 32–34.5)
MCV RBC AUTO: 114.2 FL (ref 80–99.9)
MONOCYTES NFR BLD: 0.34 K/UL (ref 0.1–0.95)
MONOCYTES NFR BLD: 6 % (ref 2–12)
NEUTROPHILS NFR BLD: 49 % (ref 43–80)
NEUTS SEG NFR BLD: 2.65 K/UL (ref 1.8–7.3)
PLATELET # BLD AUTO: 145 K/UL (ref 130–450)
PMV BLD AUTO: 11.6 FL (ref 7–12)
POTASSIUM SERPL-SCNC: 4 MMOL/L (ref 3.5–5)
PROT SERPL-MCNC: 6.1 G/DL (ref 6.4–8.3)
RBC # BLD AUTO: 2.11 M/UL (ref 3.5–5.5)
RBC # BLD: ABNORMAL 10*6/UL
SODIUM SERPL-SCNC: 141 MMOL/L (ref 132–146)
TROPONIN I SERPL HS-MCNC: 10 NG/L (ref 0–9)
TROPONIN I SERPL HS-MCNC: 11 NG/L (ref 0–9)
WBC OTHER # BLD: 5.4 K/UL (ref 4.5–11.5)

## 2023-10-28 PROCEDURE — 97530 THERAPEUTIC ACTIVITIES: CPT

## 2023-10-28 PROCEDURE — 2060000000 HC ICU INTERMEDIATE R&B

## 2023-10-28 PROCEDURE — 6360000002 HC RX W HCPCS: Performed by: NURSE PRACTITIONER

## 2023-10-28 PROCEDURE — 6370000000 HC RX 637 (ALT 250 FOR IP): Performed by: FAMILY MEDICINE

## 2023-10-28 PROCEDURE — 2580000003 HC RX 258: Performed by: INTERNAL MEDICINE

## 2023-10-28 PROCEDURE — 6360000004 HC RX CONTRAST MEDICATION: Performed by: RADIOLOGY

## 2023-10-28 PROCEDURE — 99232 SBSQ HOSP IP/OBS MODERATE 35: CPT | Performed by: NURSE PRACTITIONER

## 2023-10-28 PROCEDURE — A9579 GAD-BASE MR CONTRAST NOS,1ML: HCPCS | Performed by: RADIOLOGY

## 2023-10-28 PROCEDURE — 6370000000 HC RX 637 (ALT 250 FOR IP): Performed by: INTERNAL MEDICINE

## 2023-10-28 PROCEDURE — 72156 MRI NECK SPINE W/O & W/DYE: CPT

## 2023-10-28 PROCEDURE — 97161 PT EVAL LOW COMPLEX 20 MIN: CPT

## 2023-10-28 PROCEDURE — 36415 COLL VENOUS BLD VENIPUNCTURE: CPT

## 2023-10-28 PROCEDURE — 84484 ASSAY OF TROPONIN QUANT: CPT

## 2023-10-28 PROCEDURE — 70551 MRI BRAIN STEM W/O DYE: CPT

## 2023-10-28 PROCEDURE — 2500000003 HC RX 250 WO HCPCS: Performed by: NURSE PRACTITIONER

## 2023-10-28 PROCEDURE — 85025 COMPLETE CBC W/AUTO DIFF WBC: CPT

## 2023-10-28 PROCEDURE — 2580000003 HC RX 258: Performed by: NURSE PRACTITIONER

## 2023-10-28 PROCEDURE — 80053 COMPREHEN METABOLIC PANEL: CPT

## 2023-10-28 PROCEDURE — 97165 OT EVAL LOW COMPLEX 30 MIN: CPT

## 2023-10-28 RX ORDER — METOCLOPRAMIDE HYDROCHLORIDE 5 MG/ML
5 INJECTION INTRAMUSCULAR; INTRAVENOUS EVERY 8 HOURS
Status: COMPLETED | OUTPATIENT
Start: 2023-10-28 | End: 2023-10-29

## 2023-10-28 RX ORDER — HYDROCODONE BITARTRATE AND ACETAMINOPHEN 5; 325 MG/1; MG/1
1 TABLET ORAL EVERY 4 HOURS PRN
Status: DISCONTINUED | OUTPATIENT
Start: 2023-10-28 | End: 2023-10-29 | Stop reason: HOSPADM

## 2023-10-28 RX ADMIN — GABAPENTIN 900 MG: 300 CAPSULE ORAL at 20:43

## 2023-10-28 RX ADMIN — LEVOTHYROXINE SODIUM 150 MCG: 0.07 TABLET ORAL at 06:48

## 2023-10-28 RX ADMIN — HYDROCODONE BITARTRATE AND ACETAMINOPHEN 1 TABLET: 5; 325 TABLET ORAL at 21:49

## 2023-10-28 RX ADMIN — SODIUM CHLORIDE, PRESERVATIVE FREE 10 ML: 5 INJECTION INTRAVENOUS at 20:44

## 2023-10-28 RX ADMIN — CLOPIDOGREL BISULFATE 75 MG: 75 TABLET ORAL at 09:55

## 2023-10-28 RX ADMIN — METOCLOPRAMIDE 5 MG: 5 INJECTION, SOLUTION INTRAMUSCULAR; INTRAVENOUS at 17:09

## 2023-10-28 RX ADMIN — Medication 1 TABLET: at 09:55

## 2023-10-28 RX ADMIN — SERTRALINE 50 MG: 50 TABLET, FILM COATED ORAL at 20:43

## 2023-10-28 RX ADMIN — ATORVASTATIN CALCIUM 40 MG: 40 TABLET, FILM COATED ORAL at 09:55

## 2023-10-28 RX ADMIN — SERTRALINE 50 MG: 50 TABLET, FILM COATED ORAL at 00:29

## 2023-10-28 RX ADMIN — GADOTERIDOL 11 ML: 279.3 INJECTION, SOLUTION INTRAVENOUS at 12:16

## 2023-10-28 RX ADMIN — ASPIRIN 81 MG: 81 TABLET, COATED ORAL at 09:55

## 2023-10-28 RX ADMIN — GABAPENTIN 900 MG: 300 CAPSULE ORAL at 00:29

## 2023-10-28 RX ADMIN — VALPROATE SODIUM 250 MG: 100 INJECTION, SOLUTION INTRAVENOUS at 17:14

## 2023-10-28 RX ADMIN — SODIUM CHLORIDE, PRESERVATIVE FREE 10 ML: 5 INJECTION INTRAVENOUS at 09:55

## 2023-10-28 ASSESSMENT — PAIN SCALES - GENERAL
PAINLEVEL_OUTOF10: 0
PAINLEVEL_OUTOF10: 7

## 2023-10-28 ASSESSMENT — ENCOUNTER SYMPTOMS
WHEEZING: 0
NAUSEA: 0
DIARRHEA: 0
RHINORRHEA: 0
TROUBLE SWALLOWING: 0
PHOTOPHOBIA: 0
SHORTNESS OF BREATH: 0
VOICE CHANGE: 0
SORE THROAT: 0
COUGH: 0
VOMITING: 0
ABDOMINAL PAIN: 0

## 2023-10-28 ASSESSMENT — PAIN DESCRIPTION - ORIENTATION: ORIENTATION: LEFT;RIGHT

## 2023-10-28 ASSESSMENT — PAIN DESCRIPTION - LOCATION: LOCATION: GENERALIZED;BACK;LEG

## 2023-10-28 ASSESSMENT — PAIN DESCRIPTION - DESCRIPTORS: DESCRIPTORS: ACHING;DISCOMFORT

## 2023-10-28 NOTE — ED PROVIDER NOTES
[address] [facility]      Pt Name: Tabitha Kiser  MRN: [id number]  [address] [facility] 1940  Date of evaluation: 10/27/2023  Provider: Janene Youssef DO  PCP: CONNIE Carbajal CNP    HPI: 25-year-old female presenting emerged part complaints of dizziness, fatigue. Patient reports that she was discharged from hospital yesterday and actually went home and has been having worsening dizziness, fatigue. Patient reports that she was walking to her bathroom when she fell landing onto her side. Denies any head injury or loss of consciousness. Patient reports she struck her right side. Some discomfort over right ribs. Denies any chest pain shortness of breath nausea vomiting diarrhea. Moving all extremities upper and lower. Finger-nose grossly intact. Reports that symptoms have been ongoing since Sunday following initial improvement. Previous history of myelodysplastic syndrome. Patient reports associated myalgias. Chief Complaint   Patient presents with    Fatigue     Patient states she was discharged from the hospital yesterday and went home but has been weak and dizzy. States she did have a fall in the bathroom denies hitting head, denies LOC        Review of Systems   Constitutional:  Positive for fatigue. Negative for chills and fever. HENT:  Negative for congestion, rhinorrhea, sore throat, trouble swallowing and voice change. Eyes:  Negative for photophobia and visual disturbance. Respiratory:  Negative for cough, shortness of breath and wheezing. Cardiovascular:  Negative for chest pain and palpitations. Gastrointestinal:  Negative for abdominal pain, diarrhea, nausea and vomiting. Genitourinary:  Negative for dysuria, frequency, hematuria and urgency. Musculoskeletal:  Positive for myalgias. Negative for arthralgias, neck pain and neck stiffness. Skin:  Negative for rash and wound. Neurological:  Positive for dizziness.  Negative for syncope, weakness,

## 2023-10-28 NOTE — PLAN OF CARE
Plan of care    Discussed MRI C spine results with Dr. Sandee Graff and we will consult NSGY for abnormal MRI C spine results.

## 2023-10-28 NOTE — ED NOTES
Nurse to nurse report called to [facility]. All questions answered.  Pt in transport      Flavio Wynne RN  10/27/23 8503

## 2023-10-29 VITALS
SYSTOLIC BLOOD PRESSURE: 98 MMHG | BODY MASS INDEX: 23.41 KG/M2 | TEMPERATURE: 98 F | OXYGEN SATURATION: 99 % | HEIGHT: 61 IN | WEIGHT: 124 LBS | HEART RATE: 61 BPM | RESPIRATION RATE: 18 BRPM | DIASTOLIC BLOOD PRESSURE: 49 MMHG

## 2023-10-29 PROBLEM — M48.02 CERVICAL STENOSIS OF SPINAL CANAL: Status: ACTIVE | Noted: 2023-10-29

## 2023-10-29 PROCEDURE — 99222 1ST HOSP IP/OBS MODERATE 55: CPT | Performed by: NEUROLOGICAL SURGERY

## 2023-10-29 PROCEDURE — 6370000000 HC RX 637 (ALT 250 FOR IP): Performed by: INTERNAL MEDICINE

## 2023-10-29 PROCEDURE — 2500000003 HC RX 250 WO HCPCS: Performed by: NURSE PRACTITIONER

## 2023-10-29 PROCEDURE — 2580000003 HC RX 258: Performed by: NURSE PRACTITIONER

## 2023-10-29 PROCEDURE — 2580000003 HC RX 258: Performed by: INTERNAL MEDICINE

## 2023-10-29 PROCEDURE — 6360000002 HC RX W HCPCS: Performed by: NURSE PRACTITIONER

## 2023-10-29 RX ADMIN — VALPROATE SODIUM 250 MG: 100 INJECTION, SOLUTION INTRAVENOUS at 02:51

## 2023-10-29 RX ADMIN — Medication 1 TABLET: at 09:51

## 2023-10-29 RX ADMIN — METOCLOPRAMIDE 5 MG: 5 INJECTION, SOLUTION INTRAMUSCULAR; INTRAVENOUS at 09:51

## 2023-10-29 RX ADMIN — SODIUM CHLORIDE, PRESERVATIVE FREE 10 ML: 5 INJECTION INTRAVENOUS at 09:51

## 2023-10-29 RX ADMIN — ASPIRIN 81 MG: 81 TABLET, COATED ORAL at 09:51

## 2023-10-29 RX ADMIN — CLOPIDOGREL BISULFATE 75 MG: 75 TABLET ORAL at 09:51

## 2023-10-29 RX ADMIN — VALPROATE SODIUM 250 MG: 100 INJECTION, SOLUTION INTRAVENOUS at 09:59

## 2023-10-29 RX ADMIN — LEVOTHYROXINE SODIUM 150 MCG: 0.07 TABLET ORAL at 05:41

## 2023-10-29 RX ADMIN — ATORVASTATIN CALCIUM 40 MG: 40 TABLET, FILM COATED ORAL at 09:51

## 2023-10-29 RX ADMIN — METOCLOPRAMIDE 5 MG: 5 INJECTION, SOLUTION INTRAMUSCULAR; INTRAVENOUS at 02:50

## 2023-10-29 ASSESSMENT — PAIN SCALES - GENERAL
PAINLEVEL_OUTOF10: 0
PAINLEVEL_OUTOF10: 0

## 2023-10-29 NOTE — DISCHARGE INSTRUCTIONS
Your information:  Name: Nikolay Decker  : 1940    Your instructions: Take all medications as prescribed. Jimmie Closs Keep follow-up appointments as instructed. What to do after you leave the hospital:    Recommended diet: cardiac diet    Recommended activity: activity as tolerated        The following personal items were collected during your admission and were returned to you:    Belongings  Dental Appliances: None  Vision - Corrective Lenses: Eyeglasses, At bedside  Hearing Aid: None  Clothing: Pants, Shirt, At bedside  Jewelry: Earrings, At bedside  Electronic Devices: Cell Phone  Weapons (Notify Protective Services/Security): None  Home Medications: None  Valuables Given To: Patient  Provide Name(s) of Who Valuable(s) Were Given To: Chandni Lamas obtained by:  By signing below, I understand that if any problems occur once I leave the hospital I am to contact Mike ROSALES-CNP. I understand and acknowledge receipt of the instructions indicated above.

## 2023-10-29 NOTE — CARE COORDINATION
SOCIAL WORK/CASEMANAGEMENT TRANSITION OF CARE PLANNINGCyjohn Pilar DAVIDEVELIA, [facility]):  hhc and discharge order placed. Spoke with pt and she has no preference to a hhc agency when options were given. [facility] is setup for soc on wed. Pt said that therapy rec: a rollator. Pt has a fww at home and her and daughter will go to a dme co and pick one out. Charge rn to write order per dr. Guerrero Kaye. She is to give a copy of the order to pt to take to the dme co.  I called the floor and spoke with a rn about this. KAVYA Colin. .10/29/2023

## 2023-11-03 DIAGNOSIS — Z51.5 PALLIATIVE CARE BY SPECIALIST: ICD-10-CM

## 2023-11-03 DIAGNOSIS — G89.3 PAIN DUE TO NEOPLASM: ICD-10-CM

## 2023-11-03 DIAGNOSIS — C50.919 MALIGNANT NEOPLASM OF FEMALE BREAST, UNSPECIFIED ESTROGEN RECEPTOR STATUS, UNSPECIFIED LATERALITY, UNSPECIFIED SITE OF BREAST (HCC): ICD-10-CM

## 2023-11-03 RX ORDER — HYDROCODONE BITARTRATE AND ACETAMINOPHEN 5; 325 MG/1; MG/1
1 TABLET ORAL EVERY 6 HOURS PRN
Qty: 120 TABLET | Refills: 0 | Status: SHIPPED | OUTPATIENT
Start: 2023-11-03 | End: 2023-12-03

## 2023-11-06 ENCOUNTER — OFFICE VISIT (OUTPATIENT)
Dept: BREAST CENTER | Age: 83
End: 2023-11-06
Payer: COMMERCIAL

## 2023-11-06 VITALS
TEMPERATURE: 97.7 F | HEIGHT: 61 IN | SYSTOLIC BLOOD PRESSURE: 118 MMHG | OXYGEN SATURATION: 98 % | WEIGHT: 122 LBS | RESPIRATION RATE: 18 BRPM | DIASTOLIC BLOOD PRESSURE: 62 MMHG | HEART RATE: 64 BPM | BODY MASS INDEX: 23.03 KG/M2

## 2023-11-06 DIAGNOSIS — Z85.3 PERSONAL HISTORY OF BREAST CANCER: Primary | ICD-10-CM

## 2023-11-06 PROCEDURE — 99213 OFFICE O/P EST LOW 20 MIN: CPT | Performed by: SURGERY

## 2023-11-06 PROCEDURE — 1123F ACP DISCUSS/DSCN MKR DOCD: CPT | Performed by: SURGERY

## 2023-11-06 PROCEDURE — 99212 OFFICE O/P EST SF 10 MIN: CPT | Performed by: SURGERY

## 2023-11-06 RX ORDER — GABAPENTIN 600 MG/1
900 TABLET ORAL DAILY
COMMUNITY

## 2023-11-06 NOTE — PROGRESS NOTES
Date of visit: 11/6/2023 08/16/23 11/06/23    DIAGNOSIS:  1. (08/16/23) RIGHT mastectomy chest wall ulceration  2. (2009) Personal history RIGHT of breast cancer  * Stage III  * ER (+)  3. (10/24/23) Admission for CNS symptoms    TREATMENT  1. (2009) RIGHT mastectomy  2. Adjuvant AC-T  3. Adjuvant PMRT  4. (3393-7303) Arimidex. [address]    IMAGING/PROCEDURES:  1. (10/17/23) LEFT dt-mammogram: BIRADS-1  2. (08/16/23) RIGHT chest wall punch biopsy  * findings consistent with radiation dermatitis  3. (10/27/23) Brain MRI negative    Breast History  Ayden Salomon is in the office today for follow-up. Kandice Jay was initially evaluated in August.  At that time she had a new ulcer on her right chest wall and a prior mastectomy incision. A punch biopsy of the edge of this ulcer was performed demonstrating no evidence of recurrent malignancy. There were no other findings suggestive of recurrent malignancy. Breast Symptoms  Kandice Jay believes that the small ulcerated irregularity is actually contracted somewhat. She reports occasional staining on her clothing. She has no other symptoms to report today. Breast Examination  A focused examination of the right chest wall was performed. The ulcer measures now just under 2 cm. There is a dry eschar covering the base. There were no other irregularities on the right chest wall. Breast Imaging  Kandice Jay is due for a left breast mammogram.    Assessment/Plan  Ayden Salomon was in the office for short interval follow-up regarding a right mastectomy ulcer. I informed Kandice Jay that it is encouraging that it is сергей somewhat. I informed her that I believe this is all related to her breast cancer diagnosis from 2009 for which she was received mastectomy, chemotherapy and postmastectomy radiation. I discussed with her that the tissue and healing issues related to radiated tissue. I thought she would be best served now with referral to our wound clinic.     With respect

## 2023-11-07 ENCOUNTER — OFFICE VISIT (OUTPATIENT)
Dept: ONCOLOGY | Age: 83
End: 2023-11-07
Payer: MEDICARE

## 2023-11-07 ENCOUNTER — HOSPITAL ENCOUNTER (OUTPATIENT)
Dept: GENERAL RADIOLOGY | Age: 83
Discharge: HOME | End: 2023-11-09
Payer: MEDICARE

## 2023-11-07 ENCOUNTER — HOSPITAL ENCOUNTER (OUTPATIENT)
Dept: INFUSION THERAPY | Age: 83
Discharge: HOME | End: 2023-11-07
Payer: MEDICARE

## 2023-11-07 VITALS
TEMPERATURE: 97.2 F | SYSTOLIC BLOOD PRESSURE: 131 MMHG | BODY MASS INDEX: 23.41 KG/M2 | HEART RATE: 82 BPM | HEIGHT: 61 IN | OXYGEN SATURATION: 95 % | DIASTOLIC BLOOD PRESSURE: 97 MMHG | WEIGHT: 124 LBS

## 2023-11-07 DIAGNOSIS — Z12.31 ENCOUNTER FOR SCREENING MAMMOGRAM FOR BREAST CANCER: Primary | ICD-10-CM

## 2023-11-07 DIAGNOSIS — Z12.31 ENCOUNTER FOR SCREENING MAMMOGRAM FOR BREAST CANCER: ICD-10-CM

## 2023-11-07 DIAGNOSIS — R74.01 TRANSAMINITIS: ICD-10-CM

## 2023-11-07 DIAGNOSIS — D63.8 ANEMIA OF CHRONIC DISEASE: ICD-10-CM

## 2023-11-07 DIAGNOSIS — Z85.3 PERSONAL HISTORY OF BREAST CANCER: ICD-10-CM

## 2023-11-07 DIAGNOSIS — D46.9 MDS (MYELODYSPLASTIC SYNDROME) (HCC): Primary | ICD-10-CM

## 2023-11-07 LAB
ALBUMIN SERPL-MCNC: 4.3 G/DL (ref 3.5–5.2)
ALP SERPL-CCNC: 54 U/L (ref 35–104)
ALT SERPL-CCNC: 38 U/L (ref 0–32)
ANION GAP SERPL CALCULATED.3IONS-SCNC: 10 MMOL/L (ref 7–16)
AST SERPL-CCNC: 38 U/L (ref 0–31)
BASOPHILS # BLD: 0.04 K/UL (ref 0–0.2)
BASOPHILS NFR BLD: 1 % (ref 0–2)
BILIRUB SERPL-MCNC: 1.2 MG/DL (ref 0–1.2)
BUN SERPL-MCNC: 20 MG/DL (ref 6–23)
CALCIUM SERPL-MCNC: 9.1 MG/DL (ref 8.6–10.2)
CHLORIDE SERPL-SCNC: 107 MMOL/L (ref 98–107)
CO2 SERPL-SCNC: 25 MMOL/L (ref 22–29)
CREAT SERPL-MCNC: 0.8 MG/DL (ref 0.5–1)
EOSINOPHIL # BLD: 0.15 K/UL (ref 0.05–0.5)
EOSINOPHILS RELATIVE PERCENT: 4 % (ref 0–6)
ERYTHROCYTE [DISTWIDTH] IN BLOOD BY AUTOMATED COUNT: 27.9 % (ref 11.5–15)
GFR SERPL CREATININE-BSD FRML MDRD: >60 ML/MIN/1.73M2
GLUCOSE SERPL-MCNC: 110 MG/DL (ref 74–99)
HCT VFR BLD AUTO: 27.2 % (ref 34–48)
HGB BLD-MCNC: 8.6 G/DL (ref 11.5–15.5)
LYMPHOCYTES NFR BLD: 2.08 K/UL (ref 1.5–4)
LYMPHOCYTES RELATIVE PERCENT: 50 % (ref 20–42)
MCH RBC QN AUTO: 37.4 PG (ref 26–35)
MCHC RBC AUTO-ENTMCNC: 31.6 G/DL (ref 32–34.5)
MCV RBC AUTO: 118.3 FL (ref 80–99.9)
MONOCYTES NFR BLD: 0.29 K/UL (ref 0.1–0.95)
MONOCYTES NFR BLD: 7 % (ref 2–12)
MYELOCYTES ABSOLUTE COUNT: 0.04 K/UL
MYELOCYTES: 1 %
NEUTROPHILS NFR BLD: 38 % (ref 43–80)
NEUTS SEG NFR BLD: 1.61 K/UL (ref 1.8–7.3)
PLATELET # BLD AUTO: 158 K/UL (ref 130–450)
PMV BLD AUTO: 12.1 FL (ref 7–12)
POTASSIUM SERPL-SCNC: 4.3 MMOL/L (ref 3.5–5)
PROT SERPL-MCNC: 7 G/DL (ref 6.4–8.3)
RBC # BLD AUTO: 2.3 M/UL (ref 3.5–5.5)
RBC # BLD: ABNORMAL 10*6/UL
SODIUM SERPL-SCNC: 142 MMOL/L (ref 132–146)
WBC OTHER # BLD: 4.2 K/UL (ref 4.5–11.5)

## 2023-11-07 PROCEDURE — 99214 OFFICE O/P EST MOD 30 MIN: CPT | Performed by: INTERNAL MEDICINE

## 2023-11-07 PROCEDURE — 85025 COMPLETE CBC W/AUTO DIFF WBC: CPT

## 2023-11-07 PROCEDURE — 1123F ACP DISCUSS/DSCN MKR DOCD: CPT | Performed by: INTERNAL MEDICINE

## 2023-11-07 PROCEDURE — 80053 COMPREHEN METABOLIC PANEL: CPT

## 2023-11-07 PROCEDURE — 77063 BREAST TOMOSYNTHESIS BI: CPT

## 2023-11-07 PROCEDURE — 6360000002 HC RX W HCPCS: Performed by: INTERNAL MEDICINE

## 2023-11-07 PROCEDURE — 96372 THER/PROPH/DIAG INJ SC/IM: CPT

## 2023-11-07 PROCEDURE — 36415 COLL VENOUS BLD VENIPUNCTURE: CPT

## 2023-11-07 RX ADMIN — DARBEPOETIN ALFA 500 MCG: 500 INJECTION, SOLUTION INTRAVENOUS; SUBCUTANEOUS at 11:01

## 2023-11-07 NOTE — PROGRESS NOTES
[facility] MED ONCOLOGY  [address] [facility] [address]  Dept: [address]: [phone]  Attending Progress Note      Reason for Visit:   1. Right Breast Cancer. 2. MDS. Referring Physician:  CONNIE Matthews CNP    PCP:  CONNIE Matthews CNP    History of Present Illness: The patient is a very pleasant 78 y.o. lady with a PMH significant for Right Breast Cancer, stage III, HR pos, was diagnosed in 2009, she had a right mastectomy done, followed by adjuvant chemo, she received 8 cycles, probably AC followed by T, then PMRT, and 5 years of adjuvant ET with Arimidex, was completed in 2015. She was treated in [facility] under the care of Dr. Edmond Ballard. She was diagnosed with MDS in 2017, she received ESAs and PRBCs transfusion. She has transfusion related iron overload. She was started on Aranesp on 4/24/2020. No SE from Aranesp. 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 She is scheduled to see Dr. Margot Sales. Records reviewed. She is feeling well. She has mild intermittent dizziness. Review of Systems;  CONSTITUTIONAL: No fever, chills. Fair appetite. Positive for fatigue. ENMT: Eyes: No diplopia; Nose: No epistaxis. Mouth: No sore throat. RESPIRATORY: No hemoptysis, shortness of breath, cough. CARDIOVASCULAR: No chest pain, palpitations. GASTROINTESTINAL: No nausea/vomiting, abdominal pain, diarrhea/constipation. GENITOURINARY: No dysuria, urinary frequency, hematuria. NEURO: No syncope, presyncope, positive for headache.   Remainder:  ROS NEGATIVE    Past Medical

## 2023-11-07 NOTE — PROGRESS NOTES
Patient provided with discharge instructions, received printed AVS.  All questions answered. Patient understands follow up plan of care. Patient reminded to schedule her Ultra Sound Liver scan, given all pertinent information to call and schedule before next follow up appointment scheduled 11/21/23. Patient verbalized her understanding.

## 2023-11-08 NOTE — PROGRESS NOTES
Physician Progress Note      PATIENT:               Desmond Waters  CSN #:                  [id number]  :                       1940  ADMIT DATE:       10/24/2023 1:10 PM  [address] [place] [address] DATE:        10/26/2023 4:21 PM  RESPONDING  PROVIDER #:        Hoang Macias DO          QUERY TEXT:    Pt admitted with Dizziness/strokelike symptoms. Pt noted to have 85% stenosis   in the distal V2 segment of the right vertebral artery and 70% stenosis in the   distal right subclavian artery. If possible, please document in the progress   notes and discharge summary if you are evaluating and / or treating any of the   following: The medical record reflects the following:  Risk Factors:  Clinical Indicators: Per DS: Ataxia/strokelike symptoms/possible concussive   symptoms-  MRI brain was negative for acute findings. .. 85% stenosis in the   distal V2 segment of the right vertebral artery- discussed with neurology,   follow up with interventional neurology OP. DAPT. Statin. Treatment: Neuro consult, MRI    Thank you,  MANGO [email or web address]  Options provided:  -- Dizziness due to right vertebral artery stenosis  -- Dizziness due to right subclavian artery stenosis  -- Dizziness due to, Please specify cause. -- Other - I will add my own diagnosis  -- Disagree - Not applicable / Not valid  -- Disagree - Clinically unable to determine / Unknown  -- Refer to Clinical Documentation Reviewer    PROVIDER RESPONSE TEXT:    Provider is clinically unable to determine a response to this query.     Query created by: Paulette Todd on 10/30/2023 8:06 AM      Electronically signed by:  Irene Goldstein DO 2023 7:41 AM

## 2023-11-20 ENCOUNTER — HOSPITAL ENCOUNTER (OUTPATIENT)
Dept: ULTRASOUND IMAGING | Age: 83
Discharge: HOME | End: 2023-11-22
Attending: INTERNAL MEDICINE
Payer: MEDICARE

## 2023-11-20 DIAGNOSIS — R74.01 TRANSAMINITIS: ICD-10-CM

## 2023-11-20 PROCEDURE — 76705 ECHO EXAM OF ABDOMEN: CPT

## 2023-11-21 ENCOUNTER — HOSPITAL ENCOUNTER (OUTPATIENT)
Dept: INFUSION THERAPY | Age: 83
Discharge: HOME | End: 2023-11-21
Payer: MEDICARE

## 2023-11-21 ENCOUNTER — OFFICE VISIT (OUTPATIENT)
Dept: ONCOLOGY | Age: 83
End: 2023-11-21
Payer: MEDICARE

## 2023-11-21 VITALS
HEIGHT: 61 IN | WEIGHT: 124.2 LBS | TEMPERATURE: 97.2 F | HEART RATE: 75 BPM | DIASTOLIC BLOOD PRESSURE: 71 MMHG | SYSTOLIC BLOOD PRESSURE: 163 MMHG | BODY MASS INDEX: 23.45 KG/M2 | OXYGEN SATURATION: 96 %

## 2023-11-21 DIAGNOSIS — D63.8 ANEMIA OF CHRONIC DISEASE: ICD-10-CM

## 2023-11-21 DIAGNOSIS — R74.01 TRANSAMINITIS: Primary | ICD-10-CM

## 2023-11-21 DIAGNOSIS — D46.9 MDS (MYELODYSPLASTIC SYNDROME) (HCC): Primary | ICD-10-CM

## 2023-11-21 LAB
ALBUMIN SERPL-MCNC: 4.2 G/DL (ref 3.5–5.2)
ALP SERPL-CCNC: 52 U/L (ref 35–104)
ALT SERPL-CCNC: 54 U/L (ref 0–32)
ANION GAP SERPL CALCULATED.3IONS-SCNC: 9 MMOL/L (ref 7–16)
AST SERPL-CCNC: 50 U/L (ref 0–31)
BASOPHILS # BLD: 0.03 K/UL (ref 0–0.2)
BASOPHILS NFR BLD: 1 % (ref 0–2)
BILIRUB SERPL-MCNC: 1 MG/DL (ref 0–1.2)
BUN SERPL-MCNC: 13 MG/DL (ref 6–23)
CALCIUM SERPL-MCNC: 9 MG/DL (ref 8.6–10.2)
CHLORIDE SERPL-SCNC: 104 MMOL/L (ref 98–107)
CO2 SERPL-SCNC: 27 MMOL/L (ref 22–29)
CREAT SERPL-MCNC: 0.8 MG/DL (ref 0.5–1)
EOSINOPHIL # BLD: 0.22 K/UL (ref 0.05–0.5)
EOSINOPHILS RELATIVE PERCENT: 4 % (ref 0–6)
ERYTHROCYTE [DISTWIDTH] IN BLOOD BY AUTOMATED COUNT: 27.2 % (ref 11.5–15)
GFR SERPL CREATININE-BSD FRML MDRD: >60 ML/MIN/1.73M2
GLUCOSE SERPL-MCNC: 103 MG/DL (ref 74–99)
HCT VFR BLD AUTO: 26.6 % (ref 34–48)
HGB BLD-MCNC: 8.5 G/DL (ref 11.5–15.5)
IMM GRANULOCYTES # BLD AUTO: <0.03 K/UL (ref 0–0.58)
IMM GRANULOCYTES NFR BLD: 0 % (ref 0–5)
LYMPHOCYTES NFR BLD: 2.53 K/UL (ref 1.5–4)
LYMPHOCYTES RELATIVE PERCENT: 49 % (ref 20–42)
MCH RBC QN AUTO: 37.8 PG (ref 26–35)
MCHC RBC AUTO-ENTMCNC: 32 G/DL (ref 32–34.5)
MCV RBC AUTO: 118.2 FL (ref 80–99.9)
MONOCYTES NFR BLD: 0.35 K/UL (ref 0.1–0.95)
MONOCYTES NFR BLD: 7 % (ref 2–12)
NEUTROPHILS NFR BLD: 39 % (ref 43–80)
NEUTS SEG NFR BLD: 2.05 K/UL (ref 1.8–7.3)
PLATELET # BLD AUTO: 153 K/UL (ref 130–450)
PMV BLD AUTO: 10.8 FL (ref 7–12)
POTASSIUM SERPL-SCNC: 4.4 MMOL/L (ref 3.5–5)
PROT SERPL-MCNC: 7.1 G/DL (ref 6.4–8.3)
RBC # BLD AUTO: 2.25 M/UL (ref 3.5–5.5)
RBC # BLD: ABNORMAL 10*6/UL
SODIUM SERPL-SCNC: 140 MMOL/L (ref 132–146)
WBC OTHER # BLD: 5.2 K/UL (ref 4.5–11.5)

## 2023-11-21 PROCEDURE — 6360000002 HC RX W HCPCS: Performed by: INTERNAL MEDICINE

## 2023-11-21 PROCEDURE — 80053 COMPREHEN METABOLIC PANEL: CPT

## 2023-11-21 PROCEDURE — 1123F ACP DISCUSS/DSCN MKR DOCD: CPT | Performed by: INTERNAL MEDICINE

## 2023-11-21 PROCEDURE — 36415 COLL VENOUS BLD VENIPUNCTURE: CPT

## 2023-11-21 PROCEDURE — 99214 OFFICE O/P EST MOD 30 MIN: CPT | Performed by: INTERNAL MEDICINE

## 2023-11-21 PROCEDURE — 96372 THER/PROPH/DIAG INJ SC/IM: CPT

## 2023-11-21 PROCEDURE — 85025 COMPLETE CBC W/AUTO DIFF WBC: CPT

## 2023-11-21 RX ADMIN — DARBEPOETIN ALFA 500 MCG: 500 INJECTION, SOLUTION INTRAVENOUS; SUBCUTANEOUS at 12:00

## 2023-11-22 NOTE — PROGRESS NOTES
Patient provided with discharge instructions, received printed AVS.  All questions answered. Patient understands follow up plan of care.
counts closely. Transaminitis, could be secondary to fatty liver disease, an ultrasound was ordered for further evaluation. Hypothyroidism, continue Synthroid follow-up with PCP. 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 The patient had a follow-up with Dr. Osiel Delgadillo, he recommended conservative medical management with DAPT. Labs reviewed, the patient has anemia with a hemoglobin of 8.5 G/DL, hematocrit 26.6, .2, white count 5.2, platelet count normal, hemoglobin is less than 10 G/L, proceed with Aranesp today 11/21/2023. We will monitor her counts. RTC in 2 weeks with labs. Thank you for allowing us to participate in the care of Ms. Ventura.     Alejandro Colbert MD   HEMATOLOGY/MEDICAL [facility]  [address] [facility] MED ONCOLOGY  [address] [address] [address]  Dept: [address] [place] [address] Avenue: [phone]

## 2023-12-05 ENCOUNTER — OFFICE VISIT (OUTPATIENT)
Dept: PALLATIVE CARE | Age: 83
End: 2023-12-05
Payer: MEDICARE

## 2023-12-05 ENCOUNTER — HOSPITAL ENCOUNTER (OUTPATIENT)
Dept: INFUSION THERAPY | Age: 83
Discharge: HOME | End: 2023-12-05
Payer: MEDICARE

## 2023-12-05 ENCOUNTER — OFFICE VISIT (OUTPATIENT)
Dept: ONCOLOGY | Age: 83
End: 2023-12-05
Payer: MEDICARE

## 2023-12-05 VITALS
OXYGEN SATURATION: 96 % | BODY MASS INDEX: 23.05 KG/M2 | TEMPERATURE: 98 F | WEIGHT: 122 LBS | DIASTOLIC BLOOD PRESSURE: 31 MMHG | SYSTOLIC BLOOD PRESSURE: 101 MMHG | HEART RATE: 87 BPM

## 2023-12-05 VITALS
DIASTOLIC BLOOD PRESSURE: 57 MMHG | HEIGHT: 61 IN | WEIGHT: 122 LBS | HEART RATE: 91 BPM | SYSTOLIC BLOOD PRESSURE: 127 MMHG | BODY MASS INDEX: 23.03 KG/M2 | TEMPERATURE: 98 F | OXYGEN SATURATION: 96 %

## 2023-12-05 DIAGNOSIS — D63.8 ANEMIA OF CHRONIC DISEASE: Primary | ICD-10-CM

## 2023-12-05 DIAGNOSIS — C50.919 MALIGNANT NEOPLASM OF FEMALE BREAST, UNSPECIFIED ESTROGEN RECEPTOR STATUS, UNSPECIFIED LATERALITY, UNSPECIFIED SITE OF BREAST (HCC): ICD-10-CM

## 2023-12-05 DIAGNOSIS — D46.9 MDS (MYELODYSPLASTIC SYNDROME) (HCC): ICD-10-CM

## 2023-12-05 DIAGNOSIS — Z51.5 PALLIATIVE CARE BY SPECIALIST: ICD-10-CM

## 2023-12-05 DIAGNOSIS — R74.01 TRANSAMINITIS: Primary | ICD-10-CM

## 2023-12-05 DIAGNOSIS — G89.3 PAIN DUE TO NEOPLASM: ICD-10-CM

## 2023-12-05 LAB
ALBUMIN SERPL-MCNC: 4.3 G/DL (ref 3.5–5.2)
ALP SERPL-CCNC: 48 U/L (ref 35–104)
ALT SERPL-CCNC: 54 U/L (ref 0–32)
ANION GAP SERPL CALCULATED.3IONS-SCNC: 15 MMOL/L (ref 7–16)
AST SERPL-CCNC: 54 U/L (ref 0–31)
BASOPHILS # BLD: 0 K/UL (ref 0–0.2)
BASOPHILS NFR BLD: 0 % (ref 0–2)
BILIRUB SERPL-MCNC: 1.4 MG/DL (ref 0–1.2)
BUN SERPL-MCNC: 21 MG/DL (ref 6–23)
CALCIUM SERPL-MCNC: 9 MG/DL (ref 8.6–10.2)
CHLORIDE SERPL-SCNC: 109 MMOL/L (ref 98–107)
CO2 SERPL-SCNC: 21 MMOL/L (ref 22–29)
CREAT SERPL-MCNC: 0.6 MG/DL (ref 0.5–1)
EOSINOPHIL # BLD: 0.16 K/UL (ref 0.05–0.5)
EOSINOPHILS RELATIVE PERCENT: 4 % (ref 0–6)
ERYTHROCYTE [DISTWIDTH] IN BLOOD BY AUTOMATED COUNT: 25.8 % (ref 11.5–15)
GFR SERPL CREATININE-BSD FRML MDRD: >60 ML/MIN/1.73M2
GLUCOSE SERPL-MCNC: 111 MG/DL (ref 74–99)
HCT VFR BLD AUTO: 26.6 % (ref 34–48)
HGB BLD-MCNC: 8.6 G/DL (ref 11.5–15.5)
LYMPHOCYTES NFR BLD: 2.16 K/UL (ref 1.5–4)
LYMPHOCYTES RELATIVE PERCENT: 47 % (ref 20–42)
MCH RBC QN AUTO: 37.4 PG (ref 26–35)
MCHC RBC AUTO-ENTMCNC: 32.3 G/DL (ref 32–34.5)
MCV RBC AUTO: 115.7 FL (ref 80–99.9)
MONOCYTES NFR BLD: 0.12 K/UL (ref 0.1–0.95)
MONOCYTES NFR BLD: 3 % (ref 2–12)
NEUTROPHILS NFR BLD: 47 % (ref 43–80)
NEUTS SEG NFR BLD: 2.16 K/UL (ref 1.8–7.3)
NUCLEATED RED BLOOD CELLS: 1 PER 100 WBC
PLATELET # BLD AUTO: 157 K/UL (ref 130–450)
PMV BLD AUTO: 11.2 FL (ref 7–12)
POTASSIUM SERPL-SCNC: 3.9 MMOL/L (ref 3.5–5)
PROT SERPL-MCNC: 7.5 G/DL (ref 6.4–8.3)
RBC # BLD AUTO: 2.3 M/UL (ref 3.5–5.5)
RBC # BLD: ABNORMAL 10*6/UL
SODIUM SERPL-SCNC: 145 MMOL/L (ref 132–146)
WBC OTHER # BLD: 4.6 K/UL (ref 4.5–11.5)

## 2023-12-05 PROCEDURE — 99212 OFFICE O/P EST SF 10 MIN: CPT | Performed by: NURSE PRACTITIONER

## 2023-12-05 PROCEDURE — 6360000002 HC RX W HCPCS: Performed by: INTERNAL MEDICINE

## 2023-12-05 PROCEDURE — 85025 COMPLETE CBC W/AUTO DIFF WBC: CPT

## 2023-12-05 PROCEDURE — 1123F ACP DISCUSS/DSCN MKR DOCD: CPT | Performed by: NURSE PRACTITIONER

## 2023-12-05 PROCEDURE — 1123F ACP DISCUSS/DSCN MKR DOCD: CPT | Performed by: INTERNAL MEDICINE

## 2023-12-05 PROCEDURE — 80053 COMPREHEN METABOLIC PANEL: CPT

## 2023-12-05 PROCEDURE — 99214 OFFICE O/P EST MOD 30 MIN: CPT | Performed by: INTERNAL MEDICINE

## 2023-12-05 PROCEDURE — 36415 COLL VENOUS BLD VENIPUNCTURE: CPT

## 2023-12-05 PROCEDURE — 99211 OFF/OP EST MAY X REQ PHY/QHP: CPT | Performed by: NURSE PRACTITIONER

## 2023-12-05 PROCEDURE — 96372 THER/PROPH/DIAG INJ SC/IM: CPT

## 2023-12-05 RX ORDER — HYDROCODONE BITARTRATE AND ACETAMINOPHEN 5; 325 MG/1; MG/1
1 TABLET ORAL EVERY 6 HOURS PRN
Qty: 120 TABLET | Refills: 0 | Status: SHIPPED | OUTPATIENT
Start: 2023-12-05 | End: 2024-01-04

## 2023-12-05 RX ORDER — GABAPENTIN 300 MG/1
900 CAPSULE ORAL NIGHTLY
Qty: 270 CAPSULE | Refills: 3 | Status: SHIPPED | OUTPATIENT
Start: 2023-12-05 | End: 2024-12-04

## 2023-12-05 RX ADMIN — DARBEPOETIN ALFA 500 MCG: 500 INJECTION, SOLUTION INTRAVENOUS; SUBCUTANEOUS at 11:24

## 2023-12-05 NOTE — PROGRESS NOTES
Department of Palliative Medicine  Ambulatory Note  Provider: CONNIE Salazar - CNP       Chief Complaint: Ruma Pickering is a 78 y.o. female with chief complaint of Pain      Assessment/Plan      History of Breast Cancer stage III, HR positive:              -Diagnosed, treated, and managed in [place] in 2009              -Status post right mastectomy              -Followed by adjuvant chemotherapy              -Completed her Demadex 2015              -Currently followed locally by Dr. Monica Corona, she also follows with regarding MDS diagnosed 2017     Chemotherapy related peripheral Neuropathy/Pain:              -  Gabapentin 900 mg at nighttime              -  Continue Norco 5-325 mg every 12 hours as needed     Prognosis: unknown     Follow Up: 3 months . They were encouraged to call with any questions, concerns, needs, or changes in symptoms. Subjective:   HPI:  Ruma Pickering is a 78 y.o. female with significant past medical history of stage II HR positive breast cancer, diagnosed in 2009, status post right mastectomy, followed by adjuvant therapy, and 5 years of adjuvant Arimidex which was completed in 2015, with all treatment performed in [facility] under the care of Dr. Su Egan. She is currently being followed locally by Dr. Monica Corona, without evidence of recurrent disease, whom she also follows for management of myelodysplastic syndrome, which was diagnosed in 2017. She was referred to [address] [place] [address] for assistance with symptom management related to chemotherapy-induced peripheral neuropathy.       12/5/23  Shilpi Patrick present today unaccompanied  She is doing well  Still with some pain, mostly in her legs and back  This is managed well with her current regime  She is using norco about 1 time per day, on a rare day maybe a second dose  Gabapentin is working well for her at night  She has no other significant complaints    Objective:     Physical Exam  Wt Readings from Last 3 Encounters:

## 2023-12-05 NOTE — PROGRESS NOTES
[address] [facility] MED ONCOLOGY  [address] [facility] [address]  Dept: [address]: [phone]  Attending Progress Note      Reason for Visit:   1. Right Breast Cancer. 2. MDS. Referring Physician:  CONNIE Navarrete CNP    PCP:  CONNIE Navarrete CNP    History of Present Illness: The patient is a very pleasant 78 y.o. lady with a PMH significant for Right Breast Cancer, stage III, HR pos, was diagnosed in 2009, she had a right mastectomy done, followed by adjuvant chemo, she received 8 cycles, probably AC followed by T, then PMRT, and 5 years of adjuvant ET with Arimidex, was completed in 2015. She was treated in [facility] under the care of Dr. Bronwyn Thomas. She was diagnosed with MDS in 2017, she received ESAs and PRBCs transfusion. She has transfusion related iron overload. She was started on Aranesp on 4/24/2020. No SE from Aranesp. 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 The patient had a follow-up with Dr. Roman Maya, he recommended conservative medical management with DAPT. She is feeling better, the dizziness had resolved. Review of Systems;  CONSTITUTIONAL: No fever, chills. Fair appetite. Positive for fatigue. ENMT: Eyes: No diplopia; Nose: No epistaxis. Mouth: No sore throat. RESPIRATORY: No hemoptysis, shortness of breath, cough. CARDIOVASCULAR: No chest pain, palpitations. GASTROINTESTINAL: No nausea/vomiting, abdominal pain, diarrhea/constipation. GENITOURINARY: No dysuria, urinary frequency, hematuria.   NEURO: No syncope, presyncope, positive for

## 2023-12-14 DIAGNOSIS — D46.9 MDS (MYELODYSPLASTIC SYNDROME) (HCC): Primary | ICD-10-CM

## 2023-12-19 ENCOUNTER — HOSPITAL ENCOUNTER (OUTPATIENT)
Dept: INFUSION THERAPY | Age: 83
Discharge: HOME | End: 2023-12-19
Payer: MEDICARE

## 2023-12-19 VITALS
SYSTOLIC BLOOD PRESSURE: 141 MMHG | DIASTOLIC BLOOD PRESSURE: 63 MMHG | RESPIRATION RATE: 18 BRPM | HEART RATE: 67 BPM | TEMPERATURE: 97.7 F | OXYGEN SATURATION: 96 %

## 2023-12-19 DIAGNOSIS — D63.8 ANEMIA OF CHRONIC DISEASE: Primary | ICD-10-CM

## 2023-12-19 DIAGNOSIS — D46.9 MDS (MYELODYSPLASTIC SYNDROME) (HCC): ICD-10-CM

## 2023-12-19 PROCEDURE — 36415 COLL VENOUS BLD VENIPUNCTURE: CPT

## 2023-12-19 PROCEDURE — P9016 RBC LEUKOCYTES REDUCED: HCPCS

## 2023-12-19 PROCEDURE — 2580000003 HC RX 258: Performed by: INTERNAL MEDICINE

## 2023-12-19 PROCEDURE — 86900 BLOOD TYPING SEROLOGIC ABO: CPT

## 2023-12-19 PROCEDURE — 86922 COMPATIBILITY TEST ANTIGLOB: CPT

## 2023-12-19 PROCEDURE — 36430 TRANSFUSION BLD/BLD COMPNT: CPT

## 2023-12-19 PROCEDURE — 86880 COOMBS TEST DIRECT: CPT

## 2023-12-19 PROCEDURE — 6360000002 HC RX W HCPCS: Performed by: INTERNAL MEDICINE

## 2023-12-19 PROCEDURE — 86870 RBC ANTIBODY IDENTIFICATION: CPT

## 2023-12-19 PROCEDURE — 86901 BLOOD TYPING SEROLOGIC RH(D): CPT

## 2023-12-19 PROCEDURE — 86850 RBC ANTIBODY SCREEN: CPT

## 2023-12-19 PROCEDURE — 86920 COMPATIBILITY TEST SPIN: CPT

## 2023-12-19 PROCEDURE — 96372 THER/PROPH/DIAG INJ SC/IM: CPT

## 2023-12-19 RX ORDER — ACETAMINOPHEN 325 MG/1
650 TABLET ORAL
Status: CANCELLED | OUTPATIENT
Start: 2023-12-19

## 2023-12-19 RX ORDER — EPINEPHRINE 1 MG/ML
0.3 INJECTION, SOLUTION, CONCENTRATE INTRAVENOUS PRN
Status: CANCELLED | OUTPATIENT
Start: 2023-12-19

## 2023-12-19 RX ORDER — SODIUM CHLORIDE 9 MG/ML
INJECTION, SOLUTION INTRAVENOUS CONTINUOUS
Status: CANCELLED | OUTPATIENT
Start: 2023-12-19

## 2023-12-19 RX ORDER — ACETAMINOPHEN 325 MG/1
650 TABLET ORAL
OUTPATIENT
Start: 2023-12-19

## 2023-12-19 RX ORDER — EPINEPHRINE 1 MG/ML
0.3 INJECTION, SOLUTION, CONCENTRATE INTRAVENOUS PRN
OUTPATIENT
Start: 2023-12-19

## 2023-12-19 RX ORDER — ONDANSETRON 2 MG/ML
8 INJECTION INTRAMUSCULAR; INTRAVENOUS
Status: CANCELLED | OUTPATIENT
Start: 2023-12-19

## 2023-12-19 RX ORDER — SODIUM CHLORIDE 0.9 % (FLUSH) 0.9 %
5-40 SYRINGE (ML) INJECTION PRN
Status: DISCONTINUED | OUTPATIENT
Start: 2023-12-19 | End: 2023-12-20 | Stop reason: HOSPADM

## 2023-12-19 RX ORDER — ONDANSETRON 2 MG/ML
8 INJECTION INTRAMUSCULAR; INTRAVENOUS
OUTPATIENT
Start: 2023-12-19

## 2023-12-19 RX ORDER — SODIUM CHLORIDE 9 MG/ML
INJECTION, SOLUTION INTRAVENOUS CONTINUOUS
OUTPATIENT
Start: 2023-12-19

## 2023-12-19 RX ORDER — SODIUM CHLORIDE 9 MG/ML
25 INJECTION, SOLUTION INTRAVENOUS PRN
Status: DISCONTINUED | OUTPATIENT
Start: 2023-12-19 | End: 2023-12-20 | Stop reason: HOSPADM

## 2023-12-19 RX ORDER — DIPHENHYDRAMINE HYDROCHLORIDE 50 MG/ML
50 INJECTION INTRAMUSCULAR; INTRAVENOUS
OUTPATIENT
Start: 2023-12-19

## 2023-12-19 RX ORDER — SODIUM CHLORIDE 9 MG/ML
20 INJECTION, SOLUTION INTRAVENOUS CONTINUOUS
OUTPATIENT
Start: 2023-12-19

## 2023-12-19 RX ORDER — SODIUM CHLORIDE 0.9 % (FLUSH) 0.9 %
5-40 SYRINGE (ML) INJECTION PRN
Status: CANCELLED | OUTPATIENT
Start: 2023-12-19

## 2023-12-19 RX ORDER — SODIUM CHLORIDE 9 MG/ML
25 INJECTION, SOLUTION INTRAVENOUS PRN
Status: CANCELLED | OUTPATIENT
Start: 2023-12-19

## 2023-12-19 RX ORDER — ALBUTEROL SULFATE 90 UG/1
4 AEROSOL, METERED RESPIRATORY (INHALATION) PRN
OUTPATIENT
Start: 2023-12-19

## 2023-12-19 RX ORDER — SODIUM CHLORIDE 9 MG/ML
20 INJECTION, SOLUTION INTRAVENOUS CONTINUOUS
Status: CANCELLED | OUTPATIENT
Start: 2023-12-19

## 2023-12-19 RX ORDER — DIPHENHYDRAMINE HYDROCHLORIDE 50 MG/ML
50 INJECTION INTRAMUSCULAR; INTRAVENOUS
Status: CANCELLED | OUTPATIENT
Start: 2023-12-19

## 2023-12-19 RX ORDER — ALBUTEROL SULFATE 90 UG/1
4 AEROSOL, METERED RESPIRATORY (INHALATION) PRN
Status: CANCELLED | OUTPATIENT
Start: 2023-12-19

## 2023-12-19 RX ADMIN — SODIUM CHLORIDE 200 ML: 9 INJECTION, SOLUTION INTRAVENOUS at 14:28

## 2023-12-19 RX ADMIN — SODIUM CHLORIDE, PRESERVATIVE FREE 10 ML: 5 INJECTION INTRAVENOUS at 14:26

## 2023-12-19 RX ADMIN — DARBEPOETIN ALFA 500 MCG: 500 INJECTION, SOLUTION INTRAVENOUS; SUBCUTANEOUS at 11:19

## 2023-12-19 NOTE — PROGRESS NOTES
Informed consent from Dr. Monica Corona verified and on patient chart. Patient received 1 unit . Patient tolerated well. Vital signs stable. Reviewed orally and provided with written information regarding potential delayed reaction and instructions on what to do if reaction occurs. Questions answered to apparent satisfaction. Patient observed for 30 minutes after transfusion.

## 2023-12-20 LAB
ABO/RH: NORMAL
ANTIBODY IDENTIFICATION: NORMAL
ANTIBODY IDENTIFICATION: NORMAL
ANTIBODY SCREEN: POSITIVE
ARM BAND NUMBER: NORMAL
BLOOD BANK BLOOD PRODUCT EXPIRATION DATE: NORMAL
BLOOD BANK COMMENT: NORMAL
BLOOD BANK COMMENT: NORMAL
BLOOD BANK DISPENSE STATUS: NORMAL
BLOOD BANK ISBT PRODUCT BLOOD TYPE: 600
BLOOD BANK PRODUCT CODE: NORMAL
BLOOD BANK SAMPLE EXPIRATION: NORMAL
BLOOD BANK UNIT TYPE AND RH: NORMAL
BPU ID: NORMAL
COMPONENT: NORMAL
CROSSMATCH RESULT: NORMAL
DAT, POLYSPECIFIC: NEGATIVE
TRANSFUSION STATUS: NORMAL
UNIT DIVISION: 0
UNIT ISSUE DATE/TIME: NORMAL

## 2023-12-26 ENCOUNTER — TELEPHONE (OUTPATIENT)
Dept: ONCOLOGY | Age: 83
End: 2023-12-26

## 2023-12-26 NOTE — TELEPHONE ENCOUNTER
Pt called to report she is having increased back pain from prior fall and wanted the results of a cxr that Dr. William Aguila ordered. I spoke  with pt , I reviewed her cxr , nothing acute found . She is stating that she is having more pain at the her tailbone region and her back in addition having mobility problems that began after her recent fall . My  recommendation is that she needs further workup through an urgent care or  her PCP for further evaluation post fall . She is agreeable to call her PCP today. If pain worsens she is to proceed to urgent care or ER .    Magda Echeverria APRN, ACNS-BC,AGACNP-BC

## 2023-12-27 ENCOUNTER — HOSPITAL ENCOUNTER (OUTPATIENT)
Dept: GENERAL RADIOLOGY | Age: 83
Discharge: HOME | End: 2023-12-29
Payer: MEDICARE

## 2023-12-27 ENCOUNTER — HOSPITAL ENCOUNTER (OUTPATIENT)
Age: 83
Discharge: HOME | End: 2023-12-29
Payer: MEDICARE

## 2023-12-27 DIAGNOSIS — M54.6 ACUTE THORACIC BACK PAIN, UNSPECIFIED BACK PAIN LATERALITY: ICD-10-CM

## 2023-12-27 PROCEDURE — 72072 X-RAY EXAM THORAC SPINE 3VWS: CPT

## 2023-12-29 ENCOUNTER — TELEPHONE (OUTPATIENT)
Dept: INFUSION THERAPY | Age: 83
End: 2023-12-29

## 2023-12-29 NOTE — TELEPHONE ENCOUNTER
CALLED AND LEFT MESSAGE FOR PATIENT TO VERIFY INSURANCE STATUS FOR NEW YEAR.  AWAITING RETURNED CALL

## 2024-01-02 ENCOUNTER — HOSPITAL ENCOUNTER (OUTPATIENT)
Dept: INFUSION THERAPY | Age: 84
Discharge: HOME | End: 2024-01-02
Payer: MEDICARE

## 2024-01-02 ENCOUNTER — OFFICE VISIT (OUTPATIENT)
Dept: ONCOLOGY | Age: 84
End: 2024-01-02

## 2024-01-02 VITALS
HEIGHT: 61 IN | TEMPERATURE: 98.4 F | WEIGHT: 125.1 LBS | HEART RATE: 64 BPM | BODY MASS INDEX: 23.62 KG/M2 | SYSTOLIC BLOOD PRESSURE: 130 MMHG | OXYGEN SATURATION: 95 % | DIASTOLIC BLOOD PRESSURE: 61 MMHG

## 2024-01-02 DIAGNOSIS — D46.9 MDS (MYELODYSPLASTIC SYNDROME) (HCC): Primary | ICD-10-CM

## 2024-01-02 DIAGNOSIS — Z51.5 PALLIATIVE CARE BY SPECIALIST: ICD-10-CM

## 2024-01-02 DIAGNOSIS — C50.919 MALIGNANT NEOPLASM OF FEMALE BREAST, UNSPECIFIED ESTROGEN RECEPTOR STATUS, UNSPECIFIED LATERALITY, UNSPECIFIED SITE OF BREAST (HCC): ICD-10-CM

## 2024-01-02 DIAGNOSIS — G89.3 PAIN DUE TO NEOPLASM: ICD-10-CM

## 2024-01-02 DIAGNOSIS — R74.01 TRANSAMINITIS: ICD-10-CM

## 2024-01-02 DIAGNOSIS — D46.9 MDS (MYELODYSPLASTIC SYNDROME) (HCC): ICD-10-CM

## 2024-01-02 LAB
BASOPHILS # BLD: 0.04 K/UL (ref 0–0.2)
BASOPHILS NFR BLD: 1 % (ref 0–2)
EOSINOPHIL # BLD: 0.11 K/UL (ref 0.05–0.5)
EOSINOPHILS RELATIVE PERCENT: 3 % (ref 0–6)
ERYTHROCYTE [DISTWIDTH] IN BLOOD BY AUTOMATED COUNT: 26.9 % (ref 11.5–15)
HCT VFR BLD AUTO: 32.1 % (ref 34–48)
HGB BLD-MCNC: 10.4 G/DL (ref 11.5–15.5)
LYMPHOCYTES NFR BLD: 1.98 K/UL (ref 1.5–4)
LYMPHOCYTES RELATIVE PERCENT: 46 % (ref 20–42)
MCH RBC QN AUTO: 36.7 PG (ref 26–35)
MCHC RBC AUTO-ENTMCNC: 32.4 G/DL (ref 32–34.5)
MCV RBC AUTO: 113.4 FL (ref 80–99.9)
METAMYELOCYTES ABSOLUTE COUNT: 0.04 K/UL (ref 0–0.12)
METAMYELOCYTES: 1 % (ref 0–1)
MONOCYTES NFR BLD: 0.34 K/UL (ref 0.1–0.95)
MONOCYTES NFR BLD: 8 % (ref 2–12)
NEUTROPHILS NFR BLD: 42 % (ref 43–80)
NEUTS SEG NFR BLD: 1.79 K/UL (ref 1.8–7.3)
NUCLEATED RED BLOOD CELLS: 1 PER 100 WBC
PLATELET # BLD AUTO: 159 K/UL (ref 130–450)
PMV BLD AUTO: 10.9 FL (ref 7–12)
RBC # BLD AUTO: 2.83 M/UL (ref 3.5–5.5)
RBC # BLD: ABNORMAL 10*6/UL
WBC OTHER # BLD: 4.3 K/UL (ref 4.5–11.5)

## 2024-01-02 PROCEDURE — 36415 COLL VENOUS BLD VENIPUNCTURE: CPT

## 2024-01-02 PROCEDURE — 85025 COMPLETE CBC W/AUTO DIFF WBC: CPT

## 2024-01-02 RX ORDER — HYDROCODONE BITARTRATE AND ACETAMINOPHEN 5; 325 MG/1; MG/1
1 TABLET ORAL EVERY 6 HOURS PRN
Qty: 120 TABLET | Refills: 0 | Status: SHIPPED | OUTPATIENT
Start: 2024-01-02 | End: 2024-02-01

## 2024-01-02 NOTE — TELEPHONE ENCOUNTER
Patricia walked down to clinic to request refill of [place]. Patricia states she had a fall and has bruising down her back. Patricia states she has been taking her Norco 4 times a day. She has enough for 3 days. Pharmacy is [facility]. Next natalie 2/27/24.

## 2024-01-02 NOTE — PROGRESS NOTES
[facility] PHYSICIANS [facility] MED ONCOLOGY  [address]  [facility] [address]  Dept: [phone]  Loc: [phone]  Attending Progress Note      Reason for Visit:   1. Right Breast Cancer.                                   2. MDS.      Referring Physician:  CONNIE DAS CNP    PCP:  Roselia Moralez APRN - CNP    History of Present Illness:      The patient is a very pleasant 83 y.o. lady with a PMH significant for Right Breast Cancer, stage III, HR pos, was diagnosed in 2009, she had a right mastectomy done, followed by adjuvant chemo, she received 8 cycles, probably AC followed by T, then PMRT, and 5 years of adjuvant ET with Arimidex, was completed in 2015. She was treated in [place] under the care of Dr. Simpson. She was diagnosed with MDS in 2017, she received ESAs and PRBCs transfusion. She has transfusion related iron overload. She was started on Aranesp on 4/24/2020.  No SE from Aranesp.     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  The patient had a follow-up with Dr. Melendez, he recommended conservative medical management with DAPT.      The patient returns for follow-up visit, doing better overall, the back pain had improved.    Review of Systems;  CONSTITUTIONAL: No fever, chills. Fair appetite.  Positive for fatigue.  ENMT: Eyes: No diplopia; Nose: No epistaxis. Mouth: No sore throat.  RESPIRATORY: No hemoptysis, shortness of breath, cough.   CARDIOVASCULAR: No chest pain, palpitations.  GASTROINTESTINAL: No nausea/vomiting, abdominal pain, diarrhea/constipation.  GENITOURINARY: No dysuria, urinary frequency, hematuria.  NEURO: No

## 2024-01-04 NOTE — PROGRESS NOTES
Patient provided with discharge instructions, received printed AVS.  All questions answered.  Patient understands follow up plan of care.

## 2024-01-09 DIAGNOSIS — D46.9 MDS (MYELODYSPLASTIC SYNDROME) (HCC): Primary | ICD-10-CM

## 2024-01-16 ENCOUNTER — OFFICE VISIT (OUTPATIENT)
Dept: ONCOLOGY | Age: 84
End: 2024-01-16
Payer: MEDICARE

## 2024-01-16 ENCOUNTER — HOSPITAL ENCOUNTER (OUTPATIENT)
Dept: INFUSION THERAPY | Age: 84
Discharge: HOME | End: 2024-01-16
Payer: MEDICARE

## 2024-01-16 VITALS
WEIGHT: 123.6 LBS | TEMPERATURE: 97.5 F | HEART RATE: 88 BPM | BODY MASS INDEX: 23.33 KG/M2 | SYSTOLIC BLOOD PRESSURE: 128 MMHG | HEIGHT: 61 IN | DIASTOLIC BLOOD PRESSURE: 74 MMHG | OXYGEN SATURATION: 95 %

## 2024-01-16 DIAGNOSIS — D46.9 MDS (MYELODYSPLASTIC SYNDROME) (HCC): Primary | ICD-10-CM

## 2024-01-16 DIAGNOSIS — R74.01 TRANSAMINITIS: ICD-10-CM

## 2024-01-16 DIAGNOSIS — D63.8 ANEMIA OF CHRONIC DISEASE: Primary | ICD-10-CM

## 2024-01-16 DIAGNOSIS — D46.9 MDS (MYELODYSPLASTIC SYNDROME) (HCC): ICD-10-CM

## 2024-01-16 DIAGNOSIS — C50.919 MALIGNANT NEOPLASM OF FEMALE BREAST, UNSPECIFIED ESTROGEN RECEPTOR STATUS, UNSPECIFIED LATERALITY, UNSPECIFIED SITE OF BREAST (HCC): ICD-10-CM

## 2024-01-16 LAB
ALBUMIN SERPL-MCNC: 4.3 G/DL (ref 3.5–5.2)
ALP SERPL-CCNC: 63 U/L (ref 35–104)
ALT SERPL-CCNC: 74 U/L (ref 0–32)
ANION GAP SERPL CALCULATED.3IONS-SCNC: 12 MMOL/L (ref 7–16)
AST SERPL-CCNC: 59 U/L (ref 0–31)
BASOPHILS # BLD: 0.04 K/UL (ref 0–0.2)
BASOPHILS NFR BLD: 1 % (ref 0–2)
BILIRUB SERPL-MCNC: 1.1 MG/DL (ref 0–1.2)
BUN SERPL-MCNC: 23 MG/DL (ref 6–23)
CALCIUM SERPL-MCNC: 9.4 MG/DL (ref 8.6–10.2)
CHLORIDE SERPL-SCNC: 105 MMOL/L (ref 98–107)
CO2 SERPL-SCNC: 24 MMOL/L (ref 22–29)
CREAT SERPL-MCNC: 0.9 MG/DL (ref 0.5–1)
EOSINOPHIL # BLD: 0.19 K/UL (ref 0.05–0.5)
EOSINOPHILS RELATIVE PERCENT: 4 % (ref 0–6)
ERYTHROCYTE [DISTWIDTH] IN BLOOD BY AUTOMATED COUNT: 25.5 % (ref 11.5–15)
GFR SERPL CREATININE-BSD FRML MDRD: >60 ML/MIN/1.73M2
GLUCOSE SERPL-MCNC: 94 MG/DL (ref 74–99)
HCT VFR BLD AUTO: 23.4 % (ref 34–48)
HGB BLD-MCNC: 7.8 G/DL (ref 11.5–15.5)
LYMPHOCYTES NFR BLD: 2.6 K/UL (ref 1.5–4)
LYMPHOCYTES RELATIVE PERCENT: 61 % (ref 20–42)
MCH RBC QN AUTO: 36.8 PG (ref 26–35)
MCHC RBC AUTO-ENTMCNC: 33.3 G/DL (ref 32–34.5)
MCV RBC AUTO: 110.4 FL (ref 80–99.9)
MONOCYTES NFR BLD: 0.23 K/UL (ref 0.1–0.95)
MONOCYTES NFR BLD: 5 % (ref 2–12)
NEUTROPHILS NFR BLD: 29 % (ref 43–80)
NEUTS SEG NFR BLD: 1.24 K/UL (ref 1.8–7.3)
NUCLEATED RED BLOOD CELLS: 2 PER 100 WBC
PLATELET # BLD AUTO: 163 K/UL (ref 130–450)
PMV BLD AUTO: 11 FL (ref 7–12)
POTASSIUM SERPL-SCNC: 3.7 MMOL/L (ref 3.5–5)
PROT SERPL-MCNC: 7.3 G/DL (ref 6.4–8.3)
RBC # BLD AUTO: 2.12 M/UL (ref 3.5–5.5)
RBC # BLD: ABNORMAL 10*6/UL
SODIUM SERPL-SCNC: 141 MMOL/L (ref 132–146)
WBC OTHER # BLD: 4.3 K/UL (ref 4.5–11.5)

## 2024-01-16 PROCEDURE — 96372 THER/PROPH/DIAG INJ SC/IM: CPT

## 2024-01-16 PROCEDURE — 80053 COMPREHEN METABOLIC PANEL: CPT

## 2024-01-16 PROCEDURE — 1123F ACP DISCUSS/DSCN MKR DOCD: CPT | Performed by: INTERNAL MEDICINE

## 2024-01-16 PROCEDURE — 99214 OFFICE O/P EST MOD 30 MIN: CPT | Performed by: INTERNAL MEDICINE

## 2024-01-16 PROCEDURE — 6360000002 HC RX W HCPCS: Performed by: INTERNAL MEDICINE

## 2024-01-16 PROCEDURE — 85025 COMPLETE CBC W/AUTO DIFF WBC: CPT

## 2024-01-16 RX ADMIN — DARBEPOETIN ALFA 500 MCG: 500 INJECTION, SOLUTION INTRAVENOUS; SUBCUTANEOUS at 10:36

## 2024-01-16 NOTE — PROGRESS NOTES
[facility] PHYSICIANS [facility] MED ONCOLOGY  [address]  [facility] [address]  Dept: [phone]  Loc: [phone]  Attending Progress Note      Reason for Visit:   1. Right Breast Cancer.                                   2. MDS.      Referring Physician:  CONNIE DAS CNP    PCP:  Roselia Moralez APRN - CNP    History of Present Illness:      The patient is a very pleasant 83 y.o. lady with a PMH significant for Right Breast Cancer, stage III, HR pos, was diagnosed in 2009, she had a right mastectomy done, followed by adjuvant chemo, she received 8 cycles, probably AC followed by T, then PMRT, and 5 years of adjuvant ET with Arimidex, was completed in 2015. She was treated in [place] under the care of Dr. Simpson. She was diagnosed with MDS in 2017, she received ESAs and PRBCs transfusion. She has transfusion related iron overload. She was started on Aranesp on 4/24/2020.  No SE from Aranesp.     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  The patient had a follow-up with Dr. Melendez, he recommended conservative medical management with DAPT.      The patient returns for follow-up visit, feeling tired.  No bleeding.    Review of Systems;  CONSTITUTIONAL: No fever, chills. Fair appetite.  Positive for fatigue.  ENMT: Eyes: No diplopia; Nose: No epistaxis. Mouth: No sore throat.  RESPIRATORY: No hemoptysis, shortness of breath, cough.   CARDIOVASCULAR: No chest pain, palpitations.  GASTROINTESTINAL: No nausea/vomiting, abdominal pain, diarrhea/constipation.  GENITOURINARY: No dysuria, urinary frequency, hematuria.  NEURO: No syncope, presyncope,

## 2024-01-22 ENCOUNTER — INITIAL CONSULT (OUTPATIENT)
Age: 84
End: 2024-01-22
Payer: MEDICARE

## 2024-01-22 VITALS
OXYGEN SATURATION: 95 % | SYSTOLIC BLOOD PRESSURE: 130 MMHG | DIASTOLIC BLOOD PRESSURE: 66 MMHG | RESPIRATION RATE: 18 BRPM | HEART RATE: 74 BPM | HEIGHT: 61 IN | WEIGHT: 124 LBS | BODY MASS INDEX: 23.41 KG/M2 | TEMPERATURE: 97.1 F

## 2024-01-22 DIAGNOSIS — K76.0 FATTY LIVER: Primary | ICD-10-CM

## 2024-01-22 PROCEDURE — 99202 OFFICE O/P NEW SF 15 MIN: CPT | Performed by: NURSE PRACTITIONER

## 2024-01-22 PROCEDURE — 1123F ACP DISCUSS/DSCN MKR DOCD: CPT | Performed by: NURSE PRACTITIONER

## 2024-01-22 NOTE — PROGRESS NOTES
Patricia Ventura (:  1940) is a 83 y.o. female, here for evaluation of the following chief complaint(s):  New Patient (New patient-ref by Dr. Luciano Bernardo for transaminitis)      SUBJECTIVE/OBJECTIVE:  HPI:    Patricia is a very pleasant 83 year old female that presents today for transaminitis; past medical history includes breast cancer and os recent MDS    Lab work from 23 showed an ALT of 74, AST of 59.    Liver enzymes have been elevated since 2019  Ultrasound of the liver -  IMPRESSION:  1. Subtle hepatic nodular contour raising the possibility of cirrhosis  2. Post cholecystectomy without biliary dilation    Diagnosed with breast cancer in ; treated with with RT, CT, and surgery  This was followed by Arimidex as adjunctive therapy  Diagnosed with MDS in 2017 in [place]    Patient had has multiple blood transfusions; started in   Craves milk and bananas  No family history of liver disease  No alcohol intake  No routine NSAID use    Denies jaundice or pruritis         ROS:  General: Patient denies n/v/f/c or weight loss.  HEENT: Patient denies persistent postnasal drip, scleral icterus, drooling, persistent bleeding from nose/mouth.  Resp: Patient denies SOB, wheezing, productive cough.  Cards: Patient denies CP, palpitations, significant edema  GI: As above.  Derm: Patient denies jaundice/rashes.   Musc: Patient denies diffuse/irregular joint swelling or myalgias.      Objective   Wt Readings from Last 3 Encounters:   24 56.2 kg (124 lb)   24 56.1 kg (123 lb 9.6 oz)   24 56.7 kg (125 lb 1.6 oz)     Temp Readings from Last 3 Encounters:   24 97.1 °F (36.2 °C) (Infrared)   24 97.5 °F (36.4 °C)   24 98.4 °F (36.9 °C)     BP Readings from Last 3 Encounters:   24 130/66   24 128/74   24 130/61     Pulse Readings from Last 3 Encounters:   24 74   24 88   24 64        Physical Exam  Abdominal:      General: Bowel sounds are

## 2024-01-26 ENCOUNTER — HOSPITAL ENCOUNTER (OUTPATIENT)
Age: 84
Discharge: HOME | End: 2024-01-26
Payer: MEDICARE

## 2024-01-26 DIAGNOSIS — K76.0 FATTY LIVER: ICD-10-CM

## 2024-01-26 LAB
ALBUMIN SERPL-MCNC: 4.1 G/DL (ref 3.5–5.2)
ALP SERPL-CCNC: 50 U/L (ref 35–104)
ALT SERPL-CCNC: 55 U/L (ref 0–32)
AST SERPL-CCNC: 53 U/L (ref 0–31)
BASOPHILS # BLD: 0.04 K/UL (ref 0–0.2)
BASOPHILS NFR BLD: 1 % (ref 0–2)
BILIRUB DIRECT SERPL-MCNC: 0.3 MG/DL (ref 0–0.3)
BILIRUB INDIRECT SERPL-MCNC: 0.9 MG/DL (ref 0–1)
BILIRUB SERPL-MCNC: 1.2 MG/DL (ref 0–1.2)
EOSINOPHIL # BLD: 0.29 K/UL (ref 0.05–0.5)
EOSINOPHILS RELATIVE PERCENT: 6 % (ref 0–6)
ERYTHROCYTE [DISTWIDTH] IN BLOOD BY AUTOMATED COUNT: 27.4 % (ref 11.5–15)
GGT SERPL-CCNC: 36 U/L (ref 6–42)
HCT VFR BLD AUTO: 24.3 % (ref 34–48)
HGB BLD-MCNC: 7.9 G/DL (ref 11.5–15.5)
INR PPP: 1.2
IRON SATN MFR SERPL: ABNORMAL % (ref 15–50)
IRON SERPL-MCNC: 194 UG/DL (ref 37–145)
LYMPHOCYTES NFR BLD: 1.53 K/UL (ref 1.5–4)
LYMPHOCYTES RELATIVE PERCENT: 33 % (ref 20–42)
MCH RBC QN AUTO: 37.4 PG (ref 26–35)
MCHC RBC AUTO-ENTMCNC: 32.5 G/DL (ref 32–34.5)
MCV RBC AUTO: 115.2 FL (ref 80–99.9)
MONOCYTES NFR BLD: 0.29 K/UL (ref 0.1–0.95)
MONOCYTES NFR BLD: 6 % (ref 2–12)
NEUTROPHILS NFR BLD: 54 % (ref 43–80)
NEUTS SEG NFR BLD: 2.56 K/UL (ref 1.8–7.3)
NUCLEATED RED BLOOD CELLS: 1 PER 100 WBC
PLATELET # BLD AUTO: 153 K/UL (ref 130–450)
PMV BLD AUTO: 12.1 FL (ref 7–12)
PROT SERPL-MCNC: 6.8 G/DL (ref 6.4–8.3)
PROTHROMBIN TIME: 13.4 SEC (ref 9.3–12.4)
RBC # BLD AUTO: 2.11 M/UL (ref 3.5–5.5)
RBC # BLD: ABNORMAL 10*6/UL
TIBC SERPL-MCNC: ABNORMAL UG/DL (ref 250–450)
WBC OTHER # BLD: 4.7 K/UL (ref 4.5–11.5)

## 2024-01-26 PROCEDURE — 83540 ASSAY OF IRON: CPT

## 2024-01-26 PROCEDURE — 36415 COLL VENOUS BLD VENIPUNCTURE: CPT

## 2024-01-26 PROCEDURE — 87340 HEPATITIS B SURFACE AG IA: CPT

## 2024-01-26 PROCEDURE — 83550 IRON BINDING TEST: CPT

## 2024-01-26 PROCEDURE — 86708 HEPATITIS A ANTIBODY: CPT

## 2024-01-26 PROCEDURE — 82390 ASSAY OF CERULOPLASMIN: CPT

## 2024-01-26 PROCEDURE — 86255 FLUORESCENT ANTIBODY SCREEN: CPT

## 2024-01-26 PROCEDURE — 80076 HEPATIC FUNCTION PANEL: CPT

## 2024-01-26 PROCEDURE — 82977 ASSAY OF GGT: CPT

## 2024-01-26 PROCEDURE — 85025 COMPLETE CBC W/AUTO DIFF WBC: CPT

## 2024-01-26 PROCEDURE — 85610 PROTHROMBIN TIME: CPT

## 2024-01-26 PROCEDURE — 86704 HEP B CORE ANTIBODY TOTAL: CPT

## 2024-01-26 PROCEDURE — 86803 HEPATITIS C AB TEST: CPT

## 2024-01-26 PROCEDURE — 86317 IMMUNOASSAY INFECTIOUS AGENT: CPT

## 2024-01-29 ENCOUNTER — HOSPITAL ENCOUNTER (OUTPATIENT)
Dept: ULTRASOUND IMAGING | Age: 84
Discharge: HOME | End: 2024-01-31

## 2024-01-29 DIAGNOSIS — K76.0 FATTY LIVER: ICD-10-CM

## 2024-01-29 LAB
HBV SURFACE AB SERPL IA-ACNC: <3.1 MIU/ML (ref 0–9.99)
HBV SURFACE AG SERPL QL IA: NONREACTIVE
HCV AB SERPL QL IA: NONREACTIVE
MITOCHONDRIA AB SER QL: NEGATIVE

## 2024-01-30 ENCOUNTER — OFFICE VISIT (OUTPATIENT)
Dept: ONCOLOGY | Age: 84
End: 2024-01-30
Payer: MEDICARE

## 2024-01-30 ENCOUNTER — HOSPITAL ENCOUNTER (OUTPATIENT)
Dept: INFUSION THERAPY | Age: 84
Discharge: HOME | End: 2024-01-30
Payer: MEDICARE

## 2024-01-30 VITALS
HEIGHT: 61 IN | SYSTOLIC BLOOD PRESSURE: 144 MMHG | DIASTOLIC BLOOD PRESSURE: 61 MMHG | BODY MASS INDEX: 23.88 KG/M2 | OXYGEN SATURATION: 100 % | WEIGHT: 126.5 LBS | HEART RATE: 71 BPM | TEMPERATURE: 98.5 F

## 2024-01-30 DIAGNOSIS — D63.8 ANEMIA OF CHRONIC DISEASE: Primary | ICD-10-CM

## 2024-01-30 DIAGNOSIS — D46.9 MDS (MYELODYSPLASTIC SYNDROME) (HCC): Primary | ICD-10-CM

## 2024-01-30 DIAGNOSIS — D46.9 MDS (MYELODYSPLASTIC SYNDROME) (HCC): ICD-10-CM

## 2024-01-30 DIAGNOSIS — Z17.0 MALIGNANT NEOPLASM OF RIGHT BREAST IN FEMALE, ESTROGEN RECEPTOR POSITIVE, UNSPECIFIED SITE OF BREAST (HCC): ICD-10-CM

## 2024-01-30 DIAGNOSIS — C50.911 MALIGNANT NEOPLASM OF RIGHT BREAST IN FEMALE, ESTROGEN RECEPTOR POSITIVE, UNSPECIFIED SITE OF BREAST (HCC): ICD-10-CM

## 2024-01-30 LAB
BASOPHILS # BLD: 0.12 K/UL (ref 0–0.2)
BASOPHILS NFR BLD: 3 % (ref 0–2)
CERULOPLASMIN SERPL-MCNC: 14 MG/DL (ref 16–45)
EOSINOPHIL # BLD: 0.2 K/UL (ref 0.05–0.5)
EOSINOPHILS RELATIVE PERCENT: 4 % (ref 0–6)
ERYTHROCYTE [DISTWIDTH] IN BLOOD BY AUTOMATED COUNT: 27.9 % (ref 11.5–15)
HAV AB SERPL IA-ACNC: REACTIVE
HBV CORE AB SER QL: NONREACTIVE
HCT VFR BLD AUTO: 23.8 % (ref 34–48)
HGB BLD-MCNC: 7.7 G/DL (ref 11.5–15.5)
LYMPHOCYTES NFR BLD: 2.08 K/UL (ref 1.5–4)
LYMPHOCYTES RELATIVE PERCENT: 44 % (ref 20–42)
MCH RBC QN AUTO: 36.7 PG (ref 26–35)
MCHC RBC AUTO-ENTMCNC: 32.4 G/DL (ref 32–34.5)
MCV RBC AUTO: 113.3 FL (ref 80–99.9)
METAMYELOCYTES ABSOLUTE COUNT: 0.04 K/UL (ref 0–0.12)
METAMYELOCYTES: 1 % (ref 0–1)
MONOCYTES NFR BLD: 0.33 K/UL (ref 0.1–0.95)
MONOCYTES NFR BLD: 7 % (ref 2–12)
MYELOCYTES ABSOLUTE COUNT: 0.04 K/UL
MYELOCYTES: 1 %
NEUTROPHILS NFR BLD: 40 % (ref 43–80)
NEUTS SEG NFR BLD: 1.88 K/UL (ref 1.8–7.3)
NUCLEATED RED BLOOD CELLS: 3 PER 100 WBC
PLATELET # BLD AUTO: 122 K/UL (ref 130–450)
PMV BLD AUTO: 10.8 FL (ref 7–12)
RBC # BLD AUTO: 2.1 M/UL (ref 3.5–5.5)
RBC # BLD: ABNORMAL 10*6/UL
WBC OTHER # BLD: 4.7 K/UL (ref 4.5–11.5)

## 2024-01-30 PROCEDURE — 96372 THER/PROPH/DIAG INJ SC/IM: CPT

## 2024-01-30 PROCEDURE — 36415 COLL VENOUS BLD VENIPUNCTURE: CPT

## 2024-01-30 PROCEDURE — 1123F ACP DISCUSS/DSCN MKR DOCD: CPT | Performed by: INTERNAL MEDICINE

## 2024-01-30 PROCEDURE — 6360000002 HC RX W HCPCS: Performed by: INTERNAL MEDICINE

## 2024-01-30 PROCEDURE — 85025 COMPLETE CBC W/AUTO DIFF WBC: CPT

## 2024-01-30 PROCEDURE — 99214 OFFICE O/P EST MOD 30 MIN: CPT | Performed by: INTERNAL MEDICINE

## 2024-01-30 RX ADMIN — DARBEPOETIN ALFA 500 MCG: 500 INJECTION, SOLUTION INTRAVENOUS; SUBCUTANEOUS at 10:45

## 2024-01-30 NOTE — PROGRESS NOTES
[facility] PHYSICIANS [facility] MED ONCOLOGY  [address]  [facility] [address]  Dept: [phone]  Loc: [phone]  Attending Progress Note      Reason for Visit:   1. Right Breast Cancer.                                   2. MDS.      Referring Physician:  CONNIE DAS CNP    PCP:  Roselia Mroalez APRN - CNP    History of Present Illness:      The patient is a very pleasant 83 y.o. lady with a PMH significant for Right Breast Cancer, stage III, HR pos, was diagnosed in 2009, she had a right mastectomy done, followed by adjuvant chemo, she received 8 cycles, probably AC followed by T, then PMRT, and 5 years of adjuvant ET with Arimidex, was completed in 2015. She was treated in [place] under the care of Dr. Simpson. She was diagnosed with MDS in 2017, she received ESAs and PRBCs transfusion. She has transfusion related iron overload. She was started on Aranesp on 4/24/2020.  No SE from Aranesp.     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  The patient had a follow-up with Dr. Melendez, he recommended conservative medical management with DAPT.      The patient returns for follow-up visit, feeling tired.  No bleeding.  She was seen by GI and had a workup ordered.    Review of Systems;  CONSTITUTIONAL: No fever, chills. Fair appetite.  Positive for fatigue.  ENMT: Eyes: No diplopia; Nose: No epistaxis. Mouth: No sore throat.  RESPIRATORY: No hemoptysis, shortness of breath, cough.   CARDIOVASCULAR: No chest pain, palpitations.  GASTROINTESTINAL: No nausea/vomiting, abdominal pain, diarrhea/constipation.  GENITOURINARY: No dysuria, urinary frequency,

## 2024-01-30 NOTE — PROGRESS NOTES
Patient provided with discharge instructions, received printed AVS.  All questions answered.  Patient understands follow up plan of care.  Patient aware to arrive @ 09:00 am. for labs same day first.

## 2024-02-01 DIAGNOSIS — Z51.5 PALLIATIVE CARE BY SPECIALIST: ICD-10-CM

## 2024-02-01 DIAGNOSIS — G89.3 PAIN DUE TO NEOPLASM: ICD-10-CM

## 2024-02-01 DIAGNOSIS — C50.919 MALIGNANT NEOPLASM OF FEMALE BREAST, UNSPECIFIED ESTROGEN RECEPTOR STATUS, UNSPECIFIED LATERALITY, UNSPECIFIED SITE OF BREAST (HCC): ICD-10-CM

## 2024-02-01 RX ORDER — HYDROCODONE BITARTRATE AND ACETAMINOPHEN 5; 325 MG/1; MG/1
1 TABLET ORAL EVERY 6 HOURS PRN
Qty: 120 TABLET | Refills: 0 | Status: SHIPPED | OUTPATIENT
Start: 2024-02-01 | End: 2024-03-02

## 2024-02-01 NOTE — TELEPHONE ENCOUNTER
/Call from Patricia requesting refill [place] to [facility].  Next appointment is 2/07/2024

## 2024-02-07 ENCOUNTER — HOSPITAL ENCOUNTER (OUTPATIENT)
Dept: ULTRASOUND IMAGING | Age: 84
Discharge: HOME | End: 2024-02-09
Payer: MEDICARE

## 2024-02-07 PROCEDURE — 76981 USE PARENCHYMA: CPT

## 2024-02-13 ENCOUNTER — HOSPITAL ENCOUNTER (OUTPATIENT)
Dept: INFUSION THERAPY | Age: 84
Discharge: HOME | End: 2024-02-13
Payer: MEDICARE

## 2024-02-13 ENCOUNTER — OFFICE VISIT (OUTPATIENT)
Dept: ONCOLOGY | Age: 84
End: 2024-02-13
Payer: MEDICARE

## 2024-02-13 VITALS
HEIGHT: 61 IN | DIASTOLIC BLOOD PRESSURE: 64 MMHG | OXYGEN SATURATION: 97 % | HEART RATE: 75 BPM | TEMPERATURE: 97.6 F | WEIGHT: 124 LBS | SYSTOLIC BLOOD PRESSURE: 130 MMHG | BODY MASS INDEX: 23.41 KG/M2

## 2024-02-13 DIAGNOSIS — D63.8 ANEMIA OF CHRONIC DISEASE: Primary | ICD-10-CM

## 2024-02-13 DIAGNOSIS — D46.9 MDS (MYELODYSPLASTIC SYNDROME) (HCC): ICD-10-CM

## 2024-02-13 DIAGNOSIS — C50.911 MALIGNANT NEOPLASM OF RIGHT BREAST IN FEMALE, ESTROGEN RECEPTOR POSITIVE, UNSPECIFIED SITE OF BREAST (HCC): ICD-10-CM

## 2024-02-13 DIAGNOSIS — R74.01 TRANSAMINITIS: ICD-10-CM

## 2024-02-13 DIAGNOSIS — D46.9 MDS (MYELODYSPLASTIC SYNDROME) (HCC): Primary | ICD-10-CM

## 2024-02-13 DIAGNOSIS — Z17.0 MALIGNANT NEOPLASM OF RIGHT BREAST IN FEMALE, ESTROGEN RECEPTOR POSITIVE, UNSPECIFIED SITE OF BREAST (HCC): ICD-10-CM

## 2024-02-13 LAB
BASOPHILS # BLD: 0 K/UL (ref 0–0.2)
BASOPHILS NFR BLD: 0 % (ref 0–2)
EOSINOPHIL # BLD: 0.26 K/UL (ref 0.05–0.5)
EOSINOPHILS RELATIVE PERCENT: 5 % (ref 0–6)
ERYTHROCYTE [DISTWIDTH] IN BLOOD BY AUTOMATED COUNT: 29.4 % (ref 11.5–15)
HCT VFR BLD AUTO: 29.1 % (ref 34–48)
HGB BLD-MCNC: 9.2 G/DL (ref 11.5–15.5)
LYMPHOCYTES NFR BLD: 1.58 K/UL (ref 1.5–4)
LYMPHOCYTES RELATIVE PERCENT: 32 % (ref 20–42)
MCH RBC QN AUTO: 37.9 PG (ref 26–35)
MCHC RBC AUTO-ENTMCNC: 31.6 G/DL (ref 32–34.5)
MCV RBC AUTO: 119.8 FL (ref 80–99.9)
MONOCYTES NFR BLD: 0.22 K/UL (ref 0.1–0.95)
MONOCYTES NFR BLD: 4 % (ref 2–12)
NEUTROPHILS NFR BLD: 59 % (ref 43–80)
NEUTS SEG NFR BLD: 2.94 K/UL (ref 1.8–7.3)
NUCLEATED RED BLOOD CELLS: 2 PER 100 WBC
PLATELET # BLD AUTO: 152 K/UL (ref 130–450)
PMV BLD AUTO: 11.4 FL (ref 7–12)
RBC # BLD AUTO: 2.43 M/UL (ref 3.5–5.5)
RBC # BLD: ABNORMAL 10*6/UL
WBC OTHER # BLD: 5 K/UL (ref 4.5–11.5)

## 2024-02-13 PROCEDURE — 1123F ACP DISCUSS/DSCN MKR DOCD: CPT | Performed by: INTERNAL MEDICINE

## 2024-02-13 PROCEDURE — 6360000002 HC RX W HCPCS: Performed by: INTERNAL MEDICINE

## 2024-02-13 PROCEDURE — 85025 COMPLETE CBC W/AUTO DIFF WBC: CPT

## 2024-02-13 PROCEDURE — 99214 OFFICE O/P EST MOD 30 MIN: CPT | Performed by: INTERNAL MEDICINE

## 2024-02-13 PROCEDURE — 96372 THER/PROPH/DIAG INJ SC/IM: CPT

## 2024-02-13 PROCEDURE — 36415 COLL VENOUS BLD VENIPUNCTURE: CPT

## 2024-02-13 RX ADMIN — DARBEPOETIN ALFA 500 MCG: 500 INJECTION, SOLUTION INTRAVENOUS; SUBCUTANEOUS at 11:36

## 2024-02-14 ENCOUNTER — OFFICE VISIT (OUTPATIENT)
Age: 84
End: 2024-02-14
Payer: MEDICARE

## 2024-02-14 VITALS
WEIGHT: 124.5 LBS | HEART RATE: 86 BPM | HEIGHT: 61 IN | TEMPERATURE: 98 F | DIASTOLIC BLOOD PRESSURE: 78 MMHG | BODY MASS INDEX: 23.5 KG/M2 | SYSTOLIC BLOOD PRESSURE: 118 MMHG | OXYGEN SATURATION: 97 %

## 2024-02-14 DIAGNOSIS — K76.0 FATTY LIVER: Primary | ICD-10-CM

## 2024-02-14 PROCEDURE — 1123F ACP DISCUSS/DSCN MKR DOCD: CPT | Performed by: NURSE PRACTITIONER

## 2024-02-14 PROCEDURE — 99212 OFFICE O/P EST SF 10 MIN: CPT | Performed by: NURSE PRACTITIONER

## 2024-02-14 NOTE — PROGRESS NOTES
Patricia Ventura (:  1940) is a 83 y.o. female, here for evaluation of the following chief complaint(s):  fatty liver  and Follow-up (Ultrasound elastography )      SUBJECTIVE/OBJECTIVE:  HPI:    Patricia is a very pleasant 83 year old female that presents today for follow up on ultrasound elastography    US elastograophy revealed a fibrosis score of F2   Liver enzymes continue to be elevated    Patient has a history of breast cancer in ; treated with with RT, CT, and surgery  This was followed by Arimidex as adjunctive therapy  Diagnosed with MDS in 2017 in [place]    No alcohol intake  No routine NSAID use  Not a smoker       ROS:  General: Patient denies n/v/f/c or weight loss.  HEENT: Patient denies persistent postnasal drip, scleral icterus, drooling, persistent bleeding from nose/mouth.  Resp: Patient denies SOB, wheezing, productive cough.  Cards: Patient denies CP, palpitations, significant edema  GI: As above.  Derm: Patient denies jaundice/rashes.   Musc: Patient denies diffuse/irregular joint swelling or myalgias.      Objective   Wt Readings from Last 3 Encounters:   24 56.5 kg (124 lb 8 oz)   24 56.2 kg (124 lb)   24 57.4 kg (126 lb 8 oz)     Temp Readings from Last 3 Encounters:   24 98 °F (36.7 °C)   24 97.6 °F (36.4 °C)   24 98.5 °F (36.9 °C)     BP Readings from Last 3 Encounters:   24 118/78   24 130/64   24 (!) 144/61     Pulse Readings from Last 3 Encounters:   24 86   24 75   24 71        Physical Exam  Constitutional:       Appearance: Normal appearance.   Neurological:      Mental Status: She is alert.         Past Medical History:   Diagnosis Date    Anemia     Pt reports she was dx in her teens, w/o proper w/u, with iron deficiency anemia and was on oral iron replacement for many years, resulting in iron overload    Aplastic anemia (HCC) ~3591-2701    Possibly d/t chemotherapy for breast cancer    Breast cancer

## 2024-02-14 NOTE — PROGRESS NOTES
Patient provided with discharge instructions, received printed AVS.  All questions answered.  Patient understands follow up plan of care.     
Yaya Vickers paid his first visit to the [facility]) Cardiac Electrophysiology office on 3/9/21. SYNOPSIS  1. Poor metabolic health incurring multiple comorbid conditions. 2. Atrial fibrillation: paroxysmal. High (accelerating) burden. Symptomatic. 3. Preserved cardiac mechanical function. 4. CHADS-VASC 1-2 (HTN, subclinical coronary atherosclerosis): compliant with and tolerant of aspirin 325 QD. RECOMMENDATIONS  1. I gave him options towards atrial rhythm control, including type I/III antiarrhythmic drug and catheter ablation. He will let me know if he is interested in either of these. 2. He is aware of the pros and cons of warfarin/DOAC therapy, and at this time has made an informed decision to continue with current ASA regimen. Toro Castelan MD, [facility]      A total of 60 minutes were spent in preparation for the clinic visit, reviewing records/tests, counseling/education of the patient, ordering medications/tests/procedures, coordinating care, and documenting clinical information in the EHR.
is normal at 159 K she has iron overload secondary to prior transfusions, it is improving, will monitor her counts closely.  Transaminitis, and mild hyperbilirubinemia, an ultrasound of the abdomen was ordered, was done on 11/21/2023, was reviewed, she has cirrhotic appearing liver, referral was placed to GI.      Hypothyroidism, continue Synthroid follow-up with PCP.    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  The patient had a follow-up with Dr. Melendez, he recommended conservative medical management with DAPT.    Labs reviewed, hemoglobin had improved 9.2G/DL, hematocrit 29.1, .8, no leukopenia or thrombocytopenia, she denies any bleeding, hemoglobin is less than 10 G/DL, proceed with Aranesp, will monitor her counts closely, she will know she has any new problems.    RTC in 2 weeks with labs.    Thank you for allowing us to participate in the care of Ms. Ventura.    LUIS ALFREDO NINO MD   HEMATOLOGY/MEDICAL ONCOLOGY  [facility] MED ONCOLOGY  [facility] [address]  Dept: [phone]  Loc: [phone]

## 2024-02-27 ENCOUNTER — HOSPITAL ENCOUNTER (OUTPATIENT)
Dept: INFUSION THERAPY | Age: 84
Discharge: HOME | End: 2024-02-27
Payer: MEDICARE

## 2024-02-27 ENCOUNTER — OFFICE VISIT (OUTPATIENT)
Dept: PALLATIVE CARE | Age: 84
End: 2024-02-27
Payer: MEDICARE

## 2024-02-27 ENCOUNTER — OFFICE VISIT (OUTPATIENT)
Dept: ONCOLOGY | Age: 84
End: 2024-02-27
Payer: MEDICARE

## 2024-02-27 VITALS
OXYGEN SATURATION: 95 % | TEMPERATURE: 97.5 F | BODY MASS INDEX: 22.86 KG/M2 | HEART RATE: 90 BPM | WEIGHT: 121.1 LBS | SYSTOLIC BLOOD PRESSURE: 142 MMHG | HEIGHT: 61 IN | DIASTOLIC BLOOD PRESSURE: 63 MMHG

## 2024-02-27 VITALS
WEIGHT: 121 LBS | BODY MASS INDEX: 22.86 KG/M2 | SYSTOLIC BLOOD PRESSURE: 145 MMHG | OXYGEN SATURATION: 99 % | HEART RATE: 90 BPM | TEMPERATURE: 97.9 F | DIASTOLIC BLOOD PRESSURE: 52 MMHG

## 2024-02-27 DIAGNOSIS — G89.3 PAIN DUE TO NEOPLASM: Primary | ICD-10-CM

## 2024-02-27 DIAGNOSIS — C50.919 MALIGNANT NEOPLASM OF FEMALE BREAST, UNSPECIFIED ESTROGEN RECEPTOR STATUS, UNSPECIFIED LATERALITY, UNSPECIFIED SITE OF BREAST (HCC): ICD-10-CM

## 2024-02-27 DIAGNOSIS — D63.8 ANEMIA OF CHRONIC DISEASE: Primary | ICD-10-CM

## 2024-02-27 DIAGNOSIS — R74.01 TRANSAMINITIS: ICD-10-CM

## 2024-02-27 DIAGNOSIS — Z51.5 PALLIATIVE CARE BY SPECIALIST: ICD-10-CM

## 2024-02-27 DIAGNOSIS — D46.9 MDS (MYELODYSPLASTIC SYNDROME) (HCC): Primary | ICD-10-CM

## 2024-02-27 DIAGNOSIS — D46.9 MDS (MYELODYSPLASTIC SYNDROME) (HCC): ICD-10-CM

## 2024-02-27 DIAGNOSIS — Z17.0 MALIGNANT NEOPLASM OF RIGHT BREAST IN FEMALE, ESTROGEN RECEPTOR POSITIVE, UNSPECIFIED SITE OF BREAST (HCC): ICD-10-CM

## 2024-02-27 DIAGNOSIS — C50.911 MALIGNANT NEOPLASM OF RIGHT BREAST IN FEMALE, ESTROGEN RECEPTOR POSITIVE, UNSPECIFIED SITE OF BREAST (HCC): ICD-10-CM

## 2024-02-27 LAB
BASOPHILS # BLD: 0.03 K/UL (ref 0–0.2)
BASOPHILS NFR BLD: 1 % (ref 0–2)
EOSINOPHIL # BLD: 0.11 K/UL (ref 0.05–0.5)
EOSINOPHILS RELATIVE PERCENT: 2 % (ref 0–6)
ERYTHROCYTE [DISTWIDTH] IN BLOOD BY AUTOMATED COUNT: 27.1 % (ref 11.5–15)
HCT VFR BLD AUTO: 28.3 % (ref 34–48)
HGB BLD-MCNC: 9.1 G/DL (ref 11.5–15.5)
IMM GRANULOCYTES # BLD AUTO: 0.03 K/UL (ref 0–0.58)
IMM GRANULOCYTES NFR BLD: 1 % (ref 0–5)
LYMPHOCYTES NFR BLD: 2.17 K/UL (ref 1.5–4)
LYMPHOCYTES RELATIVE PERCENT: 44 % (ref 20–42)
MCH RBC QN AUTO: 38.2 PG (ref 26–35)
MCHC RBC AUTO-ENTMCNC: 32.2 G/DL (ref 32–34.5)
MCV RBC AUTO: 118.9 FL (ref 80–99.9)
MONOCYTES NFR BLD: 0.31 K/UL (ref 0.1–0.95)
MONOCYTES NFR BLD: 6 % (ref 2–12)
NEUTROPHILS NFR BLD: 46 % (ref 43–80)
NEUTS SEG NFR BLD: 2.24 K/UL (ref 1.8–7.3)
PLATELET # BLD AUTO: 121 K/UL (ref 130–450)
PMV BLD AUTO: 11.4 FL (ref 7–12)
RBC # BLD AUTO: 2.38 M/UL (ref 3.5–5.5)
RBC # BLD: ABNORMAL 10*6/UL
WBC OTHER # BLD: 4.9 K/UL (ref 4.5–11.5)

## 2024-02-27 PROCEDURE — 99214 OFFICE O/P EST MOD 30 MIN: CPT | Performed by: INTERNAL MEDICINE

## 2024-02-27 PROCEDURE — 1123F ACP DISCUSS/DSCN MKR DOCD: CPT | Performed by: INTERNAL MEDICINE

## 2024-02-27 PROCEDURE — 99213 OFFICE O/P EST LOW 20 MIN: CPT | Performed by: NURSE PRACTITIONER

## 2024-02-27 PROCEDURE — 96372 THER/PROPH/DIAG INJ SC/IM: CPT

## 2024-02-27 PROCEDURE — 85025 COMPLETE CBC W/AUTO DIFF WBC: CPT

## 2024-02-27 PROCEDURE — 36415 COLL VENOUS BLD VENIPUNCTURE: CPT

## 2024-02-27 PROCEDURE — 6360000002 HC RX W HCPCS: Performed by: INTERNAL MEDICINE

## 2024-02-27 PROCEDURE — 1123F ACP DISCUSS/DSCN MKR DOCD: CPT | Performed by: NURSE PRACTITIONER

## 2024-02-27 PROCEDURE — 99211 OFF/OP EST MAY X REQ PHY/QHP: CPT | Performed by: NURSE PRACTITIONER

## 2024-02-27 RX ORDER — HYDROCODONE BITARTRATE AND ACETAMINOPHEN 5; 325 MG/1; MG/1
1 TABLET ORAL EVERY 6 HOURS PRN
Qty: 120 TABLET | Refills: 0 | Status: SHIPPED | OUTPATIENT
Start: 2024-02-27 | End: 2024-03-28

## 2024-02-27 RX ADMIN — DARBEPOETIN ALFA 500 MCG: 500 INJECTION, SOLUTION INTRAVENOUS; SUBCUTANEOUS at 11:22

## 2024-02-27 NOTE — PROGRESS NOTES
[facility] PHYSICIANS [facility] MED ONCOLOGY  [address]  [facility] [address]  Dept: [phone]  Loc: [phone]  Attending Progress Note      Reason for Visit:   1. Right Breast Cancer.                                   2. MDS.      Referring Physician:  CONNIE DAS CNP    PCP:  Roselia Moralez APRN - CNP    History of Present Illness:      The patient is a very pleasant 83 y.o. lady with a PMH significant for Right Breast Cancer, stage III, HR pos, was diagnosed in 2009, she had a right mastectomy done, followed by adjuvant chemo, she received 8 cycles, probably AC followed by T, then PMRT, and 5 years of adjuvant ET with Arimidex, was completed in 2015. She was treated in [place] under the care of Dr. Simpson. She was diagnosed with MDS in 2017, she received ESAs and PRBCs transfusion. She has transfusion related iron overload. She was started on Aranesp on 4/24/2020.  No SE from Aranesp.     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  The patient had a follow-up with Dr. Melendez, he recommended conservative medical management with DAPT.      The patient returns for a follow-up visit, she has fatigue, no bleeding.    Review of Systems;  CONSTITUTIONAL: No fever, chills. Fair appetite.  Positive for fatigue.  ENMT: Eyes: No diplopia; Nose: No epistaxis. Mouth: No sore throat.  RESPIRATORY: No hemoptysis, shortness of breath, cough.   CARDIOVASCULAR: No chest pain, palpitations.  GASTROINTESTINAL: No nausea/vomiting, abdominal pain, diarrhea/constipation.  GENITOURINARY: No dysuria, urinary frequency, hematuria.  NEURO: No syncope, presyncope,

## 2024-02-27 NOTE — PROGRESS NOTES
Total Assessment Score(calculated) 33 24 23 28 23     Assessed by: patient.      Current Medications:  Medications reviewed: yes    Controlled Substances Monitoring: [place] reviewed 2/27/24.  RX Monitoring Periodic Controlled Substance Monitoring   2/27/2024  10:06 AM Possible medication side effects, risk of tolerance/dependence & alternative treatments discussed.;No signs of potential drug abuse or diversion identified.;Assessed functional status (ability to engage in work or other purposeful activities, the pain intensity and its interference with activities of daily living, quality of family life and social activities, and the physical activity);Obtaining appropriate analgesic effect of treatment.       CONNIE Fortune - CNP   Palliative Care Department     Time/Communication  Greater than 50% of time spent, total 25 minutes in face-to-face counseling and coordination of care regarding symptom management.    Note: This report was completed using computerize voiced recognition software.  Every effort has been made to ensure accuracy; however, inadvertent computerized transcription errors may be present.

## 2024-03-12 ENCOUNTER — OFFICE VISIT (OUTPATIENT)
Dept: ONCOLOGY | Age: 84
End: 2024-03-12
Payer: MEDICARE

## 2024-03-12 ENCOUNTER — HOSPITAL ENCOUNTER (OUTPATIENT)
Dept: INFUSION THERAPY | Age: 84
Discharge: HOME | End: 2024-03-12
Payer: MEDICARE

## 2024-03-12 VITALS
WEIGHT: 121.6 LBS | BODY MASS INDEX: 22.96 KG/M2 | TEMPERATURE: 97.5 F | HEART RATE: 92 BPM | OXYGEN SATURATION: 95 % | SYSTOLIC BLOOD PRESSURE: 116 MMHG | HEIGHT: 61 IN | DIASTOLIC BLOOD PRESSURE: 59 MMHG

## 2024-03-12 DIAGNOSIS — D53.9 MACROCYTIC ANEMIA: ICD-10-CM

## 2024-03-12 DIAGNOSIS — D63.8 ANEMIA OF CHRONIC DISEASE: Primary | ICD-10-CM

## 2024-03-12 DIAGNOSIS — D46.9 MDS (MYELODYSPLASTIC SYNDROME) (HCC): Primary | ICD-10-CM

## 2024-03-12 DIAGNOSIS — D46.9 MDS (MYELODYSPLASTIC SYNDROME) (HCC): ICD-10-CM

## 2024-03-12 LAB
BASOPHILS # BLD: 0.04 K/UL (ref 0–0.2)
BASOPHILS NFR BLD: 1 % (ref 0–2)
EOSINOPHIL # BLD: 0.25 K/UL (ref 0.05–0.5)
EOSINOPHILS RELATIVE PERCENT: 5 % (ref 0–6)
ERYTHROCYTE [DISTWIDTH] IN BLOOD BY AUTOMATED COUNT: 28.2 % (ref 11.5–15)
HCT VFR BLD AUTO: 25.9 % (ref 34–48)
HGB BLD-MCNC: 8.1 G/DL (ref 11.5–15.5)
LYMPHOCYTES NFR BLD: 2.59 K/UL (ref 1.5–4)
LYMPHOCYTES RELATIVE PERCENT: 54 % (ref 20–42)
MCH RBC QN AUTO: 38.2 PG (ref 26–35)
MCHC RBC AUTO-ENTMCNC: 31.3 G/DL (ref 32–34.5)
MCV RBC AUTO: 122.2 FL (ref 80–99.9)
MONOCYTES NFR BLD: 0.17 K/UL (ref 0.1–0.95)
MONOCYTES NFR BLD: 4 % (ref 2–12)
NEUTROPHILS NFR BLD: 37 % (ref 43–80)
NEUTS SEG NFR BLD: 1.75 K/UL (ref 1.8–7.3)
PLATELET # BLD AUTO: 145 K/UL (ref 130–450)
PMV BLD AUTO: 10.5 FL (ref 7–12)
RBC # BLD AUTO: 2.12 M/UL (ref 3.5–5.5)
RBC # BLD: ABNORMAL 10*6/UL
WBC OTHER # BLD: 4.8 K/UL (ref 4.5–11.5)

## 2024-03-12 PROCEDURE — 99212 OFFICE O/P EST SF 10 MIN: CPT

## 2024-03-12 PROCEDURE — 6360000002 HC RX W HCPCS: Performed by: INTERNAL MEDICINE

## 2024-03-12 PROCEDURE — 1123F ACP DISCUSS/DSCN MKR DOCD: CPT | Performed by: INTERNAL MEDICINE

## 2024-03-12 PROCEDURE — 36415 COLL VENOUS BLD VENIPUNCTURE: CPT

## 2024-03-12 PROCEDURE — 85025 COMPLETE CBC W/AUTO DIFF WBC: CPT

## 2024-03-12 PROCEDURE — 96372 THER/PROPH/DIAG INJ SC/IM: CPT

## 2024-03-12 PROCEDURE — 99214 OFFICE O/P EST MOD 30 MIN: CPT | Performed by: INTERNAL MEDICINE

## 2024-03-12 RX ADMIN — DARBEPOETIN ALFA 500 MCG: 500 INJECTION, SOLUTION INTRAVENOUS; SUBCUTANEOUS at 10:50

## 2024-03-12 NOTE — PROGRESS NOTES
[facility] PHYSICIANS [facility] MED ONCOLOGY  [address]  [facility] [address]  Dept: [phone]  Loc: [phone]  Attending Progress Note      Reason for Visit:   1. Right Breast Cancer.                                   2. MDS.      Referring Physician:  CONNIE DAS CNP    PCP:  oRselia Moralez APRN - CNP    History of Present Illness:      The patient is a very pleasant 83 y.o. lady with a PMH significant for Right Breast Cancer, stage III, HR pos, was diagnosed in 2009, she had a right mastectomy done, followed by adjuvant chemo, she received 8 cycles, probably AC followed by T, then PMRT, and 5 years of adjuvant ET with Arimidex, was completed in 2015. She was treated in [place] under the care of Dr. Simpson. She was diagnosed with MDS in 2017, she received ESAs and PRBCs transfusion. She has transfusion related iron overload. She was started on Aranesp on 4/24/2020.  No SE from Aranesp.     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  The patient had a follow-up with Dr. Melendez, he recommended conservative medical management with DAPT.      The patient returns for a follow-up visit, she is feeling tired, sometimes shaky.    Review of Systems;  CONSTITUTIONAL: No fever, chills. Fair appetite.  Positive for fatigue.  ENMT: Eyes: No diplopia; Nose: No epistaxis. Mouth: No sore throat.  RESPIRATORY: No hemoptysis, shortness of breath, cough.   CARDIOVASCULAR: No chest pain, palpitations.  GASTROINTESTINAL: No nausea/vomiting, abdominal pain, diarrhea/constipation.  GENITOURINARY: No dysuria, urinary frequency, hematuria.  NEURO: No syncope,

## 2024-03-26 ENCOUNTER — OFFICE VISIT (OUTPATIENT)
Dept: ONCOLOGY | Age: 84
End: 2024-03-26
Payer: MEDICARE

## 2024-03-26 ENCOUNTER — HOSPITAL ENCOUNTER (OUTPATIENT)
Dept: INFUSION THERAPY | Age: 84
Discharge: HOME | End: 2024-03-26
Payer: MEDICARE

## 2024-03-26 VITALS
HEIGHT: 61 IN | HEART RATE: 77 BPM | TEMPERATURE: 97.6 F | SYSTOLIC BLOOD PRESSURE: 122 MMHG | BODY MASS INDEX: 23 KG/M2 | OXYGEN SATURATION: 98 % | DIASTOLIC BLOOD PRESSURE: 74 MMHG | WEIGHT: 121.8 LBS

## 2024-03-26 DIAGNOSIS — D63.8 ANEMIA OF CHRONIC DISEASE: Primary | ICD-10-CM

## 2024-03-26 DIAGNOSIS — D46.9 MDS (MYELODYSPLASTIC SYNDROME) (HCC): Primary | ICD-10-CM

## 2024-03-26 DIAGNOSIS — D46.9 MDS (MYELODYSPLASTIC SYNDROME) (HCC): ICD-10-CM

## 2024-03-26 DIAGNOSIS — Z17.0 MALIGNANT NEOPLASM OF RIGHT BREAST IN FEMALE, ESTROGEN RECEPTOR POSITIVE, UNSPECIFIED SITE OF BREAST (HCC): ICD-10-CM

## 2024-03-26 DIAGNOSIS — C50.911 MALIGNANT NEOPLASM OF RIGHT BREAST IN FEMALE, ESTROGEN RECEPTOR POSITIVE, UNSPECIFIED SITE OF BREAST (HCC): ICD-10-CM

## 2024-03-26 LAB
BASOPHILS # BLD: 0.03 K/UL (ref 0–0.2)
BASOPHILS NFR BLD: 1 % (ref 0–2)
EOSINOPHIL # BLD: 0.21 K/UL (ref 0.05–0.5)
EOSINOPHILS RELATIVE PERCENT: 5 % (ref 0–6)
ERYTHROCYTE [DISTWIDTH] IN BLOOD BY AUTOMATED COUNT: 27.4 % (ref 11.5–15)
HCT VFR BLD AUTO: 25.2 % (ref 34–48)
HGB BLD-MCNC: 8 G/DL (ref 11.5–15.5)
LYMPHOCYTES NFR BLD: 1.68 K/UL (ref 1.5–4)
LYMPHOCYTES RELATIVE PERCENT: 43 % (ref 20–42)
MCH RBC QN AUTO: 37.4 PG (ref 26–35)
MCHC RBC AUTO-ENTMCNC: 31.7 G/DL (ref 32–34.5)
MCV RBC AUTO: 117.8 FL (ref 80–99.9)
MONOCYTES NFR BLD: 0.27 K/UL (ref 0.1–0.95)
MONOCYTES NFR BLD: 7 % (ref 2–12)
NEUTROPHILS NFR BLD: 44 % (ref 43–80)
NEUTS SEG NFR BLD: 1.71 K/UL (ref 1.8–7.3)
PLATELET # BLD AUTO: 135 K/UL (ref 130–450)
PMV BLD AUTO: 11.3 FL (ref 7–12)
RBC # BLD AUTO: 2.14 M/UL (ref 3.5–5.5)
RBC # BLD: ABNORMAL 10*6/UL
WBC OTHER # BLD: 3.9 K/UL (ref 4.5–11.5)

## 2024-03-26 PROCEDURE — 96372 THER/PROPH/DIAG INJ SC/IM: CPT

## 2024-03-26 PROCEDURE — 36415 COLL VENOUS BLD VENIPUNCTURE: CPT

## 2024-03-26 PROCEDURE — 85025 COMPLETE CBC W/AUTO DIFF WBC: CPT

## 2024-03-26 PROCEDURE — 6360000002 HC RX W HCPCS: Performed by: INTERNAL MEDICINE

## 2024-03-26 PROCEDURE — 1123F ACP DISCUSS/DSCN MKR DOCD: CPT | Performed by: INTERNAL MEDICINE

## 2024-03-26 PROCEDURE — 99214 OFFICE O/P EST MOD 30 MIN: CPT | Performed by: INTERNAL MEDICINE

## 2024-03-26 RX ADMIN — DARBEPOETIN ALFA 500 MCG: 500 INJECTION, SOLUTION INTRAVENOUS; SUBCUTANEOUS at 12:18

## 2024-03-26 NOTE — PROGRESS NOTES
Patient provided with discharge instructions, received printed AVS.  All questions answered.  Patient understands follow up plan of care.     
is normal at 159 K she has iron overload secondary to prior transfusions, it is improving, will monitor her counts closely.  Transaminitis, and mild hyperbilirubinemia, an ultrasound of the abdomen was ordered, was done on 11/21/2023, was reviewed, she has cirrhotic appearing liver, she will continue follow-up with GI.  Hypothyroidism, continue Synthroid follow-up with PCP.    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  The patient had a follow-up with Dr. Melendez, he recommended conservative medical management with DAPT.    Labs reviewed, hemoglobin is 8, proceed with Aranesp as it is less than 10 G/DL, MCV is 117, white count 3.9, platelet count 135K, will monitor her counts closely, she will know she has symptoms concerning for worsening anemia.    RTC in 2 weeks with labs.    Thank you for allowing us to participate in the care of Ms. Ventura.    LUIS ALFREDO NINO MD   HEMATOLOGY/MEDICAL ONCOLOGY  [facility] MED ONCOLOGY  [address] [place] [id number]  Dept: [phone]  Loc: [phone]

## 2024-03-28 DIAGNOSIS — Z51.5 PALLIATIVE CARE BY SPECIALIST: ICD-10-CM

## 2024-03-28 DIAGNOSIS — G89.3 PAIN DUE TO NEOPLASM: ICD-10-CM

## 2024-03-28 DIAGNOSIS — C50.919 MALIGNANT NEOPLASM OF FEMALE BREAST, UNSPECIFIED ESTROGEN RECEPTOR STATUS, UNSPECIFIED LATERALITY, UNSPECIFIED SITE OF BREAST (HCC): ICD-10-CM

## 2024-03-28 RX ORDER — HYDROCODONE BITARTRATE AND ACETAMINOPHEN 5; 325 MG/1; MG/1
1 TABLET ORAL EVERY 6 HOURS PRN
Qty: 120 TABLET | Refills: 0 | Status: SHIPPED | OUTPATIENT
Start: 2024-03-28 | End: 2024-04-27

## 2024-03-28 NOTE — TELEPHONE ENCOUNTER
Call from Patricia requesting refill for Oxy IR as her cats were playing with the bottle and she is not sure where her medications are now. [facility]. Next natalie 5/21/24.

## 2024-04-09 ENCOUNTER — HOSPITAL ENCOUNTER (OUTPATIENT)
Dept: INFUSION THERAPY | Age: 84
Discharge: HOME | End: 2024-04-09
Payer: MEDICARE

## 2024-04-09 ENCOUNTER — OFFICE VISIT (OUTPATIENT)
Dept: ONCOLOGY | Age: 84
End: 2024-04-09
Payer: MEDICARE

## 2024-04-09 VITALS
BODY MASS INDEX: 22.84 KG/M2 | OXYGEN SATURATION: 94 % | HEIGHT: 61 IN | DIASTOLIC BLOOD PRESSURE: 53 MMHG | TEMPERATURE: 97.8 F | SYSTOLIC BLOOD PRESSURE: 120 MMHG | WEIGHT: 121 LBS | HEART RATE: 86 BPM

## 2024-04-09 VITALS
RESPIRATION RATE: 16 BRPM | DIASTOLIC BLOOD PRESSURE: 60 MMHG | SYSTOLIC BLOOD PRESSURE: 120 MMHG | TEMPERATURE: 97.3 F | HEART RATE: 78 BPM | OXYGEN SATURATION: 95 %

## 2024-04-09 DIAGNOSIS — Z17.0 MALIGNANT NEOPLASM OF RIGHT BREAST IN FEMALE, ESTROGEN RECEPTOR POSITIVE, UNSPECIFIED SITE OF BREAST (HCC): ICD-10-CM

## 2024-04-09 DIAGNOSIS — C50.911 MALIGNANT NEOPLASM OF RIGHT BREAST IN FEMALE, ESTROGEN RECEPTOR POSITIVE, UNSPECIFIED SITE OF BREAST (HCC): ICD-10-CM

## 2024-04-09 DIAGNOSIS — D46.9 MDS (MYELODYSPLASTIC SYNDROME) (HCC): Primary | ICD-10-CM

## 2024-04-09 DIAGNOSIS — D63.8 ANEMIA OF CHRONIC DISEASE: Primary | ICD-10-CM

## 2024-04-09 DIAGNOSIS — D46.9 MDS (MYELODYSPLASTIC SYNDROME) (HCC): ICD-10-CM

## 2024-04-09 LAB
BASOPHILS # BLD: 0 K/UL (ref 0–0.2)
BASOPHILS NFR BLD: 0 % (ref 0–2)
EOSINOPHIL # BLD: 0.08 K/UL (ref 0.05–0.5)
EOSINOPHILS RELATIVE PERCENT: 2 % (ref 0–6)
ERYTHROCYTE [DISTWIDTH] IN BLOOD BY AUTOMATED COUNT: 27.3 % (ref 11.5–15)
HCT VFR BLD AUTO: 23.7 % (ref 34–48)
HGB BLD-MCNC: 7.7 G/DL (ref 11.5–15.5)
LYMPHOCYTES NFR BLD: 1.64 K/UL (ref 1.5–4)
LYMPHOCYTES RELATIVE PERCENT: 36 % (ref 20–42)
MCH RBC QN AUTO: 38.3 PG (ref 26–35)
MCHC RBC AUTO-ENTMCNC: 32.5 G/DL (ref 32–34.5)
MCV RBC AUTO: 117.9 FL (ref 80–99.9)
MONOCYTES NFR BLD: 0.36 K/UL (ref 0.1–0.95)
MONOCYTES NFR BLD: 8 % (ref 2–12)
MYELOCYTES ABSOLUTE COUNT: 0.04 K/UL
MYELOCYTES: 1 %
NEUTROPHILS NFR BLD: 54 % (ref 43–80)
NEUTS SEG NFR BLD: 2.48 K/UL (ref 1.8–7.3)
NUCLEATED RED BLOOD CELLS: 2 PER 100 WBC
PLATELET # BLD AUTO: 166 K/UL (ref 130–450)
PMV BLD AUTO: 12 FL (ref 7–12)
RBC # BLD AUTO: 2.01 M/UL (ref 3.5–5.5)
RBC # BLD: ABNORMAL 10*6/UL
WBC OTHER # BLD: 4.6 K/UL (ref 4.5–11.5)

## 2024-04-09 PROCEDURE — 86920 COMPATIBILITY TEST SPIN: CPT

## 2024-04-09 PROCEDURE — 86880 COOMBS TEST DIRECT: CPT

## 2024-04-09 PROCEDURE — 36430 TRANSFUSION BLD/BLD COMPNT: CPT

## 2024-04-09 PROCEDURE — 36415 COLL VENOUS BLD VENIPUNCTURE: CPT

## 2024-04-09 PROCEDURE — 99214 OFFICE O/P EST MOD 30 MIN: CPT | Performed by: INTERNAL MEDICINE

## 2024-04-09 PROCEDURE — 86850 RBC ANTIBODY SCREEN: CPT

## 2024-04-09 PROCEDURE — 1123F ACP DISCUSS/DSCN MKR DOCD: CPT | Performed by: INTERNAL MEDICINE

## 2024-04-09 PROCEDURE — 86900 BLOOD TYPING SEROLOGIC ABO: CPT

## 2024-04-09 PROCEDURE — P9016 RBC LEUKOCYTES REDUCED: HCPCS

## 2024-04-09 PROCEDURE — 86870 RBC ANTIBODY IDENTIFICATION: CPT

## 2024-04-09 PROCEDURE — 86922 COMPATIBILITY TEST ANTIGLOB: CPT

## 2024-04-09 PROCEDURE — 86901 BLOOD TYPING SEROLOGIC RH(D): CPT

## 2024-04-09 PROCEDURE — 6360000002 HC RX W HCPCS: Performed by: INTERNAL MEDICINE

## 2024-04-09 PROCEDURE — 88184 FLOWCYTOMETRY/ TC 1 MARKER: CPT

## 2024-04-09 PROCEDURE — 88185 FLOWCYTOMETRY/TC ADD-ON: CPT

## 2024-04-09 PROCEDURE — 85025 COMPLETE CBC W/AUTO DIFF WBC: CPT

## 2024-04-09 RX ORDER — SODIUM CHLORIDE 9 MG/ML
25 INJECTION, SOLUTION INTRAVENOUS PRN
Status: DISCONTINUED | OUTPATIENT
Start: 2024-04-09 | End: 2024-04-10 | Stop reason: HOSPADM

## 2024-04-09 RX ORDER — SODIUM CHLORIDE 9 MG/ML
INJECTION, SOLUTION INTRAVENOUS CONTINUOUS
OUTPATIENT
Start: 2024-04-09

## 2024-04-09 RX ORDER — SODIUM CHLORIDE 9 MG/ML
20 INJECTION, SOLUTION INTRAVENOUS CONTINUOUS
Status: DISCONTINUED | OUTPATIENT
Start: 2024-04-09 | End: 2024-04-10 | Stop reason: HOSPADM

## 2024-04-09 RX ORDER — EPINEPHRINE 1 MG/ML
0.3 INJECTION, SOLUTION, CONCENTRATE INTRAVENOUS PRN
OUTPATIENT
Start: 2024-04-09

## 2024-04-09 RX ORDER — EPINEPHRINE 1 MG/ML
0.3 INJECTION, SOLUTION, CONCENTRATE INTRAVENOUS PRN
Status: CANCELLED | OUTPATIENT
Start: 2024-04-09

## 2024-04-09 RX ORDER — DIPHENHYDRAMINE HYDROCHLORIDE 50 MG/ML
50 INJECTION INTRAMUSCULAR; INTRAVENOUS
OUTPATIENT
Start: 2024-04-09

## 2024-04-09 RX ORDER — ALBUTEROL SULFATE 90 UG/1
4 AEROSOL, METERED RESPIRATORY (INHALATION) PRN
OUTPATIENT
Start: 2024-04-09

## 2024-04-09 RX ORDER — DIPHENHYDRAMINE HYDROCHLORIDE 50 MG/ML
50 INJECTION INTRAMUSCULAR; INTRAVENOUS
Status: CANCELLED | OUTPATIENT
Start: 2024-04-09

## 2024-04-09 RX ORDER — ONDANSETRON 2 MG/ML
8 INJECTION INTRAMUSCULAR; INTRAVENOUS
OUTPATIENT
Start: 2024-04-09

## 2024-04-09 RX ORDER — SODIUM CHLORIDE 0.9 % (FLUSH) 0.9 %
5-40 SYRINGE (ML) INJECTION PRN
Status: CANCELLED | OUTPATIENT
Start: 2024-04-09

## 2024-04-09 RX ORDER — SODIUM CHLORIDE 9 MG/ML
INJECTION, SOLUTION INTRAVENOUS CONTINUOUS
Status: CANCELLED | OUTPATIENT
Start: 2024-04-09

## 2024-04-09 RX ORDER — ACETAMINOPHEN 325 MG/1
650 TABLET ORAL
Status: CANCELLED | OUTPATIENT
Start: 2024-04-09

## 2024-04-09 RX ORDER — SODIUM CHLORIDE 9 MG/ML
25 INJECTION, SOLUTION INTRAVENOUS PRN
Status: CANCELLED | OUTPATIENT
Start: 2024-04-09

## 2024-04-09 RX ORDER — SODIUM CHLORIDE 9 MG/ML
20 INJECTION, SOLUTION INTRAVENOUS CONTINUOUS
Status: CANCELLED | OUTPATIENT
Start: 2024-04-09

## 2024-04-09 RX ORDER — SODIUM CHLORIDE 0.9 % (FLUSH) 0.9 %
5-40 SYRINGE (ML) INJECTION PRN
Status: DISCONTINUED | OUTPATIENT
Start: 2024-04-09 | End: 2024-04-10 | Stop reason: HOSPADM

## 2024-04-09 RX ORDER — ONDANSETRON 2 MG/ML
8 INJECTION INTRAMUSCULAR; INTRAVENOUS
Status: CANCELLED | OUTPATIENT
Start: 2024-04-09

## 2024-04-09 RX ORDER — ALBUTEROL SULFATE 90 UG/1
4 AEROSOL, METERED RESPIRATORY (INHALATION) PRN
Status: CANCELLED | OUTPATIENT
Start: 2024-04-09

## 2024-04-09 RX ORDER — ACETAMINOPHEN 325 MG/1
650 TABLET ORAL
OUTPATIENT
Start: 2024-04-09

## 2024-04-09 RX ADMIN — DARBEPOETIN ALFA 500 MCG: 500 INJECTION, SOLUTION INTRAVENOUS; SUBCUTANEOUS at 14:18

## 2024-04-09 NOTE — PROGRESS NOTES
Patient provided with discharge instructions, received printed AVS.  All questions answered.  Patient understands follow up plan of care.     
Cancer Father         lung    Ovarian Cancer Sister 81    Breast Cancer Sister     Cancer Brother         unknown    Other Brother         MI    Cancer Maternal Aunt         unknown    Heart Attack Child        Medications:  Reviewed and reconciled.    Social History:  Social History     Socioeconomic History    Marital status:      Spouse name: Not on file    Number of children: Not on file    Years of education: Not on file    Highest education level: Not on file   Occupational History    Not on file   Tobacco Use    Smoking status: Former     Current packs/day: 0.00     Average packs/day: 1 pack/day for 37.0 years (37.0 ttl pk-yrs)     Types: Cigarettes     Start date:      Quit date:      Years since quittin.2    Smokeless tobacco: Never   Vaping Use    Vaping Use: Never used   Substance and Sexual Activity    Alcohol use: No    Drug use: Never    Sexual activity: Not Currently   Other Topics Concern    Not on file   Social History Narrative    Not on file     Social Determinants of Health     Financial Resource Strain: Not on file   Food Insecurity: Not on file   Transportation Needs: Not on file   Physical Activity: Not on file   Stress: Not on file   Social Connections: Not on file   Intimate Partner Violence: Not on file   Housing Stability: Not on file       Allergies:  Allergies   Allergen Reactions    Bee Venom Swelling and Dermatitis    Penicillins Hives, Swelling and Dermatitis    Other      All metals except gold and platinum, welts/blisters       Physical Exam:  BP (!) 120/53   Pulse 86   Temp 97.8 °F (36.6 °C)   Ht 1.549 m (5' 1\")   Wt 54.9 kg (121 lb)   LMP 1991   SpO2 94%   BMI 22.86 kg/m²   GENERAL: Alert, oriented x 3, not in acute distress.  HEENT: PERRLA; EOMI. Oropharynx clear.  .  NECK: Supple.  No palpable lymphadenopathy.  LUNGS: Good air entry bilaterally. No wheezing, crackles or rhonchi.   BREASTS: She is s/p right mastectomy, she has right chest wall

## 2024-04-09 NOTE — PROGRESS NOTES
Informed consent from Dr. Luciano eBrnardo verified and on patient chart.   Patient received 1 unit PRBC. Patient tolerated well.    Vital signs stable.  Reviewed orally and provided with written information regarding potential delayed reaction and instructions on what to do if reaction occurs. Questions answered to apparent satisfaction. Patient observed for 30 minutes after transfusion.

## 2024-04-10 LAB
ABO/RH: NORMAL
ANTIBODY IDENTIFICATION: NORMAL
ANTIBODY SCREEN: POSITIVE
ARM BAND NUMBER: NORMAL
BLOOD BANK BLOOD PRODUCT EXPIRATION DATE: NORMAL
BLOOD BANK COMMENT: NORMAL
BLOOD BANK COMMENT: NORMAL
BLOOD BANK DISPENSE STATUS: NORMAL
BLOOD BANK ISBT PRODUCT BLOOD TYPE: 600
BLOOD BANK PRODUCT CODE: NORMAL
BLOOD BANK SAMPLE EXPIRATION: NORMAL
BLOOD BANK UNIT TYPE AND RH: NORMAL
BPU ID: NORMAL
COMPONENT: NORMAL
CROSSMATCH RESULT: NORMAL
DAT IGG: NEGATIVE
TRANSFUSION STATUS: NORMAL
UNIT DIVISION: 0
UNIT ISSUE DATE/TIME: NORMAL

## 2024-04-12 LAB
SEND OUT REPORT: NORMAL
TEST NAME: NORMAL

## 2024-04-15 ENCOUNTER — TELEPHONE (OUTPATIENT)
Dept: INFUSION THERAPY | Age: 84
End: 2024-04-15

## 2024-04-15 ENCOUNTER — TELEPHONE (OUTPATIENT)
Dept: ONCOLOGY | Age: 84
End: 2024-04-15

## 2024-04-15 NOTE — TELEPHONE ENCOUNTER
Patient called in the office stating that she is feeling very weak, and tired. She also states that she is feeling cold. Patient is wondering if her hgb is dropping and if she should come for blood work. I let  the patient know that I will rely this to the nurse in the office, and will reach out to Dr. Luciano Bernardo. I let patient know that if things get worse to go to ER Patient verbalized understanding.

## 2024-04-15 NOTE — TELEPHONE ENCOUNTER
[facility] message on TL phone updating Dr Luciano Bernardo on pts recent phone call and requesting feedback for pt

## 2024-04-16 ENCOUNTER — TELEPHONE (OUTPATIENT)
Dept: INFUSION THERAPY | Age: 84
End: 2024-04-16

## 2024-04-16 ENCOUNTER — HOSPITAL ENCOUNTER (OUTPATIENT)
Dept: INFUSION THERAPY | Age: 84
Discharge: HOME | End: 2024-04-16
Payer: MEDICARE

## 2024-04-16 ENCOUNTER — APPOINTMENT (OUTPATIENT)
Dept: CT IMAGING | Age: 84
End: 2024-04-16
Payer: MEDICARE

## 2024-04-16 ENCOUNTER — HOSPITAL ENCOUNTER (EMERGENCY)
Age: 84
Discharge: HOME | End: 2024-04-16
Attending: EMERGENCY MEDICINE
Payer: MEDICARE

## 2024-04-16 ENCOUNTER — APPOINTMENT (OUTPATIENT)
Dept: GENERAL RADIOLOGY | Age: 84
End: 2024-04-16
Payer: MEDICARE

## 2024-04-16 VITALS
OXYGEN SATURATION: 95 % | HEART RATE: 64 BPM | TEMPERATURE: 97.7 F | HEIGHT: 61 IN | BODY MASS INDEX: 22.28 KG/M2 | WEIGHT: 118 LBS | RESPIRATION RATE: 18 BRPM | SYSTOLIC BLOOD PRESSURE: 118 MMHG | DIASTOLIC BLOOD PRESSURE: 83 MMHG

## 2024-04-16 DIAGNOSIS — D46.9 MDS (MYELODYSPLASTIC SYNDROME) (HCC): ICD-10-CM

## 2024-04-16 DIAGNOSIS — R53.1 GENERALIZED WEAKNESS: Primary | ICD-10-CM

## 2024-04-16 DIAGNOSIS — D64.9 CHRONIC ANEMIA: ICD-10-CM

## 2024-04-16 LAB
ALBUMIN SERPL-MCNC: 4.5 G/DL (ref 3.5–5.2)
ALP SERPL-CCNC: 59 U/L (ref 35–104)
ALT SERPL-CCNC: 72 U/L (ref 0–32)
ANION GAP SERPL CALCULATED.3IONS-SCNC: 10 MMOL/L (ref 7–16)
AST SERPL-CCNC: 85 U/L (ref 0–31)
B PARAP IS1001 DNA NPH QL NAA+NON-PROBE: NOT DETECTED
B PERT DNA SPEC QL NAA+PROBE: NOT DETECTED
BACTERIA URNS QL MICRO: ABNORMAL
BASOPHILS # BLD: 0.02 K/UL (ref 0–0.2)
BASOPHILS # BLD: 0.04 K/UL (ref 0–0.2)
BASOPHILS NFR BLD: 0 % (ref 0–2)
BASOPHILS NFR BLD: 1 % (ref 0–2)
BILIRUB SERPL-MCNC: 1.1 MG/DL (ref 0–1.2)
BILIRUB UR QL STRIP: NEGATIVE
BUN SERPL-MCNC: 23 MG/DL (ref 6–23)
C PNEUM DNA NPH QL NAA+NON-PROBE: NOT DETECTED
CALCIUM SERPL-MCNC: 9.4 MG/DL (ref 8.6–10.2)
CHLORIDE SERPL-SCNC: 105 MMOL/L (ref 98–107)
CLARITY UR: CLEAR
CO2 SERPL-SCNC: 24 MMOL/L (ref 22–29)
COLOR UR: YELLOW
CREAT SERPL-MCNC: 0.8 MG/DL (ref 0.5–1)
EOSINOPHIL # BLD: 0.04 K/UL (ref 0.05–0.5)
EOSINOPHIL # BLD: 0.13 K/UL (ref 0.05–0.5)
EOSINOPHILS RELATIVE PERCENT: 1 % (ref 0–6)
EOSINOPHILS RELATIVE PERCENT: 2 % (ref 0–6)
ERYTHROCYTE [DISTWIDTH] IN BLOOD BY AUTOMATED COUNT: 26.4 % (ref 11.5–15)
ERYTHROCYTE [DISTWIDTH] IN BLOOD BY AUTOMATED COUNT: 26.5 % (ref 11.5–15)
FLUAV RNA NPH QL NAA+NON-PROBE: NOT DETECTED
FLUBV RNA NPH QL NAA+NON-PROBE: NOT DETECTED
GFR SERPL CREATININE-BSD FRML MDRD: 76 ML/MIN/1.73M2
GLUCOSE SERPL-MCNC: 96 MG/DL (ref 74–99)
GLUCOSE UR STRIP-MCNC: NEGATIVE MG/DL
HADV DNA NPH QL NAA+NON-PROBE: NOT DETECTED
HCOV 229E RNA NPH QL NAA+NON-PROBE: NOT DETECTED
HCOV HKU1 RNA NPH QL NAA+NON-PROBE: NOT DETECTED
HCOV NL63 RNA NPH QL NAA+NON-PROBE: NOT DETECTED
HCOV OC43 RNA NPH QL NAA+NON-PROBE: NOT DETECTED
HCT VFR BLD AUTO: 29.3 % (ref 34–48)
HCT VFR BLD AUTO: 30.5 % (ref 34–48)
HGB BLD-MCNC: 9.4 G/DL (ref 11.5–15.5)
HGB BLD-MCNC: 9.9 G/DL (ref 11.5–15.5)
HGB UR QL STRIP.AUTO: NEGATIVE
HMPV RNA NPH QL NAA+NON-PROBE: NOT DETECTED
HPIV1 RNA NPH QL NAA+NON-PROBE: NOT DETECTED
HPIV2 RNA NPH QL NAA+NON-PROBE: NOT DETECTED
HPIV3 RNA NPH QL NAA+NON-PROBE: NOT DETECTED
HPIV4 RNA NPH QL NAA+NON-PROBE: NOT DETECTED
IMM GRANULOCYTES # BLD AUTO: 0.03 K/UL (ref 0–0.58)
IMM GRANULOCYTES NFR BLD: 1 % (ref 0–5)
KETONES UR STRIP-MCNC: NEGATIVE MG/DL
LEUKOCYTE ESTERASE UR QL STRIP: ABNORMAL
LYMPHOCYTES NFR BLD: 2.32 K/UL (ref 1.5–4)
LYMPHOCYTES NFR BLD: 2.46 K/UL (ref 1.5–4)
LYMPHOCYTES RELATIVE PERCENT: 44 % (ref 20–42)
LYMPHOCYTES RELATIVE PERCENT: 47 % (ref 20–42)
M PNEUMO DNA NPH QL NAA+NON-PROBE: NOT DETECTED
MAGNESIUM SERPL-MCNC: 2 MG/DL (ref 1.6–2.6)
MCH RBC QN AUTO: 36.7 PG (ref 26–35)
MCH RBC QN AUTO: 37.4 PG (ref 26–35)
MCHC RBC AUTO-ENTMCNC: 32.1 G/DL (ref 32–34.5)
MCHC RBC AUTO-ENTMCNC: 32.5 G/DL (ref 32–34.5)
MCV RBC AUTO: 114.5 FL (ref 80–99.9)
MCV RBC AUTO: 115.1 FL (ref 80–99.9)
MONOCYTES NFR BLD: 0.26 K/UL (ref 0.1–0.95)
MONOCYTES NFR BLD: 0.43 K/UL (ref 0.1–0.95)
MONOCYTES NFR BLD: 5 % (ref 2–12)
MONOCYTES NFR BLD: 8 % (ref 2–12)
NEUTROPHILS NFR BLD: 45 % (ref 43–80)
NEUTROPHILS NFR BLD: 46 % (ref 43–80)
NEUTS SEG NFR BLD: 2.24 K/UL (ref 1.8–7.3)
NEUTS SEG NFR BLD: 2.47 K/UL (ref 1.8–7.3)
NITRITE UR QL STRIP: NEGATIVE
PH UR STRIP: 6 [PH] (ref 5–9)
PLATELET # BLD AUTO: 164 K/UL (ref 130–450)
PLATELET # BLD AUTO: 182 K/UL (ref 130–450)
PMV BLD AUTO: 11.6 FL (ref 7–12)
PMV BLD AUTO: 11.6 FL (ref 7–12)
POTASSIUM SERPL-SCNC: 5.1 MMOL/L (ref 3.5–5)
PROT SERPL-MCNC: 7.6 G/DL (ref 6.4–8.3)
PROT UR STRIP-MCNC: NEGATIVE MG/DL
RBC # BLD AUTO: 2.56 M/UL (ref 3.5–5.5)
RBC # BLD AUTO: 2.65 M/UL (ref 3.5–5.5)
RBC # BLD: ABNORMAL 10*6/UL
RBC #/AREA URNS HPF: ABNORMAL /HPF
RSV RNA NPH QL NAA+NON-PROBE: NOT DETECTED
RV+EV RNA NPH QL NAA+NON-PROBE: NOT DETECTED
SARS-COV-2 RNA NPH QL NAA+NON-PROBE: NOT DETECTED
SODIUM SERPL-SCNC: 139 MMOL/L (ref 132–146)
SP GR UR STRIP: >1.03 (ref 1–1.03)
SPECIMEN DESCRIPTION: NORMAL
TROPONIN I SERPL HS-MCNC: 12 NG/L (ref 0–9)
TROPONIN I SERPL HS-MCNC: 12 NG/L (ref 0–9)
TSH SERPL DL<=0.05 MIU/L-ACNC: 0.06 UIU/ML (ref 0.27–4.2)
UROBILINOGEN UR STRIP-ACNC: 0.2 EU/DL (ref 0–1)
WBC #/AREA URNS HPF: ABNORMAL /HPF
WBC OTHER # BLD: 4.9 K/UL (ref 4.5–11.5)
WBC OTHER # BLD: 5.5 K/UL (ref 4.5–11.5)

## 2024-04-16 PROCEDURE — 70450 CT HEAD/BRAIN W/O DYE: CPT

## 2024-04-16 PROCEDURE — 86922 COMPATIBILITY TEST ANTIGLOB: CPT

## 2024-04-16 PROCEDURE — 80053 COMPREHEN METABOLIC PANEL: CPT

## 2024-04-16 PROCEDURE — 93005 ELECTROCARDIOGRAM TRACING: CPT | Performed by: EMERGENCY MEDICINE

## 2024-04-16 PROCEDURE — 83735 ASSAY OF MAGNESIUM: CPT

## 2024-04-16 PROCEDURE — 85025 COMPLETE CBC W/AUTO DIFF WBC: CPT

## 2024-04-16 PROCEDURE — 86901 BLOOD TYPING SEROLOGIC RH(D): CPT

## 2024-04-16 PROCEDURE — 86880 COOMBS TEST DIRECT: CPT

## 2024-04-16 PROCEDURE — 86870 RBC ANTIBODY IDENTIFICATION: CPT

## 2024-04-16 PROCEDURE — 86920 COMPATIBILITY TEST SPIN: CPT

## 2024-04-16 PROCEDURE — 99285 EMERGENCY DEPT VISIT HI MDM: CPT

## 2024-04-16 PROCEDURE — 84443 ASSAY THYROID STIM HORMONE: CPT

## 2024-04-16 PROCEDURE — 86900 BLOOD TYPING SEROLOGIC ABO: CPT

## 2024-04-16 PROCEDURE — 81001 URINALYSIS AUTO W/SCOPE: CPT

## 2024-04-16 PROCEDURE — 84484 ASSAY OF TROPONIN QUANT: CPT

## 2024-04-16 PROCEDURE — 36415 COLL VENOUS BLD VENIPUNCTURE: CPT

## 2024-04-16 PROCEDURE — 0202U NFCT DS 22 TRGT SARS-COV-2: CPT

## 2024-04-16 PROCEDURE — 71045 X-RAY EXAM CHEST 1 VIEW: CPT

## 2024-04-16 PROCEDURE — 86850 RBC ANTIBODY SCREEN: CPT

## 2024-04-16 ASSESSMENT — LIFESTYLE VARIABLES: HOW OFTEN DO YOU HAVE A DRINK CONTAINING ALCOHOL: NEVER

## 2024-04-16 NOTE — PROGRESS NOTES
Patient's labs reviewed with Dr Luciano Bernardo. Patient to go to ER for c/o dizziness and feeling \"wobbly\" when walking.

## 2024-04-16 NOTE — ED PROVIDER NOTES
[facility] EMERGENCY DEPARTMENT  EMERGENCY DEPARTMENT ENCOUNTER        Pt Name: Patricia Ventura  MRN: [id number]  Birthdate 1940  Date of evaluation: 4/16/2024  Provider: Darlin Maldonado DO  PCP: Roselia Moralez, CONNIE - CNP  Note Started: 2:56 PM EDT 4/16/24    CHIEF COMPLAINT       Chief Complaint   Patient presents with    Fatigue    Gait Problem     Fatigue. Pt states she has been falling asleep for hours in the chair. Unsteady gait when trying to ambulate.        HISTORY OF PRESENT ILLNESS: 1 or more Elements   History From: Patient    Limitations to history : None    Patricia Ventura is a 83 y.o. female who presents with concern for worsening weakness and fatigue.  This has been going on for the past several weeks, she says over the past few days she has been falling asleep more easily than she should.  Says she will be sitting in a chair and does fall asleep for several hours, has not been having chest pain or difficulty breathing, no numbness, recently tingling, no dysuria, hematuria, cough, congestion, fevers or chills.  She feels as though her whole body will have intermittent shakes but she has not been feeling sick recently.  No other associated complaints, she has a history of MDS and follows closely with hematology oncology and gets her transfusions as indicated.    REVIEW OF SYSTEMS :      Positives and Pertinent negatives as per HPI.     SURGICAL HISTORY     Past Surgical History:   Procedure Laterality Date    ADENOIDECTOMY      ANKLE FRACTURE SURGERY Left 08/24/2022    LEFT ANKLE OPEN REDUCTION INTERNAL FIXATION performed by Aramis Rowland DO at [facility] OR    BONE MARROW BIOPSY      BREAST BIOPSY      CHOLECYSTECTOMY      MASTECTOMY      right breast    OTHER SURGICAL HISTORY      blood transfusions    TONSILLECTOMY AND ADENOIDECTOMY      TUBAL LIGATION         CURRENTMEDICATIONS       Discharge Medication List as of 4/16/2024 10:27 PM        CONTINUE these

## 2024-04-16 NOTE — TELEPHONE ENCOUNTER
Kait RN,  from [facility] returned call and stated that patient to come for labs, poss transfusion. Per Dr Luciano Bernardo. Scheduling aware

## 2024-04-17 LAB
EKG ATRIAL RATE: 65 BPM
EKG P AXIS: 48 DEGREES
EKG P-R INTERVAL: 182 MS
EKG Q-T INTERVAL: 406 MS
EKG QRS DURATION: 76 MS
EKG QTC CALCULATION (BAZETT): 422 MS
EKG R AXIS: -37 DEGREES
EKG T AXIS: 16 DEGREES
EKG VENTRICULAR RATE: 65 BPM

## 2024-04-17 PROCEDURE — 93010 ELECTROCARDIOGRAM REPORT: CPT | Performed by: INTERNAL MEDICINE

## 2024-04-19 DIAGNOSIS — D46.9 MDS (MYELODYSPLASTIC SYNDROME) (HCC): Primary | ICD-10-CM

## 2024-04-20 LAB
ABO/RH: NORMAL
ANTIBODY IDENTIFICATION: NORMAL
ANTIBODY SCREEN: POSITIVE
ARM BAND NUMBER: NORMAL
BLOOD BANK DISPENSE STATUS: NORMAL
BLOOD BANK SAMPLE EXPIRATION: NORMAL
BPU ID: NORMAL
COMPONENT: NORMAL
CROSSMATCH RESULT: NORMAL
DAT, POLYSPECIFIC: NEGATIVE
TRANSFUSION STATUS: NORMAL
UNIT DIVISION: 0

## 2024-04-23 ENCOUNTER — HOSPITAL ENCOUNTER (OUTPATIENT)
Dept: INFUSION THERAPY | Age: 84
Discharge: HOME | End: 2024-04-23
Payer: MEDICARE

## 2024-04-23 ENCOUNTER — OFFICE VISIT (OUTPATIENT)
Dept: ONCOLOGY | Age: 84
End: 2024-04-23
Payer: MEDICARE

## 2024-04-23 VITALS
SYSTOLIC BLOOD PRESSURE: 147 MMHG | DIASTOLIC BLOOD PRESSURE: 65 MMHG | WEIGHT: 120.4 LBS | HEART RATE: 84 BPM | TEMPERATURE: 97.7 F | OXYGEN SATURATION: 96 % | BODY MASS INDEX: 22.73 KG/M2 | HEIGHT: 61 IN

## 2024-04-23 DIAGNOSIS — D63.8 ANEMIA OF CHRONIC DISEASE: Primary | ICD-10-CM

## 2024-04-23 DIAGNOSIS — Z17.0 MALIGNANT NEOPLASM OF RIGHT BREAST IN FEMALE, ESTROGEN RECEPTOR POSITIVE, UNSPECIFIED SITE OF BREAST (HCC): ICD-10-CM

## 2024-04-23 DIAGNOSIS — D46.9 MDS (MYELODYSPLASTIC SYNDROME) (HCC): ICD-10-CM

## 2024-04-23 DIAGNOSIS — D46.9 MDS (MYELODYSPLASTIC SYNDROME) (HCC): Primary | ICD-10-CM

## 2024-04-23 DIAGNOSIS — C50.911 MALIGNANT NEOPLASM OF RIGHT BREAST IN FEMALE, ESTROGEN RECEPTOR POSITIVE, UNSPECIFIED SITE OF BREAST (HCC): ICD-10-CM

## 2024-04-23 LAB
BASOPHILS # BLD: 0.03 K/UL (ref 0–0.2)
BASOPHILS NFR BLD: 1 % (ref 0–2)
EOSINOPHIL # BLD: 0.18 K/UL (ref 0.05–0.5)
EOSINOPHILS RELATIVE PERCENT: 4 % (ref 0–6)
ERYTHROCYTE [DISTWIDTH] IN BLOOD BY AUTOMATED COUNT: 25.9 % (ref 11.5–15)
HCT VFR BLD AUTO: 27.3 % (ref 34–48)
HGB BLD-MCNC: 8.9 G/DL (ref 11.5–15.5)
IMM GRANULOCYTES # BLD AUTO: <0.03 K/UL (ref 0–0.58)
IMM GRANULOCYTES NFR BLD: 0 % (ref 0–5)
LYMPHOCYTES NFR BLD: 1.73 K/UL (ref 1.5–4)
LYMPHOCYTES RELATIVE PERCENT: 39 % (ref 20–42)
MCH RBC QN AUTO: 37.1 PG (ref 26–35)
MCHC RBC AUTO-ENTMCNC: 32.6 G/DL (ref 32–34.5)
MCV RBC AUTO: 113.8 FL (ref 80–99.9)
MONOCYTES NFR BLD: 0.29 K/UL (ref 0.1–0.95)
MONOCYTES NFR BLD: 7 % (ref 2–12)
NEUTROPHILS NFR BLD: 49 % (ref 43–80)
NEUTS SEG NFR BLD: 2.16 K/UL (ref 1.8–7.3)
PLATELET # BLD AUTO: 147 K/UL (ref 130–450)
PMV BLD AUTO: 11.7 FL (ref 7–12)
RBC # BLD AUTO: 2.4 M/UL (ref 3.5–5.5)
RBC # BLD: ABNORMAL 10*6/UL
WBC OTHER # BLD: 4.4 K/UL (ref 4.5–11.5)

## 2024-04-23 PROCEDURE — 85025 COMPLETE CBC W/AUTO DIFF WBC: CPT

## 2024-04-23 PROCEDURE — 36415 COLL VENOUS BLD VENIPUNCTURE: CPT

## 2024-04-23 PROCEDURE — 6360000002 HC RX W HCPCS: Performed by: INTERNAL MEDICINE

## 2024-04-23 PROCEDURE — 1123F ACP DISCUSS/DSCN MKR DOCD: CPT | Performed by: INTERNAL MEDICINE

## 2024-04-23 PROCEDURE — 96372 THER/PROPH/DIAG INJ SC/IM: CPT

## 2024-04-23 PROCEDURE — 99214 OFFICE O/P EST MOD 30 MIN: CPT | Performed by: INTERNAL MEDICINE

## 2024-04-23 RX ADMIN — DARBEPOETIN ALFA 500 MCG: 500 INJECTION, SOLUTION INTRAVENOUS; SUBCUTANEOUS at 11:21

## 2024-04-23 NOTE — PROGRESS NOTES
[facility] PHYSICIANS [facility] MED ONCOLOGY  [address]  [facility] [address]  Dept: [phone]  Loc: [phone]  Attending Progress Note      Reason for Visit:   1. Right Breast Cancer.                                   2. MDS.      Referring Physician:  CONNIE DAS CNP    PCP:  Roselia Moralez APRN - CNP    History of Present Illness:      The patient is a very pleasant 83 y.o. lady with a PMH significant for Right Breast Cancer, stage III, HR pos, was diagnosed in 2009, she had a right mastectomy done, followed by adjuvant chemo, she received 8 cycles, probably AC followed by T, then PMRT, and 5 years of adjuvant ET with Arimidex, was completed in 2015. She was treated in [place] under the care of Dr. Simpson. She was diagnosed with MDS in 2017, she received ESAs and PRBCs transfusion. She has transfusion related iron overload. She was started on Aranesp on 4/24/2020.  No SE from Aranesp.     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  The patient had a follow-up with Dr. Melendez, he recommended conservative medical management with DAPT.      The patient returns for a follow-up visit, she was very weak and wobbly, she was referred to the ED as her hemoglobin was in the 9 range, she had a head CT done that was negative.  She is feeling better at this time.    Review of Systems;  CONSTITUTIONAL: No fever, chills. Fair appetite.  Positive for fatigue.  ENMT: Eyes: No diplopia; Nose: No epistaxis. Mouth: No sore throat.  RESPIRATORY: No hemoptysis, shortness of breath, cough.   CARDIOVASCULAR: No chest pain, palpitations.  GASTROINTESTINAL: No

## 2024-05-07 ENCOUNTER — HOSPITAL ENCOUNTER (OUTPATIENT)
Dept: INFUSION THERAPY | Age: 84
Discharge: HOME | End: 2024-05-07
Payer: MEDICARE

## 2024-05-07 ENCOUNTER — OFFICE VISIT (OUTPATIENT)
Dept: ONCOLOGY | Age: 84
End: 2024-05-07
Payer: MEDICARE

## 2024-05-07 VITALS
HEART RATE: 80 BPM | OXYGEN SATURATION: 95 % | WEIGHT: 122 LBS | TEMPERATURE: 97.3 F | BODY MASS INDEX: 23.03 KG/M2 | SYSTOLIC BLOOD PRESSURE: 116 MMHG | DIASTOLIC BLOOD PRESSURE: 74 MMHG | HEIGHT: 61 IN

## 2024-05-07 DIAGNOSIS — C50.911 MALIGNANT NEOPLASM OF RIGHT BREAST IN FEMALE, ESTROGEN RECEPTOR POSITIVE, UNSPECIFIED SITE OF BREAST (HCC): ICD-10-CM

## 2024-05-07 DIAGNOSIS — D46.9 MDS (MYELODYSPLASTIC SYNDROME) (HCC): ICD-10-CM

## 2024-05-07 DIAGNOSIS — Z17.0 MALIGNANT NEOPLASM OF RIGHT BREAST IN FEMALE, ESTROGEN RECEPTOR POSITIVE, UNSPECIFIED SITE OF BREAST (HCC): ICD-10-CM

## 2024-05-07 DIAGNOSIS — D46.9 MDS (MYELODYSPLASTIC SYNDROME) (HCC): Primary | ICD-10-CM

## 2024-05-07 DIAGNOSIS — D63.8 ANEMIA OF CHRONIC DISEASE: Primary | ICD-10-CM

## 2024-05-07 LAB
BASOPHILS # BLD: 0 K/UL (ref 0–0.2)
BASOPHILS NFR BLD: 0 % (ref 0–2)
EOSINOPHIL # BLD: 0.19 K/UL (ref 0.05–0.5)
EOSINOPHILS RELATIVE PERCENT: 4 % (ref 0–6)
ERYTHROCYTE [DISTWIDTH] IN BLOOD BY AUTOMATED COUNT: ABNORMAL % (ref 11.5–15)
HCT VFR BLD AUTO: 26.9 % (ref 34–48)
HGB BLD-MCNC: 8.4 G/DL (ref 11.5–15.5)
LYMPHOCYTES NFR BLD: 1.95 K/UL (ref 1.5–4)
LYMPHOCYTES RELATIVE PERCENT: 44 % (ref 20–42)
MCH RBC QN AUTO: 36.5 PG (ref 26–35)
MCHC RBC AUTO-ENTMCNC: 31.2 G/DL (ref 32–34.5)
MCV RBC AUTO: 117 FL (ref 80–99.9)
MONOCYTES NFR BLD: 0.34 K/UL (ref 0.1–0.95)
MONOCYTES NFR BLD: 8 % (ref 2–12)
MYELOCYTES ABSOLUTE COUNT: 0.04 K/UL
MYELOCYTES: 1 %
NEUTROPHILS NFR BLD: 43 % (ref 43–80)
NEUTS SEG NFR BLD: 1.87 K/UL (ref 1.8–7.3)
NUCLEATED RED BLOOD CELLS: 1 PER 100 WBC
PLATELET # BLD AUTO: 148 K/UL (ref 130–450)
PMV BLD AUTO: 11.6 FL (ref 7–12)
RBC # BLD AUTO: 2.3 M/UL (ref 3.5–5.5)
RBC # BLD: ABNORMAL 10*6/UL
WBC OTHER # BLD: 4.4 K/UL (ref 4.5–11.5)

## 2024-05-07 PROCEDURE — 96372 THER/PROPH/DIAG INJ SC/IM: CPT

## 2024-05-07 PROCEDURE — 36415 COLL VENOUS BLD VENIPUNCTURE: CPT

## 2024-05-07 PROCEDURE — 1123F ACP DISCUSS/DSCN MKR DOCD: CPT | Performed by: INTERNAL MEDICINE

## 2024-05-07 PROCEDURE — 99214 OFFICE O/P EST MOD 30 MIN: CPT | Performed by: INTERNAL MEDICINE

## 2024-05-07 PROCEDURE — 85025 COMPLETE CBC W/AUTO DIFF WBC: CPT

## 2024-05-07 PROCEDURE — 6360000002 HC RX W HCPCS: Performed by: INTERNAL MEDICINE

## 2024-05-07 RX ADMIN — DARBEPOETIN ALFA 500 MCG: 500 INJECTION, SOLUTION INTRAVENOUS; SUBCUTANEOUS at 11:15

## 2024-05-07 NOTE — PROGRESS NOTES
Patient provided with discharge instructions, received printed AVS.  All questions answered.  Patient understands follow up plan of care.     
blood transfusions    TONSILLECTOMY AND ADENOIDECTOMY      TUBAL LIGATION         Family History:  Family History   Problem Relation Age of Onset    Cancer Mother         lung    Cancer Father         lung    Ovarian Cancer Sister 81    Breast Cancer Sister     Cancer Brother         unknown    Other Brother         MI    Cancer Maternal Aunt         unknown    Heart Attack Child        Medications:  Reviewed and reconciled.    Social History:  Social History     Socioeconomic History    Marital status:      Spouse name: Not on file    Number of children: Not on file    Years of education: Not on file    Highest education level: Not on file   Occupational History    Not on file   Tobacco Use    Smoking status: Former     Current packs/day: 0.00     Average packs/day: 1 pack/day for 37.0 years (37.0 ttl pk-yrs)     Types: Cigarettes     Start date:      Quit date:      Years since quittin.3    Smokeless tobacco: Never   Vaping Use    Vaping Use: Never used   Substance and Sexual Activity    Alcohol use: No    Drug use: Never    Sexual activity: Not Currently   Other Topics Concern    Not on file   Social History Narrative    Not on file     Social Determinants of Health     Financial Resource Strain: Not on file   Food Insecurity: Not on file   Transportation Needs: Not on file   Physical Activity: Not on file   Stress: Not on file   Social Connections: Not on file   Intimate Partner Violence: Not on file   Housing Stability: Not on file       Allergies:  Allergies   Allergen Reactions    Bee Venom Swelling and Dermatitis    Penicillins Hives, Swelling and Dermatitis    Other      All metals except gold and platinum, welts/blisters       Physical Exam:  /74   Pulse 80   Temp 97.3 °F (36.3 °C)   Ht 1.549 m (5' 1\")   Wt 55.3 kg (122 lb)   LMP 1991   SpO2 95%   BMI 23.05 kg/m²   GENERAL: Alert, oriented x 3, not in acute distress.  HEENT: PERRLA; EOMI. Oropharynx clear.  .  NECK:

## 2024-05-21 ENCOUNTER — OFFICE VISIT (OUTPATIENT)
Dept: PALLATIVE CARE | Age: 84
End: 2024-05-21
Payer: MEDICARE

## 2024-05-21 ENCOUNTER — OFFICE VISIT (OUTPATIENT)
Dept: ONCOLOGY | Age: 84
End: 2024-05-21
Payer: MEDICARE

## 2024-05-21 ENCOUNTER — HOSPITAL ENCOUNTER (OUTPATIENT)
Dept: INFUSION THERAPY | Age: 84
Discharge: HOME | End: 2024-05-21
Payer: MEDICARE

## 2024-05-21 VITALS
SYSTOLIC BLOOD PRESSURE: 157 MMHG | BODY MASS INDEX: 22.36 KG/M2 | HEART RATE: 81 BPM | DIASTOLIC BLOOD PRESSURE: 67 MMHG | OXYGEN SATURATION: 98 % | HEIGHT: 61 IN | WEIGHT: 118.4 LBS | TEMPERATURE: 97.5 F

## 2024-05-21 VITALS
HEART RATE: 74 BPM | DIASTOLIC BLOOD PRESSURE: 78 MMHG | WEIGHT: 118.5 LBS | BODY MASS INDEX: 22.39 KG/M2 | SYSTOLIC BLOOD PRESSURE: 125 MMHG | TEMPERATURE: 97.5 F | OXYGEN SATURATION: 98 %

## 2024-05-21 DIAGNOSIS — C50.919 MALIGNANT NEOPLASM OF FEMALE BREAST, UNSPECIFIED ESTROGEN RECEPTOR STATUS, UNSPECIFIED LATERALITY, UNSPECIFIED SITE OF BREAST (HCC): ICD-10-CM

## 2024-05-21 DIAGNOSIS — C50.911 MALIGNANT NEOPLASM OF RIGHT BREAST IN FEMALE, ESTROGEN RECEPTOR POSITIVE, UNSPECIFIED SITE OF BREAST (HCC): ICD-10-CM

## 2024-05-21 DIAGNOSIS — Z17.0 MALIGNANT NEOPLASM OF RIGHT BREAST IN FEMALE, ESTROGEN RECEPTOR POSITIVE, UNSPECIFIED SITE OF BREAST (HCC): ICD-10-CM

## 2024-05-21 DIAGNOSIS — Z79.891 USE OF OPIATES FOR THERAPEUTIC PURPOSES: Primary | ICD-10-CM

## 2024-05-21 DIAGNOSIS — D46.9 MDS (MYELODYSPLASTIC SYNDROME) (HCC): Primary | ICD-10-CM

## 2024-05-21 DIAGNOSIS — D63.8 ANEMIA OF CHRONIC DISEASE: Primary | ICD-10-CM

## 2024-05-21 DIAGNOSIS — Z79.891 USE OF OPIATES FOR THERAPEUTIC PURPOSES: ICD-10-CM

## 2024-05-21 DIAGNOSIS — G89.3 PAIN DUE TO NEOPLASM: ICD-10-CM

## 2024-05-21 DIAGNOSIS — Z51.5 PALLIATIVE CARE BY SPECIALIST: ICD-10-CM

## 2024-05-21 DIAGNOSIS — D46.9 MDS (MYELODYSPLASTIC SYNDROME) (HCC): ICD-10-CM

## 2024-05-21 LAB
6-MONOACETYLMORPHINE, URINE: NEGATIVE
ABNORMAL SPECIMEN VALIDITY TEST: ABNORMAL
ALCOHOL URINE: NOT DETECTED MG/DL
AMPHETAMINE SCREEN URINE: NEGATIVE
BARBITURATE SCREEN URINE: NEGATIVE
BASOPHILS # BLD: 0 K/UL (ref 0–0.2)
BASOPHILS NFR BLD: 0 % (ref 0–2)
BENZODIAZEPINE SCREEN, URINE: NEGATIVE
BUPRENORPHINE URINE: NEGATIVE
CANNABINOID SCREEN URINE: NEGATIVE
COCAINE METABOLITE, URINE: NEGATIVE
EOSINOPHIL # BLD: 0.26 K/UL (ref 0.05–0.5)
EOSINOPHILS RELATIVE PERCENT: 5 % (ref 0–6)
ERYTHROCYTE [DISTWIDTH] IN BLOOD BY AUTOMATED COUNT: 28.2 % (ref 11.5–15)
FENTANYL URINE: NEGATIVE
HCT VFR BLD AUTO: 28 % (ref 34–48)
HGB BLD-MCNC: 9.3 G/DL (ref 11.5–15.5)
INTEGRITY CHECK, CREATININE, URINE: 177.7 MG/DL (ref 22–250)
INTEGRITY CHECK, OXIDANT, URINE: <40 MG/L
INTEGRITY CHECK, PH, URINE: 5.4 (ref 4.5–9)
INTEGRITY CHECK, SPECIFIC GRAVITY, URINE: 1.03 (ref 1–1.03)
LYMPHOCYTES NFR BLD: 2.43 K/UL (ref 1.5–4)
LYMPHOCYTES RELATIVE PERCENT: 50 % (ref 20–42)
MCH RBC QN AUTO: 38.4 PG (ref 26–35)
MCHC RBC AUTO-ENTMCNC: 33.2 G/DL (ref 32–34.5)
MCV RBC AUTO: 115.7 FL (ref 80–99.9)
METHADONE SCREEN, URINE: NEGATIVE
MONOCYTES NFR BLD: 0.26 K/UL (ref 0.1–0.95)
MONOCYTES NFR BLD: 5 % (ref 2–12)
NEUTROPHILS NFR BLD: 40 % (ref 43–80)
NEUTS SEG NFR BLD: 1.96 K/UL (ref 1.8–7.3)
OPIATES, URINE: NEGATIVE
OXYCODONE SCREEN URINE: NEGATIVE
PCP,URINE: NEGATIVE
PLATELET # BLD AUTO: 191 K/UL (ref 130–450)
PMV BLD AUTO: 11.4 FL (ref 7–12)
RBC # BLD AUTO: 2.42 M/UL (ref 3.5–5.5)
RBC # BLD: ABNORMAL 10*6/UL
TEST INFORMATION: ABNORMAL
TRAMADOL, URINE: NEGATIVE
WBC OTHER # BLD: 4.9 K/UL (ref 4.5–11.5)

## 2024-05-21 PROCEDURE — 96372 THER/PROPH/DIAG INJ SC/IM: CPT

## 2024-05-21 PROCEDURE — 1123F ACP DISCUSS/DSCN MKR DOCD: CPT | Performed by: NURSE PRACTITIONER

## 2024-05-21 PROCEDURE — 1123F ACP DISCUSS/DSCN MKR DOCD: CPT | Performed by: INTERNAL MEDICINE

## 2024-05-21 PROCEDURE — 99212 OFFICE O/P EST SF 10 MIN: CPT

## 2024-05-21 PROCEDURE — 99213 OFFICE O/P EST LOW 20 MIN: CPT | Performed by: NURSE PRACTITIONER

## 2024-05-21 PROCEDURE — 6360000002 HC RX W HCPCS: Performed by: INTERNAL MEDICINE

## 2024-05-21 PROCEDURE — 99212 OFFICE O/P EST SF 10 MIN: CPT | Performed by: NURSE PRACTITIONER

## 2024-05-21 PROCEDURE — 36415 COLL VENOUS BLD VENIPUNCTURE: CPT

## 2024-05-21 PROCEDURE — 85025 COMPLETE CBC W/AUTO DIFF WBC: CPT

## 2024-05-21 PROCEDURE — 99214 OFFICE O/P EST MOD 30 MIN: CPT | Performed by: INTERNAL MEDICINE

## 2024-05-21 RX ORDER — HYDROCODONE BITARTRATE AND ACETAMINOPHEN 5; 325 MG/1; MG/1
1 TABLET ORAL EVERY 6 HOURS PRN
Qty: 120 TABLET | Refills: 0 | Status: SHIPPED | OUTPATIENT
Start: 2024-05-21 | End: 2024-06-20

## 2024-05-21 RX ADMIN — DARBEPOETIN ALFA 500 MCG: 500 INJECTION, SOLUTION INTRAVENOUS; SUBCUTANEOUS at 11:15

## 2024-05-21 NOTE — PROGRESS NOTES
5 4 7   Tiredness Score 9 Worst possible tiredness 8 9 9   Nausea Score Not nauseated Not nauseated Not nauseated Not nauseated Not nauseated   Depression Score 5 Not depressed Not depressed Not depressed Not depressed   Anxiety Score Not anxious Not anxious Not anxious Not anxious Not anxious   Drowsiness Score 8 Worst possible drowsiness 4 5 9   Appetite Score 3 Best appetite 1 Best appetite 1   Wellbeing Score 2 5 Best feeling of wellbeing Best feeling of wellbeing Best feeling of wellbeing   Dyspnea Score 7 3 6 5 2   Other Problem Score Best possible response Best possible response Best possible response Best possible response Best possible response   Total Assessment Score(calculated) 42 33 24 23 28     Assessed by: patient.      Current Medications:  Medications reviewed: yes    Controlled Substances Monitoring: [place] reviewed 5/21/24.  RX Monitoring Periodic Controlled Substance Monitoring   5/21/2024  10:34 AM Possible medication side effects, risk of tolerance/dependence & alternative treatments discussed.;No signs of potential drug abuse or diversion identified.;Assessed functional status (ability to engage in work or other purposeful activities, the pain intensity and its interference with activities of daily living, quality of family life and social activities, and the physical activity);Obtaining appropriate analgesic effect of treatment.       Mo Dupree APRN - CNP   Palliative Care Department     Time/Communication  Greater than 50% of time spent, total 25 minutes in face-to-face counseling and coordination of care regarding symptom management.    Note: This report was completed using [employer] voiced recognition software.  Every effort has been made to ensure accuracy; however, inadvertent computerized transcription errors may be present.

## 2024-05-21 NOTE — PROGRESS NOTES
[facility] PHYSICIANS [facility] MED ONCOLOGY  [address]  [facility] [address]  Dept: [phone]  Loc: [phone]  Attending Progress Note      Reason for Visit:   1. Right Breast Cancer.                                   2. MDS.      Referring Physician:  CONNIE DAS CNP    PCP:  Roselia Moralez APRN - CNP    History of Present Illness:      The patient is a very pleasant 83 y.o. lady with a PMH significant for Right Breast Cancer, stage III, HR pos, was diagnosed in 2009, she had a right mastectomy done, followed by adjuvant chemo, she received 8 cycles, probably AC followed by T, then PMRT, and 5 years of adjuvant ET with Arimidex, was completed in 2015. She was treated in [place] under the care of Dr. Simpson. She was diagnosed with MDS in 2017, she received ESAs and PRBCs transfusion. She has transfusion related iron overload. She was started on Aranesp on 4/24/2020.  No SE from Aranesp.     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  The patient had a follow-up with Dr. Melendez, he recommended conservative medical management with DAPT.      The patient returns for a follow-up visit, she is feeling better overall, no bleeding, has easy bruising, feeling tired    Review of Systems;  CONSTITUTIONAL: No fever, chills. Fair appetite.  Positive for fatigue.  ENMT: Eyes: No diplopia; Nose: No epistaxis. Mouth: No sore throat.  RESPIRATORY: No hemoptysis, shortness of breath, cough.   CARDIOVASCULAR: No chest pain, palpitations.  GASTROINTESTINAL: No nausea/vomiting, abdominal pain, diarrhea/constipation.  GENITOURINARY: No dysuria, urinary

## 2024-05-22 LAB
6-MAM, QUANTITATIVE, URINE: <10 NG/ML
7-AMINOCLONAZEPAM, QUANTITATIVE, URINE: <50 NG/ML
ALPHA-HYDROXYALPRAZOLAM, QUANTITATIVE, URINE: <50 NG/ML
ALPHA-HYDROXYMIDAZOLAM, QUANTITATIVE, URINE: <50 NG/ML
ALPHA-HYDROXYTRIAZOLAM, QUANTITATIVE, URINE: <50 NG/ML
ALPRAZOLAM URINE QUANT: <50 NG/ML
CHLORDIAZEPOXIDE, QUANTITATIVE, URINE: <50 NG/ML
CLONAZEPAM, QUANTITATIVE, URINE: <50 NG/ML
CODEINE, QUANTITATIVE, URINE: <50 NG/ML
COMPLIANCE DRUG ANALYSIS, URINE: NORMAL
DIAZEPAM URINE QUANT: <50 NG/ML
FLUNITRAZEPAM, QUANTITATIVE, URINE: <50 NG/ML
FLURAZEPAM, QUANTITATIVE, URINE: <50 NG/ML
HYDROCODONE, QUANTITATIVE, URINE: <50 NG/ML
HYDROMORPHONE, QUANTITATIVE, URINE: <50 NG/ML
LORAZEPAM URINE QUANT: <50 NG/ML
MIDAZOLAM URINE QUANT: <50 NG/ML
MORPHINE, QUANTITATIVE, URINE: <50 NG/ML
NORDIAZEPAM URINE QUANT: <50 NG/ML
NORHYDROCODONE, QUANTITATIVE, URINE: <50 NG/ML
NOROXYCODONE, QUANTITATIVE, URINE: <50 NG/ML
OXAZEPAM URINE QUANT: <50 NG/ML
OXYCODONE URINE, QUANTITATIVE: <50 NG/ML
OXYMORPHONE, QUANTITATIVE, URINE: <50 NG/ML
TEMAZEPAM, QUANTITATIVE, URINE: <50 NG/ML

## 2024-05-26 PROBLEM — G45.0 VBI (VERTEBROBASILAR INSUFFICIENCY): Status: ACTIVE | Noted: 2024-05-26

## 2024-05-26 PROBLEM — I67.7: Status: ACTIVE | Noted: 2024-05-26

## 2024-06-04 ENCOUNTER — OFFICE VISIT (OUTPATIENT)
Dept: ONCOLOGY | Age: 84
End: 2024-06-04
Payer: MEDICARE

## 2024-06-04 ENCOUNTER — HOSPITAL ENCOUNTER (OUTPATIENT)
Dept: INFUSION THERAPY | Age: 84
Discharge: HOME | End: 2024-06-04
Payer: MEDICARE

## 2024-06-04 VITALS
HEART RATE: 72 BPM | WEIGHT: 120 LBS | TEMPERATURE: 97.8 F | HEIGHT: 61 IN | SYSTOLIC BLOOD PRESSURE: 150 MMHG | OXYGEN SATURATION: 97 % | DIASTOLIC BLOOD PRESSURE: 66 MMHG | BODY MASS INDEX: 22.66 KG/M2

## 2024-06-04 DIAGNOSIS — D63.8 ANEMIA OF CHRONIC DISEASE: Primary | ICD-10-CM

## 2024-06-04 DIAGNOSIS — Z17.0 MALIGNANT NEOPLASM OF RIGHT BREAST IN FEMALE, ESTROGEN RECEPTOR POSITIVE, UNSPECIFIED SITE OF BREAST (HCC): ICD-10-CM

## 2024-06-04 DIAGNOSIS — D46.9 MDS (MYELODYSPLASTIC SYNDROME) (HCC): ICD-10-CM

## 2024-06-04 DIAGNOSIS — C50.911 MALIGNANT NEOPLASM OF RIGHT BREAST IN FEMALE, ESTROGEN RECEPTOR POSITIVE, UNSPECIFIED SITE OF BREAST (HCC): ICD-10-CM

## 2024-06-04 DIAGNOSIS — D46.9 MDS (MYELODYSPLASTIC SYNDROME) (HCC): Primary | ICD-10-CM

## 2024-06-04 LAB
BASOPHILS # BLD: 0.02 K/UL (ref 0–0.2)
BASOPHILS NFR BLD: 0 % (ref 0–2)
EOSINOPHIL # BLD: 0.1 K/UL (ref 0.05–0.5)
EOSINOPHILS RELATIVE PERCENT: 2 % (ref 0–6)
ERYTHROCYTE [DISTWIDTH] IN BLOOD BY AUTOMATED COUNT: 29.5 % (ref 11.5–15)
HCT VFR BLD AUTO: 29.1 % (ref 34–48)
HGB BLD-MCNC: 9.2 G/DL (ref 11.5–15.5)
IMM GRANULOCYTES # BLD AUTO: 0.03 K/UL (ref 0–0.58)
IMM GRANULOCYTES NFR BLD: 1 % (ref 0–5)
LYMPHOCYTES NFR BLD: 2.05 K/UL (ref 1.5–4)
LYMPHOCYTES RELATIVE PERCENT: 45 % (ref 20–42)
MCH RBC QN AUTO: 37.2 PG (ref 26–35)
MCHC RBC AUTO-ENTMCNC: 31.6 G/DL (ref 32–34.5)
MCV RBC AUTO: 117.8 FL (ref 80–99.9)
MONOCYTES NFR BLD: 0.29 K/UL (ref 0.1–0.95)
MONOCYTES NFR BLD: 6 % (ref 2–12)
NEUTROPHILS NFR BLD: 46 % (ref 43–80)
NEUTS SEG NFR BLD: 2.12 K/UL (ref 1.8–7.3)
PLATELET # BLD AUTO: 149 K/UL (ref 130–450)
PMV BLD AUTO: 10.9 FL (ref 7–12)
RBC # BLD AUTO: 2.47 M/UL (ref 3.5–5.5)
RBC # BLD: ABNORMAL 10*6/UL
WBC OTHER # BLD: 4.6 K/UL (ref 4.5–11.5)

## 2024-06-04 PROCEDURE — 6360000002 HC RX W HCPCS: Performed by: INTERNAL MEDICINE

## 2024-06-04 PROCEDURE — 96372 THER/PROPH/DIAG INJ SC/IM: CPT

## 2024-06-04 PROCEDURE — 1123F ACP DISCUSS/DSCN MKR DOCD: CPT | Performed by: INTERNAL MEDICINE

## 2024-06-04 PROCEDURE — 85025 COMPLETE CBC W/AUTO DIFF WBC: CPT

## 2024-06-04 PROCEDURE — 99212 OFFICE O/P EST SF 10 MIN: CPT

## 2024-06-04 PROCEDURE — 99214 OFFICE O/P EST MOD 30 MIN: CPT | Performed by: INTERNAL MEDICINE

## 2024-06-04 PROCEDURE — 36415 COLL VENOUS BLD VENIPUNCTURE: CPT

## 2024-06-04 RX ADMIN — DARBEPOETIN ALFA 500 MCG: 500 INJECTION, SOLUTION INTRAVENOUS; SUBCUTANEOUS at 11:02

## 2024-06-04 NOTE — PROGRESS NOTES
[facility] PHYSICIANS [facility] MED ONCOLOGY  [address]  [facility] [address]  Dept: [phone]  Loc: [phone]  Attending Progress Note      Reason for Visit:   1. Right Breast Cancer.                                   2. MDS.      Referring Physician:  CONNIE DAS CNP    PCP:  Roselia Moralez APRN - CNP    History of Present Illness:      The patient is a very pleasant 83 y.o. lady with a PMH significant for Right Breast Cancer, stage III, HR pos, was diagnosed in 2009, she had a right mastectomy done, followed by adjuvant chemo, she received 8 cycles, probably AC followed by T, then PMRT, and 5 years of adjuvant ET with Arimidex, was completed in 2015. She was treated in [place] under the care of Dr. Simpson. She was diagnosed with MDS in 2017, she received ESAs and PRBCs transfusion. She has transfusion related iron overload. She was started on Aranesp on 4/24/2020.  No SE from Aranesp.     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  The patient had a follow-up with Dr. Melendez, he recommended conservative medical management with DAPT.      The patient returns for a follow-up visit, she is feeling better overall, no bleeding, has easy bruising, feeling tired.  No side effects from the injections.    Review of Systems;  CONSTITUTIONAL: No fever, chills. Fair appetite.  Positive for fatigue.  ENMT: Eyes: No diplopia; Nose: No epistaxis. Mouth: No sore throat.  RESPIRATORY: No hemoptysis, shortness of breath, cough.   CARDIOVASCULAR: No chest pain, palpitations.  GASTROINTESTINAL: No nausea/vomiting, abdominal pain,

## 2024-06-20 DIAGNOSIS — Z51.5 PALLIATIVE CARE BY SPECIALIST: ICD-10-CM

## 2024-06-20 DIAGNOSIS — C50.919 MALIGNANT NEOPLASM OF FEMALE BREAST, UNSPECIFIED ESTROGEN RECEPTOR STATUS, UNSPECIFIED LATERALITY, UNSPECIFIED SITE OF BREAST (HCC): ICD-10-CM

## 2024-06-20 DIAGNOSIS — G89.3 PAIN DUE TO NEOPLASM: ICD-10-CM

## 2024-06-20 RX ORDER — HYDROCODONE BITARTRATE AND ACETAMINOPHEN 5; 325 MG/1; MG/1
1 TABLET ORAL EVERY 6 HOURS PRN
Qty: 120 TABLET | Refills: 0 | Status: SHIPPED | OUTPATIENT
Start: 2024-06-20 | End: 2024-07-20

## 2024-06-20 NOTE — TELEPHONE ENCOUNTER
Call from Sabra , Patricia's daughter, asking for resources for her mom as she has some concerns about her. Patricia has an appointment scheduled net week 6/25 that Sabra plans to attend. Sabra also requests a refill for Patricia's [place]. [facility].

## 2024-06-24 PROBLEM — D46.A: Status: ACTIVE | Noted: 2024-06-24

## 2024-06-25 ENCOUNTER — OFFICE VISIT (OUTPATIENT)
Dept: ONCOLOGY | Age: 84
End: 2024-06-25
Payer: MEDICARE

## 2024-06-25 ENCOUNTER — HOSPITAL ENCOUNTER (OUTPATIENT)
Dept: INFUSION THERAPY | Age: 84
Discharge: HOME | End: 2024-06-25
Payer: MEDICARE

## 2024-06-25 ENCOUNTER — OFFICE VISIT (OUTPATIENT)
Dept: PALLATIVE CARE | Age: 84
End: 2024-06-25
Payer: MEDICARE

## 2024-06-25 VITALS
OXYGEN SATURATION: 99 % | BODY MASS INDEX: 22.86 KG/M2 | WEIGHT: 121.1 LBS | DIASTOLIC BLOOD PRESSURE: 75 MMHG | HEIGHT: 61 IN | TEMPERATURE: 97.4 F | SYSTOLIC BLOOD PRESSURE: 120 MMHG | HEART RATE: 78 BPM

## 2024-06-25 VITALS
OXYGEN SATURATION: 99 % | TEMPERATURE: 96.8 F | DIASTOLIC BLOOD PRESSURE: 57 MMHG | SYSTOLIC BLOOD PRESSURE: 135 MMHG | BODY MASS INDEX: 22.86 KG/M2 | HEART RATE: 74 BPM | WEIGHT: 121 LBS

## 2024-06-25 DIAGNOSIS — D46.9 MDS (MYELODYSPLASTIC SYNDROME) (HCC): ICD-10-CM

## 2024-06-25 DIAGNOSIS — D46.A MYELODYSPLASTIC SYNDROME WITH MULTILINEAGE DYSPLASIA (HCC): Primary | ICD-10-CM

## 2024-06-25 DIAGNOSIS — C50.911 MALIGNANT NEOPLASM OF RIGHT BREAST IN FEMALE, ESTROGEN RECEPTOR POSITIVE, UNSPECIFIED SITE OF BREAST (HCC): ICD-10-CM

## 2024-06-25 DIAGNOSIS — I65.01 STENOSIS OF RIGHT VERTEBRAL ARTERY: Primary | ICD-10-CM

## 2024-06-25 DIAGNOSIS — D63.8 ANEMIA OF CHRONIC DISEASE: ICD-10-CM

## 2024-06-25 DIAGNOSIS — G89.3 PAIN DUE TO NEOPLASM: ICD-10-CM

## 2024-06-25 DIAGNOSIS — R53.0 NEOPLASTIC MALIGNANT RELATED FATIGUE: ICD-10-CM

## 2024-06-25 DIAGNOSIS — D46.9 MDS (MYELODYSPLASTIC SYNDROME) (HCC): Primary | ICD-10-CM

## 2024-06-25 DIAGNOSIS — Z17.0 MALIGNANT NEOPLASM OF RIGHT BREAST IN FEMALE, ESTROGEN RECEPTOR POSITIVE, UNSPECIFIED SITE OF BREAST (HCC): ICD-10-CM

## 2024-06-25 DIAGNOSIS — Z51.5 PALLIATIVE CARE BY SPECIALIST: Primary | ICD-10-CM

## 2024-06-25 LAB
BASOPHILS # BLD: 0.04 K/UL (ref 0–0.2)
BASOPHILS NFR BLD: 1 % (ref 0–2)
EOSINOPHIL # BLD: 0.15 K/UL (ref 0.05–0.5)
EOSINOPHILS RELATIVE PERCENT: 3 % (ref 0–6)
ERYTHROCYTE [DISTWIDTH] IN BLOOD BY AUTOMATED COUNT: 27.7 % (ref 11.5–15)
HCT VFR BLD AUTO: 27.2 % (ref 34–48)
HGB BLD-MCNC: 9 G/DL (ref 11.5–15.5)
IMM GRANULOCYTES # BLD AUTO: <0.03 K/UL (ref 0–0.58)
IMM GRANULOCYTES NFR BLD: 0 % (ref 0–5)
LYMPHOCYTES NFR BLD: 2.86 K/UL (ref 1.5–4)
LYMPHOCYTES RELATIVE PERCENT: 54 % (ref 20–42)
MCH RBC QN AUTO: 38.3 PG (ref 26–35)
MCHC RBC AUTO-ENTMCNC: 33.1 G/DL (ref 32–34.5)
MCV RBC AUTO: 115.7 FL (ref 80–99.9)
MONOCYTES NFR BLD: 0.31 K/UL (ref 0.1–0.95)
MONOCYTES NFR BLD: 6 % (ref 2–12)
NEUTROPHILS NFR BLD: 37 % (ref 43–80)
NEUTS SEG NFR BLD: 1.98 K/UL (ref 1.8–7.3)
PLATELET # BLD AUTO: 173 K/UL (ref 130–450)
PMV BLD AUTO: 11.1 FL (ref 7–12)
RBC # BLD AUTO: 2.35 M/UL (ref 3.5–5.5)
RBC # BLD: ABNORMAL 10*6/UL
WBC OTHER # BLD: 5.4 K/UL (ref 4.5–11.5)

## 2024-06-25 PROCEDURE — 1123F ACP DISCUSS/DSCN MKR DOCD: CPT | Performed by: INTERNAL MEDICINE

## 2024-06-25 PROCEDURE — 1123F ACP DISCUSS/DSCN MKR DOCD: CPT | Performed by: NURSE PRACTITIONER

## 2024-06-25 PROCEDURE — 99214 OFFICE O/P EST MOD 30 MIN: CPT | Performed by: INTERNAL MEDICINE

## 2024-06-25 PROCEDURE — 85025 COMPLETE CBC W/AUTO DIFF WBC: CPT

## 2024-06-25 PROCEDURE — 99213 OFFICE O/P EST LOW 20 MIN: CPT | Performed by: NURSE PRACTITIONER

## 2024-06-25 PROCEDURE — 36415 COLL VENOUS BLD VENIPUNCTURE: CPT

## 2024-06-25 PROCEDURE — 96372 THER/PROPH/DIAG INJ SC/IM: CPT

## 2024-06-25 PROCEDURE — 99211 OFF/OP EST MAY X REQ PHY/QHP: CPT | Performed by: NURSE PRACTITIONER

## 2024-06-25 PROCEDURE — 99212 OFFICE O/P EST SF 10 MIN: CPT

## 2024-06-25 PROCEDURE — 6360000002 HC RX W HCPCS: Performed by: INTERNAL MEDICINE

## 2024-06-25 RX ADMIN — DARBEPOETIN ALFA 500 MCG: 500 INJECTION, SOLUTION INTRAVENOUS; SUBCUTANEOUS at 10:43

## 2024-06-25 NOTE — PROGRESS NOTES
Patient provided with discharge instructions, received printed AVS.  All questions answered.  Patient understands follow up plan of care.     
oriented x 3, not in acute distress.  HEENT: PERRLA; EOMI. Oropharynx clear.  .  NECK: Supple.  No palpable lymphadenopathy.  LUNGS: Good air entry bilaterally. No wheezing, crackles or rhonchi.   BREASTS: She is s/p right mastectomy, she has right chest wall ulceration, has slightly increased in size since her last visit, no drainage, she has telangiectasias.    CARDIOVASCULAR: Regular rate. No murmurs, rubs or gallops.   ABDOMEN: Soft. Non-tender, non-distended. Positive bowel sounds.  B ACK: The patient has ecchymosis/bruising on the right back  EXTREMITIES: Dorsum of the foot bruising  NEUROLOGIC: No focal deficits.   ECOG PS 1      Impression/Plan:     1. The patient is a very pleasant 83 y.o. lady with a PMH significant for Right Breast Cancer, stage III, HR pos, was diagnosed in 2009, she had a right mastectomy done, followed by adjuvant chemo, she received 8 cycles, probably AC followed by T, then PMRT, and 5 years of adjuvant ET with Arimidex, was completed in 2015. She was treated in [place] under the care of Dr. Simpson. She is doing well clinically without any evidence of recurrence of her disease. Discussed with her the importance of monthly SBE, and routine screening mammograms, she had a repeat left breast screening mammogram done on 11/7/2023, was reviewed, was negative for malignancy.  The patient has a right chest wall ulcer, referral was placed to [facility], the patient was seen by Dr. Duque, she had a biopsy done on 8/16/2023, pathology results reviewed, they are consistent with changes suggestive of radiation effect, chronic radiodermatitis.     2. She has MDS, diagnosed in 2017, she received ESAs and PRBCs transfusion, has transfusion related iron overload, hgb was 8.5, ferritin 3647, was restarted on Aranesp on 4/23/2019.     On 7/20/2021, hemoglobin was 8 g/dl hematocrit 24.7,  1.3, normal white count, platelet count 138K, fit test is negative.  The patient did not have an adequate response

## 2024-06-25 NOTE — PROGRESS NOTES
Department of Palliative Medicine  Ambulatory Note  Provider: CONNIE Fortune - CNP       Chief Complaint: Patricia Ventura is a 83 y.o. female with chief complaint of Pain      Assessment/Plan      History of Breast Cancer stage III, HR positive:              -Diagnosed, treated, and managed in [place] in 2009              -Status post right mastectomy              -Followed by adjuvant chemotherapy              -Completed her Demadex 2015              -Currently followed locally by Dr. Luciano Bernardo, she also follows with regarding MDS diagnosed 2017     Chemotherapy related peripheral Neuropathy/Pain:              -  Norco 5-325 mg every 6 hours as needed     Fatigue/weakness:   -  Encouraged continue physical activity   -  Encouraged to watch intake and use ONS   -  Looking into resources for help at home, to establish at    -  reports improvement in energy, especially after stopping her gabapentin and lipitor    Prognosis: unknown     Follow Up: 1 months.  They were encouraged to call with any questions, concerns, needs, or changes in symptoms.    Subjective:   HPI:  Patricia Ventura is a 79 y.o. female with significant past medical history of stage II HR positive breast cancer, diagnosed in 2009, status post right mastectomy, followed by adjuvant therapy, and 5 years of adjuvant Arimidex which was completed in 2015, with all treatment performed in [place] under the care of Dr. Simpson.  She is currently being followed locally by Dr. Luciano Bernardo, without evidence of recurrent disease, whom she also follows for management of myelodysplastic syndrome, which was diagnosed in 2017.  She was referred to [facility] Palliative Medicine for assistance with symptom management related to chemotherapy-induced peripheral neuropathy.      6/25/24  Patricia present today unaccompanied  She reports that she is doing much better  She has decided to stop some of her medicines and she is feeling better, especially after stopping

## 2024-06-28 ENCOUNTER — TELEPHONE (OUTPATIENT)
Dept: PALLATIVE CARE | Age: 84
End: 2024-06-28

## 2024-06-28 NOTE — TELEPHONE ENCOUNTER
made referral to [facility] on pt behalf following discussion during pt appt with Palliative Care earlier in the week. Provided intake with pt information to contact pt for assessment. No further needs at this time.      [facility]:  Phone:[phone]      Winnie CONWAY,W  Palliative Medicine

## 2024-07-01 ENCOUNTER — ANESTHESIA (OUTPATIENT)
Dept: INTERVENTIONAL RADIOLOGY/VASCULAR | Age: 84
DRG: 301 | End: 2024-07-01
Payer: MEDICARE

## 2024-07-01 ENCOUNTER — CLINICAL DOCUMENTATION (OUTPATIENT)
Dept: INTERVENTIONAL RADIOLOGY/VASCULAR | Age: 84
End: 2024-07-01

## 2024-07-01 ENCOUNTER — ANESTHESIA EVENT (OUTPATIENT)
Dept: INTERVENTIONAL RADIOLOGY/VASCULAR | Age: 84
DRG: 301 | End: 2024-07-01
Payer: MEDICARE

## 2024-07-01 ENCOUNTER — HOSPITAL ENCOUNTER (INPATIENT)
Dept: INTERVENTIONAL RADIOLOGY/VASCULAR | Age: 84
LOS: 1 days | Discharge: HOME | DRG: 301 | End: 2024-07-02
Attending: PSYCHIATRY & NEUROLOGY | Admitting: PSYCHIATRY & NEUROLOGY
Payer: MEDICARE

## 2024-07-01 DIAGNOSIS — I65.01 STENOSIS OF RIGHT VERTEBRAL ARTERY: ICD-10-CM

## 2024-07-01 DIAGNOSIS — G45.0 VBI (VERTEBROBASILAR INSUFFICIENCY): ICD-10-CM

## 2024-07-01 DIAGNOSIS — I77.1 STRICTURE OF ARTERY (HCC): ICD-10-CM

## 2024-07-01 DIAGNOSIS — I67.7: ICD-10-CM

## 2024-07-01 PROBLEM — I77.74 VERTEBRAL ARTERY DISSECTION (HCC): Status: ACTIVE | Noted: 2024-07-01

## 2024-07-01 PROBLEM — I77.74 DISSECTION OF EXTRACRANIAL VERTEBRAL ARTERY (HCC): Status: ACTIVE | Noted: 2024-07-01

## 2024-07-01 LAB
ANION GAP SERPL CALCULATED.3IONS-SCNC: 12 MMOL/L (ref 7–16)
BUN SERPL-MCNC: 16 MG/DL (ref 6–23)
CALCIUM SERPL-MCNC: 8.7 MG/DL (ref 8.6–10.2)
CHLORIDE SERPL-SCNC: 102 MMOL/L (ref 98–107)
CLOSURE TME COLL+ADP BLD: 140 SEC (ref 48–103)
CO2 SERPL-SCNC: 23 MMOL/L (ref 22–29)
COLLAGEN EPINEPHRINE TIME: ABNORMAL SEC (ref 83–176)
CREAT SERPL-MCNC: 0.8 MG/DL (ref 0.5–1)
ERYTHROCYTE [DISTWIDTH] IN BLOOD BY AUTOMATED COUNT: 28.2 % (ref 11.5–15)
GFR, ESTIMATED: 71 ML/MIN/1.73M2
GLUCOSE SERPL-MCNC: 104 MG/DL (ref 74–99)
HCT VFR BLD AUTO: 24.6 % (ref 34–48)
HGB BLD-MCNC: 8.1 G/DL (ref 11.5–15.5)
INR PPP: 1.2
MAGNESIUM SERPL-MCNC: 2.2 MG/DL (ref 1.6–2.6)
MCH RBC QN AUTO: 38.4 PG (ref 26–35)
MCHC RBC AUTO-ENTMCNC: 32.9 G/DL (ref 32–34.5)
MCV RBC AUTO: 116.6 FL (ref 80–99.9)
PARTIAL THROMBOPLASTIN TIME: 30.5 SEC (ref 24.5–35.1)
PLATELET # BLD AUTO: 183 K/UL (ref 130–450)
PMV BLD AUTO: 12.6 FL (ref 7–12)
POTASSIUM SERPL-SCNC: 4.9 MMOL/L (ref 3.5–5)
PROTHROMBIN TIME: 13.6 SEC (ref 9.3–12.4)
RBC # BLD AUTO: 2.11 M/UL (ref 3.5–5.5)
SODIUM SERPL-SCNC: 137 MMOL/L (ref 132–146)
WBC OTHER # BLD: 3.9 K/UL (ref 4.5–11.5)

## 2024-07-01 PROCEDURE — 85610 PROTHROMBIN TIME: CPT

## 2024-07-01 PROCEDURE — 6360000004 HC RX CONTRAST MEDICATION: Performed by: PSYCHIATRY & NEUROLOGY

## 2024-07-01 PROCEDURE — 7100000001 HC PACU RECOVERY - ADDTL 15 MIN

## 2024-07-01 PROCEDURE — 83735 ASSAY OF MAGNESIUM: CPT

## 2024-07-01 PROCEDURE — 2500000003 HC RX 250 WO HCPCS: Performed by: PSYCHIATRY & NEUROLOGY

## 2024-07-01 PROCEDURE — 85027 COMPLETE CBC AUTOMATED: CPT

## 2024-07-01 PROCEDURE — 2580000003 HC RX 258: Performed by: NURSE ANESTHETIST, CERTIFIED REGISTERED

## 2024-07-01 PROCEDURE — 6370000000 HC RX 637 (ALT 250 FOR IP): Performed by: PSYCHIATRY & NEUROLOGY

## 2024-07-01 PROCEDURE — 85576 BLOOD PLATELET AGGREGATION: CPT

## 2024-07-01 PROCEDURE — 51798 US URINE CAPACITY MEASURE: CPT

## 2024-07-01 PROCEDURE — 80048 BASIC METABOLIC PNL TOTAL CA: CPT

## 2024-07-01 PROCEDURE — 85730 THROMBOPLASTIN TIME PARTIAL: CPT

## 2024-07-01 PROCEDURE — 2700000000 HC OXYGEN THERAPY PER DAY

## 2024-07-01 PROCEDURE — G0378 HOSPITAL OBSERVATION PER HR: HCPCS

## 2024-07-01 PROCEDURE — 6360000002 HC RX W HCPCS: Performed by: NURSE ANESTHETIST, CERTIFIED REGISTERED

## 2024-07-01 PROCEDURE — 85347 COAGULATION TIME ACTIVATED: CPT

## 2024-07-01 PROCEDURE — 37799 UNLISTED PX VASCULAR SURGERY: CPT

## 2024-07-01 PROCEDURE — 6360000002 HC RX W HCPCS: Performed by: PSYCHIATRY & NEUROLOGY

## 2024-07-01 PROCEDURE — 7100000000 HC PACU RECOVERY - FIRST 15 MIN

## 2024-07-01 PROCEDURE — C1769 GUIDE WIRE: HCPCS

## 2024-07-01 PROCEDURE — 0075T PERQ STENT/CHEST VERT ART: CPT | Performed by: PSYCHIATRY & NEUROLOGY

## 2024-07-01 PROCEDURE — 2580000003 HC RX 258: Performed by: PSYCHIATRY & NEUROLOGY

## 2024-07-01 PROCEDURE — G0379 DIRECT REFER HOSPITAL OBSERV: HCPCS

## 2024-07-01 RX ORDER — FENTANYL CITRATE 50 UG/ML
INJECTION, SOLUTION INTRAMUSCULAR; INTRAVENOUS PRN
Status: DISCONTINUED | OUTPATIENT
Start: 2024-07-01 | End: 2024-07-01 | Stop reason: HOSPADM

## 2024-07-01 RX ORDER — ONDANSETRON 4 MG/1
4 TABLET, ORALLY DISINTEGRATING ORAL EVERY 8 HOURS PRN
Status: DISCONTINUED | OUTPATIENT
Start: 2024-07-01 | End: 2024-07-02 | Stop reason: HOSPADM

## 2024-07-01 RX ORDER — PROPOFOL 10 MG/ML
INJECTION, EMULSION INTRAVENOUS CONTINUOUS PRN
Status: DISCONTINUED | OUTPATIENT
Start: 2024-07-01 | End: 2024-07-01 | Stop reason: HOSPADM

## 2024-07-01 RX ORDER — LEVOTHYROXINE SODIUM 0.15 MG/1
150 TABLET ORAL DAILY
Status: DISCONTINUED | OUTPATIENT
Start: 2024-07-02 | End: 2024-07-02 | Stop reason: HOSPADM

## 2024-07-01 RX ORDER — LABETALOL HYDROCHLORIDE 5 MG/ML
5 INJECTION, SOLUTION INTRAVENOUS EVERY 10 MIN PRN
Status: DISCONTINUED | OUTPATIENT
Start: 2024-07-01 | End: 2024-07-02 | Stop reason: HOSPADM

## 2024-07-01 RX ORDER — CLOPIDOGREL BISULFATE 75 MG/1
75 TABLET ORAL DAILY
Status: DISCONTINUED | OUTPATIENT
Start: 2024-07-01 | End: 2024-07-02 | Stop reason: HOSPADM

## 2024-07-01 RX ORDER — HYDRALAZINE HYDROCHLORIDE 20 MG/ML
5 INJECTION INTRAMUSCULAR; INTRAVENOUS EVERY 10 MIN PRN
Status: DISCONTINUED | OUTPATIENT
Start: 2024-07-01 | End: 2024-07-02 | Stop reason: HOSPADM

## 2024-07-01 RX ORDER — SODIUM CHLORIDE 0.9 % (FLUSH) 0.9 %
5-40 SYRINGE (ML) INJECTION PRN
Status: DISCONTINUED | OUTPATIENT
Start: 2024-07-01 | End: 2024-07-02 | Stop reason: HOSPADM

## 2024-07-01 RX ORDER — HEPARIN SODIUM 1000 [USP'U]/ML
INJECTION, SOLUTION INTRAVENOUS; SUBCUTANEOUS PRN
Status: DISCONTINUED | OUTPATIENT
Start: 2024-07-01 | End: 2024-07-01 | Stop reason: HOSPADM

## 2024-07-01 RX ORDER — HYDROCODONE BITARTRATE AND ACETAMINOPHEN 5; 325 MG/1; MG/1
1 TABLET ORAL EVERY 6 HOURS PRN
Status: DISCONTINUED | OUTPATIENT
Start: 2024-07-01 | End: 2024-07-02 | Stop reason: HOSPADM

## 2024-07-01 RX ORDER — GLYCOPYRROLATE 1 MG/5 ML
SYRINGE (ML) INTRAVENOUS PRN
Status: DISCONTINUED | OUTPATIENT
Start: 2024-07-01 | End: 2024-07-01 | Stop reason: HOSPADM

## 2024-07-01 RX ORDER — BISACODYL 5 MG/1
5 TABLET, DELAYED RELEASE ORAL DAILY PRN
Status: DISCONTINUED | OUTPATIENT
Start: 2024-07-01 | End: 2024-07-02 | Stop reason: HOSPADM

## 2024-07-01 RX ORDER — ONDANSETRON 2 MG/ML
4 INJECTION INTRAMUSCULAR; INTRAVENOUS EVERY 6 HOURS PRN
Status: DISCONTINUED | OUTPATIENT
Start: 2024-07-01 | End: 2024-07-02 | Stop reason: HOSPADM

## 2024-07-01 RX ORDER — ACETAMINOPHEN 325 MG/1
650 TABLET ORAL EVERY 4 HOURS PRN
Status: DISCONTINUED | OUTPATIENT
Start: 2024-07-01 | End: 2024-07-02 | Stop reason: HOSPADM

## 2024-07-01 RX ORDER — ONDANSETRON 2 MG/ML
INJECTION INTRAMUSCULAR; INTRAVENOUS PRN
Status: DISCONTINUED | OUTPATIENT
Start: 2024-07-01 | End: 2024-07-01 | Stop reason: HOSPADM

## 2024-07-01 RX ORDER — FERROUS SULFATE 300 MG/5ML
300 LIQUID (ML) ORAL 2 TIMES DAILY
Status: DISCONTINUED | OUTPATIENT
Start: 2024-07-01 | End: 2024-07-02 | Stop reason: HOSPADM

## 2024-07-01 RX ORDER — VERAPAMIL HYDROCHLORIDE 2.5 MG/ML
INJECTION, SOLUTION INTRAVENOUS PRN
Status: COMPLETED | OUTPATIENT
Start: 2024-07-01 | End: 2024-07-01

## 2024-07-01 RX ORDER — SODIUM CHLORIDE 0.9 % (FLUSH) 0.9 %
5-40 SYRINGE (ML) INJECTION EVERY 12 HOURS SCHEDULED
Status: DISCONTINUED | OUTPATIENT
Start: 2024-07-01 | End: 2024-07-02 | Stop reason: HOSPADM

## 2024-07-01 RX ORDER — HEPARIN SODIUM 10000 [USP'U]/ML
INJECTION, SOLUTION INTRAVENOUS; SUBCUTANEOUS PRN
Status: COMPLETED | OUTPATIENT
Start: 2024-07-01 | End: 2024-07-01

## 2024-07-01 RX ORDER — SODIUM CHLORIDE 9 MG/ML
INJECTION, SOLUTION INTRAVENOUS CONTINUOUS PRN
Status: DISCONTINUED | OUTPATIENT
Start: 2024-07-01 | End: 2024-07-01 | Stop reason: HOSPADM

## 2024-07-01 RX ORDER — MULTIVIT WITH MINERALS/LUTEIN
250 TABLET ORAL 2 TIMES DAILY
Status: DISCONTINUED | OUTPATIENT
Start: 2024-07-01 | End: 2024-07-02 | Stop reason: HOSPADM

## 2024-07-01 RX ORDER — SODIUM CHLORIDE 9 MG/ML
INJECTION, SOLUTION INTRAVENOUS PRN
Status: DISCONTINUED | OUTPATIENT
Start: 2024-07-01 | End: 2024-07-02 | Stop reason: HOSPADM

## 2024-07-01 RX ORDER — ASPIRIN 81 MG/1
81 TABLET ORAL DAILY
Status: DISCONTINUED | OUTPATIENT
Start: 2024-07-01 | End: 2024-07-02 | Stop reason: HOSPADM

## 2024-07-01 RX ADMIN — PHENYLEPHRINE HYDROCHLORIDE 200 MCG: 10 INJECTION INTRAVENOUS at 11:01

## 2024-07-01 RX ADMIN — SODIUM CHLORIDE: 9 INJECTION, SOLUTION INTRAVENOUS at 10:20

## 2024-07-01 RX ADMIN — ONDANSETRON HYDROCHLORIDE 4 MG: 2 SOLUTION INTRAMUSCULAR; INTRAVENOUS at 10:44

## 2024-07-01 RX ADMIN — SODIUM CHLORIDE, PRESERVATIVE FREE 10 ML: 5 INJECTION INTRAVENOUS at 20:19

## 2024-07-01 RX ADMIN — FENTANYL CITRATE 50 MCG: 50 INJECTION, SOLUTION INTRAMUSCULAR; INTRAVENOUS at 10:33

## 2024-07-01 RX ADMIN — FENTANYL CITRATE 25 MCG: 50 INJECTION, SOLUTION INTRAMUSCULAR; INTRAVENOUS at 10:47

## 2024-07-01 RX ADMIN — ASPIRIN 81 MG: 81 TABLET, COATED ORAL at 15:48

## 2024-07-01 RX ADMIN — Medication 0.2 MG: at 11:03

## 2024-07-01 RX ADMIN — HEPARIN SODIUM 500 UNITS: 1000 INJECTION INTRAVENOUS; SUBCUTANEOUS at 11:02

## 2024-07-01 RX ADMIN — Medication 5000 UNITS: at 10:42

## 2024-07-01 RX ADMIN — Medication 1000 ML: at 10:42

## 2024-07-01 RX ADMIN — PROPOFOL 50 MCG/KG/MIN: 10 INJECTION, EMULSION INTRAVENOUS at 10:33

## 2024-07-01 RX ADMIN — PHENYLEPHRINE HYDROCHLORIDE 100 MCG: 10 INJECTION INTRAVENOUS at 10:57

## 2024-07-01 RX ADMIN — SERTRALINE 50 MG: 50 TABLET, FILM COATED ORAL at 20:19

## 2024-07-01 RX ADMIN — IOPAMIDOL 15 ML: 612 INJECTION, SOLUTION INTRAVENOUS at 11:18

## 2024-07-01 RX ADMIN — VERAPAMIL HYDROCHLORIDE 2.5 MG: 2.5 INJECTION, SOLUTION INTRAVENOUS at 11:08

## 2024-07-01 RX ADMIN — PHENYLEPHRINE HYDROCHLORIDE 100 MCG: 10 INJECTION INTRAVENOUS at 10:40

## 2024-07-01 RX ADMIN — Medication 250 MG: at 20:19

## 2024-07-01 RX ADMIN — CLOPIDOGREL BISULFATE 75 MG: 75 TABLET ORAL at 15:48

## 2024-07-01 RX ADMIN — Medication 1000 ML: at 10:43

## 2024-07-01 RX ADMIN — PHENYLEPHRINE HYDROCHLORIDE 100 MCG: 10 INJECTION INTRAVENOUS at 10:45

## 2024-07-01 RX ADMIN — HEPARIN SODIUM: 1000 INJECTION, SOLUTION INTRAVENOUS; SUBCUTANEOUS at 10:43

## 2024-07-01 RX ADMIN — PHENYLEPHRINE HYDROCHLORIDE 100 MCG: 10 INJECTION INTRAVENOUS at 10:51

## 2024-07-01 ASSESSMENT — ENCOUNTER SYMPTOMS: SHORTNESS OF BREATH: 0

## 2024-07-01 ASSESSMENT — LIFESTYLE VARIABLES: SMOKING_STATUS: 0

## 2024-07-01 ASSESSMENT — PAIN - FUNCTIONAL ASSESSMENT: PAIN_FUNCTIONAL_ASSESSMENT: NONE - DENIES PAIN

## 2024-07-01 ASSESSMENT — PAIN SCALES - GENERAL: PAINLEVEL_OUTOF10: 0

## 2024-07-01 NOTE — PROGRESS NOTES
I called patients insurance [employer] and spoke with Authorization Rep Patrice.       Authorization: [id number]  Valid Dates: 6/20/24- 9/17/24

## 2024-07-01 NOTE — ANESTHESIA PRE PROCEDURE
[facility]   7/1/2024        PNB Consent  Benefits, alternatives and risks of PNBs were discussed with patient.  Risks include but are not limited to; bleeding, LAST, infection and possible nerve trauma.  All questions answered.    PNB was recommended and encouraged as part of multi-modal pain therapy.    After consideration;    patient agrees to:   left        Popliteal Sciatic Block/adductor.    for management of post-op pain.      CONNIE Torres - CRNA  Staff Anesthesiologist  July 1, 2024  8:20 AM

## 2024-07-01 NOTE — PROCEDURES
PROCEDURE NOTE  Date: 7/1/2024   Name: Patricia Ventura  YOB: 1940    0725 Patient arrived to Radiology department for vertebral dissection repair. Vital signs taken. IV placed, blood obtained, IV flushed and prn adapter attached. Blood sample sent to lab for ordered tests. Allergies, home medications, medical history, H&P and fasting instructions reviewed with patient. Paper chart reviewed for correct documents.    0826 Procedural instructions given, questions answered, understanding expressed and consent signed.    There are no preventive care reminders to display for this patient.    Patient is up to date, no discussion needed.

## 2024-07-01 NOTE — PROGRESS NOTES
4 Eyes Skin Assessment     NAME:  Patricia Ventura  YOB: 1940  MEDICAL RECORD NUMBER:  [id number]    The patient is being assessed for  Admission    I agree that at least one RN has performed a thorough Head to Toe Skin Assessment on the patient. ALL assessment sites listed below have been assessed.      Areas assessed by both nurses:    Head, Face, Ears, Shoulders, Back, Chest, Arms, Elbows, Hands, Sacrum. Buttock, Coccyx, Ischium, Legs. Feet and Heels, and Under Medical Devices         Scattered ecchymosis BUE/BLE  Varicosities BLE  Bilateral feet boggy/dry/flaky; red/blanchable  Right breast scar - dry/flaky  Puncture site right radial    Does the Patient have a Wound? No noted wound(s)       Sandip Prevention initiated by RN: Yes  Wound Care Orders initiated by RN: No    Pressure Injury (Stage 3,4, Unstageable, DTI, NWPT, and Complex wounds) if present, place Wound referral order by RN under : No    New Ostomies, if present place, Ostomy referral order under : No     Nurse 1 eSignature: Electronically signed by Amy Wallis RN on 7/1/24 at 4:11 PM EDT    **SHARE this note so that the co-signing nurse can place an eSignature**    Nurse 2 eSignature: Electronically signed by Dez Vega RN on 7/1/24 at 4:14 PM EDT

## 2024-07-01 NOTE — BRIEF OP NOTE
Brief Postoperative Note      Patient: Patricia Ventura  YOB: 1940  MRN: [id number]    Date of Procedure: 2024  Neurointerventional POST Procedure Note    This is a brief post operative note that serves as immediate documentation to communicate with other clinicians and update them about the procedure  For Billing and complete documentation purposes the COMPLETE operative report/diagnostic imaging report is placed in PACS. To review this document please go under the images tab and click on the relevant IR procedure.      Date of Service: 24      Patient Name: Patricia Ventura   : 1940  Medical record number:  [id number]        Procedure: Right Vertebral artery dissection repair      Physician: SCOTT ENAMORADO MD  Assistant:Charisma Pizarro RTR  Access: Right radial   Vessels injected:   Hemostasis: VASC BAND 14 ml   Anesthesia: Please see flowchart  Specimens: None  Blood loss: 30 ml   Contrast Material:  please see dictation in PACS  Fluoro time: Please see dictation in PACS      Diagnosis/Findings:     99% stenosis of the right Vertebral artery V2 segment, appearance suggestive of dissection    S/p Successful Stent assisted repair of the right vertebral artery with 4mm [place] frontier stent and flow reversal with Medela aspiration     Plan:  1. Q1h neuro checks while awake and q4 while asleep to avoid ICU delirum in this 83 year old elective patient  2. Maintain SBP <160  3. Neurovascular checks     6. Antiplatelet Recs ASA + Plavix    Hb 8 transfuse if needed, IRON supplement        Implants:  Implant Name Type Inv. Item Serial No.  Lot No. LRB No. Used Action   STENT CORONARY ANGELA FRONTIER RX [id number] MM ZOTAROLIMUS ELUTE - [id number] Coronary stents STENT CORONARY ANGELA FRONTIER RX 4X26 MM ZOTAROLIMUS ELUTE  [employer] [id number] Right 1 Implanted             Electronically signed by Scott Enamorado MD on 2024 at 12:19 PM

## 2024-07-01 NOTE — PROGRESS NOTES
Patient admitted to NSICU with the following belongings:  Purse, Cell Phone, Cell Phone , Shoes, and Other dress . The following belongings admitted with the patient were kept at bedside with patient.

## 2024-07-02 VITALS
OXYGEN SATURATION: 99 % | DIASTOLIC BLOOD PRESSURE: 46 MMHG | HEART RATE: 67 BPM | BODY MASS INDEX: 22.28 KG/M2 | WEIGHT: 118 LBS | TEMPERATURE: 98.8 F | RESPIRATION RATE: 18 BRPM | HEIGHT: 61 IN | SYSTOLIC BLOOD PRESSURE: 102 MMHG

## 2024-07-02 PROBLEM — Z95.9 S/P ARTERIAL STENT: Status: ACTIVE | Noted: 2024-07-02

## 2024-07-02 LAB
ACTIVATED CLOTTING TIME, LOW RANGE: 196 SEC
ALBUMIN SERPL-MCNC: 3.4 G/DL (ref 3.5–5.2)
ALP SERPL-CCNC: 42 U/L (ref 35–104)
ALT SERPL-CCNC: 31 U/L (ref 0–32)
ANION GAP SERPL CALCULATED.3IONS-SCNC: 8 MMOL/L (ref 7–16)
AST SERPL-CCNC: 30 U/L (ref 0–31)
BASOPHILS # BLD: 0.11 K/UL (ref 0–0.2)
BASOPHILS NFR BLD: 3 % (ref 0–2)
BILIRUB SERPL-MCNC: 0.7 MG/DL (ref 0–1.2)
BUN SERPL-MCNC: 15 MG/DL (ref 6–23)
CALCIUM SERPL-MCNC: 8.3 MG/DL (ref 8.6–10.2)
CHLORIDE SERPL-SCNC: 106 MMOL/L (ref 98–107)
CO2 SERPL-SCNC: 24 MMOL/L (ref 22–29)
CREAT SERPL-MCNC: 1 MG/DL (ref 0.5–1)
EOSINOPHIL # BLD: 0.19 K/UL (ref 0.05–0.5)
EOSINOPHILS RELATIVE PERCENT: 4 % (ref 0–6)
ERYTHROCYTE [DISTWIDTH] IN BLOOD BY AUTOMATED COUNT: 28.6 % (ref 11.5–15)
GFR, ESTIMATED: 56 ML/MIN/1.73M2
GLUCOSE SERPL-MCNC: 93 MG/DL (ref 74–99)
HCT VFR BLD AUTO: 19.9 % (ref 34–48)
HGB BLD-MCNC: 6.6 G/DL (ref 11.5–15.5)
LYMPHOCYTES NFR BLD: 1.32 K/UL (ref 1.5–4)
LYMPHOCYTES RELATIVE PERCENT: 31 % (ref 20–42)
MCH RBC QN AUTO: 38.4 PG (ref 26–35)
MCHC RBC AUTO-ENTMCNC: 33.2 G/DL (ref 32–34.5)
MCV RBC AUTO: 115.7 FL (ref 80–99.9)
MONOCYTES NFR BLD: 0.08 K/UL (ref 0.1–0.95)
MONOCYTES NFR BLD: 2 % (ref 2–12)
MYELOCYTES ABSOLUTE COUNT: 0.04 K/UL
MYELOCYTES: 1 %
NEUTROPHILS NFR BLD: 60 % (ref 43–80)
NEUTS SEG NFR BLD: 2.56 K/UL (ref 1.8–7.3)
NUCLEATED RED BLOOD CELLS: 5 PER 100 WBC
PLATELET # BLD AUTO: 147 K/UL (ref 130–450)
PMV BLD AUTO: 12.7 FL (ref 7–12)
POTASSIUM SERPL-SCNC: 4.4 MMOL/L (ref 3.5–5)
PROT SERPL-MCNC: 5.4 G/DL (ref 6.4–8.3)
RBC # BLD AUTO: 1.72 M/UL (ref 3.5–5.5)
RBC # BLD: ABNORMAL 10*6/UL
SODIUM SERPL-SCNC: 138 MMOL/L (ref 132–146)
WBC OTHER # BLD: 4.3 K/UL (ref 4.5–11.5)

## 2024-07-02 PROCEDURE — 80053 COMPREHEN METABOLIC PANEL: CPT

## 2024-07-02 PROCEDURE — 86922 COMPATIBILITY TEST ANTIGLOB: CPT

## 2024-07-02 PROCEDURE — 86901 BLOOD TYPING SEROLOGIC RH(D): CPT

## 2024-07-02 PROCEDURE — 2580000003 HC RX 258: Performed by: PSYCHIATRY & NEUROLOGY

## 2024-07-02 PROCEDURE — 86850 RBC ANTIBODY SCREEN: CPT

## 2024-07-02 PROCEDURE — 30233N1 TRANSFUSION OF NONAUTOLOGOUS RED BLOOD CELLS INTO PERIPHERAL VEIN, PERCUTANEOUS APPROACH: ICD-10-PCS | Performed by: PSYCHIATRY & NEUROLOGY

## 2024-07-02 PROCEDURE — 86920 COMPATIBILITY TEST SPIN: CPT

## 2024-07-02 PROCEDURE — 36415 COLL VENOUS BLD VENIPUNCTURE: CPT

## 2024-07-02 PROCEDURE — 86870 RBC ANTIBODY IDENTIFICATION: CPT

## 2024-07-02 PROCEDURE — 86900 BLOOD TYPING SEROLOGIC ABO: CPT

## 2024-07-02 PROCEDURE — 36430 TRANSFUSION BLD/BLD COMPNT: CPT

## 2024-07-02 PROCEDURE — 6370000000 HC RX 637 (ALT 250 FOR IP): Performed by: PSYCHIATRY & NEUROLOGY

## 2024-07-02 PROCEDURE — 2000000000 HC ICU R&B

## 2024-07-02 PROCEDURE — P9016 RBC LEUKOCYTES REDUCED: HCPCS

## 2024-07-02 PROCEDURE — 6360000002 HC RX W HCPCS: Performed by: PSYCHIATRY & NEUROLOGY

## 2024-07-02 PROCEDURE — 30233K1 TRANSFUSION OF NONAUTOLOGOUS FROZEN PLASMA INTO PERIPHERAL VEIN, PERCUTANEOUS APPROACH: ICD-10-PCS | Performed by: PSYCHIATRY & NEUROLOGY

## 2024-07-02 PROCEDURE — 85025 COMPLETE CBC W/AUTO DIFF WBC: CPT

## 2024-07-02 PROCEDURE — 037P34Z DILATION OF RIGHT VERTEBRAL ARTERY WITH DRUG-ELUTING INTRALUMINAL DEVICE, PERCUTANEOUS APPROACH: ICD-10-PCS | Performed by: PSYCHIATRY & NEUROLOGY

## 2024-07-02 PROCEDURE — 86880 COOMBS TEST DIRECT: CPT

## 2024-07-02 PROCEDURE — 37799 UNLISTED PX VASCULAR SURGERY: CPT

## 2024-07-02 RX ORDER — SODIUM CHLORIDE 9 MG/ML
INJECTION, SOLUTION INTRAVENOUS PRN
Status: DISCONTINUED | OUTPATIENT
Start: 2024-07-02 | End: 2024-07-02 | Stop reason: HOSPADM

## 2024-07-02 RX ORDER — DEXAMETHASONE SODIUM PHOSPHATE 4 MG/ML
4 INJECTION, SOLUTION INTRA-ARTICULAR; INTRALESIONAL; INTRAMUSCULAR; INTRAVENOUS; SOFT TISSUE SEE ADMIN INSTRUCTIONS
Status: COMPLETED | OUTPATIENT
Start: 2024-07-02 | End: 2024-07-02

## 2024-07-02 RX ORDER — ACETAMINOPHEN 325 MG/1
650 TABLET ORAL SEE ADMIN INSTRUCTIONS
Status: COMPLETED | OUTPATIENT
Start: 2024-07-02 | End: 2024-07-02

## 2024-07-02 RX ADMIN — CLOPIDOGREL BISULFATE 75 MG: 75 TABLET ORAL at 09:00

## 2024-07-02 RX ADMIN — Medication 250 MG: at 11:48

## 2024-07-02 RX ADMIN — Medication 300 MG: at 09:00

## 2024-07-02 RX ADMIN — SODIUM CHLORIDE, PRESERVATIVE FREE 10 ML: 5 INJECTION INTRAVENOUS at 09:00

## 2024-07-02 RX ADMIN — LEVOTHYROXINE SODIUM 150 MCG: 0.15 TABLET ORAL at 06:28

## 2024-07-02 RX ADMIN — ASPIRIN 81 MG: 81 TABLET, COATED ORAL at 09:00

## 2024-07-02 RX ADMIN — DEXAMETHASONE SODIUM PHOSPHATE 4 MG: 4 INJECTION INTRA-ARTICULAR; INTRALESIONAL; INTRAMUSCULAR; INTRAVENOUS; SOFT TISSUE at 14:28

## 2024-07-02 RX ADMIN — ACETAMINOPHEN 650 MG: 325 TABLET ORAL at 14:27

## 2024-07-02 ASSESSMENT — PAIN SCALES - GENERAL
PAINLEVEL_OUTOF10: 0
PAINLEVEL_OUTOF10: 0

## 2024-07-02 NOTE — ACP (ADVANCE CARE PLANNING)
Advance Care Planning   Healthcare Decision Maker:    Primary Decision Maker: Sabra Jarrett - Child - [phone]    Click here to complete Healthcare Decision Makers including selection of the Healthcare Decision Maker Relationship (ie \"Primary\").

## 2024-07-02 NOTE — PROGRESS NOTES
Visited the patient and she stated that she is feeling better and may be discharged today.I prayed for her and assured her of our support if need be.    YULISSA Herrera.PH,B.,[id number]

## 2024-07-02 NOTE — CARE COORDINATION
Patient from home, lives alone. She has 2+5 steps to enter the home with rails, then 1 floor plan. She owns a cane and walker but typically does not use them. PCP is JONO Moralez. No current homecare. She is in the process of being assessed by [facility] for aides in the home. Her daughter and granddaughter check on her often at home and assist as needed. She plans to return home when released. No current homecare and she does not feel it is needed at this time. Daughter will transport when released.     Case Management Assessment  Initial Evaluation    Date/Time of Evaluation: 7/2/2024 11:13 AM  Assessment Completed by: KAVYA Tejeda    If patient is discharged prior to next notation, then this note serves as note for discharge by case management.    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 Yes    Chart Reviewed: Yes      History Provided by: Patient  Patient Orientation: Alert and Oriented    Patient Cognition: Alert    Hospitalization in the last 30 days (Readmission):  No    If yes, Readmission Assessment in  Navigator will be completed.    Advance Directives:      Code Status: Full Code   Patient's Primary Decision Maker is: Legal Next of Kin    Primary Decision Maker: NghiarenettaSabra - Child - [phone]    Discharge Planning:    Patient lives with: Alone Type of Home: House  Primary Care Giver: Self  Patient Support Systems  [I77.74]  Vertebral artery dissection (HCC) [I77.74]    IF APPLICABLE: The Patient and/or patient representative Patricia and her family were provided with a choice of provider and agrees with the discharge plan. Freedom of choice list with basic dialogue that supports the patient's individualized plan of care/goals and shares the quality data associated with the providers was provided to:     Patient Representative Name:       The Patient and/or Patient Representative Agree with the Discharge Plan?      KAVYA Tejeda  Case Management Department  Ph: [phone] Fax:   t

## 2024-07-02 NOTE — CONSENT
Informed Consent for Blood Component Transfusion Note    I have discussed with the patient the rationale for blood component transfusion; its benefits in treating or preventing fatigue, organ damage, or death; and its risk which includes mild transfusion reactions, rare risk of blood borne infection, or more serious but rare reactions. I have discussed the alternatives to transfusion, including the risk and consequences of not receiving transfusion. The patient had an opportunity to ask questions and had agreed to proceed with transfusion of blood components.    Electronically signed by Nii Leon MD on 7/2/24 at 12:12 PM EDT

## 2024-07-02 NOTE — PROGRESS NOTES
Subjective:      Patricia Ventura post op follow up     Patient is clinically stable     Review of Systems  A comprehensive review of systems was negative.      Objective:     Vitals:    07/02/24 1200   BP: (!) 103/42   Pulse: 59   Resp: 18   Temp: 98.9 °F (37.2 °C)   SpO2: 94%         NIH Stroke Scale/Score at time of initial evaluation:    1A: Level of Consciousness 0 - alert; keenly responsive   1B: Ask Month and Age 0 - answers both questions correctly   1C: Tell Patient To Open and Close Eyes, then Hand  Squeeze 0 - performs both tasks correctly   2: Test Horizontal Extraocular Movements 0 - normal   3: Test Visual Fields 0 - no visual loss   4: Test Facial Palsy 0 - normal symmetric movement   5A: Test Left Arm Motor Drift 0 - no drift, limb holds 90 (or 45) degrees for full 10 seconds   5B: Test Right Arm Motor Drift 0 - no drift, limb holds 90 (or 45) degrees for full 10 seconds   6A: Test Left Leg Motor Drift 0 - no drift; leg holds 30 degree position for full 5 seconds   6B: Test Right Leg Motor Drift 0 - no drift; leg holds 30 degree position for full 5 seconds   7: Test Limb Ataxia (FNF/Heel-Shin) 0 - absent   8: Test Sensation 0 - normal; no sensory loss   9: Test Language/Aphasia 0 - no aphasia, normal   10: Test Dysarthria 0 - normal   11: Test Extinction/Inattention 0 - no abnormality   Total 0              Lab Review    HB is 6.6     Assessment:       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    Acute on chronic anemia secondary to blood loss due to aspiration flow reversal during procedure with underlying myelodysplastic syndrome       Plan:       Needs to be transfused  Can d/c after  transfusion     Post Neuro intervention check list, Access site no issues, peripheral extremities no issues    Follow up : 6-8 weeks ( will discuss transitioning to antiplt monotherapy)

## 2024-07-02 NOTE — PLAN OF CARE
Problem: Skin/Tissue Integrity  Goal: Absence of new skin breakdown  Description: 1.  Monitor for areas of redness and/or skin breakdown  2.  Assess vascular access sites hourly  3.  Every 4-6 hours minimum:  Change oxygen saturation probe site  4.  Every 4-6 hours:  If on nasal continuous positive airway pressure, respiratory therapy assess nares and determine need for appliance change or resting period.  7/2/2024 1024 by Melinda Weems RN  Outcome: Progressing  7/1/2024 2100 by Lalo Limon RN  Outcome: Progressing     Problem: Chronic Conditions and Co-morbidities  Goal: Patient's chronic conditions and co-morbidity symptoms are monitored and maintained or improved  7/2/2024 1024 by Melinda Weems RN  Outcome: Progressing  Flowsheets (Taken 7/1/2024 1616 by Amy Wallis, RN)  Care Plan - Patient's Chronic Conditions and Co-Morbidity Symptoms are Monitored and Maintained or Improved:   Monitor and assess patient's chronic conditions and comorbid symptoms for stability, deterioration, or improvement   Collaborate with multidisciplinary team to address chronic and comorbid conditions and prevent exacerbation or deterioration   Update acute care plan with appropriate goals if chronic or comorbid symptoms are exacerbated and prevent overall improvement and discharge  7/1/2024 2100 by Lalo Limon RN  Outcome: Progressing     Problem: ABCDS Injury Assessment  Goal: Absence of physical injury  7/2/2024 1024 by Melinda Weems RN  Outcome: Progressing  Flowsheets (Taken 7/1/2024 1616 by Amy Wallis, RN)  Absence of Physical Injury: Implement safety measures based on patient assessment  7/1/2024 2100 by Lalo Limon RN  Outcome: Progressing     Problem: Safety - Adult  Goal: Free from fall injury  7/2/2024 1024 by Melinda Weems RN  Outcome: Progressing  Flowsheets (Taken 7/1/2024 1616 by Amy Wallis, RN)  Free From Fall Injury:   Instruct family/caregiver on patient safety   Based on caregiver  1616 by Amy Wallis, RN)  Absence of urinary retention:   Assess patient’s ability to void and empty bladder   Monitor intake/output and perform bladder scan as needed     Problem: Hematologic - Adult  Goal: Maintains hematologic stability  Outcome: Progressing  Flowsheets (Taken 7/1/2024 1616 by Amy Wallis, RN)  Maintains hematologic stability:   Assess for signs and symptoms of bleeding or hemorrhage   Administer blood products/factors as ordered   Monitor labs for bleeding or clotting disorders

## 2024-07-03 LAB
ABO/RH: NORMAL
ANTIBODY IDENTIFICATION: NORMAL
ANTIBODY IDENTIFICATION: NORMAL
ANTIBODY SCREEN: POSITIVE
ARM BAND NUMBER: NORMAL
BLOOD BANK BLOOD PRODUCT EXPIRATION DATE: NORMAL
BLOOD BANK COMMENT: NORMAL
BLOOD BANK COMMENT: NORMAL
BLOOD BANK DISPENSE STATUS: NORMAL
BLOOD BANK ISBT PRODUCT BLOOD TYPE: 9500
BLOOD BANK PRODUCT CODE: NORMAL
BLOOD BANK SAMPLE EXPIRATION: NORMAL
BLOOD BANK UNIT TYPE AND RH: NORMAL
BPU ID: NORMAL
COMPONENT: NORMAL
CROSSMATCH RESULT: NORMAL
DAT IGG: NEGATIVE
TRANSFUSION STATUS: NORMAL
UNIT DIVISION: 0
UNIT ISSUE DATE/TIME: NORMAL

## 2024-07-16 ENCOUNTER — OFFICE VISIT (OUTPATIENT)
Dept: ONCOLOGY | Age: 84
End: 2024-07-16
Payer: MEDICARE

## 2024-07-16 ENCOUNTER — HOSPITAL ENCOUNTER (OUTPATIENT)
Dept: INFUSION THERAPY | Age: 84
Discharge: HOME | End: 2024-07-16
Payer: MEDICARE

## 2024-07-16 VITALS
OXYGEN SATURATION: 95 % | SYSTOLIC BLOOD PRESSURE: 124 MMHG | DIASTOLIC BLOOD PRESSURE: 84 MMHG | BODY MASS INDEX: 22.84 KG/M2 | HEIGHT: 61 IN | HEART RATE: 89 BPM | TEMPERATURE: 98 F | WEIGHT: 121 LBS

## 2024-07-16 DIAGNOSIS — D53.9 MACROCYTIC ANEMIA: ICD-10-CM

## 2024-07-16 DIAGNOSIS — D46.9 MDS (MYELODYSPLASTIC SYNDROME) (HCC): ICD-10-CM

## 2024-07-16 DIAGNOSIS — Z17.0 MALIGNANT NEOPLASM OF RIGHT BREAST IN FEMALE, ESTROGEN RECEPTOR POSITIVE, UNSPECIFIED SITE OF BREAST (HCC): ICD-10-CM

## 2024-07-16 DIAGNOSIS — D46.A MYELODYSPLASTIC SYNDROME WITH MULTILINEAGE DYSPLASIA (HCC): ICD-10-CM

## 2024-07-16 DIAGNOSIS — D46.9 MDS (MYELODYSPLASTIC SYNDROME) (HCC): Primary | ICD-10-CM

## 2024-07-16 DIAGNOSIS — C50.911 MALIGNANT NEOPLASM OF RIGHT BREAST IN FEMALE, ESTROGEN RECEPTOR POSITIVE, UNSPECIFIED SITE OF BREAST (HCC): ICD-10-CM

## 2024-07-16 DIAGNOSIS — D63.8 ANEMIA OF CHRONIC DISEASE: Primary | ICD-10-CM

## 2024-07-16 LAB
BASOPHILS # BLD: 0.03 K/UL (ref 0–0.2)
BASOPHILS NFR BLD: 1 % (ref 0–2)
EOSINOPHIL # BLD: 0.09 K/UL (ref 0.05–0.5)
EOSINOPHILS RELATIVE PERCENT: 2 % (ref 0–6)
ERYTHROCYTE [DISTWIDTH] IN BLOOD BY AUTOMATED COUNT: 26.5 % (ref 11.5–15)
HCT VFR BLD AUTO: 27.1 % (ref 34–48)
HGB BLD-MCNC: 8.9 G/DL (ref 11.5–15.5)
IMM GRANULOCYTES # BLD AUTO: <0.03 K/UL (ref 0–0.58)
IMM GRANULOCYTES NFR BLD: 0 % (ref 0–5)
LYMPHOCYTES NFR BLD: 1.95 K/UL (ref 1.5–4)
LYMPHOCYTES RELATIVE PERCENT: 43 % (ref 20–42)
MCH RBC QN AUTO: 38 PG (ref 26–35)
MCHC RBC AUTO-ENTMCNC: 32.8 G/DL (ref 32–34.5)
MCV RBC AUTO: 115.8 FL (ref 80–99.9)
MONOCYTES NFR BLD: 0.33 K/UL (ref 0.1–0.95)
MONOCYTES NFR BLD: 7 % (ref 2–12)
NEUTROPHILS NFR BLD: 47 % (ref 43–80)
NEUTS SEG NFR BLD: 2.13 K/UL (ref 1.8–7.3)
PLATELET # BLD AUTO: 133 K/UL (ref 130–450)
PMV BLD AUTO: 11.8 FL (ref 7–12)
RBC # BLD AUTO: 2.34 M/UL (ref 3.5–5.5)
RBC # BLD: ABNORMAL 10*6/UL
WBC OTHER # BLD: 4.5 K/UL (ref 4.5–11.5)

## 2024-07-16 PROCEDURE — 1123F ACP DISCUSS/DSCN MKR DOCD: CPT | Performed by: INTERNAL MEDICINE

## 2024-07-16 PROCEDURE — 96372 THER/PROPH/DIAG INJ SC/IM: CPT

## 2024-07-16 PROCEDURE — 36415 COLL VENOUS BLD VENIPUNCTURE: CPT

## 2024-07-16 PROCEDURE — 6360000002 HC RX W HCPCS: Performed by: INTERNAL MEDICINE

## 2024-07-16 PROCEDURE — 99214 OFFICE O/P EST MOD 30 MIN: CPT | Performed by: INTERNAL MEDICINE

## 2024-07-16 PROCEDURE — 85025 COMPLETE CBC W/AUTO DIFF WBC: CPT

## 2024-07-16 RX ADMIN — DARBEPOETIN ALFA 500 MCG: 500 INJECTION, SOLUTION INTRAVENOUS; SUBCUTANEOUS at 10:53

## 2024-07-17 DIAGNOSIS — C50.919 MALIGNANT NEOPLASM OF FEMALE BREAST, UNSPECIFIED ESTROGEN RECEPTOR STATUS, UNSPECIFIED LATERALITY, UNSPECIFIED SITE OF BREAST (HCC): ICD-10-CM

## 2024-07-17 DIAGNOSIS — Z51.5 PALLIATIVE CARE BY SPECIALIST: ICD-10-CM

## 2024-07-17 DIAGNOSIS — G89.3 PAIN DUE TO NEOPLASM: ICD-10-CM

## 2024-07-17 RX ORDER — HYDROCODONE BITARTRATE AND ACETAMINOPHEN 5; 325 MG/1; MG/1
1 TABLET ORAL EVERY 6 HOURS PRN
Qty: 120 TABLET | Refills: 0 | Status: SHIPPED | OUTPATIENT
Start: 2024-07-17 | End: 2024-08-16

## 2024-07-17 NOTE — TELEPHONE ENCOUNTER
Call from Sabra, Patricia's daughter requesting refill for [place] to be sent to [facility] on [facility]. Next natalie 24. Sabra states her mom had another fall recently, no apparent injuries. Sabra also shares that she was sorry to miss last appointment but her father  under Hospice care so she was predisposed. Sabra needs to know Patricia's next natalie as her mother does not remember. .

## 2024-07-17 NOTE — PROGRESS NOTES
Patient provided with discharge instructions, received printed AVS.  All questions answered.  Patient understands follow up plan of care.     
probably AC followed by T, then PMRT, and 5 years of adjuvant ET with Arimidex, was completed in 2015. She was treated in [place] under the care of Dr. Simpson. She is doing well clinically without any evidence of recurrence of her disease. Discussed with her the importance of monthly SBE, and routine screening mammograms, she had a repeat left breast screening mammogram done on 11/7/2023, was reviewed, was negative for malignancy.  The patient has a right chest wall ulcer, referral was placed to [facility], the patient was seen by Dr. Duque, she had a biopsy done on 8/16/2023, pathology results reviewed, they are consistent with changes suggestive of radiation effect, chronic radiodermatitis.     2. She has MDS, diagnosed in 2017, she received ESAs and PRBCs transfusion, has transfusion related iron overload, hgb was 8.5, ferritin 3647, was restarted on Aranesp on 4/23/2019.     On 7/20/2021, hemoglobin was 8 g/dl hematocrit 24.7,  1.3, normal white count, platelet count 138K, fit test is negative.  The patient did not have an adequate response to Retacrit, it was changed back to Aranesp, today 1/2/2024, labs reviewed, her CBCD is remarkable for hemoglobin of 10.4 G/DL, it has improved, and is greater than 10, hold Aranesp, white count is stable at 4.3, MCV stable at 113.4, platelet count is normal at 159 K she has iron overload secondary to prior transfusions, it is improving, will monitor her counts closely.  Transaminitis, and mild hyperbilirubinemia, an ultrasound of the abdomen was ordered, was done on 11/21/2023, was reviewed, she has cirrhotic appearing liver, she will continue follow-up with GI.  Hypothyroidism, continue Synthroid follow-up with PCP.    The  patient was taking care of her brother-in-law who has dementia, he shook her head, she was having dizziness, she was referred to the ED, the patient was subsequently admitted, was found to have 85% stenosis in the distal V2 segment of the right vertebral

## 2024-07-29 DIAGNOSIS — D46.9 MDS (MYELODYSPLASTIC SYNDROME) (HCC): Primary | ICD-10-CM

## 2024-07-30 ENCOUNTER — OFFICE VISIT (OUTPATIENT)
Dept: ONCOLOGY | Age: 84
End: 2024-07-30
Payer: MEDICARE

## 2024-07-30 ENCOUNTER — HOSPITAL ENCOUNTER (OUTPATIENT)
Dept: INFUSION THERAPY | Age: 84
Discharge: HOME | End: 2024-07-30
Payer: MEDICARE

## 2024-07-30 VITALS
BODY MASS INDEX: 23.22 KG/M2 | SYSTOLIC BLOOD PRESSURE: 125 MMHG | OXYGEN SATURATION: 96 % | HEIGHT: 61 IN | DIASTOLIC BLOOD PRESSURE: 60 MMHG | HEART RATE: 77 BPM | WEIGHT: 123 LBS | TEMPERATURE: 97.8 F

## 2024-07-30 DIAGNOSIS — D53.9 MACROCYTIC ANEMIA: Primary | ICD-10-CM

## 2024-07-30 DIAGNOSIS — Z17.0 MALIGNANT NEOPLASM OF RIGHT BREAST IN FEMALE, ESTROGEN RECEPTOR POSITIVE, UNSPECIFIED SITE OF BREAST (HCC): ICD-10-CM

## 2024-07-30 DIAGNOSIS — C50.911 MALIGNANT NEOPLASM OF RIGHT BREAST IN FEMALE, ESTROGEN RECEPTOR POSITIVE, UNSPECIFIED SITE OF BREAST (HCC): ICD-10-CM

## 2024-07-30 DIAGNOSIS — Z12.31 ENCOUNTER FOR SCREENING MAMMOGRAM FOR MALIGNANT NEOPLASM OF BREAST: ICD-10-CM

## 2024-07-30 DIAGNOSIS — D46.9 MDS (MYELODYSPLASTIC SYNDROME) (HCC): ICD-10-CM

## 2024-07-30 DIAGNOSIS — D46.A MYELODYSPLASTIC SYNDROME WITH MULTILINEAGE DYSPLASIA (HCC): ICD-10-CM

## 2024-07-30 DIAGNOSIS — D63.8 ANEMIA OF CHRONIC DISEASE: Primary | ICD-10-CM

## 2024-07-30 LAB
ALBUMIN SERPL-MCNC: 4.2 G/DL (ref 3.5–5.2)
ALP SERPL-CCNC: 49 U/L (ref 35–104)
ALT SERPL-CCNC: 31 U/L (ref 0–32)
ANION GAP SERPL CALCULATED.3IONS-SCNC: 11 MMOL/L (ref 7–16)
AST SERPL-CCNC: 32 U/L (ref 0–31)
BASOPHILS # BLD: 0.05 K/UL (ref 0–0.2)
BASOPHILS NFR BLD: 1 % (ref 0–2)
BILIRUB SERPL-MCNC: 1.2 MG/DL (ref 0–1.2)
BUN SERPL-MCNC: 19 MG/DL (ref 6–23)
CALCIUM SERPL-MCNC: 9.2 MG/DL (ref 8.6–10.2)
CHLORIDE SERPL-SCNC: 104 MMOL/L (ref 98–107)
CO2 SERPL-SCNC: 25 MMOL/L (ref 22–29)
CREAT SERPL-MCNC: 0.8 MG/DL (ref 0.5–1)
EOSINOPHIL # BLD: 0.09 K/UL (ref 0.05–0.5)
EOSINOPHILS RELATIVE PERCENT: 2 % (ref 0–6)
ERYTHROCYTE [DISTWIDTH] IN BLOOD BY AUTOMATED COUNT: 28.1 % (ref 11.5–15)
GFR, ESTIMATED: 73 ML/MIN/1.73M2
GLUCOSE SERPL-MCNC: 118 MG/DL (ref 74–99)
HCT VFR BLD AUTO: 28.9 % (ref 34–48)
HGB BLD-MCNC: 9.6 G/DL (ref 11.5–15.5)
LYMPHOCYTES NFR BLD: 1.94 K/UL (ref 1.5–4)
LYMPHOCYTES RELATIVE PERCENT: 37 % (ref 20–42)
MCH RBC QN AUTO: 38.7 PG (ref 26–35)
MCHC RBC AUTO-ENTMCNC: 33.2 G/DL (ref 32–34.5)
MCV RBC AUTO: 116.5 FL (ref 80–99.9)
MONOCYTES NFR BLD: 0.32 K/UL (ref 0.1–0.95)
MONOCYTES NFR BLD: 6 % (ref 2–12)
NEUTROPHILS NFR BLD: 55 % (ref 43–80)
NEUTS SEG NFR BLD: 2.9 K/UL (ref 1.8–7.3)
NUCLEATED RED BLOOD CELLS: 3 PER 100 WBC
PLATELET # BLD AUTO: 163 K/UL (ref 130–450)
PMV BLD AUTO: 11 FL (ref 7–12)
POTASSIUM SERPL-SCNC: 4.6 MMOL/L (ref 3.5–5)
PROT SERPL-MCNC: 7.2 G/DL (ref 6.4–8.3)
RBC # BLD AUTO: 2.48 M/UL (ref 3.5–5.5)
RBC # BLD: ABNORMAL 10*6/UL
SODIUM SERPL-SCNC: 140 MMOL/L (ref 132–146)
WBC OTHER # BLD: 5.3 K/UL (ref 4.5–11.5)

## 2024-07-30 PROCEDURE — 96372 THER/PROPH/DIAG INJ SC/IM: CPT

## 2024-07-30 PROCEDURE — 85025 COMPLETE CBC W/AUTO DIFF WBC: CPT

## 2024-07-30 PROCEDURE — 6360000002 HC RX W HCPCS: Performed by: INTERNAL MEDICINE

## 2024-07-30 PROCEDURE — 36415 COLL VENOUS BLD VENIPUNCTURE: CPT

## 2024-07-30 PROCEDURE — 1123F ACP DISCUSS/DSCN MKR DOCD: CPT | Performed by: INTERNAL MEDICINE

## 2024-07-30 PROCEDURE — 80053 COMPREHEN METABOLIC PANEL: CPT

## 2024-07-30 PROCEDURE — 99214 OFFICE O/P EST MOD 30 MIN: CPT | Performed by: INTERNAL MEDICINE

## 2024-07-30 RX ADMIN — DARBEPOETIN ALFA 500 MCG: 500 INJECTION, SOLUTION INTRAVENOUS; SUBCUTANEOUS at 10:52

## 2024-07-31 NOTE — PROGRESS NOTES
Patient provided with discharge instructions, received printed AVS.  All questions answered.  Patient understands follow up plan of care.       
by T, then PMRT, and 5 years of adjuvant ET with Arimidex, was completed in 2015. She was treated in [place] under the care of Dr. Simpson. She is doing well clinically without any evidence of recurrence of her disease. Discussed with her the importance of monthly SBE, and routine screening mammograms, she had a repeat left breast screening mammogram done on 11/7/2023, was reviewed, was negative for malignancy.  The patient has a right chest wall ulcer, referral was placed to , the patient was seen by Dr. Duque, she had a biopsy done on 8/16/2023, pathology results reviewed, they are consistent with changes suggestive of radiation effect, chronic radiodermatitis.  She will be due for a screening mammogram in November, will order.     2. She has MDS, diagnosed in 2017, she received ESAs and PRBCs transfusion, has transfusion related iron overload, hgb was 8.5, ferritin 3647, was restarted on Aranesp on 4/23/2019.     On 7/20/2021, hemoglobin was 8 g/dl hematocrit 24.7,  1.3, normal white count, platelet count 138K, fit test is negative.  The patient did not have an adequate response to Retacrit, it was changed back to Aranesp, today 1/2/2024, labs reviewed, her CBCD is remarkable for hemoglobin of 10.4 G/DL, it has improved, and is greater than 10, hold Aranesp, white count is stable at 4.3, MCV stable at 113.4, platelet count is normal at 159 K she has iron overload secondary to prior transfusions, it is improving, will monitor her counts closely.  Transaminitis, and mild hyperbilirubinemia, an ultrasound of the abdomen was ordered, was done on 11/21/2023, was reviewed, she has cirrhotic appearing liver, she will continue follow-up with GI.  Hypothyroidism, continue Synthroid follow-up with PCP.    The  patient was taking care of her brother-in-law who has dementia, he shook her head, she was having dizziness, she was referred to the ED, the patient was subsequently admitted, was found to have 85% stenosis

## 2024-08-13 ENCOUNTER — HOSPITAL ENCOUNTER (OUTPATIENT)
Dept: INFUSION THERAPY | Age: 84
End: 2024-08-13

## 2024-08-14 ENCOUNTER — OFFICE VISIT (OUTPATIENT)
Age: 84
End: 2024-08-14
Payer: MEDICARE

## 2024-08-14 VITALS
BODY MASS INDEX: 23.24 KG/M2 | OXYGEN SATURATION: 99 % | SYSTOLIC BLOOD PRESSURE: 110 MMHG | TEMPERATURE: 97.9 F | DIASTOLIC BLOOD PRESSURE: 50 MMHG | HEIGHT: 61 IN | HEART RATE: 75 BPM | RESPIRATION RATE: 16 BRPM

## 2024-08-14 DIAGNOSIS — K76.0 FATTY LIVER: Primary | ICD-10-CM

## 2024-08-14 PROCEDURE — 99212 OFFICE O/P EST SF 10 MIN: CPT | Performed by: NURSE PRACTITIONER

## 2024-08-14 PROCEDURE — 1123F ACP DISCUSS/DSCN MKR DOCD: CPT | Performed by: NURSE PRACTITIONER

## 2024-08-14 NOTE — PROGRESS NOTES
Patricia Ventura (:  1940) is a 83 y.o. 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  HEENT: Patient denies persistent postnasal drip, scleral icterus, drooling, persistent bleeding from nose/mouth.  Resp: Patient denies SOB, wheezing, productive cough.  Cards: Patient denies CP, palpitations, significant edema  GI: As above.  Derm: Patient denies jaundice/rashes.   Musc: Patient denies diffuse/irregular joint swelling or myalgias.      Objective   Wt Readings from Last 3 Encounters:   24 55.8 kg (123 lb)   24 54.9 kg (121 lb)   24 53.5 kg (118 lb)     Temp Readings from Last 3 Encounters:   24 97.9 °F (36.6 °C) (Infrared)   24 97.8 °F (36.6 °C)   24 98 °F (36.7 °C)     BP Readings from Last 3 Encounters:   24 (!) 110/50   24 125/60   24 124/84     Pulse Readings from Last 3 Encounters:   24 75   24 77   24 89        Physical Exam  Constitutional:       Appearance: Normal appearance.   Neurological:      Mental Status: She is alert.

## 2024-08-16 DIAGNOSIS — G89.3 PAIN DUE TO NEOPLASM: ICD-10-CM

## 2024-08-16 DIAGNOSIS — C50.919 MALIGNANT NEOPLASM OF FEMALE BREAST, UNSPECIFIED ESTROGEN RECEPTOR STATUS, UNSPECIFIED LATERALITY, UNSPECIFIED SITE OF BREAST (HCC): ICD-10-CM

## 2024-08-16 DIAGNOSIS — Z51.5 PALLIATIVE CARE BY SPECIALIST: ICD-10-CM

## 2024-08-16 RX ORDER — HYDROCODONE BITARTRATE AND ACETAMINOPHEN 5; 325 MG/1; MG/1
1 TABLET ORAL EVERY 6 HOURS PRN
Qty: 120 TABLET | Refills: 0 | Status: SHIPPED | OUTPATIENT
Start: 2024-08-16 | End: 2024-09-15

## 2024-08-16 NOTE — TELEPHONE ENCOUNTER
Patient Patricia's daughter Sabra called for refill [place] [phone]. [facility]. Next appointment 9-. Routed call to A JONO Dupree

## 2024-08-20 ENCOUNTER — OFFICE VISIT (OUTPATIENT)
Dept: ONCOLOGY | Age: 84
End: 2024-08-20
Payer: MEDICARE

## 2024-08-20 ENCOUNTER — HOSPITAL ENCOUNTER (OUTPATIENT)
Dept: INFUSION THERAPY | Age: 84
Discharge: HOME | End: 2024-08-20
Payer: MEDICARE

## 2024-08-20 VITALS
WEIGHT: 123.6 LBS | TEMPERATURE: 97.9 F | SYSTOLIC BLOOD PRESSURE: 143 MMHG | OXYGEN SATURATION: 94 % | DIASTOLIC BLOOD PRESSURE: 65 MMHG | BODY MASS INDEX: 23.35 KG/M2 | HEART RATE: 86 BPM

## 2024-08-20 DIAGNOSIS — K76.0 FATTY LIVER: ICD-10-CM

## 2024-08-20 DIAGNOSIS — Z17.0 MALIGNANT NEOPLASM OF RIGHT BREAST IN FEMALE, ESTROGEN RECEPTOR POSITIVE, UNSPECIFIED SITE OF BREAST (HCC): ICD-10-CM

## 2024-08-20 DIAGNOSIS — D46.9 MDS (MYELODYSPLASTIC SYNDROME) (HCC): ICD-10-CM

## 2024-08-20 DIAGNOSIS — D46.A MYELODYSPLASTIC SYNDROME WITH MULTILINEAGE DYSPLASIA (HCC): ICD-10-CM

## 2024-08-20 DIAGNOSIS — D53.9 MACROCYTIC ANEMIA: Primary | ICD-10-CM

## 2024-08-20 DIAGNOSIS — D63.8 ANEMIA OF CHRONIC DISEASE: Primary | ICD-10-CM

## 2024-08-20 DIAGNOSIS — C50.911 MALIGNANT NEOPLASM OF RIGHT BREAST IN FEMALE, ESTROGEN RECEPTOR POSITIVE, UNSPECIFIED SITE OF BREAST (HCC): ICD-10-CM

## 2024-08-20 LAB
ABO + RH BLD: NORMAL
ALBUMIN SERPL-MCNC: 4.3 G/DL (ref 3.5–5.2)
ALP SERPL-CCNC: 61 U/L (ref 35–104)
ALT SERPL-CCNC: 40 U/L (ref 0–32)
ANION GAP SERPL CALCULATED.3IONS-SCNC: 9 MMOL/L (ref 7–16)
ANTIBODY IDENTIFICATION: NORMAL
AST SERPL-CCNC: 38 U/L (ref 0–31)
BASOPHILS # BLD: 0 K/UL (ref 0–0.2)
BASOPHILS NFR BLD: 0 % (ref 0–2)
BILIRUB SERPL-MCNC: 0.9 MG/DL (ref 0–1.2)
BLOOD BANK SAMPLE EXPIRATION: NORMAL
BLOOD GROUP ANTIBODIES SERPL: POSITIVE
BUN SERPL-MCNC: 18 MG/DL (ref 6–23)
CALCIUM SERPL-MCNC: 9.1 MG/DL (ref 8.6–10.2)
CHLORIDE SERPL-SCNC: 105 MMOL/L (ref 98–107)
CO2 SERPL-SCNC: 25 MMOL/L (ref 22–29)
CREAT SERPL-MCNC: 0.8 MG/DL (ref 0.5–1)
DAT IGG: NEGATIVE
EOSINOPHIL # BLD: 0 K/UL (ref 0.05–0.5)
EOSINOPHILS RELATIVE PERCENT: 0 % (ref 0–6)
ERYTHROCYTE [DISTWIDTH] IN BLOOD BY AUTOMATED COUNT: 27.8 % (ref 11.5–15)
GFR, ESTIMATED: 78 ML/MIN/1.73M2
GLUCOSE SERPL-MCNC: 102 MG/DL (ref 74–99)
HCT VFR BLD AUTO: 26.4 % (ref 34–48)
HGB BLD-MCNC: 8.5 G/DL (ref 11.5–15.5)
LYMPHOCYTES NFR BLD: 2.14 K/UL (ref 1.5–4)
LYMPHOCYTES RELATIVE PERCENT: 38 % (ref 20–42)
MCH RBC QN AUTO: 37.1 PG (ref 26–35)
MCHC RBC AUTO-ENTMCNC: 32.2 G/DL (ref 32–34.5)
MCV RBC AUTO: 115.3 FL (ref 80–99.9)
MONOCYTES NFR BLD: 0.29 K/UL (ref 0.1–0.95)
MONOCYTES NFR BLD: 5 % (ref 2–12)
NEUTROPHILS NFR BLD: 57 % (ref 43–80)
NEUTS SEG NFR BLD: 3.17 K/UL (ref 1.8–7.3)
PLATELET # BLD AUTO: 189 K/UL (ref 130–450)
PMV BLD AUTO: 11.1 FL (ref 7–12)
POTASSIUM SERPL-SCNC: 4.4 MMOL/L (ref 3.5–5)
PROT SERPL-MCNC: 7.3 G/DL (ref 6.4–8.3)
RBC # BLD AUTO: 2.29 M/UL (ref 3.5–5.5)
RBC # BLD: ABNORMAL 10*6/UL
SODIUM SERPL-SCNC: 139 MMOL/L (ref 132–146)
WBC OTHER # BLD: 5.6 K/UL (ref 4.5–11.5)

## 2024-08-20 PROCEDURE — 80053 COMPREHEN METABOLIC PANEL: CPT

## 2024-08-20 PROCEDURE — 86901 BLOOD TYPING SEROLOGIC RH(D): CPT

## 2024-08-20 PROCEDURE — 96372 THER/PROPH/DIAG INJ SC/IM: CPT

## 2024-08-20 PROCEDURE — 6360000002 HC RX W HCPCS: Performed by: INTERNAL MEDICINE

## 2024-08-20 PROCEDURE — 99214 OFFICE O/P EST MOD 30 MIN: CPT | Performed by: INTERNAL MEDICINE

## 2024-08-20 PROCEDURE — 1123F ACP DISCUSS/DSCN MKR DOCD: CPT | Performed by: INTERNAL MEDICINE

## 2024-08-20 PROCEDURE — 86870 RBC ANTIBODY IDENTIFICATION: CPT

## 2024-08-20 PROCEDURE — 86850 RBC ANTIBODY SCREEN: CPT

## 2024-08-20 PROCEDURE — 36415 COLL VENOUS BLD VENIPUNCTURE: CPT

## 2024-08-20 PROCEDURE — 86900 BLOOD TYPING SEROLOGIC ABO: CPT

## 2024-08-20 PROCEDURE — 99212 OFFICE O/P EST SF 10 MIN: CPT

## 2024-08-20 PROCEDURE — 85025 COMPLETE CBC W/AUTO DIFF WBC: CPT

## 2024-08-20 PROCEDURE — 86880 COOMBS TEST DIRECT: CPT

## 2024-08-20 RX ADMIN — DARBEPOETIN ALFA 500 MCG: 500 INJECTION, SOLUTION INTRAVENOUS; SUBCUTANEOUS at 10:48

## 2024-08-20 NOTE — PROGRESS NOTES
[facility] PHYSICIANS [facility] MED ONCOLOGY  [address]  [facility] [address]  Dept: [phone]  Loc: [phone]  Attending Progress Note      Reason for Visit:   1. Right Breast Cancer.                                   2. MDS.      Referring Physician:  CONNIE DAS CNP    PCP:  Roselia Moralez APRN - CNP    History of Present Illness:      The patient is a very pleasant 83 y.o. lady with a PMH significant for Right Breast Cancer, stage III, HR pos, was diagnosed in 2009, she had a right mastectomy done, followed by adjuvant chemo, she received 8 cycles, probably AC followed by T, then PMRT, and 5 years of adjuvant ET with Arimidex, was completed in 2015. She was treated in [place] under the care of Dr. Simpson. She was diagnosed with MDS in 2017, she received ESAs and PRBCs transfusion. She has transfusion related iron overload. She was started on Aranesp on 4/24/2020.  No SE from Aranesp.     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  The patient had a follow-up with Dr. Melendez, he recommended conservative medical management with DAPT.      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  She had worsening anemia after the procedure requiring packed RBCs transfusion.  The patient is doing well since the procedure, no headaches or dizziness.  She missed her appointment last week due to a viral illness.  Doing better at this time.    Review

## 2024-08-29 ENCOUNTER — HOSPITAL ENCOUNTER (OUTPATIENT)
Dept: ULTRASOUND IMAGING | Age: 84
Discharge: HOME | End: 2024-08-31
Payer: MEDICARE

## 2024-08-29 DIAGNOSIS — K76.0 FATTY LIVER: ICD-10-CM

## 2024-08-29 PROCEDURE — 76705 ECHO EXAM OF ABDOMEN: CPT

## 2024-09-03 ENCOUNTER — HOSPITAL ENCOUNTER (OUTPATIENT)
Dept: INFUSION THERAPY | Age: 84
Discharge: HOME | End: 2024-09-03
Payer: MEDICARE

## 2024-09-03 ENCOUNTER — OFFICE VISIT (OUTPATIENT)
Dept: ONCOLOGY | Age: 84
End: 2024-09-03
Payer: MEDICARE

## 2024-09-03 VITALS
OXYGEN SATURATION: 96 % | WEIGHT: 121.2 LBS | SYSTOLIC BLOOD PRESSURE: 140 MMHG | HEART RATE: 81 BPM | BODY MASS INDEX: 22.88 KG/M2 | DIASTOLIC BLOOD PRESSURE: 105 MMHG | TEMPERATURE: 97.7 F | HEIGHT: 61 IN

## 2024-09-03 DIAGNOSIS — D46.9 MDS (MYELODYSPLASTIC SYNDROME) (HCC): ICD-10-CM

## 2024-09-03 DIAGNOSIS — D46.9 MDS (MYELODYSPLASTIC SYNDROME) (HCC): Primary | ICD-10-CM

## 2024-09-03 DIAGNOSIS — D53.9 MACROCYTIC ANEMIA: Primary | ICD-10-CM

## 2024-09-03 DIAGNOSIS — Z17.0 MALIGNANT NEOPLASM OF RIGHT BREAST IN FEMALE, ESTROGEN RECEPTOR POSITIVE, UNSPECIFIED SITE OF BREAST (HCC): ICD-10-CM

## 2024-09-03 DIAGNOSIS — D46.A MYELODYSPLASTIC SYNDROME WITH MULTILINEAGE DYSPLASIA (HCC): ICD-10-CM

## 2024-09-03 DIAGNOSIS — D63.8 ANEMIA OF CHRONIC DISEASE: Primary | ICD-10-CM

## 2024-09-03 DIAGNOSIS — C50.911 MALIGNANT NEOPLASM OF RIGHT BREAST IN FEMALE, ESTROGEN RECEPTOR POSITIVE, UNSPECIFIED SITE OF BREAST (HCC): ICD-10-CM

## 2024-09-03 LAB
ALBUMIN SERPL-MCNC: 4.4 G/DL (ref 3.5–5.2)
ALP SERPL-CCNC: 57 U/L (ref 35–104)
ALT SERPL-CCNC: 43 U/L (ref 0–32)
ANION GAP SERPL CALCULATED.3IONS-SCNC: 16 MMOL/L (ref 7–16)
AST SERPL-CCNC: 43 U/L (ref 0–31)
BASOPHILS # BLD: 0 K/UL (ref 0–0.2)
BASOPHILS NFR BLD: 0 % (ref 0–2)
BILIRUB SERPL-MCNC: 1.4 MG/DL (ref 0–1.2)
BUN SERPL-MCNC: 22 MG/DL (ref 6–23)
CALCIUM SERPL-MCNC: 9.3 MG/DL (ref 8.6–10.2)
CHLORIDE SERPL-SCNC: 106 MMOL/L (ref 98–107)
CO2 SERPL-SCNC: 20 MMOL/L (ref 22–29)
CREAT SERPL-MCNC: 0.8 MG/DL (ref 0.5–1)
EOSINOPHIL # BLD: 0.18 K/UL (ref 0.05–0.5)
EOSINOPHILS RELATIVE PERCENT: 4 % (ref 0–6)
ERYTHROCYTE [DISTWIDTH] IN BLOOD BY AUTOMATED COUNT: 28.8 % (ref 11.5–15)
GFR, ESTIMATED: 72 ML/MIN/1.73M2
GLUCOSE SERPL-MCNC: 147 MG/DL (ref 74–99)
HCT VFR BLD AUTO: 28.5 % (ref 34–48)
HGB BLD-MCNC: 9.4 G/DL (ref 11.5–15.5)
LYMPHOCYTES NFR BLD: 1.95 K/UL (ref 1.5–4)
LYMPHOCYTES RELATIVE PERCENT: 38 % (ref 20–42)
MCH RBC QN AUTO: 39 PG (ref 26–35)
MCHC RBC AUTO-ENTMCNC: 33 G/DL (ref 32–34.5)
MCV RBC AUTO: 118.3 FL (ref 80–99.9)
MONOCYTES NFR BLD: 0.18 K/UL (ref 0.1–0.95)
MONOCYTES NFR BLD: 4 % (ref 2–12)
MYELOCYTES ABSOLUTE COUNT: 0.04 K/UL
MYELOCYTES: 1 %
NEUTROPHILS NFR BLD: 54 % (ref 43–80)
NEUTS SEG NFR BLD: 2.75 K/UL (ref 1.8–7.3)
PLATELET # BLD AUTO: 188 K/UL (ref 130–450)
PMV BLD AUTO: 12.3 FL (ref 7–12)
POTASSIUM SERPL-SCNC: 4.2 MMOL/L (ref 3.5–5)
PROT SERPL-MCNC: 7.5 G/DL (ref 6.4–8.3)
RBC # BLD AUTO: 2.41 M/UL (ref 3.5–5.5)
RBC # BLD: ABNORMAL 10*6/UL
SODIUM SERPL-SCNC: 142 MMOL/L (ref 132–146)
WBC OTHER # BLD: 5.1 K/UL (ref 4.5–11.5)

## 2024-09-03 PROCEDURE — 80053 COMPREHEN METABOLIC PANEL: CPT

## 2024-09-03 PROCEDURE — 96372 THER/PROPH/DIAG INJ SC/IM: CPT

## 2024-09-03 PROCEDURE — 1123F ACP DISCUSS/DSCN MKR DOCD: CPT | Performed by: INTERNAL MEDICINE

## 2024-09-03 PROCEDURE — 6360000002 HC RX W HCPCS: Performed by: INTERNAL MEDICINE

## 2024-09-03 PROCEDURE — 99214 OFFICE O/P EST MOD 30 MIN: CPT | Performed by: INTERNAL MEDICINE

## 2024-09-03 PROCEDURE — 36415 COLL VENOUS BLD VENIPUNCTURE: CPT

## 2024-09-03 PROCEDURE — 85025 COMPLETE CBC W/AUTO DIFF WBC: CPT

## 2024-09-03 RX ADMIN — DARBEPOETIN ALFA 500 MCG: 500 INJECTION, SOLUTION INTRAVENOUS; SUBCUTANEOUS at 10:34

## 2024-09-06 NOTE — PROGRESS NOTES
Patient provided with discharge instructions, received printed AVS.  All questions answered.  Patient understands follow up plan of care.     
has dementia, he shook her head, she was having dizziness, she was referred to the ED, the patient was subsequently admitted, was found to have 85% stenosis in the distal V2 segment of the right vertebral artery and 70% stenosis in the distal right subclavian artery.  The patient had a follow-up with Dr. Melendez, he recommended conservative medical management with DAPT.    Today 9/3/2024, the patient is doing better clinically, Labs reviewed, hemoglobin is improving, 9.4 G/DL, proceed with Aranesp as hemoglobin is less than 10 G/DL, she previously had 1% myelocytes, recommended a peripheral blood flow cytometry was done, did not reveal any blasts, will hold off on a bone marrow biopsy and aspirate at this time, white count is normal as well as platelet count, no immature cells in the periphery, will monitor her counts closely.    RTC in 2 weeks with labs.    Thank you for allowing us to participate in the care of Ms. Ventura.    LUIS ALFREDO NINO MD   HEMATOLOGY/MEDICAL ONCOLOGY  [facility] MED ONCOLOGY  [address] [place] [id number]  Dept: [phone]  Loc: [phone]

## 2024-09-17 ENCOUNTER — HOSPITAL ENCOUNTER (OUTPATIENT)
Dept: INFUSION THERAPY | Age: 84
Discharge: HOME | End: 2024-09-17

## 2024-09-17 ENCOUNTER — OFFICE VISIT (OUTPATIENT)
Dept: PALLATIVE CARE | Age: 84
End: 2024-09-17
Payer: MEDICARE

## 2024-09-17 ENCOUNTER — OFFICE VISIT (OUTPATIENT)
Dept: ONCOLOGY | Age: 84
End: 2024-09-17
Payer: MEDICARE

## 2024-09-17 VITALS
WEIGHT: 123.3 LBS | SYSTOLIC BLOOD PRESSURE: 142 MMHG | OXYGEN SATURATION: 94 % | DIASTOLIC BLOOD PRESSURE: 91 MMHG | TEMPERATURE: 97.8 F | HEART RATE: 79 BPM | BODY MASS INDEX: 23.3 KG/M2

## 2024-09-17 VITALS — BODY MASS INDEX: 23.24 KG/M2 | WEIGHT: 123 LBS

## 2024-09-17 DIAGNOSIS — D53.9 MACROCYTIC ANEMIA: Primary | ICD-10-CM

## 2024-09-17 DIAGNOSIS — D46.9 MDS (MYELODYSPLASTIC SYNDROME) (HCC): ICD-10-CM

## 2024-09-17 DIAGNOSIS — C50.911 MALIGNANT NEOPLASM OF RIGHT BREAST IN FEMALE, ESTROGEN RECEPTOR POSITIVE, UNSPECIFIED SITE OF BREAST (HCC): ICD-10-CM

## 2024-09-17 DIAGNOSIS — Z17.0 MALIGNANT NEOPLASM OF RIGHT BREAST IN FEMALE, ESTROGEN RECEPTOR POSITIVE, UNSPECIFIED SITE OF BREAST (HCC): ICD-10-CM

## 2024-09-17 DIAGNOSIS — D46.A MYELODYSPLASTIC SYNDROME WITH MULTILINEAGE DYSPLASIA (HCC): ICD-10-CM

## 2024-09-17 DIAGNOSIS — D63.8 ANEMIA OF CHRONIC DISEASE: Primary | ICD-10-CM

## 2024-09-17 DIAGNOSIS — Z51.5 PALLIATIVE CARE BY SPECIALIST: ICD-10-CM

## 2024-09-17 DIAGNOSIS — G89.3 PAIN DUE TO NEOPLASM: ICD-10-CM

## 2024-09-17 DIAGNOSIS — C50.919 MALIGNANT NEOPLASM OF FEMALE BREAST, UNSPECIFIED ESTROGEN RECEPTOR STATUS, UNSPECIFIED LATERALITY, UNSPECIFIED SITE OF BREAST (HCC): ICD-10-CM

## 2024-09-17 LAB
BASOPHILS # BLD: 0.03 K/UL (ref 0–0.2)
BASOPHILS NFR BLD: 1 % (ref 0–2)
EOSINOPHIL # BLD: 0.16 K/UL (ref 0.05–0.5)
EOSINOPHILS RELATIVE PERCENT: 3 % (ref 0–6)
ERYTHROCYTE [DISTWIDTH] IN BLOOD BY AUTOMATED COUNT: 29.2 % (ref 11.5–15)
HCT VFR BLD AUTO: 27.8 % (ref 34–48)
HGB BLD-MCNC: 8.9 G/DL (ref 11.5–15.5)
IMM GRANULOCYTES # BLD AUTO: 0.04 K/UL (ref 0–0.58)
IMM GRANULOCYTES NFR BLD: 1 % (ref 0–5)
LYMPHOCYTES NFR BLD: 2.52 K/UL (ref 1.5–4)
LYMPHOCYTES RELATIVE PERCENT: 43 % (ref 20–42)
MCH RBC QN AUTO: 37.9 PG (ref 26–35)
MCHC RBC AUTO-ENTMCNC: 32 G/DL (ref 32–34.5)
MCV RBC AUTO: 118.3 FL (ref 80–99.9)
MONOCYTES NFR BLD: 0.39 K/UL (ref 0.1–0.95)
MONOCYTES NFR BLD: 7 % (ref 2–12)
NEUTROPHILS NFR BLD: 47 % (ref 43–80)
NEUTS SEG NFR BLD: 2.79 K/UL (ref 1.8–7.3)
PLATELET # BLD AUTO: 223 K/UL (ref 130–450)
PMV BLD AUTO: 12.3 FL (ref 7–12)
RBC # BLD AUTO: 2.35 M/UL (ref 3.5–5.5)
RBC # BLD: ABNORMAL 10*6/UL
WBC OTHER # BLD: 5.9 K/UL (ref 4.5–11.5)

## 2024-09-17 PROCEDURE — 99213 OFFICE O/P EST LOW 20 MIN: CPT | Performed by: NURSE PRACTITIONER

## 2024-09-17 PROCEDURE — 6360000002 HC RX W HCPCS: Performed by: INTERNAL MEDICINE

## 2024-09-17 PROCEDURE — 85025 COMPLETE CBC W/AUTO DIFF WBC: CPT

## 2024-09-17 PROCEDURE — 99211 OFF/OP EST MAY X REQ PHY/QHP: CPT

## 2024-09-17 PROCEDURE — 1123F ACP DISCUSS/DSCN MKR DOCD: CPT | Performed by: NURSE PRACTITIONER

## 2024-09-17 PROCEDURE — 96372 THER/PROPH/DIAG INJ SC/IM: CPT

## 2024-09-17 PROCEDURE — 99214 OFFICE O/P EST MOD 30 MIN: CPT | Performed by: INTERNAL MEDICINE

## 2024-09-17 PROCEDURE — 36415 COLL VENOUS BLD VENIPUNCTURE: CPT

## 2024-09-17 PROCEDURE — 1123F ACP DISCUSS/DSCN MKR DOCD: CPT | Performed by: INTERNAL MEDICINE

## 2024-09-17 RX ORDER — HYDROCODONE BITARTRATE AND ACETAMINOPHEN 5; 325 MG/1; MG/1
1 TABLET ORAL EVERY 6 HOURS PRN
Qty: 120 TABLET | Refills: 0 | Status: SHIPPED | OUTPATIENT
Start: 2024-09-17 | End: 2024-10-17

## 2024-09-17 RX ADMIN — DARBEPOETIN ALFA 500 MCG: 500 INJECTION, SOLUTION INTRAVENOUS; SUBCUTANEOUS at 10:10

## 2024-10-01 ENCOUNTER — OFFICE VISIT (OUTPATIENT)
Dept: ONCOLOGY | Age: 84
End: 2024-10-01
Payer: MEDICARE

## 2024-10-01 ENCOUNTER — HOSPITAL ENCOUNTER (OUTPATIENT)
Dept: INFUSION THERAPY | Age: 84
Discharge: HOME | End: 2024-10-01
Payer: MEDICARE

## 2024-10-01 VITALS
HEART RATE: 76 BPM | SYSTOLIC BLOOD PRESSURE: 154 MMHG | WEIGHT: 124.1 LBS | BODY MASS INDEX: 23.43 KG/M2 | DIASTOLIC BLOOD PRESSURE: 67 MMHG | HEIGHT: 61 IN | OXYGEN SATURATION: 99 % | TEMPERATURE: 97 F

## 2024-10-01 DIAGNOSIS — D63.8 ANEMIA OF CHRONIC DISEASE: Primary | ICD-10-CM

## 2024-10-01 DIAGNOSIS — D46.9 MDS (MYELODYSPLASTIC SYNDROME) (HCC): ICD-10-CM

## 2024-10-01 DIAGNOSIS — Z17.0 MALIGNANT NEOPLASM OF RIGHT BREAST IN FEMALE, ESTROGEN RECEPTOR POSITIVE, UNSPECIFIED SITE OF BREAST (HCC): ICD-10-CM

## 2024-10-01 DIAGNOSIS — D46.A MYELODYSPLASTIC SYNDROME WITH MULTILINEAGE DYSPLASIA (HCC): ICD-10-CM

## 2024-10-01 DIAGNOSIS — C50.911 MALIGNANT NEOPLASM OF RIGHT BREAST IN FEMALE, ESTROGEN RECEPTOR POSITIVE, UNSPECIFIED SITE OF BREAST (HCC): ICD-10-CM

## 2024-10-01 DIAGNOSIS — D53.9 MACROCYTIC ANEMIA: Primary | ICD-10-CM

## 2024-10-01 LAB
BASOPHILS # BLD: 0.12 K/UL (ref 0–0.2)
BASOPHILS NFR BLD: 3 % (ref 0–2)
EOSINOPHIL # BLD: 0.08 K/UL (ref 0.05–0.5)
EOSINOPHILS RELATIVE PERCENT: 2 % (ref 0–6)
ERYTHROCYTE [DISTWIDTH] IN BLOOD BY AUTOMATED COUNT: 28.7 % (ref 11.5–15)
HCT VFR BLD AUTO: 26.7 % (ref 34–48)
HGB BLD-MCNC: 8.4 G/DL (ref 11.5–15.5)
LYMPHOCYTES NFR BLD: 1.68 K/UL (ref 1.5–4)
LYMPHOCYTES RELATIVE PERCENT: 37 % (ref 20–42)
MCH RBC QN AUTO: 37.7 PG (ref 26–35)
MCHC RBC AUTO-ENTMCNC: 31.5 G/DL (ref 32–34.5)
MCV RBC AUTO: 119.7 FL (ref 80–99.9)
MONOCYTES NFR BLD: 0.16 K/UL (ref 0.1–0.95)
MONOCYTES NFR BLD: 4 % (ref 2–12)
NEUTROPHILS NFR BLD: 56 % (ref 43–80)
NEUTS SEG NFR BLD: 2.56 K/UL (ref 1.8–7.3)
NUCLEATED RED BLOOD CELLS: 2 PER 100 WBC
PLATELET # BLD AUTO: 163 K/UL (ref 130–450)
PMV BLD AUTO: 11.7 FL (ref 7–12)
RBC # BLD AUTO: 2.23 M/UL (ref 3.5–5.5)
RBC # BLD: ABNORMAL 10*6/UL
WBC OTHER # BLD: 4.6 K/UL (ref 4.5–11.5)

## 2024-10-01 PROCEDURE — 96372 THER/PROPH/DIAG INJ SC/IM: CPT

## 2024-10-01 PROCEDURE — 85025 COMPLETE CBC W/AUTO DIFF WBC: CPT

## 2024-10-01 PROCEDURE — 1123F ACP DISCUSS/DSCN MKR DOCD: CPT | Performed by: INTERNAL MEDICINE

## 2024-10-01 PROCEDURE — 36415 COLL VENOUS BLD VENIPUNCTURE: CPT

## 2024-10-01 PROCEDURE — 6360000002 HC RX W HCPCS: Performed by: INTERNAL MEDICINE

## 2024-10-01 PROCEDURE — 99214 OFFICE O/P EST MOD 30 MIN: CPT | Performed by: INTERNAL MEDICINE

## 2024-10-01 RX ADMIN — DARBEPOETIN ALFA 500 MCG: 500 INJECTION, SOLUTION INTRAVENOUS; SUBCUTANEOUS at 10:42

## 2024-10-02 NOTE — PROGRESS NOTES
Patient provided with discharge instructions, received printed AVS.  All questions answered.  Patient understands follow up plan of care.       
pos, was diagnosed in 2009, she had a right mastectomy done, followed by adjuvant chemo, she received 8 cycles, probably AC followed by T, then PMRT, and 5 years of adjuvant ET with Arimidex, was completed in 2015. She was treated in [place] under the care of Dr. Simpson. She is doing well clinically without any evidence of recurrence of her disease. Discussed with her the importance of monthly SBE, and routine screening mammograms, she had a repeat left breast screening mammogram done on 11/7/2023, was reviewed, was negative for malignancy.  The patient has a right chest wall ulcer, referral was placed to [facility], the patient was seen by Dr. Duque, she had a biopsy done on 8/16/2023, pathology results reviewed, they are consistent with changes suggestive of radiation effect, chronic radiodermatitis.  She will be due for a screening mammogram in November, ordered.     2. She has MDS, diagnosed in 2017, she received ESAs and PRBCs transfusion, has transfusion related iron overload, hgb was 8.5, ferritin 3647, was restarted on Aranesp on 4/23/2019.     On 7/20/2021, hemoglobin was 8 g/dl hematocrit 24.7,  1.3, normal white count, platelet count 138K, fit test is negative.  The patient did not have an adequate response to Retacrit, it was changed back to Aranesp, today 1/2/2024, labs reviewed, her CBCD is remarkable for hemoglobin of 10.4 G/DL, it has improved, and is greater than 10, hold Aranesp, white count is stable at 4.3, MCV stable at 113.4, platelet count is normal at 159 K she has iron overload secondary to prior transfusions, it is improving, will monitor her counts closely.  Transaminitis, and mild hyperbilirubinemia, an ultrasound of the abdomen was ordered, was done on 11/21/2023, was reviewed, she has cirrhotic appearing liver, she will continue follow-up with GI.  Liver ultrasound was done on 8/20/2024.  She had reviewed, revealing fatty position of the liver.  Hypothyroidism, continue Synthroid

## 2024-10-11 DIAGNOSIS — D46.9 MDS (MYELODYSPLASTIC SYNDROME) (HCC): Primary | ICD-10-CM

## 2024-10-15 ENCOUNTER — OFFICE VISIT (OUTPATIENT)
Dept: ONCOLOGY | Age: 84
End: 2024-10-15
Payer: MEDICARE

## 2024-10-15 ENCOUNTER — HOSPITAL ENCOUNTER (OUTPATIENT)
Dept: INFUSION THERAPY | Age: 84
Discharge: HOME | End: 2024-10-15
Payer: MEDICARE

## 2024-10-15 VITALS
BODY MASS INDEX: 23.41 KG/M2 | OXYGEN SATURATION: 98 % | HEIGHT: 61 IN | TEMPERATURE: 98.1 F | HEART RATE: 81 BPM | WEIGHT: 124 LBS | SYSTOLIC BLOOD PRESSURE: 140 MMHG | DIASTOLIC BLOOD PRESSURE: 60 MMHG

## 2024-10-15 DIAGNOSIS — D46.9 MDS (MYELODYSPLASTIC SYNDROME) (HCC): Primary | ICD-10-CM

## 2024-10-15 DIAGNOSIS — D63.8 ANEMIA OF CHRONIC DISEASE: Primary | ICD-10-CM

## 2024-10-15 DIAGNOSIS — D46.9 MDS (MYELODYSPLASTIC SYNDROME) (HCC): ICD-10-CM

## 2024-10-15 DIAGNOSIS — R74.01 TRANSAMINITIS: Primary | ICD-10-CM

## 2024-10-15 DIAGNOSIS — D53.9 MACROCYTIC ANEMIA: ICD-10-CM

## 2024-10-15 DIAGNOSIS — D46.A MYELODYSPLASTIC SYNDROME WITH MULTILINEAGE DYSPLASIA (HCC): ICD-10-CM

## 2024-10-15 LAB
ALBUMIN SERPL-MCNC: 4.4 G/DL (ref 3.5–5.2)
ALP SERPL-CCNC: 48 U/L (ref 35–104)
ALT SERPL-CCNC: 41 U/L (ref 0–32)
ANION GAP SERPL CALCULATED.3IONS-SCNC: 8 MMOL/L (ref 7–16)
AST SERPL-CCNC: 35 U/L (ref 0–31)
BASOPHILS # BLD: 0.08 K/UL (ref 0–0.2)
BASOPHILS NFR BLD: 2 % (ref 0–2)
BILIRUB SERPL-MCNC: 1.5 MG/DL (ref 0–1.2)
BUN SERPL-MCNC: 25 MG/DL (ref 6–23)
CALCIUM SERPL-MCNC: 9.1 MG/DL (ref 8.6–10.2)
CHLORIDE SERPL-SCNC: 107 MMOL/L (ref 98–107)
CO2 SERPL-SCNC: 24 MMOL/L (ref 22–29)
CREAT SERPL-MCNC: 0.9 MG/DL (ref 0.5–1)
EOSINOPHIL # BLD: 0.12 K/UL (ref 0.05–0.5)
EOSINOPHILS RELATIVE PERCENT: 3 % (ref 0–6)
ERYTHROCYTE [DISTWIDTH] IN BLOOD BY AUTOMATED COUNT: 29.2 % (ref 11.5–15)
GFR, ESTIMATED: 63 ML/MIN/1.73M2
GLUCOSE SERPL-MCNC: 108 MG/DL (ref 74–99)
HCT VFR BLD AUTO: 26.4 % (ref 34–48)
HGB BLD-MCNC: 8.5 G/DL (ref 11.5–15.5)
LYMPHOCYTES NFR BLD: 1.78 K/UL (ref 1.5–4)
LYMPHOCYTES RELATIVE PERCENT: 39 % (ref 20–42)
MCH RBC QN AUTO: 37.8 PG (ref 26–35)
MCHC RBC AUTO-ENTMCNC: 32.2 G/DL (ref 32–34.5)
MCV RBC AUTO: 117.3 FL (ref 80–99.9)
MONOCYTES NFR BLD: 0.28 K/UL (ref 0.1–0.95)
MONOCYTES NFR BLD: 6 % (ref 2–12)
NEUTROPHILS NFR BLD: 51 % (ref 43–80)
NEUTS SEG NFR BLD: 2.34 K/UL (ref 1.8–7.3)
NUCLEATED RED BLOOD CELLS: 3 PER 100 WBC
PLATELET # BLD AUTO: 158 K/UL (ref 130–450)
PMV BLD AUTO: 10.4 FL (ref 7–12)
POTASSIUM SERPL-SCNC: 4.3 MMOL/L (ref 3.5–5)
PROT SERPL-MCNC: 7.2 G/DL (ref 6.4–8.3)
RBC # BLD AUTO: 2.25 M/UL (ref 3.5–5.5)
RBC # BLD: ABNORMAL 10*6/UL
SODIUM SERPL-SCNC: 139 MMOL/L (ref 132–146)
WBC OTHER # BLD: 4.6 K/UL (ref 4.5–11.5)

## 2024-10-15 PROCEDURE — 1123F ACP DISCUSS/DSCN MKR DOCD: CPT | Performed by: INTERNAL MEDICINE

## 2024-10-15 PROCEDURE — 99214 OFFICE O/P EST MOD 30 MIN: CPT | Performed by: INTERNAL MEDICINE

## 2024-10-15 PROCEDURE — 85025 COMPLETE CBC W/AUTO DIFF WBC: CPT

## 2024-10-15 PROCEDURE — 96372 THER/PROPH/DIAG INJ SC/IM: CPT

## 2024-10-15 PROCEDURE — 80053 COMPREHEN METABOLIC PANEL: CPT

## 2024-10-15 PROCEDURE — 6360000002 HC RX W HCPCS: Performed by: INTERNAL MEDICINE

## 2024-10-15 PROCEDURE — 36415 COLL VENOUS BLD VENIPUNCTURE: CPT

## 2024-10-15 RX ADMIN — DARBEPOETIN ALFA 500 MCG: 500 INJECTION, SOLUTION INTRAVENOUS; SUBCUTANEOUS at 10:55

## 2024-10-15 NOTE — PROGRESS NOTES
Patient tolerated injection well without complications or complaints. Alert and oriented x3. Patient aware of potential side effects and denies questions regarding treatment. Pain assessed, patient denies any new or worsening pain. Patient left ambulatory

## 2024-10-15 NOTE — PROGRESS NOTES
Patient provided with discharge instructions, received printed AVS.  All questions answered.  Patient understands follow up plan of care.     
Insecurity: Patient Declined (7/1/2024)    Hunger Vital Sign     Worried About Running Out of Food in the Last Year: Patient declined     Ran Out of Food in the Last Year: Patient declined   Transportation Needs: Patient Declined (7/1/2024)    PRAPARE - Transportation     Lack of Transportation (Medical): Patient declined     Lack of Transportation (Non-Medical): Patient declined   Physical Activity: Not on file   Stress: Not on file   Social Connections: Not on file   Intimate Partner Violence: Not on file   Housing Stability: Patient Declined (7/1/2024)    Housing Stability Vital Sign     Unable to Pay for Housing in the Last Year: Patient declined     Number of Places Lived in the Last Year: 1     Unstable Housing in the Last Year: Patient declined       Allergies:  Allergies   Allergen Reactions    Bee Venom Swelling and Dermatitis    Penicillins Hives, Swelling and Dermatitis    Other      All metals except gold and platinum, welts/blisters       Physical Exam:  BP (!) 140/60   Pulse 81   Temp 98.1 °F (36.7 °C)   Ht 1.549 m (5' 1\")   Wt 56.2 kg (124 lb)   LMP 05/28/1991   SpO2 98%   BMI 23.43 kg/m²   GENERAL: Alert, oriented x 3, not in acute distress.  HEENT: PERRLA; EOMI. Oropharynx clear.  .  NECK: Supple.  No palpable lymphadenopathy.  LUNGS: Good air entry bilaterally. No wheezing, crackles or rhonchi.   BREASTS: She is s/p right mastectomy, she has right chest wall ulceration, has slightly increased in size since her last visit, no drainage, she has telangiectasias.    CARDIOVASCULAR: Regular rate. No murmurs, rubs or gallops.   ABDOMEN: Soft. Non-tender, non-distended. Positive bowel sounds.  B ACK: The patient has ecchymosis/bruising on the right back  EXTREMITIES: Dorsum of the foot bruising  NEUROLOGIC: No focal deficits.   ECOG PS 1      Impression/Plan:     1. The patient is a very pleasant 84 y.o. lady with a PMH significant for Right Breast Cancer, stage III, HR pos, was diagnosed in 2009,

## 2024-10-18 DIAGNOSIS — Z51.5 PALLIATIVE CARE BY SPECIALIST: ICD-10-CM

## 2024-10-18 DIAGNOSIS — G89.3 PAIN DUE TO NEOPLASM: ICD-10-CM

## 2024-10-18 DIAGNOSIS — C50.919 MALIGNANT NEOPLASM OF FEMALE BREAST, UNSPECIFIED ESTROGEN RECEPTOR STATUS, UNSPECIFIED LATERALITY, UNSPECIFIED SITE OF BREAST (HCC): ICD-10-CM

## 2024-10-18 RX ORDER — HYDROCODONE BITARTRATE AND ACETAMINOPHEN 5; 325 MG/1; MG/1
1 TABLET ORAL EVERY 6 HOURS PRN
Qty: 120 TABLET | Refills: 0 | Status: SHIPPED | OUTPATIENT
Start: 2024-10-18 | End: 2024-11-17

## 2024-10-18 NOTE — TELEPHONE ENCOUNTER
Patient Patricia called for refill hydrocodone-acetaminophen 5-325mg. Next appointment 12-. [facility]. Routed call to A Leia, NP

## 2024-10-29 ENCOUNTER — OFFICE VISIT (OUTPATIENT)
Dept: ONCOLOGY | Age: 84
End: 2024-10-29

## 2024-10-29 ENCOUNTER — HOSPITAL ENCOUNTER (OUTPATIENT)
Dept: INFUSION THERAPY | Age: 84
Discharge: HOME | End: 2024-10-29
Payer: MEDICARE

## 2024-10-29 VITALS
DIASTOLIC BLOOD PRESSURE: 68 MMHG | HEIGHT: 61 IN | TEMPERATURE: 97.7 F | BODY MASS INDEX: 23.18 KG/M2 | RESPIRATION RATE: 16 BRPM | SYSTOLIC BLOOD PRESSURE: 156 MMHG | HEART RATE: 73 BPM | WEIGHT: 122.8 LBS

## 2024-10-29 DIAGNOSIS — D53.9 MACROCYTIC ANEMIA: Primary | ICD-10-CM

## 2024-10-29 DIAGNOSIS — D46.9 MDS (MYELODYSPLASTIC SYNDROME) (HCC): ICD-10-CM

## 2024-10-29 DIAGNOSIS — R74.01 TRANSAMINITIS: ICD-10-CM

## 2024-10-29 DIAGNOSIS — D46.9 MDS (MYELODYSPLASTIC SYNDROME) (HCC): Primary | ICD-10-CM

## 2024-10-29 DIAGNOSIS — Z17.0 MALIGNANT NEOPLASM OF RIGHT BREAST IN FEMALE, ESTROGEN RECEPTOR POSITIVE, UNSPECIFIED SITE OF BREAST (HCC): ICD-10-CM

## 2024-10-29 DIAGNOSIS — C50.911 MALIGNANT NEOPLASM OF RIGHT BREAST IN FEMALE, ESTROGEN RECEPTOR POSITIVE, UNSPECIFIED SITE OF BREAST (HCC): ICD-10-CM

## 2024-10-29 DIAGNOSIS — D63.8 ANEMIA OF CHRONIC DISEASE: ICD-10-CM

## 2024-10-29 DIAGNOSIS — D46.A MYELODYSPLASTIC SYNDROME WITH MULTILINEAGE DYSPLASIA (HCC): ICD-10-CM

## 2024-10-29 LAB
ALBUMIN SERPL-MCNC: 4.4 G/DL (ref 3.5–5.2)
ALP SERPL-CCNC: 52 U/L (ref 35–104)
ALT SERPL-CCNC: 45 U/L (ref 0–32)
ANION GAP SERPL CALCULATED.3IONS-SCNC: 8 MMOL/L (ref 7–16)
AST SERPL-CCNC: 42 U/L (ref 0–31)
BASOPHILS # BLD: 0 K/UL (ref 0–0.2)
BASOPHILS NFR BLD: 0 % (ref 0–2)
BILIRUB SERPL-MCNC: 1.6 MG/DL (ref 0–1.2)
BUN SERPL-MCNC: 17 MG/DL (ref 6–23)
CALCIUM SERPL-MCNC: 9.3 MG/DL (ref 8.6–10.2)
CHLORIDE SERPL-SCNC: 104 MMOL/L (ref 98–107)
CO2 SERPL-SCNC: 27 MMOL/L (ref 22–29)
CREAT SERPL-MCNC: 0.8 MG/DL (ref 0.5–1)
EOSINOPHIL # BLD: 0.09 K/UL (ref 0.05–0.5)
EOSINOPHILS RELATIVE PERCENT: 2 % (ref 0–6)
ERYTHROCYTE [DISTWIDTH] IN BLOOD BY AUTOMATED COUNT: 29.1 % (ref 11.5–15)
GFR, ESTIMATED: 72 ML/MIN/1.73M2
GLUCOSE SERPL-MCNC: 124 MG/DL (ref 74–99)
HCT VFR BLD AUTO: 28.4 % (ref 34–48)
HGB BLD-MCNC: 9 G/DL (ref 11.5–15.5)
LYMPHOCYTES NFR BLD: 1.68 K/UL (ref 1.5–4)
LYMPHOCYTES RELATIVE PERCENT: 39 % (ref 20–42)
MCH RBC QN AUTO: 38 PG (ref 26–35)
MCHC RBC AUTO-ENTMCNC: 31.7 G/DL (ref 32–34.5)
MCV RBC AUTO: 119.8 FL (ref 80–99.9)
MONOCYTES NFR BLD: 0.34 K/UL (ref 0.1–0.95)
MONOCYTES NFR BLD: 8 % (ref 2–12)
NEUTROPHILS NFR BLD: 51 % (ref 43–80)
NEUTS SEG NFR BLD: 2.19 K/UL (ref 1.8–7.3)
PLATELET # BLD AUTO: 201 K/UL (ref 130–450)
PMV BLD AUTO: 11.8 FL (ref 7–12)
POTASSIUM SERPL-SCNC: 4.7 MMOL/L (ref 3.5–5)
PROT SERPL-MCNC: 7.2 G/DL (ref 6.4–8.3)
RBC # BLD AUTO: 2.37 M/UL (ref 3.5–5.5)
RBC # BLD: ABNORMAL 10*6/UL
SODIUM SERPL-SCNC: 139 MMOL/L (ref 132–146)
WBC OTHER # BLD: 4.3 K/UL (ref 4.5–11.5)

## 2024-10-29 PROCEDURE — 85025 COMPLETE CBC W/AUTO DIFF WBC: CPT

## 2024-10-29 PROCEDURE — 80053 COMPREHEN METABOLIC PANEL: CPT

## 2024-10-29 PROCEDURE — 6360000002 HC RX W HCPCS: Performed by: INTERNAL MEDICINE

## 2024-10-29 PROCEDURE — 36415 COLL VENOUS BLD VENIPUNCTURE: CPT

## 2024-10-29 PROCEDURE — 96372 THER/PROPH/DIAG INJ SC/IM: CPT

## 2024-10-29 RX ADMIN — DARBEPOETIN ALFA 500 MCG: 500 INJECTION, SOLUTION INTRAVENOUS; SUBCUTANEOUS at 10:46

## 2024-10-29 NOTE — PROGRESS NOTES
[facility] PHYSICIANS [facility] MED ONCOLOGY  [address]  [facility] [address]  Dept: [phone]  Loc: [phone]  Attending Progress Note      Reason for Visit:   1. Right Breast Cancer.                                   2. MDS.      Referring Physician:  CONNIE DAS CNP    PCP:  Roselia Moralez APRN - CNP    History of Present Illness:      The patient is a very pleasant 84 y.o. lady with a PMH significant for Right Breast Cancer, stage III, HR pos, was diagnosed in 2009, she had a right mastectomy done, followed by adjuvant chemo, she received 8 cycles, probably AC followed by T, then PMRT, and 5 years of adjuvant ET with Arimidex, was completed in 2015. She was treated in [place] under the care of Dr. Simpson. She was diagnosed with MDS in 2017, she received ESAs and PRBCs transfusion. She has transfusion related iron overload. She was started on Aranesp on 4/24/2020.  No SE from Aranesp.     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  The patient had a follow-up with Dr. Melendez, he recommended conservative medical management with DAPT.      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  She had worsening anemia after the procedure requiring packed RBCs transfusion.  The patient did well after the procedure.    The patient returns for a follow-up visit, she is feeling tired, but remains very active.  No bleeding.    Review of

## 2024-11-04 ENCOUNTER — TELEPHONE (OUTPATIENT)
Dept: NEUROSURGERY | Age: 84
End: 2024-11-04

## 2024-11-04 NOTE — TELEPHONE ENCOUNTER
Patient had sent placement in July, she is getting dental work done and wants to know if she can, please call her at [phone].    Called and left message for patient-she is ok to dental work.

## 2024-11-06 DIAGNOSIS — D46.9 MDS (MYELODYSPLASTIC SYNDROME) (HCC): Primary | ICD-10-CM

## 2024-11-12 ENCOUNTER — HOSPITAL ENCOUNTER (OUTPATIENT)
Dept: INFUSION THERAPY | Age: 84
Discharge: HOME | End: 2024-11-12
Payer: MEDICARE

## 2024-11-12 ENCOUNTER — OFFICE VISIT (OUTPATIENT)
Dept: ONCOLOGY | Age: 84
End: 2024-11-12

## 2024-11-12 VITALS
WEIGHT: 122.8 LBS | HEART RATE: 82 BPM | DIASTOLIC BLOOD PRESSURE: 65 MMHG | HEIGHT: 61 IN | SYSTOLIC BLOOD PRESSURE: 140 MMHG | OXYGEN SATURATION: 97 % | TEMPERATURE: 97.9 F | RESPIRATION RATE: 18 BRPM | BODY MASS INDEX: 23.18 KG/M2

## 2024-11-12 DIAGNOSIS — R31.9 HEMATURIA, UNSPECIFIED TYPE: ICD-10-CM

## 2024-11-12 DIAGNOSIS — D46.A MYELODYSPLASTIC SYNDROME WITH MULTILINEAGE DYSPLASIA (HCC): ICD-10-CM

## 2024-11-12 DIAGNOSIS — D46.9 MDS (MYELODYSPLASTIC SYNDROME) (HCC): ICD-10-CM

## 2024-11-12 DIAGNOSIS — D53.9 MACROCYTIC ANEMIA: Primary | ICD-10-CM

## 2024-11-12 DIAGNOSIS — R74.01 TRANSAMINITIS: ICD-10-CM

## 2024-11-12 DIAGNOSIS — D53.9 MACROCYTIC ANEMIA: ICD-10-CM

## 2024-11-12 DIAGNOSIS — D63.8 ANEMIA OF CHRONIC DISEASE: Primary | ICD-10-CM

## 2024-11-12 LAB
ALBUMIN SERPL-MCNC: 4.4 G/DL (ref 3.5–5.2)
ALBUMIN SERPL-MCNC: 4.5 G/DL (ref 3.5–5.2)
ALP SERPL-CCNC: 51 U/L (ref 35–104)
ALP SERPL-CCNC: 53 U/L (ref 35–104)
ALT SERPL-CCNC: 47 U/L (ref 0–32)
ALT SERPL-CCNC: 48 U/L (ref 0–32)
ANION GAP SERPL CALCULATED.3IONS-SCNC: 11 MMOL/L (ref 7–16)
AST SERPL-CCNC: 43 U/L (ref 0–31)
AST SERPL-CCNC: 45 U/L (ref 0–31)
BACTERIA URNS QL MICRO: ABNORMAL
BASOPHILS # BLD: 0.04 K/UL (ref 0–0.2)
BASOPHILS NFR BLD: 1 % (ref 0–2)
BILIRUB DIRECT SERPL-MCNC: 0.3 MG/DL (ref 0–0.3)
BILIRUB INDIRECT SERPL-MCNC: 1.1 MG/DL (ref 0–1)
BILIRUB SERPL-MCNC: 1.3 MG/DL (ref 0–1.2)
BILIRUB SERPL-MCNC: 1.4 MG/DL (ref 0–1.2)
BILIRUB UR QL STRIP: NEGATIVE
BUN SERPL-MCNC: 24 MG/DL (ref 6–23)
CALCIUM SERPL-MCNC: 9.2 MG/DL (ref 8.6–10.2)
CASTS #/AREA URNS LPF: ABNORMAL /LPF
CHLORIDE SERPL-SCNC: 105 MMOL/L (ref 98–107)
CLARITY UR: CLEAR
CO2 SERPL-SCNC: 25 MMOL/L (ref 22–29)
COLOR UR: YELLOW
CREAT SERPL-MCNC: 0.8 MG/DL (ref 0.5–1)
EOSINOPHIL # BLD: 0.12 K/UL (ref 0.05–0.5)
EOSINOPHILS RELATIVE PERCENT: 3 % (ref 0–6)
EPI CELLS #/AREA URNS HPF: ABNORMAL /HPF
ERYTHROCYTE [DISTWIDTH] IN BLOOD BY AUTOMATED COUNT: 29.2 % (ref 11.5–15)
GFR, ESTIMATED: 74 ML/MIN/1.73M2
GLUCOSE SERPL-MCNC: 132 MG/DL (ref 74–99)
GLUCOSE UR STRIP-MCNC: NEGATIVE MG/DL
HCT VFR BLD AUTO: 28.8 % (ref 34–48)
HGB BLD-MCNC: 9.2 G/DL (ref 11.5–15.5)
HGB UR QL STRIP.AUTO: ABNORMAL
KETONES UR STRIP-MCNC: NEGATIVE MG/DL
LEUKOCYTE ESTERASE UR QL STRIP: NEGATIVE
LYMPHOCYTES NFR BLD: 1.63 K/UL (ref 1.5–4)
LYMPHOCYTES RELATIVE PERCENT: 35 % (ref 20–42)
MCH RBC QN AUTO: 38.2 PG (ref 26–35)
MCHC RBC AUTO-ENTMCNC: 31.9 G/DL (ref 32–34.5)
MCV RBC AUTO: 119.5 FL (ref 80–99.9)
MONOCYTES NFR BLD: 0.33 K/UL (ref 0.1–0.95)
MONOCYTES NFR BLD: 7 % (ref 2–12)
MYELOCYTES ABSOLUTE COUNT: 0.04 K/UL
MYELOCYTES: 1 %
NEUTROPHILS NFR BLD: 54 % (ref 43–80)
NEUTS SEG NFR BLD: 2.53 K/UL (ref 1.8–7.3)
NITRITE UR QL STRIP: NEGATIVE
NUCLEATED RED BLOOD CELLS: 4 PER 100 WBC
PH UR STRIP: 5.5 [PH] (ref 5–9)
PLATELET # BLD AUTO: 169 K/UL (ref 130–450)
PMV BLD AUTO: 11 FL (ref 7–12)
POTASSIUM SERPL-SCNC: 4.5 MMOL/L (ref 3.5–5)
PROT SERPL-MCNC: 7.3 G/DL (ref 6.4–8.3)
PROT SERPL-MCNC: 7.7 G/DL (ref 6.4–8.3)
PROT UR STRIP-MCNC: NEGATIVE MG/DL
RBC # BLD AUTO: 2.41 M/UL (ref 3.5–5.5)
RBC # BLD: ABNORMAL 10*6/UL
RBC #/AREA URNS HPF: ABNORMAL /HPF
SODIUM SERPL-SCNC: 141 MMOL/L (ref 132–146)
SP GR UR STRIP: >1.03 (ref 1–1.03)
UROBILINOGEN UR STRIP-ACNC: 0.2 EU/DL (ref 0–1)
WBC #/AREA URNS HPF: ABNORMAL /HPF
WBC OTHER # BLD: 4.7 K/UL (ref 4.5–11.5)

## 2024-11-12 PROCEDURE — 96372 THER/PROPH/DIAG INJ SC/IM: CPT

## 2024-11-12 PROCEDURE — 85025 COMPLETE CBC W/AUTO DIFF WBC: CPT

## 2024-11-12 PROCEDURE — 36415 COLL VENOUS BLD VENIPUNCTURE: CPT

## 2024-11-12 PROCEDURE — 87086 URINE CULTURE/COLONY COUNT: CPT

## 2024-11-12 PROCEDURE — 6360000002 HC RX W HCPCS: Performed by: INTERNAL MEDICINE

## 2024-11-12 PROCEDURE — 80076 HEPATIC FUNCTION PANEL: CPT

## 2024-11-12 PROCEDURE — 80053 COMPREHEN METABOLIC PANEL: CPT

## 2024-11-12 PROCEDURE — 81001 URINALYSIS AUTO W/SCOPE: CPT

## 2024-11-12 RX ORDER — ERGOCALCIFEROL 1.25 MG/1
50000 CAPSULE, LIQUID FILLED ORAL WEEKLY
COMMUNITY
Start: 2024-11-09

## 2024-11-12 RX ADMIN — DARBEPOETIN ALFA 500 MCG: 500 INJECTION, SOLUTION INTRAVENOUS; SUBCUTANEOUS at 11:33

## 2024-11-12 NOTE — PROGRESS NOTES
[facility] PHYSICIANS [facility] MED ONCOLOGY  [address]  [facility] [address]  Dept: [phone]  Loc: [phone]  Attending Progress Note      Reason for Visit:   1. Right Breast Cancer.                                   2. MDS.      Referring Physician:  CONNIE DAS CNP    PCP:  Roselia Moralez APRN - CNP    History of Present Illness:      The patient is a very pleasant 84 y.o. lady with a PMH significant for Right Breast Cancer, stage III, HR pos, was diagnosed in 2009, she had a right mastectomy done, followed by adjuvant chemo, she received 8 cycles, probably AC followed by T, then PMRT, and 5 years of adjuvant ET with Arimidex, was completed in 2015. She was treated in [place] under the care of Dr. Simpson. She was diagnosed with MDS in 2017, she received ESAs and PRBCs transfusion. She has transfusion related iron overload. She was started on Aranesp on 4/24/2020.  No SE from Aranesp.     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  The patient had a follow-up with Dr. Melendez, he recommended conservative medical management with DAPT.      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  She had worsening anemia after the procedure requiring packed RBCs transfusion.  The patient did well after the procedure.    The patient returns for a follow-up visit, she is feeling tired, but remains very active.  She has hematuria.    Review of

## 2024-11-13 LAB
MICROORGANISM SPEC CULT: ABNORMAL
SERVICE CMNT-IMP: ABNORMAL
SPECIMEN DESCRIPTION: ABNORMAL

## 2024-11-19 NOTE — PROGRESS NOTES
Called to get mammogram scheduled, scheduling number provided. Patient verbalizes understanding and 'will call right now'

## 2024-11-26 ENCOUNTER — OFFICE VISIT (OUTPATIENT)
Dept: ONCOLOGY | Age: 84
End: 2024-11-26
Payer: MEDICARE

## 2024-11-26 ENCOUNTER — HOSPITAL ENCOUNTER (OUTPATIENT)
Dept: INFUSION THERAPY | Age: 84
Discharge: HOME | End: 2024-11-26
Payer: MEDICARE

## 2024-11-26 VITALS
TEMPERATURE: 98.1 F | HEIGHT: 61 IN | OXYGEN SATURATION: 98 % | HEART RATE: 73 BPM | BODY MASS INDEX: 23.73 KG/M2 | DIASTOLIC BLOOD PRESSURE: 61 MMHG | WEIGHT: 125.7 LBS | SYSTOLIC BLOOD PRESSURE: 130 MMHG

## 2024-11-26 DIAGNOSIS — D53.9 MACROCYTIC ANEMIA: Primary | ICD-10-CM

## 2024-11-26 DIAGNOSIS — D63.8 ANEMIA OF CHRONIC DISEASE: Primary | ICD-10-CM

## 2024-11-26 DIAGNOSIS — R74.01 TRANSAMINITIS: ICD-10-CM

## 2024-11-26 DIAGNOSIS — Z17.0 MALIGNANT NEOPLASM OF RIGHT BREAST IN FEMALE, ESTROGEN RECEPTOR POSITIVE, UNSPECIFIED SITE OF BREAST (HCC): ICD-10-CM

## 2024-11-26 DIAGNOSIS — D46.9 MDS (MYELODYSPLASTIC SYNDROME) (HCC): ICD-10-CM

## 2024-11-26 DIAGNOSIS — D46.A MYELODYSPLASTIC SYNDROME WITH MULTILINEAGE DYSPLASIA (HCC): ICD-10-CM

## 2024-11-26 DIAGNOSIS — D53.9 MACROCYTIC ANEMIA: ICD-10-CM

## 2024-11-26 DIAGNOSIS — C50.911 MALIGNANT NEOPLASM OF RIGHT BREAST IN FEMALE, ESTROGEN RECEPTOR POSITIVE, UNSPECIFIED SITE OF BREAST (HCC): ICD-10-CM

## 2024-11-26 LAB
ALBUMIN SERPL-MCNC: 4.3 G/DL (ref 3.5–5.2)
ALP SERPL-CCNC: 47 U/L (ref 35–104)
ALT SERPL-CCNC: 41 U/L (ref 0–32)
ANION GAP SERPL CALCULATED.3IONS-SCNC: 11 MMOL/L (ref 7–16)
AST SERPL-CCNC: 33 U/L (ref 0–31)
BASOPHILS # BLD: 0 K/UL (ref 0–0.2)
BASOPHILS NFR BLD: 0 % (ref 0–2)
BILIRUB SERPL-MCNC: 1.2 MG/DL (ref 0–1.2)
BUN SERPL-MCNC: 18 MG/DL (ref 6–23)
CALCIUM SERPL-MCNC: 9.2 MG/DL (ref 8.6–10.2)
CHLORIDE SERPL-SCNC: 108 MMOL/L (ref 98–107)
CO2 SERPL-SCNC: 24 MMOL/L (ref 22–29)
CREAT SERPL-MCNC: 1 MG/DL (ref 0.5–1)
DAT POLY-SP REAG RBC QL: NEGATIVE
EOSINOPHIL # BLD: 0.04 K/UL (ref 0.05–0.5)
EOSINOPHILS RELATIVE PERCENT: 1 % (ref 0–6)
ERYTHROCYTE [DISTWIDTH] IN BLOOD BY AUTOMATED COUNT: 27.9 % (ref 11.5–15)
GFR, ESTIMATED: 57 ML/MIN/1.73M2
GLUCOSE SERPL-MCNC: 90 MG/DL (ref 74–99)
HCT VFR BLD AUTO: 26.2 % (ref 34–48)
HGB BLD-MCNC: 8.4 G/DL (ref 11.5–15.5)
IMM RETICS NFR: 16.5 % (ref 3–15.9)
LDH SERPL-CCNC: 229 U/L (ref 135–214)
LYMPHOCYTES NFR BLD: 1.74 K/UL (ref 1.5–4)
LYMPHOCYTES RELATIVE PERCENT: 35 % (ref 20–42)
MCH RBC QN AUTO: 37.8 PG (ref 26–35)
MCHC RBC AUTO-ENTMCNC: 32.1 G/DL (ref 32–34.5)
MCV RBC AUTO: 118 FL (ref 80–99.9)
MONOCYTES NFR BLD: 0.22 K/UL (ref 0.1–0.95)
MONOCYTES NFR BLD: 4 % (ref 2–12)
NEUTROPHILS NFR BLD: 60 % (ref 43–80)
NEUTS SEG NFR BLD: 3 K/UL (ref 1.8–7.3)
PLATELET, FLUORESCENCE: 156 K/UL (ref 130–450)
PMV BLD AUTO: 12.2 FL (ref 7–12)
POTASSIUM SERPL-SCNC: 3.8 MMOL/L (ref 3.5–5)
PROT SERPL-MCNC: 6.9 G/DL (ref 6.4–8.3)
RBC # BLD AUTO: 2.22 M/UL (ref 3.5–5.5)
RBC # BLD: ABNORMAL 10*6/UL
RETIC HEMOGLOBIN: 30.6 PG (ref 28.2–36.6)
RETICS # AUTO: 0.05 M/UL
RETICS/RBC NFR AUTO: 2.1 % (ref 0.4–1.9)
SODIUM SERPL-SCNC: 143 MMOL/L (ref 132–146)
WBC OTHER # BLD: 5 K/UL (ref 4.5–11.5)

## 2024-11-26 PROCEDURE — 99214 OFFICE O/P EST MOD 30 MIN: CPT | Performed by: INTERNAL MEDICINE

## 2024-11-26 PROCEDURE — 36415 COLL VENOUS BLD VENIPUNCTURE: CPT

## 2024-11-26 PROCEDURE — 1160F RVW MEDS BY RX/DR IN RCRD: CPT | Performed by: INTERNAL MEDICINE

## 2024-11-26 PROCEDURE — 86880 COOMBS TEST DIRECT: CPT

## 2024-11-26 PROCEDURE — 6360000002 HC RX W HCPCS: Performed by: INTERNAL MEDICINE

## 2024-11-26 PROCEDURE — 1125F AMNT PAIN NOTED PAIN PRSNT: CPT | Performed by: INTERNAL MEDICINE

## 2024-11-26 PROCEDURE — 1159F MED LIST DOCD IN RCRD: CPT | Performed by: INTERNAL MEDICINE

## 2024-11-26 PROCEDURE — 99212 OFFICE O/P EST SF 10 MIN: CPT | Performed by: INTERNAL MEDICINE

## 2024-11-26 PROCEDURE — 96372 THER/PROPH/DIAG INJ SC/IM: CPT

## 2024-11-26 PROCEDURE — 83615 LACTATE (LD) (LDH) ENZYME: CPT

## 2024-11-26 PROCEDURE — 80053 COMPREHEN METABOLIC PANEL: CPT

## 2024-11-26 PROCEDURE — 85045 AUTOMATED RETICULOCYTE COUNT: CPT

## 2024-11-26 PROCEDURE — 85025 COMPLETE CBC W/AUTO DIFF WBC: CPT

## 2024-11-26 PROCEDURE — 1123F ACP DISCUSS/DSCN MKR DOCD: CPT | Performed by: INTERNAL MEDICINE

## 2024-11-26 RX ADMIN — DARBEPOETIN ALFA 500 MCG: 500 INJECTION, SOLUTION INTRAVENOUS; SUBCUTANEOUS at 10:31

## 2024-11-26 NOTE — PROGRESS NOTES
[facility] PHYSICIANS [facility] MED ONCOLOGY  [address]  [facility] [address]  Dept: [phone]  Loc: [phone]  Attending Progress Note      Reason for Visit:   1. Right Breast Cancer.                                   2. MDS.      Referring Physician:  CONNIE DAS CNP    PCP:  Roselia Moralez APRN - CNP    History of Present Illness:      The patient is a very pleasant 84 y.o. lady with a PMH significant for Right Breast Cancer, stage III, HR pos, was diagnosed in 2009, she had a right mastectomy done, followed by adjuvant chemo, she received 8 cycles, probably AC followed by T, then PMRT, and 5 years of adjuvant ET with Arimidex, was completed in 2015. She was treated in [place] under the care of Dr. Simpson. She was diagnosed with MDS in 2017, she received ESAs and PRBCs transfusion. She has transfusion related iron overload. She was started on Aranesp on 4/24/2020.  No SE from Aranesp.     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  The patient had a follow-up with Dr. Melendez, he recommended conservative medical management with DAPT.      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  She had worsening anemia after the procedure requiring packed RBCs transfusion.  The patient did well after the procedure.    The patient returns for a follow-up visit, she is feeling tired, remains active, no bleeding, no dark urine or

## 2024-12-05 DIAGNOSIS — G89.3 PAIN DUE TO NEOPLASM: ICD-10-CM

## 2024-12-05 DIAGNOSIS — C50.919 MALIGNANT NEOPLASM OF FEMALE BREAST, UNSPECIFIED ESTROGEN RECEPTOR STATUS, UNSPECIFIED LATERALITY, UNSPECIFIED SITE OF BREAST (HCC): ICD-10-CM

## 2024-12-05 DIAGNOSIS — Z51.5 PALLIATIVE CARE BY SPECIALIST: ICD-10-CM

## 2024-12-05 RX ORDER — HYDROCODONE BITARTRATE AND ACETAMINOPHEN 5; 325 MG/1; MG/1
1 TABLET ORAL EVERY 6 HOURS PRN
Qty: 120 TABLET | Refills: 0 | Status: SHIPPED | OUTPATIENT
Start: 2024-12-05 | End: 2025-01-04

## 2024-12-05 NOTE — TELEPHONE ENCOUNTER
Call from Patricia requesting refill for [place]. Pharmacy is [facility]. Next natalie 12/10/24.

## 2024-12-10 ENCOUNTER — HOSPITAL ENCOUNTER (OUTPATIENT)
Dept: INFUSION THERAPY | Age: 84
Discharge: HOME | End: 2024-12-10
Payer: MEDICARE

## 2024-12-10 ENCOUNTER — OFFICE VISIT (OUTPATIENT)
Dept: PALLATIVE CARE | Age: 84
End: 2024-12-10
Payer: MEDICARE

## 2024-12-10 ENCOUNTER — OFFICE VISIT (OUTPATIENT)
Dept: ONCOLOGY | Age: 84
End: 2024-12-10
Payer: MEDICARE

## 2024-12-10 VITALS
HEIGHT: 61 IN | DIASTOLIC BLOOD PRESSURE: 69 MMHG | HEART RATE: 78 BPM | WEIGHT: 120.9 LBS | TEMPERATURE: 97.4 F | OXYGEN SATURATION: 96 % | SYSTOLIC BLOOD PRESSURE: 152 MMHG | BODY MASS INDEX: 22.83 KG/M2

## 2024-12-10 VITALS — BODY MASS INDEX: 22.86 KG/M2 | WEIGHT: 121 LBS

## 2024-12-10 DIAGNOSIS — D53.9 MACROCYTIC ANEMIA: Primary | ICD-10-CM

## 2024-12-10 DIAGNOSIS — D46.9 MDS (MYELODYSPLASTIC SYNDROME) (HCC): ICD-10-CM

## 2024-12-10 DIAGNOSIS — D63.8 ANEMIA OF CHRONIC DISEASE: Primary | ICD-10-CM

## 2024-12-10 DIAGNOSIS — C50.919 MALIGNANT NEOPLASM OF FEMALE BREAST, UNSPECIFIED ESTROGEN RECEPTOR STATUS, UNSPECIFIED LATERALITY, UNSPECIFIED SITE OF BREAST (HCC): Primary | ICD-10-CM

## 2024-12-10 DIAGNOSIS — R74.01 TRANSAMINITIS: ICD-10-CM

## 2024-12-10 DIAGNOSIS — D46.A MYELODYSPLASTIC SYNDROME WITH MULTILINEAGE DYSPLASIA (HCC): ICD-10-CM

## 2024-12-10 LAB
ALBUMIN SERPL-MCNC: 4.4 G/DL (ref 3.5–5.2)
ALP SERPL-CCNC: 62 U/L (ref 35–104)
ALT SERPL-CCNC: 40 U/L (ref 0–32)
ANION GAP SERPL CALCULATED.3IONS-SCNC: 7 MMOL/L (ref 7–16)
AST SERPL-CCNC: 41 U/L (ref 0–31)
BASOPHILS # BLD: 0.04 K/UL (ref 0–0.2)
BASOPHILS NFR BLD: 1 % (ref 0–2)
BILIRUB SERPL-MCNC: 1.1 MG/DL (ref 0–1.2)
BUN SERPL-MCNC: 20 MG/DL (ref 6–23)
CALCIUM SERPL-MCNC: 9.6 MG/DL (ref 8.6–10.2)
CHLORIDE SERPL-SCNC: 105 MMOL/L (ref 98–107)
CO2 SERPL-SCNC: 27 MMOL/L (ref 22–29)
CREAT SERPL-MCNC: 0.9 MG/DL (ref 0.5–1)
EOSINOPHIL # BLD: 0.22 K/UL (ref 0.05–0.5)
EOSINOPHILS RELATIVE PERCENT: 3 % (ref 0–6)
ERYTHROCYTE [DISTWIDTH] IN BLOOD BY AUTOMATED COUNT: 28.9 % (ref 11.5–15)
GFR, ESTIMATED: 64 ML/MIN/1.73M2
GLUCOSE SERPL-MCNC: 126 MG/DL (ref 74–99)
HCT VFR BLD AUTO: 28.1 % (ref 34–48)
HGB BLD-MCNC: 9 G/DL (ref 11.5–15.5)
IMM GRANULOCYTES # BLD AUTO: 0.03 K/UL (ref 0–0.58)
IMM GRANULOCYTES NFR BLD: 0 % (ref 0–5)
LYMPHOCYTES NFR BLD: 2.79 K/UL (ref 1.5–4)
LYMPHOCYTES RELATIVE PERCENT: 40 % (ref 20–42)
MCH RBC QN AUTO: 37.3 PG (ref 26–35)
MCHC RBC AUTO-ENTMCNC: 32 G/DL (ref 32–34.5)
MCV RBC AUTO: 116.6 FL (ref 80–99.9)
MONOCYTES NFR BLD: 0.53 K/UL (ref 0.1–0.95)
MONOCYTES NFR BLD: 8 % (ref 2–12)
NEUTROPHILS NFR BLD: 49 % (ref 43–80)
NEUTS SEG NFR BLD: 3.4 K/UL (ref 1.8–7.3)
PLATELET # BLD AUTO: 200 K/UL (ref 130–450)
PMV BLD AUTO: 11.6 FL (ref 7–12)
POTASSIUM SERPL-SCNC: 4.7 MMOL/L (ref 3.5–5)
PROT SERPL-MCNC: 7.6 G/DL (ref 6.4–8.3)
RBC # BLD AUTO: 2.41 M/UL (ref 3.5–5.5)
RBC # BLD: ABNORMAL 10*6/UL
SODIUM SERPL-SCNC: 139 MMOL/L (ref 132–146)
WBC OTHER # BLD: 7 K/UL (ref 4.5–11.5)

## 2024-12-10 PROCEDURE — 99212 OFFICE O/P EST SF 10 MIN: CPT | Performed by: NURSE PRACTITIONER

## 2024-12-10 PROCEDURE — 1123F ACP DISCUSS/DSCN MKR DOCD: CPT | Performed by: NURSE PRACTITIONER

## 2024-12-10 PROCEDURE — 36415 COLL VENOUS BLD VENIPUNCTURE: CPT

## 2024-12-10 PROCEDURE — 6360000002 HC RX W HCPCS: Performed by: INTERNAL MEDICINE

## 2024-12-10 PROCEDURE — 1159F MED LIST DOCD IN RCRD: CPT | Performed by: NURSE PRACTITIONER

## 2024-12-10 PROCEDURE — 80053 COMPREHEN METABOLIC PANEL: CPT

## 2024-12-10 PROCEDURE — 1125F AMNT PAIN NOTED PAIN PRSNT: CPT | Performed by: INTERNAL MEDICINE

## 2024-12-10 PROCEDURE — 85025 COMPLETE CBC W/AUTO DIFF WBC: CPT

## 2024-12-10 PROCEDURE — 1160F RVW MEDS BY RX/DR IN RCRD: CPT | Performed by: INTERNAL MEDICINE

## 2024-12-10 PROCEDURE — 99211 OFF/OP EST MAY X REQ PHY/QHP: CPT | Performed by: NURSE PRACTITIONER

## 2024-12-10 PROCEDURE — 96372 THER/PROPH/DIAG INJ SC/IM: CPT

## 2024-12-10 PROCEDURE — 1123F ACP DISCUSS/DSCN MKR DOCD: CPT | Performed by: INTERNAL MEDICINE

## 2024-12-10 PROCEDURE — 99214 OFFICE O/P EST MOD 30 MIN: CPT | Performed by: INTERNAL MEDICINE

## 2024-12-10 PROCEDURE — 1159F MED LIST DOCD IN RCRD: CPT | Performed by: INTERNAL MEDICINE

## 2024-12-10 RX ADMIN — DARBEPOETIN ALFA 500 MCG: 500 INJECTION, SOLUTION INTRAVENOUS; SUBCUTANEOUS at 11:08

## 2024-12-10 NOTE — PROGRESS NOTES
Controlled Substance Monitoring   9/17/2024   9:23 AM Possible medication side effects, risk of tolerance/dependence & alternative treatments discussed.;No signs of potential drug abuse or diversion identified.;Assessed functional status (ability to engage in work or other purposeful activities, the pain intensity and its interference with activities of daily living, quality of family life and social activities, and the physical activity);Obtaining appropriate analgesic effect of treatment.       CONNIE Varma - CNP   Palliative Care Department     Time/Communication  Greater than 50% of time spent, total 15 minutes in face-to-face counseling and coordination of care regarding symptom management.    Note: This report was completed using [employer] voiced recognition software.  Every effort has been made to ensure accuracy; however, inadvertent computerized transcription errors may be present.

## 2024-12-10 NOTE — PROGRESS NOTES
[facility] PHYSICIANS [facility] MED ONCOLOGY  [address]  [facility] [address]  Dept: [phone]  Loc: [phone]  Attending Progress Note      Reason for Visit:   1. Right Breast Cancer.                                   2. MDS.      Referring Physician:  CONNIE DAS CNP    PCP:  Roselia Moralez APRN - CNP    History of Present Illness:      The patient is a very pleasant 84 y.o. lady with a PMH significant for Right Breast Cancer, stage III, HR pos, was diagnosed in 2009, she had a right mastectomy done, followed by adjuvant chemo, she received 8 cycles, probably AC followed by T, then PMRT, and 5 years of adjuvant ET with Arimidex, was completed in 2015. She was treated in [place] under the care of Dr. Simpson. She was diagnosed with MDS in 2017, she received ESAs and PRBCs transfusion. She has transfusion related iron overload. She was started on Aranesp on 4/24/2020.  No SE from Aranesp.     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  The patient had a follow-up with Dr. Melenedz, he recommended conservative medical management with DAPT.      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  She had worsening anemia after the procedure requiring packed RBCs transfusion.  The patient did well after the procedure.    The patient returns for a follow-up visit, she is feeling tired, however remains very active.  No bleeding.    Review of

## 2024-12-17 ENCOUNTER — HOSPITAL ENCOUNTER (OUTPATIENT)
Dept: GENERAL RADIOLOGY | Age: 84
Discharge: HOME | End: 2024-12-19
Attending: INTERNAL MEDICINE
Payer: MEDICARE

## 2024-12-17 VITALS — BODY MASS INDEX: 22.66 KG/M2 | WEIGHT: 120 LBS | HEIGHT: 61 IN

## 2024-12-17 DIAGNOSIS — Z12.31 ENCOUNTER FOR SCREENING MAMMOGRAM FOR MALIGNANT NEOPLASM OF BREAST: ICD-10-CM

## 2024-12-17 PROCEDURE — 77063 BREAST TOMOSYNTHESIS BI: CPT

## 2024-12-17 PROCEDURE — 77067 SCR MAMMO BI INCL CAD: CPT

## 2024-12-24 ENCOUNTER — OFFICE VISIT (OUTPATIENT)
Dept: ONCOLOGY | Age: 84
End: 2024-12-24
Payer: MEDICARE

## 2024-12-24 ENCOUNTER — HOSPITAL ENCOUNTER (OUTPATIENT)
Dept: INFUSION THERAPY | Age: 84
Discharge: HOME | End: 2024-12-24
Payer: MEDICARE

## 2024-12-24 VITALS
TEMPERATURE: 97.6 F | HEIGHT: 61 IN | BODY MASS INDEX: 23.22 KG/M2 | OXYGEN SATURATION: 96 % | DIASTOLIC BLOOD PRESSURE: 60 MMHG | SYSTOLIC BLOOD PRESSURE: 120 MMHG | HEART RATE: 79 BPM | WEIGHT: 123 LBS

## 2024-12-24 DIAGNOSIS — D46.9 MDS (MYELODYSPLASTIC SYNDROME) (HCC): ICD-10-CM

## 2024-12-24 DIAGNOSIS — R74.01 TRANSAMINITIS: ICD-10-CM

## 2024-12-24 DIAGNOSIS — D46.A MYELODYSPLASTIC SYNDROME WITH MULTILINEAGE DYSPLASIA (HCC): ICD-10-CM

## 2024-12-24 DIAGNOSIS — D53.9 MACROCYTIC ANEMIA: Primary | ICD-10-CM

## 2024-12-24 DIAGNOSIS — D63.8 ANEMIA OF CHRONIC DISEASE: Primary | ICD-10-CM

## 2024-12-24 LAB
ALBUMIN SERPL-MCNC: 4.4 G/DL (ref 3.5–5.2)
ALP SERPL-CCNC: 53 U/L (ref 35–104)
ALT SERPL-CCNC: 34 U/L (ref 0–32)
ANION GAP SERPL CALCULATED.3IONS-SCNC: 11 MMOL/L (ref 7–16)
AST SERPL-CCNC: 38 U/L (ref 0–31)
BASOPHILS # BLD: 0.03 K/UL (ref 0–0.2)
BASOPHILS NFR BLD: 1 % (ref 0–2)
BILIRUB SERPL-MCNC: 1 MG/DL (ref 0–1.2)
BUN SERPL-MCNC: 18 MG/DL (ref 6–23)
CALCIUM SERPL-MCNC: 9.3 MG/DL (ref 8.6–10.2)
CHLORIDE SERPL-SCNC: 102 MMOL/L (ref 98–107)
CO2 SERPL-SCNC: 27 MMOL/L (ref 22–29)
CREAT SERPL-MCNC: 0.8 MG/DL (ref 0.5–1)
EOSINOPHIL # BLD: 0.18 K/UL (ref 0.05–0.5)
EOSINOPHILS RELATIVE PERCENT: 3 % (ref 0–6)
ERYTHROCYTE [DISTWIDTH] IN BLOOD BY AUTOMATED COUNT: 29.7 % (ref 11.5–15)
GFR, ESTIMATED: 73 ML/MIN/1.73M2
GLUCOSE SERPL-MCNC: 103 MG/DL (ref 74–99)
HCT VFR BLD AUTO: 30.9 % (ref 34–48)
HGB BLD-MCNC: 9.7 G/DL (ref 11.5–15.5)
IMM GRANULOCYTES # BLD AUTO: 0.06 K/UL (ref 0–0.58)
IMM GRANULOCYTES NFR BLD: 1 % (ref 0–5)
LYMPHOCYTES NFR BLD: 2.61 K/UL (ref 1.5–4)
LYMPHOCYTES RELATIVE PERCENT: 43 % (ref 20–42)
MCH RBC QN AUTO: 37.7 PG (ref 26–35)
MCHC RBC AUTO-ENTMCNC: 31.4 G/DL (ref 32–34.5)
MCV RBC AUTO: 120.2 FL (ref 80–99.9)
MONOCYTES NFR BLD: 0.49 K/UL (ref 0.1–0.95)
MONOCYTES NFR BLD: 8 % (ref 2–12)
NEUTROPHILS NFR BLD: 45 % (ref 43–80)
NEUTS SEG NFR BLD: 2.75 K/UL (ref 1.8–7.3)
PLATELET # BLD AUTO: 176 K/UL (ref 130–450)
PMV BLD AUTO: 12.1 FL (ref 7–12)
POTASSIUM SERPL-SCNC: 4.6 MMOL/L (ref 3.5–5)
PROT SERPL-MCNC: 7.5 G/DL (ref 6.4–8.3)
RBC # BLD AUTO: 2.57 M/UL (ref 3.5–5.5)
RBC # BLD: ABNORMAL 10*6/UL
SODIUM SERPL-SCNC: 140 MMOL/L (ref 132–146)
WBC OTHER # BLD: 6.1 K/UL (ref 4.5–11.5)

## 2024-12-24 PROCEDURE — 80053 COMPREHEN METABOLIC PANEL: CPT

## 2024-12-24 PROCEDURE — 1159F MED LIST DOCD IN RCRD: CPT | Performed by: INTERNAL MEDICINE

## 2024-12-24 PROCEDURE — 99214 OFFICE O/P EST MOD 30 MIN: CPT | Performed by: INTERNAL MEDICINE

## 2024-12-24 PROCEDURE — 1123F ACP DISCUSS/DSCN MKR DOCD: CPT | Performed by: INTERNAL MEDICINE

## 2024-12-24 PROCEDURE — 6360000002 HC RX W HCPCS: Performed by: INTERNAL MEDICINE

## 2024-12-24 PROCEDURE — 99212 OFFICE O/P EST SF 10 MIN: CPT

## 2024-12-24 PROCEDURE — 1160F RVW MEDS BY RX/DR IN RCRD: CPT | Performed by: INTERNAL MEDICINE

## 2024-12-24 PROCEDURE — 85025 COMPLETE CBC W/AUTO DIFF WBC: CPT

## 2024-12-24 PROCEDURE — 96372 THER/PROPH/DIAG INJ SC/IM: CPT

## 2024-12-24 PROCEDURE — 36415 COLL VENOUS BLD VENIPUNCTURE: CPT

## 2024-12-24 RX ADMIN — DARBEPOETIN ALFA 500 MCG: 500 INJECTION, SOLUTION INTRAVENOUS; SUBCUTANEOUS at 10:36

## 2024-12-24 NOTE — PROGRESS NOTES
[facility] PHYSICIANS [facility] MED ONCOLOGY  [address]  [facility] [address]  Dept: [phone]  Loc: [phone]  Attending Progress Note      Reason for Visit:   1. Right Breast Cancer.                                   2. MDS.      Referring Physician:  CONNIE DAS CNP    PCP:  Roselia Moralez APRN - CNP    History of Present Illness:      The patient is a very pleasant 84 y.o. lady with a PMH significant for Right Breast Cancer, stage III, HR pos, was diagnosed in 2009, she had a right mastectomy done, followed by adjuvant chemo, she received 8 cycles, probably AC followed by T, then PMRT, and 5 years of adjuvant ET with Arimidex, was completed in 2015. She was treated in [place] under the care of Dr. Simpson. She was diagnosed with MDS in 2017, she received ESAs and PRBCs transfusion. She has transfusion related iron overload. She was started on Aranesp on 4/24/2020.  No SE from Aranesp.     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  The patient had a follow-up with Dr. Melendez, he recommended conservative medical management with DAPT.      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  She had worsening anemia after the procedure requiring packed RBCs transfusion.  The patient did well after the procedure.    The patient returns for a follow-up visit, she is feeling tired, however remains very active.  No bleeding.  No new

## 2025-01-02 DIAGNOSIS — G89.3 PAIN DUE TO NEOPLASM: ICD-10-CM

## 2025-01-02 DIAGNOSIS — Z51.5 PALLIATIVE CARE BY SPECIALIST: ICD-10-CM

## 2025-01-02 DIAGNOSIS — C50.919 MALIGNANT NEOPLASM OF FEMALE BREAST, UNSPECIFIED ESTROGEN RECEPTOR STATUS, UNSPECIFIED LATERALITY, UNSPECIFIED SITE OF BREAST (HCC): ICD-10-CM

## 2025-01-02 RX ORDER — HYDROCODONE BITARTRATE AND ACETAMINOPHEN 5; 325 MG/1; MG/1
1 TABLET ORAL EVERY 6 HOURS PRN
Qty: 120 TABLET | Refills: 0 | Status: SHIPPED | OUTPATIENT
Start: 2025-01-02 | End: 2025-02-01

## 2025-01-02 NOTE — TELEPHONE ENCOUNTER
Call from Patricia's daughter , Sabra, requesting a refill for her mother's [place]. [facility]. Next natalie 3/4/25.

## 2025-01-07 ENCOUNTER — HOSPITAL ENCOUNTER (OUTPATIENT)
Dept: INFUSION THERAPY | Age: 85
Discharge: HOME | End: 2025-01-07
Payer: MEDICARE

## 2025-01-07 ENCOUNTER — OFFICE VISIT (OUTPATIENT)
Dept: ONCOLOGY | Age: 85
End: 2025-01-07
Payer: MEDICARE

## 2025-01-07 VITALS
BODY MASS INDEX: 23.17 KG/M2 | HEIGHT: 61 IN | WEIGHT: 122.7 LBS | TEMPERATURE: 97.3 F | HEART RATE: 85 BPM | OXYGEN SATURATION: 97 % | SYSTOLIC BLOOD PRESSURE: 167 MMHG | DIASTOLIC BLOOD PRESSURE: 72 MMHG

## 2025-01-07 DIAGNOSIS — D53.9 MACROCYTIC ANEMIA: ICD-10-CM

## 2025-01-07 DIAGNOSIS — D46.9 MDS (MYELODYSPLASTIC SYNDROME) (HCC): ICD-10-CM

## 2025-01-07 DIAGNOSIS — R74.01 TRANSAMINITIS: ICD-10-CM

## 2025-01-07 DIAGNOSIS — D53.9 MACROCYTIC ANEMIA: Primary | ICD-10-CM

## 2025-01-07 LAB
ALBUMIN SERPL-MCNC: 4.7 G/DL (ref 3.5–5.2)
ALP SERPL-CCNC: 55 U/L (ref 35–104)
ALT SERPL-CCNC: 40 U/L (ref 0–32)
ANION GAP SERPL CALCULATED.3IONS-SCNC: 12 MMOL/L (ref 7–16)
AST SERPL-CCNC: 47 U/L (ref 0–31)
BASOPHILS # BLD: 0.05 K/UL (ref 0–0.2)
BASOPHILS NFR BLD: 1 % (ref 0–2)
BILIRUB SERPL-MCNC: 1.1 MG/DL (ref 0–1.2)
BUN SERPL-MCNC: 20 MG/DL (ref 6–23)
CALCIUM SERPL-MCNC: 9.8 MG/DL (ref 8.6–10.2)
CHLORIDE SERPL-SCNC: 103 MMOL/L (ref 98–107)
CO2 SERPL-SCNC: 26 MMOL/L (ref 22–29)
CREAT SERPL-MCNC: 0.9 MG/DL (ref 0.5–1)
EOSINOPHIL # BLD: 0.21 K/UL (ref 0.05–0.5)
EOSINOPHILS RELATIVE PERCENT: 4 % (ref 0–6)
ERYTHROCYTE [DISTWIDTH] IN BLOOD BY AUTOMATED COUNT: 27.4 % (ref 11.5–15)
GFR, ESTIMATED: 64 ML/MIN/1.73M2
GLUCOSE SERPL-MCNC: 92 MG/DL (ref 74–99)
HCT VFR BLD AUTO: 32 % (ref 34–48)
HGB BLD-MCNC: 10.4 G/DL (ref 11.5–15.5)
IMM GRANULOCYTES # BLD AUTO: <0.03 K/UL (ref 0–0.58)
IMM GRANULOCYTES NFR BLD: 0 % (ref 0–5)
LYMPHOCYTES NFR BLD: 2.2 K/UL (ref 1.5–4)
LYMPHOCYTES RELATIVE PERCENT: 41 % (ref 20–42)
MCH RBC QN AUTO: 37.7 PG (ref 26–35)
MCHC RBC AUTO-ENTMCNC: 32.5 G/DL (ref 32–34.5)
MCV RBC AUTO: 115.9 FL (ref 80–99.9)
MONOCYTES NFR BLD: 0.39 K/UL (ref 0.1–0.95)
MONOCYTES NFR BLD: 7 % (ref 2–12)
NEUTROPHILS NFR BLD: 47 % (ref 43–80)
NEUTS SEG NFR BLD: 2.5 K/UL (ref 1.8–7.3)
PLATELET # BLD AUTO: 191 K/UL (ref 130–450)
PMV BLD AUTO: 11.7 FL (ref 7–12)
POTASSIUM SERPL-SCNC: 4.4 MMOL/L (ref 3.5–5)
PROT SERPL-MCNC: 8.2 G/DL (ref 6.4–8.3)
RBC # BLD AUTO: 2.76 M/UL (ref 3.5–5.5)
RBC # BLD: ABNORMAL 10*6/UL
SODIUM SERPL-SCNC: 141 MMOL/L (ref 132–146)
WBC OTHER # BLD: 5.4 K/UL (ref 4.5–11.5)

## 2025-01-07 PROCEDURE — 99212 OFFICE O/P EST SF 10 MIN: CPT

## 2025-01-07 PROCEDURE — 80053 COMPREHEN METABOLIC PANEL: CPT

## 2025-01-07 PROCEDURE — 99214 OFFICE O/P EST MOD 30 MIN: CPT | Performed by: INTERNAL MEDICINE

## 2025-01-07 PROCEDURE — 1160F RVW MEDS BY RX/DR IN RCRD: CPT | Performed by: INTERNAL MEDICINE

## 2025-01-07 PROCEDURE — 1123F ACP DISCUSS/DSCN MKR DOCD: CPT | Performed by: INTERNAL MEDICINE

## 2025-01-07 PROCEDURE — 85025 COMPLETE CBC W/AUTO DIFF WBC: CPT

## 2025-01-07 PROCEDURE — 1159F MED LIST DOCD IN RCRD: CPT | Performed by: INTERNAL MEDICINE

## 2025-01-07 PROCEDURE — 36415 COLL VENOUS BLD VENIPUNCTURE: CPT

## 2025-01-07 NOTE — PROGRESS NOTES
[facility] PHYSICIANS [facility] MED ONCOLOGY  [address]  [facility] [address] [address]  Dept: [phone]  Loc: [phone]  Attending Progress Note      Reason for Visit:   1. Right Breast Cancer.                                   2. MDS.      Referring Physician:  CONNIE DAS CNP    PCP:  Roselia Moralez APRN - CNP    History of Present Illness:      The patient is a very pleasant 84 y.o. lady with a PMH significant for Right Breast Cancer, stage III, HR pos, was diagnosed in 2009, she had a right mastectomy done, followed by adjuvant chemo, she received 8 cycles, probably AC followed by T, then PMRT, and 5 years of adjuvant ET with Arimidex, was completed in 2015. She was treated in [place] under the care of Dr. Simpson. She was diagnosed with MDS in 2017, she received ESAs and PRBCs transfusion. She has transfusion related iron overload. She was started on Aranesp on 4/24/2020.  No SE from Aranesp.     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  The patient had a follow-up with Dr. Melendez, he recommended conservative medical management with DAPT.      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  She had worsening anemia after the procedure requiring packed RBCs transfusion.  The patient did well after the procedure.    The patient returns for a follow-up visit, energy level improved, she is doing well overall, no new problems.  No

## 2025-01-21 ENCOUNTER — OFFICE VISIT (OUTPATIENT)
Dept: ONCOLOGY | Age: 85
End: 2025-01-21
Payer: MEDICARE

## 2025-01-21 ENCOUNTER — HOSPITAL ENCOUNTER (OUTPATIENT)
Dept: INFUSION THERAPY | Age: 85
Discharge: HOME | End: 2025-01-21
Payer: MEDICARE

## 2025-01-21 VITALS
HEART RATE: 75 BPM | DIASTOLIC BLOOD PRESSURE: 88 MMHG | TEMPERATURE: 97.6 F | WEIGHT: 121 LBS | HEIGHT: 61 IN | BODY MASS INDEX: 22.84 KG/M2 | OXYGEN SATURATION: 97 % | SYSTOLIC BLOOD PRESSURE: 135 MMHG

## 2025-01-21 DIAGNOSIS — D46.A MYELODYSPLASTIC SYNDROME WITH MULTILINEAGE DYSPLASIA (HCC): ICD-10-CM

## 2025-01-21 DIAGNOSIS — D53.9 MACROCYTIC ANEMIA: Primary | ICD-10-CM

## 2025-01-21 DIAGNOSIS — D63.8 ANEMIA OF CHRONIC DISEASE: Primary | ICD-10-CM

## 2025-01-21 DIAGNOSIS — D46.9 MDS (MYELODYSPLASTIC SYNDROME) (HCC): ICD-10-CM

## 2025-01-21 DIAGNOSIS — R74.01 TRANSAMINITIS: ICD-10-CM

## 2025-01-21 DIAGNOSIS — D53.9 MACROCYTIC ANEMIA: ICD-10-CM

## 2025-01-21 LAB
ALBUMIN SERPL-MCNC: 4.3 G/DL (ref 3.5–5.2)
ALP SERPL-CCNC: 53 U/L (ref 35–104)
ALT SERPL-CCNC: 40 U/L (ref 0–32)
ANION GAP SERPL CALCULATED.3IONS-SCNC: 10 MMOL/L (ref 7–16)
AST SERPL-CCNC: 40 U/L (ref 0–31)
BASOPHILS # BLD: 0 K/UL (ref 0–0.2)
BASOPHILS NFR BLD: 0 % (ref 0–2)
BILIRUB SERPL-MCNC: 0.9 MG/DL (ref 0–1.2)
BUN SERPL-MCNC: 23 MG/DL (ref 6–23)
CALCIUM SERPL-MCNC: 9.3 MG/DL (ref 8.6–10.2)
CHLORIDE SERPL-SCNC: 101 MMOL/L (ref 98–107)
CO2 SERPL-SCNC: 26 MMOL/L (ref 22–29)
CREAT SERPL-MCNC: 0.9 MG/DL (ref 0.5–1)
EOSINOPHIL # BLD: 0.25 K/UL (ref 0.05–0.5)
EOSINOPHILS RELATIVE PERCENT: 4 % (ref 0–6)
ERYTHROCYTE [DISTWIDTH] IN BLOOD BY AUTOMATED COUNT: 26.4 % (ref 11.5–15)
GFR, ESTIMATED: 60 ML/MIN/1.73M2
GLUCOSE SERPL-MCNC: 111 MG/DL (ref 74–99)
HCT VFR BLD AUTO: 27.7 % (ref 34–48)
HGB BLD-MCNC: 9.1 G/DL (ref 11.5–15.5)
LYMPHOCYTES NFR BLD: 1.73 K/UL (ref 1.5–4)
LYMPHOCYTES RELATIVE PERCENT: 30 % (ref 20–42)
MCH RBC QN AUTO: 37.3 PG (ref 26–35)
MCHC RBC AUTO-ENTMCNC: 32.9 G/DL (ref 32–34.5)
MCV RBC AUTO: 113.5 FL (ref 80–99.9)
MONOCYTES NFR BLD: 0.31 K/UL (ref 0.1–0.95)
MONOCYTES NFR BLD: 5 % (ref 2–12)
NEUTROPHILS NFR BLD: 61 % (ref 43–80)
NEUTS SEG NFR BLD: 3.51 K/UL (ref 1.8–7.3)
NUCLEATED RED BLOOD CELLS: 1 PER 100 WBC
PLATELET # BLD AUTO: 200 K/UL (ref 130–450)
PMV BLD AUTO: 11.4 FL (ref 7–12)
POTASSIUM SERPL-SCNC: 4.3 MMOL/L (ref 3.5–5)
PROT SERPL-MCNC: 7.6 G/DL (ref 6.4–8.3)
RBC # BLD AUTO: 2.44 M/UL (ref 3.5–5.5)
RBC # BLD: ABNORMAL 10*6/UL
SODIUM SERPL-SCNC: 137 MMOL/L (ref 132–146)
WBC OTHER # BLD: 5.8 K/UL (ref 4.5–11.5)

## 2025-01-21 PROCEDURE — 1160F RVW MEDS BY RX/DR IN RCRD: CPT | Performed by: INTERNAL MEDICINE

## 2025-01-21 PROCEDURE — 99212 OFFICE O/P EST SF 10 MIN: CPT

## 2025-01-21 PROCEDURE — 96372 THER/PROPH/DIAG INJ SC/IM: CPT

## 2025-01-21 PROCEDURE — 36415 COLL VENOUS BLD VENIPUNCTURE: CPT

## 2025-01-21 PROCEDURE — 1159F MED LIST DOCD IN RCRD: CPT | Performed by: INTERNAL MEDICINE

## 2025-01-21 PROCEDURE — 1125F AMNT PAIN NOTED PAIN PRSNT: CPT | Performed by: INTERNAL MEDICINE

## 2025-01-21 PROCEDURE — 85025 COMPLETE CBC W/AUTO DIFF WBC: CPT

## 2025-01-21 PROCEDURE — 1123F ACP DISCUSS/DSCN MKR DOCD: CPT | Performed by: INTERNAL MEDICINE

## 2025-01-21 PROCEDURE — 80053 COMPREHEN METABOLIC PANEL: CPT

## 2025-01-21 PROCEDURE — 99214 OFFICE O/P EST MOD 30 MIN: CPT | Performed by: INTERNAL MEDICINE

## 2025-01-21 PROCEDURE — 6360000002 HC RX W HCPCS: Performed by: INTERNAL MEDICINE

## 2025-01-21 RX ADMIN — DARBEPOETIN ALFA 500 MCG: 500 INJECTION, SOLUTION INTRAVENOUS; SUBCUTANEOUS at 10:20

## 2025-01-22 NOTE — PROGRESS NOTES
Patient provided with discharge instructions, received printed AVS.  All questions answered.  Patient understands follow up plan of care.     
followed by adjuvant chemo, she received 8 cycles, probably AC followed by T, then PMRT, and 5 years of adjuvant ET with Arimidex, was completed in 2015. She was treated in [place] under the care of Dr. Simpson. She is doing well clinically without any evidence of recurrence of her disease. Discussed with her the importance of monthly SBE, and routine screening mammograms, she had a repeat left breast screening mammogram done on 11/7/2023, was reviewed, was negative for malignancy.  The patient has a right chest wall ulcer, referral was placed to [facility], the patient was seen by Dr. Duque, she had a biopsy done on 8/16/2023, pathology results reviewed, they are consistent with changes suggestive of radiation effect, chronic radiodermatitis.  The patient had a right breast screening mammogram done on 12/18/2024, which I had reviewed and is negative for malignancy.  Will continue with breast cancer screening.     2. She has MDS, diagnosed in 2017, she received ESAs and PRBCs transfusion, has transfusion related iron overload, hgb was 8.5, ferritin 3647, was restarted on Aranesp on 4/23/2019.     On 7/20/2021, hemoglobin was 8 g/dl hematocrit 24.7,  1.3, normal white count, platelet count 138K, fit test is negative.  The patient did not have an adequate response to Retacrit, it was changed back to Aranesp, today 1/2/2024, labs reviewed, her CBCD is remarkable for hemoglobin of 10.4 G/DL, it has improved, and is greater than 10, hold Aranesp, white count is stable at 4.3, MCV stable at 113.4, platelet count is normal at 159 K she has iron overload secondary to prior transfusions, it is improving, will monitor her counts closely.  Transaminitis, and mild hyperbilirubinemia, an ultrasound of the abdomen was ordered, was done on 11/21/2023, was reviewed, she has cirrhotic appearing liver, she will continue follow-up with GI.  Liver ultrasound was done on 8/20/2024.  She had reviewed, revealing fatty infiltration of the

## 2025-01-28 DIAGNOSIS — Z51.5 PALLIATIVE CARE BY SPECIALIST: ICD-10-CM

## 2025-01-28 DIAGNOSIS — G89.3 PAIN DUE TO NEOPLASM: ICD-10-CM

## 2025-01-28 DIAGNOSIS — C50.919 MALIGNANT NEOPLASM OF FEMALE BREAST, UNSPECIFIED ESTROGEN RECEPTOR STATUS, UNSPECIFIED LATERALITY, UNSPECIFIED SITE OF BREAST (HCC): ICD-10-CM

## 2025-01-28 RX ORDER — HYDROCODONE BITARTRATE AND ACETAMINOPHEN 5; 325 MG/1; MG/1
1 TABLET ORAL EVERY 6 HOURS PRN
Qty: 120 TABLET | Refills: 0 | Status: SHIPPED | OUTPATIENT
Start: 2025-01-28 | End: 2025-02-27

## 2025-01-28 NOTE — TELEPHONE ENCOUNTER
Patient Patricia's daughter Delma called for refill norco 5-325 mg. Next appointment 3-4-2025. [facility]. Daughter Delma stated she's going out of town for emergency and needs refill for mom before leaving. Routed call to A Leia, NP

## 2025-02-04 ENCOUNTER — HOSPITAL ENCOUNTER (OUTPATIENT)
Dept: INFUSION THERAPY | Age: 85
Discharge: HOME | End: 2025-02-04
Payer: MEDICARE

## 2025-02-04 ENCOUNTER — OFFICE VISIT (OUTPATIENT)
Dept: ONCOLOGY | Age: 85
End: 2025-02-04
Payer: MEDICARE

## 2025-02-04 VITALS
TEMPERATURE: 97.9 F | WEIGHT: 119.8 LBS | SYSTOLIC BLOOD PRESSURE: 139 MMHG | DIASTOLIC BLOOD PRESSURE: 65 MMHG | OXYGEN SATURATION: 99 % | BODY MASS INDEX: 22.62 KG/M2 | HEIGHT: 61 IN | HEART RATE: 77 BPM

## 2025-02-04 DIAGNOSIS — D46.A MYELODYSPLASTIC SYNDROME WITH MULTILINEAGE DYSPLASIA (HCC): ICD-10-CM

## 2025-02-04 DIAGNOSIS — D46.9 MDS (MYELODYSPLASTIC SYNDROME) (HCC): ICD-10-CM

## 2025-02-04 DIAGNOSIS — D53.9 MACROCYTIC ANEMIA: Primary | ICD-10-CM

## 2025-02-04 DIAGNOSIS — D53.9 MACROCYTIC ANEMIA: ICD-10-CM

## 2025-02-04 DIAGNOSIS — D63.8 ANEMIA OF CHRONIC DISEASE: Primary | ICD-10-CM

## 2025-02-04 DIAGNOSIS — C50.911 MALIGNANT NEOPLASM OF RIGHT BREAST IN FEMALE, ESTROGEN RECEPTOR POSITIVE, UNSPECIFIED SITE OF BREAST (HCC): ICD-10-CM

## 2025-02-04 DIAGNOSIS — R74.01 TRANSAMINITIS: ICD-10-CM

## 2025-02-04 DIAGNOSIS — Z17.0 MALIGNANT NEOPLASM OF RIGHT BREAST IN FEMALE, ESTROGEN RECEPTOR POSITIVE, UNSPECIFIED SITE OF BREAST (HCC): ICD-10-CM

## 2025-02-04 LAB
ALBUMIN SERPL-MCNC: 4.5 G/DL (ref 3.5–5.2)
ALP SERPL-CCNC: 51 U/L (ref 35–104)
ALT SERPL-CCNC: 36 U/L (ref 0–32)
ANION GAP SERPL CALCULATED.3IONS-SCNC: 12 MMOL/L (ref 7–16)
AST SERPL-CCNC: 39 U/L (ref 0–31)
ATYPICAL LYMPHOCYTE ABSOLUTE COUNT: 0.47 K/UL (ref 0–0.46)
ATYPICAL LYMPHOCYTES: 8 % (ref 0–4)
BASOPHILS # BLD: 0.05 K/UL (ref 0–0.2)
BASOPHILS NFR BLD: 1 % (ref 0–2)
BILIRUB SERPL-MCNC: 1.2 MG/DL (ref 0–1.2)
BUN SERPL-MCNC: 22 MG/DL (ref 6–23)
CALCIUM SERPL-MCNC: 9.4 MG/DL (ref 8.6–10.2)
CHLORIDE SERPL-SCNC: 106 MMOL/L (ref 98–107)
CO2 SERPL-SCNC: 23 MMOL/L (ref 22–29)
CREAT SERPL-MCNC: 0.9 MG/DL (ref 0.5–1)
EOSINOPHIL # BLD: 0.26 K/UL (ref 0.05–0.5)
EOSINOPHILS RELATIVE PERCENT: 4 % (ref 0–6)
ERYTHROCYTE [DISTWIDTH] IN BLOOD BY AUTOMATED COUNT: 26.6 % (ref 11.5–15)
GFR, ESTIMATED: 61 ML/MIN/1.73M2
GLUCOSE SERPL-MCNC: 107 MG/DL (ref 74–99)
HCT VFR BLD AUTO: 30.7 % (ref 34–48)
HGB BLD-MCNC: 9.9 G/DL (ref 11.5–15.5)
LYMPHOCYTES NFR BLD: 1.45 K/UL (ref 1.5–4)
LYMPHOCYTES RELATIVE PERCENT: 25 % (ref 20–42)
MCH RBC QN AUTO: 37.4 PG (ref 26–35)
MCHC RBC AUTO-ENTMCNC: 32.2 G/DL (ref 32–34.5)
MCV RBC AUTO: 115.8 FL (ref 80–99.9)
MONOCYTES NFR BLD: 0.16 K/UL (ref 0.1–0.95)
MONOCYTES NFR BLD: 3 % (ref 2–12)
NEUTROPHILS NFR BLD: 60 % (ref 43–80)
NEUTS SEG NFR BLD: 3.52 K/UL (ref 1.8–7.3)
PLATELET # BLD AUTO: 156 K/UL (ref 130–450)
PMV BLD AUTO: 11.9 FL (ref 7–12)
POTASSIUM SERPL-SCNC: 3.9 MMOL/L (ref 3.5–5)
PROT SERPL-MCNC: 7.8 G/DL (ref 6.4–8.3)
RBC # BLD AUTO: 2.65 M/UL (ref 3.5–5.5)
RBC # BLD: ABNORMAL 10*6/UL
SODIUM SERPL-SCNC: 141 MMOL/L (ref 132–146)
WBC OTHER # BLD: 5.9 K/UL (ref 4.5–11.5)

## 2025-02-04 PROCEDURE — 99214 OFFICE O/P EST MOD 30 MIN: CPT | Performed by: INTERNAL MEDICINE

## 2025-02-04 PROCEDURE — 80053 COMPREHEN METABOLIC PANEL: CPT

## 2025-02-04 PROCEDURE — 36415 COLL VENOUS BLD VENIPUNCTURE: CPT

## 2025-02-04 PROCEDURE — 1123F ACP DISCUSS/DSCN MKR DOCD: CPT | Performed by: INTERNAL MEDICINE

## 2025-02-04 PROCEDURE — 96372 THER/PROPH/DIAG INJ SC/IM: CPT

## 2025-02-04 PROCEDURE — 1159F MED LIST DOCD IN RCRD: CPT | Performed by: INTERNAL MEDICINE

## 2025-02-04 PROCEDURE — 85025 COMPLETE CBC W/AUTO DIFF WBC: CPT

## 2025-02-04 PROCEDURE — 6360000002 HC RX W HCPCS: Performed by: INTERNAL MEDICINE

## 2025-02-04 PROCEDURE — 1160F RVW MEDS BY RX/DR IN RCRD: CPT | Performed by: INTERNAL MEDICINE

## 2025-02-04 RX ADMIN — DARBEPOETIN ALFA 500 MCG: 500 INJECTION, SOLUTION INTRAVENOUS; SUBCUTANEOUS at 10:28

## 2025-02-04 NOTE — PROGRESS NOTES
[facility] PHYSICIANS [facility] MED ONCOLOGY  [address]  [facility] [address]  Dept: [phone]  Loc: [phone]  Attending Progress Note      Reason for Visit:   1. Right Breast Cancer.                                   2. MDS.      Referring Physician:  CONNIE DAS CNP    PCP:  Roselia Moralez APRN - CNP    History of Present Illness:      The patient is a very pleasant 84 y.o. lady with a PMH significant for Right Breast Cancer, stage III, HR pos, was diagnosed in 2009, she had a right mastectomy done, followed by adjuvant chemo, she received 8 cycles, probably AC followed by T, then PMRT, and 5 years of adjuvant ET with Arimidex, was completed in 2015. She was treated in [place] under the care of Dr. Simpson. She was diagnosed with MDS in 2017, she received ESAs and PRBCs transfusion. She has transfusion related iron overload. She was started on Aranesp on 4/24/2020.  No SE from Aranesp.     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  The patient had a follow-up with Dr. Melendez, he recommended conservative medical management with DAPT.      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  She had worsening anemia after the procedure requiring packed RBCs transfusion.  The patient did well after the procedure.    The patient returns for a follow-up visit, doing well, no new problems, no bleeding.    Review of Systems;  CONSTITUTIONAL: No

## 2025-02-18 ENCOUNTER — HOSPITAL ENCOUNTER (OUTPATIENT)
Dept: INFUSION THERAPY | Age: 85
Discharge: HOME | End: 2025-02-18
Payer: MEDICARE

## 2025-02-18 ENCOUNTER — OFFICE VISIT (OUTPATIENT)
Dept: ONCOLOGY | Age: 85
End: 2025-02-18

## 2025-02-18 VITALS
OXYGEN SATURATION: 98 % | DIASTOLIC BLOOD PRESSURE: 69 MMHG | SYSTOLIC BLOOD PRESSURE: 153 MMHG | WEIGHT: 120.1 LBS | HEIGHT: 61 IN | BODY MASS INDEX: 22.68 KG/M2 | TEMPERATURE: 98.6 F | HEART RATE: 75 BPM

## 2025-02-18 DIAGNOSIS — D46.9 MDS (MYELODYSPLASTIC SYNDROME) (HCC): ICD-10-CM

## 2025-02-18 DIAGNOSIS — D46.A MYELODYSPLASTIC SYNDROME WITH MULTILINEAGE DYSPLASIA (HCC): ICD-10-CM

## 2025-02-18 DIAGNOSIS — D53.9 MACROCYTIC ANEMIA: Primary | ICD-10-CM

## 2025-02-18 DIAGNOSIS — R74.01 TRANSAMINITIS: ICD-10-CM

## 2025-02-18 DIAGNOSIS — D63.8 ANEMIA OF CHRONIC DISEASE: Primary | ICD-10-CM

## 2025-02-18 LAB
ALBUMIN SERPL-MCNC: 4.5 G/DL (ref 3.5–5.2)
ALP SERPL-CCNC: 49 U/L (ref 35–104)
ALT SERPL-CCNC: 36 U/L (ref 0–32)
ANION GAP SERPL CALCULATED.3IONS-SCNC: 10 MMOL/L (ref 7–16)
AST SERPL-CCNC: 36 U/L (ref 0–31)
BASOPHILS # BLD: 0 K/UL (ref 0–0.2)
BASOPHILS NFR BLD: 0 % (ref 0–2)
BILIRUB SERPL-MCNC: 1.2 MG/DL (ref 0–1.2)
BUN SERPL-MCNC: 20 MG/DL (ref 6–23)
CALCIUM SERPL-MCNC: 9.2 MG/DL (ref 8.6–10.2)
CHLORIDE SERPL-SCNC: 104 MMOL/L (ref 98–107)
CO2 SERPL-SCNC: 23 MMOL/L (ref 22–29)
CREAT SERPL-MCNC: 0.8 MG/DL (ref 0.5–1)
EOSINOPHIL # BLD: 0.1 K/UL (ref 0.05–0.5)
EOSINOPHILS RELATIVE PERCENT: 2 % (ref 0–6)
ERYTHROCYTE [DISTWIDTH] IN BLOOD BY AUTOMATED COUNT: 28.7 % (ref 11.5–15)
GFR, ESTIMATED: 71 ML/MIN/1.73M2
GLUCOSE SERPL-MCNC: 139 MG/DL (ref 74–99)
HCT VFR BLD AUTO: 27.9 % (ref 34–48)
HGB BLD-MCNC: 9 G/DL (ref 11.5–15.5)
LYMPHOCYTES NFR BLD: 2.36 K/UL (ref 1.5–4)
LYMPHOCYTES RELATIVE PERCENT: 40 % (ref 20–42)
MCH RBC QN AUTO: 37.5 PG (ref 26–35)
MCHC RBC AUTO-ENTMCNC: 32.3 G/DL (ref 32–34.5)
MCV RBC AUTO: 116.3 FL (ref 80–99.9)
MONOCYTES NFR BLD: 0.26 K/UL (ref 0.1–0.95)
MONOCYTES NFR BLD: 4 % (ref 2–12)
NEUTROPHILS NFR BLD: 54 % (ref 43–80)
NEUTS SEG NFR BLD: 3.18 K/UL (ref 1.8–7.3)
PLATELET # BLD AUTO: 185 K/UL (ref 130–450)
PMV BLD AUTO: 12.2 FL (ref 7–12)
POTASSIUM SERPL-SCNC: 4.7 MMOL/L (ref 3.5–5)
PROT SERPL-MCNC: 7.3 G/DL (ref 6.4–8.3)
RBC # BLD AUTO: 2.4 M/UL (ref 3.5–5.5)
RBC # BLD: ABNORMAL 10*6/UL
SODIUM SERPL-SCNC: 137 MMOL/L (ref 132–146)
WBC OTHER # BLD: 5.9 K/UL (ref 4.5–11.5)

## 2025-02-18 PROCEDURE — 6360000002 HC RX W HCPCS: Performed by: INTERNAL MEDICINE

## 2025-02-18 PROCEDURE — 85025 COMPLETE CBC W/AUTO DIFF WBC: CPT

## 2025-02-18 PROCEDURE — 80053 COMPREHEN METABOLIC PANEL: CPT

## 2025-02-18 PROCEDURE — 96372 THER/PROPH/DIAG INJ SC/IM: CPT

## 2025-02-18 PROCEDURE — 36415 COLL VENOUS BLD VENIPUNCTURE: CPT

## 2025-02-18 RX ADMIN — DARBEPOETIN ALFA 500 MCG: 500 INJECTION, SOLUTION INTRAVENOUS; SUBCUTANEOUS at 10:14

## 2025-02-18 NOTE — PROGRESS NOTES
Patient arrived for Aranesp injection. H/H: 9.0 and 27.9 (within parameters). Injection administered without incident. Discharged ambulatory.

## 2025-02-18 NOTE — PROGRESS NOTES
[facility] PHYSICIANS [facility] MED ONCOLOGY  [address]  [facility] [address]  Dept: [phone]  Loc: [phone]  Attending Progress Note      Reason for Visit:   1. Right Breast Cancer.                                   2. MDS.      Referring Physician:  CONNIE DAS CNP    PCP:  Roselai Moralez APRN - CNP    History of Present Illness:      The patient is a very pleasant 84 y.o. lady with a PMH significant for Right Breast Cancer, stage III, HR pos, was diagnosed in 2009, she had a right mastectomy done, followed by adjuvant chemo, she received 8 cycles, probably AC followed by T, then PMRT, and 5 years of adjuvant ET with Arimidex, was completed in 2015. She was treated in [place] under the care of Dr. Simpson. She was diagnosed with MDS in 2017, she received ESAs and PRBCs transfusion. She has transfusion related iron overload. She was started on Aranesp on 4/24/2020.  No SE from Aranesp.     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  The patient had a follow-up with Dr. Melendez, he recommended conservative medical management with DAPT.      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  She had worsening anemia after the procedure requiring packed RBCs transfusion.  The patient did well after the procedure.    The patient returns for a follow-up visit, doing well, no new problems, no bleeding.  She remains active.    Review of

## 2025-02-27 DIAGNOSIS — G89.3 PAIN DUE TO NEOPLASM: ICD-10-CM

## 2025-02-27 DIAGNOSIS — Z51.5 PALLIATIVE CARE BY SPECIALIST: ICD-10-CM

## 2025-02-27 DIAGNOSIS — C50.919 MALIGNANT NEOPLASM OF FEMALE BREAST, UNSPECIFIED ESTROGEN RECEPTOR STATUS, UNSPECIFIED LATERALITY, UNSPECIFIED SITE OF BREAST (HCC): ICD-10-CM

## 2025-02-27 RX ORDER — HYDROCODONE BITARTRATE AND ACETAMINOPHEN 5; 325 MG/1; MG/1
1 TABLET ORAL EVERY 6 HOURS PRN
Qty: 120 TABLET | Refills: 0 | Status: SHIPPED | OUTPATIENT
Start: 2025-02-27 | End: 2025-03-29

## 2025-02-27 NOTE — TELEPHONE ENCOUNTER
Call from Patricia's daughter Sabra , requesting refill for Norco 5/325 mg. Sabra shares that Patricia just had 10 teeth pulled and she is helping her mom at this time. [facility]. Next natalie 3/4/25.

## 2025-03-03 DIAGNOSIS — D46.9 MDS (MYELODYSPLASTIC SYNDROME) (HCC): Primary | ICD-10-CM

## 2025-03-04 ENCOUNTER — HOSPITAL ENCOUNTER (OUTPATIENT)
Dept: INFUSION THERAPY | Age: 85
Discharge: HOME | End: 2025-03-04
Payer: MEDICARE

## 2025-03-04 ENCOUNTER — OFFICE VISIT (OUTPATIENT)
Dept: ONCOLOGY | Age: 85
End: 2025-03-04
Payer: MEDICARE

## 2025-03-04 ENCOUNTER — OFFICE VISIT (OUTPATIENT)
Dept: PALLATIVE CARE | Age: 85
End: 2025-03-04
Payer: MEDICARE

## 2025-03-04 VITALS
TEMPERATURE: 97 F | HEIGHT: 61 IN | HEART RATE: 77 BPM | DIASTOLIC BLOOD PRESSURE: 63 MMHG | BODY MASS INDEX: 22.16 KG/M2 | OXYGEN SATURATION: 96 % | SYSTOLIC BLOOD PRESSURE: 140 MMHG | WEIGHT: 117.4 LBS

## 2025-03-04 DIAGNOSIS — D46.9 MDS (MYELODYSPLASTIC SYNDROME) (HCC): Primary | ICD-10-CM

## 2025-03-04 DIAGNOSIS — C50.919 MALIGNANT NEOPLASM OF FEMALE BREAST, UNSPECIFIED ESTROGEN RECEPTOR STATUS, UNSPECIFIED LATERALITY, UNSPECIFIED SITE OF BREAST (HCC): ICD-10-CM

## 2025-03-04 DIAGNOSIS — D46.9 MDS (MYELODYSPLASTIC SYNDROME) (HCC): ICD-10-CM

## 2025-03-04 DIAGNOSIS — D46.A MYELODYSPLASTIC SYNDROME WITH MULTILINEAGE DYSPLASIA (HCC): ICD-10-CM

## 2025-03-04 DIAGNOSIS — Z51.5 PALLIATIVE CARE BY SPECIALIST: Primary | ICD-10-CM

## 2025-03-04 DIAGNOSIS — R74.01 TRANSAMINITIS: ICD-10-CM

## 2025-03-04 DIAGNOSIS — D63.8 ANEMIA OF CHRONIC DISEASE: ICD-10-CM

## 2025-03-04 DIAGNOSIS — D53.9 MACROCYTIC ANEMIA: Primary | ICD-10-CM

## 2025-03-04 DIAGNOSIS — G89.3 PAIN DUE TO NEOPLASM: ICD-10-CM

## 2025-03-04 LAB
ALBUMIN SERPL-MCNC: 4.3 G/DL (ref 3.5–5.2)
ALP SERPL-CCNC: 64 U/L (ref 35–104)
ALT SERPL-CCNC: 36 U/L (ref 0–32)
ANION GAP SERPL CALCULATED.3IONS-SCNC: 16 MMOL/L (ref 7–16)
AST SERPL-CCNC: 41 U/L (ref 0–31)
BASOPHILS # BLD: 0.04 K/UL (ref 0–0.2)
BASOPHILS NFR BLD: 1 % (ref 0–2)
BILIRUB SERPL-MCNC: 1 MG/DL (ref 0–1.2)
BUN SERPL-MCNC: 24 MG/DL (ref 6–23)
CALCIUM SERPL-MCNC: 9.7 MG/DL (ref 8.6–10.2)
CHLORIDE SERPL-SCNC: 101 MMOL/L (ref 98–107)
CO2 SERPL-SCNC: 22 MMOL/L (ref 22–29)
CREAT SERPL-MCNC: 0.9 MG/DL (ref 0.5–1)
EOSINOPHIL # BLD: 0.18 K/UL (ref 0.05–0.5)
EOSINOPHILS RELATIVE PERCENT: 4 % (ref 0–6)
ERYTHROCYTE [DISTWIDTH] IN BLOOD BY AUTOMATED COUNT: 28.8 % (ref 11.5–15)
FERRITIN SERPL-MCNC: 5277 NG/ML
GFR, ESTIMATED: 59 ML/MIN/1.73M2
GLUCOSE SERPL-MCNC: 119 MG/DL (ref 74–99)
HCT VFR BLD AUTO: 29.1 % (ref 34–48)
HGB BLD-MCNC: 9.3 G/DL (ref 11.5–15.5)
IMM GRANULOCYTES # BLD AUTO: <0.03 K/UL (ref 0–0.58)
IMM GRANULOCYTES NFR BLD: 0 % (ref 0–5)
IRON SATN MFR SERPL: 81 % (ref 15–50)
IRON SERPL-MCNC: 179 UG/DL (ref 37–145)
LYMPHOCYTES NFR BLD: 1.83 K/UL (ref 1.5–4)
LYMPHOCYTES RELATIVE PERCENT: 39 % (ref 20–42)
MCH RBC QN AUTO: 36.8 PG (ref 26–35)
MCHC RBC AUTO-ENTMCNC: 32 G/DL (ref 32–34.5)
MCV RBC AUTO: 115 FL (ref 80–99.9)
MONOCYTES NFR BLD: 0.31 K/UL (ref 0.1–0.95)
MONOCYTES NFR BLD: 7 % (ref 2–12)
NEUTROPHILS NFR BLD: 49 % (ref 43–80)
NEUTS SEG NFR BLD: 2.27 K/UL (ref 1.8–7.3)
PLATELET # BLD AUTO: 171 K/UL (ref 130–450)
PMV BLD AUTO: 11.8 FL (ref 7–12)
POTASSIUM SERPL-SCNC: 4.7 MMOL/L (ref 3.5–5)
PROT SERPL-MCNC: 7.7 G/DL (ref 6.4–8.3)
RBC # BLD AUTO: 2.53 M/UL (ref 3.5–5.5)
RBC # BLD: ABNORMAL 10*6/UL
SODIUM SERPL-SCNC: 139 MMOL/L (ref 132–146)
TIBC SERPL-MCNC: 220 UG/DL (ref 250–450)
WBC OTHER # BLD: 4.6 K/UL (ref 4.5–11.5)

## 2025-03-04 PROCEDURE — 85025 COMPLETE CBC W/AUTO DIFF WBC: CPT

## 2025-03-04 PROCEDURE — 99212 OFFICE O/P EST SF 10 MIN: CPT

## 2025-03-04 PROCEDURE — 36415 COLL VENOUS BLD VENIPUNCTURE: CPT

## 2025-03-04 PROCEDURE — 83550 IRON BINDING TEST: CPT

## 2025-03-04 PROCEDURE — 6360000002 HC RX W HCPCS: Performed by: INTERNAL MEDICINE

## 2025-03-04 PROCEDURE — 83540 ASSAY OF IRON: CPT

## 2025-03-04 PROCEDURE — 80053 COMPREHEN METABOLIC PANEL: CPT

## 2025-03-04 PROCEDURE — 82728 ASSAY OF FERRITIN: CPT

## 2025-03-04 PROCEDURE — 99211 OFF/OP EST MAY X REQ PHY/QHP: CPT | Performed by: NURSE PRACTITIONER

## 2025-03-04 PROCEDURE — 96372 THER/PROPH/DIAG INJ SC/IM: CPT

## 2025-03-04 RX ADMIN — DARBEPOETIN ALFA 500 MCG: 500 INJECTION, SOLUTION INTRAVENOUS; SUBCUTANEOUS at 10:29

## 2025-03-04 NOTE — PROGRESS NOTES
[facility] PHYSICIANS [facility] MED ONCOLOGY  [address]  [facility] [address]  Dept: [phone]  Loc: [phone]  Attending Progress Note      Reason for Visit:   1. Right Breast Cancer.                                   2. MDS.      Referring Physician:  CONNIE DAS CNP    PCP:  Roselia Moralez APRN - CNP    History of Present Illness:      The patient is a very pleasant 84 y.o. lady with a PMH significant for Right Breast Cancer, stage III, HR pos, was diagnosed in 2009, she had a right mastectomy done, followed by adjuvant chemo, she received 8 cycles, probably AC followed by T, then PMRT, and 5 years of adjuvant ET with Arimidex, was completed in 2015. She was treated in [place] under the care of Dr. Simpson. She was diagnosed with MDS in 2017, she received ESAs and PRBCs transfusion. She has transfusion related iron overload. She was started on Aranesp on 4/24/2020.  No SE from Aranesp.     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  The patient had a follow-up with Dr. Melendez, he recommended conservative medical management with DAPT.      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  She had worsening anemia after the procedure requiring packed RBCs transfusion.  The patient did well after the procedure.    The patient returns for a follow-up visit, she has teeth extraction done, she has extensive bruising of the face and the

## 2025-03-04 NOTE — PROGRESS NOTES
alternative treatments discussed.;No signs of potential drug abuse or diversion identified.;Assessed functional status (ability to engage in work or other purposeful activities, the pain intensity and its interference with activities of daily living, quality of family life and social activities, and the physical activity);Obtaining appropriate analgesic effect of treatment.       CONNIE Fortune - CNP   Palliative Care Department     Time/Communication  Greater than 50% of time spent, total 25 minutes in face-to-face counseling and coordination of care regarding symptom management.    Note: This report was completed using [employer] voiced recognition software.  Every effort has been made to ensure accuracy; however, inadvertent computerized transcription errors may be present.

## 2025-03-18 ENCOUNTER — HOSPITAL ENCOUNTER (OUTPATIENT)
Dept: INFUSION THERAPY | Age: 85
Discharge: HOME | End: 2025-03-18
Payer: MEDICARE

## 2025-03-18 ENCOUNTER — OFFICE VISIT (OUTPATIENT)
Dept: ONCOLOGY | Age: 85
End: 2025-03-18
Payer: MEDICARE

## 2025-03-18 VITALS
OXYGEN SATURATION: 97 % | TEMPERATURE: 97.7 F | HEIGHT: 61 IN | HEART RATE: 74 BPM | SYSTOLIC BLOOD PRESSURE: 150 MMHG | WEIGHT: 117.7 LBS | DIASTOLIC BLOOD PRESSURE: 80 MMHG | BODY MASS INDEX: 22.22 KG/M2

## 2025-03-18 DIAGNOSIS — C50.911 MALIGNANT NEOPLASM OF RIGHT BREAST IN FEMALE, ESTROGEN RECEPTOR POSITIVE, UNSPECIFIED SITE OF BREAST: ICD-10-CM

## 2025-03-18 DIAGNOSIS — D46.9 MDS (MYELODYSPLASTIC SYNDROME) (HCC): ICD-10-CM

## 2025-03-18 DIAGNOSIS — D46.A MYELODYSPLASTIC SYNDROME WITH MULTILINEAGE DYSPLASIA (HCC): ICD-10-CM

## 2025-03-18 DIAGNOSIS — D63.8 ANEMIA OF CHRONIC DISEASE: Primary | ICD-10-CM

## 2025-03-18 DIAGNOSIS — D53.9 MACROCYTIC ANEMIA: Primary | ICD-10-CM

## 2025-03-18 DIAGNOSIS — Z17.0 MALIGNANT NEOPLASM OF RIGHT BREAST IN FEMALE, ESTROGEN RECEPTOR POSITIVE, UNSPECIFIED SITE OF BREAST: ICD-10-CM

## 2025-03-18 LAB
ALBUMIN SERPL-MCNC: 4.1 G/DL (ref 3.5–5.2)
ALP SERPL-CCNC: 57 U/L (ref 35–104)
ALT SERPL-CCNC: 39 U/L (ref 0–32)
ANION GAP SERPL CALCULATED.3IONS-SCNC: 15 MMOL/L (ref 7–16)
AST SERPL-CCNC: 39 U/L (ref 0–31)
BASOPHILS # BLD: 0.08 K/UL (ref 0–0.2)
BASOPHILS NFR BLD: 2 % (ref 0–2)
BILIRUB SERPL-MCNC: 1.1 MG/DL (ref 0–1.2)
BUN SERPL-MCNC: 24 MG/DL (ref 6–23)
CALCIUM SERPL-MCNC: 9.2 MG/DL (ref 8.6–10.2)
CHLORIDE SERPL-SCNC: 106 MMOL/L (ref 98–107)
CO2 SERPL-SCNC: 20 MMOL/L (ref 22–29)
CREAT SERPL-MCNC: 0.9 MG/DL (ref 0.5–1)
EOSINOPHIL # BLD: 0.04 K/UL (ref 0.05–0.5)
EOSINOPHILS RELATIVE PERCENT: 1 % (ref 0–6)
ERYTHROCYTE [DISTWIDTH] IN BLOOD BY AUTOMATED COUNT: 28.9 % (ref 11.5–15)
GFR, ESTIMATED: 65 ML/MIN/1.73M2
GLUCOSE SERPL-MCNC: 87 MG/DL (ref 74–99)
HCT VFR BLD AUTO: 25.9 % (ref 34–48)
HGB BLD-MCNC: 8.5 G/DL (ref 11.5–15.5)
LYMPHOCYTES NFR BLD: 2.11 K/UL (ref 1.5–4)
LYMPHOCYTES RELATIVE PERCENT: 44 % (ref 20–42)
MCH RBC QN AUTO: 37.9 PG (ref 26–35)
MCHC RBC AUTO-ENTMCNC: 32.8 G/DL (ref 32–34.5)
MCV RBC AUTO: 115.6 FL (ref 80–99.9)
MONOCYTES NFR BLD: 0.08 K/UL (ref 0.1–0.95)
MONOCYTES NFR BLD: 2 % (ref 2–12)
NEUTROPHILS NFR BLD: 52 % (ref 43–80)
NEUTS SEG NFR BLD: 2.48 K/UL (ref 1.8–7.3)
NUCLEATED RED BLOOD CELLS: 9 PER 100 WBC
PLATELET # BLD AUTO: 143 K/UL (ref 130–450)
PMV BLD AUTO: 11.3 FL (ref 7–12)
POTASSIUM SERPL-SCNC: 4.2 MMOL/L (ref 3.5–5)
PROT SERPL-MCNC: 7.1 G/DL (ref 6.4–8.3)
RBC # BLD AUTO: 2.24 M/UL (ref 3.5–5.5)
RBC # BLD: ABNORMAL 10*6/UL
SODIUM SERPL-SCNC: 141 MMOL/L (ref 132–146)
WBC OTHER # BLD: 4.8 K/UL (ref 4.5–11.5)

## 2025-03-18 PROCEDURE — 6360000002 HC RX W HCPCS: Performed by: INTERNAL MEDICINE

## 2025-03-18 PROCEDURE — 80053 COMPREHEN METABOLIC PANEL: CPT

## 2025-03-18 PROCEDURE — 99214 OFFICE O/P EST MOD 30 MIN: CPT | Performed by: INTERNAL MEDICINE

## 2025-03-18 PROCEDURE — 1160F RVW MEDS BY RX/DR IN RCRD: CPT | Performed by: INTERNAL MEDICINE

## 2025-03-18 PROCEDURE — 1123F ACP DISCUSS/DSCN MKR DOCD: CPT | Performed by: INTERNAL MEDICINE

## 2025-03-18 PROCEDURE — 36415 COLL VENOUS BLD VENIPUNCTURE: CPT

## 2025-03-18 PROCEDURE — 1159F MED LIST DOCD IN RCRD: CPT | Performed by: INTERNAL MEDICINE

## 2025-03-18 PROCEDURE — 96372 THER/PROPH/DIAG INJ SC/IM: CPT

## 2025-03-18 PROCEDURE — 85025 COMPLETE CBC W/AUTO DIFF WBC: CPT

## 2025-03-18 RX ADMIN — DARBEPOETIN ALFA 500 MCG: 500 INJECTION, SOLUTION INTRAVENOUS; SUBCUTANEOUS at 10:29

## 2025-03-18 NOTE — PROGRESS NOTES
[facility] PHYSICIANS [facility] MED ONCOLOGY  [address]  [facility] [address]  Dept: [phone]  Loc: [phone]  Attending Progress Note      Reason for Visit:   1. Right Breast Cancer.                                   2. MDS.      Referring Physician:  CONNIE DAS CNP    PCP:  Roselia Moralez APRN - CNP    History of Present Illness:      The patient is a very pleasant 84 y.o. lady with a PMH significant for Right Breast Cancer, stage III, HR pos, was diagnosed in 2009, she had a right mastectomy done, followed by adjuvant chemo, she received 8 cycles, probably AC followed by T, then PMRT, and 5 years of adjuvant ET with Arimidex, was completed in 2015. She was treated in [place] under the care of Dr. Simpson. She was diagnosed with MDS in 2017, she received ESAs and PRBCs transfusion. She has transfusion related iron overload. She was started on Aranesp on 4/24/2020.  No SE from Aranesp.     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  The patient had a follow-up with Dr. Melendez, he recommended conservative medical management with DAPT.      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  She had worsening anemia after the procedure requiring packed RBCs transfusion.  The patient did well after the procedure.    The patient returns for a follow-up visit, the bruising from the tooth extraction has significantly improved.    Review of

## 2025-03-28 DIAGNOSIS — G89.3 PAIN DUE TO NEOPLASM: ICD-10-CM

## 2025-03-28 DIAGNOSIS — Z51.5 PALLIATIVE CARE BY SPECIALIST: ICD-10-CM

## 2025-03-28 DIAGNOSIS — C50.919 MALIGNANT NEOPLASM OF FEMALE BREAST, UNSPECIFIED ESTROGEN RECEPTOR STATUS, UNSPECIFIED LATERALITY, UNSPECIFIED SITE OF BREAST: ICD-10-CM

## 2025-03-28 RX ORDER — HYDROCODONE BITARTRATE AND ACETAMINOPHEN 5; 325 MG/1; MG/1
1 TABLET ORAL EVERY 6 HOURS PRN
Qty: 120 TABLET | Refills: 0 | Status: SHIPPED | OUTPATIENT
Start: 2025-03-28 | End: 2025-04-27

## 2025-03-28 NOTE — TELEPHONE ENCOUNTER
Patient Patricia's daughter Sabra called for refill norco 5-325 mg. [facility]. Next appointment 5-. Routed call to A JONO Dupree

## 2025-04-01 ENCOUNTER — OFFICE VISIT (OUTPATIENT)
Dept: ONCOLOGY | Age: 85
End: 2025-04-01
Payer: MEDICARE

## 2025-04-01 ENCOUNTER — HOSPITAL ENCOUNTER (OUTPATIENT)
Dept: INFUSION THERAPY | Age: 85
Discharge: HOME | End: 2025-04-01
Payer: MEDICARE

## 2025-04-01 VITALS
OXYGEN SATURATION: 97 % | BODY MASS INDEX: 22.05 KG/M2 | TEMPERATURE: 97 F | SYSTOLIC BLOOD PRESSURE: 118 MMHG | HEART RATE: 79 BPM | WEIGHT: 116.8 LBS | HEIGHT: 61 IN | DIASTOLIC BLOOD PRESSURE: 60 MMHG

## 2025-04-01 DIAGNOSIS — Z17.0 MALIGNANT NEOPLASM OF RIGHT BREAST IN FEMALE, ESTROGEN RECEPTOR POSITIVE, UNSPECIFIED SITE OF BREAST: ICD-10-CM

## 2025-04-01 DIAGNOSIS — L40.9 PSORIASIS: ICD-10-CM

## 2025-04-01 DIAGNOSIS — D46.A MYELODYSPLASTIC SYNDROME WITH MULTILINEAGE DYSPLASIA (HCC): ICD-10-CM

## 2025-04-01 DIAGNOSIS — C50.911 MALIGNANT NEOPLASM OF RIGHT BREAST IN FEMALE, ESTROGEN RECEPTOR POSITIVE, UNSPECIFIED SITE OF BREAST: ICD-10-CM

## 2025-04-01 DIAGNOSIS — D46.9 MDS (MYELODYSPLASTIC SYNDROME) (HCC): Primary | ICD-10-CM

## 2025-04-01 DIAGNOSIS — D63.8 ANEMIA OF CHRONIC DISEASE: ICD-10-CM

## 2025-04-01 DIAGNOSIS — D53.9 MACROCYTIC ANEMIA: ICD-10-CM

## 2025-04-01 DIAGNOSIS — R74.01 TRANSAMINITIS: ICD-10-CM

## 2025-04-01 DIAGNOSIS — D46.9 MDS (MYELODYSPLASTIC SYNDROME) (HCC): ICD-10-CM

## 2025-04-01 LAB
ALBUMIN SERPL-MCNC: 4.5 G/DL (ref 3.5–5.2)
ALP SERPL-CCNC: 63 U/L (ref 35–104)
ALT SERPL-CCNC: 54 U/L (ref 0–32)
ANION GAP SERPL CALCULATED.3IONS-SCNC: 17 MMOL/L (ref 7–16)
AST SERPL-CCNC: 57 U/L (ref 0–31)
BASOPHILS # BLD: 0.04 K/UL (ref 0–0.2)
BASOPHILS NFR BLD: 1 % (ref 0–2)
BILIRUB SERPL-MCNC: 1.4 MG/DL (ref 0–1.2)
BUN SERPL-MCNC: 19 MG/DL (ref 6–23)
CALCIUM SERPL-MCNC: 9.7 MG/DL (ref 8.6–10.2)
CHLORIDE SERPL-SCNC: 105 MMOL/L (ref 98–107)
CO2 SERPL-SCNC: 20 MMOL/L (ref 22–29)
CREAT SERPL-MCNC: 0.9 MG/DL (ref 0.5–1)
EOSINOPHIL # BLD: 0.09 K/UL (ref 0.05–0.5)
EOSINOPHILS RELATIVE PERCENT: 2 % (ref 0–6)
ERYTHROCYTE [DISTWIDTH] IN BLOOD BY AUTOMATED COUNT: 28.2 % (ref 11.5–15)
GFR, ESTIMATED: 60 ML/MIN/1.73M2
GLUCOSE SERPL-MCNC: 116 MG/DL (ref 74–99)
HCT VFR BLD AUTO: 29.1 % (ref 34–48)
HGB BLD-MCNC: 9.5 G/DL (ref 11.5–15.5)
LYMPHOCYTES NFR BLD: 1.71 K/UL (ref 1.5–4)
LYMPHOCYTES RELATIVE PERCENT: 36 % (ref 20–42)
MCH RBC QN AUTO: 37.8 PG (ref 26–35)
MCHC RBC AUTO-ENTMCNC: 32.6 G/DL (ref 32–34.5)
MCV RBC AUTO: 115.9 FL (ref 80–99.9)
MONOCYTES NFR BLD: 0.17 K/UL (ref 0.1–0.95)
MONOCYTES NFR BLD: 4 % (ref 2–12)
NEUTROPHILS NFR BLD: 58 % (ref 43–80)
NEUTS SEG NFR BLD: 2.79 K/UL (ref 1.8–7.3)
NUCLEATED RED BLOOD CELLS: 5 PER 100 WBC
PLATELET # BLD AUTO: 176 K/UL (ref 130–450)
PMV BLD AUTO: 12.3 FL (ref 7–12)
POTASSIUM SERPL-SCNC: 4.4 MMOL/L (ref 3.5–5)
PROT SERPL-MCNC: 7.8 G/DL (ref 6.4–8.3)
RBC # BLD AUTO: 2.51 M/UL (ref 3.5–5.5)
RBC # BLD: ABNORMAL 10*6/UL
SODIUM SERPL-SCNC: 142 MMOL/L (ref 132–146)
WBC OTHER # BLD: 4.8 K/UL (ref 4.5–11.5)

## 2025-04-01 PROCEDURE — 1160F RVW MEDS BY RX/DR IN RCRD: CPT | Performed by: NURSE PRACTITIONER

## 2025-04-01 PROCEDURE — 99214 OFFICE O/P EST MOD 30 MIN: CPT | Performed by: NURSE PRACTITIONER

## 2025-04-01 PROCEDURE — 99212 OFFICE O/P EST SF 10 MIN: CPT

## 2025-04-01 PROCEDURE — 80053 COMPREHEN METABOLIC PANEL: CPT

## 2025-04-01 PROCEDURE — 1159F MED LIST DOCD IN RCRD: CPT | Performed by: NURSE PRACTITIONER

## 2025-04-01 PROCEDURE — 1125F AMNT PAIN NOTED PAIN PRSNT: CPT | Performed by: NURSE PRACTITIONER

## 2025-04-01 PROCEDURE — 6360000002 HC RX W HCPCS: Performed by: INTERNAL MEDICINE

## 2025-04-01 PROCEDURE — 96372 THER/PROPH/DIAG INJ SC/IM: CPT

## 2025-04-01 PROCEDURE — 1123F ACP DISCUSS/DSCN MKR DOCD: CPT | Performed by: NURSE PRACTITIONER

## 2025-04-01 PROCEDURE — 85025 COMPLETE CBC W/AUTO DIFF WBC: CPT

## 2025-04-01 PROCEDURE — 36415 COLL VENOUS BLD VENIPUNCTURE: CPT

## 2025-04-01 RX ORDER — TRIAMCINOLONE ACETONIDE 0.25 MG/G
CREAM TOPICAL
Qty: 454 EACH | Refills: 0 | Status: SHIPPED | OUTPATIENT
Start: 2025-04-01

## 2025-04-01 RX ADMIN — DARBEPOETIN ALFA 500 MCG: 500 INJECTION, SOLUTION INTRAVENOUS; SUBCUTANEOUS at 10:53

## 2025-04-01 NOTE — PROGRESS NOTES
Patient provided with discharge instructions, received printed AVS.  All questions answered.  Patient understands follow up plan of care.       
  Food Insecurity: Patient Declined (7/1/2024)    Hunger Vital Sign     Worried About Running Out of Food in the Last Year: Patient declined     Ran Out of Food in the Last Year: Patient declined   Transportation Needs: Patient Declined (7/1/2024)    PRAPARE - Transportation     Lack of Transportation (Medical): Patient declined     Lack of Transportation (Non-Medical): Patient declined   Physical Activity: Not on file   Stress: Not on file   Social Connections: Not on file   Intimate Partner Violence: Not on file   Housing Stability: Patient Declined (7/1/2024)    Housing Stability Vital Sign     Unable to Pay for Housing in the Last Year: Patient declined     Number of Places Lived in the Last Year: 1     Unstable Housing in the Last Year: Patient declined       Allergies:  Allergies   Allergen Reactions    Bee Venom Swelling and Dermatitis    Penicillins Hives, Swelling and Dermatitis    Other      All metals except gold and platinum, welts/blisters       Physical Exam:  /60 Comment: manual  Pulse 79   Temp 97 °F (36.1 °C)   Ht 1.549 m (5' 1\")   Wt 53 kg (116 lb 12.8 oz)   LMP 05/28/1991   SpO2 97%   BMI 22.07 kg/m²   GENERAL: Alert, oriented x 3, not in acute distress.  HEENT: PERRLA; EOMI. Oropharynx clear.  Pos for bruising.  NECK: Supple.  No palpable lymphadenopathy.  LUNGS: Good air entry bilaterally. No wheezing, crackles or rhonchi.   BREASTS: She is s/p right mastectomy, she has right chest wall ulceration, has slightly increased in size since her last visit, no drainage, she has telangiectasias.    CARDIOVASCULAR: Regular rate. No murmurs, rubs or gallops.   ABDOMEN: Soft. Non-tender, non-distended. Positive bowel sounds.  B ACK: The patient has ecchymosis/bruising on the right back  EXTREMITIES: Dorsum of the foot bruising  NEUROLOGIC: No focal deficits.   ECOG PS 1      Impression/Plan:     1. The patient is a very pleasant 84 y.o. lady with a PMH significant for Right Breast Cancer,

## 2025-04-15 ENCOUNTER — HOSPITAL ENCOUNTER (OUTPATIENT)
Dept: INFUSION THERAPY | Age: 85
Discharge: HOME | End: 2025-04-15
Payer: MEDICARE

## 2025-04-15 ENCOUNTER — OFFICE VISIT (OUTPATIENT)
Dept: ONCOLOGY | Age: 85
End: 2025-04-15
Payer: MEDICARE

## 2025-04-15 VITALS
SYSTOLIC BLOOD PRESSURE: 132 MMHG | WEIGHT: 115.3 LBS | HEIGHT: 61 IN | DIASTOLIC BLOOD PRESSURE: 62 MMHG | BODY MASS INDEX: 21.77 KG/M2 | TEMPERATURE: 97.5 F | HEART RATE: 80 BPM | OXYGEN SATURATION: 96 %

## 2025-04-15 DIAGNOSIS — D46.9 MDS (MYELODYSPLASTIC SYNDROME) (HCC): Primary | ICD-10-CM

## 2025-04-15 DIAGNOSIS — D63.8 ANEMIA OF CHRONIC DISEASE: ICD-10-CM

## 2025-04-15 DIAGNOSIS — D53.9 MACROCYTIC ANEMIA: ICD-10-CM

## 2025-04-15 DIAGNOSIS — D46.9 MDS (MYELODYSPLASTIC SYNDROME) (HCC): ICD-10-CM

## 2025-04-15 DIAGNOSIS — Z17.0 MALIGNANT NEOPLASM OF RIGHT BREAST IN FEMALE, ESTROGEN RECEPTOR POSITIVE, UNSPECIFIED SITE OF BREAST: ICD-10-CM

## 2025-04-15 DIAGNOSIS — D46.A MYELODYSPLASTIC SYNDROME WITH MULTILINEAGE DYSPLASIA (HCC): ICD-10-CM

## 2025-04-15 DIAGNOSIS — C50.911 MALIGNANT NEOPLASM OF RIGHT BREAST IN FEMALE, ESTROGEN RECEPTOR POSITIVE, UNSPECIFIED SITE OF BREAST: ICD-10-CM

## 2025-04-15 LAB
ALBUMIN SERPL-MCNC: 4.3 G/DL (ref 3.5–5.2)
ALP SERPL-CCNC: 70 U/L (ref 35–104)
ALT SERPL-CCNC: 42 U/L (ref 0–32)
ANION GAP SERPL CALCULATED.3IONS-SCNC: 17 MMOL/L (ref 7–16)
AST SERPL-CCNC: 43 U/L (ref 0–31)
BASOPHILS # BLD: 0 K/UL (ref 0–0.2)
BASOPHILS NFR BLD: 0 % (ref 0–2)
BILIRUB SERPL-MCNC: 1.2 MG/DL (ref 0–1.2)
BUN SERPL-MCNC: 22 MG/DL (ref 6–23)
CALCIUM SERPL-MCNC: 9.2 MG/DL (ref 8.6–10.2)
CHLORIDE SERPL-SCNC: 105 MMOL/L (ref 98–107)
CO2 SERPL-SCNC: 18 MMOL/L (ref 22–29)
CREAT SERPL-MCNC: 0.9 MG/DL (ref 0.5–1)
EOSINOPHIL # BLD: 0.21 K/UL (ref 0.05–0.5)
EOSINOPHILS RELATIVE PERCENT: 4 % (ref 0–6)
ERYTHROCYTE [DISTWIDTH] IN BLOOD BY AUTOMATED COUNT: 28.4 % (ref 11.5–15)
GFR, ESTIMATED: 61 ML/MIN/1.73M2
GLUCOSE SERPL-MCNC: 135 MG/DL (ref 74–99)
HCT VFR BLD AUTO: 28.9 % (ref 34–48)
HGB BLD-MCNC: 9.4 G/DL (ref 11.5–15.5)
LYMPHOCYTES NFR BLD: 2.39 K/UL (ref 1.5–4)
LYMPHOCYTES RELATIVE PERCENT: 40 % (ref 20–42)
MCH RBC QN AUTO: 38.2 PG (ref 26–35)
MCHC RBC AUTO-ENTMCNC: 32.5 G/DL (ref 32–34.5)
MCV RBC AUTO: 117.5 FL (ref 80–99.9)
MONOCYTES NFR BLD: 0.21 K/UL (ref 0.1–0.95)
MONOCYTES NFR BLD: 4 % (ref 2–12)
NEUTROPHILS NFR BLD: 53 % (ref 43–80)
NEUTS SEG NFR BLD: 3.19 K/UL (ref 1.8–7.3)
NUCLEATED RED BLOOD CELLS: 1 PER 100 WBC
PLATELET # BLD AUTO: 185 K/UL (ref 130–450)
PMV BLD AUTO: 11.9 FL (ref 7–12)
POTASSIUM SERPL-SCNC: 4.4 MMOL/L (ref 3.5–5)
PROT SERPL-MCNC: 7.3 G/DL (ref 6.4–8.3)
RBC # BLD AUTO: 2.46 M/UL (ref 3.5–5.5)
RBC # BLD: ABNORMAL 10*6/UL
SODIUM SERPL-SCNC: 140 MMOL/L (ref 132–146)
WBC OTHER # BLD: 6 K/UL (ref 4.5–11.5)

## 2025-04-15 PROCEDURE — 80053 COMPREHEN METABOLIC PANEL: CPT

## 2025-04-15 PROCEDURE — 6360000002 HC RX W HCPCS: Performed by: INTERNAL MEDICINE

## 2025-04-15 PROCEDURE — 1125F AMNT PAIN NOTED PAIN PRSNT: CPT | Performed by: INTERNAL MEDICINE

## 2025-04-15 PROCEDURE — 1123F ACP DISCUSS/DSCN MKR DOCD: CPT | Performed by: INTERNAL MEDICINE

## 2025-04-15 PROCEDURE — 85025 COMPLETE CBC W/AUTO DIFF WBC: CPT

## 2025-04-15 PROCEDURE — 96372 THER/PROPH/DIAG INJ SC/IM: CPT

## 2025-04-15 PROCEDURE — 1160F RVW MEDS BY RX/DR IN RCRD: CPT | Performed by: INTERNAL MEDICINE

## 2025-04-15 PROCEDURE — 36415 COLL VENOUS BLD VENIPUNCTURE: CPT

## 2025-04-15 PROCEDURE — 99214 OFFICE O/P EST MOD 30 MIN: CPT | Performed by: INTERNAL MEDICINE

## 2025-04-15 PROCEDURE — 1159F MED LIST DOCD IN RCRD: CPT | Performed by: INTERNAL MEDICINE

## 2025-04-15 RX ADMIN — DARBEPOETIN ALFA 500 MCG: 500 INJECTION, SOLUTION INTRAVENOUS; SUBCUTANEOUS at 10:53

## 2025-04-15 NOTE — PROGRESS NOTES
[facility] PHYSICIANS [facility] MED ONCOLOGY  [address]  [facility] [address]  Dept: [phone]  Loc: [phone]  Attending Progress Note      Reason for Visit:   1. Right Breast Cancer.                                   2. MDS.      Referring Physician:  CONNIE DAS CNP    PCP:  Roselia Moralez APRN - CNP    History of Present Illness:      The patient is a very pleasant 84 y.o. lady with a PMH significant for Right Breast Cancer, stage III, HR pos, was diagnosed in 2009, she had a right mastectomy done, followed by adjuvant chemo, she received 8 cycles, probably AC followed by T, then PMRT, and 5 years of adjuvant ET with Arimidex, was completed in 2015. She was treated in [place] under the care of Dr. Simpson. She was diagnosed with MDS in 2017, she received ESAs and PRBCs transfusion. She has transfusion related iron overload. She was started on Aranesp on 4/24/2020.  No SE from Aranesp.     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  The patient had a follow-up with Dr. Melendez, he recommended conservative medical management with DAPT.      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  She had worsening anemia after the procedure requiring packed RBCs transfusion.  The patient did well after the procedure.    The patient returns for a follow-up visit, she is tired, otherwise doing well, no bleeding.    Review of

## 2025-04-22 DIAGNOSIS — D46.9 MDS (MYELODYSPLASTIC SYNDROME) (HCC): Primary | ICD-10-CM

## 2025-04-28 DIAGNOSIS — C50.919 MALIGNANT NEOPLASM OF FEMALE BREAST, UNSPECIFIED ESTROGEN RECEPTOR STATUS, UNSPECIFIED LATERALITY, UNSPECIFIED SITE OF BREAST (HCC): ICD-10-CM

## 2025-04-28 DIAGNOSIS — G89.3 PAIN DUE TO NEOPLASM: ICD-10-CM

## 2025-04-28 DIAGNOSIS — Z51.5 PALLIATIVE CARE BY SPECIALIST: ICD-10-CM

## 2025-04-28 RX ORDER — HYDROCODONE BITARTRATE AND ACETAMINOPHEN 5; 325 MG/1; MG/1
1 TABLET ORAL EVERY 6 HOURS PRN
Qty: 120 TABLET | Refills: 0 | Status: SHIPPED | OUTPATIENT
Start: 2025-04-28 | End: 2025-05-28

## 2025-04-28 NOTE — TELEPHONE ENCOUNTER
Patient Patricia's daughter Sabra called for refill norco 5-325 mg. Next appointment 5-. [facility]. Routed call to A JONO Dupree

## 2025-04-29 ENCOUNTER — OFFICE VISIT (OUTPATIENT)
Dept: ONCOLOGY | Age: 85
End: 2025-04-29
Payer: MEDICARE

## 2025-04-29 ENCOUNTER — HOSPITAL ENCOUNTER (OUTPATIENT)
Dept: INFUSION THERAPY | Age: 85
Discharge: HOME | End: 2025-04-29
Payer: MEDICARE

## 2025-04-29 VITALS
DIASTOLIC BLOOD PRESSURE: 60 MMHG | BODY MASS INDEX: 21.86 KG/M2 | OXYGEN SATURATION: 96 % | WEIGHT: 115.8 LBS | SYSTOLIC BLOOD PRESSURE: 110 MMHG | TEMPERATURE: 96.7 F | HEIGHT: 61 IN | HEART RATE: 73 BPM

## 2025-04-29 DIAGNOSIS — Z17.0 MALIGNANT NEOPLASM OF RIGHT BREAST IN FEMALE, ESTROGEN RECEPTOR POSITIVE, UNSPECIFIED SITE OF BREAST (HCC): ICD-10-CM

## 2025-04-29 DIAGNOSIS — D63.8 ANEMIA OF CHRONIC DISEASE: Primary | ICD-10-CM

## 2025-04-29 DIAGNOSIS — D46.9 MDS (MYELODYSPLASTIC SYNDROME) (HCC): ICD-10-CM

## 2025-04-29 DIAGNOSIS — R74.01 TRANSAMINITIS: ICD-10-CM

## 2025-04-29 DIAGNOSIS — D46.A MYELODYSPLASTIC SYNDROME WITH MULTILINEAGE DYSPLASIA (HCC): ICD-10-CM

## 2025-04-29 DIAGNOSIS — D46.9 MDS (MYELODYSPLASTIC SYNDROME) (HCC): Primary | ICD-10-CM

## 2025-04-29 DIAGNOSIS — C50.911 MALIGNANT NEOPLASM OF RIGHT BREAST IN FEMALE, ESTROGEN RECEPTOR POSITIVE, UNSPECIFIED SITE OF BREAST (HCC): ICD-10-CM

## 2025-04-29 DIAGNOSIS — D53.9 MACROCYTIC ANEMIA: ICD-10-CM

## 2025-04-29 LAB
ALBUMIN SERPL-MCNC: 4.1 G/DL (ref 3.5–5.2)
ALP SERPL-CCNC: 50 U/L (ref 35–104)
ALT SERPL-CCNC: 46 U/L (ref 0–35)
ANION GAP SERPL CALCULATED.3IONS-SCNC: 12 MMOL/L (ref 7–16)
AST SERPL-CCNC: 52 U/L (ref 0–35)
BASOPHILS # BLD: 0 K/UL (ref 0–0.2)
BASOPHILS NFR BLD: 0 % (ref 0–2)
BILIRUB SERPL-MCNC: 1 MG/DL (ref 0–1.2)
BUN SERPL-MCNC: 21 MG/DL (ref 8–23)
CALCIUM SERPL-MCNC: 9.3 MG/DL (ref 8.8–10.2)
CHLORIDE SERPL-SCNC: 105 MMOL/L (ref 98–107)
CO2 SERPL-SCNC: 23 MMOL/L (ref 22–29)
CREAT SERPL-MCNC: 0.8 MG/DL (ref 0.5–1)
EOSINOPHIL # BLD: 0.05 K/UL (ref 0.05–0.5)
EOSINOPHILS RELATIVE PERCENT: 1 % (ref 0–6)
ERYTHROCYTE [DISTWIDTH] IN BLOOD BY AUTOMATED COUNT: 29.1 % (ref 11.5–15)
GFR, ESTIMATED: 75 ML/MIN/1.73M2
GLUCOSE SERPL-MCNC: 122 MG/DL (ref 74–99)
HCT VFR BLD AUTO: 27.6 % (ref 34–48)
HGB BLD-MCNC: 9 G/DL (ref 11.5–15.5)
LYMPHOCYTES NFR BLD: 2 K/UL (ref 1.5–4)
LYMPHOCYTES RELATIVE PERCENT: 38 % (ref 20–42)
MCH RBC QN AUTO: 37.8 PG (ref 26–35)
MCHC RBC AUTO-ENTMCNC: 32.6 G/DL (ref 32–34.5)
MCV RBC AUTO: 116 FL (ref 80–99.9)
MONOCYTES NFR BLD: 0.23 K/UL (ref 0.1–0.95)
MONOCYTES NFR BLD: 4 % (ref 2–12)
NEUTROPHILS NFR BLD: 57 % (ref 43–80)
NEUTS SEG NFR BLD: 3.02 K/UL (ref 1.8–7.3)
PLATELET # BLD AUTO: 155 K/UL (ref 130–450)
PMV BLD AUTO: 12.7 FL (ref 7–12)
POTASSIUM SERPL-SCNC: 5.1 MMOL/L (ref 3.5–5.1)
PROT SERPL-MCNC: 6.9 G/DL (ref 6.4–8.3)
RBC # BLD AUTO: 2.38 M/UL (ref 3.5–5.5)
RBC # BLD: ABNORMAL 10*6/UL
SODIUM SERPL-SCNC: 140 MMOL/L (ref 136–145)
WBC OTHER # BLD: 5.3 K/UL (ref 4.5–11.5)

## 2025-04-29 PROCEDURE — 6360000002 HC RX W HCPCS: Performed by: INTERNAL MEDICINE

## 2025-04-29 PROCEDURE — 36415 COLL VENOUS BLD VENIPUNCTURE: CPT

## 2025-04-29 PROCEDURE — 1160F RVW MEDS BY RX/DR IN RCRD: CPT | Performed by: INTERNAL MEDICINE

## 2025-04-29 PROCEDURE — 96401 CHEMO ANTI-NEOPL SQ/IM: CPT

## 2025-04-29 PROCEDURE — 1159F MED LIST DOCD IN RCRD: CPT | Performed by: INTERNAL MEDICINE

## 2025-04-29 PROCEDURE — 85025 COMPLETE CBC W/AUTO DIFF WBC: CPT

## 2025-04-29 PROCEDURE — 99214 OFFICE O/P EST MOD 30 MIN: CPT | Performed by: INTERNAL MEDICINE

## 2025-04-29 PROCEDURE — 1123F ACP DISCUSS/DSCN MKR DOCD: CPT | Performed by: INTERNAL MEDICINE

## 2025-04-29 PROCEDURE — 1125F AMNT PAIN NOTED PAIN PRSNT: CPT | Performed by: INTERNAL MEDICINE

## 2025-04-29 PROCEDURE — 80053 COMPREHEN METABOLIC PANEL: CPT

## 2025-04-29 RX ADMIN — DARBEPOETIN ALFA 500 MCG: 500 INJECTION, SOLUTION INTRAVENOUS; SUBCUTANEOUS at 12:14

## 2025-04-29 NOTE — PROGRESS NOTES
Patient provided with discharge instructions, received printed AVS.  All questions answered.  Patient understands follow up plan of care.       
the liver.  Hypothyroidism, continue Synthroid follow-up with PCP.    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  The patient had a follow-up with Dr. Melendez, he recommended conservative medical management with DAPT.    Today 4/29/2025 the patient is feeling tired, otherwise doing well clinically, Labs reviewed, hemoglobin is 9 G/DL, proceed with Aranesp as her hemoglobin is less than 10G/DL, she previously had 1% myelocytes, recommended a peripheral blood flow cytometry was done, did not reveal any blasts, will hold off on a bone marrow biopsy and aspirate at this time, white count is normal as well as platelet count, no immature cells in the periphery, she has macrocytosis, which is stable, will monitor her counts closely.     Recurrent transaminitis/hyperbilirubinemia, the patient was evaluated by GI previously, records reviewed, results sent to GI.  Indirect bilirubin was elevated, Chip test was done and was negative.  Reticulocytes 2.1%.  .  Transaminitis stable, ALT 46, AST 52, stable, bilirubin had normalized, 1, will monitor LFTs.    RTC in 2 weeks with labs.    Thank you for allowing us to participate in the care of Ms. Ventura.    LUIS ALFREDO NINO MD   HEMATOLOGY/MEDICAL ONCOLOGY  [facility] MED ONCOLOGY  [address] [place] [id number]  Dept: [phone]  Loc: [phone]

## 2025-05-13 ENCOUNTER — HOSPITAL ENCOUNTER (OUTPATIENT)
Dept: INFUSION THERAPY | Age: 85
Discharge: HOME | End: 2025-05-13
Payer: MEDICARE

## 2025-05-13 ENCOUNTER — OFFICE VISIT (OUTPATIENT)
Dept: ONCOLOGY | Age: 85
End: 2025-05-13
Payer: MEDICARE

## 2025-05-13 VITALS
SYSTOLIC BLOOD PRESSURE: 176 MMHG | DIASTOLIC BLOOD PRESSURE: 72 MMHG | BODY MASS INDEX: 20.96 KG/M2 | WEIGHT: 111 LBS | HEART RATE: 80 BPM | HEIGHT: 61 IN | OXYGEN SATURATION: 96 % | TEMPERATURE: 97.2 F

## 2025-05-13 DIAGNOSIS — C50.911 MALIGNANT NEOPLASM OF RIGHT BREAST IN FEMALE, ESTROGEN RECEPTOR POSITIVE, UNSPECIFIED SITE OF BREAST (HCC): ICD-10-CM

## 2025-05-13 DIAGNOSIS — D46.9 MDS (MYELODYSPLASTIC SYNDROME) (HCC): Primary | ICD-10-CM

## 2025-05-13 DIAGNOSIS — Z17.0 MALIGNANT NEOPLASM OF RIGHT BREAST IN FEMALE, ESTROGEN RECEPTOR POSITIVE, UNSPECIFIED SITE OF BREAST (HCC): ICD-10-CM

## 2025-05-13 DIAGNOSIS — D46.9 MDS (MYELODYSPLASTIC SYNDROME) (HCC): ICD-10-CM

## 2025-05-13 DIAGNOSIS — D53.9 MACROCYTIC ANEMIA: ICD-10-CM

## 2025-05-13 LAB
ALBUMIN SERPL-MCNC: 4.4 G/DL (ref 3.5–5.2)
ALP SERPL-CCNC: 55 U/L (ref 35–104)
ALT SERPL-CCNC: 56 U/L (ref 0–35)
ANION GAP SERPL CALCULATED.3IONS-SCNC: 13 MMOL/L (ref 7–16)
AST SERPL-CCNC: 67 U/L (ref 0–35)
BASOPHILS # BLD: 0.1 K/UL (ref 0–0.2)
BASOPHILS NFR BLD: 2 % (ref 0–2)
BILIRUB SERPL-MCNC: 1.1 MG/DL (ref 0–1.2)
BUN SERPL-MCNC: 21 MG/DL (ref 8–23)
CALCIUM SERPL-MCNC: 9.8 MG/DL (ref 8.8–10.2)
CHLORIDE SERPL-SCNC: 102 MMOL/L (ref 98–107)
CO2 SERPL-SCNC: 22 MMOL/L (ref 22–29)
CREAT SERPL-MCNC: 0.8 MG/DL (ref 0.5–1)
EOSINOPHIL # BLD: 0.16 K/UL (ref 0.05–0.5)
EOSINOPHILS RELATIVE PERCENT: 3 % (ref 0–6)
ERYTHROCYTE [DISTWIDTH] IN BLOOD BY AUTOMATED COUNT: 27.6 % (ref 11.5–15)
GFR, ESTIMATED: 74 ML/MIN/1.73M2
GLUCOSE SERPL-MCNC: 112 MG/DL (ref 74–99)
HCT VFR BLD AUTO: 29.8 % (ref 34–48)
HGB BLD-MCNC: 10 G/DL (ref 11.5–15.5)
LYMPHOCYTES NFR BLD: 2.45 K/UL (ref 1.5–4)
LYMPHOCYTES RELATIVE PERCENT: 41 % (ref 20–42)
MCH RBC QN AUTO: 37.7 PG (ref 26–35)
MCHC RBC AUTO-ENTMCNC: 33.6 G/DL (ref 32–34.5)
MCV RBC AUTO: 112.5 FL (ref 80–99.9)
MONOCYTES NFR BLD: 0.31 K/UL (ref 0.1–0.95)
MONOCYTES NFR BLD: 5 % (ref 2–12)
NEUTROPHILS NFR BLD: 50 % (ref 43–80)
NEUTS SEG NFR BLD: 2.97 K/UL (ref 1.8–7.3)
PLATELET # BLD AUTO: 187 K/UL (ref 130–450)
PMV BLD AUTO: 12.6 FL (ref 7–12)
POTASSIUM SERPL-SCNC: 4.8 MMOL/L (ref 3.5–5.1)
PROT SERPL-MCNC: 7.6 G/DL (ref 6.4–8.3)
RBC # BLD AUTO: 2.65 M/UL (ref 3.5–5.5)
RBC # BLD: ABNORMAL 10*6/UL
SODIUM SERPL-SCNC: 137 MMOL/L (ref 136–145)
WBC OTHER # BLD: 6 K/UL (ref 4.5–11.5)

## 2025-05-13 PROCEDURE — 1123F ACP DISCUSS/DSCN MKR DOCD: CPT | Performed by: INTERNAL MEDICINE

## 2025-05-13 PROCEDURE — 80053 COMPREHEN METABOLIC PANEL: CPT

## 2025-05-13 PROCEDURE — 99212 OFFICE O/P EST SF 10 MIN: CPT

## 2025-05-13 PROCEDURE — 36415 COLL VENOUS BLD VENIPUNCTURE: CPT

## 2025-05-13 PROCEDURE — 1160F RVW MEDS BY RX/DR IN RCRD: CPT | Performed by: INTERNAL MEDICINE

## 2025-05-13 PROCEDURE — 99214 OFFICE O/P EST MOD 30 MIN: CPT | Performed by: INTERNAL MEDICINE

## 2025-05-13 PROCEDURE — 1159F MED LIST DOCD IN RCRD: CPT | Performed by: INTERNAL MEDICINE

## 2025-05-13 PROCEDURE — 85025 COMPLETE CBC W/AUTO DIFF WBC: CPT

## 2025-05-13 RX ORDER — PNV NO.95/FERROUS FUM/FOLIC AC 28MG-0.8MG
1 TABLET ORAL DAILY
COMMUNITY
Start: 2025-05-06

## 2025-05-13 NOTE — PROGRESS NOTES
[facility] PHYSICIANS [facility] MED ONCOLOGY  [address]  [facility] [address]  Dept: [phone]  Loc: [phone]  Attending Progress Note      Reason for Visit:   1. Right Breast Cancer.                                   2. MDS.      Referring Physician:  CONNIE DAS CNP    PCP:  Roselia Moralez APRN - CNP    History of Present Illness:      The patient is a very pleasant 84 y.o. lady with a PMH significant for Right Breast Cancer, stage III, HR pos, was diagnosed in 2009, she had a right mastectomy done, followed by adjuvant chemo, she received 8 cycles, probably AC followed by T, then PMRT, and 5 years of adjuvant ET with Arimidex, was completed in 2015. She was treated in [place] under the care of Dr. Simpson. She was diagnosed with MDS in 2017, she received ESAs and PRBCs transfusion. She has transfusion related iron overload. She was started on Aranesp on 4/24/2020.  No SE from Aranesp.     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  The patient had a follow-up with Dr. Melendez, he recommended conservative medical management with DAPT.      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  She had worsening anemia after the procedure requiring packed RBCs transfusion.  The patient did well after the procedure.    The patient returns for a follow-up visit, she is tired, otherwise doing well, no bleeding.  She had shoulder pain, saw PCP and

## 2025-05-20 ENCOUNTER — TELEPHONE (OUTPATIENT)
Dept: INFUSION THERAPY | Age: 85
End: 2025-05-20

## 2025-05-20 DIAGNOSIS — D46.9 MDS (MYELODYSPLASTIC SYNDROME) (HCC): Primary | ICD-10-CM

## 2025-05-20 NOTE — TELEPHONE ENCOUNTER
Contacted patient to et her know that Dr. Luciano Bernardo placed new imaging orders. Patient stated she had a CT Chest last week and her PCP was waiting for the results to come back.. Patient let me know that she will instruct the office to send us a copy once the results are back.

## 2025-05-23 DIAGNOSIS — J90 PLEURAL EFFUSION: Primary | ICD-10-CM

## 2025-05-27 ENCOUNTER — HOSPITAL ENCOUNTER (OUTPATIENT)
Dept: INFUSION THERAPY | Age: 85
Discharge: HOME | End: 2025-05-27
Payer: MEDICARE

## 2025-05-27 ENCOUNTER — OFFICE VISIT (OUTPATIENT)
Age: 85
End: 2025-05-27
Payer: MEDICARE

## 2025-05-27 VITALS
SYSTOLIC BLOOD PRESSURE: 160 MMHG | TEMPERATURE: 97.6 F | HEIGHT: 61 IN | WEIGHT: 111.6 LBS | DIASTOLIC BLOOD PRESSURE: 68 MMHG | OXYGEN SATURATION: 97 % | BODY MASS INDEX: 21.07 KG/M2 | HEART RATE: 96 BPM

## 2025-05-27 DIAGNOSIS — D63.8 ANEMIA OF CHRONIC DISEASE: ICD-10-CM

## 2025-05-27 DIAGNOSIS — J90 PLEURAL EFFUSION, LEFT: ICD-10-CM

## 2025-05-27 DIAGNOSIS — Z51.5 PALLIATIVE CARE BY SPECIALIST: ICD-10-CM

## 2025-05-27 DIAGNOSIS — C50.919 MALIGNANT NEOPLASM OF FEMALE BREAST, UNSPECIFIED ESTROGEN RECEPTOR STATUS, UNSPECIFIED LATERALITY, UNSPECIFIED SITE OF BREAST (HCC): ICD-10-CM

## 2025-05-27 DIAGNOSIS — G89.3 PAIN DUE TO NEOPLASM: ICD-10-CM

## 2025-05-27 DIAGNOSIS — D46.9 MDS (MYELODYSPLASTIC SYNDROME) (HCC): Primary | ICD-10-CM

## 2025-05-27 DIAGNOSIS — D46.9 MDS (MYELODYSPLASTIC SYNDROME) (HCC): ICD-10-CM

## 2025-05-27 DIAGNOSIS — D46.A MYELODYSPLASTIC SYNDROME WITH MULTILINEAGE DYSPLASIA (HCC): ICD-10-CM

## 2025-05-27 LAB
ALBUMIN SERPL-MCNC: 4.2 G/DL (ref 3.5–5.2)
ALP SERPL-CCNC: 56 U/L (ref 35–104)
ALT SERPL-CCNC: 69 U/L (ref 0–35)
ANION GAP SERPL CALCULATED.3IONS-SCNC: 12 MMOL/L (ref 7–16)
AST SERPL-CCNC: 71 U/L (ref 0–35)
BASOPHILS # BLD: 0.06 K/UL (ref 0–0.2)
BASOPHILS NFR BLD: 1 % (ref 0–2)
BILIRUB SERPL-MCNC: 1.1 MG/DL (ref 0–1.2)
BUN SERPL-MCNC: 19 MG/DL (ref 8–23)
CALCIUM SERPL-MCNC: 9.5 MG/DL (ref 8.8–10.2)
CHLORIDE SERPL-SCNC: 105 MMOL/L (ref 98–107)
CO2 SERPL-SCNC: 23 MMOL/L (ref 22–29)
CREAT SERPL-MCNC: 1 MG/DL (ref 0.5–1)
EOSINOPHIL # BLD: 0.18 K/UL (ref 0.05–0.5)
EOSINOPHILS RELATIVE PERCENT: 2 % (ref 0–6)
ERYTHROCYTE [DISTWIDTH] IN BLOOD BY AUTOMATED COUNT: 26.8 % (ref 11.5–15)
GFR, ESTIMATED: 57 ML/MIN/1.73M2
GLUCOSE SERPL-MCNC: 130 MG/DL (ref 74–99)
HCT VFR BLD AUTO: 26.2 % (ref 34–48)
HGB BLD-MCNC: 9 G/DL (ref 11.5–15.5)
IMM GRANULOCYTES # BLD AUTO: 0.03 K/UL (ref 0–0.58)
IMM GRANULOCYTES NFR BLD: 0 % (ref 0–5)
LYMPHOCYTES NFR BLD: 3.23 K/UL (ref 1.5–4)
LYMPHOCYTES RELATIVE PERCENT: 37 % (ref 20–42)
MCH RBC QN AUTO: 38.1 PG (ref 26–35)
MCHC RBC AUTO-ENTMCNC: 34.4 G/DL (ref 32–34.5)
MCV RBC AUTO: 111 FL (ref 80–99.9)
MONOCYTES NFR BLD: 0.54 K/UL (ref 0.1–0.95)
MONOCYTES NFR BLD: 6 % (ref 2–12)
NEUTROPHILS NFR BLD: 54 % (ref 43–80)
NEUTS SEG NFR BLD: 4.79 K/UL (ref 1.8–7.3)
PLATELET # BLD AUTO: 196 K/UL (ref 130–450)
PMV BLD AUTO: 11.9 FL (ref 7–12)
POTASSIUM SERPL-SCNC: 4.6 MMOL/L (ref 3.5–5.1)
PROT SERPL-MCNC: 7.5 G/DL (ref 6.4–8.3)
RBC # BLD AUTO: 2.36 M/UL (ref 3.5–5.5)
RBC # BLD: ABNORMAL 10*6/UL
SODIUM SERPL-SCNC: 141 MMOL/L (ref 136–145)
WBC OTHER # BLD: 8.8 K/UL (ref 4.5–11.5)

## 2025-05-27 PROCEDURE — 1159F MED LIST DOCD IN RCRD: CPT | Performed by: NURSE PRACTITIONER

## 2025-05-27 PROCEDURE — 99214 OFFICE O/P EST MOD 30 MIN: CPT | Performed by: INTERNAL MEDICINE

## 2025-05-27 PROCEDURE — 1125F AMNT PAIN NOTED PAIN PRSNT: CPT | Performed by: INTERNAL MEDICINE

## 2025-05-27 PROCEDURE — 80053 COMPREHEN METABOLIC PANEL: CPT

## 2025-05-27 PROCEDURE — 99213 OFFICE O/P EST LOW 20 MIN: CPT | Performed by: NURSE PRACTITIONER

## 2025-05-27 PROCEDURE — 1123F ACP DISCUSS/DSCN MKR DOCD: CPT | Performed by: NURSE PRACTITIONER

## 2025-05-27 PROCEDURE — 96372 THER/PROPH/DIAG INJ SC/IM: CPT

## 2025-05-27 PROCEDURE — 85025 COMPLETE CBC W/AUTO DIFF WBC: CPT

## 2025-05-27 PROCEDURE — 1159F MED LIST DOCD IN RCRD: CPT | Performed by: INTERNAL MEDICINE

## 2025-05-27 PROCEDURE — 6360000002 HC RX W HCPCS: Performed by: INTERNAL MEDICINE

## 2025-05-27 PROCEDURE — 36415 COLL VENOUS BLD VENIPUNCTURE: CPT

## 2025-05-27 PROCEDURE — 1123F ACP DISCUSS/DSCN MKR DOCD: CPT | Performed by: INTERNAL MEDICINE

## 2025-05-27 RX ORDER — HYDROCODONE BITARTRATE AND ACETAMINOPHEN 5; 325 MG/1; MG/1
1 TABLET ORAL EVERY 6 HOURS PRN
Qty: 120 TABLET | Refills: 0 | Status: SHIPPED | OUTPATIENT
Start: 2025-05-27 | End: 2025-06-26

## 2025-05-27 RX ORDER — TRAMADOL HYDROCHLORIDE 50 MG/1
50 TABLET ORAL EVERY 8 HOURS PRN
COMMUNITY
Start: 2025-05-22

## 2025-05-27 RX ORDER — CLINDAMYCIN HYDROCHLORIDE 300 MG/1
CAPSULE ORAL
COMMUNITY
Start: 2025-05-22

## 2025-05-27 RX ORDER — TIZANIDINE 2 MG/1
2 TABLET ORAL 3 TIMES DAILY PRN
Qty: 30 TABLET | Refills: 0 | Status: SHIPPED | OUTPATIENT
Start: 2025-05-27

## 2025-05-27 RX ADMIN — DARBEPOETIN ALFA 500 MCG: 500 INJECTION, SOLUTION INTRAVENOUS; SUBCUTANEOUS at 11:53

## 2025-05-27 NOTE — PROGRESS NOTES
Department of Palliative Medicine  Ambulatory Note  Provider: Mo Dupree, APRN - CNP       Chief Complaint: Patricia Ventura is a 84 y.o. female with chief complaint of Pain      Assessment/Plan      History of Breast Cancer stage III, HR positive:  Diagnosed, treated, and managed in [place] in 2009  Status post right mastectomy  Followed by adjuvant chemotherapy  Completed her Demadex 2015  Currently followed locally by Dr. Luciano Bernardo     Chemotherapy related peripheral Neuropathy/Pain:  Norco 5-325 mg every 6 hours as needed     Fatigue/weakness:  Encouraged continue physical activity  Encouraged to watch intake and use ONS  Looking into resources for help at home, to establish at   reports improvement in energy, especially after stopping her gabapentin and lipitor    Follow Up: 3 months.  They were encouraged to call with any questions, concerns, needs, or changes in symptoms.    Subjective:   HPI:  Patricia Ventura is a 79 y.o. female with significant past medical history of stage II HR positive breast cancer, diagnosed in 2009, status post right mastectomy, followed by adjuvant therapy, and 5 years of adjuvant Arimidex which was completed in 2015, with all treatment performed in [place] under the care of Dr. Simpson.  She is currently being followed locally by Dr. Luciano Bernardo, without evidence of recurrent disease, whom she also follows for management of myelodysplastic syndrome, which was diagnosed in 2017.  She was referred to [facility] Palliative Medicine for assistance with symptom management related to chemotherapy-induced peripheral neuropathy.      5/27/25  History of Present Illness  The patient presents today for a palliative medicine follow-up regarding symptoms related to her history of breast cancer, including pain and fatigue. She is accompanied by her daughter.    She reports experiencing new pain, which she describes as shooting in nature. This pain radiates to her back and scapular muscles.

## 2025-05-27 NOTE — PROGRESS NOTES
Insecurity: Patient Declined (7/1/2024)    Hunger Vital Sign     Worried About Running Out of Food in the Last Year: Patient declined     Ran Out of Food in the Last Year: Patient declined   Transportation Needs: Patient Declined (7/1/2024)    PRAPARE - Transportation     Lack of Transportation (Medical): Patient declined     Lack of Transportation (Non-Medical): Patient declined   Physical Activity: Not on file   Stress: Not on file   Social Connections: Not on file   Intimate Partner Violence: Not on file   Housing Stability: Patient Declined (7/1/2024)    Housing Stability Vital Sign     Unable to Pay for Housing in the Last Year: Patient declined     Number of Places Lived in the Last Year: 1     Unstable Housing in the Last Year: Patient declined       Allergies:  Allergies   Allergen Reactions    Bee Venom Swelling and Dermatitis    Penicillins Hives, Swelling and Dermatitis    Other/Food      All metals except gold and platinum, welts/blisters       Physical Exam:  BP (!) 160/68   Pulse 96   Temp 97.6 °F (36.4 °C)   Ht 1.549 m (5' 1\")   Wt 50.6 kg (111 lb 9.6 oz)   LMP 05/28/1991   SpO2 97%   BMI 21.09 kg/m²   GENERAL: Alert, oriented x 3, not in acute distress.  HEENT: PERRLA; EOMI. Oropharynx clear.  Pos for bruising.  NECK: Supple.  No palpable lymphadenopathy.  LUNGS: Decreased air entry on the left  BREASTS: She is s/p right mastectomy, she has right chest wall ulceration, has slightly increased in size since her last visit, no drainage, she has telangiectasias.    CARDIOVASCULAR: Regular rate. No murmurs, rubs or gallops.   ABDOMEN: Soft. Non-tender, non-distended. Positive bowel sounds.  EXTREMITIES: Dorsum of the foot bruising  NEUROLOGIC: No focal deficits.   ECOG PS 1      Impression/Plan:     1. The patient is a very pleasant 84 y.o. lady with a PMH significant for Right Breast Cancer, stage III, HR pos, was diagnosed in 2009, she had a right mastectomy done, followed by adjuvant chemo, she

## 2025-06-10 ENCOUNTER — HOSPITAL ENCOUNTER (OUTPATIENT)
Dept: INFUSION THERAPY | Age: 85
Discharge: HOME | End: 2025-06-10
Payer: MEDICARE

## 2025-06-10 ENCOUNTER — OFFICE VISIT (OUTPATIENT)
Age: 85
End: 2025-06-10
Payer: MEDICARE

## 2025-06-10 VITALS
TEMPERATURE: 97.4 F | DIASTOLIC BLOOD PRESSURE: 71 MMHG | HEART RATE: 77 BPM | SYSTOLIC BLOOD PRESSURE: 159 MMHG | BODY MASS INDEX: 20.05 KG/M2 | HEIGHT: 61 IN | WEIGHT: 106.2 LBS | OXYGEN SATURATION: 94 %

## 2025-06-10 DIAGNOSIS — D46.9 MDS (MYELODYSPLASTIC SYNDROME) (HCC): Primary | ICD-10-CM

## 2025-06-10 DIAGNOSIS — J90 PLEURAL EFFUSION, LEFT: ICD-10-CM

## 2025-06-10 DIAGNOSIS — D46.9 MDS (MYELODYSPLASTIC SYNDROME) (HCC): ICD-10-CM

## 2025-06-10 LAB
ALBUMIN SERPL-MCNC: 4.3 G/DL (ref 3.5–5.2)
ALP SERPL-CCNC: 52 U/L (ref 35–104)
ALT SERPL-CCNC: 65 U/L (ref 0–35)
ANION GAP SERPL CALCULATED.3IONS-SCNC: 13 MMOL/L (ref 7–16)
AST SERPL-CCNC: 71 U/L (ref 0–35)
BASOPHILS # BLD: 0.04 K/UL (ref 0–0.2)
BASOPHILS NFR BLD: 1 % (ref 0–2)
BILIRUB SERPL-MCNC: 1.4 MG/DL (ref 0–1.2)
BUN SERPL-MCNC: 18 MG/DL (ref 8–23)
CALCIUM SERPL-MCNC: 9.6 MG/DL (ref 8.8–10.2)
CHLORIDE SERPL-SCNC: 102 MMOL/L (ref 98–107)
CO2 SERPL-SCNC: 25 MMOL/L (ref 22–29)
CREAT SERPL-MCNC: 0.9 MG/DL (ref 0.5–1)
EOSINOPHIL # BLD: 0.16 K/UL (ref 0.05–0.5)
EOSINOPHILS RELATIVE PERCENT: 3 % (ref 0–6)
ERYTHROCYTE [DISTWIDTH] IN BLOOD BY AUTOMATED COUNT: 27.7 % (ref 11.5–15)
GFR, ESTIMATED: 61 ML/MIN/1.73M2
GLUCOSE SERPL-MCNC: 130 MG/DL (ref 74–99)
HCT VFR BLD AUTO: 30.3 % (ref 34–48)
HGB BLD-MCNC: 10.2 G/DL (ref 11.5–15.5)
IMM GRANULOCYTES # BLD AUTO: <0.03 K/UL (ref 0–0.58)
IMM GRANULOCYTES NFR BLD: 0 % (ref 0–5)
LYMPHOCYTES NFR BLD: 3.08 K/UL (ref 1.5–4)
LYMPHOCYTES RELATIVE PERCENT: 51 % (ref 20–42)
MCH RBC QN AUTO: 38.5 PG (ref 26–35)
MCHC RBC AUTO-ENTMCNC: 33.7 G/DL (ref 32–34.5)
MCV RBC AUTO: 114.3 FL (ref 80–99.9)
MONOCYTES NFR BLD: 0.34 K/UL (ref 0.1–0.95)
MONOCYTES NFR BLD: 6 % (ref 2–12)
NEUTROPHILS NFR BLD: 40 % (ref 43–80)
NEUTS SEG NFR BLD: 2.43 K/UL (ref 1.8–7.3)
PLATELET # BLD AUTO: 156 K/UL (ref 130–450)
PMV BLD AUTO: 11 FL (ref 7–12)
POTASSIUM SERPL-SCNC: 4.6 MMOL/L (ref 3.5–5.1)
PROT SERPL-MCNC: 7.4 G/DL (ref 6.4–8.3)
RBC # BLD AUTO: 2.65 M/UL (ref 3.5–5.5)
RBC # BLD: ABNORMAL 10*6/UL
SODIUM SERPL-SCNC: 139 MMOL/L (ref 136–145)
WBC OTHER # BLD: 6.1 K/UL (ref 4.5–11.5)

## 2025-06-10 PROCEDURE — 1125F AMNT PAIN NOTED PAIN PRSNT: CPT | Performed by: INTERNAL MEDICINE

## 2025-06-10 PROCEDURE — 85025 COMPLETE CBC W/AUTO DIFF WBC: CPT

## 2025-06-10 PROCEDURE — 36415 COLL VENOUS BLD VENIPUNCTURE: CPT

## 2025-06-10 PROCEDURE — 1123F ACP DISCUSS/DSCN MKR DOCD: CPT | Performed by: INTERNAL MEDICINE

## 2025-06-10 PROCEDURE — 1160F RVW MEDS BY RX/DR IN RCRD: CPT | Performed by: INTERNAL MEDICINE

## 2025-06-10 PROCEDURE — 99214 OFFICE O/P EST MOD 30 MIN: CPT | Performed by: INTERNAL MEDICINE

## 2025-06-10 PROCEDURE — 1159F MED LIST DOCD IN RCRD: CPT | Performed by: INTERNAL MEDICINE

## 2025-06-10 PROCEDURE — 80053 COMPREHEN METABOLIC PANEL: CPT

## 2025-06-10 NOTE — PROGRESS NOTES
[facility]ICIANS [facility]  [address]  [facility] [address]  Dept: [phone]  Loc: [phone]  Attending Progress Note      Reason for Visit:   1. Right Breast Cancer.                                   2. MDS.      Referring Physician:  CONNIE DAS CNP    PCP:  Roselia Moralez APRN - CNP    History of Present Illness:      The patient is a very pleasant 84 y.o. lady with a PMH significant for Right Breast Cancer, stage III, HR pos, was diagnosed in 2009, she had a right mastectomy done, followed by adjuvant chemo, she received 8 cycles, probably AC followed by T, then PMRT, and 5 years of adjuvant ET with Arimidex, was completed in 2015. She was treated in [place] under the care of Dr. Simpson. She was diagnosed with MDS in 2017, she received ESAs and PRBCs transfusion. She has transfusion related iron overload. She was started on Aranesp on 4/24/2020.  No SE from Aranesp.     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  The patient had a follow-up with Dr. Melendez, he recommended conservative medical management with DAPT.      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  She had worsening anemia after the procedure requiring packed RBCs transfusion.  The patient did well after the procedure.    The patient returns for a follow-up visit, she is tired, no bleeding, she did not get scheduled to see pulmonary

## 2025-06-10 NOTE — PROGRESS NOTES
Patricia Ventura  6/10/2025  Ht Readings from Last 1 Encounters:   06/10/25 1.549 m (5' 1\")     Wt Readings from Last 10 Encounters:   06/10/25 48.2 kg (106 lb 3.2 oz)   05/27/25 50.6 kg (111 lb 9.6 oz)   05/13/25 50.3 kg (111 lb)   04/29/25 52.5 kg (115 lb 12.8 oz)   04/15/25 52.3 kg (115 lb 4.8 oz)   04/01/25 53 kg (116 lb 12.8 oz)   03/18/25 53.4 kg (117 lb 11.2 oz)   03/04/25 53.3 kg (117 lb 6.4 oz)   02/18/25 54.5 kg (120 lb 1.6 oz)   02/04/25 54.3 kg (119 lb 12.8 oz)     BMI: 20.07    Assessment: Met w/ pt during medical oncology visit. Pt has lost 5# over last 2 weeks (4.5%), significant loss of 9.4% x3 months (11#). She reports decreased appetite w/ lack of hunger cues. She is consuming 3 meals/day, drinking one Ensure Original and an HS snack each evening. Discussed ONS options, noting that pt may benefit from more nutrient dense option. Pt reports she has tried higher calorie options, but finds original versions of ONS to be more tolerable. Recommend pt attempt BID intake of ONS. Discussed smaller more frequent meals, encouraging pt to attempt PO intake every 2-3 hours while awake to maintain/gain weight. Pt also reports her current dentures are temporary and ill-fitting, making it difficult to chew nuts and meats. Reviewed soft protein food options. Provided pt w/ resources to optimize nutritional intake. Pt appreciative of assistance. Encouraged pt to contact this clinician as needed. Will follow PRN.    Weight change: -9.4% x3 months  Appetite: poor  Nutritional Side Effects: anorexia, early satiety, lack of hunger cues  Calculated Needs: 30-32 kcal/kg CBW =  kcal, 1.3-1.5 gm/kg CBW = 60-70 gm pro, 1 ml/kcal =  ml fluids  Malnutrition Status: Severe  Nutrition Diagnosis: Severe Malnutrition related to Catabolic Illness as evidenced by Diet history of poor intake , Mild Fat Loss , Mild Muscle loss , and Weight loss of >75.% x3 months    Recommendations: Pt to consume at least 6 small

## 2025-06-16 ENCOUNTER — APPOINTMENT (OUTPATIENT)
Dept: CT IMAGING | Age: 85
End: 2025-06-16
Payer: MEDICARE

## 2025-06-16 ENCOUNTER — APPOINTMENT (OUTPATIENT)
Dept: GENERAL RADIOLOGY | Age: 85
End: 2025-06-16
Payer: MEDICARE

## 2025-06-16 ENCOUNTER — HOSPITAL ENCOUNTER (EMERGENCY)
Age: 85
Discharge: HOME | End: 2025-06-16
Attending: EMERGENCY MEDICINE
Payer: MEDICARE

## 2025-06-16 VITALS
OXYGEN SATURATION: 100 % | TEMPERATURE: 98.1 F | SYSTOLIC BLOOD PRESSURE: 135 MMHG | BODY MASS INDEX: 20.01 KG/M2 | WEIGHT: 106 LBS | HEIGHT: 61 IN | RESPIRATION RATE: 16 BRPM | DIASTOLIC BLOOD PRESSURE: 50 MMHG | HEART RATE: 72 BPM

## 2025-06-16 DIAGNOSIS — R11.2 NAUSEA AND VOMITING, UNSPECIFIED VOMITING TYPE: Primary | ICD-10-CM

## 2025-06-16 LAB
ALBUMIN SERPL-MCNC: 4.6 G/DL (ref 3.5–5.2)
ALP SERPL-CCNC: 51 U/L (ref 35–104)
ALT SERPL-CCNC: 57 U/L (ref 0–32)
ANION GAP SERPL CALCULATED.3IONS-SCNC: 12 MMOL/L (ref 7–16)
AST SERPL-CCNC: 58 U/L (ref 0–31)
ATYPICAL LYMPHOCYTE ABSOLUTE COUNT: 0.21 K/UL (ref 0–0.46)
ATYPICAL LYMPHOCYTES: 4 % (ref 0–4)
BASOPHILS # BLD: 0.04 K/UL (ref 0–0.2)
BASOPHILS NFR BLD: 1 % (ref 0–2)
BILIRUB SERPL-MCNC: 1.2 MG/DL (ref 0–1.2)
BUN SERPL-MCNC: 24 MG/DL (ref 6–23)
CALCIUM SERPL-MCNC: 9.6 MG/DL (ref 8.6–10.2)
CHLORIDE SERPL-SCNC: 100 MMOL/L (ref 98–107)
CO2 SERPL-SCNC: 25 MMOL/L (ref 22–29)
CREAT SERPL-MCNC: 0.9 MG/DL (ref 0.5–1)
EOSINOPHIL # BLD: 0.25 K/UL (ref 0.05–0.5)
EOSINOPHILS RELATIVE PERCENT: 5 % (ref 0–6)
ERYTHROCYTE [DISTWIDTH] IN BLOOD BY AUTOMATED COUNT: 28 % (ref 11.5–15)
GFR, ESTIMATED: 64 ML/MIN/1.73M2
GLUCOSE SERPL-MCNC: 103 MG/DL (ref 74–99)
HCT VFR BLD AUTO: 27.2 % (ref 34–48)
HGB BLD-MCNC: 9.1 G/DL (ref 11.5–15.5)
LYMPHOCYTES NFR BLD: 2.84 K/UL (ref 1.5–4)
LYMPHOCYTES RELATIVE PERCENT: 56 % (ref 20–42)
MCH RBC QN AUTO: 38.4 PG (ref 26–35)
MCHC RBC AUTO-ENTMCNC: 33.5 G/DL (ref 32–34.5)
MCV RBC AUTO: 114.8 FL (ref 80–99.9)
MONOCYTES NFR BLD: 0.21 K/UL (ref 0.1–0.95)
MONOCYTES NFR BLD: 4 % (ref 2–12)
NEUTROPHILS NFR BLD: 30 % (ref 43–80)
NEUTS SEG NFR BLD: 1.55 K/UL (ref 1.8–7.3)
PLATELET # BLD AUTO: 148 K/UL (ref 130–450)
PMV BLD AUTO: 12.6 FL (ref 7–12)
POTASSIUM SERPL-SCNC: 4.5 MMOL/L (ref 3.5–5)
PROT SERPL-MCNC: 7.5 G/DL (ref 6.4–8.3)
RBC # BLD AUTO: 2.37 M/UL (ref 3.5–5.5)
RBC # BLD: ABNORMAL 10*6/UL
SODIUM SERPL-SCNC: 137 MMOL/L (ref 132–146)
TROPONIN I SERPL HS-MCNC: 16 NG/L (ref 0–14)
WBC OTHER # BLD: 5.1 K/UL (ref 4.5–11.5)

## 2025-06-16 PROCEDURE — 96374 THER/PROPH/DIAG INJ IV PUSH: CPT

## 2025-06-16 PROCEDURE — 6360000002 HC RX W HCPCS: Performed by: EMERGENCY MEDICINE

## 2025-06-16 PROCEDURE — 84484 ASSAY OF TROPONIN QUANT: CPT

## 2025-06-16 PROCEDURE — 85025 COMPLETE CBC W/AUTO DIFF WBC: CPT

## 2025-06-16 PROCEDURE — 74176 CT ABD & PELVIS W/O CONTRAST: CPT

## 2025-06-16 PROCEDURE — 93005 ELECTROCARDIOGRAM TRACING: CPT | Performed by: EMERGENCY MEDICINE

## 2025-06-16 PROCEDURE — 99285 EMERGENCY DEPT VISIT HI MDM: CPT

## 2025-06-16 PROCEDURE — 80053 COMPREHEN METABOLIC PANEL: CPT

## 2025-06-16 PROCEDURE — 2580000003 HC RX 258: Performed by: EMERGENCY MEDICINE

## 2025-06-16 PROCEDURE — 71045 X-RAY EXAM CHEST 1 VIEW: CPT

## 2025-06-16 RX ORDER — ONDANSETRON 4 MG/1
4 TABLET, FILM COATED ORAL DAILY PRN
Qty: 10 TABLET | Refills: 0 | Status: SHIPPED | OUTPATIENT
Start: 2025-06-16

## 2025-06-16 RX ORDER — 0.9 % SODIUM CHLORIDE 0.9 %
1000 INTRAVENOUS SOLUTION INTRAVENOUS ONCE
Status: COMPLETED | OUTPATIENT
Start: 2025-06-16 | End: 2025-06-16

## 2025-06-16 RX ORDER — ONDANSETRON 2 MG/ML
4 INJECTION INTRAMUSCULAR; INTRAVENOUS ONCE
Status: COMPLETED | OUTPATIENT
Start: 2025-06-16 | End: 2025-06-16

## 2025-06-16 RX ADMIN — ONDANSETRON 4 MG: 2 INJECTION, SOLUTION INTRAMUSCULAR; INTRAVENOUS at 16:31

## 2025-06-16 RX ADMIN — SODIUM CHLORIDE 1000 ML: 0.9 INJECTION, SOLUTION INTRAVENOUS at 16:30

## 2025-06-16 ASSESSMENT — PAIN SCALES - GENERAL: PAINLEVEL_OUTOF10: 8

## 2025-06-16 ASSESSMENT — PAIN - FUNCTIONAL ASSESSMENT: PAIN_FUNCTIONAL_ASSESSMENT: 0-10

## 2025-06-16 ASSESSMENT — PAIN DESCRIPTION - LOCATION: LOCATION: ABDOMEN

## 2025-06-16 ASSESSMENT — PAIN DESCRIPTION - ORIENTATION: ORIENTATION: LEFT;UPPER

## 2025-06-16 NOTE — ED PROVIDER NOTES
[facility] EMERGENCY DEPARTMENT  EMERGENCY DEPARTMENT ENCOUNTER      Pt Name: Patricia Ventura  MRN: [id number]  Birthdate 1940  Date of evaluation: 6/16/2025  Provider: Jayden Denise MD     CHIEF COMPLAINT       Chief Complaint   Patient presents with    Vomiting    Fatigue         HISTORY OF PRESENT ILLNESS   (Location/Symptom, Timing/Onset, Context/Setting, Quality, Duration, Modifying Factors, Severity) Note limiting factors.        HPI    Patricia Ventura is a 84 y.o. female who presents to the emergency department with acute on chronic abdominal pain open and vomiting.  Chest CT for this 3 weeks ago she says but I cannot see the results has been having intermittent episodes of nausea and vomiting no other acute complaints denies fever diarrhea urinary symptoms chest pain or shortness of breath at this time    Nursing Notes were reviewed.    REVIEW OF SYSTEMS    (2+ for level 4; 10+ for level 5)   Review of Systems    PAST MEDICAL HISTORY     Past Medical History:   Diagnosis Date    Anemia     Pt reports she was dx in her teens, w/o proper w/u, with iron deficiency anemia and was on oral iron replacement for many years, resulting in iron overload    Aplastic anemia ~2510-6501    Possibly d/t chemotherapy for breast cancer    Breast cancer (HCC) 2009    right side; pt underwent radical mastectomy followed by radiation therapy and chemotherapy and was then on oral chemo pill for 5 yrs; no recurrence as of 01/2019    Chickenpox     History of blood transfusion     History of therapeutic radiation     Hx antineoplastic chemo     Hypothyroidism     Measles     Mild depression     Mumps     Myelodysplastic syndrome (HCC) ~2016    Neuropathy     feet and ankles murali    Prolonged emergence from general anesthesia     Pulmonary tuberculosis ~7699-5476    in her early 20s    Scarlet fever     Sensory neuropathy     beyond the ankle b/l    Tuberculosis     years ago before children

## 2025-06-16 NOTE — ED TRIAGE NOTES
Department of Emergency Medicine  FIRST PROVIDER TRIAGE NOTE             Independent MLP           6/16/25  1:40 PM EDT    Date of Encounter: 6/16/25   MRN: [id number]      HPI: Patricia Ventura is a 84 y.o. female who presents to the ED for Vomiting and Fatigue       ROS: Negative for cp, Suicidal ideation, or Homicidal Ideation.    PE: Gen Appearance/Constitutional: alert  CV: regular rate  GI: soft and NT     Initial Plan of Care: All treatment areas with department are currently occupied. Plan to Proceed toTreatment Area When Bed Available for ED Attending/MLP to Continue Care    The provider-patient relationship was only established for Provider In Triage (PIT) note.  A full assessment, HPI, and examination was not performed and therefore it is not yet possible to state whether or not an emergency medical condition exists.  This is not a comprehensive evaluation.  The provider-patient relationship was terminated after triage completion.    Electronically signed by CONNIE Bravo CNP   DD: 6/16/25

## 2025-06-18 ENCOUNTER — TELEPHONE (OUTPATIENT)
Age: 85
End: 2025-06-18

## 2025-06-18 ENCOUNTER — OFFICE VISIT (OUTPATIENT)
Age: 85
End: 2025-06-18
Payer: MEDICARE

## 2025-06-18 VITALS
RESPIRATION RATE: 18 BRPM | SYSTOLIC BLOOD PRESSURE: 109 MMHG | TEMPERATURE: 98 F | OXYGEN SATURATION: 93 % | DIASTOLIC BLOOD PRESSURE: 55 MMHG | HEART RATE: 70 BPM

## 2025-06-18 DIAGNOSIS — R06.02 SHORTNESS OF BREATH: ICD-10-CM

## 2025-06-18 DIAGNOSIS — T66.XXXA RADIATION ADVERSE EFFECT, INITIAL ENCOUNTER: ICD-10-CM

## 2025-06-18 DIAGNOSIS — D63.8 ANEMIA OF CHRONIC DISEASE: ICD-10-CM

## 2025-06-18 DIAGNOSIS — R01.1 HEART MURMUR: Primary | ICD-10-CM

## 2025-06-18 DIAGNOSIS — Z79.01 ANTICOAGULATION ADEQUATE WITH ANTICOAGULANT THERAPY: ICD-10-CM

## 2025-06-18 LAB
EKG ATRIAL RATE: 76 BPM
EKG P AXIS: 33 DEGREES
EKG P-R INTERVAL: 204 MS
EKG Q-T INTERVAL: 416 MS
EKG QRS DURATION: 124 MS
EKG QTC CALCULATION (BAZETT): 468 MS
EKG R AXIS: -45 DEGREES
EKG T AXIS: 2 DEGREES
EKG VENTRICULAR RATE: 76 BPM

## 2025-06-18 PROCEDURE — 99204 OFFICE O/P NEW MOD 45 MIN: CPT | Performed by: INTERNAL MEDICINE

## 2025-06-18 PROCEDURE — 93010 ELECTROCARDIOGRAM REPORT: CPT | Performed by: INTERNAL MEDICINE

## 2025-06-18 PROCEDURE — 1123F ACP DISCUSS/DSCN MKR DOCD: CPT | Performed by: INTERNAL MEDICINE

## 2025-06-18 NOTE — TELEPHONE ENCOUNTER
Spoke with Daughter to notify of Echocardiogram and PFT also [facility] will be calling patient to schedule referral was faxed.

## 2025-06-18 NOTE — PROGRESS NOTES
PFT Echo ordered and scheduled IR thoracentesis sent over to radiology referral to cardio faxed over return appt in 1 month

## 2025-06-23 ENCOUNTER — HOSPITAL ENCOUNTER (OUTPATIENT)
Dept: CARDIOLOGY | Age: 85
Discharge: HOME | End: 2025-06-25
Attending: INTERNAL MEDICINE
Payer: MEDICARE

## 2025-06-23 VITALS
DIASTOLIC BLOOD PRESSURE: 55 MMHG | SYSTOLIC BLOOD PRESSURE: 109 MMHG | BODY MASS INDEX: 20.58 KG/M2 | HEIGHT: 61 IN | WEIGHT: 109 LBS

## 2025-06-23 DIAGNOSIS — R01.1 HEART MURMUR: ICD-10-CM

## 2025-06-23 PROCEDURE — 93306 TTE W/DOPPLER COMPLETE: CPT

## 2025-06-24 ENCOUNTER — HOSPITAL ENCOUNTER (OUTPATIENT)
Dept: INFUSION THERAPY | Age: 85
Discharge: HOME | End: 2025-06-24
Payer: MEDICARE

## 2025-06-24 ENCOUNTER — OFFICE VISIT (OUTPATIENT)
Age: 85
End: 2025-06-24
Payer: MEDICARE

## 2025-06-24 VITALS
TEMPERATURE: 97 F | WEIGHT: 110.4 LBS | HEART RATE: 79 BPM | SYSTOLIC BLOOD PRESSURE: 148 MMHG | OXYGEN SATURATION: 97 % | HEIGHT: 61 IN | DIASTOLIC BLOOD PRESSURE: 65 MMHG | BODY MASS INDEX: 20.84 KG/M2

## 2025-06-24 DIAGNOSIS — D46.9 MDS (MYELODYSPLASTIC SYNDROME) (HCC): Primary | ICD-10-CM

## 2025-06-24 DIAGNOSIS — D46.9 MDS (MYELODYSPLASTIC SYNDROME) (HCC): ICD-10-CM

## 2025-06-24 DIAGNOSIS — D46.A MYELODYSPLASTIC SYNDROME WITH MULTILINEAGE DYSPLASIA (HCC): ICD-10-CM

## 2025-06-24 DIAGNOSIS — Z17.0 MALIGNANT NEOPLASM OF RIGHT BREAST IN FEMALE, ESTROGEN RECEPTOR POSITIVE, UNSPECIFIED SITE OF BREAST (HCC): ICD-10-CM

## 2025-06-24 DIAGNOSIS — D53.9 MACROCYTIC ANEMIA: ICD-10-CM

## 2025-06-24 DIAGNOSIS — J90 PLEURAL EFFUSION, LEFT: ICD-10-CM

## 2025-06-24 DIAGNOSIS — C50.911 MALIGNANT NEOPLASM OF RIGHT BREAST IN FEMALE, ESTROGEN RECEPTOR POSITIVE, UNSPECIFIED SITE OF BREAST (HCC): ICD-10-CM

## 2025-06-24 DIAGNOSIS — D63.8 ANEMIA OF CHRONIC DISEASE: ICD-10-CM

## 2025-06-24 LAB
ALBUMIN SERPL-MCNC: 4.3 G/DL (ref 3.5–5.2)
ALP SERPL-CCNC: 51 U/L (ref 35–104)
ALT SERPL-CCNC: 66 U/L (ref 0–35)
ANION GAP SERPL CALCULATED.3IONS-SCNC: 12 MMOL/L (ref 7–16)
AST SERPL-CCNC: 68 U/L (ref 0–35)
BASOPHILS # BLD: 0.05 K/UL (ref 0–0.2)
BASOPHILS NFR BLD: 1 % (ref 0–2)
BILIRUB SERPL-MCNC: 1 MG/DL (ref 0–1.2)
BUN SERPL-MCNC: 17 MG/DL (ref 8–23)
CALCIUM SERPL-MCNC: 9.4 MG/DL (ref 8.8–10.2)
CHLORIDE SERPL-SCNC: 102 MMOL/L (ref 98–107)
CO2 SERPL-SCNC: 25 MMOL/L (ref 22–29)
CREAT SERPL-MCNC: 0.8 MG/DL (ref 0.5–1)
ECHO AO ASC DIAM: 1.9 CM
ECHO AO ASCENDING AORTA INDEX: 1.3 CM/M2
ECHO AR MAX VEL PISA: 3.8 M/S
ECHO AV AREA PEAK VELOCITY: 0.8 CM2
ECHO AV AREA VTI: 0.8 CM2
ECHO AV AREA/BSA PEAK VELOCITY: 0.5 CM2/M2
ECHO AV AREA/BSA VTI: 0.5 CM2/M2
ECHO AV CUSP MM: 1.2 CM
ECHO AV MEAN GRADIENT: 18 MMHG
ECHO AV MEAN VELOCITY: 2 M/S
ECHO AV PEAK GRADIENT: 34 MMHG
ECHO AV PEAK VELOCITY: 2.9 M/S
ECHO AV REGURGITANT PHT: 470.2 MS
ECHO AV VELOCITY RATIO: 0.38
ECHO AV VTI: 60 CM
ECHO BSA: 1.46 M2
ECHO EST RA PRESSURE: 3 MMHG
ECHO LA DIAMETER INDEX: 2.47 CM/M2
ECHO LA DIAMETER: 3.6 CM
ECHO LA VOL A-L A2C: 33 ML (ref 22–52)
ECHO LA VOL A-L A4C: 23 ML (ref 22–52)
ECHO LA VOL MOD A2C: 32 ML (ref 22–52)
ECHO LA VOL MOD A4C: 21 ML (ref 22–52)
ECHO LA VOLUME AREA LENGTH: 30 ML
ECHO LA VOLUME INDEX A-L A2C: 23 ML/M2 (ref 16–34)
ECHO LA VOLUME INDEX A-L A4C: 16 ML/M2 (ref 16–34)
ECHO LA VOLUME INDEX AREA LENGTH: 21 ML/M2 (ref 16–34)
ECHO LA VOLUME INDEX MOD A2C: 22 ML/M2 (ref 16–34)
ECHO LA VOLUME INDEX MOD A4C: 14 ML/M2 (ref 16–34)
ECHO LV EF PHYSICIAN: 65 %
ECHO LV FRACTIONAL SHORTENING: 35 % (ref 28–44)
ECHO LV INTERNAL DIMENSION DIASTOLE INDEX: 2.74 CM/M2
ECHO LV INTERNAL DIMENSION DIASTOLIC: 4 CM (ref 3.9–5.3)
ECHO LV INTERNAL DIMENSION SYSTOLIC INDEX: 1.78 CM/M2
ECHO LV INTERNAL DIMENSION SYSTOLIC: 2.6 CM
ECHO LV IVSD: 1.1 CM (ref 0.6–0.9)
ECHO LV IVSS: 1.3 CM
ECHO LV MASS 2D: 127.1 G (ref 67–162)
ECHO LV MASS INDEX 2D: 87 G/M2 (ref 43–95)
ECHO LV POSTERIOR WALL DIASTOLIC: 0.9 CM (ref 0.6–0.9)
ECHO LV POSTERIOR WALL SYSTOLIC: 1.3 CM
ECHO LV RELATIVE WALL THICKNESS RATIO: 0.45
ECHO LVOT AREA: 2.3 CM2
ECHO LVOT AV VTI INDEX: 0.34
ECHO LVOT DIAM: 1.7 CM
ECHO LVOT MEAN GRADIENT: 2 MMHG
ECHO LVOT PEAK GRADIENT: 5 MMHG
ECHO LVOT PEAK VELOCITY: 1.1 M/S
ECHO LVOT STROKE VOLUME INDEX: 31.7 ML/M2
ECHO LVOT SV: 46.3 ML
ECHO LVOT VTI: 20.4 CM
ECHO MV "A" WAVE DURATION: 128 MSEC
ECHO MV A VELOCITY: 1.15 M/S
ECHO MV AREA PHT: 2.3 CM2
ECHO MV AREA VTI: 1.7 CM2
ECHO MV E DECELERATION TIME (DT): 261.2 MS
ECHO MV E VELOCITY: 0.65 M/S
ECHO MV E/A RATIO: 0.57
ECHO MV LVOT VTI INDEX: 1.37
ECHO MV MAX VELOCITY: 1.5 M/S
ECHO MV MEAN GRADIENT: 2 MMHG
ECHO MV MEAN VELOCITY: 0.7 M/S
ECHO MV PEAK GRADIENT: 8 MMHG
ECHO MV PRESSURE HALF TIME (PHT): 93.9 MS
ECHO MV VTI: 27.9 CM
ECHO PV MAX VELOCITY: 0.9 M/S
ECHO PV MEAN GRADIENT: 2 MMHG
ECHO PV MEAN VELOCITY: 0.6 M/S
ECHO PV PEAK GRADIENT: 4 MMHG
ECHO PV VTI: 15.2 CM
ECHO RIGHT VENTRICULAR SYSTOLIC PRESSURE (RVSP): 23 MMHG
ECHO RV INTERNAL DIMENSION: 2.5 CM
ECHO TV REGURGITANT MAX VELOCITY: 2.21 M/S
ECHO TV REGURGITANT PEAK GRADIENT: 20 MMHG
EOSINOPHIL # BLD: 0.05 K/UL (ref 0.05–0.5)
EOSINOPHILS RELATIVE PERCENT: 1 % (ref 0–6)
ERYTHROCYTE [DISTWIDTH] IN BLOOD BY AUTOMATED COUNT: 27.2 % (ref 11.5–15)
GFR, ESTIMATED: 70 ML/MIN/1.73M2
GLUCOSE SERPL-MCNC: 95 MG/DL (ref 74–99)
HCT VFR BLD AUTO: 26.6 % (ref 34–48)
HGB BLD-MCNC: 9.1 G/DL (ref 11.5–15.5)
LYMPHOCYTES NFR BLD: 3.75 K/UL (ref 1.5–4)
LYMPHOCYTES RELATIVE PERCENT: 61 % (ref 20–42)
MCH RBC QN AUTO: 38.6 PG (ref 26–35)
MCHC RBC AUTO-ENTMCNC: 34.2 G/DL (ref 32–34.5)
MCV RBC AUTO: 112.7 FL (ref 80–99.9)
MONOCYTES NFR BLD: 0.16 K/UL (ref 0.1–0.95)
MONOCYTES NFR BLD: 3 % (ref 2–12)
NEUTROPHILS NFR BLD: 34 % (ref 43–80)
NEUTS SEG NFR BLD: 2.09 K/UL (ref 1.8–7.3)
PLATELET # BLD AUTO: 170 K/UL (ref 130–450)
PMV BLD AUTO: 10.1 FL (ref 7–12)
POTASSIUM SERPL-SCNC: 4.4 MMOL/L (ref 3.5–5.1)
PROT SERPL-MCNC: 7.1 G/DL (ref 6.4–8.3)
RBC # BLD AUTO: 2.36 M/UL (ref 3.5–5.5)
RBC # BLD: ABNORMAL 10*6/UL
SODIUM SERPL-SCNC: 140 MMOL/L (ref 136–145)
WBC OTHER # BLD: 6.1 K/UL (ref 4.5–11.5)

## 2025-06-24 PROCEDURE — 93306 TTE W/DOPPLER COMPLETE: CPT | Performed by: INTERNAL MEDICINE

## 2025-06-24 PROCEDURE — 6360000002 HC RX W HCPCS: Performed by: INTERNAL MEDICINE

## 2025-06-24 PROCEDURE — 1125F AMNT PAIN NOTED PAIN PRSNT: CPT | Performed by: INTERNAL MEDICINE

## 2025-06-24 PROCEDURE — 36415 COLL VENOUS BLD VENIPUNCTURE: CPT

## 2025-06-24 PROCEDURE — 80053 COMPREHEN METABOLIC PANEL: CPT

## 2025-06-24 PROCEDURE — 1123F ACP DISCUSS/DSCN MKR DOCD: CPT | Performed by: INTERNAL MEDICINE

## 2025-06-24 PROCEDURE — 96372 THER/PROPH/DIAG INJ SC/IM: CPT

## 2025-06-24 PROCEDURE — 99214 OFFICE O/P EST MOD 30 MIN: CPT | Performed by: INTERNAL MEDICINE

## 2025-06-24 PROCEDURE — 1160F RVW MEDS BY RX/DR IN RCRD: CPT | Performed by: INTERNAL MEDICINE

## 2025-06-24 PROCEDURE — 1159F MED LIST DOCD IN RCRD: CPT | Performed by: INTERNAL MEDICINE

## 2025-06-24 PROCEDURE — 85025 COMPLETE CBC W/AUTO DIFF WBC: CPT

## 2025-06-24 RX ADMIN — DARBEPOETIN ALFA 500 MCG: 500 INJECTION, SOLUTION INTRAVENOUS; SUBCUTANEOUS at 11:28

## 2025-06-24 NOTE — PROGRESS NOTES
Patient arrived to clinic today for aranesp injection. Patient tolerated injection(s) well. Patient alert and oriented x3.   No distress noted.   Vital signs stable.   Patient denies any new or worsening pain.     Patient educated on signs and symptoms of reaction to medication.   Educated patient on possible side effects and treatment of medication.     Patient verbalized understanding.   Offered patient education an/or discharge material.  Patient declined.   Patient denies any needs.  All questions answered.

## 2025-06-25 NOTE — PROGRESS NOTES
Patient provided with discharge instructions, received printed AVS.  All questions answered.  Patient understands follow up plan of care.     
and 5 years of adjuvant ET with Arimidex, was completed in 2015. She was treated in [place] under the care of Dr. Simpson. She is doing well clinically without any evidence of recurrence of her disease. Discussed with her the importance of monthly SBE, and routine screening mammograms, she had a repeat left breast screening mammogram done on 11/7/2023, was reviewed, was negative for malignancy.  The patient has a right chest wall ulcer, referral was placed to [facility], the patient was seen by Dr. Duque, she had a biopsy done on 8/16/2023, pathology results reviewed, they are consistent with changes suggestive of radiation effect, chronic radiodermatitis.  The patient had a right breast screening mammogram done on 12/18/2024, which I had reviewed and is negative for malignancy.  Will continue with breast cancer screening.     2. She has MDS, diagnosed in 2017, she received ESAs and PRBCs transfusion, has transfusion related iron overload, hgb was 8.5, ferritin 3647, was restarted on Aranesp on 4/23/2019.     On 7/20/2021, hemoglobin was 8 g/dl hematocrit 24.7,  1.3, normal white count, platelet count 138K, fit test is negative.  The patient did not have an adequate response to Retacrit, it was changed back to Aranesp, today 1/2/2024, labs reviewed, her CBCD is remarkable for hemoglobin of 10.4 G/DL, it has improved, and is greater than 10, hold Aranesp, white count is stable at 4.3, MCV stable at 113.4, platelet count is normal at 159 K she has iron overload secondary to prior transfusions, it is improving, will monitor her counts closely.  Transaminitis, and mild hyperbilirubinemia, an ultrasound of the abdomen was ordered, was done on 11/21/2023, was reviewed, she has cirrhotic appearing liver, she will continue follow-up with GI.  Liver ultrasound was done on 8/20/2024.  She had reviewed, revealing fatty infiltration of the liver.  Hypothyroidism, continue Synthroid follow-up with PCP.    The  patient was taking

## 2025-06-26 ENCOUNTER — HOSPITAL ENCOUNTER (OUTPATIENT)
Dept: GENERAL RADIOLOGY | Age: 85
Discharge: HOME | End: 2025-06-28
Payer: MEDICARE

## 2025-06-26 ENCOUNTER — HOSPITAL ENCOUNTER (OUTPATIENT)
Dept: INTERVENTIONAL RADIOLOGY/VASCULAR | Age: 85
Discharge: HOME | End: 2025-06-28
Payer: MEDICARE

## 2025-06-26 VITALS
RESPIRATION RATE: 16 BRPM | TEMPERATURE: 97.7 F | SYSTOLIC BLOOD PRESSURE: 133 MMHG | OXYGEN SATURATION: 92 % | DIASTOLIC BLOOD PRESSURE: 61 MMHG | HEART RATE: 78 BPM

## 2025-06-26 DIAGNOSIS — R06.02 SHORTNESS OF BREATH: ICD-10-CM

## 2025-06-26 DIAGNOSIS — Z51.5 PALLIATIVE CARE BY SPECIALIST: ICD-10-CM

## 2025-06-26 DIAGNOSIS — G89.3 PAIN DUE TO NEOPLASM: ICD-10-CM

## 2025-06-26 DIAGNOSIS — C50.919 MALIGNANT NEOPLASM OF FEMALE BREAST, UNSPECIFIED ESTROGEN RECEPTOR STATUS, UNSPECIFIED LATERALITY, UNSPECIFIED SITE OF BREAST (HCC): ICD-10-CM

## 2025-06-26 DIAGNOSIS — R01.1 HEART MURMUR: ICD-10-CM

## 2025-06-26 LAB
APPEARANCE FLD: NORMAL
BODY FLD TYPE: NORMAL
CLOT CHECK: NORMAL
COLOR FLD: YELLOW
INR PPP: 1.2
LDH FLD L TO P-CCNC: 141 U/L
MONOCYTES NFR FLD: 97 %
NEUTROPHILS NFR FLD: 3 %
PROTHROMBIN TIME: 12.7 SEC (ref 9.3–12.4)
RBC # FLD: <2000 CELLS/UL
SPECIMEN TYPE: NORMAL
WBC # FLD: 724 CELLS/UL

## 2025-06-26 PROCEDURE — 71045 X-RAY EXAM CHEST 1 VIEW: CPT

## 2025-06-26 PROCEDURE — 83986 ASSAY PH BODY FLUID NOS: CPT

## 2025-06-26 PROCEDURE — 83615 LACTATE (LD) (LDH) ENZYME: CPT

## 2025-06-26 PROCEDURE — 32555 ASPIRATE PLEURA W/ IMAGING: CPT

## 2025-06-26 PROCEDURE — 36415 COLL VENOUS BLD VENIPUNCTURE: CPT

## 2025-06-26 PROCEDURE — 89051 BODY FLUID CELL COUNT: CPT

## 2025-06-26 PROCEDURE — 6360000002 HC RX W HCPCS: Performed by: PHYSICIAN ASSISTANT

## 2025-06-26 PROCEDURE — 85610 PROTHROMBIN TIME: CPT

## 2025-06-26 RX ORDER — LIDOCAINE HYDROCHLORIDE 20 MG/ML
INJECTION, SOLUTION INFILTRATION; PERINEURAL PRN
Status: COMPLETED | OUTPATIENT
Start: 2025-06-26 | End: 2025-06-26

## 2025-06-26 RX ORDER — HYDROCODONE BITARTRATE AND ACETAMINOPHEN 5; 325 MG/1; MG/1
1 TABLET ORAL EVERY 6 HOURS PRN
Qty: 120 TABLET | Refills: 0 | Status: SHIPPED | OUTPATIENT
Start: 2025-06-26 | End: 2025-07-26

## 2025-06-26 RX ADMIN — LIDOCAINE HYDROCHLORIDE 10 ML: 20 INJECTION, SOLUTION INFILTRATION; PERINEURAL at 11:49

## 2025-06-26 NOTE — TELEPHONE ENCOUNTER
Call from Patricia's daughter Sabra requesting a refill for Patricia's [place]. Sabra states her mother is declining. Patricia had fluid removed from her lung today. Sabra has her mother living with her now because she is very weak, losing weight, and her home has AC. Please send medications to [facility] on [address], [place] as this is closer to daughters home. Appointment moved up to 7/8/25 to coordinate with next Med Onc appointment as Sabra is concerned with how long patient has. We did discuss Hospice and for Sabra to call if her mother has increased pain or needs hospice referral before our next appointment.

## 2025-06-26 NOTE — DISCHARGE INSTRUCTIONS
Thoracentesis: What to Expect at Home  Your Recovery  Thoracentesis (say \"hhux-sr-kor-KATHERINE-sis\") is a procedure to remove fluid from the space between the lungs and the chest wall (pleural space). This procedure may also be called a \"chest tap.\" It's normal to have a small amount of fluid in the pleural space. But too much fluid can build up because of problems such as infection, heart failure, or lung cancer. The procedure may have been done to help with shortness of breath and pain caused by the fluid buildup. Or you may have had this procedure so the doctor could test the fluid to find the cause of the buildup.  Your chest may be sore where the doctor put the needle or catheter into your skin (the puncture site). This usually gets better after a day or two. You can go back to work or your normal activities as soon as you feel up to it.  If a large amount of pleural fluid was removed during the procedure, you will probably be able to breathe more easily.  If more pleural fluid collects and needs to be removed, another thoracentesis may be done later.  This care sheet gives you a general idea about how long it will take for you to recover. But each person recovers at a different pace. Follow the steps below to feel better as quickly as possible.  How can you care for yourself at home?  Activity    Rest when you feel tired. Getting enough sleep will help you recover.     Avoid strenuous activities, such as bicycle riding, jogging, weight lifting, or aerobic exercise, until your doctor says it is okay.     You may shower. Do not take a bath until the puncture site has healed, or until your doctor tells you it is okay.     Ask your doctor when you can drive again.     You may need to take 1 or 2 days off from work. It depends on the type of work you do and how you feel.   Diet    You can eat your normal diet.     Drink plenty of fluids (unless your doctor tells you not to).   Medicines    Your doctor will tell you

## 2025-06-26 NOTE — OR NURSING
Thoracentesis catheter placed to left posterior thoracic region, clear yellow fluid draining at this time.

## 2025-06-26 NOTE — PROCEDURES
Procedure Note  ____________________________________________________________      IR U/S GUIDED THORACENTESIS  [facility] SPECIAL PROCEDURES    Patient Name: Patricia Ventura   YOB: 1940  Medical Record Number: [id number]  Date of Procedure: 6/26/25  Room/Bed: Room/bed info not found    Preoperative diagnosis: left Pleural Effusion    Postoperative diagnosis: Same    Consent: The patient was counseled regarding the procedure, it's indications, risks, potential complications and alternatives and any questions were answered. Consent was obtained for image guided left thoracentesis for removal of fluid for diagnostic purposes.     Procedure:  Image Guided left Thoracentesis.    Performed by: FRED Flores under on-site supervision by Kelly Galvan MD.    Anesthesia: Local    Estimated blood loss: Less than 10 mL    Complications: None    Specimen Obtained: Pleural Fluid    Procedure: Prior to start of procedure, routine scanning of the posterior thorax was performed using real-time ultrasound and revealed sufficient amount of pleural fluid present. Decision was made to proceed with procedure.  After obtaining consent, a \"Time-out\" was called to verify the correct patient, procedure/location, allergies, relevant medications held for procedure and that all equipment is functioning and available. The patient was then situated in a seated upright position and the appropriate landmarks were identified using ultrasound. The site of maximum fluid collection was marked. The skin over the puncture site in the left lower posterior hemithorax region was prepped with surgical skin prep and draped in a sterile fashion. Local anesthesia was administered by infiltration using 2% Lidocaine without epinephrine administered subcutaneously.  A 6 Ghanaian safe-t-centesis catheter was then advanced into the pleural cavity and the needle was withdrawn.  Pleural fluid return was clear straw colored. The catheter was

## 2025-06-26 NOTE — OR NURSING
Thoracentesis catheter removed at this time. 750 ml of fluid removed. Vaseline gauze, 4x4 and transparent dressing applied to clean dry site. Patient tolerated well.    Continue Regimen: Apply foam rubber donut and egg size carton to bed and chair for more support on buttocks. Apply gauze, tape and silvadene cream to AA. Detail Level: Zone

## 2025-06-30 LAB — REPORT STATUS: NORMAL

## 2025-06-30 NOTE — PROGRESS NOTES
[facility] PHYSICIANS [facility]     HISTORY OF PRESENT ILLNESS:    Patricia Ventura is a 84 y.o. year old female here for evaluation of a pleural effusion. She is accompanied by her daughter. She reports a history of a dry cough, shortness of breath, and chest tightness. She had a CT of the chest at [facility] a few weeks ago. She smoked for about 40-50 years and quit in April of 2000. She grew up in [place] and [place]. She has lived in this area for 7 years. She has had increased dyspnea for the last few weeks. Her mother had lung cancer. She also reports that she may have had TB at the age of 16-18 although she states that this was never treated?       ALLERGIES:    Allergies   Allergen Reactions    Bee Venom Swelling and Dermatitis    Penicillins Hives, Swelling and Dermatitis    Other      All metals except gold and platinum, welts/blisters       PAST MEDICAL HISTORY:       Diagnosis Date    Anemia     Pt reports she was dx in her teens, w/o proper w/u, with iron deficiency anemia and was on oral iron replacement for many years, resulting in iron overload    Aplastic anemia ~8114-7122    Possibly d/t chemotherapy for breast cancer    Breast cancer (HCC) 2009    right side; pt underwent radical mastectomy followed by radiation therapy and chemotherapy and was then on oral chemo pill for 5 yrs; no recurrence as of 01/2019    Chickenpox     History of blood transfusion     History of therapeutic radiation     Hx antineoplastic chemo     Hypothyroidism     Measles     Mild depression     Mumps     Myelodysplastic syndrome (HCC) ~2016    Neuropathy     feet and ankles murali    Prolonged emergence from general anesthesia     Pulmonary tuberculosis ~6084-1990    in her early 20s    Scarlet fever     Sensory neuropathy     beyond the ankle b/l    Tuberculosis     years ago before children       MEDICATIONS:   Current Outpatient Medications   Medication Sig Dispense Refill

## 2025-07-02 ENCOUNTER — APPOINTMENT (OUTPATIENT)
Dept: CT IMAGING | Age: 85
End: 2025-07-02
Payer: MEDICARE

## 2025-07-02 ENCOUNTER — HOSPITAL ENCOUNTER (OUTPATIENT)
Age: 85
Setting detail: OBSERVATION
Discharge: HOME | End: 2025-07-03
Attending: EMERGENCY MEDICINE | Admitting: HOSPITALIST
Payer: MEDICARE

## 2025-07-02 ENCOUNTER — TELEPHONE (OUTPATIENT)
Age: 85
End: 2025-07-02

## 2025-07-02 ENCOUNTER — APPOINTMENT (OUTPATIENT)
Dept: GENERAL RADIOLOGY | Age: 85
End: 2025-07-02
Payer: MEDICARE

## 2025-07-02 DIAGNOSIS — R07.9 CHEST PAIN, UNSPECIFIED TYPE: Primary | ICD-10-CM

## 2025-07-02 DIAGNOSIS — D46.9 MDS (MYELODYSPLASTIC SYNDROME) (HCC): Chronic | ICD-10-CM

## 2025-07-02 DIAGNOSIS — D46.9 MDS (MYELODYSPLASTIC SYNDROME) (HCC): ICD-10-CM

## 2025-07-02 DIAGNOSIS — C50.919 MALIGNANT NEOPLASM OF FEMALE BREAST, UNSPECIFIED ESTROGEN RECEPTOR STATUS, UNSPECIFIED LATERALITY, UNSPECIFIED SITE OF BREAST (HCC): Primary | ICD-10-CM

## 2025-07-02 DIAGNOSIS — G60.8 PERIPHERAL SENSORY NEUROPATHY: Chronic | ICD-10-CM

## 2025-07-02 DIAGNOSIS — C50.919 MALIGNANT NEOPLASM OF FEMALE BREAST, UNSPECIFIED ESTROGEN RECEPTOR STATUS, UNSPECIFIED LATERALITY, UNSPECIFIED SITE OF BREAST (HCC): ICD-10-CM

## 2025-07-02 LAB
ALBUMIN SERPL-MCNC: 4.5 G/DL (ref 3.5–5.2)
ALP SERPL-CCNC: 55 U/L (ref 35–104)
ALT SERPL-CCNC: 96 U/L (ref 0–35)
ANION GAP SERPL CALCULATED.3IONS-SCNC: 15 MMOL/L (ref 7–16)
AST SERPL-CCNC: 85 U/L (ref 0–35)
ATYPICAL LYMPHOCYTE ABSOLUTE COUNT: 0.11 K/UL (ref 0–0.46)
ATYPICAL LYMPHOCYTES: 2 % (ref 0–4)
BASOPHILS # BLD: 0.17 K/UL (ref 0–0.2)
BASOPHILS NFR BLD: 3 % (ref 0–2)
BILIRUB SERPL-MCNC: 1.3 MG/DL (ref 0–1.2)
BUN SERPL-MCNC: 15 MG/DL (ref 8–23)
CALCIUM SERPL-MCNC: 9.5 MG/DL (ref 8.8–10.2)
CHLORIDE SERPL-SCNC: 96 MMOL/L (ref 98–107)
CO2 SERPL-SCNC: 23 MMOL/L (ref 22–29)
CREAT SERPL-MCNC: 0.8 MG/DL (ref 0.5–1)
EKG ATRIAL RATE: 81 BPM
EKG P AXIS: 40 DEGREES
EKG P-R INTERVAL: 178 MS
EKG Q-T INTERVAL: 400 MS
EKG QRS DURATION: 128 MS
EKG QTC CALCULATION (BAZETT): 464 MS
EKG R AXIS: -58 DEGREES
EKG T AXIS: 18 DEGREES
EKG VENTRICULAR RATE: 81 BPM
EOSINOPHIL # BLD: 0.11 K/UL (ref 0.05–0.5)
EOSINOPHILS RELATIVE PERCENT: 2 % (ref 0–6)
ERYTHROCYTE [DISTWIDTH] IN BLOOD BY AUTOMATED COUNT: 26.8 % (ref 11.5–15)
GFR, ESTIMATED: 68 ML/MIN/1.73M2
GLUCOSE SERPL-MCNC: 86 MG/DL (ref 74–99)
HCT VFR BLD AUTO: 30.1 % (ref 34–48)
HGB BLD-MCNC: 9.9 G/DL (ref 11.5–15.5)
INR PPP: 1.2
LYMPHOCYTES NFR BLD: 2.11 K/UL (ref 1.5–4)
LYMPHOCYTES RELATIVE PERCENT: 32 % (ref 20–42)
MAGNESIUM SERPL-MCNC: 2.2 MG/DL (ref 1.6–2.4)
MCH RBC QN AUTO: 37.9 PG (ref 26–35)
MCHC RBC AUTO-ENTMCNC: 32.9 G/DL (ref 32–34.5)
MCV RBC AUTO: 115.3 FL (ref 80–99.9)
MONOCYTES NFR BLD: 0.4 K/UL (ref 0.1–0.95)
MONOCYTES NFR BLD: 6 % (ref 2–12)
NEUTROPHILS NFR BLD: 56 % (ref 43–80)
NEUTS SEG NFR BLD: 3.7 K/UL (ref 1.8–7.3)
NON-GYN CYTOLOGY REPORT: NORMAL
NUCLEATED RED BLOOD CELLS: 3 PER 100 WBC
PLATELET # BLD AUTO: 183 K/UL (ref 130–450)
PMV BLD AUTO: 10.9 FL (ref 7–12)
POTASSIUM SERPL-SCNC: 4.1 MMOL/L (ref 3.5–5.1)
PROT SERPL-MCNC: 7.7 G/DL (ref 6.4–8.3)
PROTHROMBIN TIME: 12.8 SEC (ref 9.3–12.4)
RBC # BLD AUTO: 2.61 M/UL (ref 3.5–5.5)
RBC # BLD: ABNORMAL 10*6/UL
SODIUM SERPL-SCNC: 134 MMOL/L (ref 136–145)
TROPONIN I SERPL HS-MCNC: 18 NG/L (ref 0–14)
TROPONIN I SERPL HS-MCNC: 19 NG/L (ref 0–14)
WBC OTHER # BLD: 6.6 K/UL (ref 4.5–11.5)

## 2025-07-02 PROCEDURE — 85025 COMPLETE CBC W/AUTO DIFF WBC: CPT

## 2025-07-02 PROCEDURE — APPSS60 APP SPLIT SHARED TIME 46-60 MINUTES: Performed by: NURSE PRACTITIONER

## 2025-07-02 PROCEDURE — 96375 TX/PRO/DX INJ NEW DRUG ADDON: CPT

## 2025-07-02 PROCEDURE — 83735 ASSAY OF MAGNESIUM: CPT

## 2025-07-02 PROCEDURE — 71046 X-RAY EXAM CHEST 2 VIEWS: CPT

## 2025-07-02 PROCEDURE — 84484 ASSAY OF TROPONIN QUANT: CPT

## 2025-07-02 PROCEDURE — 71275 CT ANGIOGRAPHY CHEST: CPT

## 2025-07-02 PROCEDURE — 6360000004 HC RX CONTRAST MEDICATION: Performed by: RADIOLOGY

## 2025-07-02 PROCEDURE — 96374 THER/PROPH/DIAG INJ IV PUSH: CPT

## 2025-07-02 PROCEDURE — 85610 PROTHROMBIN TIME: CPT

## 2025-07-02 PROCEDURE — 93010 ELECTROCARDIOGRAM REPORT: CPT | Performed by: INTERNAL MEDICINE

## 2025-07-02 PROCEDURE — 93005 ELECTROCARDIOGRAM TRACING: CPT

## 2025-07-02 PROCEDURE — 80053 COMPREHEN METABOLIC PANEL: CPT

## 2025-07-02 PROCEDURE — G0378 HOSPITAL OBSERVATION PER HR: HCPCS

## 2025-07-02 PROCEDURE — 6370000000 HC RX 637 (ALT 250 FOR IP): Performed by: EMERGENCY MEDICINE

## 2025-07-02 PROCEDURE — 99285 EMERGENCY DEPT VISIT HI MDM: CPT

## 2025-07-02 PROCEDURE — 6360000002 HC RX W HCPCS: Performed by: EMERGENCY MEDICINE

## 2025-07-02 PROCEDURE — 96376 TX/PRO/DX INJ SAME DRUG ADON: CPT

## 2025-07-02 RX ORDER — IBUPROFEN 400 MG/1
400 TABLET, FILM COATED ORAL EVERY 6 HOURS PRN
Qty: 120 TABLET | Refills: 0 | Status: SHIPPED | OUTPATIENT
Start: 2025-07-02

## 2025-07-02 RX ORDER — IOPAMIDOL 755 MG/ML
75 INJECTION, SOLUTION INTRAVASCULAR
Status: COMPLETED | OUTPATIENT
Start: 2025-07-02 | End: 2025-07-02

## 2025-07-02 RX ORDER — ONDANSETRON 2 MG/ML
4 INJECTION INTRAMUSCULAR; INTRAVENOUS ONCE
Status: COMPLETED | OUTPATIENT
Start: 2025-07-02 | End: 2025-07-02

## 2025-07-02 RX ORDER — FENTANYL CITRATE 50 UG/ML
50 INJECTION, SOLUTION INTRAMUSCULAR; INTRAVENOUS ONCE
Refills: 0 | Status: COMPLETED | OUTPATIENT
Start: 2025-07-02 | End: 2025-07-02

## 2025-07-02 RX ORDER — OXYCODONE AND ACETAMINOPHEN 5; 325 MG/1; MG/1
2 TABLET ORAL ONCE
Refills: 0 | Status: COMPLETED | OUTPATIENT
Start: 2025-07-02 | End: 2025-07-02

## 2025-07-02 RX ADMIN — FENTANYL CITRATE 50 MCG: 50 INJECTION INTRAMUSCULAR; INTRAVENOUS at 15:23

## 2025-07-02 RX ADMIN — OXYCODONE AND ACETAMINOPHEN 2 TABLET: 5; 325 TABLET ORAL at 20:57

## 2025-07-02 RX ADMIN — ONDANSETRON 4 MG: 2 INJECTION, SOLUTION INTRAMUSCULAR; INTRAVENOUS at 18:29

## 2025-07-02 RX ADMIN — FENTANYL CITRATE 50 MCG: 50 INJECTION INTRAMUSCULAR; INTRAVENOUS at 18:28

## 2025-07-02 RX ADMIN — ONDANSETRON 4 MG: 2 INJECTION, SOLUTION INTRAMUSCULAR; INTRAVENOUS at 15:24

## 2025-07-02 RX ADMIN — IOPAMIDOL 75 ML: 755 INJECTION, SOLUTION INTRAVENOUS at 16:38

## 2025-07-02 ASSESSMENT — PAIN SCALES - GENERAL
PAINLEVEL_OUTOF10: 10
PAINLEVEL_OUTOF10: 0
PAINLEVEL_OUTOF10: 10
PAINLEVEL_OUTOF10: 10
PAINLEVEL_OUTOF10: 5

## 2025-07-02 ASSESSMENT — PAIN - FUNCTIONAL ASSESSMENT
PAIN_FUNCTIONAL_ASSESSMENT: 0-10
PAIN_FUNCTIONAL_ASSESSMENT: ACTIVITIES ARE NOT PREVENTED

## 2025-07-02 ASSESSMENT — PAIN DESCRIPTION - ORIENTATION
ORIENTATION: LEFT
ORIENTATION: LEFT
ORIENTATION: LEFT;RIGHT;MID

## 2025-07-02 ASSESSMENT — PAIN DESCRIPTION - PAIN TYPE: TYPE: ACUTE PAIN

## 2025-07-02 ASSESSMENT — PAIN DESCRIPTION - DESCRIPTORS
DESCRIPTORS: ACHING;DISCOMFORT;THROBBING
DESCRIPTORS: DISCOMFORT;SHOOTING
DESCRIPTORS: THROBBING;BURNING

## 2025-07-02 ASSESSMENT — PAIN DESCRIPTION - LOCATION
LOCATION: CHEST;ARM;SHOULDER
LOCATION: BACK;SHOULDER
LOCATION: CHEST

## 2025-07-02 NOTE — ED TRIAGE NOTES
Department of Emergency Medicine  FIRST PROVIDER TRIAGE NOTE             Independent MLP           7/2/25  1:11 PM EDT    Date of Encounter: 7/2/25   MRN: [id number]      HPI: Patricia Ventura is a 84 y.o. female who presents to the ED for Chest Pain (L side that radiates down arm x2 days, n/v)       ROS: Negative for dizziness, Suicidal ideation, or Homicidal Ideation.    PE: Gen Appearance/Constitutional: alert  CV: regular rate     Initial Plan of Care: All treatment areas with department are currently occupied. Plan to order/Initiate the following while awaiting opening in ED: EKG and imaging studies.  Initiate Treatment-Testing, Proceed toTreatment Area When Bed Available for ED Attending/MLP to Continue Care    The provider-patient relationship was only established for Provider In Triage (PIT) note.  A full assessment, HPI, and examination was not performed and therefore it is not yet possible to state whether or not an emergency medical condition exists.  This is not a comprehensive evaluation.  The provider-patient relationship was terminated after triage completion.    Electronically signed by CONNIE Bravo CNP   DD: 7/2/25

## 2025-07-02 NOTE — ED NOTES
Received report from Annelise COBIAN. Introduced self to patient and family at bedside. Updated on plan of care.

## 2025-07-02 NOTE — TELEPHONE ENCOUNTER
Call from Sabra requesting a referral for Hospice. Called back and provided supportive care through active listening and reassurance. Options provided and Sabra choose [facility]. Called and spoke with Luzmaria at [facility], faxed Patricia's information to [phone].

## 2025-07-02 NOTE — ED PROVIDER NOTES
1523)   ondansetron (ZOFRAN) injection 4 mg (4 mg IntraVENous Given 7/2/25 1524)   iopamidol (ISOVUE-370) 76 % injection 75 mL (75 mLs IntraVENous Given 7/2/25 1638)   fentaNYL (SUBLIMAZE) injection 50 mcg (50 mcg IntraVENous Given 7/2/25 1828)   ondansetron (ZOFRAN) injection 4 mg (4 mg IntraVENous Given 7/2/25 1829)   oxyCODONE-acetaminophen (PERCOCET) 5-325 MG per tablet 2 tablet (2 tablets Oral Given 7/2/25 2057)         ED COURSE:  ED Course as of 07/03/25 0000 Wed Jul 02, 2025 2015 Reeval.  Pain improved.   [AT]      ED Course User Index  [AT] Ryland Salinas MD             This patient's ED course included: a personal history and physicial examination    This patient has remained hemodynamically stable during their ED course.      Re-Evaluations:             Re-evaluation.  Patient’s symptoms are improving    Re-examination  7/2/25   2:23 PM EDT          Vital Signs:   Vitals:    07/02/25 1900 07/02/25 1905 07/02/25 2128 07/02/25 2230   BP: 97/86 97/86 (!) 125/58 (!) 106/50   Pulse:  73 74 72   Resp:  23  19   Temp:       TempSrc:       SpO2:  100%     Weight:       Height:             Consult hospitalist    Counseling:   The emergency provider has spoken with the patient and discussed today’s results, in addition to providing specific details for the plan of care and counseling regarding the diagnosis and prognosis.  Questions are answered at this time and they are agreeable with the plan.       --------------------------------- IMPRESSION AND DISPOSITION ---------------------------------    IMPRESSION  1. Chest pain, unspecified type        DISPOSITION  Disposition: admit to tele  Patient condition is stable    NOTE: This report was transcribed using voice recognition software. Every effort was made to ensure accuracy; however, inadvertent computerized transcription errors may be present        Ryland Salinas MD  07/02/25 2017       Ryland Salinas MD  07/02/25 2038       Rlyand Salinas MD  07/03/25

## 2025-07-03 VITALS
HEIGHT: 60 IN | SYSTOLIC BLOOD PRESSURE: 130 MMHG | RESPIRATION RATE: 18 BRPM | WEIGHT: 105.1 LBS | TEMPERATURE: 98.2 F | OXYGEN SATURATION: 92 % | HEART RATE: 72 BPM | DIASTOLIC BLOOD PRESSURE: 57 MMHG | BODY MASS INDEX: 20.63 KG/M2

## 2025-07-03 PROBLEM — Z85.3 HISTORY OF BREAST CANCER: Status: ACTIVE | Noted: 2025-07-03

## 2025-07-03 PROBLEM — J90 PLEURAL EFFUSION: Status: ACTIVE | Noted: 2025-07-03

## 2025-07-03 PROBLEM — I35.0 SEVERE AORTIC STENOSIS: Status: ACTIVE | Noted: 2025-07-03

## 2025-07-03 PROBLEM — I50.32 CHRONIC HEART FAILURE WITH PRESERVED EJECTION FRACTION (HFPEF) (HCC): Status: ACTIVE | Noted: 2025-07-03

## 2025-07-03 LAB
ANION GAP SERPL CALCULATED.3IONS-SCNC: 14 MMOL/L (ref 7–16)
BUN SERPL-MCNC: 13 MG/DL (ref 8–23)
CALCIUM SERPL-MCNC: 8.6 MG/DL (ref 8.8–10.2)
CHLORIDE SERPL-SCNC: 99 MMOL/L (ref 98–107)
CO2 SERPL-SCNC: 22 MMOL/L (ref 22–29)
CREAT SERPL-MCNC: 0.9 MG/DL (ref 0.5–1)
GFR, ESTIMATED: 63 ML/MIN/1.73M2
GLUCOSE SERPL-MCNC: 100 MG/DL (ref 74–99)
POTASSIUM SERPL-SCNC: 4.2 MMOL/L (ref 3.5–5.1)
SODIUM SERPL-SCNC: 135 MMOL/L (ref 136–145)
TROPONIN I SERPL HS-MCNC: 20 NG/L (ref 0–14)

## 2025-07-03 PROCEDURE — 80048 BASIC METABOLIC PNL TOTAL CA: CPT

## 2025-07-03 PROCEDURE — 99223 1ST HOSP IP/OBS HIGH 75: CPT | Performed by: INTERNAL MEDICINE

## 2025-07-03 PROCEDURE — 2580000003 HC RX 258: Performed by: NURSE PRACTITIONER

## 2025-07-03 PROCEDURE — 2500000003 HC RX 250 WO HCPCS: Performed by: NURSE PRACTITIONER

## 2025-07-03 PROCEDURE — 96372 THER/PROPH/DIAG INJ SC/IM: CPT

## 2025-07-03 PROCEDURE — G0378 HOSPITAL OBSERVATION PER HR: HCPCS

## 2025-07-03 PROCEDURE — 84484 ASSAY OF TROPONIN QUANT: CPT

## 2025-07-03 PROCEDURE — 6360000002 HC RX W HCPCS: Performed by: NURSE PRACTITIONER

## 2025-07-03 PROCEDURE — 36415 COLL VENOUS BLD VENIPUNCTURE: CPT

## 2025-07-03 PROCEDURE — 96361 HYDRATE IV INFUSION ADD-ON: CPT

## 2025-07-03 PROCEDURE — 6370000000 HC RX 637 (ALT 250 FOR IP): Performed by: NURSE PRACTITIONER

## 2025-07-03 PROCEDURE — APPSS180 APP SPLIT SHARED TIME > 60 MINUTES: Performed by: NURSE PRACTITIONER

## 2025-07-03 PROCEDURE — 99239 HOSP IP/OBS DSCHRG MGMT >30: CPT | Performed by: STUDENT IN AN ORGANIZED HEALTH CARE EDUCATION/TRAINING PROGRAM

## 2025-07-03 RX ORDER — HYDROCODONE BITARTRATE AND ACETAMINOPHEN 5; 325 MG/1; MG/1
1 TABLET ORAL EVERY 6 HOURS PRN
Status: DISCONTINUED | OUTPATIENT
Start: 2025-07-03 | End: 2025-07-03 | Stop reason: HOSPADM

## 2025-07-03 RX ORDER — POLYETHYLENE GLYCOL 3350 17 G/17G
17 POWDER, FOR SOLUTION ORAL DAILY PRN
Status: DISCONTINUED | OUTPATIENT
Start: 2025-07-03 | End: 2025-07-03 | Stop reason: HOSPADM

## 2025-07-03 RX ORDER — FUROSEMIDE 20 MG/1
20 TABLET ORAL DAILY
Qty: 60 TABLET | Refills: 3 | Status: SHIPPED | OUTPATIENT
Start: 2025-07-04

## 2025-07-03 RX ORDER — ONDANSETRON 2 MG/ML
4 INJECTION INTRAMUSCULAR; INTRAVENOUS EVERY 6 HOURS PRN
Status: DISCONTINUED | OUTPATIENT
Start: 2025-07-03 | End: 2025-07-03 | Stop reason: HOSPADM

## 2025-07-03 RX ORDER — SODIUM CHLORIDE 9 MG/ML
INJECTION, SOLUTION INTRAVENOUS PRN
Status: DISCONTINUED | OUTPATIENT
Start: 2025-07-03 | End: 2025-07-03 | Stop reason: HOSPADM

## 2025-07-03 RX ORDER — ACETAMINOPHEN 650 MG/1
650 SUPPOSITORY RECTAL EVERY 6 HOURS PRN
Status: DISCONTINUED | OUTPATIENT
Start: 2025-07-03 | End: 2025-07-03 | Stop reason: HOSPADM

## 2025-07-03 RX ORDER — MAGNESIUM SULFATE IN WATER 40 MG/ML
2000 INJECTION, SOLUTION INTRAVENOUS PRN
Status: DISCONTINUED | OUTPATIENT
Start: 2025-07-03 | End: 2025-07-03 | Stop reason: HOSPADM

## 2025-07-03 RX ORDER — SODIUM CHLORIDE 0.9 % (FLUSH) 0.9 %
5-40 SYRINGE (ML) INJECTION EVERY 12 HOURS SCHEDULED
Status: DISCONTINUED | OUTPATIENT
Start: 2025-07-03 | End: 2025-07-03 | Stop reason: HOSPADM

## 2025-07-03 RX ORDER — POTASSIUM CHLORIDE 1500 MG/1
40 TABLET, EXTENDED RELEASE ORAL PRN
Status: DISCONTINUED | OUTPATIENT
Start: 2025-07-03 | End: 2025-07-03 | Stop reason: HOSPADM

## 2025-07-03 RX ORDER — ENOXAPARIN SODIUM 100 MG/ML
40 INJECTION SUBCUTANEOUS DAILY
Status: DISCONTINUED | OUTPATIENT
Start: 2025-07-03 | End: 2025-07-03 | Stop reason: DRUGHIGH

## 2025-07-03 RX ORDER — SODIUM CHLORIDE 0.9 % (FLUSH) 0.9 %
5-40 SYRINGE (ML) INJECTION PRN
Status: DISCONTINUED | OUTPATIENT
Start: 2025-07-03 | End: 2025-07-03 | Stop reason: HOSPADM

## 2025-07-03 RX ORDER — POTASSIUM CHLORIDE 7.45 MG/ML
10 INJECTION INTRAVENOUS PRN
Status: DISCONTINUED | OUTPATIENT
Start: 2025-07-03 | End: 2025-07-03 | Stop reason: HOSPADM

## 2025-07-03 RX ORDER — SODIUM CHLORIDE 9 MG/ML
INJECTION, SOLUTION INTRAVENOUS CONTINUOUS
Status: DISCONTINUED | OUTPATIENT
Start: 2025-07-03 | End: 2025-07-03 | Stop reason: HOSPADM

## 2025-07-03 RX ORDER — OXYCODONE AND ACETAMINOPHEN 5; 325 MG/1; MG/1
1 TABLET ORAL EVERY 6 HOURS PRN
Qty: 20 TABLET | Refills: 0 | Status: SHIPPED | OUTPATIENT
Start: 2025-07-03 | End: 2025-07-08

## 2025-07-03 RX ORDER — ONDANSETRON 4 MG/1
4 TABLET, ORALLY DISINTEGRATING ORAL EVERY 8 HOURS PRN
Status: DISCONTINUED | OUTPATIENT
Start: 2025-07-03 | End: 2025-07-03 | Stop reason: HOSPADM

## 2025-07-03 RX ORDER — ASPIRIN 81 MG/1
81 TABLET ORAL DAILY
COMMUNITY

## 2025-07-03 RX ORDER — ENOXAPARIN SODIUM 100 MG/ML
30 INJECTION SUBCUTANEOUS DAILY
Status: DISCONTINUED | OUTPATIENT
Start: 2025-07-03 | End: 2025-07-03 | Stop reason: HOSPADM

## 2025-07-03 RX ORDER — ACETAMINOPHEN 325 MG/1
650 TABLET ORAL EVERY 6 HOURS PRN
Status: DISCONTINUED | OUTPATIENT
Start: 2025-07-03 | End: 2025-07-03 | Stop reason: HOSPADM

## 2025-07-03 RX ORDER — NITROGLYCERIN 0.4 MG/1
0.4 TABLET SUBLINGUAL EVERY 5 MIN PRN
Status: DISCONTINUED | OUTPATIENT
Start: 2025-07-03 | End: 2025-07-03 | Stop reason: HOSPADM

## 2025-07-03 RX ORDER — FUROSEMIDE 20 MG/1
20 TABLET ORAL DAILY
Status: DISCONTINUED | OUTPATIENT
Start: 2025-07-03 | End: 2025-07-03 | Stop reason: HOSPADM

## 2025-07-03 RX ORDER — ENOXAPARIN SODIUM 100 MG/ML
30 INJECTION SUBCUTANEOUS DAILY
Status: DISCONTINUED | OUTPATIENT
Start: 2025-07-03 | End: 2025-07-03 | Stop reason: DRUGHIGH

## 2025-07-03 RX ADMIN — ENOXAPARIN SODIUM 30 MG: 100 INJECTION SUBCUTANEOUS at 08:46

## 2025-07-03 RX ADMIN — HYDROCODONE BITARTRATE AND ACETAMINOPHEN 1 TABLET: 5; 325 TABLET ORAL at 12:29

## 2025-07-03 RX ADMIN — SODIUM CHLORIDE: 0.9 INJECTION, SOLUTION INTRAVENOUS at 00:26

## 2025-07-03 RX ADMIN — FUROSEMIDE 20 MG: 20 TABLET ORAL at 13:32

## 2025-07-03 RX ADMIN — ACETAMINOPHEN 650 MG: 325 TABLET ORAL at 00:29

## 2025-07-03 RX ADMIN — SODIUM CHLORIDE, PRESERVATIVE FREE 10 ML: 5 INJECTION INTRAVENOUS at 08:46

## 2025-07-03 RX ADMIN — SODIUM CHLORIDE: 0.9 INJECTION, SOLUTION INTRAVENOUS at 12:30

## 2025-07-03 RX ADMIN — HYDROCODONE BITARTRATE AND ACETAMINOPHEN 1 TABLET: 5; 325 TABLET ORAL at 05:07

## 2025-07-03 RX ADMIN — ACETAMINOPHEN 650 MG: 325 TABLET ORAL at 08:51

## 2025-07-03 ASSESSMENT — PAIN DESCRIPTION - ORIENTATION
ORIENTATION: LEFT

## 2025-07-03 ASSESSMENT — PAIN - FUNCTIONAL ASSESSMENT
PAIN_FUNCTIONAL_ASSESSMENT: ACTIVITIES ARE NOT PREVENTED
PAIN_FUNCTIONAL_ASSESSMENT: ACTIVITIES ARE NOT PREVENTED

## 2025-07-03 ASSESSMENT — PAIN DESCRIPTION - LOCATION
LOCATION: BACK;CHEST
LOCATION: GENERALIZED
LOCATION: CHEST;BACK
LOCATION: CHEST;SHOULDER;ARM

## 2025-07-03 ASSESSMENT — PAIN DESCRIPTION - ONSET
ONSET: GRADUAL
ONSET: GRADUAL

## 2025-07-03 ASSESSMENT — PAIN SCALES - GENERAL
PAINLEVEL_OUTOF10: 5
PAINLEVEL_OUTOF10: 9
PAINLEVEL_OUTOF10: 4
PAINLEVEL_OUTOF10: 10
PAINLEVEL_OUTOF10: 10
PAINLEVEL_OUTOF10: 6
PAINLEVEL_OUTOF10: 5
PAINLEVEL_OUTOF10: 3

## 2025-07-03 ASSESSMENT — PAIN DESCRIPTION - PAIN TYPE
TYPE: ACUTE PAIN
TYPE: ACUTE PAIN

## 2025-07-03 ASSESSMENT — PAIN DESCRIPTION - DESCRIPTORS
DESCRIPTORS: SHARP;THROBBING
DESCRIPTORS: DULL;ACHING

## 2025-07-03 ASSESSMENT — PAIN DESCRIPTION - FREQUENCY
FREQUENCY: INTERMITTENT
FREQUENCY: INTERMITTENT

## 2025-07-03 NOTE — PROGRESS NOTES
Comprehensive Nutrition Assessment    Type and Reason for Visit:  Initial, Positive nutrition screen    Nutrition Recommendations/Plan:   Continue current diet and ONS, as tolerated  Will continue inpatient monitoring POC     Malnutrition Assessment:  Malnutrition Status:  Moderate malnutrition (07/03/25 1612)    Context:  Chronic Illness     Findings of the 6 clinical characteristics of malnutrition:  Energy Intake:  Mild decrease in energy intake  Weight Loss:  Greater than 10% over 6 months     Body Fat Loss:  No body fat loss     Muscle Mass Loss:  Mild muscle mass loss Clavicles (pectoralis & deltoids), Hand (interosseous)  Fluid Accumulation:  No fluid accumulation     Strength:  Not Performed    Nutrition Assessment:    Pt admit 2/2 CP, VHD, pleural effusion. S/p 6/27 thoracentesis w/750 ml off.  PMHx=breast CA, MDS-on Aranesp currently. Pt's daughter had requested Hospice Consult. Pt meets criteria for PCM. Will add Magic Cup ONS BID to optimize nutrient intake in the setting of recent N/V and decreased PO.    Nutrition Related Findings:    A&Ox4, N/V PTA, no edema, I/O WNL, hyponatremia (135) Wound Type: None       Current Nutrition Intake & Therapies:    Average Meal Intake: 1-25% (N/V PTA)  Average Supplements Intake: None Ordered  ADULT DIET; Regular    Anthropometric Measures:  Height: 152.4 cm (5')  Ideal Body Weight (IBW): 100 lbs (45 kg)    Admission Body Weight: 47.7 kg (105 lb 2.6 oz) (7/2)  Current Body Weight: 47.7 kg (105 lb 2.6 oz), 105.2 % IBW. Weight Source: Bed scale (7/2)  Current BMI (kg/m2): 20.5  Usual Body Weight: 55.7 kg (122 lb 11 oz) (1/7/2025)     % Weight Change (Calculated): -14.3                    BMI Categories: Underweight (BMI less than 22) age over 65    Estimated Daily Nutrient Needs:  Energy Requirements Based On: Formula  Weight Used for Energy Requirements: Admission  Energy (kcal/day): 2414-5912  Weight Used for Protein Requirements: Current  Protein (g/day): 55-65

## 2025-07-03 NOTE — CONSULTS
1 tablet by mouth nightly 3/4/22   Autumn Dacosta, APRN - CNP       ALLERGIES: Bee venom, Penicillins, and Other       REVIEW OF SYSTEMS:  Otherwise negative if not reported or listed below  Constitutional:  No unanticipated weight loss.      No change in sleep pattern or activity.      No fevers, chills or rigors.    Eyes:    No visual changes or diplopia.      No scleral icterus.  ENT:    No Headaches, hearing loss or vertigo.      No mouth sores or sore throat.   Cardiovascular:  + chest discomfort, palpitations.  Respiratory:  + cough, No wheezing      No sputum production.      No hemoptysis, pleuritic pain.  Gastrointestinal:  No abdominal pain, appetite loss, blood in stools.      No hematemesis  Genitourinary:  No dysuria, trouble voiding or hematuria.      No nocturia.  Musculoskeletal:   No weakness or joint complaints.  Integumentary: No rashes or pruritis.  Neurological:  No headache, numbness or tingling.     Psychiatric:   No effect on mood, memory, mentation, or behavior.     No anxiety or depression.  Endocrine:   No excessive thirst, fluid intake, or urination.      No tremor.  Hematologic: No abnormal bruising or bleeding.  Lymphatic:  No swollen lymph nodes.  Immunologic:  No hives or hx of anaphylaxis      OBJECTIVE:     PHYSICAL EXAM:   VITALS:   Vitals:    07/03/25 0706 07/03/25 1115 07/03/25 1545 07/03/25 1546   BP: (!) 124/55 (!) 115/47 (!) 130/57    Pulse: 80 67 72    Resp: 18 16 18    Temp: 97.4 °F (36.3 °C) 97.3 °F (36.3 °C) 98.2 °F (36.8 °C)    TempSrc: Oral Oral Oral    SpO2: 93% 95% 92%    Weight:       Height:    1.524 m (5')        Intake/Output Summary (Last 24 hours) at 7/3/2025 1646  Last data filed at 7/3/2025 1138  Gross per 24 hour   Intake 834.95 ml   Output --   Net 834.95 ml        CONSTITUTIONAL:   A&O x 3, NAD  SKIN:     No rash, No suspicious lesions, No skin discoloration  HEENT:     EOMI, MMM, No thrush  NECK:    No bruits, No JVP appreciated  CV:      Sinus,  4/6 
results found for: \"TRIG\"  No results found for: \"HDL\"  No components found for: \"LDLCALC\", \"LDLCHOLESTEROL\"  No results found for: \"VLDL\"  No results found for: \"CHOLHDLRATIO\"  No results for input(s): \"PROBNP\" in the last 72 hours.  Lab Results   Component Value Date    SEDRATE 1 01/29/2019     Lab Results   Component Value Date    CRP 0.3 01/29/2019       Cardiac Tests:  EKG personally reviewed: SR, rate 76, 1st degree AV block, RBBB    Telemetry personally reviewed (date: 7/3/2025): SR at rate 70's    Echocardiogram reviewed: 6/23/25 (Dr. Olvera)    Left Ventricle: EF by visual approximation is 65%. Left ventricle size is normal. Normal wall motion.    Right Ventricle: Right ventricle size is normal. Normal systolic function.    Aortic Valve: Mild regurgitation. Paradoxical low flow/low gradient severe aortic stenosis with normal EF. AV mean gradient is 18 mmHg. AV area by continuity VTI is 0.8 cm2.    Mitral Valve: Mildly calcified leaflets. Mild regurgitation.    Tricuspid Valve: Mild regurgitation. RVSP is 23 mmHg.    Extracardiac: Medium sized left pleural effusion.    Image quality is good.      Treatment options for severe AS discussed with the patient and her daughter at length today (additional work-up deferred at this time -- recent family request for hospice evaluation noted)  Continue current medications  Monitor labs  Care per palliative care and heme/onc  Case discussed with primary service as well today      Ross Deluca MD  [facility] Cardiology

## 2025-07-03 NOTE — DISCHARGE SUMMARY
hilar lymph nodes are present. Coronary artery calcification is evident.  Moderate calcified plaque in the aortic arch. Lungs/pleura: Moderate left pleural effusion is noted with compressive atelectasis in the left lower lobe.  Rosa fissural granulomas are present in the left lower lobe inferiorly.  Calcified pleural plaques are noted anteriorly in the right base along the right middle lobe the patient has a lingular perifissural noncalcified nodule of 9.4 mm image 67 series 301.  A few parenchymal granulomas are present in the lower lobes. Upper Abdomen: Atherosclerotic disease of the aorta is noted.  Status post cholecystectomy. Soft Tissues/Bones: Multilevel degenerative changes are present.  Chronic anterior compression T5 is noted.     1.  No evidence of pulmonary embolism.  No mediastinal adenopathy. 2.  Old granulomatous changes. 3.  Atherosclerotic disease of the aorta. 4.  Noncalcified perifissural nodule in the lingula of 9.4 mm. RECOMMENDATIONS: Fleischner Society guidelines for follow-up and management of incidentally detected pulmonary nodules: . Multiple Solid Nodules: Nodule size greater than 8 mm In a low-risk patient, CT at 3-6 months, then consider CT at 18-24 months. PET-CT may be considered as the nodule is more than 9 mm. In a high-risk patient, CT at 3-6 months, then CT at 18-24 months. - Low risk patients include individuals with minimal or absent history of smoking and other known risk factors. - High risk patients include individuals with a history or smoking or known risk factors. Radiology 2017 http://pubs.rsna.org/doi/full/10.1148/radiol.7552040471     XR CHEST (2 VW)  Result Date: 7/2/2025  EXAMINATION: TWO XRAY VIEWS OF THE CHEST 7/2/2025 2:36 pm COMPARISON: June 26, 2025 HISTORY: ORDERING SYSTEM PROVIDED HISTORY: CP TECHNOLOGIST PROVIDED HISTORY: Reason for exam:->CP FINDINGS: The heart is normal in size.  There is mild left pleural effusion.  Focal atelectasis in the left lower lung.

## 2025-07-03 NOTE — CARE COORDINATION
7/3/2025  Social Work Discharge Planning: Chest pain.Recent pleural effusion two week ago. This worker met with Pt to discuss  role and transition of care/discharge planning.Pt resides at home alone;however, she states shed will be gig marlee stay with her daughter and AGUILA on discharge at [address] [facility] [address]. P is on room air. Pt has lots of DME but sates she doesn't use any of them. Electronically signed by KAVYA Palacios on 7/3/2025 at 2:17 PM

## 2025-07-03 NOTE — ED NOTES
ED to Inpatient Handoff Report    Notified 4W Nilda that electronic handoff available and patient ready for transport to room 416.    Safety Risks: Risk of falls    Patient in Restraints: no    Constant Observer or Patient : no    Telemetry Monitoring Ordered: Yes          Order to transfer to unit without monitor: NO    Last MEWS: 1 Time completed: 2300    Deterioration Index: 27.73    Vitals:    07/02/25 1900 07/02/25 1905 07/02/25 2128 07/02/25 2230   BP: 97/86 97/86 (!) 125/58 (!) 106/50   Pulse:  73 74 72   Resp:  23  19   Temp:       TempSrc:       SpO2:  100%     Weight:       Height:           Opportunity for questions and clarification was provided.

## 2025-07-03 NOTE — H&P
[facility] Hospitalist Group History and Physical      CHIEF COMPLAINT:  Chest pain and shortness of breath    History of Present Illness:  This is a 84 year old female with PMH significant for anemia, breast CA s/p chemo/radiation and radical mastectomy, hypothyroidism, depression, and myelodysplastic syndrome.  Patient to ED secondary to chest pain and shortness of breath.  Patient reports having chest pain on and off for 6 weeks with it progressively worsening.  Patient reports left chest pain now radiates to back, shoulder and goes down left arm.  Associated symptoms include shortness of breath and emesis.  Patient reports shortness of breath worse with activity.  Does admit to having thoracentesis done last week with 750 mL of fluid removed.  Admits to having multiple emesis last couple days.  States that she had an EKG done initially when pain started and was told that it was negative.  Also had recent echo done showing EF of 65% with normal left ventricle and right ventricle function, mild AV regurgitation, mild MV regurgitation and mild tricuspid valve regurgitation.  Troponin 19 and then 18.  EKG showing normal sinus rhythm with right BBB.  Does have cardiology appointment scheduled for late July.  Does not remember the name of the cardiologist.  Will observe for additional evaluation and treatment.    Informant(s) for H&P: Patient and chart review    REVIEW OF SYSTEMS:  A comprehensive review of systems was negative except for: what is in the HPI      PMH:  Past Medical History:   Diagnosis Date    Anemia     Pt reports she was dx in her teens, w/o proper w/u, with iron deficiency anemia and was on oral iron replacement for many years, resulting in iron overload    Aplastic anemia ~1255-7130    Possibly d/t chemotherapy for breast cancer    Breast cancer (HCC) 2009    right side; pt underwent radical mastectomy followed by radiation therapy and chemotherapy and was then on oral chemo pill for 5 yrs;

## 2025-07-03 NOTE — ACP (ADVANCE CARE PLANNING)
7/3/2025  Advance Care Planning   Healthcare Decision Maker:    Primary Decision Maker: Sabra Jarrett - Child - [phone]    Click here to complete Healthcare Decision Makers including selection of the Healthcare Decision Maker Relationship (ie \"Primary\").

## 2025-07-03 NOTE — PROGRESS NOTES
CLINICAL PHARMACY NOTE: MEDS TO BEDS    Total # of Prescriptions Filled: 2   The following medications were delivered to the patient:  Furosemide 20mg tabs  Percocet 5/325mg tabs    Additional Documentation:  To pt's daughter Sabra at the pharmacy

## 2025-07-03 NOTE — PLAN OF CARE
Problem: Discharge Planning  Goal: Discharge to home or other facility with appropriate resources  7/3/2025 1136 by Hazel Parr RN  Outcome: Progressing  7/3/2025 0109 by Nilda Cantu, RN  Outcome: Progressing     Problem: Pain  Goal: Verbalizes/displays adequate comfort level or baseline comfort level  7/3/2025 1136 by Hazel Parr RN  Outcome: Progressing  7/3/2025 0109 by Nilda Cantu, RN  Outcome: Progressing     Problem: Safety - Adult  Goal: Free from fall injury  7/3/2025 1136 by Hazel Parr RN  Outcome: Progressing  7/3/2025 0109 by Nilda Cantu, RN  Outcome: Progressing

## 2025-07-03 NOTE — PROGRESS NOTES
4 Eyes Skin Assessment     NAME:  Patricia Ventura  YOB: 1940  MEDICAL RECORD NUMBER:  [id number]    The patient is being assessed for  Admission    I agree that at least one RN has performed a thorough Head to Toe Skin Assessment on the patient. ALL assessment sites listed below have been assessed.      Areas assessed by both nurses:    Head, Face, Ears, Shoulders, Back, Chest, Arms, Elbows, Hands, Sacrum. Buttock, Coccyx, Ischium, Legs. Feet and Heels, and Under Medical Devices         Does the Patient have a Wound? No noted wound(s)       Sandip Prevention initiated by RN: No  Wound Care Orders initiated by RN: No    Pressure Injury (Stage 1,2,3,4, Unstageable, DTI, NWPT, and Complex wounds) if present, place Wound referral order by RN under : No    New Ostomies, if present place, Ostomy referral order under : No     Nurse 1 eSignature: Electronically signed by Nilda Cantu RN on 7/3/25 at 12:59 AM EDT    **SHARE this note so that the co-signing nurse can place an eSignature**    Nurse 2 eSignature: Electronically signed by Miriam Gonzales RN on 7/3/25 at 1:00 AM EDT

## 2025-07-08 ENCOUNTER — OFFICE VISIT (OUTPATIENT)
Age: 85
End: 2025-07-08
Payer: MEDICARE

## 2025-07-08 ENCOUNTER — HOSPITAL ENCOUNTER (OUTPATIENT)
Dept: INFUSION THERAPY | Age: 85
Discharge: HOME | End: 2025-07-08
Payer: MEDICARE

## 2025-07-08 ENCOUNTER — HOSPITAL ENCOUNTER (OUTPATIENT)
Dept: INFUSION THERAPY | Age: 85
End: 2025-07-08

## 2025-07-08 VITALS
HEART RATE: 66 BPM | SYSTOLIC BLOOD PRESSURE: 91 MMHG | TEMPERATURE: 97.9 F | OXYGEN SATURATION: 98 % | DIASTOLIC BLOOD PRESSURE: 50 MMHG

## 2025-07-08 DIAGNOSIS — R53.0 NEOPLASTIC MALIGNANT RELATED FATIGUE: ICD-10-CM

## 2025-07-08 DIAGNOSIS — G89.3 PAIN DUE TO NEOPLASM: Primary | ICD-10-CM

## 2025-07-08 DIAGNOSIS — D46.9 MDS (MYELODYSPLASTIC SYNDROME) (HCC): ICD-10-CM

## 2025-07-08 DIAGNOSIS — C80.1 ADENOCARCINOMA (HCC): ICD-10-CM

## 2025-07-08 DIAGNOSIS — D63.8 ANEMIA OF CHRONIC DISEASE: Primary | ICD-10-CM

## 2025-07-08 DIAGNOSIS — C50.911 MALIGNANT NEOPLASM OF RIGHT BREAST IN FEMALE, ESTROGEN RECEPTOR POSITIVE, UNSPECIFIED SITE OF BREAST (HCC): Primary | ICD-10-CM

## 2025-07-08 DIAGNOSIS — D46.A MYELODYSPLASTIC SYNDROME WITH MULTILINEAGE DYSPLASIA (HCC): ICD-10-CM

## 2025-07-08 DIAGNOSIS — D63.8 ANEMIA OF CHRONIC DISEASE: ICD-10-CM

## 2025-07-08 DIAGNOSIS — D46.9 MDS (MYELODYSPLASTIC SYNDROME) (HCC): Primary | ICD-10-CM

## 2025-07-08 DIAGNOSIS — C50.919 MALIGNANT NEOPLASM OF FEMALE BREAST, UNSPECIFIED ESTROGEN RECEPTOR STATUS, UNSPECIFIED LATERALITY, UNSPECIFIED SITE OF BREAST (HCC): ICD-10-CM

## 2025-07-08 DIAGNOSIS — Z51.5 PALLIATIVE CARE BY SPECIALIST: ICD-10-CM

## 2025-07-08 DIAGNOSIS — Z17.0 MALIGNANT NEOPLASM OF RIGHT BREAST IN FEMALE, ESTROGEN RECEPTOR POSITIVE, UNSPECIFIED SITE OF BREAST (HCC): Primary | ICD-10-CM

## 2025-07-08 DIAGNOSIS — J91.0 MALIGNANT PLEURAL EFFUSION (HCC): ICD-10-CM

## 2025-07-08 LAB
ALBUMIN SERPL-MCNC: 3.9 G/DL (ref 3.5–5.2)
ALP SERPL-CCNC: 43 U/L (ref 35–104)
ALT SERPL-CCNC: 76 U/L (ref 0–35)
ANION GAP SERPL CALCULATED.3IONS-SCNC: 12 MMOL/L (ref 7–16)
AST SERPL-CCNC: 86 U/L (ref 0–35)
BASOPHILS # BLD: 0.05 K/UL (ref 0–0.2)
BASOPHILS NFR BLD: 1 % (ref 0–2)
BILIRUB SERPL-MCNC: 1.3 MG/DL (ref 0–1.2)
BUN SERPL-MCNC: 22 MG/DL (ref 8–23)
CALCIUM SERPL-MCNC: 9.1 MG/DL (ref 8.8–10.2)
CHLORIDE SERPL-SCNC: 103 MMOL/L (ref 98–107)
CO2 SERPL-SCNC: 24 MMOL/L (ref 22–29)
CREAT SERPL-MCNC: 0.9 MG/DL (ref 0.5–1)
EOSINOPHIL # BLD: 0.14 K/UL (ref 0.05–0.5)
EOSINOPHILS RELATIVE PERCENT: 3 % (ref 0–6)
ERYTHROCYTE [DISTWIDTH] IN BLOOD BY AUTOMATED COUNT: 27.7 % (ref 11.5–15)
GFR, ESTIMATED: 59 ML/MIN/1.73M2
GLUCOSE SERPL-MCNC: 141 MG/DL (ref 74–99)
HCT VFR BLD AUTO: 25.7 % (ref 34–48)
HGB BLD-MCNC: 8.6 G/DL (ref 11.5–15.5)
IMM GRANULOCYTES # BLD AUTO: <0.03 K/UL (ref 0–0.58)
IMM GRANULOCYTES NFR BLD: 1 % (ref 0–5)
LYMPHOCYTES NFR BLD: 1.77 K/UL (ref 1.5–4)
LYMPHOCYTES RELATIVE PERCENT: 41 % (ref 20–42)
MCH RBC QN AUTO: 38.7 PG (ref 26–35)
MCHC RBC AUTO-ENTMCNC: 33.5 G/DL (ref 32–34.5)
MCV RBC AUTO: 115.8 FL (ref 80–99.9)
MONOCYTES NFR BLD: 0.29 K/UL (ref 0.1–0.95)
MONOCYTES NFR BLD: 7 % (ref 2–12)
NEUTROPHILS NFR BLD: 48 % (ref 43–80)
NEUTS SEG NFR BLD: 2.07 K/UL (ref 1.8–7.3)
PLATELET # BLD AUTO: 141 K/UL (ref 130–450)
PMV BLD AUTO: 11.3 FL (ref 7–12)
POTASSIUM SERPL-SCNC: 4.1 MMOL/L (ref 3.5–5.1)
PROT SERPL-MCNC: 6.6 G/DL (ref 6.4–8.3)
RBC # BLD AUTO: 2.22 M/UL (ref 3.5–5.5)
RBC # BLD: ABNORMAL 10*6/UL
SODIUM SERPL-SCNC: 139 MMOL/L (ref 136–145)
WBC OTHER # BLD: 4.3 K/UL (ref 4.5–11.5)

## 2025-07-08 PROCEDURE — 99213 OFFICE O/P EST LOW 20 MIN: CPT | Performed by: NURSE PRACTITIONER

## 2025-07-08 PROCEDURE — 1159F MED LIST DOCD IN RCRD: CPT | Performed by: NURSE PRACTITIONER

## 2025-07-08 PROCEDURE — 1123F ACP DISCUSS/DSCN MKR DOCD: CPT | Performed by: NURSE PRACTITIONER

## 2025-07-08 PROCEDURE — 85025 COMPLETE CBC W/AUTO DIFF WBC: CPT

## 2025-07-08 PROCEDURE — 80053 COMPREHEN METABOLIC PANEL: CPT

## 2025-07-08 PROCEDURE — 1126F AMNT PAIN NOTED NONE PRSNT: CPT | Performed by: NURSE PRACTITIONER

## 2025-07-08 PROCEDURE — 1160F RVW MEDS BY RX/DR IN RCRD: CPT | Performed by: NURSE PRACTITIONER

## 2025-07-08 PROCEDURE — 96372 THER/PROPH/DIAG INJ SC/IM: CPT

## 2025-07-08 PROCEDURE — 36415 COLL VENOUS BLD VENIPUNCTURE: CPT

## 2025-07-08 PROCEDURE — 99214 OFFICE O/P EST MOD 30 MIN: CPT | Performed by: NURSE PRACTITIONER

## 2025-07-08 PROCEDURE — 6360000002 HC RX W HCPCS: Performed by: INTERNAL MEDICINE

## 2025-07-08 RX ORDER — OXYCODONE AND ACETAMINOPHEN 5; 325 MG/1; MG/1
1 TABLET ORAL EVERY 4 HOURS PRN
Qty: 90 TABLET | Refills: 0 | Status: SHIPPED | OUTPATIENT
Start: 2025-07-08 | End: 2025-07-23

## 2025-07-08 RX ADMIN — DARBEPOETIN ALFA 500 MCG: 500 INJECTION, SOLUTION INTRAVENOUS; SUBCUTANEOUS at 12:52

## 2025-07-08 NOTE — PROGRESS NOTES
related to her history of breast cancer and myelodysplastic syndrome. She is accompanied by her daughter.    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 She reports worsening pain in the left chest wall, increased fatigue, and decreased appetite. She experienced pain that radiated to her back, followed by arm discomfort. She describes a sensation of tightness around her midsection, likening it to a rope being squeezed. She also mentions a cough, rapid heartbeat, and shortness of breath, even during short walks from her living room to the kitchen. She has experienced episodes of vomiting and has lost weight, now weighing 104 pounds. Her pain is severe enough to disrupt her sleep, although it is not constant. The pain is episodic and does not completely subside. She was initially prescribed hydrocodone, but it was ineffective, so she switched to Percocet, which provided some relief. She was also given fentanyl intravenously, but it took a long time to alleviate her pain. She is currently taking one Percocet daily, which provides some relief, but not complete. She is unable to engage in physical activities due to the pain. She admits to occasionally cutting her pills in half due to anxiety about medication use. She is considering increasing her tablet intake. She is scheduled to see a cardiologist and pulmonologist for potential procedures.    MEDICATIONS  Current: Percocet  Past: Hydrocodone, fentanyl        Objective:     Physical Exam  Wt Readings from Last 3 Encounters:   07/02/25 47.7 kg (105 lb 1.6 oz)   06/23/25 49.4 kg (109 lb)   06/24/25 50.1 kg (110 lb 6.4 oz)     LMP 05/28/1991     Gen:  Alert, appears stated age, well nourished, in no acute distress  HEENT:  Normocephalic, conjunctiva pink, no drainage, mucosa moist  Neck:  Supple  Lungs:  CTA bilaterally, no audible rhonchi or wheezes noted  Heart::  RRR, no murmur, rub, or gallop noted during

## 2025-07-08 NOTE — PROGRESS NOTES
Patient refused printed AVS.   Pt verbalized understanding of follow up plan of care.  All questions answered.    
had reviewed, revealing fatty infiltration of the liver.  Hypothyroidism, continue Synthroid follow-up with PCP.    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  The patient had a follow-up with Dr. Melendez, he recommended conservative medical management with DAPT.    Today 7/8/2025 the patient is doing well clinically, labs reviewed, hemoglobin is 8.6, proceed with Aranesp.    Recurrent transaminitis/hyperbilirubinemia, the patient was evaluated by GI, records reviewed, ALT 76, AST 86, total bilirubin 1.3, will continue monitor.    The patient had a chest x-ray and chest CT done by PCP, was found to have mild patchy left-sided infiltrate with atelectasis and moderate left pleural effusion, the patient was referred to pulmonary for further evaluation.  Thoracentesis completed on 6/26/2025 with 750 ml removed, results showing pleural fluid positive for adenocarcinoma.  CT chest on 7/2/2025 revealing no evidence of PE.  No mediastinal adenopathy.  Old granulomatous changes.  Arteriosclerotic disease of the aorta.  Noncalcified perifissural nodule in lingula of 9.4 mm.  Will order bone scan for staging.  She is scheduled with Dr. Garcia pulmonologist on 7/21/2025.    Patient was hospitalized on 7/2/2025 due to chest pain.  Patient was found to have severe aortic stenosis.  Echocardiogram completed on 6/23/2025 revealing an EF of 65%.  Patient is scheduled with cardiologist  and cardiothoracic surgery  both on 7/10/2025.    RTC in 1 week with Dr. Luciano Bernardo to discuss recent findings of adenocarcinoma.    Thank you for allowing us to participate in the care of Ms. Ventura.    Alia Ramsay, APRN - CNP   HEMATOLOGY/MEDICAL

## 2025-07-09 ENCOUNTER — TELEPHONE (OUTPATIENT)
Age: 85
End: 2025-07-09

## 2025-07-09 NOTE — TELEPHONE ENCOUNTER
Call from daughter Sabra on [facility]. She stated that Patricia's percocet was needing to be transferred to [facility] in [place]. Called [facility] on [address] and they stated that the percocet just needed verified by office and that patient was no longer taking norco. [facility] said they would reach out to fabiola Montaño

## 2025-07-10 ENCOUNTER — OFFICE VISIT (OUTPATIENT)
Dept: CARDIOTHORACIC SURGERY | Age: 85
End: 2025-07-10
Payer: MEDICARE

## 2025-07-10 ENCOUNTER — OFFICE VISIT (OUTPATIENT)
Dept: CARDIOLOGY CLINIC | Age: 85
End: 2025-07-10
Payer: MEDICARE

## 2025-07-10 VITALS — HEART RATE: 87 BPM | DIASTOLIC BLOOD PRESSURE: 62 MMHG | SYSTOLIC BLOOD PRESSURE: 120 MMHG

## 2025-07-10 DIAGNOSIS — I35.0 NONRHEUMATIC AORTIC VALVE STENOSIS: ICD-10-CM

## 2025-07-10 DIAGNOSIS — I35.0 SEVERE AORTIC STENOSIS: Primary | ICD-10-CM

## 2025-07-10 DIAGNOSIS — I35.0 AORTIC STENOSIS DUE TO BICUSPID AORTIC VALVE: Primary | ICD-10-CM

## 2025-07-10 DIAGNOSIS — Q23.81 AORTIC STENOSIS DUE TO BICUSPID AORTIC VALVE: Primary | ICD-10-CM

## 2025-07-10 PROCEDURE — 1123F ACP DISCUSS/DSCN MKR DOCD: CPT | Performed by: INTERNAL MEDICINE

## 2025-07-10 PROCEDURE — 1159F MED LIST DOCD IN RCRD: CPT | Performed by: INTERNAL MEDICINE

## 2025-07-10 PROCEDURE — G2211 COMPLEX E/M VISIT ADD ON: HCPCS | Performed by: INTERNAL MEDICINE

## 2025-07-10 PROCEDURE — 99204 OFFICE O/P NEW MOD 45 MIN: CPT | Performed by: INTERNAL MEDICINE

## 2025-07-10 PROCEDURE — 1123F ACP DISCUSS/DSCN MKR DOCD: CPT | Performed by: STUDENT IN AN ORGANIZED HEALTH CARE EDUCATION/TRAINING PROGRAM

## 2025-07-10 PROCEDURE — 99204 OFFICE O/P NEW MOD 45 MIN: CPT | Performed by: STUDENT IN AN ORGANIZED HEALTH CARE EDUCATION/TRAINING PROGRAM

## 2025-07-10 PROCEDURE — 1159F MED LIST DOCD IN RCRD: CPT | Performed by: STUDENT IN AN ORGANIZED HEALTH CARE EDUCATION/TRAINING PROGRAM

## 2025-07-10 ASSESSMENT — ENCOUNTER SYMPTOMS
BACK PAIN: 0
COUGH: 0
NAUSEA: 0
CHEST TIGHTNESS: 0
SHORTNESS OF BREATH: 1
ABDOMINAL PAIN: 0
VOMITING: 0
BLOOD IN STOOL: 0

## 2025-07-10 NOTE — PROGRESS NOTES
2000     Years since quittin.5    Smokeless tobacco: Never   Vaping Use    Vaping status: Never Used   Substance and Sexual Activity    Alcohol use: No    Drug use: Never    Sexual activity: Not Currently   Other Topics Concern    Not on file   Social History Narrative    Not on file     Social Drivers of Health     Financial Resource Strain: Not on file   Food Insecurity: No Food Insecurity (2025)    Hunger Vital Sign     Worried About Running Out of Food in the Last Year: Never true     Ran Out of Food in the Last Year: Never true   Transportation Needs: No Transportation Needs (2025)    PRAPARE - Transportation     Lack of Transportation (Medical): No     Lack of Transportation (Non-Medical): No   Physical Activity: Not on file   Stress: Not on file   Social Connections: Not on file   Intimate Partner Violence: Not on file   Housing Stability: Low Risk  (2025)    Housing Stability Vital Sign     Unable to Pay for Housing in the Last Year: No     Number of Times Moved in the Last Year: 0     Homeless in the Last Year: No       Family History:  Family History   Problem Relation Age of Onset    Cancer Mother         lung    Cancer Father         lung    Ovarian Cancer Sister 81    Breast Cancer Sister     Cancer Brother         unknown    Other Brother         MI    Cancer Maternal Aunt         unknown    Heart Attack Child        Objective:  There were no vitals filed for this visit.  General Appearance: Pleasant 84 y.o. year old female who appears stated age.  Communicates well, no acute distress.    HEENT: Head is normocephalic, atraumatic.  EOMs intact, PERRL.  Trachea midline.   Lungs: Normal respiratory rate and normal effort. She is not in respiratory distress. Breath sounds clear to auscultation. No wheezes.   Heart: Normal rate. Regular rhythm. S1 normal and S2 normal. Positive for murmur.   Chest: Symmetric chest wall expansion.   Extremities: Normal range of motion.     Neurological:

## 2025-07-10 NOTE — PROGRESS NOTES
and vomiting.   Genitourinary:  Negative for hematuria.   Musculoskeletal:  Negative for back pain and myalgias.   Skin: Negative.    Neurological:  Negative for dizziness, syncope, speech difficulty, weakness and light-headedness.   Hematological:  Does not bruise/bleed easily.   Psychiatric/Behavioral: Negative.            PHYSICAL EXAMINATION:   VITAL SIGNS: /62   Pulse 87   LMP 05/28/1991     GENERAL: NAD   HEAD: Normocephalic, atraumatic.   NECK: No JVD. No carotid bruits. No neck masses.    CARDIOVASCULAR: Normal rate, regular rhythm, normal S1, systolic murmur with preserved S2   LUNGS: Clear to auscultation bilaterally, no wheezing.    ABDOMEN: Abdomen is soft and not distended. No tenderness.    EXTREMITIES: There is no pedal edema.   MUSCULOSKELETAL: No joint swelling. Normal range of motion    SKIN: Skin is moist. No ulcers or lesions.   NEUROLOGICAL: No evidence of obvious neurological deficits.    PSYCHOLOGICAL: Patient is in normal mood.          LABORATORY DATA:   Lab Results   Component Value Date/Time    WBC 4.3 07/08/2025 09:56 AM    HGB 8.6 07/08/2025 09:56 AM    HCT 25.7 07/08/2025 09:56 AM     07/08/2025 09:56 AM      Lab Results   Component Value Date/Time     07/08/2025 09:56 AM    K 4.1 07/08/2025 09:56 AM    K 3.7 06/26/2022 02:17 PM    CO2 24 07/08/2025 09:56 AM    BUN 22 07/08/2025 09:56 AM    CREATININE 0.9 07/08/2025 09:56 AM    MG 2.2 07/02/2025 03:04 PM      No components found for: \"HGBA1C\"   Lab Results   Component Value Date/Time    TSH 0.06 04/16/2024 03:00 PM      No components found for: \"LDLCALC\"     The ASCVD Risk score (Alexis AU, et al., 2019) failed to calculate for the following reasons:    The 2019 ASCVD risk score is only valid for ages 40 to 79   The ASCVD Risk score (Alexis AU, et al., 2019) failed to calculate for the following reasons:    The 2019 ASCVD risk score is only valid for ages 40 to 79        FRAILTY ASSESSMENT:   [facility]

## 2025-07-15 ENCOUNTER — APPOINTMENT (OUTPATIENT)
Dept: INFUSION THERAPY | Age: 85
End: 2025-07-15
Payer: MEDICARE

## 2025-07-17 ENCOUNTER — HOSPITAL ENCOUNTER (OUTPATIENT)
Dept: PULMONOLOGY | Age: 85
Discharge: HOME | End: 2025-07-17
Attending: INTERNAL MEDICINE
Payer: MEDICARE

## 2025-07-17 DIAGNOSIS — R06.02 SHORTNESS OF BREATH: ICD-10-CM

## 2025-07-17 DIAGNOSIS — R01.1 HEART MURMUR: ICD-10-CM

## 2025-07-17 PROCEDURE — 94729 DIFFUSING CAPACITY: CPT

## 2025-07-17 PROCEDURE — 94060 EVALUATION OF WHEEZING: CPT

## 2025-07-17 PROCEDURE — 94726 PLETHYSMOGRAPHY LUNG VOLUMES: CPT

## 2025-07-21 ENCOUNTER — OFFICE VISIT (OUTPATIENT)
Age: 85
End: 2025-07-21
Payer: MEDICARE

## 2025-07-21 VITALS
DIASTOLIC BLOOD PRESSURE: 49 MMHG | SYSTOLIC BLOOD PRESSURE: 116 MMHG | OXYGEN SATURATION: 96 % | RESPIRATION RATE: 18 BRPM | TEMPERATURE: 97.8 F | HEART RATE: 78 BPM

## 2025-07-21 DIAGNOSIS — I35.0 SEVERE AORTIC STENOSIS: ICD-10-CM

## 2025-07-21 DIAGNOSIS — C50.919 MALIGNANT NEOPLASM OF FEMALE BREAST, UNSPECIFIED ESTROGEN RECEPTOR STATUS, UNSPECIFIED LATERALITY, UNSPECIFIED SITE OF BREAST (HCC): ICD-10-CM

## 2025-07-21 DIAGNOSIS — J90 PLEURAL EFFUSION: Primary | ICD-10-CM

## 2025-07-21 PROCEDURE — 1123F ACP DISCUSS/DSCN MKR DOCD: CPT | Performed by: INTERNAL MEDICINE

## 2025-07-21 PROCEDURE — 99214 OFFICE O/P EST MOD 30 MIN: CPT | Performed by: INTERNAL MEDICINE

## 2025-07-21 NOTE — PROGRESS NOTES
Orders Placed This Encounter    XR CHEST (2 VW)     Standing Status:   Future     Expected Date:   7/21/2025     Expiration Date:   7/21/2026     Reason for exam::   follow up on pleural effusion     Dr Garcia will call patient in 1 week patient will obtain xray at [facility]

## 2025-07-22 ENCOUNTER — HOSPITAL ENCOUNTER (OUTPATIENT)
Dept: INFUSION THERAPY | Age: 85
Discharge: HOME | End: 2025-07-22
Payer: MEDICARE

## 2025-07-22 ENCOUNTER — TELEPHONE (OUTPATIENT)
Dept: INFUSION THERAPY | Age: 85
End: 2025-07-22

## 2025-07-22 ENCOUNTER — OFFICE VISIT (OUTPATIENT)
Age: 85
End: 2025-07-22
Payer: MEDICARE

## 2025-07-22 VITALS
DIASTOLIC BLOOD PRESSURE: 56 MMHG | WEIGHT: 100.6 LBS | HEART RATE: 83 BPM | OXYGEN SATURATION: 96 % | TEMPERATURE: 97.3 F | BODY MASS INDEX: 19.75 KG/M2 | HEIGHT: 60 IN | SYSTOLIC BLOOD PRESSURE: 116 MMHG

## 2025-07-22 VITALS — BODY MASS INDEX: 19.67 KG/M2 | WEIGHT: 100.7 LBS

## 2025-07-22 DIAGNOSIS — G89.3 PAIN DUE TO NEOPLASM: ICD-10-CM

## 2025-07-22 DIAGNOSIS — Z51.5 PALLIATIVE CARE BY SPECIALIST: ICD-10-CM

## 2025-07-22 DIAGNOSIS — R53.0 NEOPLASTIC MALIGNANT RELATED FATIGUE: ICD-10-CM

## 2025-07-22 DIAGNOSIS — Z17.0 MALIGNANT NEOPLASM OF RIGHT BREAST IN FEMALE, ESTROGEN RECEPTOR POSITIVE, UNSPECIFIED SITE OF BREAST (HCC): ICD-10-CM

## 2025-07-22 DIAGNOSIS — Z17.0 MALIGNANT NEOPLASM OF RIGHT BREAST IN FEMALE, ESTROGEN RECEPTOR POSITIVE, UNSPECIFIED SITE OF BREAST (HCC): Primary | ICD-10-CM

## 2025-07-22 DIAGNOSIS — C50.919 MALIGNANT NEOPLASM OF FEMALE BREAST, UNSPECIFIED ESTROGEN RECEPTOR STATUS, UNSPECIFIED LATERALITY, UNSPECIFIED SITE OF BREAST (HCC): ICD-10-CM

## 2025-07-22 DIAGNOSIS — C80.1 ADENOCARCINOMA (HCC): ICD-10-CM

## 2025-07-22 DIAGNOSIS — D46.9 MDS (MYELODYSPLASTIC SYNDROME) (HCC): ICD-10-CM

## 2025-07-22 DIAGNOSIS — D46.9 MDS (MYELODYSPLASTIC SYNDROME) (HCC): Primary | ICD-10-CM

## 2025-07-22 DIAGNOSIS — D63.8 ANEMIA OF CHRONIC DISEASE: ICD-10-CM

## 2025-07-22 DIAGNOSIS — C50.911 MALIGNANT NEOPLASM OF RIGHT BREAST IN FEMALE, ESTROGEN RECEPTOR POSITIVE, UNSPECIFIED SITE OF BREAST (HCC): Primary | ICD-10-CM

## 2025-07-22 DIAGNOSIS — C50.911 MALIGNANT NEOPLASM OF RIGHT BREAST IN FEMALE, ESTROGEN RECEPTOR POSITIVE, UNSPECIFIED SITE OF BREAST (HCC): ICD-10-CM

## 2025-07-22 DIAGNOSIS — D46.A MYELODYSPLASTIC SYNDROME WITH MULTILINEAGE DYSPLASIA (HCC): ICD-10-CM

## 2025-07-22 LAB
ALBUMIN SERPL-MCNC: 4.3 G/DL (ref 3.5–5.2)
ALP SERPL-CCNC: 54 U/L (ref 35–104)
ALT SERPL-CCNC: 95 U/L (ref 0–35)
ANION GAP SERPL CALCULATED.3IONS-SCNC: 15 MMOL/L (ref 7–16)
AST SERPL-CCNC: 104 U/L (ref 0–35)
BASOPHILS # BLD: 0.04 K/UL (ref 0–0.2)
BASOPHILS NFR BLD: 1 % (ref 0–2)
BILIRUB SERPL-MCNC: 1.6 MG/DL (ref 0–1.2)
BUN SERPL-MCNC: 30 MG/DL (ref 8–23)
CALCIUM SERPL-MCNC: 9.8 MG/DL (ref 8.8–10.2)
CEA SERPL-MCNC: 72.9 NG/ML (ref 0–5.2)
CHLORIDE SERPL-SCNC: 103 MMOL/L (ref 98–107)
CO2 SERPL-SCNC: 26 MMOL/L (ref 22–29)
CREAT SERPL-MCNC: 1 MG/DL (ref 0.5–1)
EOSINOPHIL # BLD: 0.12 K/UL (ref 0.05–0.5)
EOSINOPHILS RELATIVE PERCENT: 3 % (ref 0–6)
ERYTHROCYTE [DISTWIDTH] IN BLOOD BY AUTOMATED COUNT: 27.8 % (ref 11.5–15)
GFR, ESTIMATED: 53 ML/MIN/1.73M2
GLUCOSE SERPL-MCNC: 134 MG/DL (ref 74–99)
HCT VFR BLD AUTO: 28.2 % (ref 34–48)
HGB BLD-MCNC: 9.3 G/DL (ref 11.5–15.5)
LYMPHOCYTES NFR BLD: 1.61 K/UL (ref 1.5–4)
LYMPHOCYTES RELATIVE PERCENT: 34 % (ref 20–42)
MCH RBC QN AUTO: 38.3 PG (ref 26–35)
MCHC RBC AUTO-ENTMCNC: 33 G/DL (ref 32–34.5)
MCV RBC AUTO: 116 FL (ref 80–99.9)
MONOCYTES NFR BLD: 0.21 K/UL (ref 0.1–0.95)
MONOCYTES NFR BLD: 4 % (ref 2–12)
NEUTROPHILS NFR BLD: 58 % (ref 43–80)
NEUTS SEG NFR BLD: 2.72 K/UL (ref 1.8–7.3)
NUCLEATED RED BLOOD CELLS: 2 PER 100 WBC
PLATELET # BLD AUTO: 140 K/UL (ref 130–450)
PMV BLD AUTO: 11 FL (ref 7–12)
POTASSIUM SERPL-SCNC: 3.9 MMOL/L (ref 3.5–5.1)
PROT SERPL-MCNC: 7.4 G/DL (ref 6.4–8.3)
RBC # BLD AUTO: 2.43 M/UL (ref 3.5–5.5)
RBC # BLD: ABNORMAL 10*6/UL
SODIUM SERPL-SCNC: 144 MMOL/L (ref 136–145)
WBC OTHER # BLD: 4.7 K/UL (ref 4.5–11.5)

## 2025-07-22 PROCEDURE — 96372 THER/PROPH/DIAG INJ SC/IM: CPT

## 2025-07-22 PROCEDURE — 1159F MED LIST DOCD IN RCRD: CPT | Performed by: NURSE PRACTITIONER

## 2025-07-22 PROCEDURE — 86300 IMMUNOASSAY TUMOR CA 15-3: CPT

## 2025-07-22 PROCEDURE — 36415 COLL VENOUS BLD VENIPUNCTURE: CPT

## 2025-07-22 PROCEDURE — 1123F ACP DISCUSS/DSCN MKR DOCD: CPT | Performed by: INTERNAL MEDICINE

## 2025-07-22 PROCEDURE — 99214 OFFICE O/P EST MOD 30 MIN: CPT | Performed by: INTERNAL MEDICINE

## 2025-07-22 PROCEDURE — 80053 COMPREHEN METABOLIC PANEL: CPT

## 2025-07-22 PROCEDURE — 82378 CARCINOEMBRYONIC ANTIGEN: CPT

## 2025-07-22 PROCEDURE — 1160F RVW MEDS BY RX/DR IN RCRD: CPT | Performed by: INTERNAL MEDICINE

## 2025-07-22 PROCEDURE — 85025 COMPLETE CBC W/AUTO DIFF WBC: CPT

## 2025-07-22 PROCEDURE — 1123F ACP DISCUSS/DSCN MKR DOCD: CPT | Performed by: NURSE PRACTITIONER

## 2025-07-22 PROCEDURE — 1126F AMNT PAIN NOTED NONE PRSNT: CPT | Performed by: INTERNAL MEDICINE

## 2025-07-22 PROCEDURE — 6360000002 HC RX W HCPCS: Performed by: INTERNAL MEDICINE

## 2025-07-22 PROCEDURE — 1159F MED LIST DOCD IN RCRD: CPT | Performed by: INTERNAL MEDICINE

## 2025-07-22 PROCEDURE — 99214 OFFICE O/P EST MOD 30 MIN: CPT | Performed by: NURSE PRACTITIONER

## 2025-07-22 RX ORDER — OXYCODONE AND ACETAMINOPHEN 5; 325 MG/1; MG/1
1 TABLET ORAL EVERY 4 HOURS PRN
Qty: 90 TABLET | Refills: 0 | Status: SHIPPED | OUTPATIENT
Start: 2025-07-22 | End: 2025-08-06

## 2025-07-22 RX ORDER — METHOCARBAMOL 750 MG/1
750 TABLET, FILM COATED ORAL 4 TIMES DAILY
Qty: 40 TABLET | Refills: 0 | Status: SHIPPED | OUTPATIENT
Start: 2025-07-22 | End: 2025-07-22

## 2025-07-22 RX ORDER — METHOCARBAMOL 750 MG/1
750 TABLET, FILM COATED ORAL 3 TIMES DAILY PRN
Qty: 30 TABLET | Refills: 0 | Status: SHIPPED | OUTPATIENT
Start: 2025-07-22 | End: 2025-08-01

## 2025-07-22 RX ADMIN — DARBEPOETIN ALFA 500 MCG: 500 INJECTION, SOLUTION INTRAVENOUS; SUBCUTANEOUS at 11:36

## 2025-07-22 NOTE — PROGRESS NOTES
Moat liquid Bx test kit ordered per Dr.El Bernardo  Kit ID [id number] exp date 10-. Order submitted online via Epic.Specimen obtained by Pilar in med onc office. Copy of Epic order placed in box along with patient consent form. Specimen dropped off directly by this RN and shipped same day [employer] Ex with tracking number of  [id number].

## 2025-07-22 NOTE — TELEPHONE ENCOUNTER
Bone scan appt canceled at this time per Dr. Luciano Bernardo, no need for bone scan as PET scan was ordered at this time. Pt and daughter notified, number provided for scheduling PET.

## 2025-07-22 NOTE — PROGRESS NOTES
[facility] MED ONCOLOGY  [address]  [facility] [address]  Dept: [phone]  Loc: [phone]  Attending Progress Note      Reason for Visit:   1.  Recurrent metastatic breast cancer.                                   2. MDS.      Referring Physician:  CONNIE DAS CNP    PCP:  Roselia Moralez APRN - CNP    History of Present Illness:      The patient is a very pleasant 84 y.o. lady with a PMH significant for Right Breast Cancer, stage III, HR pos, was diagnosed in 2009, she had a right mastectomy done, followed by adjuvant chemo, she received 8 cycles, probably AC followed by T, then PMRT, and 5 years of adjuvant ET with Arimidex, was completed in 2015. She was treated in [place] under the care of Dr. Simpson. She was diagnosed with MDS in 2017, she received ESAs and PRBCs transfusion. She has transfusion related iron overload. She was started on Aranesp on 4/24/2020.  No SE from Aranesp.     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  The patient had a follow-up with Dr. Melendez, he recommended conservative medical management with DAPT.      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  She had worsening anemia after the procedure requiring packed RBCs transfusion.  The patient did well after the procedure.    The patient was found to have a left pleural effusion, s/p thoracentesis on 6/27/2025 with removal of 750 cc

## 2025-07-22 NOTE — PROGRESS NOTES
Department of Palliative Medicine  Ambulatory Note  Provider: Ilda Ashrfa, APRN - CNP       Chief Complaint: Patricia Ventura is a 84 y.o. female with chief complaint of Pain      Assessment/Plan      History of Breast Cancer stage III, HR positive/New Malignant pleural effusion:  Diagnosed, treated, and managed in [place] in 2009  Status post right mastectomy  Followed by adjuvant chemotherapy  Completed her Demadex 2015  Currently followed locally by Dr. Luciano Bernardo  Malignant Pleural Effusion found June 26 2025     Chemotherapy related peripheral Neuropathy/Pain:  Oxycodone 5/325 mg every 6 hours as needed  Zanaflex-caused dry mouth  Trial Robaxin 750 mg 3 times daily as needed     Fatigue/weakness:  Encouraged continue physical activity  Encouraged to watch intake and use ONS  Looking into resources for help at home, to establish at   reports improvement in energy, especially after stopping her gabapentin and lipitor    Fatigue/Weakness:  Likely multifactorial  Possibly exacerbated by aortic stenosis  Additionally newly found pleural effusion positive for adenocarcinoma      Follow Up: 4 weeks.  They were encouraged to call with any questions, concerns, needs, or changes in symptoms.    Subjective:   HPI:  Patricia Ventura is a 79 y.o. female with significant past medical history of stage II HR positive breast cancer, diagnosed in 2009, status post right mastectomy, followed by adjuvant therapy, and 5 years of adjuvant Arimidex which was completed in 2015, with all treatment performed in [place] under the care of Dr. Simpson.  She is currently being followed locally by Dr. Luciano Bernardo, without evidence of recurrent disease, whom she also follows for management of myelodysplastic syndrome, which was diagnosed in 2017.  She was referred to [facility] Palliative Medicine for assistance with symptom management related to chemotherapy-induced peripheral neuropathy.      7/22/25  History of Present Illness  Chart

## 2025-07-25 LAB — CANCER AG15-3 SERPL-ACNC: 630 U/ML (ref 0–31)

## 2025-07-26 PROBLEM — R01.1 HEART MURMUR: Status: ACTIVE | Noted: 2025-07-26

## 2025-07-29 LAB
CARIS HLA-A LIQUID GERMLINE: NORMAL
CARIS HLA-B LIQUID GERMLINE: NORMAL
CARIS HLA-C LIQUID GERMLINE: NORMAL
CARIS MSI - LP EXOME VARIANTS: NOT DETECTED
CARIS TMB - LP EXOME VARIANTS: NORMAL

## 2025-08-01 ENCOUNTER — HOSPITAL ENCOUNTER (OUTPATIENT)
Dept: PET IMAGING | Age: 85
End: 2025-08-01
Attending: INTERNAL MEDICINE
Payer: MEDICARE

## 2025-08-01 ENCOUNTER — HOSPITAL ENCOUNTER (OUTPATIENT)
Dept: PET IMAGING | Age: 85
Discharge: HOME | End: 2025-08-01
Attending: INTERNAL MEDICINE
Payer: MEDICARE

## 2025-08-01 DIAGNOSIS — C50.911 MALIGNANT NEOPLASM OF RIGHT BREAST IN FEMALE, ESTROGEN RECEPTOR POSITIVE, UNSPECIFIED SITE OF BREAST (HCC): ICD-10-CM

## 2025-08-01 DIAGNOSIS — Z17.0 MALIGNANT NEOPLASM OF RIGHT BREAST IN FEMALE, ESTROGEN RECEPTOR POSITIVE, UNSPECIFIED SITE OF BREAST (HCC): ICD-10-CM

## 2025-08-01 DIAGNOSIS — C80.1 ADENOCARCINOMA (HCC): ICD-10-CM

## 2025-08-01 LAB — GLUCOSE BLD-MCNC: 106 MG/DL (ref 74–99)

## 2025-08-01 PROCEDURE — A9609 HC RX DIAGNOSTIC RADIOPHARMACEUTICAL: HCPCS | Performed by: RADIOLOGY

## 2025-08-01 PROCEDURE — 82962 GLUCOSE BLOOD TEST: CPT

## 2025-08-01 PROCEDURE — 78815 PET IMAGE W/CT SKULL-THIGH: CPT

## 2025-08-01 PROCEDURE — 3430000000 HC RX DIAGNOSTIC RADIOPHARMACEUTICAL: Performed by: RADIOLOGY

## 2025-08-01 RX ORDER — FLUDEOXYGLUCOSE F 18 200 MCI/ML
15 INJECTION, SOLUTION INTRAVENOUS
Status: COMPLETED | OUTPATIENT
Start: 2025-08-01 | End: 2025-08-01

## 2025-08-01 RX ADMIN — FLUDEOXYGLUCOSE F 18 15 MILLICURIE: 200 INJECTION, SOLUTION INTRAVENOUS at 12:33

## 2025-08-04 LAB — SURGICAL PATHOLOGY REPORT: NORMAL

## (undated) DEVICE — TAPE ADH W3INXL10YD WHT COT WVN BK POWERFUL RUB BASE HIGHLY

## (undated) DEVICE — ELECTRODE PT RET AD L9FT HI MOIST COND ADH HYDRGEL CORDED

## (undated) DEVICE — GLOVE SURG SZ 8 L12IN FNGR THK79MIL GRN LTX FREE

## (undated) DEVICE — SOLUTION IV IRRIG WATER 1000ML POUR BRL 2F7114

## (undated) DEVICE — TOWEL,OR,DSP,ST,BLUE,STD,6/PK,12PK/CS: Brand: MEDLINE

## (undated) DEVICE — CLOTH SURG PREP PREOPERATIVE CHLORHEXIDINE GLUC 2% READYPREP

## (undated) DEVICE — SYRINGE MED 50ML LUERLOCK TIP

## (undated) DEVICE — DRAPE,REIN 53X77,STERILE: Brand: MEDLINE

## (undated) DEVICE — SPLINT ORTH W5XL30IN PLSTR OF PARIS FAST IMMOB OF FRAC HI

## (undated) DEVICE — BANDAGE COMPR W6INXL12FT SMOOTH FOR LIMB EXSANG ESMARCH

## (undated) DEVICE — GOWN,SIRUS,FABRNF,XL,20/CS: Brand: MEDLINE

## (undated) DEVICE — PACK PROCEDURE SURG GEN CUST

## (undated) DEVICE — BIT DRL DIA2.5MM FOR ANK FRAC MGMT SYS

## (undated) DEVICE — GLOVE ORANGE PI 8   MSG9080

## (undated) DEVICE — PADDING CAST W6INXL4YD COT LO LINTING WYTEX

## (undated) DEVICE — DRAPE,TOP,102X53,STERILE: Brand: MEDLINE

## (undated) DEVICE — TOTAL KNEE PK

## (undated) DEVICE — BNDG,ELSTC,MATRIX,STRL,4"X5YD,LF,HOOK&LP: Brand: MEDLINE

## (undated) DEVICE — SCREW BONE L12MM DIA3.5MM TI LCK LO PROF FOR ANK FX SYS
Type: IMPLANTABLE DEVICE | Site: ANKLE | Status: NON-FUNCTIONAL
Removed: 2022-08-24

## (undated) DEVICE — DOUBLE BASIN SET: Brand: MEDLINE INDUSTRIES, INC.

## (undated) DEVICE — GLOVE SURG SZ 8 L12IN FNGR THK94MIL STD WHT LTX FREE

## (undated) DEVICE — TUBING SUCT 12FR MAL ALUM SHFT FN CAP VENT UNIV CONN W/ OBT

## (undated) DEVICE — GLOVE ORTHO 8   MSG9480

## (undated) DEVICE — ZIMMER® STERILE DISPOSABLE TOURNIQUET CUFF WITH PLC, DUAL PORT, SINGLE BLADDER, 30 IN. (76 CM)

## (undated) DEVICE — GAUZE,SPONGE,4"X4",8PLY,STRL,LF,10/TRAY: Brand: MEDLINE

## (undated) DEVICE — DRESSING,GAUZE,XEROFORM,CURAD,1"X8",ST: Brand: CURAD

## (undated) DEVICE — 3M™ STERI-DRAPE™ U-DRAPE 1015: Brand: STERI-DRAPE™

## (undated) DEVICE — SOLUTION IV IRRIG POUR BRL 0.9% SODIUM CHL 2F7124

## (undated) DEVICE — ANCHOR FIX DISP FOR ANK FRAC SYS BB-TAK

## (undated) DEVICE — BIT DRL DIA2MM CALIB FOR ANK FRAC MGMT SYS

## (undated) DEVICE — SPONGE LAP W18XL18IN WHT COT 4 PLY FLD STRUNG RADPQ DISP ST

## (undated) DEVICE — PADDING,UNDERCAST,COTTON, 4"X4YD STERILE: Brand: MEDLINE

## (undated) DEVICE — 4-PORT MANIFOLD: Brand: NEPTUNE 2

## (undated) DEVICE — NEEDLE HYPO 25GA L1.5IN BLU POLYPR HUB S STL REG BVL STR

## (undated) DEVICE — C-ARM: Brand: UNBRANDED

## (undated) DEVICE — SPLINT CAST W5INXL45IN CRM PLSTR FAST SET W CTRL PLAS COR N

## (undated) DEVICE — TUBING, SUCTION, 9/32" X 10', STRAIGHT: Brand: MEDLINE

## (undated) DEVICE — CHLORAPREP 26ML ORANGE

## (undated) DEVICE — INTENDED FOR TISSUE SEPARATION, AND OTHER PROCEDURES THAT REQUIRE A SHARP SURGICAL BLADE TO PUNCTURE OR CUT.: Brand: BARD-PARKER ® STAINLESS STEEL BLADES